# Patient Record
Sex: MALE | Race: ASIAN | NOT HISPANIC OR LATINO | URBAN - METROPOLITAN AREA
[De-identification: names, ages, dates, MRNs, and addresses within clinical notes are randomized per-mention and may not be internally consistent; named-entity substitution may affect disease eponyms.]

---

## 2019-08-08 ENCOUNTER — INPATIENT (INPATIENT)
Facility: HOSPITAL | Age: 49
LOS: 0 days | Discharge: ANOTHER HOSPITAL | End: 2019-08-09
Attending: INTERNAL MEDICINE | Admitting: INTERNAL MEDICINE
Payer: MEDICARE

## 2019-08-08 VITALS
OXYGEN SATURATION: 100 % | HEART RATE: 87 BPM | RESPIRATION RATE: 16 BRPM | TEMPERATURE: 98 F | DIASTOLIC BLOOD PRESSURE: 80 MMHG | SYSTOLIC BLOOD PRESSURE: 126 MMHG

## 2019-08-08 DIAGNOSIS — I24.9 ACUTE ISCHEMIC HEART DISEASE, UNSPECIFIED: ICD-10-CM

## 2019-08-08 DIAGNOSIS — Z98.61 CORONARY ANGIOPLASTY STATUS: Chronic | ICD-10-CM

## 2019-08-08 LAB
ALBUMIN SERPL ELPH-MCNC: 3.9 G/DL — SIGNIFICANT CHANGE UP (ref 3.3–5)
ALBUMIN SERPL ELPH-MCNC: 4.1 G/DL — SIGNIFICANT CHANGE UP (ref 3.3–5)
ALP SERPL-CCNC: 75 U/L — SIGNIFICANT CHANGE UP (ref 40–120)
ALP SERPL-CCNC: 76 U/L — SIGNIFICANT CHANGE UP (ref 40–120)
ALT FLD-CCNC: 15 U/L — SIGNIFICANT CHANGE UP (ref 4–41)
ALT FLD-CCNC: 16 U/L — SIGNIFICANT CHANGE UP (ref 4–41)
ANION GAP SERPL CALC-SCNC: 13 MMO/L — SIGNIFICANT CHANGE UP (ref 7–14)
ANION GAP SERPL CALC-SCNC: 13 MMO/L — SIGNIFICANT CHANGE UP (ref 7–14)
APTT BLD: 24.6 SEC — LOW (ref 27.5–36.3)
AST SERPL-CCNC: 12 U/L — SIGNIFICANT CHANGE UP (ref 4–40)
AST SERPL-CCNC: 13 U/L — SIGNIFICANT CHANGE UP (ref 4–40)
BASOPHILS # BLD AUTO: 0.03 K/UL — SIGNIFICANT CHANGE UP (ref 0–0.2)
BASOPHILS NFR BLD AUTO: 0.5 % — SIGNIFICANT CHANGE UP (ref 0–2)
BILIRUB SERPL-MCNC: 0.2 MG/DL — SIGNIFICANT CHANGE UP (ref 0.2–1.2)
BILIRUB SERPL-MCNC: 0.3 MG/DL — SIGNIFICANT CHANGE UP (ref 0.2–1.2)
BUN SERPL-MCNC: 10 MG/DL — SIGNIFICANT CHANGE UP (ref 7–23)
BUN SERPL-MCNC: 11 MG/DL — SIGNIFICANT CHANGE UP (ref 7–23)
CALCIUM SERPL-MCNC: 9.5 MG/DL — SIGNIFICANT CHANGE UP (ref 8.4–10.5)
CALCIUM SERPL-MCNC: 9.8 MG/DL — SIGNIFICANT CHANGE UP (ref 8.4–10.5)
CHLORIDE SERPL-SCNC: 100 MMOL/L — SIGNIFICANT CHANGE UP (ref 98–107)
CHLORIDE SERPL-SCNC: 103 MMOL/L — SIGNIFICANT CHANGE UP (ref 98–107)
CO2 SERPL-SCNC: 24 MMOL/L — SIGNIFICANT CHANGE UP (ref 22–31)
CO2 SERPL-SCNC: 26 MMOL/L — SIGNIFICANT CHANGE UP (ref 22–31)
CREAT SERPL-MCNC: 0.68 MG/DL — SIGNIFICANT CHANGE UP (ref 0.5–1.3)
CREAT SERPL-MCNC: 0.79 MG/DL — SIGNIFICANT CHANGE UP (ref 0.5–1.3)
EOSINOPHIL # BLD AUTO: 0.07 K/UL — SIGNIFICANT CHANGE UP (ref 0–0.5)
EOSINOPHIL NFR BLD AUTO: 1.1 % — SIGNIFICANT CHANGE UP (ref 0–6)
GLUCOSE SERPL-MCNC: 282 MG/DL — HIGH (ref 70–99)
GLUCOSE SERPL-MCNC: 357 MG/DL — HIGH (ref 70–99)
HCT VFR BLD CALC: 40.7 % — SIGNIFICANT CHANGE UP (ref 39–50)
HGB BLD-MCNC: 13.4 G/DL — SIGNIFICANT CHANGE UP (ref 13–17)
IMM GRANULOCYTES NFR BLD AUTO: 0.3 % — SIGNIFICANT CHANGE UP (ref 0–1.5)
INR BLD: 0.88 — SIGNIFICANT CHANGE UP (ref 0.88–1.17)
LYMPHOCYTES # BLD AUTO: 2.37 K/UL — SIGNIFICANT CHANGE UP (ref 1–3.3)
LYMPHOCYTES # BLD AUTO: 38.7 % — SIGNIFICANT CHANGE UP (ref 13–44)
MCHC RBC-ENTMCNC: 26.1 PG — LOW (ref 27–34)
MCHC RBC-ENTMCNC: 32.9 % — SIGNIFICANT CHANGE UP (ref 32–36)
MCV RBC AUTO: 79.2 FL — LOW (ref 80–100)
MONOCYTES # BLD AUTO: 0.43 K/UL — SIGNIFICANT CHANGE UP (ref 0–0.9)
MONOCYTES NFR BLD AUTO: 7 % — SIGNIFICANT CHANGE UP (ref 2–14)
NEUTROPHILS # BLD AUTO: 3.2 K/UL — SIGNIFICANT CHANGE UP (ref 1.8–7.4)
NEUTROPHILS NFR BLD AUTO: 52.4 % — SIGNIFICANT CHANGE UP (ref 43–77)
NRBC # FLD: 0 K/UL — SIGNIFICANT CHANGE UP (ref 0–0)
PLATELET # BLD AUTO: 161 K/UL — SIGNIFICANT CHANGE UP (ref 150–400)
PMV BLD: 12.6 FL — SIGNIFICANT CHANGE UP (ref 7–13)
POTASSIUM SERPL-MCNC: 4.1 MMOL/L — SIGNIFICANT CHANGE UP (ref 3.5–5.3)
POTASSIUM SERPL-MCNC: 4.4 MMOL/L — SIGNIFICANT CHANGE UP (ref 3.5–5.3)
POTASSIUM SERPL-SCNC: 4.1 MMOL/L — SIGNIFICANT CHANGE UP (ref 3.5–5.3)
POTASSIUM SERPL-SCNC: 4.4 MMOL/L — SIGNIFICANT CHANGE UP (ref 3.5–5.3)
PROT SERPL-MCNC: 6.9 G/DL — SIGNIFICANT CHANGE UP (ref 6–8.3)
PROT SERPL-MCNC: 6.9 G/DL — SIGNIFICANT CHANGE UP (ref 6–8.3)
PROTHROM AB SERPL-ACNC: 9.8 SEC — SIGNIFICANT CHANGE UP (ref 9.8–13.1)
RBC # BLD: 5.14 M/UL — SIGNIFICANT CHANGE UP (ref 4.2–5.8)
RBC # FLD: 12.9 % — SIGNIFICANT CHANGE UP (ref 10.3–14.5)
SODIUM SERPL-SCNC: 139 MMOL/L — SIGNIFICANT CHANGE UP (ref 135–145)
SODIUM SERPL-SCNC: 140 MMOL/L — SIGNIFICANT CHANGE UP (ref 135–145)
TROPONIN T, HIGH SENSITIVITY: 31 NG/L — SIGNIFICANT CHANGE UP (ref ?–14)
TROPONIN T, HIGH SENSITIVITY: 33 NG/L — SIGNIFICANT CHANGE UP (ref ?–14)
WBC # BLD: 6.12 K/UL — SIGNIFICANT CHANGE UP (ref 3.8–10.5)
WBC # FLD AUTO: 6.12 K/UL — SIGNIFICANT CHANGE UP (ref 3.8–10.5)

## 2019-08-08 PROCEDURE — 71046 X-RAY EXAM CHEST 2 VIEWS: CPT | Mod: 26

## 2019-08-08 RX ORDER — ASPIRIN/CALCIUM CARB/MAGNESIUM 324 MG
81 TABLET ORAL DAILY
Refills: 0 | Status: DISCONTINUED | OUTPATIENT
Start: 2019-08-08 | End: 2019-08-09

## 2019-08-08 RX ORDER — FENOFIBRATE,MICRONIZED 130 MG
145 CAPSULE ORAL DAILY
Refills: 0 | Status: DISCONTINUED | OUTPATIENT
Start: 2019-08-08 | End: 2019-08-09

## 2019-08-08 RX ORDER — PANTOPRAZOLE SODIUM 20 MG/1
40 TABLET, DELAYED RELEASE ORAL
Refills: 0 | Status: DISCONTINUED | OUTPATIENT
Start: 2019-08-08 | End: 2019-08-09

## 2019-08-08 RX ORDER — ATORVASTATIN CALCIUM 80 MG/1
40 TABLET, FILM COATED ORAL AT BEDTIME
Refills: 0 | Status: DISCONTINUED | OUTPATIENT
Start: 2019-08-08 | End: 2019-08-09

## 2019-08-08 RX ORDER — METOPROLOL TARTRATE 50 MG
25 TABLET ORAL
Refills: 0 | Status: DISCONTINUED | OUTPATIENT
Start: 2019-08-08 | End: 2019-08-09

## 2019-08-08 RX ORDER — GABAPENTIN 400 MG/1
300 CAPSULE ORAL
Refills: 0 | Status: DISCONTINUED | OUTPATIENT
Start: 2019-08-08 | End: 2019-08-09

## 2019-08-08 RX ORDER — ASPIRIN/CALCIUM CARB/MAGNESIUM 324 MG
324 TABLET ORAL ONCE
Refills: 0 | Status: COMPLETED | OUTPATIENT
Start: 2019-08-08 | End: 2019-08-08

## 2019-08-08 RX ORDER — LISINOPRIL 2.5 MG/1
10 TABLET ORAL DAILY
Refills: 0 | Status: DISCONTINUED | OUTPATIENT
Start: 2019-08-08 | End: 2019-08-09

## 2019-08-08 RX ORDER — HEPARIN SODIUM 5000 [USP'U]/ML
5000 INJECTION INTRAVENOUS; SUBCUTANEOUS EVERY 12 HOURS
Refills: 0 | Status: DISCONTINUED | OUTPATIENT
Start: 2019-08-08 | End: 2019-08-09

## 2019-08-08 RX ADMIN — Medication 324 MILLIGRAM(S): at 14:25

## 2019-08-08 NOTE — ED PROVIDER NOTE - CARDIAC, MLM
Chest wall tenderness over L chest wall. Normal rate, regular rhythm.  Heart sounds S1, S2.  No murmurs, rubs or gallops.

## 2019-08-08 NOTE — ED PROVIDER NOTE - PMH
Coronary artery disease of native artery of native heart with stable angina pectoris    Hyperlipidemia, unspecified hyperlipidemia type    Hypertension, unspecified type    Type 2 diabetes mellitus without complication, with long-term current use of insulin

## 2019-08-08 NOTE — ED PROVIDER NOTE - OBJECTIVE STATEMENT
48 yo M w/ PMHx of CAD (s/p stents in 2007, 2009), HTN, HLD, DM that presents to the ED c/o chest pain for 2 hours that onset while he was at rest in his car. The chest pain is described as L sided sharp electricity like that radiates to the L shoulder and is exacerbated by exertion and relieved with rest. He has associated shortness of breath with exertion. Also states he has been feeling "tired" since last week. Denies diaphoresis, fever, chills, cough, back pain, nausea, vomiting, abd pain. Of note, he was recently sent by his PCP for a MPI in 5/23/2019 which showed inferolateral scar with significant angelica-infact ischemia w/ no evidence of LV dilatation, and normal global LV systolic function with inferolateral wall motion abnormalities more pronounced during stress. He is scheduled for an elective heart cath on 8/14/2019.

## 2019-08-08 NOTE — ED PROVIDER NOTE - ATTENDING CONTRIBUTION TO CARE
I have personally performed a face to face bedside history and physical examination of this patient. I have discussed the history, examination, review of systems, assessment and plan of management with the resident. I have reviewed the electronic medical record and amended it to reflect my history, review of systems, physical exam, assessment and plan.      48 yo M w/ PMHx of CAD (s/p stents in 2007, 2009), HTN, HLD, DM that presents to the ED c/o chest pain for 2 hours.  Left sided, radiatin gto left shoulder, associated with sob and worse with exertion.  Had MPI 5/23/19 showing inferolateral scar and is scheduled for elective heart cath on 8/14/19.  VSS afebrile.  Exam non-toxic appearing, non-diaphoretic, lungs clear, heart sounds nl, well perfused.   EKG non-ischemic.   Unstable angina considered given risk factors and planned elective heart cath.  Low c/f PE, dissection.  Will send labs, cxr.  Likely CDU given elevated heart score.

## 2019-08-08 NOTE — ED PROVIDER NOTE - CLINICAL SUMMARY MEDICAL DECISION MAKING FREE TEXT BOX
48 yo M with PMhx of CAD (stents in 2007, 2009), HTN, HLD, DM and recent outpatient myocardial perfusion imaging that yielded recommendation for urgent outpatient cath that presented with chest pain for 2 hours concerning for cardiac chest pain due to its exertional nature. HEART score of 6 (moderate risk of MACE). Due to high suspicion of cardiac chest pain, will admit for ACS r/o. Less likely PE as patient is not tachypneic, not hypoxic, not tachycardia, no unilateral leg swelling, no hormone use, no hemoptysis, no recent surgery or trauma, no hx of DVT/PE. Less likely pneumonia as patient has no fever, chills, productive cough.

## 2019-08-08 NOTE — ED ADULT NURSE NOTE - NSIMPLEMENTINTERV_GEN_ALL_ED
Implemented All Universal Safety Interventions:  [place] to call system. Call bell, personal items and telephone within reach. Instruct patient to call for assistance. Room bathroom lighting operational. Non-slip footwear when patient is off stretcher. Physically safe environment: no spills, clutter or unnecessary equipment. Stretcher in lowest position, wheels locked, appropriate side rails in place.

## 2019-08-08 NOTE — ED ADULT TRIAGE NOTE - CHIEF COMPLAINT QUOTE
pt comes to ED for CP x 40 mins ago. pt VSS in triage. pt has hx of cardiac stents, stroke HLD, HTN. pt appears comfortable ekg to be obtained

## 2019-08-09 ENCOUNTER — TRANSCRIPTION ENCOUNTER (OUTPATIENT)
Age: 49
End: 2019-08-09

## 2019-08-09 ENCOUNTER — INPATIENT (INPATIENT)
Facility: HOSPITAL | Age: 49
LOS: 5 days | Discharge: HOME | DRG: 235 | End: 2019-08-15
Attending: STUDENT IN AN ORGANIZED HEALTH CARE EDUCATION/TRAINING PROGRAM | Admitting: STUDENT IN AN ORGANIZED HEALTH CARE EDUCATION/TRAINING PROGRAM
Payer: COMMERCIAL

## 2019-08-09 VITALS
OXYGEN SATURATION: 98 % | SYSTOLIC BLOOD PRESSURE: 106 MMHG | TEMPERATURE: 96 F | DIASTOLIC BLOOD PRESSURE: 71 MMHG | HEART RATE: 70 BPM | RESPIRATION RATE: 21 BRPM

## 2019-08-09 VITALS — HEART RATE: 72 BPM

## 2019-08-09 DIAGNOSIS — I10 ESSENTIAL (PRIMARY) HYPERTENSION: ICD-10-CM

## 2019-08-09 DIAGNOSIS — I25.10 ATHEROSCLEROTIC HEART DISEASE OF NATIVE CORONARY ARTERY WITHOUT ANGINA PECTORIS: ICD-10-CM

## 2019-08-09 DIAGNOSIS — E11.65 TYPE 2 DIABETES MELLITUS WITH HYPERGLYCEMIA: ICD-10-CM

## 2019-08-09 DIAGNOSIS — Z29.9 ENCOUNTER FOR PROPHYLACTIC MEASURES, UNSPECIFIED: ICD-10-CM

## 2019-08-09 DIAGNOSIS — R07.9 CHEST PAIN, UNSPECIFIED: ICD-10-CM

## 2019-08-09 DIAGNOSIS — Z98.61 CORONARY ANGIOPLASTY STATUS: Chronic | ICD-10-CM

## 2019-08-09 DIAGNOSIS — E78.5 HYPERLIPIDEMIA, UNSPECIFIED: ICD-10-CM

## 2019-08-09 LAB
ALBUMIN SERPL ELPH-MCNC: 3.9 G/DL — SIGNIFICANT CHANGE UP (ref 3.3–5)
ALP SERPL-CCNC: 59 U/L — SIGNIFICANT CHANGE UP (ref 40–120)
ALT FLD-CCNC: 18 U/L — SIGNIFICANT CHANGE UP (ref 10–45)
ANION GAP SERPL CALC-SCNC: 13 MMO/L — SIGNIFICANT CHANGE UP (ref 7–14)
ANION GAP SERPL CALC-SCNC: 13 MMOL/L — SIGNIFICANT CHANGE UP (ref 5–17)
APAP SERPL-MCNC: < 15 UG/ML — LOW (ref 15–25)
APTT BLD: 42.4 SEC — HIGH (ref 27.5–36.3)
AST SERPL-CCNC: 13 U/L — SIGNIFICANT CHANGE UP (ref 10–40)
BILIRUB SERPL-MCNC: 0.3 MG/DL — SIGNIFICANT CHANGE UP (ref 0.2–1.2)
BLD GP AB SCN SERPL QL: NEGATIVE — SIGNIFICANT CHANGE UP
BUN SERPL-MCNC: 12 MG/DL — SIGNIFICANT CHANGE UP (ref 7–23)
BUN SERPL-MCNC: 16 MG/DL — SIGNIFICANT CHANGE UP (ref 7–23)
CALCIUM SERPL-MCNC: 9.1 MG/DL — SIGNIFICANT CHANGE UP (ref 8.4–10.5)
CALCIUM SERPL-MCNC: 9.6 MG/DL — SIGNIFICANT CHANGE UP (ref 8.4–10.5)
CHLORIDE SERPL-SCNC: 101 MMOL/L — SIGNIFICANT CHANGE UP (ref 96–108)
CHLORIDE SERPL-SCNC: 101 MMOL/L — SIGNIFICANT CHANGE UP (ref 98–107)
CHOLEST SERPL-MCNC: 163 MG/DL — SIGNIFICANT CHANGE UP (ref 120–199)
CK MB BLD-MCNC: 5.34 NG/ML — SIGNIFICANT CHANGE UP (ref 1–6.6)
CK MB BLD-MCNC: SIGNIFICANT CHANGE UP (ref 0–2.5)
CK MB CFR SERPL CALC: 4 NG/ML — SIGNIFICANT CHANGE UP (ref 0–6.7)
CK SERPL-CCNC: 57 U/L — SIGNIFICANT CHANGE UP (ref 30–200)
CK SERPL-CCNC: 91 U/L — SIGNIFICANT CHANGE UP (ref 30–200)
CO2 SERPL-SCNC: 22 MMOL/L — SIGNIFICANT CHANGE UP (ref 22–31)
CO2 SERPL-SCNC: 26 MMOL/L — SIGNIFICANT CHANGE UP (ref 22–31)
CREAT SERPL-MCNC: 0.81 MG/DL — SIGNIFICANT CHANGE UP (ref 0.5–1.3)
CREAT SERPL-MCNC: 0.87 MG/DL — SIGNIFICANT CHANGE UP (ref 0.5–1.3)
ETHANOL BLD-MCNC: < 10 MG/DL — SIGNIFICANT CHANGE UP
FERRITIN SERPL-MCNC: 64.72 NG/ML — SIGNIFICANT CHANGE UP (ref 30–400)
FIBRINOGEN PPP-MCNC: 327 MG/DL — LOW (ref 350–510)
GAS PNL BLDA: SIGNIFICANT CHANGE UP
GLUCOSE BLDC GLUCOMTR-MCNC: 181 MG/DL — HIGH (ref 70–99)
GLUCOSE BLDC GLUCOMTR-MCNC: 190 MG/DL — HIGH (ref 70–99)
GLUCOSE BLDC GLUCOMTR-MCNC: 318 MG/DL — HIGH (ref 70–99)
GLUCOSE BLDC GLUCOMTR-MCNC: 385 MG/DL — HIGH (ref 70–99)
GLUCOSE SERPL-MCNC: 206 MG/DL — HIGH (ref 70–99)
GLUCOSE SERPL-MCNC: 370 MG/DL — HIGH (ref 70–99)
HBA1C BLD-MCNC: 11.7 % — HIGH (ref 4–5.6)
HCT VFR BLD CALC: 42.4 % — SIGNIFICANT CHANGE UP (ref 39–50)
HCT VFR BLD CALC: 42.4 % — SIGNIFICANT CHANGE UP (ref 39–50)
HDLC SERPL-MCNC: 44 MG/DL — SIGNIFICANT CHANGE UP (ref 35–55)
HGB BLD-MCNC: 13.9 G/DL — SIGNIFICANT CHANGE UP (ref 13–17)
HGB BLD-MCNC: 14 G/DL — SIGNIFICANT CHANGE UP (ref 13–17)
INR BLD: 0.93 RATIO — SIGNIFICANT CHANGE UP (ref 0.88–1.16)
IRON SATN MFR SERPL: 349 UG/DL — SIGNIFICANT CHANGE UP (ref 155–535)
IRON SATN MFR SERPL: 83 UG/DL — SIGNIFICANT CHANGE UP (ref 45–165)
LIPID PNL WITH DIRECT LDL SERPL: 98 MG/DL — SIGNIFICANT CHANGE UP
MAGNESIUM SERPL-MCNC: 2 MG/DL — SIGNIFICANT CHANGE UP (ref 1.6–2.6)
MCHC RBC-ENTMCNC: 26.1 PG — LOW (ref 27–34)
MCHC RBC-ENTMCNC: 26.8 PG — LOW (ref 27–34)
MCHC RBC-ENTMCNC: 32.8 % — SIGNIFICANT CHANGE UP (ref 32–36)
MCHC RBC-ENTMCNC: 33.1 GM/DL — SIGNIFICANT CHANGE UP (ref 32–36)
MCV RBC AUTO: 79.7 FL — LOW (ref 80–100)
MCV RBC AUTO: 80.9 FL — SIGNIFICANT CHANGE UP (ref 80–100)
NRBC # FLD: 0 K/UL — SIGNIFICANT CHANGE UP (ref 0–0)
PA ADP PRP-ACNC: 217 PRU — SIGNIFICANT CHANGE UP (ref 194–417)
PHOSPHATE SERPL-MCNC: 3.7 MG/DL — SIGNIFICANT CHANGE UP (ref 2.5–4.5)
PLATELET # BLD AUTO: 152 K/UL — SIGNIFICANT CHANGE UP (ref 150–400)
PLATELET # BLD AUTO: 156 K/UL — SIGNIFICANT CHANGE UP (ref 150–400)
PMV BLD: 12.9 FL — SIGNIFICANT CHANGE UP (ref 7–13)
POTASSIUM SERPL-MCNC: 4 MMOL/L — SIGNIFICANT CHANGE UP (ref 3.5–5.3)
POTASSIUM SERPL-MCNC: 4.5 MMOL/L — SIGNIFICANT CHANGE UP (ref 3.5–5.3)
POTASSIUM SERPL-SCNC: 4 MMOL/L — SIGNIFICANT CHANGE UP (ref 3.5–5.3)
POTASSIUM SERPL-SCNC: 4.5 MMOL/L — SIGNIFICANT CHANGE UP (ref 3.5–5.3)
PROT SERPL-MCNC: 6.5 G/DL — SIGNIFICANT CHANGE UP (ref 6–8.3)
PROTHROM AB SERPL-ACNC: 10.6 SEC — SIGNIFICANT CHANGE UP (ref 10–12.9)
RBC # BLD: 5.24 M/UL — SIGNIFICANT CHANGE UP (ref 4.2–5.8)
RBC # BLD: 5.32 M/UL — SIGNIFICANT CHANGE UP (ref 4.2–5.8)
RBC # FLD: 12.6 % — SIGNIFICANT CHANGE UP (ref 10.3–14.5)
RBC # FLD: 13.2 % — SIGNIFICANT CHANGE UP (ref 10.3–14.5)
RETICS #: 80 K/UL — SIGNIFICANT CHANGE UP (ref 25–125)
RETICS/RBC NFR: 1.5 % — SIGNIFICANT CHANGE UP (ref 0.5–2.5)
RH IG SCN BLD-IMP: POSITIVE — SIGNIFICANT CHANGE UP
SALICYLATES SERPL-MCNC: < 5 MG/DL — LOW (ref 15–30)
SODIUM SERPL-SCNC: 136 MMOL/L — SIGNIFICANT CHANGE UP (ref 135–145)
SODIUM SERPL-SCNC: 140 MMOL/L — SIGNIFICANT CHANGE UP (ref 135–145)
TRIGL SERPL-MCNC: 282 MG/DL — HIGH (ref 10–149)
TROPONIN T, HIGH SENSITIVITY RESULT: 26 NG/L — SIGNIFICANT CHANGE UP (ref 0–51)
TROPONIN T, HIGH SENSITIVITY: 26 NG/L — SIGNIFICANT CHANGE UP (ref ?–14)
TSH SERPL-MCNC: 1.77 UIU/ML — SIGNIFICANT CHANGE UP (ref 0.27–4.2)
UIBC SERPL-MCNC: 266 UG/DL — SIGNIFICANT CHANGE UP (ref 110–370)
WBC # BLD: 5.47 K/UL — SIGNIFICANT CHANGE UP (ref 3.8–10.5)
WBC # BLD: 6.3 K/UL — SIGNIFICANT CHANGE UP (ref 3.8–10.5)
WBC # FLD AUTO: 5.47 K/UL — SIGNIFICANT CHANGE UP (ref 3.8–10.5)
WBC # FLD AUTO: 6.3 K/UL — SIGNIFICANT CHANGE UP (ref 3.8–10.5)

## 2019-08-09 PROCEDURE — 71045 X-RAY EXAM CHEST 1 VIEW: CPT | Mod: 26

## 2019-08-09 PROCEDURE — 36620 INSERTION CATHETER ARTERY: CPT

## 2019-08-09 PROCEDURE — 33967 INSERT I-AORT PERCUT DEVICE: CPT

## 2019-08-09 PROCEDURE — 99223 1ST HOSP IP/OBS HIGH 75: CPT

## 2019-08-09 PROCEDURE — 76937 US GUIDE VASCULAR ACCESS: CPT | Mod: 26

## 2019-08-09 PROCEDURE — 93458 L HRT ARTERY/VENTRICLE ANGIO: CPT | Mod: 26

## 2019-08-09 PROCEDURE — 93010 ELECTROCARDIOGRAM REPORT: CPT

## 2019-08-09 PROCEDURE — 99291 CRITICAL CARE FIRST HOUR: CPT | Mod: 25

## 2019-08-09 PROCEDURE — 93306 TTE W/DOPPLER COMPLETE: CPT | Mod: 26

## 2019-08-09 PROCEDURE — 99236 HOSP IP/OBS SAME DATE HI 85: CPT

## 2019-08-09 RX ORDER — SODIUM CHLORIDE 9 MG/ML
3 INJECTION INTRAMUSCULAR; INTRAVENOUS; SUBCUTANEOUS EVERY 8 HOURS
Refills: 0 | Status: DISCONTINUED | OUTPATIENT
Start: 2019-08-09 | End: 2019-08-10

## 2019-08-09 RX ORDER — INSULIN LISPRO 100/ML
VIAL (ML) SUBCUTANEOUS EVERY 4 HOURS
Refills: 0 | Status: DISCONTINUED | OUTPATIENT
Start: 2019-08-09 | End: 2019-08-09

## 2019-08-09 RX ORDER — INSULIN GLARGINE 100 [IU]/ML
60 INJECTION, SOLUTION SUBCUTANEOUS AT BEDTIME
Refills: 0 | Status: DISCONTINUED | OUTPATIENT
Start: 2019-08-09 | End: 2019-08-09

## 2019-08-09 RX ORDER — DEXTROSE 50 % IN WATER 50 %
15 SYRINGE (ML) INTRAVENOUS ONCE
Refills: 0 | Status: DISCONTINUED | OUTPATIENT
Start: 2019-08-09 | End: 2019-08-10

## 2019-08-09 RX ORDER — CHLORHEXIDINE GLUCONATE 213 G/1000ML
1 SOLUTION TOPICAL ONCE
Refills: 0 | Status: COMPLETED | OUTPATIENT
Start: 2019-08-09 | End: 2019-08-09

## 2019-08-09 RX ORDER — INSULIN LISPRO 100/ML
8 VIAL (ML) SUBCUTANEOUS
Refills: 0 | Status: DISCONTINUED | OUTPATIENT
Start: 2019-08-09 | End: 2019-08-09

## 2019-08-09 RX ORDER — DEXTROSE 50 % IN WATER 50 %
12.5 SYRINGE (ML) INTRAVENOUS ONCE
Refills: 0 | Status: DISCONTINUED | OUTPATIENT
Start: 2019-08-09 | End: 2019-08-09

## 2019-08-09 RX ORDER — FENOFIBRATE,MICRONIZED 130 MG
48 CAPSULE ORAL DAILY
Refills: 0 | Status: DISCONTINUED | OUTPATIENT
Start: 2019-08-09 | End: 2019-08-10

## 2019-08-09 RX ORDER — INSULIN LISPRO 100/ML
VIAL (ML) SUBCUTANEOUS AT BEDTIME
Refills: 0 | Status: DISCONTINUED | OUTPATIENT
Start: 2019-08-09 | End: 2019-08-09

## 2019-08-09 RX ORDER — DEXTROSE 50 % IN WATER 50 %
12.5 SYRINGE (ML) INTRAVENOUS ONCE
Refills: 0 | Status: DISCONTINUED | OUTPATIENT
Start: 2019-08-09 | End: 2019-08-10

## 2019-08-09 RX ORDER — INSULIN LISPRO 100/ML
VIAL (ML) SUBCUTANEOUS
Refills: 0 | Status: DISCONTINUED | OUTPATIENT
Start: 2019-08-09 | End: 2019-08-09

## 2019-08-09 RX ORDER — SODIUM CHLORIDE 9 MG/ML
1000 INJECTION, SOLUTION INTRAVENOUS
Refills: 0 | Status: DISCONTINUED | OUTPATIENT
Start: 2019-08-09 | End: 2019-08-09

## 2019-08-09 RX ORDER — DEXTROSE 50 % IN WATER 50 %
25 SYRINGE (ML) INTRAVENOUS ONCE
Refills: 0 | Status: DISCONTINUED | OUTPATIENT
Start: 2019-08-09 | End: 2019-08-09

## 2019-08-09 RX ORDER — SODIUM CHLORIDE 9 MG/ML
500 INJECTION, SOLUTION INTRAVENOUS ONCE
Refills: 0 | Status: COMPLETED | OUTPATIENT
Start: 2019-08-09 | End: 2019-08-09

## 2019-08-09 RX ORDER — GLUCAGON INJECTION, SOLUTION 0.5 MG/.1ML
1 INJECTION, SOLUTION SUBCUTANEOUS ONCE
Refills: 0 | Status: DISCONTINUED | OUTPATIENT
Start: 2019-08-09 | End: 2019-08-09

## 2019-08-09 RX ORDER — HEPARIN SODIUM 5000 [USP'U]/ML
900 INJECTION INTRAVENOUS; SUBCUTANEOUS
Qty: 25000 | Refills: 0 | Status: DISCONTINUED | OUTPATIENT
Start: 2019-08-09 | End: 2019-08-09

## 2019-08-09 RX ORDER — SODIUM CHLORIDE 9 MG/ML
1000 INJECTION, SOLUTION INTRAVENOUS
Refills: 0 | Status: DISCONTINUED | OUTPATIENT
Start: 2019-08-09 | End: 2019-08-10

## 2019-08-09 RX ORDER — HEPARIN SODIUM 5000 [USP'U]/ML
900 INJECTION INTRAVENOUS; SUBCUTANEOUS
Qty: 25000 | Refills: 0 | Status: DISCONTINUED | OUTPATIENT
Start: 2019-08-09 | End: 2019-08-10

## 2019-08-09 RX ORDER — FENTANYL CITRATE 50 UG/ML
25 INJECTION INTRAVENOUS ONCE
Refills: 0 | Status: DISCONTINUED | OUTPATIENT
Start: 2019-08-09 | End: 2019-08-09

## 2019-08-09 RX ORDER — CHLORHEXIDINE GLUCONATE 213 G/1000ML
1 SOLUTION TOPICAL
Refills: 0 | Status: DISCONTINUED | OUTPATIENT
Start: 2019-08-09 | End: 2019-08-10

## 2019-08-09 RX ORDER — CHLORHEXIDINE GLUCONATE 213 G/1000ML
15 SOLUTION TOPICAL ONCE
Refills: 0 | Status: COMPLETED | OUTPATIENT
Start: 2019-08-09 | End: 2019-08-10

## 2019-08-09 RX ORDER — ATORVASTATIN CALCIUM 80 MG/1
80 TABLET, FILM COATED ORAL AT BEDTIME
Refills: 0 | Status: DISCONTINUED | OUTPATIENT
Start: 2019-08-09 | End: 2019-08-10

## 2019-08-09 RX ORDER — INSULIN HUMAN 100 [IU]/ML
3 INJECTION, SOLUTION SUBCUTANEOUS
Qty: 100 | Refills: 0 | Status: DISCONTINUED | OUTPATIENT
Start: 2019-08-09 | End: 2019-08-10

## 2019-08-09 RX ORDER — PANTOPRAZOLE SODIUM 20 MG/1
40 TABLET, DELAYED RELEASE ORAL DAILY
Refills: 0 | Status: DISCONTINUED | OUTPATIENT
Start: 2019-08-09 | End: 2019-08-10

## 2019-08-09 RX ORDER — DEXTROSE 50 % IN WATER 50 %
15 SYRINGE (ML) INTRAVENOUS ONCE
Refills: 0 | Status: DISCONTINUED | OUTPATIENT
Start: 2019-08-09 | End: 2019-08-09

## 2019-08-09 RX ORDER — CEFUROXIME AXETIL 250 MG
1500 TABLET ORAL ONCE
Refills: 0 | Status: DISCONTINUED | OUTPATIENT
Start: 2019-08-09 | End: 2019-08-10

## 2019-08-09 RX ORDER — INSULIN GLARGINE 100 [IU]/ML
24 INJECTION, SOLUTION SUBCUTANEOUS AT BEDTIME
Refills: 0 | Status: DISCONTINUED | OUTPATIENT
Start: 2019-08-09 | End: 2019-08-09

## 2019-08-09 RX ORDER — POTASSIUM CHLORIDE 20 MEQ
20 PACKET (EA) ORAL ONCE
Refills: 0 | Status: COMPLETED | OUTPATIENT
Start: 2019-08-09 | End: 2019-08-09

## 2019-08-09 RX ORDER — GLUCAGON INJECTION, SOLUTION 0.5 MG/.1ML
1 INJECTION, SOLUTION SUBCUTANEOUS ONCE
Refills: 0 | Status: DISCONTINUED | OUTPATIENT
Start: 2019-08-09 | End: 2019-08-10

## 2019-08-09 RX ADMIN — Medication 81 MILLIGRAM(S): at 09:27

## 2019-08-09 RX ADMIN — SODIUM CHLORIDE 3 MILLILITER(S): 9 INJECTION INTRAMUSCULAR; INTRAVENOUS; SUBCUTANEOUS at 22:27

## 2019-08-09 RX ADMIN — Medication 1: at 18:45

## 2019-08-09 RX ADMIN — Medication 4: at 22:15

## 2019-08-09 RX ADMIN — ATORVASTATIN CALCIUM 80 MILLIGRAM(S): 80 TABLET, FILM COATED ORAL at 22:15

## 2019-08-09 RX ADMIN — Medication 25 MILLIGRAM(S): at 05:06

## 2019-08-09 RX ADMIN — GABAPENTIN 300 MILLIGRAM(S): 400 CAPSULE ORAL at 18:44

## 2019-08-09 RX ADMIN — Medication 4: at 11:56

## 2019-08-09 RX ADMIN — PANTOPRAZOLE SODIUM 40 MILLIGRAM(S): 20 TABLET, DELAYED RELEASE ORAL at 05:06

## 2019-08-09 RX ADMIN — LISINOPRIL 10 MILLIGRAM(S): 2.5 TABLET ORAL at 05:06

## 2019-08-09 RX ADMIN — GABAPENTIN 300 MILLIGRAM(S): 400 CAPSULE ORAL at 05:06

## 2019-08-09 RX ADMIN — HEPARIN SODIUM 9 UNIT(S)/HR: 5000 INJECTION INTRAVENOUS; SUBCUTANEOUS at 17:45

## 2019-08-09 RX ADMIN — Medication 25 MILLIGRAM(S): at 18:44

## 2019-08-09 RX ADMIN — Medication 5: at 08:44

## 2019-08-09 RX ADMIN — Medication 145 MILLIGRAM(S): at 09:27

## 2019-08-09 NOTE — H&P ADULT - NSICDXPASTMEDICALHX_GEN_ALL_CORE_FT
PAST MEDICAL HISTORY:  Coronary artery disease of native artery of native heart with stable angina pectoris     Hyperlipidemia, unspecified hyperlipidemia type     Hypertension, unspecified type     Type 2 diabetes mellitus without complication, with long-term current use of insulin

## 2019-08-09 NOTE — H&P ADULT - NSHPPHYSICALEXAM_GEN_ALL_CORE
Neuro: awake, alert, followed commands  HEENT: WNL  Lungs: clear, bilateral breath sounds  CV: RRR S1S2, IABP in place R groin  Abd: soft, ND, NT  Ext: warm, well perfused, +DP bilaterally, no edema

## 2019-08-09 NOTE — H&P ADULT - NEGATIVE OPHTHALMOLOGIC SYMPTOMS
no blurred vision L/no discharge L/no photophobia/no diplopia/no blurred vision R/no discharge R/no lacrimation L/no lacrimation R

## 2019-08-09 NOTE — H&P ADULT - NEGATIVE NEUROLOGICAL SYMPTOMS
no transient paralysis/no weakness/no loss of consciousness/no hemiparesis/no generalized seizures/no tremors/no vertigo/no loss of sensation/no syncope/no difficulty walking/no confusion/no focal seizures/no paresthesias/no headache

## 2019-08-09 NOTE — H&P ADULT - NSHPSOCIALHISTORY_GEN_ALL_CORE
, lives with wife, retired    Quit smoking in 2005 and smoked 2-3 packs a day/used to drink 1/2 a bottle of vodka a day and quit in 2005/denied any illicit drug use

## 2019-08-09 NOTE — H&P ADULT - GASTROINTESTINAL DETAILS
bowel sounds normal/normal/nontender/no guarding/no rigidity/no rebound tenderness/soft/no distention

## 2019-08-09 NOTE — CONSULT NOTE ADULT - SUBJECTIVE AND OBJECTIVE BOX
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 As per the patient he was resting in bed last night when he developed sudden onset left sided chest pain. Patient stated that the pain was not that severe and he went to sleep. Patient woke up the next morning and went on with his usual day and was in the car when he developed similar left sided chest pain. Patient described the chest pain as a sharp pain, without any aggravating or alleviating factors, lasting few hours in duration, without any radiation, with a severity of 5/10. Patient stated that this episode of chest pain was different when he had the stents placed. Patient stated that when he had the stents placed he did not have chest pain. Patient also endorsed of SOB and CAGE with the chest pain. Patient denied any fevers, chills, N/V/D/C, abdominal pain, dysuria, melena, hematochezia, recent travel, sick contact, pleuritic or positional chest pain.     On ED admission EKG revealed NSR at a rate of 81 with TWI in leads V3-V5, II, III, AVF and QTc of 439, CE x 1: Trop: 33, CE x 2: Trop: 31, Gluc: 282. CXR: Suboptimally inflated but otherwise clear lungs. No pleural effusions or pneumothorax. Left coronary stent noted otherwise cardiac and mediastinal silhouettes within normal limits. Trachea midline. Unremarkable osseous structures. In the ED patient received Aspirin 325mg. When examined patient is resting in the stretcher and denied any current chest pain or SOB. (09 Aug 2019 00:05)      PAST MEDICAL & SURGICAL HISTORY:  Coronary artery disease of native artery of native heart with stable angina pectoris  Type 2 diabetes mellitus without complication, with long-term current use of insulin  Hyperlipidemia, unspecified hyperlipidemia type  Hypertension, unspecified type  History of percutaneous coronary intervention      FAMILY HISTORY:  Family history of heart disease      Social History:    Outpatient Medications:    MEDICATIONS  (STANDING):  aspirin enteric coated 81 milliGRAM(s) Oral daily  atorvastatin 40 milliGRAM(s) Oral at bedtime  dextrose 5%. 1000 milliLiter(s) (50 mL/Hr) IV Continuous <Continuous>  dextrose 50% Injectable 12.5 Gram(s) IV Push once  dextrose 50% Injectable 25 Gram(s) IV Push once  dextrose 50% Injectable 25 Gram(s) IV Push once  fenofibrate Tablet 145 milliGRAM(s) Oral daily  gabapentin 300 milliGRAM(s) Oral two times a day  heparin  Injectable 5000 Unit(s) SubCutaneous every 12 hours  insulin glargine Injectable (LANTUS) 60 Unit(s) SubCutaneous at bedtime  insulin lispro (HumaLOG) corrective regimen sliding scale   SubCutaneous three times a day before meals  insulin lispro (HumaLOG) corrective regimen sliding scale   SubCutaneous at bedtime  lisinopril 10 milliGRAM(s) Oral daily  metoprolol tartrate 25 milliGRAM(s) Oral two times a day  pantoprazole    Tablet 40 milliGRAM(s) Oral before breakfast    MEDICATIONS  (PRN):  dextrose 40% Gel 15 Gram(s) Oral once PRN Blood Glucose LESS THAN 70 milliGRAM(s)/deciliter  glucagon  Injectable 1 milliGRAM(s) IntraMuscular once PRN Glucose LESS THAN 70 milligrams/deciliter      Allergies    No Known Allergies    Intolerances      Review of Systems:  Constitutional: No fever  Eyes: No blurry vision  Neuro: No tremors  HEENT: No pain  Cardiovascular: No chest pain, palpitations  Respiratory: No SOB, no cough  GI: No nausea, vomiting, abdominal pain  : No dysuria  Skin: no rash  Psych: no depression  Endocrine: no polyuria, polydipsia  Hem/lymph: no swelling  Osteoporosis: no fractures    ALL OTHER SYSTEMS REVIEWED AND NEGATIVE    UNABLE TO OBTAIN    PHYSICAL EXAM:  VITALS: T(C): 36.4 (08-09-19 @ 05:27)  T(F): 97.6 (08-09-19 @ 05:27), Max: 98.2 (08-08-19 @ 19:46)  HR: 81 (08-09-19 @ 05:27) (70 - 84)  BP: 122/64 (08-09-19 @ 05:27) (122/64 - 141/82)  RR:  (16 - 18)  SpO2:  (98% - 100%)  Wt(kg): --  GENERAL: NAD, well-groomed, well-developed  EYES: No proptosis, no lid lag, anicteric  HEENT:  Atraumatic, Normocephalic, moist mucous membranes  THYROID: Normal size, no palpable nodules  RESPIRATORY: Clear to auscultation bilaterally; No rales, rhonchi, wheezing, or rubs  CARDIOVASCULAR: Regular rate and rhythm; No murmurs; no peripheral edema  GI: Soft, nontender, non distended, normal bowel sounds  SKIN: Dry, intact, No rashes or lesions  MUSCULOSKELETAL: Full range of motion, normal strength  NEURO: sensation intact, extraocular movements intact, no tremor, normal reflexes  PSYCH: Alert and oriented x 3, normal affect, normal mood  CUSHING'S SIGNS: no striae    POCT Blood Glucose.: 318 mg/dL (08-09-19 @ 11:47)  POCT Blood Glucose.: 385 mg/dL (08-09-19 @ 08:36)                            13.9   5.47  )-----------( 156      ( 09 Aug 2019 07:40 )             42.4       08-09    136  |  101  |  12  ----------------------------<  370<H>  4.5   |  22  |  0.81    EGFR if : 120  EGFR if non : 104    Ca    9.6      08-09  Mg     2.0     08-09  Phos  3.7     08-09    TPro  6.9  /  Alb  3.9  /  TBili  0.2  /  DBili  x   /  AST  12  /  ALT  15  /  AlkPhos  75  08-08      Thyroid Function Tests:  08-09 @ 07:40 TSH 1.77 FreeT4 -- T3 -- Anti TPO -- Anti Thyroglobulin Ab -- TSI --      Hemoglobin A1C, Whole Blood: 11.7 % <H> [4.0 - 5.6] (08-09-19 @ 07:40)      08-09 Chol 163 LDL 98 HDL 44 Trig 282<H>    Radiology: HPI:  50 y/o M with PMH of CAD(s/p 2 stents), 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 As per the patient he was resting in bed last night when he developed sudden onset left sided chest pain. Patient stated that the pain was not that severe and he went to sleep. Patient woke up the next morning and went on with his usual day and was in the car when he developed similar left sided chest pain. Patient described the chest pain as a sharp pain, without any aggravating or alleviating factors, lasting few hours in duration, without any radiation, with a severity of 5/10. Patient stated that this episode of chest pain was different when he had the stents placed. Patient stated that when he had the stents placed he did not have chest pain. Patient also endorsed of SOB and CAGE with the chest pain. Patient denied any fevers, chills, N/V/D/C, abdominal pain, dysuria, melena, hematochezia, recent travel, sick contact, pleuritic or positional chest pain.     On ED admission EKG revealed NSR at a rate of 81 with TWI in leads V3-V5, II, III, AVF and QTc of 439, CE x 1: Trop: 33, CE x 2: Trop: 31, Gluc: 282. CXR: Suboptimally inflated but otherwise clear lungs. No pleural effusions or pneumothorax. Left coronary stent noted otherwise cardiac and mediastinal silhouettes within normal limits. Trachea midline. Unremarkable osseous structures. In the ED patient received Aspirin 325mg. When examined patient is resting in the stretcher and denied any current chest pain or SOB. 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 Patient has been following with PMD for management of his DM. He was diagnosed with T2DM in 2005. He was initially treated with oral meds and was started on insulin 2-3 years ago. He currently takes glipizide 10mg BID, basaglar 60 Units HS and Janumet 50-1000mg BID. Patient has never been hospitalized for DKA. His DM is complicated by cataracts, neuropathy, CVA and CAD. His glucometer at home is broken so he does not check FS. He has a high intake of roti and rice in his diet and likes to drink home made orange juice.       PAST MEDICAL & SURGICAL HISTORY:  Coronary artery disease of native artery of native heart with stable angina pectoris  Type 2 diabetes mellitus without complication, with long-term current use of insulin  Hyperlipidemia, unspecified hyperlipidemia type  Hypertension, unspecified type  History of percutaneous coronary intervention      FAMILY HISTORY:  Family history of heart disease  FH of DM: Mother      Social History:nonsmoker, nondrinker    Outpatient Medications:  · 	esomeprazole 40 mg oral delayed release capsule: Last Dose Taken:  , 1 cap(s) orally once a day  · 	atorvastatin 40 mg oral tablet: Last Dose Taken:  , 1 tab(s) orally once a day  · 	Janumet 50 mg-1000 mg oral tablet: Last Dose Taken:  , 1 tab(s) orally 2 times a day  · 	fenofibrate 145 mg oral tablet: Last Dose Taken:  , 1 tab(s) orally once a day  · 	Metoprolol Tartrate 25 mg oral tablet: Last Dose Taken:  , 1 tab(s) orally 2 times a day  · 	lisinopril 10 mg oral tablet: Last Dose Taken:  , 1 tab(s) orally once a day  · 	gabapentin 300 mg oral capsule: Last Dose Taken:  , 1 cap(s) orally 2 times a day  · 	glipiZIDE 10 mg oral tablet: 1 tab(s) orally 2 times a day  · 	Aspirin Enteric Coated 81 mg oral delayed release tablet: 1 tab(s) orally once a day  · 	Basaglar KwikPen 100 units/mL subcutaneous solution: 60 unit(s) subcutaneous once a day (at bedtime)  · 	Vitamin D2 50,000 intl units (1.25 mg) oral capsule: 1 cap(s) orally once a week    .      MEDICATIONS  (STANDING):  aspirin enteric coated 81 milliGRAM(s) Oral daily  atorvastatin 40 milliGRAM(s) Oral at bedtime  dextrose 5%. 1000 milliLiter(s) (50 mL/Hr) IV Continuous <Continuous>  dextrose 50% Injectable 12.5 Gram(s) IV Push once  dextrose 50% Injectable 25 Gram(s) IV Push once  dextrose 50% Injectable 25 Gram(s) IV Push once  fenofibrate Tablet 145 milliGRAM(s) Oral daily  gabapentin 300 milliGRAM(s) Oral two times a day  heparin  Injectable 5000 Unit(s) SubCutaneous every 12 hours  insulin glargine Injectable (LANTUS) 60 Unit(s) SubCutaneous at bedtime  insulin lispro (HumaLOG) corrective regimen sliding scale   SubCutaneous three times a day before meals  insulin lispro (HumaLOG) corrective regimen sliding scale   SubCutaneous at bedtime  lisinopril 10 milliGRAM(s) Oral daily  metoprolol tartrate 25 milliGRAM(s) Oral two times a day  pantoprazole    Tablet 40 milliGRAM(s) Oral before breakfast    MEDICATIONS  (PRN):  dextrose 40% Gel 15 Gram(s) Oral once PRN Blood Glucose LESS THAN 70 milliGRAM(s)/deciliter  glucagon  Injectable 1 milliGRAM(s) IntraMuscular once PRN Glucose LESS THAN 70 milligrams/deciliter      Allergies    No Known Allergies    Intolerances      Review of Systems:  Constitutional: No fever  Eyes: No blurry vision  Neuro: No tremors  HEENT: No pain  Cardiovascular: + chest pain on arrival, palpitations  Respiratory: No SOB, no cough  GI: No nausea, vomiting, abdominal pain  : No dysuria  Skin: no rash  Psych: no depression  Endocrine: + polyuria, polydipsia  Hem/lymph: no swelling  Osteoporosis: no fractures    ALL OTHER SYSTEMS REVIEWED AND NEGATIVE        PHYSICAL EXAM:  VITALS: T(C): 36.4 (08-09-19 @ 05:27)  T(F): 97.6 (08-09-19 @ 05:27), Max: 98.2 (08-08-19 @ 19:46)  HR: 81 (08-09-19 @ 05:27) (70 - 84)  BP: 122/64 (08-09-19 @ 05:27) (122/64 - 141/82)  RR:  (16 - 18)  SpO2:  (98% - 100%)  Wt(kg): --  GENERAL: NAD, well-groomed, well-developed  EYES: No proptosis, no lid lag, anicteric  HEENT:  Atraumatic, Normocephalic, moist mucous membranes  THYROID: Normal size, no palpable nodules  RESPIRATORY: Clear to auscultation bilaterally; No rales, rhonchi, wheezing, or rubs  CARDIOVASCULAR: Regular rate and rhythm; No murmurs; no peripheral edema  GI: Soft, nontender, non distended, normal bowel sounds  SKIN: Dry, intact, No rashes or lesions  MUSCULOSKELETAL: Full range of motion, normal strength  NEURO: sensation intact, extraocular movements intact, no tremor, normal reflexes  PSYCH: Alert and oriented x 3, normal affect, normal mood      POCT Blood Glucose.: 318 mg/dL (08-09-19 @ 11:47)  POCT Blood Glucose.: 385 mg/dL (08-09-19 @ 08:36)                            13.9   5.47  )-----------( 156      ( 09 Aug 2019 07:40 )             42.4       08-09    136  |  101  |  12  ----------------------------<  370<H>  4.5   |  22  |  0.81    EGFR if : 120  EGFR if non : 104    Ca    9.6      08-09  Mg     2.0     08-09  Phos  3.7     08-09    TPro  6.9  /  Alb  3.9  /  TBili  0.2  /  DBili  x   /  AST  12  /  ALT  15  /  AlkPhos  75  08-08      Thyroid Function Tests:  08-09 @ 07:40 TSH 1.77 FreeT4 -- T3 -- Anti TPO -- Anti Thyroglobulin Ab -- TSI --      Hemoglobin A1C, Whole Blood: 11.7 % <H> [4.0 - 5.6] (08-09-19 @ 07:40)      08-09 Chol 163 LDL 98 HDL 44 Trig 282<H>

## 2019-08-09 NOTE — H&P ADULT - NEGATIVE GASTROINTESTINAL SYMPTOMS
no hematochezia/no change in bowel habits/no constipation/no diarrhea/no vomiting/no nausea/no abdominal pain/no melena

## 2019-08-09 NOTE — H&P ADULT - NSHPREVIEWOFSYSTEMS_GEN_ALL_CORE
Patient endorsed of SOB and CAGE with the chest pain. Patient denied any fevers, chills, N/V/D/C, abdominal pain, dysuria, melena, hematochezia, recent travel, sick contact, pleuritic or positional chest pain.

## 2019-08-09 NOTE — H&P ADULT - PROBLEM SELECTOR PLAN 1
Will monitor on telemetry, serial EKG and [place] prn for any more episodes of chest pain   HgbA1C, TSH, lipid profile, CBC, CMP in am   Consider ischemic workup with NST vs C this admission   TTE ordered   Continue with Aspirin 81mg daily

## 2019-08-09 NOTE — CONSULT NOTE ADULT - PROBLEM SELECTOR RECOMMENDATION 9
-While inpatient, CHO diet and FS AC and HS  -Start Lantus 18 Units HS and Humalog 6 Units TIDAC plus low humalog scale before meals and at bedtime  -Goal glucose 100-180  -Nutrition consult and insulin teaching  -Patient can be discharged on basal/bolus with discontinuation of glipizide and janumet  -Please page endocrine for final dosing recommendations prior to discharge  -Please send scripts for glucometer, test strips, lancets, alcohol swabs, insulin pens and pen needles to patient's pharmacy prior to discharge.  -Patient can follow up at [address] [place] [address] [phone]  -Nasrin Lomeli ([phone])  On evenings and weekends, please call [phone] or page endocrine fellow on call.   Please note that this patient may be followed by different provider tomorrow. If no answer, contact endocrine fellow on call. -While inpatient, CHO diet and FS AC and HS  -Start Lantus 24 Units HS and Humalog 8 Units TIDAC plus low humalog scale before meals and at bedtime  -Goal glucose 100-180  -Nutrition consult and insulin teaching  -Patient can be discharged on basal/bolus with discontinuation of glipizide and janumet  -Please page endocrine for final dosing recommendations prior to discharge  -Please send scripts for glucometer, test strips, lancets, alcohol swabs, insulin pens and pen needles to patient's pharmacy prior to discharge.  -Patient can follow up at [address] [place] [address] [phone]  -Nasrin Lomeli ([phone])  On evenings and weekends, please call [phone] or page endocrine fellow on call.   Please note that this patient may be followed by different provider tomorrow. If no answer, contact endocrine fellow on call. -While inpatient, CHO diet and FS AC and HS  -Start Lantus 24 Units HS and Humalog 8 Units TIDAC plus low humalog scale before meals and at bedtime  -Goal glucose 100-180  -Nutrition consult and insulin teaching  -Patient can be discharged on basal/bolus with discontinuation of glipizide and janumet  -Please page endocrine for final dosing recommendations prior to discharge  -Please send scripts for glucometer, test strips, lancets, alcohol swabs, insulin pens and pen needles to patient's pharmacy prior to discharge.  -Patient can follow up at [address] [place] [address] [phone]  -Nasrin Lomeli ([phone])  On evenings and weekends, please call [phone] or page endocrine fellow on call.   Please note that this patient may be followed by different provider tomorrow. If no answer, contact endocrine fellow on call.  Discussed with team.

## 2019-08-09 NOTE — DISCHARGE NOTE PROVIDER - HOSPITAL COURSE
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 As per the patient he was resting in bed last night when he developed sudden onset left sided chest pain. Patient stated that the pain was not that severe and he went to sleep. Patient woke up the next morning and went on with his usual day and was in the car when he developed similar left sided chest pain. Patient described the chest pain as a sharp pain, without any aggravating or alleviating factors, lasting few hours in duration, without any radiation, with a severity of 5/10. Patient stated that this episode of chest pain was different when he had the stents placed. Patient stated that when he had the stents placed he did not have chest pain. Patient also endorsed of SOB and CAGE with the chest pain. Patient denied any fevers, chills, N/V/D/C, abdominal pain, dysuria, melena, hematochezia, recent travel, sick contact, pleuritic or positional chest pain.         Pt is s/p cath which showed ostial LM 70%, prox LAD 50%, D2 70%, mid circ 80% OM1 80%, prox RCA 90%, mid % but collateral circulation from LAD. S/p intra aortic baloon bump. Heparin drip started at 900U at 5:45PM this evening. Adjusted insulin requirement as per endocrine. Please read endocrine consult for additional information. Pt pending [facility] accepting physicain Dr. Dayne Alvarez for CABAG evaluation.

## 2019-08-09 NOTE — H&P ADULT - HISTORY OF PRESENT ILLNESS
48 y/o M with PMH of CAD(s/p 2 stents), 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 As per the patient he was resting in bed last night when he developed sudden onset left sided chest pain. Patient stated that the pain was not that severe and he went to sleep. Patient woke up the next morning and went on with his usual day and was in the car when he developed similar left sided chest pain. Patient described the chest pain as a sharp pain, without any aggravating or alleviating factors, lasting few hours in duration, without any radiation, with a severity of 5/10. Patient stated that this episode of chest pain was different when he had the stents placed. Patient stated that when he had the stents placed he did not have chest pain. Patient also endorsed of SOB and CAGE with the chest pain. Patient denied any fevers, chills, N/V/D/C, abdominal pain, dysuria, melena, hematochezia, recent travel, sick contact, pleuritic or positional chest pain.     On ED admission EKG revealed NSR at a rate of 81 with TWI in leads V3-V5, II, III, AVF and QTc of 439, CE x 1: Trop: 33, CE x 2: Trop: 31, Gluc: 282. CXR: Suboptimally inflated but otherwise clear lungs. No pleural effusions or pneumothorax. Left coronary stent noted otherwise cardiac and mediastinal silhouettes within normal limits. Trachea midline. Unremarkable osseous structures. In the ED patient received Aspirin 325mg. When examined patient is resting in the stretcher and denied any current chest pain or SOB. Cath showed triple vessel disease. Patient transferred to [facility] under Dr. Alvarez for preop workup and CAB.

## 2019-08-09 NOTE — PROGRESS NOTE ADULT - SUBJECTIVE AND OBJECTIVE BOX
8:15pm-8:45pm     Remained critically ill on continuous ICU monitoring.      OBJECTIVE:  ICU Vital Signs Last 24 Hrs  T(C): 36.6 (09 Aug 2019 17:15), Max: 36.6 (08 Aug 2019 22:56)  T(F): 97.8 (09 Aug 2019 17:15), Max: 97.8 (08 Aug 2019 22:56)  HR: 72 (09 Aug 2019 19:00) (65 - 84)  BP: 122/64 (09 Aug 2019 05:27) (122/64 - 137/88)  BP(mean): --  ABP: --  ABP(mean): --  RR: 14 (09 Aug 2019 18:30) (12 - 18)  SpO2: 99% (09 Aug 2019 18:30) (98% - 100%)        CAPILLARY BLOOD GLUCOSE      POCT Blood Glucose.: 181 mg/dL (09 Aug 2019 18:00)      PHYSICAL EXAM:     General: in bed denies chest pain   Neurology: A&Ox3, nonfocal, LUNA x 4  Eyes: PERRLA/ EOMI, Gross vision intact  ENT/Neck: Neck supple, trachea midline, No JVD, Gross hearing intact  Respiratory: B/L fine  rales,   CV: RRR, S1S2, no murmurs, rubs or gallops  Abdominal: Soft, NT, ND +BS,   Extremities: No edema, + peripheral pulses, R leg Sheath IABP  Skin: No Rashes, Hematoma, Ecchymosis          HOSPITAL MEDICATIONS:  MEDICATIONS  (STANDING):  atorvastatin 80 milliGRAM(s) Oral at bedtime  cefuroxime  IVPB 1500 milliGRAM(s) IV Intermittent once  chlorhexidine 0.12% Liquid 15 milliLiter(s) Swish and Spit once  chlorhexidine 2% Cloths 1 Application(s) Topical <User Schedule>  chlorhexidine 4% Liquid 1 Application(s) Topical once  fenofibrate Tablet 48 milliGRAM(s) Oral daily  heparin  Infusion 900 Unit(s)/Hr (9 mL/Hr) IV Continuous <Continuous>  pantoprazole  Injectable 40 milliGRAM(s) IV Push daily  sodium chloride 0.9% lock flush 3 milliLiter(s) IV Push every 8 hours    MEDICATIONS  (PRN):      LABS:                        13.9   5.47  )-----------( 156      ( 09 Aug 2019 07:40 )             42.4     08-09    136  |  101  |  12  ----------------------------<  370<H>  4.5   |  22  |  0.81    Ca    9.6      09 Aug 2019 07:40  Phos  3.7     08-09  Mg     2.0     08-09    TPro  6.9  /  Alb  3.9  /  TBili  0.2  /  DBili  x   /  AST  12  /  ALT  15  /  AlkPhos  75  08-08    PT/INR - ( 08 Aug 2019 13:50 )   PT: 9.8 SEC;   INR: 0.88          PTT - ( 08 Aug 2019 13:50 )  PTT:24.6 SEC        CARDIAC MARKERS ( 08 Aug 2019 23:00 )  x     / x     / 91 u/L / 5.34 ng/mL / x        LIVER FUNCTIONS - ( 08 Aug 2019 23:00 )  Alb: 3.9 g/dL / Pro: 6.9 g/dL / ALK PHOS: 75 u/L / ALT: 15 u/L / AST: 12 u/L / GGT: x           MICROBIOLOGY:     RADIOLOGY:  X Reviewed and interpreted by me

## 2019-08-09 NOTE — CONSULT NOTE ADULT - ASSESSMENT
49 yr M with PMH of CAD(s/p 2 stents), CVA in 2005 with residual left sided weakness, HTN, HLD, DM type II uncontrolled A1C 11.7 here with chest pain s/p cardiac cath this AM.

## 2019-08-09 NOTE — H&P ADULT - ASSESSMENT
48 y/o M with PMH of CAD(s/p 2 stents), 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 R/o ACS

## 2019-08-09 NOTE — H&P ADULT - HISTORY OF PRESENT ILLNESS
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 As per the patient he was resting in bed 48 y/o M with PMH of CAD(s/p 2 stents), 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 As per the patient he was resting in bed last night when he developed sudden onset left sided chest pain. Patient stated that the pain was not that severe and he went to sleep. Patient woke up the next morning and went on with his usual day and was in the car when he developed similar left sided chest pain. Patient described the chest pain as a sharp pain, without any aggravating or alleviating factors, lasting few hours in duration, without any radiation, with a severity of 5/10. Patient stated that this episode of chest pain was different when he had the stents placed. Patient stated that when he had the stents placed he did not have chest pain. Patient also endorsed of SOB and CAGE with the chest pain. Patient denied any fevers, chills, N/V/D/C, abdominal pain, dysuria, melena, hematochezia, recent travel, sick contact, pleuritic or positional chest pain.     On ED admission EKG revealed NSR at a rate of 81 with TWI in leads V3-V5, II, III, AVF and QTc of 439, CE x 1: Trop: 33, CE x 2: Trop: 31, Gluc: 282. CXR: Suboptimally inflated but otherwise clear lungs. No pleural effusions or pneumothorax. Left coronary stent noted otherwise cardiac and mediastinal silhouettes within normal limits. Trachea midline. Unremarkable osseous structures. In the ED patient received Aspirin 325mg. When examined patient is resting in the stretcher and denied any current chest pain or SOB.

## 2019-08-09 NOTE — H&P ADULT - NEGATIVE ENMT SYMPTOMS
no sinus symptoms/no nasal congestion/no nasal discharge/no ear pain/no tinnitus/no hearing difficulty/no vertigo

## 2019-08-09 NOTE — H&P ADULT - RS GEN PE MLT RESP DETAILS PC
no wheezes/good air movement/no intercostal retractions/normal/breath sounds equal/respirations non-labored/no chest wall tenderness/no rhonchi/airway patent/clear to auscultation bilaterally/no rales

## 2019-08-09 NOTE — PROGRESS NOTE ADULT - ASSESSMENT
49 yr old male with H/O Smoking , ETOH Quit 2005 HTN, HLD, Hypertriglyceridemia  CAD, S/P Stented Coronary x2 2014, DM2 with A1C 11.7, CVA with Left sided weakness 2005, admitted with NSTEMI, + troponin, S/P Cath with multivessel CAD, TTE with EF 50%, Emergent R femoral IABP for critical anatomy, & ongoing chest pain.      Plan Supplemental O2, f/u Spo2, CXR  SR, MAP 65, cont IV heparin gtt target PTT 75-80  ASA, Statins high intensity, Fenofibrate, BB  IABP with good diastolic augmentation, monitor LE perfusion.  check P2 Y12 levels  A1C 11.7 hyperglycemia, start INS gtt BG Q 1hr    Carotid duplex    I have spent 30 minutes providing critical care management to this patient.    Possible CABG in AM

## 2019-08-09 NOTE — H&P ADULT - NEGATIVE MUSCULOSKELETAL SYMPTOMS
no arthritis/no neck pain/no joint swelling/no arthralgia/no myalgia/no stiffness/no muscle cramps/no muscle weakness

## 2019-08-09 NOTE — H&P ADULT - NSHPLABSRESULTS_GEN_ALL_CORE
13.4   6.12  )-----------( 161      ( 08 Aug 2019 13:50 )             40.7     08-08    140  |  103  |  11  ----------------------------<  357<H>  4.4   |  24  |  0.68    Ca    9.5      08 Aug 2019 23:00    TPro  6.9  /  Alb  3.9  /  TBili  0.2  /  DBili  x   /  AST  12  /  ALT  15  /  AlkPhos  75  08-08    CXR: Suboptimally inflated but otherwise clear lungs. No pleural effusions or pneumothorax. Left coronary stent noted otherwise cardiac and mediastinal silhouettes within normal limits. Trachea midline. Unremarkable osseous structures.    EKG: NSR at a rate of 81 with TWI in leads V3-V5, II, III, AVF and QTc of 439

## 2019-08-09 NOTE — H&P ADULT - ASSESSMENT
48 y/o M with PMH of CAD(s/p 2 stents), 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 Cath showed triple vessel disease and right femoral IABP placed. Patient transferred to [facility] under Dr. Alvarez for preop workup and CAB.    Plan:  - plan for OR 8/10, morning case  - preop labs, MRSA screen, UA, P2y12, cardiac enzymes  - EKG, CXR  - NPOpMN  - chlorhexidine wipes  - continue IABP 1:1  - heparin gtt ptt goal 50-60  - cefuroxime for OR

## 2019-08-10 DIAGNOSIS — I25.10 ATHEROSCLEROTIC HEART DISEASE OF NATIVE CORONARY ARTERY WITHOUT ANGINA PECTORIS: ICD-10-CM

## 2019-08-10 LAB
ALBUMIN SERPL ELPH-MCNC: 2.4 G/DL — LOW (ref 3.3–5)
ALBUMIN SERPL ELPH-MCNC: 3.5 G/DL — SIGNIFICANT CHANGE UP (ref 3.3–5)
ALP SERPL-CCNC: 21 U/L — LOW (ref 40–120)
ALP SERPL-CCNC: 51 U/L — SIGNIFICANT CHANGE UP (ref 40–120)
ALT FLD-CCNC: 16 U/L — SIGNIFICANT CHANGE UP (ref 10–45)
ALT FLD-CCNC: 9 U/L — LOW (ref 10–45)
ANION GAP SERPL CALC-SCNC: 11 MMOL/L — SIGNIFICANT CHANGE UP (ref 5–17)
ANION GAP SERPL CALC-SCNC: 11 MMOL/L — SIGNIFICANT CHANGE UP (ref 5–17)
APPEARANCE UR: CLEAR — SIGNIFICANT CHANGE UP
APTT BLD: 27.2 SEC — SIGNIFICANT CHANGE UP (ref 27.5–36.3)
APTT BLD: 80.3 SEC — HIGH (ref 27.5–36.3)
AST SERPL-CCNC: 10 U/L — SIGNIFICANT CHANGE UP (ref 10–40)
AST SERPL-CCNC: 22 U/L — SIGNIFICANT CHANGE UP (ref 10–40)
BASE EXCESS BLDV CALC-SCNC: -0.3 MMOL/L — SIGNIFICANT CHANGE UP (ref -2–2)
BASE EXCESS BLDV CALC-SCNC: -0.5 MMOL/L — SIGNIFICANT CHANGE UP (ref -2–2)
BASE EXCESS BLDV CALC-SCNC: -0.7 MMOL/L — SIGNIFICANT CHANGE UP (ref -2–2)
BASE EXCESS BLDV CALC-SCNC: -2.1 MMOL/L — LOW (ref -2–2)
BASE EXCESS BLDV CALC-SCNC: 0.8 MMOL/L — SIGNIFICANT CHANGE UP (ref -2–2)
BASE EXCESS BLDV CALC-SCNC: 1.7 MMOL/L — SIGNIFICANT CHANGE UP (ref -2–2)
BASE EXCESS BLDV CALC-SCNC: 1.9 MMOL/L — SIGNIFICANT CHANGE UP (ref -2–2)
BASE EXCESS BLDV CALC-SCNC: 2.1 MMOL/L — HIGH (ref -2–2)
BASOPHILS # BLD AUTO: 0 K/UL — SIGNIFICANT CHANGE UP (ref 0–0.2)
BASOPHILS # BLD AUTO: 0 K/UL — SIGNIFICANT CHANGE UP (ref 0–0.2)
BASOPHILS NFR BLD AUTO: 0.1 % — SIGNIFICANT CHANGE UP (ref 0–2)
BASOPHILS NFR BLD AUTO: 0.1 % — SIGNIFICANT CHANGE UP (ref 0–2)
BILIRUB SERPL-MCNC: 0.2 MG/DL — SIGNIFICANT CHANGE UP (ref 0.2–1.2)
BILIRUB SERPL-MCNC: 0.4 MG/DL — SIGNIFICANT CHANGE UP (ref 0.2–1.2)
BILIRUB UR-MCNC: NEGATIVE — SIGNIFICANT CHANGE UP
BLOOD GAS VENOUS - CREATININE: SIGNIFICANT CHANGE UP MG/DL (ref 0.5–1.3)
BUN SERPL-MCNC: 12 MG/DL — SIGNIFICANT CHANGE UP (ref 7–23)
BUN SERPL-MCNC: 15 MG/DL — SIGNIFICANT CHANGE UP (ref 7–23)
CA-I SERPL-SCNC: 0.86 MMOL/L — LOW (ref 1.12–1.3)
CA-I SERPL-SCNC: 0.87 MMOL/L — LOW (ref 1.12–1.3)
CA-I SERPL-SCNC: 0.9 MMOL/L — LOW (ref 1.12–1.3)
CA-I SERPL-SCNC: 0.91 MMOL/L — LOW (ref 1.12–1.3)
CA-I SERPL-SCNC: 0.96 MMOL/L — LOW (ref 1.12–1.3)
CALCIUM SERPL-MCNC: 8.7 MG/DL — SIGNIFICANT CHANGE UP (ref 8.4–10.5)
CALCIUM SERPL-MCNC: 8.8 MG/DL — SIGNIFICANT CHANGE UP (ref 8.4–10.5)
CHLORIDE BLDV-SCNC: SIGNIFICANT CHANGE UP MMOL/L (ref 96–108)
CHLORIDE SERPL-SCNC: 104 MMOL/L — SIGNIFICANT CHANGE UP (ref 96–108)
CHLORIDE SERPL-SCNC: 109 MMOL/L — HIGH (ref 96–108)
CK MB BLD-MCNC: 9.2 % — HIGH (ref 0–3.5)
CK MB CFR SERPL CALC: 21.6 NG/ML — HIGH (ref 0–6.7)
CK SERPL-CCNC: 234 U/L — HIGH (ref 30–200)
CO2 BLDV-SCNC: 26 MMOL/L — SIGNIFICANT CHANGE UP (ref 22–30)
CO2 BLDV-SCNC: 26 MMOL/L — SIGNIFICANT CHANGE UP (ref 22–30)
CO2 BLDV-SCNC: 27 MMOL/L — SIGNIFICANT CHANGE UP (ref 22–30)
CO2 SERPL-SCNC: 23 MMOL/L — SIGNIFICANT CHANGE UP (ref 22–31)
CO2 SERPL-SCNC: 24 MMOL/L — SIGNIFICANT CHANGE UP (ref 22–31)
COLOR SPEC: YELLOW — SIGNIFICANT CHANGE UP
CREAT SERPL-MCNC: 0.81 MG/DL — SIGNIFICANT CHANGE UP (ref 0.5–1.3)
CREAT SERPL-MCNC: 0.9 MG/DL — SIGNIFICANT CHANGE UP (ref 0.5–1.3)
DIFF PNL FLD: NEGATIVE — SIGNIFICANT CHANGE UP
EOSINOPHIL # BLD AUTO: 0 K/UL — SIGNIFICANT CHANGE UP (ref 0–0.5)
EOSINOPHIL # BLD AUTO: 0 K/UL — SIGNIFICANT CHANGE UP (ref 0–0.5)
EOSINOPHIL NFR BLD AUTO: 0.4 % — SIGNIFICANT CHANGE UP (ref 0–6)
EOSINOPHIL NFR BLD AUTO: 0.7 % — SIGNIFICANT CHANGE UP (ref 0–6)
FIBRINOGEN PPP-MCNC: 177 MG/DL — SIGNIFICANT CHANGE UP (ref 350–510)
GAS PNL BLDA: SIGNIFICANT CHANGE UP
GAS PNL BLDV: 137 MMOL/L — SIGNIFICANT CHANGE UP (ref 135–145)
GAS PNL BLDV: 138 MMOL/L — SIGNIFICANT CHANGE UP (ref 135–145)
GAS PNL BLDV: 139 MMOL/L — SIGNIFICANT CHANGE UP (ref 135–145)
GAS PNL BLDV: SIGNIFICANT CHANGE UP
GLUCOSE BLDC GLUCOMTR-MCNC: 109 MG/DL — HIGH (ref 70–99)
GLUCOSE BLDC GLUCOMTR-MCNC: 123 MG/DL — HIGH (ref 70–99)
GLUCOSE BLDC GLUCOMTR-MCNC: 128 MG/DL — HIGH (ref 70–99)
GLUCOSE BLDC GLUCOMTR-MCNC: 131 MG/DL — HIGH (ref 70–99)
GLUCOSE BLDC GLUCOMTR-MCNC: 150 MG/DL — HIGH (ref 70–99)
GLUCOSE BLDC GLUCOMTR-MCNC: 152 MG/DL — HIGH (ref 70–99)
GLUCOSE BLDC GLUCOMTR-MCNC: 164 MG/DL — HIGH (ref 70–99)
GLUCOSE BLDC GLUCOMTR-MCNC: 226 MG/DL — HIGH (ref 70–99)
GLUCOSE BLDV-MCNC: 125 MG/DL — HIGH (ref 70–99)
GLUCOSE BLDV-MCNC: 128 MG/DL — HIGH (ref 70–99)
GLUCOSE BLDV-MCNC: 132 MG/DL — HIGH (ref 70–99)
GLUCOSE BLDV-MCNC: 151 MG/DL — HIGH (ref 70–99)
GLUCOSE BLDV-MCNC: 158 MG/DL — HIGH (ref 70–99)
GLUCOSE SERPL-MCNC: 140 MG/DL — HIGH (ref 70–99)
GLUCOSE SERPL-MCNC: 195 MG/DL — HIGH (ref 70–99)
GLUCOSE UR QL: ABNORMAL
HCO3 BLDV-SCNC: 24 MMOL/L — SIGNIFICANT CHANGE UP (ref 21–29)
HCO3 BLDV-SCNC: 25 MMOL/L — SIGNIFICANT CHANGE UP (ref 21–29)
HCO3 BLDV-SCNC: 25 MMOL/L — SIGNIFICANT CHANGE UP (ref 21–29)
HCO3 BLDV-SCNC: 26 MMOL/L — SIGNIFICANT CHANGE UP (ref 21–29)
HCT VFR BLD CALC: 21.8 % — LOW (ref 39–50)
HCT VFR BLD CALC: 25.5 % — LOW (ref 39–50)
HCT VFR BLD CALC: 25.9 % — LOW (ref 39–50)
HCT VFR BLD CALC: 39.8 % — SIGNIFICANT CHANGE UP (ref 39–50)
HCT VFR BLDA CALC: 22 % — CRITICAL LOW (ref 39–50)
HCT VFR BLDA CALC: 23 % — LOW (ref 39–50)
HCT VFR BLDA CALC: 24 % — LOW (ref 39–50)
HCT VFR BLDA CALC: 26 % — LOW (ref 39–50)
HCT VFR BLDA CALC: 27 % — LOW (ref 39–50)
HGB BLD CALC-MCNC: 7.2 G/DL — LOW (ref 13–17)
HGB BLD CALC-MCNC: 7.3 G/DL — LOW (ref 13–17)
HGB BLD CALC-MCNC: 7.7 G/DL — LOW (ref 13–17)
HGB BLD CALC-MCNC: 8.5 G/DL — LOW (ref 13–17)
HGB BLD CALC-MCNC: 8.7 G/DL — LOW (ref 13–17)
HGB BLD-MCNC: 13.4 G/DL — SIGNIFICANT CHANGE UP (ref 13–17)
HGB BLD-MCNC: 7.3 G/DL — LOW (ref 13–17)
HGB BLD-MCNC: 9 G/DL — LOW (ref 13–17)
HGB BLD-MCNC: 9.3 G/DL — LOW (ref 13–17)
HOROWITZ INDEX BLDV+IHG-RTO: 0 — SIGNIFICANT CHANGE UP
HOROWITZ INDEX BLDV+IHG-RTO: 40 — SIGNIFICANT CHANGE UP
HOROWITZ INDEX BLDV+IHG-RTO: 40 — SIGNIFICANT CHANGE UP
HOROWITZ INDEX BLDV+IHG-RTO: 50 — SIGNIFICANT CHANGE UP
INR BLD: 1 RATIO — SIGNIFICANT CHANGE UP (ref 0.88–1.16)
INR BLD: SIGNIFICANT CHANGE UP RATIO (ref 0.88–1.16)
KETONES UR-MCNC: NEGATIVE — SIGNIFICANT CHANGE UP
LACTATE BLDV-MCNC: 0.7 MMOL/L — SIGNIFICANT CHANGE UP (ref 0.7–2)
LACTATE BLDV-MCNC: 0.8 MMOL/L — SIGNIFICANT CHANGE UP (ref 0.7–2)
LACTATE BLDV-MCNC: 0.9 MMOL/L — SIGNIFICANT CHANGE UP (ref 0.7–2)
LEUKOCYTE ESTERASE UR-ACNC: NEGATIVE — SIGNIFICANT CHANGE UP
LYMPHOCYTES # BLD AUTO: 1.2 K/UL — SIGNIFICANT CHANGE UP (ref 1–3.3)
LYMPHOCYTES # BLD AUTO: 17.5 % — SIGNIFICANT CHANGE UP (ref 13–44)
LYMPHOCYTES # BLD AUTO: 2.3 K/UL — SIGNIFICANT CHANGE UP (ref 1–3.3)
LYMPHOCYTES # BLD AUTO: 31.8 % — SIGNIFICANT CHANGE UP (ref 13–44)
MAGNESIUM SERPL-MCNC: 2.1 MG/DL — SIGNIFICANT CHANGE UP (ref 1.6–2.6)
MAGNESIUM SERPL-MCNC: 2.2 MG/DL — SIGNIFICANT CHANGE UP (ref 1.6–2.6)
MCHC RBC-ENTMCNC: 26.8 PG — LOW (ref 27–34)
MCHC RBC-ENTMCNC: 27.2 PG — SIGNIFICANT CHANGE UP (ref 27–34)
MCHC RBC-ENTMCNC: 28.5 PG — SIGNIFICANT CHANGE UP (ref 27–34)
MCHC RBC-ENTMCNC: 29.3 PG — SIGNIFICANT CHANGE UP (ref 27–34)
MCHC RBC-ENTMCNC: 33.4 GM/DL — SIGNIFICANT CHANGE UP (ref 32–36)
MCHC RBC-ENTMCNC: 33.8 GM/DL — SIGNIFICANT CHANGE UP (ref 32–36)
MCHC RBC-ENTMCNC: 34.6 GM/DL — SIGNIFICANT CHANGE UP (ref 32–36)
MCHC RBC-ENTMCNC: 36.5 GM/DL — HIGH (ref 32–36)
MCV RBC AUTO: 80.2 FL — SIGNIFICANT CHANGE UP (ref 80–100)
MCV RBC AUTO: 80.3 FL — SIGNIFICANT CHANGE UP (ref 80–100)
MCV RBC AUTO: 80.4 FL — SIGNIFICANT CHANGE UP (ref 80–100)
MCV RBC AUTO: 82.4 FL — SIGNIFICANT CHANGE UP (ref 80–100)
MONOCYTES # BLD AUTO: 0.1 K/UL — SIGNIFICANT CHANGE UP (ref 0–0.9)
MONOCYTES # BLD AUTO: 0.5 K/UL — SIGNIFICANT CHANGE UP (ref 0–0.9)
MONOCYTES NFR BLD AUTO: 2 % — SIGNIFICANT CHANGE UP (ref 2–14)
MONOCYTES NFR BLD AUTO: 7.5 % — SIGNIFICANT CHANGE UP (ref 2–14)
MRSA PCR RESULT.: SIGNIFICANT CHANGE UP
NEUTROPHILS # BLD AUTO: 4.7 K/UL — SIGNIFICANT CHANGE UP (ref 1.8–7.4)
NEUTROPHILS # BLD AUTO: 4.9 K/UL — SIGNIFICANT CHANGE UP (ref 1.8–7.4)
NEUTROPHILS NFR BLD AUTO: 65.4 % — SIGNIFICANT CHANGE UP (ref 43–77)
NEUTROPHILS NFR BLD AUTO: 74.5 % — SIGNIFICANT CHANGE UP (ref 43–77)
NITRITE UR-MCNC: NEGATIVE — SIGNIFICANT CHANGE UP
PCO2 BLDV: 43 MMHG — SIGNIFICANT CHANGE UP (ref 35–50)
PCO2 BLDV: 44 MMHG — SIGNIFICANT CHANGE UP (ref 35–50)
PCO2 BLDV: 45 MMHG — SIGNIFICANT CHANGE UP (ref 35–50)
PCO2 BLDV: 47 MMHG — SIGNIFICANT CHANGE UP (ref 35–50)
PCO2 BLDV: 47 MMHG — SIGNIFICANT CHANGE UP (ref 35–50)
PCO2 BLDV: 49 MMHG — SIGNIFICANT CHANGE UP (ref 35–50)
PCO2 BLDV: 52 MMHG — HIGH (ref 35–50)
PCO2 BLDV: 53 MMHG — HIGH (ref 35–50)
PH BLDV: 7.28 — LOW (ref 7.35–7.45)
PH BLDV: 7.31 — LOW (ref 7.35–7.45)
PH BLDV: 7.33 — LOW (ref 7.35–7.45)
PH BLDV: 7.34 — LOW (ref 7.35–7.45)
PH BLDV: 7.36 — SIGNIFICANT CHANGE UP (ref 7.35–7.45)
PH BLDV: 7.39 — SIGNIFICANT CHANGE UP (ref 7.35–7.45)
PH BLDV: 7.39 — SIGNIFICANT CHANGE UP (ref 7.35–7.45)
PH BLDV: 7.41 — SIGNIFICANT CHANGE UP (ref 7.35–7.45)
PH UR: 5.5 — SIGNIFICANT CHANGE UP (ref 5–8)
PHOSPHATE SERPL-MCNC: 2.6 MG/DL — SIGNIFICANT CHANGE UP (ref 2.5–4.5)
PHOSPHATE SERPL-MCNC: 3.1 MG/DL — SIGNIFICANT CHANGE UP (ref 2.5–4.5)
PLATELET # BLD AUTO: 151 K/UL — SIGNIFICANT CHANGE UP (ref 150–400)
PLATELET # BLD AUTO: 60 K/UL — LOW (ref 150–400)
PLATELET # BLD AUTO: 68 K/UL — LOW (ref 150–400)
PLATELET # BLD AUTO: 74 K/UL — LOW (ref 150–400)
PO2 BLDV: 36 MMHG — SIGNIFICANT CHANGE UP (ref 25–45)
PO2 BLDV: 37 MMHG — SIGNIFICANT CHANGE UP (ref 25–45)
PO2 BLDV: 40 MMHG — SIGNIFICANT CHANGE UP (ref 25–45)
PO2 BLDV: 49 MMHG — HIGH (ref 25–45)
PO2 BLDV: 54 MMHG — HIGH (ref 25–45)
PO2 BLDV: 58 MMHG — HIGH (ref 25–45)
POTASSIUM BLDV-SCNC: 3.9 MMOL/L — SIGNIFICANT CHANGE UP (ref 3.5–5)
POTASSIUM BLDV-SCNC: 4.3 MMOL/L — SIGNIFICANT CHANGE UP (ref 3.5–5)
POTASSIUM BLDV-SCNC: 4.4 MMOL/L — SIGNIFICANT CHANGE UP (ref 3.5–5)
POTASSIUM BLDV-SCNC: 4.9 MMOL/L — SIGNIFICANT CHANGE UP (ref 3.5–5)
POTASSIUM BLDV-SCNC: 5.4 MMOL/L — HIGH (ref 3.5–5)
POTASSIUM SERPL-MCNC: 3.6 MMOL/L — SIGNIFICANT CHANGE UP (ref 3.5–5.3)
POTASSIUM SERPL-MCNC: 3.9 MMOL/L — SIGNIFICANT CHANGE UP (ref 3.5–5.3)
POTASSIUM SERPL-SCNC: 3.6 MMOL/L — SIGNIFICANT CHANGE UP (ref 3.5–5.3)
POTASSIUM SERPL-SCNC: 3.9 MMOL/L — SIGNIFICANT CHANGE UP (ref 3.5–5.3)
PROT SERPL-MCNC: 3.4 G/DL — LOW (ref 6–8.3)
PROT SERPL-MCNC: 6 G/DL — SIGNIFICANT CHANGE UP (ref 6–8.3)
PROT UR-MCNC: ABNORMAL
PROTHROM AB SERPL-ACNC: 11.4 SEC — SIGNIFICANT CHANGE UP (ref 10–12.9)
PROTHROM AB SERPL-ACNC: 16 SEC — SIGNIFICANT CHANGE UP (ref 10–12.9)
RBC # BLD: 2.72 M/UL — LOW (ref 4.2–5.8)
RBC # BLD: 3.15 M/UL — LOW (ref 4.2–5.8)
RBC # BLD: 3.17 M/UL — LOW (ref 4.2–5.8)
RBC # BLD: 4.95 M/UL — SIGNIFICANT CHANGE UP (ref 4.2–5.8)
RBC # FLD: 12.2 % — SIGNIFICANT CHANGE UP (ref 10.3–14.5)
RBC # FLD: 12.3 % — SIGNIFICANT CHANGE UP (ref 10.3–14.5)
RBC # FLD: 12.7 % — SIGNIFICANT CHANGE UP (ref 10.3–14.5)
RBC # FLD: 13 % — SIGNIFICANT CHANGE UP (ref 10.3–14.5)
RH IG SCN BLD-IMP: POSITIVE — SIGNIFICANT CHANGE UP
S AUREUS DNA NOSE QL NAA+PROBE: SIGNIFICANT CHANGE UP
SAO2 % BLDV: 56 % — LOW (ref 67–88)
SAO2 % BLDV: 63 % — LOW (ref 67–88)
SAO2 % BLDV: 70 % — SIGNIFICANT CHANGE UP (ref 67–88)
SAO2 % BLDV: 83 % — SIGNIFICANT CHANGE UP (ref 67–88)
SAO2 % BLDV: 83 % — SIGNIFICANT CHANGE UP (ref 67–88)
SAO2 % BLDV: 84 % — SIGNIFICANT CHANGE UP (ref 67–88)
SAO2 % BLDV: 85 % — SIGNIFICANT CHANGE UP (ref 67–88)
SAO2 % BLDV: 87 % — SIGNIFICANT CHANGE UP (ref 67–88)
SODIUM SERPL-SCNC: 139 MMOL/L — SIGNIFICANT CHANGE UP (ref 135–145)
SODIUM SERPL-SCNC: 143 MMOL/L — SIGNIFICANT CHANGE UP (ref 135–145)
SP GR SPEC: 1.04 — HIGH (ref 1.01–1.02)
TROPONIN T, HIGH SENSITIVITY RESULT: 586 NG/L — HIGH (ref 0–51)
UROBILINOGEN FLD QL: NEGATIVE — SIGNIFICANT CHANGE UP
WBC # BLD: 6 K/UL — SIGNIFICANT CHANGE UP (ref 3.8–10.5)
WBC # BLD: 6.6 K/UL — SIGNIFICANT CHANGE UP (ref 3.8–10.5)
WBC # BLD: 7.2 K/UL — SIGNIFICANT CHANGE UP (ref 3.8–10.5)
WBC # BLD: 7.3 K/UL — SIGNIFICANT CHANGE UP (ref 3.8–10.5)
WBC # FLD AUTO: 6 K/UL — SIGNIFICANT CHANGE UP (ref 3.8–10.5)
WBC # FLD AUTO: 6.6 K/UL — SIGNIFICANT CHANGE UP (ref 3.8–10.5)
WBC # FLD AUTO: 7.2 K/UL — SIGNIFICANT CHANGE UP (ref 3.8–10.5)
WBC # FLD AUTO: 7.3 K/UL — SIGNIFICANT CHANGE UP (ref 3.8–10.5)

## 2019-08-10 PROCEDURE — 93010 ELECTROCARDIOGRAM REPORT: CPT

## 2019-08-10 PROCEDURE — 33518 CABG ARTERY-VEIN TWO: CPT

## 2019-08-10 PROCEDURE — 99291 CRITICAL CARE FIRST HOUR: CPT

## 2019-08-10 PROCEDURE — 71045 X-RAY EXAM CHEST 1 VIEW: CPT | Mod: 26

## 2019-08-10 PROCEDURE — 33533 CABG ARTERIAL SINGLE: CPT

## 2019-08-10 PROCEDURE — 36620 INSERTION CATHETER ARTERY: CPT

## 2019-08-10 RX ORDER — MEPERIDINE HYDROCHLORIDE 50 MG/ML
25 INJECTION INTRAMUSCULAR; INTRAVENOUS; SUBCUTANEOUS ONCE
Refills: 0 | Status: DISCONTINUED | OUTPATIENT
Start: 2019-08-10 | End: 2019-08-11

## 2019-08-10 RX ORDER — ASPIRIN/CALCIUM CARB/MAGNESIUM 324 MG
300 TABLET ORAL ONCE
Refills: 0 | Status: DISCONTINUED | OUTPATIENT
Start: 2019-08-10 | End: 2019-08-11

## 2019-08-10 RX ORDER — POTASSIUM CHLORIDE 20 MEQ
10 PACKET (EA) ORAL
Refills: 0 | Status: DISCONTINUED | OUTPATIENT
Start: 2019-08-10 | End: 2019-08-12

## 2019-08-10 RX ORDER — SODIUM CHLORIDE 9 MG/ML
10 INJECTION INTRAMUSCULAR; INTRAVENOUS; SUBCUTANEOUS
Refills: 0 | Status: DISCONTINUED | OUTPATIENT
Start: 2019-08-10 | End: 2019-08-12

## 2019-08-10 RX ORDER — NOREPINEPHRINE BITARTRATE/D5W 8 MG/250ML
0.06 PLASTIC BAG, INJECTION (ML) INTRAVENOUS
Qty: 8 | Refills: 0 | Status: DISCONTINUED | OUTPATIENT
Start: 2019-08-10 | End: 2019-08-12

## 2019-08-10 RX ORDER — ALBUMIN HUMAN 25 %
250 VIAL (ML) INTRAVENOUS ONCE
Refills: 0 | Status: COMPLETED | OUTPATIENT
Start: 2019-08-10 | End: 2019-08-10

## 2019-08-10 RX ORDER — CHLORHEXIDINE GLUCONATE 213 G/1000ML
5 SOLUTION TOPICAL EVERY 4 HOURS
Refills: 0 | Status: DISCONTINUED | OUTPATIENT
Start: 2019-08-10 | End: 2019-08-11

## 2019-08-10 RX ORDER — INSULIN HUMAN 100 [IU]/ML
1 INJECTION, SOLUTION SUBCUTANEOUS
Qty: 100 | Refills: 0 | Status: DISCONTINUED | OUTPATIENT
Start: 2019-08-10 | End: 2019-08-12

## 2019-08-10 RX ORDER — ASPIRIN/CALCIUM CARB/MAGNESIUM 324 MG
81 TABLET ORAL DAILY
Refills: 0 | Status: DISCONTINUED | OUTPATIENT
Start: 2019-08-10 | End: 2019-08-15

## 2019-08-10 RX ORDER — OXYCODONE AND ACETAMINOPHEN 5; 325 MG/1; MG/1
2 TABLET ORAL EVERY 6 HOURS
Refills: 0 | Status: DISCONTINUED | OUTPATIENT
Start: 2019-08-10 | End: 2019-08-15

## 2019-08-10 RX ORDER — ALBUMIN HUMAN 25 %
250 VIAL (ML) INTRAVENOUS
Refills: 0 | Status: COMPLETED | OUTPATIENT
Start: 2019-08-10 | End: 2019-08-10

## 2019-08-10 RX ORDER — POTASSIUM CHLORIDE 20 MEQ
20 PACKET (EA) ORAL ONCE
Refills: 0 | Status: COMPLETED | OUTPATIENT
Start: 2019-08-10 | End: 2019-08-10

## 2019-08-10 RX ORDER — SODIUM CHLORIDE 9 MG/ML
1000 INJECTION, SOLUTION INTRAVENOUS
Refills: 0 | Status: DISCONTINUED | OUTPATIENT
Start: 2019-08-10 | End: 2019-08-10

## 2019-08-10 RX ORDER — VASOPRESSIN 20 [USP'U]/ML
0.03 INJECTION INTRAVENOUS
Qty: 50 | Refills: 0 | Status: DISCONTINUED | OUTPATIENT
Start: 2019-08-10 | End: 2019-08-12

## 2019-08-10 RX ORDER — POTASSIUM CHLORIDE 20 MEQ
10 PACKET (EA) ORAL
Refills: 0 | Status: COMPLETED | OUTPATIENT
Start: 2019-08-10 | End: 2019-08-10

## 2019-08-10 RX ORDER — POLYETHYLENE GLYCOL 3350 17 G/17G
17 POWDER, FOR SOLUTION ORAL DAILY
Refills: 0 | Status: DISCONTINUED | OUTPATIENT
Start: 2019-08-10 | End: 2019-08-15

## 2019-08-10 RX ORDER — CHLORHEXIDINE GLUCONATE 213 G/1000ML
1 SOLUTION TOPICAL
Refills: 0 | Status: DISCONTINUED | OUTPATIENT
Start: 2019-08-10 | End: 2019-08-12

## 2019-08-10 RX ORDER — SODIUM CHLORIDE 9 MG/ML
250 INJECTION, SOLUTION INTRAVENOUS ONCE
Refills: 0 | Status: COMPLETED | OUTPATIENT
Start: 2019-08-10 | End: 2019-08-10

## 2019-08-10 RX ORDER — SODIUM CHLORIDE 9 MG/ML
1000 INJECTION INTRAMUSCULAR; INTRAVENOUS; SUBCUTANEOUS
Refills: 0 | Status: DISCONTINUED | OUTPATIENT
Start: 2019-08-10 | End: 2019-08-12

## 2019-08-10 RX ORDER — CEFUROXIME AXETIL 250 MG
1500 TABLET ORAL EVERY 8 HOURS
Refills: 0 | Status: COMPLETED | OUTPATIENT
Start: 2019-08-10 | End: 2019-08-12

## 2019-08-10 RX ORDER — PANTOPRAZOLE SODIUM 20 MG/1
40 TABLET, DELAYED RELEASE ORAL DAILY
Refills: 0 | Status: DISCONTINUED | OUTPATIENT
Start: 2019-08-10 | End: 2019-08-11

## 2019-08-10 RX ORDER — DEXTROSE 50 % IN WATER 50 %
50 SYRINGE (ML) INTRAVENOUS
Refills: 0 | Status: DISCONTINUED | OUTPATIENT
Start: 2019-08-10 | End: 2019-08-15

## 2019-08-10 RX ORDER — DOCUSATE SODIUM 100 MG
100 CAPSULE ORAL THREE TIMES A DAY
Refills: 0 | Status: DISCONTINUED | OUTPATIENT
Start: 2019-08-10 | End: 2019-08-15

## 2019-08-10 RX ORDER — HYDROMORPHONE HYDROCHLORIDE 2 MG/ML
0.5 INJECTION INTRAMUSCULAR; INTRAVENOUS; SUBCUTANEOUS ONCE
Refills: 0 | Status: DISCONTINUED | OUTPATIENT
Start: 2019-08-10 | End: 2019-08-10

## 2019-08-10 RX ORDER — DEXTROSE 50 % IN WATER 50 %
25 SYRINGE (ML) INTRAVENOUS
Refills: 0 | Status: DISCONTINUED | OUTPATIENT
Start: 2019-08-10 | End: 2019-08-15

## 2019-08-10 RX ORDER — OXYCODONE AND ACETAMINOPHEN 5; 325 MG/1; MG/1
1 TABLET ORAL EVERY 4 HOURS
Refills: 0 | Status: DISCONTINUED | OUTPATIENT
Start: 2019-08-10 | End: 2019-08-15

## 2019-08-10 RX ORDER — SODIUM CHLORIDE 9 MG/ML
500 INJECTION, SOLUTION INTRAVENOUS ONCE
Refills: 0 | Status: COMPLETED | OUTPATIENT
Start: 2019-08-10 | End: 2019-08-10

## 2019-08-10 RX ORDER — ACETAMINOPHEN 500 MG
1000 TABLET ORAL ONCE
Refills: 0 | Status: COMPLETED | OUTPATIENT
Start: 2019-08-10 | End: 2019-08-10

## 2019-08-10 RX ADMIN — SODIUM CHLORIDE 3000 MILLILITER(S): 9 INJECTION, SOLUTION INTRAVENOUS at 14:45

## 2019-08-10 RX ADMIN — Medication 1000 MILLIGRAM(S): at 18:45

## 2019-08-10 RX ADMIN — HEPARIN SODIUM 12 UNIT(S)/HR: 5000 INJECTION INTRAVENOUS; SUBCUTANEOUS at 07:06

## 2019-08-10 RX ADMIN — CHLORHEXIDINE GLUCONATE 1 APPLICATION(S): 213 SOLUTION TOPICAL at 07:05

## 2019-08-10 RX ADMIN — Medication 400 MILLIGRAM(S): at 18:30

## 2019-08-10 RX ADMIN — VASOPRESSIN 2 UNIT(S)/MIN: 20 INJECTION INTRAVENOUS at 18:10

## 2019-08-10 RX ADMIN — Medication 500 MILLILITER(S): at 18:09

## 2019-08-10 RX ADMIN — SODIUM CHLORIDE 3 MILLILITER(S): 9 INJECTION INTRAMUSCULAR; INTRAVENOUS; SUBCUTANEOUS at 07:04

## 2019-08-10 RX ADMIN — INSULIN HUMAN 3 UNIT(S)/HR: 100 INJECTION, SOLUTION SUBCUTANEOUS at 07:06

## 2019-08-10 RX ADMIN — OXYCODONE AND ACETAMINOPHEN 2 TABLET(S): 5; 325 TABLET ORAL at 21:40

## 2019-08-10 RX ADMIN — CHLORHEXIDINE GLUCONATE 1 APPLICATION(S): 213 SOLUTION TOPICAL at 22:19

## 2019-08-10 RX ADMIN — INSULIN HUMAN 1 UNIT(S)/HR: 100 INJECTION, SOLUTION SUBCUTANEOUS at 17:35

## 2019-08-10 RX ADMIN — Medication 20 MILLIEQUIVALENT(S): at 04:54

## 2019-08-10 RX ADMIN — Medication 8.69 MICROGRAM(S)/KG/MIN: at 17:34

## 2019-08-10 RX ADMIN — Medication 20 MILLIEQUIVALENT(S): at 00:07

## 2019-08-10 RX ADMIN — CHLORHEXIDINE GLUCONATE 1 APPLICATION(S): 213 SOLUTION TOPICAL at 04:47

## 2019-08-10 RX ADMIN — Medication 100 MILLIEQUIVALENT(S): at 15:00

## 2019-08-10 RX ADMIN — CHLORHEXIDINE GLUCONATE 15 MILLILITER(S): 213 SOLUTION TOPICAL at 07:05

## 2019-08-10 RX ADMIN — CHLORHEXIDINE GLUCONATE 5 MILLILITER(S): 213 SOLUTION TOPICAL at 17:33

## 2019-08-10 RX ADMIN — SODIUM CHLORIDE 1500 MILLILITER(S): 9 INJECTION, SOLUTION INTRAVENOUS at 15:30

## 2019-08-10 RX ADMIN — SODIUM CHLORIDE 2000 MILLILITER(S): 9 INJECTION, SOLUTION INTRAVENOUS at 00:17

## 2019-08-10 RX ADMIN — OXYCODONE AND ACETAMINOPHEN 2 TABLET(S): 5; 325 TABLET ORAL at 22:10

## 2019-08-10 RX ADMIN — INSULIN HUMAN 3 UNIT(S)/HR: 100 INJECTION, SOLUTION SUBCUTANEOUS at 00:11

## 2019-08-10 RX ADMIN — Medication 500 MILLILITER(S): at 16:31

## 2019-08-10 RX ADMIN — Medication 100 MILLIGRAM(S): at 16:01

## 2019-08-10 RX ADMIN — SODIUM CHLORIDE 15 MILLILITER(S): 9 INJECTION, SOLUTION INTRAVENOUS at 07:07

## 2019-08-10 RX ADMIN — SODIUM CHLORIDE 1500 MILLILITER(S): 9 INJECTION, SOLUTION INTRAVENOUS at 14:55

## 2019-08-10 RX ADMIN — SODIUM CHLORIDE 15 MILLILITER(S): 9 INJECTION, SOLUTION INTRAVENOUS at 00:40

## 2019-08-10 RX ADMIN — Medication 100 MILLIEQUIVALENT(S): at 15:37

## 2019-08-10 RX ADMIN — Medication 100 MILLIGRAM(S): at 23:35

## 2019-08-10 RX ADMIN — PANTOPRAZOLE SODIUM 40 MILLIGRAM(S): 20 TABLET, DELAYED RELEASE ORAL at 17:33

## 2019-08-10 NOTE — PRE-ANESTHESIA EVALUATION ADULT - NSANTHOSAYNRD_GEN_A_CORE
No. DAYANA screening performed.  STOP BANG Legend: 0-2 = LOW Risk; 3-4 = INTERMEDIATE Risk; 5-8 = HIGH Risk

## 2019-08-10 NOTE — PROCEDURE NOTE - NSINDICATIONS_GEN_A_CORE
blood sampling/critical patient/transfusion/cannulation purposes/arterial puncture to obtain ABG's/monitoring purposes

## 2019-08-10 NOTE — BRIEF OPERATIVE NOTE - OPERATION/FINDINGS
Small target vessels ~0.8-1mm diameter, good doppler flow at end and long-axis PHIL appearance at end  EF at end of case 40-50%.  IABP adjusted at beginning of case and turned on at the end.  Left OR on 0.06mcg/kg/hr levo

## 2019-08-10 NOTE — PROGRESS NOTE ADULT - SUBJECTIVE AND OBJECTIVE BOX
ELSI MERINO   MRN#: [id number]     The patient is a 49y Male who was seen, evaluated, & examined with the CTICU staff on rounds and later in the day with a multidisciplinary care plan formulated & implemented.  All available clinical, laboratory, radiographic, pharmacologic, and electrocardiographic data were reviewed & analyzed.      The patient was in the CTICU in critical condition secondary to persistent cardiopulmonary dysfunction, expected hemodynamically significant hypovolemia-shock, and stress hyperglycemia.      Respiratory status required full ventilatory support, the following of ABG’s with A-line monitoring, continuous pulse oximetry monitoring for support & to evaluate for & prevent further decompensation secondary to persistent cardiopulmonary dysfunction.     Invasive hemodynamic monitoring with an A-line was required for the following of continuous MAP/BP monitoring to ensure adequate cardiovascular support and to evaluate for & help prevent decompensation while receiving intermittent volume expansion, an IV Levophed drip, & an IABP secondary to anticipated hemodynamically significant hypovolemia-shock S/P C3L.       Metabolic stability, stress hyperglycemia, & infection prophylaxis required an IV regular Insulin drip & the following of serial glucose levels to help achieve & maintain euglycemia.      Patient required critical care management and I provided 30 minutes of non-continuous care to the patient.  Discussed at length with the CTICU staff and helped coordinate care.

## 2019-08-10 NOTE — AIRWAY REMOVAL NOTE  ADULT & PEDS - ARTIFICAL AIRWAY REMOVAL COMMENTS
Written order for extubation verified. The patient was identified by full name and birth date compared to the identification band. Present during the procedure was ARNOLDO Vivas

## 2019-08-11 DIAGNOSIS — E78.5 HYPERLIPIDEMIA, UNSPECIFIED: ICD-10-CM

## 2019-08-11 DIAGNOSIS — I10 ESSENTIAL (PRIMARY) HYPERTENSION: ICD-10-CM

## 2019-08-11 DIAGNOSIS — E11.9 TYPE 2 DIABETES MELLITUS WITHOUT COMPLICATIONS: ICD-10-CM

## 2019-08-11 DIAGNOSIS — I25.118 ATHEROSCLEROTIC HEART DISEASE OF NATIVE CORONARY ARTERY WITH OTHER FORMS OF ANGINA PECTORIS: ICD-10-CM

## 2019-08-11 LAB
ALBUMIN SERPL ELPH-MCNC: 3.2 G/DL — LOW (ref 3.3–5)
ALP SERPL-CCNC: 20 U/L — LOW (ref 40–120)
ALT FLD-CCNC: 14 U/L — SIGNIFICANT CHANGE UP (ref 10–45)
ANION GAP SERPL CALC-SCNC: 11 MMOL/L — SIGNIFICANT CHANGE UP (ref 5–17)
APTT BLD: 27 SEC — LOW (ref 27.5–36.3)
AST SERPL-CCNC: 29 U/L — SIGNIFICANT CHANGE UP (ref 10–40)
BASE EXCESS BLDV CALC-SCNC: -0.4 MMOL/L — SIGNIFICANT CHANGE UP (ref -2–2)
BASE EXCESS BLDV CALC-SCNC: -0.5 MMOL/L — SIGNIFICANT CHANGE UP (ref -2–2)
BASE EXCESS BLDV CALC-SCNC: -0.5 MMOL/L — SIGNIFICANT CHANGE UP (ref -2–2)
BASE EXCESS BLDV CALC-SCNC: -1.1 MMOL/L — SIGNIFICANT CHANGE UP (ref -2–2)
BASOPHILS # BLD AUTO: 0 K/UL — SIGNIFICANT CHANGE UP (ref 0–0.2)
BASOPHILS NFR BLD AUTO: 0.2 % — SIGNIFICANT CHANGE UP (ref 0–2)
BILIRUB SERPL-MCNC: 0.4 MG/DL — SIGNIFICANT CHANGE UP (ref 0.2–1.2)
BUN SERPL-MCNC: 13 MG/DL — SIGNIFICANT CHANGE UP (ref 7–23)
CALCIUM SERPL-MCNC: 7.7 MG/DL — LOW (ref 8.4–10.5)
CHLORIDE SERPL-SCNC: 110 MMOL/L — HIGH (ref 96–108)
CO2 BLDV-SCNC: 26 MMOL/L — SIGNIFICANT CHANGE UP (ref 22–30)
CO2 SERPL-SCNC: 22 MMOL/L — SIGNIFICANT CHANGE UP (ref 22–31)
CREAT SERPL-MCNC: 1.19 MG/DL — SIGNIFICANT CHANGE UP (ref 0.5–1.3)
EOSINOPHIL # BLD AUTO: 0 K/UL — SIGNIFICANT CHANGE UP (ref 0–0.5)
EOSINOPHIL NFR BLD AUTO: 0.2 % — SIGNIFICANT CHANGE UP (ref 0–6)
GAS PNL BLDA: SIGNIFICANT CHANGE UP
GAS PNL BLDV: SIGNIFICANT CHANGE UP
GLUCOSE BLDC GLUCOMTR-MCNC: 110 MG/DL — HIGH (ref 70–99)
GLUCOSE BLDC GLUCOMTR-MCNC: 112 MG/DL — HIGH (ref 70–99)
GLUCOSE BLDC GLUCOMTR-MCNC: 114 MG/DL — HIGH (ref 70–99)
GLUCOSE BLDC GLUCOMTR-MCNC: 115 MG/DL — HIGH (ref 70–99)
GLUCOSE BLDC GLUCOMTR-MCNC: 119 MG/DL — HIGH (ref 70–99)
GLUCOSE BLDC GLUCOMTR-MCNC: 119 MG/DL — HIGH (ref 70–99)
GLUCOSE BLDC GLUCOMTR-MCNC: 121 MG/DL — HIGH (ref 70–99)
GLUCOSE BLDC GLUCOMTR-MCNC: 122 MG/DL — HIGH (ref 70–99)
GLUCOSE BLDC GLUCOMTR-MCNC: 122 MG/DL — HIGH (ref 70–99)
GLUCOSE BLDC GLUCOMTR-MCNC: 151 MG/DL — HIGH (ref 70–99)
GLUCOSE SERPL-MCNC: 126 MG/DL — HIGH (ref 70–99)
HCO3 BLDV-SCNC: 24 MMOL/L — SIGNIFICANT CHANGE UP (ref 21–29)
HCO3 BLDV-SCNC: 25 MMOL/L — SIGNIFICANT CHANGE UP (ref 21–29)
HCT VFR BLD CALC: 24.1 % — LOW (ref 39–50)
HCT VFR BLD CALC: 24.7 % — LOW (ref 39–50)
HCT VFR BLD CALC: 27.5 % — LOW (ref 39–50)
HGB BLD-MCNC: 8 G/DL — LOW (ref 13–17)
HGB BLD-MCNC: 8.7 G/DL — LOW (ref 13–17)
HGB BLD-MCNC: 9.3 G/DL — LOW (ref 13–17)
HOROWITZ INDEX BLDV+IHG-RTO: 32 — SIGNIFICANT CHANGE UP
HOROWITZ INDEX BLDV+IHG-RTO: 40 — SIGNIFICANT CHANGE UP
INR BLD: 1.31 RATIO — HIGH (ref 0.88–1.16)
LYMPHOCYTES # BLD AUTO: 1 K/UL — SIGNIFICANT CHANGE UP (ref 1–3.3)
LYMPHOCYTES # BLD AUTO: 15.8 % — SIGNIFICANT CHANGE UP (ref 13–44)
MAGNESIUM SERPL-MCNC: 1.8 MG/DL — SIGNIFICANT CHANGE UP (ref 1.6–2.6)
MCHC RBC-ENTMCNC: 27.3 PG — SIGNIFICANT CHANGE UP (ref 27–34)
MCHC RBC-ENTMCNC: 28.2 PG — SIGNIFICANT CHANGE UP (ref 27–34)
MCHC RBC-ENTMCNC: 29 PG — SIGNIFICANT CHANGE UP (ref 27–34)
MCHC RBC-ENTMCNC: 33.2 GM/DL — SIGNIFICANT CHANGE UP (ref 32–36)
MCHC RBC-ENTMCNC: 33.9 GM/DL — SIGNIFICANT CHANGE UP (ref 32–36)
MCHC RBC-ENTMCNC: 35.2 GM/DL — SIGNIFICANT CHANGE UP (ref 32–36)
MCV RBC AUTO: 82.2 FL — SIGNIFICANT CHANGE UP (ref 80–100)
MCV RBC AUTO: 82.4 FL — SIGNIFICANT CHANGE UP (ref 80–100)
MCV RBC AUTO: 83.1 FL — SIGNIFICANT CHANGE UP (ref 80–100)
MONOCYTES # BLD AUTO: 0.6 K/UL — SIGNIFICANT CHANGE UP (ref 0–0.9)
MONOCYTES NFR BLD AUTO: 8.4 % — SIGNIFICANT CHANGE UP (ref 2–14)
NEUTROPHILS # BLD AUTO: 5 K/UL — SIGNIFICANT CHANGE UP (ref 1.8–7.4)
NEUTROPHILS NFR BLD AUTO: 75.4 % — SIGNIFICANT CHANGE UP (ref 43–77)
PCO2 BLDV: 47 MMHG — SIGNIFICANT CHANGE UP (ref 35–50)
PCO2 BLDV: 48 MMHG — SIGNIFICANT CHANGE UP (ref 35–50)
PH BLDV: 7.33 — LOW (ref 7.35–7.45)
PH BLDV: 7.34 — LOW (ref 7.35–7.45)
PHOSPHATE SERPL-MCNC: 2.9 MG/DL — SIGNIFICANT CHANGE UP (ref 2.5–4.5)
PLATELET # BLD AUTO: 100 K/UL — LOW (ref 150–400)
PLATELET # BLD AUTO: 102 K/UL — LOW (ref 150–400)
PLATELET # BLD AUTO: 63 K/UL — LOW (ref 150–400)
PO2 BLDV: 34 MMHG — SIGNIFICANT CHANGE UP (ref 25–45)
PO2 BLDV: 35 MMHG — SIGNIFICANT CHANGE UP (ref 25–45)
PO2 BLDV: 35 MMHG — SIGNIFICANT CHANGE UP (ref 25–45)
PO2 BLDV: 43 MMHG — SIGNIFICANT CHANGE UP (ref 25–45)
POTASSIUM SERPL-MCNC: 4.4 MMOL/L — SIGNIFICANT CHANGE UP (ref 3.5–5.3)
POTASSIUM SERPL-SCNC: 4.4 MMOL/L — SIGNIFICANT CHANGE UP (ref 3.5–5.3)
PROT SERPL-MCNC: 4.4 G/DL — LOW (ref 6–8.3)
PROTHROM AB SERPL-ACNC: 15.2 SEC — HIGH (ref 10–12.9)
RBC # BLD: 2.94 M/UL — LOW (ref 4.2–5.8)
RBC # BLD: 3 M/UL — LOW (ref 4.2–5.8)
RBC # BLD: 3.31 M/UL — LOW (ref 4.2–5.8)
RBC # FLD: 12.8 % — SIGNIFICANT CHANGE UP (ref 10.3–14.5)
RBC # FLD: 12.9 % — SIGNIFICANT CHANGE UP (ref 10.3–14.5)
RBC # FLD: 13 % — SIGNIFICANT CHANGE UP (ref 10.3–14.5)
SAO2 % BLDV: 60 % — LOW (ref 67–88)
SAO2 % BLDV: 61 % — LOW (ref 67–88)
SAO2 % BLDV: 62 % — LOW (ref 67–88)
SAO2 % BLDV: 73 % — SIGNIFICANT CHANGE UP (ref 67–88)
SODIUM SERPL-SCNC: 143 MMOL/L — SIGNIFICANT CHANGE UP (ref 135–145)
WBC # BLD: 6.2 K/UL — SIGNIFICANT CHANGE UP (ref 3.8–10.5)
WBC # BLD: 6.6 K/UL — SIGNIFICANT CHANGE UP (ref 3.8–10.5)
WBC # BLD: 7 K/UL — SIGNIFICANT CHANGE UP (ref 3.8–10.5)
WBC # FLD AUTO: 6.2 K/UL — SIGNIFICANT CHANGE UP (ref 3.8–10.5)
WBC # FLD AUTO: 6.6 K/UL — SIGNIFICANT CHANGE UP (ref 3.8–10.5)
WBC # FLD AUTO: 7 K/UL — SIGNIFICANT CHANGE UP (ref 3.8–10.5)

## 2019-08-11 PROCEDURE — 71045 X-RAY EXAM CHEST 1 VIEW: CPT | Mod: 26

## 2019-08-11 PROCEDURE — 99291 CRITICAL CARE FIRST HOUR: CPT

## 2019-08-11 PROCEDURE — 33968 REMOVE AORTIC ASSIST DEVICE: CPT | Mod: 78

## 2019-08-11 PROCEDURE — 93010 ELECTROCARDIOGRAM REPORT: CPT

## 2019-08-11 RX ORDER — PANTOPRAZOLE SODIUM 20 MG/1
40 TABLET, DELAYED RELEASE ORAL
Refills: 0 | Status: DISCONTINUED | OUTPATIENT
Start: 2019-08-11 | End: 2019-08-11

## 2019-08-11 RX ORDER — ALBUMIN HUMAN 25 %
250 VIAL (ML) INTRAVENOUS ONCE
Refills: 0 | Status: COMPLETED | OUTPATIENT
Start: 2019-08-11 | End: 2019-08-11

## 2019-08-11 RX ORDER — HEPARIN SODIUM 5000 [USP'U]/ML
5000 INJECTION INTRAVENOUS; SUBCUTANEOUS EVERY 8 HOURS
Refills: 0 | Status: DISCONTINUED | OUTPATIENT
Start: 2019-08-11 | End: 2019-08-15

## 2019-08-11 RX ORDER — PANTOPRAZOLE SODIUM 20 MG/1
40 TABLET, DELAYED RELEASE ORAL
Refills: 0 | Status: DISCONTINUED | OUTPATIENT
Start: 2019-08-11 | End: 2019-08-15

## 2019-08-11 RX ORDER — INSULIN LISPRO 100/ML
VIAL (ML) SUBCUTANEOUS
Refills: 0 | Status: DISCONTINUED | OUTPATIENT
Start: 2019-08-11 | End: 2019-08-15

## 2019-08-11 RX ORDER — INSULIN LISPRO 100/ML
8 VIAL (ML) SUBCUTANEOUS
Refills: 0 | Status: DISCONTINUED | OUTPATIENT
Start: 2019-08-11 | End: 2019-08-15

## 2019-08-11 RX ORDER — MAGNESIUM SULFATE 500 MG/ML
2 VIAL (ML) INJECTION ONCE
Refills: 0 | Status: COMPLETED | OUTPATIENT
Start: 2019-08-11 | End: 2019-08-11

## 2019-08-11 RX ORDER — CALCIUM GLUCONATE 100 MG/ML
1 VIAL (ML) INTRAVENOUS ONCE
Refills: 0 | Status: COMPLETED | OUTPATIENT
Start: 2019-08-11 | End: 2019-08-11

## 2019-08-11 RX ORDER — ATORVASTATIN CALCIUM 80 MG/1
80 TABLET, FILM COATED ORAL AT BEDTIME
Refills: 0 | Status: DISCONTINUED | OUTPATIENT
Start: 2019-08-11 | End: 2019-08-15

## 2019-08-11 RX ORDER — HYDROMORPHONE HYDROCHLORIDE 2 MG/ML
0.5 INJECTION INTRAMUSCULAR; INTRAVENOUS; SUBCUTANEOUS ONCE
Refills: 0 | Status: DISCONTINUED | OUTPATIENT
Start: 2019-08-11 | End: 2019-08-11

## 2019-08-11 RX ORDER — TICAGRELOR 90 MG/1
90 TABLET ORAL EVERY 12 HOURS
Refills: 0 | Status: DISCONTINUED | OUTPATIENT
Start: 2019-08-11 | End: 2019-08-15

## 2019-08-11 RX ORDER — INSULIN GLARGINE 100 [IU]/ML
20 INJECTION, SOLUTION SUBCUTANEOUS ONCE
Refills: 0 | Status: COMPLETED | OUTPATIENT
Start: 2019-08-11 | End: 2019-08-11

## 2019-08-11 RX ORDER — ATORVASTATIN CALCIUM 80 MG/1
80 TABLET, FILM COATED ORAL AT BEDTIME
Refills: 0 | Status: DISCONTINUED | OUTPATIENT
Start: 2019-08-11 | End: 2019-08-11

## 2019-08-11 RX ORDER — INSULIN GLARGINE 100 [IU]/ML
20 INJECTION, SOLUTION SUBCUTANEOUS AT BEDTIME
Refills: 0 | Status: DISCONTINUED | OUTPATIENT
Start: 2019-08-12 | End: 2019-08-15

## 2019-08-11 RX ADMIN — HEPARIN SODIUM 5000 UNIT(S): 5000 INJECTION INTRAVENOUS; SUBCUTANEOUS at 22:21

## 2019-08-11 RX ADMIN — Medication 125 MILLILITER(S): at 14:48

## 2019-08-11 RX ADMIN — Medication 8 UNIT(S): at 12:53

## 2019-08-11 RX ADMIN — Medication 50 GRAM(S): at 06:40

## 2019-08-11 RX ADMIN — HEPARIN SODIUM 5000 UNIT(S): 5000 INJECTION INTRAVENOUS; SUBCUTANEOUS at 14:18

## 2019-08-11 RX ADMIN — ATORVASTATIN CALCIUM 80 MILLIGRAM(S): 80 TABLET, FILM COATED ORAL at 22:20

## 2019-08-11 RX ADMIN — Medication 8 UNIT(S): at 17:42

## 2019-08-11 RX ADMIN — Medication 100 MILLIGRAM(S): at 14:18

## 2019-08-11 RX ADMIN — Medication 4: at 17:41

## 2019-08-11 RX ADMIN — Medication 81 MILLIGRAM(S): at 11:43

## 2019-08-11 RX ADMIN — CHLORHEXIDINE GLUCONATE 1 APPLICATION(S): 213 SOLUTION TOPICAL at 06:13

## 2019-08-11 RX ADMIN — TICAGRELOR 90 MILLIGRAM(S): 90 TABLET ORAL at 07:53

## 2019-08-11 RX ADMIN — Medication 100 MILLIGRAM(S): at 06:40

## 2019-08-11 RX ADMIN — VASOPRESSIN 2 UNIT(S)/MIN: 20 INJECTION INTRAVENOUS at 06:41

## 2019-08-11 RX ADMIN — TICAGRELOR 90 MILLIGRAM(S): 90 TABLET ORAL at 20:47

## 2019-08-11 RX ADMIN — INSULIN HUMAN 1 UNIT(S)/HR: 100 INJECTION, SOLUTION SUBCUTANEOUS at 06:41

## 2019-08-11 RX ADMIN — Medication 100 MILLIGRAM(S): at 22:20

## 2019-08-11 RX ADMIN — HYDROMORPHONE HYDROCHLORIDE 0.5 MILLIGRAM(S): 2 INJECTION INTRAMUSCULAR; INTRAVENOUS; SUBCUTANEOUS at 19:49

## 2019-08-11 RX ADMIN — Medication 125 MILLILITER(S): at 13:47

## 2019-08-11 RX ADMIN — INSULIN GLARGINE 20 UNIT(S): 100 INJECTION, SOLUTION SUBCUTANEOUS at 11:43

## 2019-08-11 RX ADMIN — OXYCODONE AND ACETAMINOPHEN 1 TABLET(S): 5; 325 TABLET ORAL at 15:00

## 2019-08-11 RX ADMIN — PANTOPRAZOLE SODIUM 40 MILLIGRAM(S): 20 TABLET, DELAYED RELEASE ORAL at 06:40

## 2019-08-11 RX ADMIN — Medication 4: at 22:21

## 2019-08-11 RX ADMIN — Medication 100 MILLIGRAM(S): at 07:53

## 2019-08-11 RX ADMIN — OXYCODONE AND ACETAMINOPHEN 2 TABLET(S): 5; 325 TABLET ORAL at 09:30

## 2019-08-11 RX ADMIN — Medication 100 MILLIGRAM(S): at 15:44

## 2019-08-11 RX ADMIN — OXYCODONE AND ACETAMINOPHEN 2 TABLET(S): 5; 325 TABLET ORAL at 08:43

## 2019-08-11 RX ADMIN — Medication 200 GRAM(S): at 16:45

## 2019-08-11 RX ADMIN — POLYETHYLENE GLYCOL 3350 17 GRAM(S): 17 POWDER, FOR SOLUTION ORAL at 11:43

## 2019-08-11 RX ADMIN — OXYCODONE AND ACETAMINOPHEN 1 TABLET(S): 5; 325 TABLET ORAL at 14:17

## 2019-08-11 RX ADMIN — HYDROMORPHONE HYDROCHLORIDE 0.5 MILLIGRAM(S): 2 INJECTION INTRAMUSCULAR; INTRAVENOUS; SUBCUTANEOUS at 19:34

## 2019-08-11 NOTE — PHYSICAL THERAPY INITIAL EVALUATION ADULT - BALANCE TRAINING, PT EVAL
GOAL: Pt will improve her balance during (static/dynamic) (sitting/standing) activites by atleast 1 balance grade within 3-4 weeks to assist with greater independence during functional mobility and ADL's. e

## 2019-08-11 NOTE — CONSULT NOTE ADULT - PROBLEM SELECTOR RECOMMENDATION 9
start  Lantus 20   units qhs, if start eating  Humalog  8    units before each meal in addition to correction scale coverage, monitor FS will FU  if not eating , ssi coverage only   Patient counseled for compliance with consistent low carb diet

## 2019-08-11 NOTE — PHYSICAL THERAPY INITIAL EVALUATION ADULT - PERTINENT HX OF CURRENT PROBLEM, REHAB EVAL
Pt is a 49 yr old male with H/O Smoking , ETOH Quit 2005 HTN, HLD, Hypertriglyceridemia  CAD, S/P Stented Coronary x2 2014, DM2 with A1C 11.7, CVA with Left sided weakness 2005, admitted with NSTEMI, + troponin, S/P Cath with multivessel CAD, TTE with EF 50%, Emergent R femoral IABP for critical anatomy, & ongoing chest pain. Now s/p CABG 8/10/19.

## 2019-08-11 NOTE — CHART NOTE - NSCHARTNOTEFT_GEN_A_CORE
PROCEDURE NOTE  REMOVAL OF INTRA AORTIC BALLOON PUMP    The IABP (intra-aortic balloon pump) was weaned according to protocol.  Hemodynamics remained stable.  Pulses in the right lower extremity are palpable.  The patient was placed in the supine position.  The insertion site was identified and the sutures were removed.  The IABP was turned off and the balloon deflated.  The IABP was then removed.  Direct pressure was applied for      30         minutes.  A sandbag was applied and is  to remain in place for three hours.    Monitoring of the     right        groin and both lower extremities including neuro-vascular checks and vital signs every 15 minutes  x4, then every 30 minutes x 2, then every 1 hr x 4 was ordered.      Complications: none

## 2019-08-11 NOTE — CONSULT NOTE ADULT - ATTENDING COMMENTS
I have discussed patient with ophthalmology team but have not examined patient. Patient should follow up with [facility] within 1 week of discharge.

## 2019-08-11 NOTE — CONSULT NOTE ADULT - SUBJECTIVE AND OBJECTIVE BOX
[facility] DEPARTMENT OF OPHTHALMOLOGY - INITIAL ADULT CONSULT  -----------------------------------------------------------------------------------------------------------------  Masood Clay MD PGY-2  Pager: [phone]/[facility]: [id number]  -----------------------------------------------------------------------------------------------------------------    HPI:  50 y/o M with PMH of CAD(s/p 2 stents), 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 As per the patient he was resting in bed last night when he developed sudden onset left sided chest pain. Patient stated that the pain was not that severe and he went to sleep. Patient woke up the next morning and went on with his usual day and was in the car when he developed similar left sided chest pain. Patient described the chest pain as a sharp pain, without any aggravating or alleviating factors, lasting few hours in duration, without any radiation, with a severity of 5/10. Patient stated that this episode of chest pain was different when he had the stents placed. Patient stated that when he had the stents placed he did not have chest pain. Patient also endorsed of SOB and CAGE with the chest pain. Patient denied any fevers, chills, N/V/D/C, abdominal pain, dysuria, melena, hematochezia, recent travel, sick contact, pleuritic or positional chest pain.     On ED admission EKG revealed NSR at a rate of 81 with TWI in leads V3-V5, II, III, AVF and QTc of 439, CE x 1: Trop: 33, CE x 2: Trop: 31, Gluc: 282. CXR: Suboptimally inflated but otherwise clear lungs. No pleural effusions or pneumothorax. Left coronary stent noted otherwise cardiac and mediastinal silhouettes within normal limits. Trachea midline. Unremarkable osseous structures. In the ED patient received Aspirin 325mg. When examined patient is resting in the stretcher and denied any current chest pain or SOB. Cath showed triple vessel disease. Patient transferred to [facility] under Dr. Alvarez for preop workup and CAB. (09 Aug 2019 20:47)    Interval History: consulted for complaint of blurry vision. Upon asking patient, reported vision in right eye has been blurry for the last 6-7 weeks, admits to poorly controlled diabetes. Had 'cloudy vision' in the right eye before for which he got laser. Denies any acute changes to vision, wears reading glasses.     PAST MEDICAL & SURGICAL HISTORY:  Coronary artery disease of native artery of native heart with stable angina pectoris  Type 2 diabetes mellitus without complication, with long-term current use of insulin  Hyperlipidemia, unspecified hyperlipidemia type  Hypertension, unspecified type  History of percutaneous coronary intervention    Past Ocular History: ?diabetic retinopathy OD  FAMILY HISTORY: Family history of heart disease  Social History: former smoker, former drinker  Ophthalmic Medications: none    MEDICATIONS  (STANDING):  aspirin enteric coated 81 milliGRAM(s) Oral daily  atorvastatin 80 milliGRAM(s) Oral at bedtime  cefuroxime  IVPB 1500 milliGRAM(s) IV Intermittent every 8 hours  chlorhexidine 2% Cloths 1 Application(s) Topical <User Schedule>  dextrose 50% Injectable 50 milliLiter(s) IV Push every 15 minutes  dextrose 50% Injectable 25 milliLiter(s) IV Push every 15 minutes  docusate sodium 100 milliGRAM(s) Oral three times a day  heparin  Injectable 5000 Unit(s) SubCutaneous every 8 hours  insulin lispro (HumaLOG) corrective regimen sliding scale   SubCutaneous Before meals and at bedtime  insulin lispro Injectable (HumaLOG) 8 Unit(s) SubCutaneous before breakfast  insulin lispro Injectable (HumaLOG) 8 Unit(s) SubCutaneous before lunch  insulin lispro Injectable (HumaLOG) 8 Unit(s) SubCutaneous before dinner  insulin regular Infusion 1 Unit(s)/Hr (1 mL/Hr) IV Continuous <Continuous>  norepinephrine Infusion 0.06 MICROgram(s)/kG/Min (8.685 mL/Hr) IV Continuous <Continuous>  pantoprazole    Tablet 40 milliGRAM(s) Oral before breakfast  polyethylene glycol 3350 17 Gram(s) Oral daily  potassium chloride  10 mEq/50 mL IVPB 10 milliEquivalent(s) IV Intermittent every 1 hour  potassium chloride  10 mEq/50 mL IVPB 10 milliEquivalent(s) IV Intermittent every 1 hour  potassium chloride  10 mEq/50 mL IVPB 10 milliEquivalent(s) IV Intermittent every 1 hour  sodium chloride 0.9%. 1000 milliLiter(s) (10 mL/Hr) IV Continuous <Continuous>  ticagrelor 90 milliGRAM(s) Oral every 12 hours  vasopressin Infusion 0.033 Unit(s)/Min (2 mL/Hr) IV Continuous <Continuous>    MEDICATIONS  (PRN):  oxyCODONE    5 mG/acetaminophen 325 mG 1 Tablet(s) Oral every 4 hours PRN Mild Pain (1 - 3)  oxyCODONE    5 mG/acetaminophen 325 mG 2 Tablet(s) Oral every 6 hours PRN Moderate Pain (4 - 6)  sodium chloride 0.9% lock flush 10 milliLiter(s) IV Push every 1 hour PRN Pre/post blood products, medications, blood draw, and to maintain line patency    Allergies & Intolerances: NKDA    Review of Systems:  Constitutional: No fever, chills  Eyes: No flashes, floaters, FBS, erythema, discharge, double vision OU, blurry VA OD x 6-7 weeks  Neuro: No tremors  Cardiovascular: No chest pain, palpitations  Respiratory: No SOB, no cough  GI: No nausea, vomiting, abdominal pain  : No dysuria  Skin: no rash  Psych: no depression  Endocrine: no polyuria, polydipsia  Heme/lymph: no swelling    VITALS: T(C): 37.4 (08-11-19 @ 20:00)  T(F): 99.3 (08-11-19 @ 20:00), Max: 99.7 (08-11-19 @ 16:00)  HR: 109 (08-11-19 @ 20:00) (96 - 116)  BP: 123/68 (08-11-19 @ 20:00) (84/57 - 139/70)  RR:  (7 - 31)  SpO2:  (91% - 100%)  Wt(kg): --  General: AAO x 3, appropriate mood and affect    Ophthalmology Exam:  Visual acuity (w/ +1.75 readers): 20/600 OD ph NI, 20/25 OS  Pupils: PERRL OU, no APD  Ttono: 16 OU  Extraocular movements (EOMs): Full OU, no pain, no diplopia  Confrontational Visual Field (CVF): Full OU    Pen Light Exam (PLE)  External: Flat OU  Lids/Lashes/Lacrimal Ducts: Flat OU    Sclera/Conjunctiva: W+Q OU  Cornea: Cl OU  Anterior Chamber: D+F OU    Iris: Flat OU  Lens: Cl OU    Fundus Exam: dilated with 1% tropicamide and 2.5% phenylephrine  Approval obtained from primary team for dilation  Patient aware that pupils can remained dilated for at least 4-6 hours  Exam performed with 20D lens    Vitreous: wnl OU  Disc, cup/disc: sharp and pink, 0.4 OU  Macula: wnl OU  Vessels: wnl OU  Periphery: wnl OU [facility] DEPARTMENT OF OPHTHALMOLOGY - INITIAL ADULT CONSULT  ----------------------------------------------------------------------  Masood Clay MD PGY-2  Pager: [phone]/[facility]: [id number]  ----------------------------------------------------------------------    HPI:  50 y/o M with PMH of CAD(s/p 2 stents), 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 As per the patient he was resting in bed last night when he developed sudden onset left sided chest pain. Patient stated that the pain was not that severe and he went to sleep. Patient woke up the next morning and went on with his usual day and was in the car when he developed similar left sided chest pain. Patient described the chest pain as a sharp pain, without any aggravating or alleviating factors, lasting few hours in duration, without any radiation, with a severity of 5/10. Patient stated that this episode of chest pain was different when he had the stents placed. Patient stated that when he had the stents placed he did not have chest pain. Patient also endorsed of SOB and CAGE with the chest pain. Patient denied any fevers, chills, N/V/D/C, abdominal pain, dysuria, melena, hematochezia, recent travel, sick contact, pleuritic or positional chest pain.     On ED admission EKG revealed NSR at a rate of 81 with TWI in leads V3-V5, II, III, AVF and QTc of 439, CE x 1: Trop: 33, CE x 2: Trop: 31, Gluc: 282. CXR: Suboptimally inflated but otherwise clear lungs. No pleural effusions or pneumothorax. Left coronary stent noted otherwise cardiac and mediastinal silhouettes within normal limits. Trachea midline. Unremarkable osseous structures. In the ED patient received Aspirin 325mg. When examined patient is resting in the stretcher and denied any current chest pain or SOB. Cath showed triple vessel disease. Patient transferred to [facility] under Dr. Alvarez for preop workup and CAB. (09 Aug 2019 20:47)    Interval History: consulted for complaint of blurry vision. Upon asking patient, reported vision in right eye has been blurry for the last 6-7 weeks, admits to poorly controlled diabetes. Had 'cloudy vision' in the right eye before for which he got laser? Denies any acute changes to vision, wears reading glasses. Though speaking w/ other providers, appears patient's story somewhat inconsistent and changes. 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 pectoris  Type 2 diabetes mellitus without complication, with long-term current use of insulin  Hyperlipidemia, unspecified hyperlipidemia type  Hypertension, unspecified type  History of percutaneous coronary intervention    Past Ocular History: ?diabetic retinopathy OD  FAMILY HISTORY: Family history of heart disease  Social History: former smoker, former drinker  Ophthalmic Medications: none    MEDICATIONS  (STANDING):  aspirin enteric coated 81 milliGRAM(s) Oral daily  atorvastatin 80 milliGRAM(s) Oral at bedtime  cefuroxime  IVPB 1500 milliGRAM(s) IV Intermittent every 8 hours  chlorhexidine 2% Cloths 1 Application(s) Topical <User Schedule>  dextrose 50% Injectable 50 milliLiter(s) IV Push every 15 minutes  dextrose 50% Injectable 25 milliLiter(s) IV Push every 15 minutes  docusate sodium 100 milliGRAM(s) Oral three times a day  heparin  Injectable 5000 Unit(s) SubCutaneous every 8 hours  insulin lispro (HumaLOG) corrective regimen sliding scale   SubCutaneous Before meals and at bedtime  insulin lispro Injectable (HumaLOG) 8 Unit(s) SubCutaneous before breakfast  insulin lispro Injectable (HumaLOG) 8 Unit(s) SubCutaneous before lunch  insulin lispro Injectable (HumaLOG) 8 Unit(s) SubCutaneous before dinner  insulin regular Infusion 1 Unit(s)/Hr (1 mL/Hr) IV Continuous <Continuous>  norepinephrine Infusion 0.06 MICROgram(s)/kG/Min (8.685 mL/Hr) IV Continuous <Continuous>  pantoprazole    Tablet 40 milliGRAM(s) Oral before breakfast  polyethylene glycol 3350 17 Gram(s) Oral daily  potassium chloride  10 mEq/50 mL IVPB 10 milliEquivalent(s) IV Intermittent every 1 hour  potassium chloride  10 mEq/50 mL IVPB 10 milliEquivalent(s) IV Intermittent every 1 hour  potassium chloride  10 mEq/50 mL IVPB 10 milliEquivalent(s) IV Intermittent every 1 hour  sodium chloride 0.9%. 1000 milliLiter(s) (10 mL/Hr) IV Continuous <Continuous>  ticagrelor 90 milliGRAM(s) Oral every 12 hours  vasopressin Infusion 0.033 Unit(s)/Min (2 mL/Hr) IV Continuous <Continuous>    MEDICATIONS  (PRN):  oxyCODONE    5 mG/acetaminophen 325 mG 1 Tablet(s) Oral every 4 hours PRN Mild Pain (1 - 3)  oxyCODONE    5 mG/acetaminophen 325 mG 2 Tablet(s) Oral every 6 hours PRN Moderate Pain (4 - 6)  sodium chloride 0.9% lock flush 10 milliLiter(s) IV Push every 1 hour PRN Pre/post blood products, medications, blood draw, and to maintain line patency    Allergies & Intolerances: NKDA    Review of Systems:  Constitutional: No fever, chills  Eyes: No flashes, floaters, FBS, erythema, discharge, double vision OU, blurry VA OD x 6-7 weeks  Neuro: No tremors  Cardiovascular: No chest pain, palpitations  Respiratory: No SOB, no cough  GI: No nausea, vomiting, abdominal pain  : No dysuria  Skin: no rash  Psych: no depression  Endocrine: no polyuria, polydipsia  Heme/lymph: no swelling    VITALS: T(C): 37.4 (08-11-19 @ 20:00)  T(F): 99.3 (08-11-19 @ 20:00), Max: 99.7 (08-11-19 @ 16:00)  HR: 109 (08-11-19 @ 20:00) (96 - 116)  BP: 123/68 (08-11-19 @ 20:00) (84/57 - 139/70)  RR:  (7 - 31)  SpO2:  (91% - 100%)  Wt(kg): --  General: AAO x 3, appropriate mood and affect    Ophthalmology Exam:  Visual acuity (w/ +1.75 readers): 20/600 OD ph NI, 20/25 OS  Pupils: PERRL OU, no APD  Ttono: 17 OD, 19 OS  Extraocular movements (EOMs): Full OU, no pain, no diplopia  Confrontational Visual Field (CVF): Full OU    Pen Light Exam (PLE)  External: Flat OU  Lids/Lashes/Lacrimal Ducts: Flat OU    Sclera/Conjunctiva: W+Q OU  Cornea: Cl OU  Anterior Chamber: D+F OU    Iris: Flat OU  Lens: +NS, CC, PSC OD, PCIOL OS    Fundus Exam: dilated with 1% tropicamide and 2.5% phenylephrine  Approval obtained from primary team for dilation  Patient aware that pupils can remained dilated for at least 4-6 hours  Exam performed with 20D lens    View OS hazy likely 2/2 cataract  Vitreous: wnl OU  Disc, cup/disc: sharp and pink, 0.4 OU  Macula: scattered DBH OU  Vessels: wnl OU  Periphery: scattered DBH OU [facility] DEPARTMENT OF OPHTHALMOLOGY - INITIAL ADULT CONSULT  ----------------------------------------------------------------------  Masood Clay MD PGY-2  Pager: [phone]/[facility]: [id number]  ----------------------------------------------------------------------    HPI:  50 y/o M with PMH of CAD(s/p 2 stents), 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 As per the patient he was resting in bed last night when he developed sudden onset left sided chest pain. Patient stated that the pain was not that severe and he went to sleep. Patient woke up the next morning and went on with his usual day and was in the car when he developed similar left sided chest pain. Patient described the chest pain as a sharp pain, without any aggravating or alleviating factors, lasting few hours in duration, without any radiation, with a severity of 5/10. Patient stated that this episode of chest pain was different when he had the stents placed. Patient stated that when he had the stents placed he did not have chest pain. Patient also endorsed of SOB and CAGE with the chest pain. Patient denied any fevers, chills, N/V/D/C, abdominal pain, dysuria, melena, hematochezia, recent travel, sick contact, pleuritic or positional chest pain.     On ED admission EKG revealed NSR at a rate of 81 with TWI in leads V3-V5, II, III, AVF and QTc of 439, CE x 1: Trop: 33, CE x 2: Trop: 31, Gluc: 282. CXR: Suboptimally inflated but otherwise clear lungs. No pleural effusions or pneumothorax. Left coronary stent noted otherwise cardiac and mediastinal silhouettes within normal limits. Trachea midline. Unremarkable osseous structures. In the ED patient received Aspirin 325mg. When examined patient is resting in the stretcher and denied any current chest pain or SOB. Cath showed triple vessel disease. Patient transferred to [facility] under Dr. Alvarez for preop workup and CAB. (09 Aug 2019 20:47)    Interval History: consulted for complaint of blurry vision. Upon asking patient, reported vision in right eye has been blurry for the last 6-7 weeks, admits to poorly controlled diabetes. Had 'cloudy vision' in the right eye before for which he got laser? Denies any acute changes to vision, wears reading glasses. Though speaking w/ other providers, appears patient's story somewhat inconsistent and changes. 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 pectoris  Type 2 diabetes mellitus without complication, with long-term current use of insulin  Hyperlipidemia, unspecified hyperlipidemia type  Hypertension, unspecified type  History of percutaneous coronary intervention    Past Ocular History: ?diabetic retinopathy OD  FAMILY HISTORY: Family history of heart disease  Social History: former smoker, former drinker  Ophthalmic Medications: none    MEDICATIONS  (STANDING):  aspirin enteric coated 81 milliGRAM(s) Oral daily  atorvastatin 80 milliGRAM(s) Oral at bedtime  cefuroxime  IVPB 1500 milliGRAM(s) IV Intermittent every 8 hours  chlorhexidine 2% Cloths 1 Application(s) Topical <User Schedule>  dextrose 50% Injectable 50 milliLiter(s) IV Push every 15 minutes  dextrose 50% Injectable 25 milliLiter(s) IV Push every 15 minutes  docusate sodium 100 milliGRAM(s) Oral three times a day  heparin  Injectable 5000 Unit(s) SubCutaneous every 8 hours  insulin lispro (HumaLOG) corrective regimen sliding scale   SubCutaneous Before meals and at bedtime  insulin lispro Injectable (HumaLOG) 8 Unit(s) SubCutaneous before breakfast  insulin lispro Injectable (HumaLOG) 8 Unit(s) SubCutaneous before lunch  insulin lispro Injectable (HumaLOG) 8 Unit(s) SubCutaneous before dinner  insulin regular Infusion 1 Unit(s)/Hr (1 mL/Hr) IV Continuous <Continuous>  norepinephrine Infusion 0.06 MICROgram(s)/kG/Min (8.685 mL/Hr) IV Continuous <Continuous>  pantoprazole    Tablet 40 milliGRAM(s) Oral before breakfast  polyethylene glycol 3350 17 Gram(s) Oral daily  potassium chloride  10 mEq/50 mL IVPB 10 milliEquivalent(s) IV Intermittent every 1 hour  potassium chloride  10 mEq/50 mL IVPB 10 milliEquivalent(s) IV Intermittent every 1 hour  potassium chloride  10 mEq/50 mL IVPB 10 milliEquivalent(s) IV Intermittent every 1 hour  sodium chloride 0.9%. 1000 milliLiter(s) (10 mL/Hr) IV Continuous <Continuous>  ticagrelor 90 milliGRAM(s) Oral every 12 hours  vasopressin Infusion 0.033 Unit(s)/Min (2 mL/Hr) IV Continuous <Continuous>    MEDICATIONS  (PRN):  oxyCODONE    5 mG/acetaminophen 325 mG 1 Tablet(s) Oral every 4 hours PRN Mild Pain (1 - 3)  oxyCODONE    5 mG/acetaminophen 325 mG 2 Tablet(s) Oral every 6 hours PRN Moderate Pain (4 - 6)  sodium chloride 0.9% lock flush 10 milliLiter(s) IV Push every 1 hour PRN Pre/post blood products, medications, blood draw, and to maintain line patency    Allergies & Intolerances: NKDA    Review of Systems:  Constitutional: No fever, chills  Eyes: No flashes, floaters, FBS, erythema, discharge, double vision OU, blurry VA OD x 6-7 weeks  Neuro: No tremors  Cardiovascular: No chest pain, palpitations  Respiratory: No SOB, no cough  GI: No nausea, vomiting, abdominal pain  : No dysuria  Skin: no rash  Psych: no depression  Endocrine: no polyuria, polydipsia  Heme/lymph: no swelling    VITALS: T(C): 37.4 (08-11-19 @ 20:00)  T(F): 99.3 (08-11-19 @ 20:00), Max: 99.7 (08-11-19 @ 16:00)  HR: 109 (08-11-19 @ 20:00) (96 - 116)  BP: 123/68 (08-11-19 @ 20:00) (84/57 - 139/70)  RR:  (7 - 31)  SpO2:  (91% - 100%)  Wt(kg): --  General: AAO x 3, appropriate mood and affect    Ophthalmology Exam:  Visual acuity (w/ +1.75 readers): 20/800 OD ph NI, 20/25 OS  Pupils: small, equal, round, reactive to light OU, no APD OU  Ttono: 17 OD, 19 OS  Extraocular movements (EOMs): Full OU, no pain, no diplopia  Confrontational Visual Field (CVF): Full OU    Pen Light Exam (PLE)  External: Flat OU  Lids/Lashes/Lacrimal Ducts: Flat OU    Sclera/Conjunctiva: W+Q OU  Cornea: Cl OU  Anterior Chamber: D+F OU    Iris: Flat OU  Lens: +NS, CC, PSC OD, PCIOL OS    Fundus Exam: dilated with 1% tropicamide and 2.5% phenylephrine  Approval obtained from primary team for dilation  Patient aware that pupils can remained dilated for at least 4-6 hours  Exam performed with 20D lens    View OS hazy likely 2/2 cataract  Vitreous: wnl OU  Disc, cup/disc: sharp and pink, 0.4 OU  Macula: scattered DBH OU  Vessels: wnl OU  Periphery: scattered DBH OU

## 2019-08-11 NOTE — PHYSICAL THERAPY INITIAL EVALUATION ADULT - PLANNED THERAPY INTERVENTIONS, PT EVAL
GOAL: Pt will perform 12 stairs with or without U HR as needed within 3-4weeks./transfer training/balance training/bed mobility training/gait training

## 2019-08-11 NOTE — PHYSICAL THERAPY INITIAL EVALUATION ADULT - ADDITIONAL COMMENTS
As per pt, pt lives in a private house w/ 3 steps to enter. PTA pt was independent w/ ADLs and mobility.

## 2019-08-11 NOTE — PROGRESS NOTE ADULT - SUBJECTIVE AND OBJECTIVE BOX
ELSI MERINO   MRN#: [id number]     The patient is a 49y Male who was seen, evaluated, & examined with the CTICU staff post-operatively with a multidisciplinary care plan formulated & implemented.  All available clinical, laboratory, radiographic, pharmacologic, and electrocardiographic data were reviewed & analyzed.      The patient was in the CTICU in critical condition secondary to:     persistent cardiopulmonary dysfunction  vasodilatory shock    For support and evaluation & prevention of further decompensation secondary to persistent cardiopulmonary dysfunction, respiratory status required:     supplemental oxygen with nasal cannula  continuous pulse oximetry monitoring  following ABGs with A-line monitoring    Invasive hemodynamic monitoring with an A-line was required for continuous MAP/BP monitoring to ensure adequate cardiovascular support and to evaluate for & help prevent decompensation while receiving     blood transfusions  blood product transfusions  IV Vasopressin infusion    secondary to     hemodynamically significant anemia  vasodilatory shock    Metabolic stability, uncontrolled type II Diabetes mellitus-stress hyperglycemia, & infection prophylaxis required an IV regular Insulin drip & the following of serial glucose levels to help achieve & maintain euglycemia.      In addition:  S/p CABG and multiple stents, on ASA, Brilinta, Lipitor 80  Pressor requirement, now off post transfusions of blood and platelets, will check levels post transfusions  Poorly-controlled diabetic as an outpatient, on insulin drip, Endocrine consulted, awaiting recs    The patient required critical care management and I personally provided 30 minutes of non-continuous care to the patient, excluding separate procedures, in addition to  discussing the patient and plan at length with the CTICU staff and helping coordinate care. ELSI MERINO   MRN#: [id number]     The patient is a 49y Male who was seen, evaluated, & examined with the CTICU staff post-operatively with a multidisciplinary care plan formulated & implemented.  All available clinical, laboratory, radiographic, pharmacologic, and electrocardiographic data were reviewed & analyzed.      The patient was in the CTICU in critical condition secondary to:     persistent cardiopulmonary dysfunction  vasodilatory shock    For support and evaluation & prevention of further decompensation secondary to persistent cardiopulmonary dysfunction, respiratory status required:     supplemental oxygen with nasal cannula  continuous pulse oximetry monitoring  following ABGs with A-line monitoring    Invasive hemodynamic monitoring with an A-line was required for continuous MAP/BP monitoring to ensure adequate cardiovascular support and to evaluate for & help prevent decompensation while receiving     blood transfusions  blood product transfusions  IV Vasopressin infusion    secondary to     hemodynamically significant anemia  vasodilatory shock    Metabolic stability, uncontrolled type II Diabetes mellitus-stress hyperglycemia, & infection prophylaxis required an IV regular Insulin drip & the following of serial glucose levels to help achieve & maintain euglycemia.      In addition:  S/p CABG and multiple stents, on ASA, Brilinta, Lipitor 80  IABP removed earlier today  Pressor requirement, now off post transfusions of blood and platelets, will check levels post transfusions  Poorly-controlled diabetic as an outpatient, on insulin drip, Endocrine consulted, awaiting recs    The patient required critical care management and I personally provided 30 minutes of non-continuous care to the patient, excluding separate procedures, in addition to  discussing the patient and plan at length with the CTICU staff and helping coordinate care.

## 2019-08-11 NOTE — CONSULT NOTE ADULT - SUBJECTIVE AND OBJECTIVE BOX
Endocrinology Attending Covering For Dr. 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 As per the patient he was resting in bed last night when he developed sudden onset left sided chest pain. Patient stated that the pain was not that severe and he went to sleep. Patient woke up the next morning and went on with his usual day and was in the car when he developed similar left sided chest pain. Patient described the chest pain as a sharp pain, without any aggravating or alleviating factors, lasting few hours in duration, without any radiation, with a severity of 5/10. Patient stated that this episode of chest pain was different when he had the stents placed. Patient stated that when he had the stents placed he did not have chest pain. Patient also endorsed of SOB and CAGE with the chest pain. Patient denied any fevers, chills, N/V/D/C, abdominal pain, dysuria, melena, hematochezia, recent travel, sick contact, pleuritic or positional chest pain.   On ED admission EKG revealed NSR at a rate of 81 with TWI in leads V3-V5, II, III, AVF and QTc of 439, CE x 1: Trop: 33, CE x 2: Trop: 31, Gluc: 282. CXR: Suboptimally inflated but otherwise clear lungs. No pleural effusions or pneumothorax. Left coronary stent noted otherwise cardiac and mediastinal silhouettes within normal limits. Trachea midline. Unremarkable osseous structures. In the ED patient received Aspirin 325mg. When examined patient is resting in the stretcher and denied any current chest pain or SOB. Cath showed triple vessel disease. Patient transferred to [facility] under Dr. Alvarez for preop workup and CAB. (09 Aug 2019 20:47)  Patient has history of diabetes,,  on insulin at home,  Lantus 55 and Humalog  12 pre mealsPatient follows up with PCP for diabetes management.  Had lt eye laser rx., on insulin drip, plan is to wine off.  PAST MEDICAL & SURGICAL HISTORY:  Coronary artery disease of native artery of native heart with stable angina pectoris  Type 2 diabetes mellitus without complication, with long-term current use of insulin  Hyperlipidemia, unspecified hyperlipidemia type  Hypertension, unspecified type  History of percutaneous coronary intervention      FAMILY HISTORY:  Family history of heart disease  Mother with Diabetes    Social History:    Outpatient Medications:    MEDICATIONS  (STANDING):  aspirin enteric coated 81 milliGRAM(s) Oral daily  atorvastatin 80 milliGRAM(s) Oral at bedtime  cefuroxime  IVPB 1500 milliGRAM(s) IV Intermittent every 8 hours  chlorhexidine 2% Cloths 1 Application(s) Topical <User Schedule>  dextrose 50% Injectable 50 milliLiter(s) IV Push every 15 minutes  dextrose 50% Injectable 25 milliLiter(s) IV Push every 15 minutes  docusate sodium 100 milliGRAM(s) Oral three times a day  heparin  Injectable 5000 Unit(s) SubCutaneous every 8 hours  insulin glargine Injectable (LANTUS) 20 Unit(s) SubCutaneous once  insulin lispro (HumaLOG) corrective regimen sliding scale   SubCutaneous Before meals and at bedtime  insulin lispro Injectable (HumaLOG) 8 Unit(s) SubCutaneous before breakfast  insulin lispro Injectable (HumaLOG) 8 Unit(s) SubCutaneous before lunch  insulin lispro Injectable (HumaLOG) 8 Unit(s) SubCutaneous before dinner  insulin regular Infusion 1 Unit(s)/Hr (1 mL/Hr) IV Continuous <Continuous>  norepinephrine Infusion 0.06 MICROgram(s)/kG/Min (8.685 mL/Hr) IV Continuous <Continuous>  pantoprazole    Tablet 40 milliGRAM(s) Oral before breakfast  polyethylene glycol 3350 17 Gram(s) Oral daily  potassium chloride  10 mEq/50 mL IVPB 10 milliEquivalent(s) IV Intermittent every 1 hour  potassium chloride  10 mEq/50 mL IVPB 10 milliEquivalent(s) IV Intermittent every 1 hour  potassium chloride  10 mEq/50 mL IVPB 10 milliEquivalent(s) IV Intermittent every 1 hour  sodium chloride 0.9%. 1000 milliLiter(s) (10 mL/Hr) IV Continuous <Continuous>  ticagrelor 90 milliGRAM(s) Oral every 12 hours  vasopressin Infusion 0.033 Unit(s)/Min (2 mL/Hr) IV Continuous <Continuous>    MEDICATIONS  (PRN):  oxyCODONE    5 mG/acetaminophen 325 mG 1 Tablet(s) Oral every 4 hours PRN Mild Pain (1 - 3)  oxyCODONE    5 mG/acetaminophen 325 mG 2 Tablet(s) Oral every 6 hours PRN Moderate Pain (4 - 6)  sodium chloride 0.9% lock flush 10 milliLiter(s) IV Push every 1 hour PRN Pre/post blood products, medications, blood draw, and to maintain line patency      Allergies    No Known Allergies    Intolerances      Review of Systems:  Constitutional: No fever, no chills  Eyes: No blurry vision  Neuro: No tremors  HEENT: No pain, no neck swelling  Cardiovascular: No chest pain, no palpitations  Respiratory: Has SOB, no cough  GI: No nausea, vomiting, abdominal pain  : No dysuria  Skin: no rash  MSK: Has leg swelling, no foot ulcers.  Psych: no depression  Endocrine: no polyuria, polydipsia    ALL OTHER SYSTEMS REVIEWED AND NEGATIVE    UNABLE TO OBTAIN    PHYSICAL EXAM:  VITALS: T(C): 37 (08-11-19 @ 08:00)  T(F): 98.6 (08-11-19 @ 08:00), Max: 100.4 (08-10-19 @ 20:00)  HR: 105 (08-11-19 @ 09:15) (96 - 114)  BP: 108/56 (08-11-19 @ 09:15) (84/57 - 139/70)  RR:  (3 - 28)  SpO2:  (96% - 100%)  Wt(kg): --  GENERAL: NAD, well-groomed, well-developed  EYES: No proptosis, no lid lag  HEENT:  Atraumatic, Normocephalic  THYROID: Normal size, no palpable nodules  RESPIRATORY: Clear to auscultation bilaterally; No rales, rhonchi, wheezing  CARDIOVASCULAR: Si S2, No murmurs;  GI: Soft, non distended, normal bowel sounds  SKIN: Dry, intact, No rashes or lesions  MUSCULOSKELETAL: Has BL lower extremity edema.  NEURO:  no tremor, sensation decreased in feet BL,    POCT Blood Glucose.: 115 mg/dL (08-11-19 @ 09:01)  POCT Blood Glucose.: 112 mg/dL (08-11-19 @ 05:59)  POCT Blood Glucose.: 119 mg/dL (08-11-19 @ 04:03)  POCT Blood Glucose.: 122 mg/dL (08-11-19 @ 02:58)  POCT Blood Glucose.: 121 mg/dL (08-11-19 @ 02:00)  POCT Blood Glucose.: 114 mg/dL (08-11-19 @ 01:08)  POCT Blood Glucose.: 128 mg/dL (08-10-19 @ 23:08)  POCT Blood Glucose.: 150 mg/dL (08-10-19 @ 20:19)  POCT Blood Glucose.: 152 mg/dL (08-10-19 @ 17:54)  POCT Blood Glucose.: 131 mg/dL (08-10-19 @ 15:10)  POCT Blood Glucose.: 164 mg/dL (08-10-19 @ 06:59)  POCT Blood Glucose.: 123 mg/dL (08-10-19 @ 06:07)  POCT Blood Glucose.: 109 mg/dL (08-10-19 @ 05:27)  POCT Blood Glucose.: 226 mg/dL (08-10-19 @ 02:12)  POCT Blood Glucose.: 190 mg/dL (08-09-19 @ 21:24)  POCT Blood Glucose.: 181 mg/dL (08-09-19 @ 18:00)  POCT Blood Glucose.: 318 mg/dL (08-09-19 @ 11:47)  POCT Blood Glucose.: 385 mg/dL (08-09-19 @ 08:36)                            8.0    6.2   )-----------( 100      ( 11 Aug 2019 06:40 )             24.1       08-11    143  |  110<H>  |  13  ----------------------------<  126<H>  4.4   |  22  |  1.19    EGFR if : 82  EGFR if non : 71    Ca    7.7<L>      08-11  Mg     1.8     08-11  Phos  2.9     08-11    TPro  4.4<L>  /  Alb  3.2<L>  /  TBili  0.4  /  DBili  x   /  AST  29  /  ALT  14  /  AlkPhos  20<L>  08-11      Thyroid Function Tests:  08-09 @ 07:40 TSH 1.77 FreeT4 -- T3 -- Anti TPO -- Anti Thyroglobulin Ab -- TSI --      Hemoglobin A1C, Whole Blood: 11.7 % <H> [4.0 - 5.6] (08-09-19 @ 07:40)      08-09 Chol 163 LDL 98 HDL 44 Trig 282<H>    Radiology:

## 2019-08-11 NOTE — CONSULT NOTE ADULT - ASSESSMENT
49M h/o diabetes (A1c 11%) w/ h/o prior laser OD and diabetic retinopathy. Consult for blurry vision.   - outp    SDW Dr. Soriano    Outpatient follow-up: Patient should follow-up with his/her ophthalmologist or with [facility] Department of Ophthalmology within 1 week of after discharge at:    [address]  [place] [address]  [phone]    Masood Clay MD, PGY-2  Pager: [phone]/[facility]: [phone] 49M h/o diabetes (A1c 11%) w/ CE/IOL OS, moderate non-proliferative diabetic retinopathy OU, visually significant cataract OD. Consult for blurry vision. Blurry VA OD w/o improvement w/ pinhole likely multifactorial in setting of moderate NPDR and visually significant cataract. No APD seen on exam and retina is attached OU. Patient reports he has outpatient ophthalmologist and patient advise to follow-up with ophthalmology (private or in-house) within 1 week of discharge for further evaluation.   - PFATs 4-5 times per day OU  - Strict BP/BG control  - Outpatient follow-up as below for cataract evaluation    SDW Dr. Soriano    Outpatient follow-up: Patient should follow-up with his/her ophthalmologist or with [facility] Department of Ophthalmology within 1 week of after discharge at:    [address]  [place] [address]  [phone]    Masood Clay MD, PGY-2  Pager: [phone]/[facility]: [phone] 49M h/o diabetes (A1c 11%) w/ CE/IOL OS, moderate non-proliferative diabetic retinopathy OU, visually significant cataract OD. Consult for blurry vision. Blurry VA OD w/o improvement w/ pinhole likely multifactorial in setting of moderate NPDR and visually significant cataract. No APD seen on exam and retina is attached OU. Given recent cardiac surgery, PION would be on differential, though is diagnosis of exclusion and unlikely in patient without APD and given timeline for decreased vision in right eye pre-dates cardiac surgery. Patient reports he has outpatient ophthalmologist and patient advise to follow-up with ophthalmology (private or in-house) within 1 week of discharge for further evaluation.   - PFATs 4-5 times per day OU  - BG control  - monitor for hypotension/anemia  - Outpatient follow-up as below for cataract evaluation    SDW Dr. Soriano    Outpatient follow-up: Patient should follow-up with his/her ophthalmologist or with [facility] Department of Ophthalmology within 1 week of after discharge at:    [address]  [place] [address]  [phone]    Masood Clay MD, PGY-2  Pager: [phone]/[facility]: [phone]

## 2019-08-11 NOTE — CONSULT NOTE ADULT - ASSESSMENT
Assessment  DMT2: 49y Male with DM T2 with hyperglycemia on IV  insulin drip  not eating meals yet   non compliant with low carb diet.  CAD: post CABG day 1 On medications, stable, monitored.  HTN:  was hypotensive post op, now off pressors stable   HLD on statin        Plan:     DMT2:  start  Lantus 20   units qhs, if start eating  Humalog  8    units before each meal in addition to correction scale coverage, monitor FS will FU  if not eating , ssi coverage only   Patient counseled for compliance with consistent low carb diet  CAD: post CABG day 1 Continue treatment, monitoring FU Cardiothoracic team.  HTN: Continue treatment, monitoring, Primary team FU  HLD :Continue  statin  Case discussed with the NP  thank you for consult  Titi radford MD  [phone]

## 2019-08-11 NOTE — PROVIDER CONTACT NOTE (OTHER) - BACKGROUND
pt s/p c3l. pt has hx of cataract to b/l eyes. right eyes has been operated on per wife. left eye is blurry at night per wife. pt now c.o of b/l blurry eyes

## 2019-08-11 NOTE — CHART NOTE - NSCHARTNOTEFT_GEN_A_CORE
6-hour post-removal of IABP evaluation  8/11  1200 NOON    vital signs are stable, neuro-vascular status of the lower extremities is intact/stable and there is no evidence of hematoma of  the     right            groin.

## 2019-08-12 DIAGNOSIS — H53.8 OTHER VISUAL DISTURBANCES: ICD-10-CM

## 2019-08-12 DIAGNOSIS — Z95.1 PRESENCE OF AORTOCORONARY BYPASS GRAFT: ICD-10-CM

## 2019-08-12 LAB
ALBUMIN SERPL ELPH-MCNC: 3.4 G/DL — SIGNIFICANT CHANGE UP (ref 3.3–5)
ALP SERPL-CCNC: 38 U/L — LOW (ref 40–120)
ALT FLD-CCNC: 23 U/L — SIGNIFICANT CHANGE UP (ref 10–45)
ANION GAP SERPL CALC-SCNC: 9 MMOL/L — SIGNIFICANT CHANGE UP (ref 5–17)
APTT BLD: 28.8 SEC — SIGNIFICANT CHANGE UP (ref 27.5–36.3)
AST SERPL-CCNC: 31 U/L — SIGNIFICANT CHANGE UP (ref 10–40)
BASOPHILS # BLD AUTO: 0 K/UL — SIGNIFICANT CHANGE UP (ref 0–0.2)
BASOPHILS NFR BLD AUTO: 0.2 % — SIGNIFICANT CHANGE UP (ref 0–2)
BILIRUB SERPL-MCNC: 0.3 MG/DL — SIGNIFICANT CHANGE UP (ref 0.2–1.2)
BUN SERPL-MCNC: 11 MG/DL — SIGNIFICANT CHANGE UP (ref 7–23)
CALCIUM SERPL-MCNC: 8.1 MG/DL — LOW (ref 8.4–10.5)
CHLORIDE SERPL-SCNC: 105 MMOL/L — SIGNIFICANT CHANGE UP (ref 96–108)
CO2 SERPL-SCNC: 23 MMOL/L — SIGNIFICANT CHANGE UP (ref 22–31)
CREAT SERPL-MCNC: 0.83 MG/DL — SIGNIFICANT CHANGE UP (ref 0.5–1.3)
EOSINOPHIL # BLD AUTO: 0.1 K/UL — SIGNIFICANT CHANGE UP (ref 0–0.5)
EOSINOPHIL NFR BLD AUTO: 0.6 % — SIGNIFICANT CHANGE UP (ref 0–6)
GLUCOSE BLDC GLUCOMTR-MCNC: 130 MG/DL — HIGH (ref 70–99)
GLUCOSE BLDC GLUCOMTR-MCNC: 166 MG/DL — HIGH (ref 70–99)
GLUCOSE BLDC GLUCOMTR-MCNC: 180 MG/DL — HIGH (ref 70–99)
GLUCOSE BLDC GLUCOMTR-MCNC: 188 MG/DL — HIGH (ref 70–99)
GLUCOSE SERPL-MCNC: 133 MG/DL — HIGH (ref 70–99)
HCT VFR BLD CALC: 25.3 % — LOW (ref 39–50)
HCT VFR BLD CALC: 30.7 % — LOW (ref 39–50)
HGB BLD-MCNC: 10 G/DL — LOW (ref 13–17)
HGB BLD-MCNC: 8.7 G/DL — LOW (ref 13–17)
INR BLD: 1.2 RATIO — HIGH (ref 0.88–1.16)
LYMPHOCYTES # BLD AUTO: 1.2 K/UL — SIGNIFICANT CHANGE UP (ref 1–3.3)
LYMPHOCYTES # BLD AUTO: 14.1 % — SIGNIFICANT CHANGE UP (ref 13–44)
MAGNESIUM SERPL-MCNC: 2.3 MG/DL — SIGNIFICANT CHANGE UP (ref 1.6–2.6)
MCHC RBC-ENTMCNC: 26.8 PG — LOW (ref 27–34)
MCHC RBC-ENTMCNC: 28.9 PG — SIGNIFICANT CHANGE UP (ref 27–34)
MCHC RBC-ENTMCNC: 32.5 GM/DL — SIGNIFICANT CHANGE UP (ref 32–36)
MCHC RBC-ENTMCNC: 34.5 GM/DL — SIGNIFICANT CHANGE UP (ref 32–36)
MCV RBC AUTO: 82.5 FL — SIGNIFICANT CHANGE UP (ref 80–100)
MCV RBC AUTO: 83.6 FL — SIGNIFICANT CHANGE UP (ref 80–100)
MONOCYTES # BLD AUTO: 0.6 K/UL — SIGNIFICANT CHANGE UP (ref 0–0.9)
MONOCYTES NFR BLD AUTO: 6.8 % — SIGNIFICANT CHANGE UP (ref 2–14)
NEUTROPHILS # BLD AUTO: 6.6 K/UL — SIGNIFICANT CHANGE UP (ref 1.8–7.4)
NEUTROPHILS NFR BLD AUTO: 78.2 % — HIGH (ref 43–77)
PHOSPHATE SERPL-MCNC: 2.2 MG/DL — LOW (ref 2.5–4.5)
PLATELET # BLD AUTO: 89 K/UL — LOW (ref 150–400)
PLATELET # BLD AUTO: 99 K/UL — LOW (ref 150–400)
POTASSIUM SERPL-MCNC: 4.1 MMOL/L — SIGNIFICANT CHANGE UP (ref 3.5–5.3)
POTASSIUM SERPL-SCNC: 4.1 MMOL/L — SIGNIFICANT CHANGE UP (ref 3.5–5.3)
PROT SERPL-MCNC: 5.2 G/DL — LOW (ref 6–8.3)
PROTHROM AB SERPL-ACNC: 13.8 SEC — HIGH (ref 10–12.9)
RBC # BLD: 3.03 M/UL — LOW (ref 4.2–5.8)
RBC # BLD: 3.73 M/UL — LOW (ref 4.2–5.8)
RBC # FLD: 12.8 % — SIGNIFICANT CHANGE UP (ref 10.3–14.5)
RBC # FLD: 13 % — SIGNIFICANT CHANGE UP (ref 10.3–14.5)
SODIUM SERPL-SCNC: 137 MMOL/L — SIGNIFICANT CHANGE UP (ref 135–145)
WBC # BLD: 7.6 K/UL — SIGNIFICANT CHANGE UP (ref 3.8–10.5)
WBC # BLD: 8.4 K/UL — SIGNIFICANT CHANGE UP (ref 3.8–10.5)
WBC # FLD AUTO: 7.6 K/UL — SIGNIFICANT CHANGE UP (ref 3.8–10.5)
WBC # FLD AUTO: 8.4 K/UL — SIGNIFICANT CHANGE UP (ref 3.8–10.5)

## 2019-08-12 PROCEDURE — 71045 X-RAY EXAM CHEST 1 VIEW: CPT | Mod: 26

## 2019-08-12 PROCEDURE — 99233 SBSQ HOSP IP/OBS HIGH 50: CPT

## 2019-08-12 PROCEDURE — 93010 ELECTROCARDIOGRAM REPORT: CPT | Mod: 76

## 2019-08-12 RX ORDER — METOPROLOL TARTRATE 50 MG
50 TABLET ORAL EVERY 12 HOURS
Refills: 0 | Status: DISCONTINUED | OUTPATIENT
Start: 2019-08-12 | End: 2019-08-13

## 2019-08-12 RX ORDER — METOPROLOL TARTRATE 50 MG
5 TABLET ORAL ONCE
Refills: 0 | Status: COMPLETED | OUTPATIENT
Start: 2019-08-12 | End: 2019-08-12

## 2019-08-12 RX ORDER — METOPROLOL TARTRATE 50 MG
25 TABLET ORAL EVERY 12 HOURS
Refills: 0 | Status: DISCONTINUED | OUTPATIENT
Start: 2019-08-12 | End: 2019-08-12

## 2019-08-12 RX ORDER — DEXMEDETOMIDINE HYDROCHLORIDE IN 0.9% SODIUM CHLORIDE 4 UG/ML
0.5 INJECTION INTRAVENOUS
Qty: 200 | Refills: 0 | Status: DISCONTINUED | OUTPATIENT
Start: 2019-08-12 | End: 2019-08-12

## 2019-08-12 RX ADMIN — OXYCODONE AND ACETAMINOPHEN 2 TABLET(S): 5; 325 TABLET ORAL at 20:48

## 2019-08-12 RX ADMIN — HEPARIN SODIUM 5000 UNIT(S): 5000 INJECTION INTRAVENOUS; SUBCUTANEOUS at 15:25

## 2019-08-12 RX ADMIN — ATORVASTATIN CALCIUM 80 MILLIGRAM(S): 80 TABLET, FILM COATED ORAL at 21:56

## 2019-08-12 RX ADMIN — Medication 100 MILLIGRAM(S): at 06:18

## 2019-08-12 RX ADMIN — DEXMEDETOMIDINE HYDROCHLORIDE IN 0.9% SODIUM CHLORIDE 9.65 MICROGRAM(S)/KG/HR: 4 INJECTION INTRAVENOUS at 04:45

## 2019-08-12 RX ADMIN — CHLORHEXIDINE GLUCONATE 1 APPLICATION(S): 213 SOLUTION TOPICAL at 06:19

## 2019-08-12 RX ADMIN — Medication 5 MILLIGRAM(S): at 08:43

## 2019-08-12 RX ADMIN — OXYCODONE AND ACETAMINOPHEN 1 TABLET(S): 5; 325 TABLET ORAL at 10:52

## 2019-08-12 RX ADMIN — Medication 4: at 14:43

## 2019-08-12 RX ADMIN — Medication 100 MILLIGRAM(S): at 21:56

## 2019-08-12 RX ADMIN — HEPARIN SODIUM 5000 UNIT(S): 5000 INJECTION INTRAVENOUS; SUBCUTANEOUS at 21:56

## 2019-08-12 RX ADMIN — TICAGRELOR 90 MILLIGRAM(S): 90 TABLET ORAL at 08:43

## 2019-08-12 RX ADMIN — OXYCODONE AND ACETAMINOPHEN 1 TABLET(S): 5; 325 TABLET ORAL at 09:32

## 2019-08-12 RX ADMIN — Medication 4: at 08:54

## 2019-08-12 RX ADMIN — INSULIN GLARGINE 20 UNIT(S): 100 INJECTION, SOLUTION SUBCUTANEOUS at 21:56

## 2019-08-12 RX ADMIN — TICAGRELOR 90 MILLIGRAM(S): 90 TABLET ORAL at 20:43

## 2019-08-12 RX ADMIN — HEPARIN SODIUM 5000 UNIT(S): 5000 INJECTION INTRAVENOUS; SUBCUTANEOUS at 06:18

## 2019-08-12 RX ADMIN — Medication 8 UNIT(S): at 18:42

## 2019-08-12 RX ADMIN — Medication 50 MILLIGRAM(S): at 17:48

## 2019-08-12 RX ADMIN — Medication 100 MILLIGRAM(S): at 00:53

## 2019-08-12 RX ADMIN — PANTOPRAZOLE SODIUM 40 MILLIGRAM(S): 20 TABLET, DELAYED RELEASE ORAL at 06:19

## 2019-08-12 RX ADMIN — Medication 62.5 MILLIMOLE(S): at 06:18

## 2019-08-12 RX ADMIN — Medication 81 MILLIGRAM(S): at 15:25

## 2019-08-12 RX ADMIN — Medication 25 MILLIGRAM(S): at 06:18

## 2019-08-12 RX ADMIN — Medication 4: at 18:42

## 2019-08-12 RX ADMIN — Medication 8 UNIT(S): at 14:43

## 2019-08-12 RX ADMIN — OXYCODONE AND ACETAMINOPHEN 2 TABLET(S): 5; 325 TABLET ORAL at 20:18

## 2019-08-12 RX ADMIN — Medication 1 DROP(S): at 15:25

## 2019-08-12 NOTE — PROGRESS NOTE ADULT - ASSESSMENT
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 As per the patient he was resting in bed last night when he developed sudden onset left sided chest pain. Patient stated that the pain was not that severe and he went to sleep. Patient woke up the next morning and went on with his usual day and was in the car when he developed similar left sided chest pain. Patient described the chest pain as a sharp pain, without any aggravating or alleviating factors, lasting few hours in duration, without any radiation, with a severity of 5/10. Patient stated that this episode of chest pain was different when he had the stents placed. Patient stated that when he had the stents placed he did not have chest pain. Patient also endorsed of SOB and CAGE with the chest pain. Patient denied any fevers, chills, N/V/D/C, abdominal pain, dysuria, melena, hematochezia, recent travel, sick contact, pleuritic or positional chest pain.     On ED admission EKG revealed NSR at a rate of 81 with TWI in leads V3-V5, II, III, AVF and QTc of 439, CE x 1: Trop: 33, CE x 2: Trop: 31, Gluc: 282. CXR: Suboptimally inflated but otherwise clear lungs. No pleural effusions or pneumothorax. Left coronary stent noted otherwise cardiac and mediastinal silhouettes within normal limits. Trachea midline. Unremarkable osseous structures. In the ED patient received Aspirin 325mg. When examined patient is resting in the stretcher and denied any current chest pain or SOB. Cath showed triple vessel disease. Patient transferred to [facility] under Dr. Alvarez for preop workup and CAB. (09 Aug 2019 20:47) 50 y/o Male with PMH of CAD(s/p 2 stents), 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 Cath showed triple vessel disease. Patient transferred to [facility] under Dr. Alvarez for preop workup and CAB. (09 Aug 2019 20:47)  Pre-op IABP placement  s/p 8/10 CABGx3 LIMA  Post-op Course:  8/11 Pt extubated, weaned off pressor support, IABP dc'd. C/o blurry vision - opthal consulted f/u outpatient. Endocrine consulted for glucose management.  8/12 H/H 8.7/25.3 s/p 1U PRBC - repeat H/H 10/30.7, VSS, transferred to floor.

## 2019-08-12 NOTE — PROGRESS NOTE ADULT - ASSESSMENT
Assessment  DMT2: 49y Male with DM T2 with hyperglycemia  start  eating meals ,   Bs 188 this AM  diet.  CAD: post CABG day 1 On medications, stable, monitored.  HTN:  was hypotensive post op, now off pressors stable   HLD on statin        Plan:     DMT2:  Change   Lantus 24   units qhs, if  eating  Humalog  8    units before each meal in addition to correction scale coverage, monitor FS will FU  if not eating , ssi coverage only   Patient counseled for compliance with consistent low carb diet  CAD: post CABG day 1 Continue treatment, monitoring FU Cardiothoracic team.  HTN: Continue treatment, monitoring, Primary team FU  HLD :Continue  statin  Case discussed with the NP  thank you for consult  Titi radford MD  [phone]

## 2019-08-12 NOTE — PROGRESS NOTE ADULT - SUBJECTIVE AND OBJECTIVE BOX
Subjective: Pt states " " denies any CP, SOB, N/V and palpitations. No acute events overnight.     Telemetry:    Vital Signs Last 24 Hrs  T(C): 36.7 (19 @ 11:11), Max: 38 (19 @ 04:00)  T(F): 98 (19 @ 11:11), Max: 100.4 (19 @ 04:00)  HR: 96 (19 11:11) (96 - 116)  BP: 120/72 (19 @ 11:11) (106/62 - 134/73)  RR: 18 (19 11:11) (15 - 31)  SpO2: 97% (19:11) (95% - 100%)              @ 07:01  -   @ 11:16  --------------------------------------------------------  IN: 255 mL / OUT: 80 mL / NET: 175 mL        Daily Weight in k.1 (12 Aug 2019 00:00)                        10.0   8.4   )-----------( 99       ( 12 Aug 2019 09:02 )             30.7     137  |  105  |  11  ----------------------------<  133<H>  4.1   |  23  |  0.83          CAPILLARY BLOOD GLUCOSE  119 - 188        PHYSICAL EXAM  Neurology: A&Ox3, nonfocal, no gross deficits  CV : RRR+S1S2  Sternal Wound: MSI CDI w/DSD, Stable  +PW - EPM off  Lungs: Respirations non-labored, B/L BS clear, diminished at bases  +Left pleural and Right pleural chest tube with serosanguinous drainage to LWS, no air leak  Abdomen: Soft, NT/ND, +BSx4Q, neg BM  : Voiding without difficulty  Extremities: B/L LE trace edema, negative calf tenderness, +PP, RLE SVG incision w/DSD CDI             MEDICATIONS  aspirin enteric coated 81 milliGRAM(s) Oral daily  atorvastatin 80 milliGRAM(s) Oral at bedtime  dextrose 50% Injectable 50 milliLiter(s) IV Push every 15 minutes  dextrose 50% Injectable 25 milliLiter(s) IV Push every 15 minutes  docusate sodium 100 milliGRAM(s) Oral three times a day  heparin  Injectable 5000 Unit(s) SubCutaneous every 8 hours  insulin glargine Injectable (LANTUS) 20 Unit(s) SubCutaneous at bedtime  insulin lispro (HumaLOG) corrective regimen sliding scale   SubCutaneous Before meals and at bedtime  insulin lispro Injectable (HumaLOG) 8 Unit(s) SubCutaneous before breakfast  insulin lispro Injectable (HumaLOG) 8 Unit(s) SubCutaneous before lunch  insulin lispro Injectable (HumaLOG) 8 Unit(s) SubCutaneous before dinner  metoprolol tartrate 50 milliGRAM(s) Oral every 12 hours  oxyCODONE    5 mG/acetaminophen 325 mG 1 Tablet(s) Oral every 4 hours PRN  oxyCODONE    5 mG/acetaminophen 325 mG 2 Tablet(s) Oral every 6 hours PRN  pantoprazole    Tablet 40 milliGRAM(s) Oral before breakfast  polyethylene glycol 3350 17 Gram(s) Oral daily  ticagrelor 90 milliGRAM(s) Oral every 12 hours      Physical Therapy Rec:   Home  [ X ]   Home w/ PT  [  ]  Rehab  [  ]    Discussed with Cardiothoracic Team at AM rounds. Subjective: Pt states "Hello" denies any CP, SOB, N/V and palpitations.     Telemetry:  SR 90s  Vital Signs Last 24 Hrs  T(C): 36.7 (19 @ 11:11), Max: 38 (19 @ 04:00)  T(F): 98 (19 @ 11:11), Max: 100.4 (19 @ 04:00)  HR: 96 (19 11:11) (96 - 116)  BP: 120/72 (19 @ 11:11) (106/62 - 134/73)  RR: 18 (19 11:11) (15 - 31)  SpO2: 97% (19 11:11) (95% - 100%)              @ 07:01  -   @ 11:16  --------------------------------------------------------  IN: 255 mL / OUT: 80 mL / NET: 175 mL        Daily Weight in k.1 (12 Aug 2019 00:00)                        10.0   8.4   )-----------( 99       ( 12 Aug 2019 09:02 )             30.7     137  |  105  |  11  ----------------------------<  133<H>  4.1   |  23  |  0.83          CAPILLARY BLOOD GLUCOSE  119 - 188        PHYSICAL EXAM  Neurology: A&Ox3, nonfocal, no gross deficits  CV : RRR+S1S2  Sternal Wound: MSI CDI w/DSD, Stable  +PW - EPM off  Lungs: Respirations non-labored, B/L BS clear, diminished at bases  +Left pleural and Right pleural chest tube with serosanguinous drainage to LWS, no air leak  Abdomen: Soft, NT/ND, +BSx4Q, neg BM  : Voiding without difficulty  Extremities: B/L LE trace edema, negative calf tenderness, +PP, RLE SVG incision w/DSD CDI             MEDICATIONS  aspirin enteric coated 81 milliGRAM(s) Oral daily  atorvastatin 80 milliGRAM(s) Oral at bedtime  dextrose 50% Injectable 50 milliLiter(s) IV Push every 15 minutes  dextrose 50% Injectable 25 milliLiter(s) IV Push every 15 minutes  docusate sodium 100 milliGRAM(s) Oral three times a day  heparin  Injectable 5000 Unit(s) SubCutaneous every 8 hours  insulin glargine Injectable (LANTUS) 20 Unit(s) SubCutaneous at bedtime  insulin lispro (HumaLOG) corrective regimen sliding scale   SubCutaneous Before meals and at bedtime  insulin lispro Injectable (HumaLOG) 8 Unit(s) SubCutaneous before breakfast  insulin lispro Injectable (HumaLOG) 8 Unit(s) SubCutaneous before lunch  insulin lispro Injectable (HumaLOG) 8 Unit(s) SubCutaneous before dinner  metoprolol tartrate 50 milliGRAM(s) Oral every 12 hours  oxyCODONE    5 mG/acetaminophen 325 mG 1 Tablet(s) Oral every 4 hours PRN  oxyCODONE    5 mG/acetaminophen 325 mG 2 Tablet(s) Oral every 6 hours PRN  pantoprazole    Tablet 40 milliGRAM(s) Oral before breakfast  polyethylene glycol 3350 17 Gram(s) Oral daily  ticagrelor 90 milliGRAM(s) Oral every 12 hours      Physical Therapy Rec:   Home  [ X ]   Home w/ PT  [  ]  Rehab  [  ]    Discussed with Cardiothoracic Team at AM rounds.

## 2019-08-12 NOTE — PROGRESS NOTE ADULT - SUBJECTIVE AND OBJECTIVE BOX
Endocrinology Attending Covering for Dr. Lyon      Chief complaint  Patient is a 49y old  Male who presents with a chief complaint of CABG (12 Aug 2019 07:39)   Review of systems  Patient in bed, looks comfortable, no fever,  had no hypoglycemia.    Labs and Fingersticks  CAPILLARY BLOOD GLUCOSE  188 (12 Aug 2019 09:00)  119 (11 Aug 2019 12:00)      POCT Blood Glucose.: 188 mg/dL (12 Aug 2019 08:49)  POCT Blood Glucose.: 151 mg/dL (11 Aug 2019 22:19)  POCT Blood Glucose.: 122 mg/dL (11 Aug 2019 12:51)  POCT Blood Glucose.: 119 mg/dL (11 Aug 2019 12:15)      Anion Gap, Serum: 9 (08-12 @ 00:55)  Anion Gap, Serum: 11 (08-11 @ 00:58)  Anion Gap, Serum: 11 (08-10 @ 14:51)      Calcium, Total Serum: 8.1 <L> (08-12 @ 00:55)  Calcium, Total Serum: 7.7 <L> (08-11 @ 00:58)  Calcium, Total Serum: 8.7 (08-10 @ 14:51)  Albumin, Serum: 3.4 (08-12 @ 00:55)  Albumin, Serum: 3.2 <L> (08-11 @ 00:58)  Albumin, Serum: 2.4 <L> (08-10 @ 14:51)    Alanine Aminotransferase (ALT/SGPT): 23 (08-12 @ 00:55)  Alanine Aminotransferase (ALT/SGPT): 14 (08-11 @ 00:58)  Alanine Aminotransferase (ALT/SGPT): 9 <L> (08-10 @ 14:51)  Alkaline Phosphatase, Serum: 38 <L> (08-12 @ 00:55)  Alkaline Phosphatase, Serum: 20 <L> (08-11 @ 00:58)  Alkaline Phosphatase, Serum: 21 <L> (08-10 @ 14:51)  Aspartate Aminotransferase (AST/SGOT): 31 (08-12 @ 00:55)  Aspartate Aminotransferase (AST/SGOT): 29 (08-11 @ 00:58)  Aspartate Aminotransferase (AST/SGOT): 22 (08-10 @ 14:51)        08-12    137  |  105  |  11  ----------------------------<  133<H>  4.1   |  23  |  0.83    Ca    8.1<L>      12 Aug 2019 00:55  Phos  2.2     08-12  Mg     2.3     08-12    TPro  5.2<L>  /  Alb  3.4  /  TBili  0.3  /  DBili  x   /  AST  31  /  ALT  23  /  AlkPhos  38<L>  08-12                        10.0   8.4   )-----------( 99       ( 12 Aug 2019 09:02 )             30.7     Medications  MEDICATIONS  (STANDING):  aspirin enteric coated 81 milliGRAM(s) Oral daily  atorvastatin 80 milliGRAM(s) Oral at bedtime  dextrose 50% Injectable 50 milliLiter(s) IV Push every 15 minutes  dextrose 50% Injectable 25 milliLiter(s) IV Push every 15 minutes  docusate sodium 100 milliGRAM(s) Oral three times a day  heparin  Injectable 5000 Unit(s) SubCutaneous every 8 hours  insulin glargine Injectable (LANTUS) 20 Unit(s) SubCutaneous at bedtime  insulin lispro (HumaLOG) corrective regimen sliding scale   SubCutaneous Before meals and at bedtime  insulin lispro Injectable (HumaLOG) 8 Unit(s) SubCutaneous before breakfast  insulin lispro Injectable (HumaLOG) 8 Unit(s) SubCutaneous before lunch  insulin lispro Injectable (HumaLOG) 8 Unit(s) SubCutaneous before dinner  metoprolol tartrate 50 milliGRAM(s) Oral every 12 hours  pantoprazole    Tablet 40 milliGRAM(s) Oral before breakfast  polyethylene glycol 3350 17 Gram(s) Oral daily  ticagrelor 90 milliGRAM(s) Oral every 12 hours      Physical Exam  General: Patient comfortable in bed  Vital Signs Last 12 Hrs  T(F): 98 (08-12-19 @ 11:11), Max: 100.4 (08-12-19 @ 04:00)  HR: 96 (08-12-19 @ 11:11) (96 - 109)  BP: 120/72 (08-12-19 @ 11:11) (112/64 - 130/73)  BP(mean): 80 (08-12-19 @ 10:00) (80 - 95)  RR: 18 (08-12-19 @ 11:11) (15 - 24)  SpO2: 97% (08-12-19 @ 11:11) (97% - 100%)  Neck: No palpable thyroid nodules.  CVS: S1S2, No murmurs  Respiratory: No wheezing, no crepitations  GI: Abdomen soft, bowel sounds positive  Musculoskeletal:  edema lower extremities.   Skin: No skin rashes, no ecchymosis    Diagnostics

## 2019-08-12 NOTE — PROGRESS NOTE ADULT - SUBJECTIVE AND OBJECTIVE BOX
ELSI MERINO  MRN-[id number]    Patient is a 49y old  Male who presents with a chief complaint of Left sided chest pain (11 Aug 2019 20:37)    HPI:  48 y/o M with PMH of CAD(s/p 2 stents), CVA in 2005 with residual left sided weakness, HTN, HLD, DM type II presented with the complaint of left sided chest pain, was found to have significant multi-vessel coronary artery disease and is now s/p CABG. Per history the patient was resting in bed the night prior to admission when he developed sudden onset left sided chest pain. Patient stated that the pain was not that severe and he went to sleep. Patient woke up the next morning and went on with his usual day and was in the car when he developed similar left sided chest pain. Patient described the chest pain as a sharp pain, without any aggravating or alleviating factors, lasting few hours in duration, without any radiation, with a severity of 5/10. Patient stated that this episode of chest pain was different when he had the stents placed. Patient stated that when he had the stents placed he did not have chest pain. Patient also endorsed of SOB and CAGE with the chest pain. Patient denied any fevers, chills, N/V/D/C, abdominal pain, dysuria, melena, hematochezia, recent travel, sick contact, pleuritic or positional chest pain. Cath showed triple vessel disease. Patient transferred to [facility] and is now s/p CABG. He was transfused one unit of blood overnight. He has no complaints.    PAST MEDICAL & SURGICAL HISTORY:  Coronary artery disease of native artery of native heart with stable angina pectoris  Type 2 diabetes mellitus without complication, with long-term current use of insulin  Hyperlipidemia, unspecified hyperlipidemia type  Hypertension, unspecified type  History of percutaneous coronary intervention    FAMILY HISTORY:  Family history of heart disease    Social History:  Denies illicit drug use. He endorses prior smoking and prior frequent alcohol consumption    Allergies  No Known Allergies    MEDICATIONS  (STANDING):  aspirin enteric coated 81 milliGRAM(s) Oral daily  atorvastatin 80 milliGRAM(s) Oral at bedtime  chlorhexidine 2% Cloths 1 Application(s) Topical <User Schedule>  dextrose 50% Injectable 50 milliLiter(s) IV Push every 15 minutes  dextrose 50% Injectable 25 milliLiter(s) IV Push every 15 minutes  docusate sodium 100 milliGRAM(s) Oral three times a day  heparin  Injectable 5000 Unit(s) SubCutaneous every 8 hours  insulin glargine Injectable (LANTUS) 20 Unit(s) SubCutaneous at bedtime  insulin lispro (HumaLOG) corrective regimen sliding scale   SubCutaneous Before meals and at bedtime  insulin lispro Injectable (HumaLOG) 8 Unit(s) SubCutaneous before breakfast  insulin lispro Injectable (HumaLOG) 8 Unit(s) SubCutaneous before lunch  insulin lispro Injectable (HumaLOG) 8 Unit(s) SubCutaneous before dinner  metoprolol tartrate 25 milliGRAM(s) Oral every 12 hours  pantoprazole    Tablet 40 milliGRAM(s) Oral before breakfast  polyethylene glycol 3350 17 Gram(s) Oral daily  sodium chloride 0.9%. 1000 milliLiter(s) (10 mL/Hr) IV Continuous <Continuous>  ticagrelor 90 milliGRAM(s) Oral every 12 hours    MEDICATIONS  (PRN):  oxyCODONE    5 mG/acetaminophen 325 mG 1 Tablet(s) Oral every 4 hours PRN Mild Pain (1 - 3)  oxyCODONE    5 mG/acetaminophen 325 mG 2 Tablet(s) Oral every 6 hours PRN Moderate Pain (4 - 6)  sodium chloride 0.9% lock flush 10 milliLiter(s) IV Push every 1 hour PRN Pre/post blood products, medications, blood draw, and to maintain line patency      Review of Systems:  Constitutional:  Negative for weight change, fever, malaise  HEENT:  Negative for sinus pain, hoarseness, sore throat, dysphagia, vision changes  Cardiovascular:  Positive for chest pain; negative for palpitations, dizziness  Respiratory:  Negative for cough, wheezing, dyspnea  Gastrointestinal:  Negative for nausea, vomiting, diarrhea, melena  Musculoskeletal:  Negative for pain, swelling, stiffness   Neuro:  Negative for weakness, numbness, headache  Psych:  Negative for anxiety, depression  Endocrine:  Negative for polyuria, polydipsia, temperature Intolerance    All other systems negative unless otherwise stated    ICU Vital Signs Last 24 Hrs  T(C): 38 (12 Aug 2019 04:00), Max: 38 (12 Aug 2019 04:00)  T(F): 100.4 (12 Aug 2019 04:00), Max: 100.4 (12 Aug 2019 04:00)  HR: 106 (12 Aug 2019 07:00) (101 - 116)  BP: 130/73 (12 Aug 2019 07:00) (94/52 - 134/73)  BP(mean): 95 (12 Aug 2019 07:00) (67 - 96)  ABP: 84/54 (11 Aug 2019 17:00) (62/62 - 141/42)  ABP(mean): 67 (11 Aug 2019 17:00) (62 - 87)  RR: 19 (12 Aug 2019 07:00) (15 - 31)  SpO2: 100% (12 Aug 2019 07:00) (91% - 100%)    Daily     Daily Weight in k.1 (12 Aug 2019 00:00)  I&O's Summary    11 Aug 2019 07:01  -  12 Aug 2019 07:00  --------------------------------------------------------  IN: 2165.1 mL / OUT: 1860 mL / NET: 305.1 mL    Physical Exam:  Gen: Alert, no apparent distress  CV: Tachycardic, regular rhythm no murmurs, rubs or gallops  Pulm: Clear to auscultation bilaterally, no rales, rhonchi or wheezes  Chest: Chest tubes/drains in place with dressings clean, dry and intact  GI: Abd is soft, non-tender and non-distended with +BS  Ext: No clubbing, cyanosis or edema  Neuro: A+Ox3, follows commands and moves all extremities    Labs:                          8.7    7.6   )-----------( 89       ( 12 Aug 2019 00:55 )             25.3       08-12    137  |  105  |  11  ----------------------------<  133<H>  4.1   |  23  |  0.83    Ca    8.1<L>      12 Aug 2019 00:55  Phos  2.2     08-12  Mg     2.3     08-12    TPro  5.2<L>  /  Alb  3.4  /  TBili  0.3  /  DBili  x   /  AST  31  /  ALT  23  /  AlkPhos  38<L>  08-12  PT/INR - ( 12 Aug 2019 00:55 )   PT: 13.8 sec;   INR: 1.20 ratio    PTT - ( 12 Aug 2019 00:55 )  PTT:28.8 sec    Assessment/Plan: 48 y/o M with PMH of CAD(s/p 2 stents), CVA in  with residual left sided weakness, HTN, HLD, DM type II presented with the complaint of left sided chest pain, was found to have significant multi-vessel coronary artery disease and is now s/p CABG.     Neuro:   Pain control with Percocet / Tylenol IV                                           Cardiovascular:    Stable hemodynamics  Not on any pressors  ASA, Brilinta, Lipitor 80  Uptitrating beta blocker for sinus tachycardia  Continue hemodynamic monitoring    Respiratory:  Pt is comfortable on nasal cannula  Encourage incentive spirometry    GI:  Tolerating diabetic diet  Continue bowel regimen, Protonix  Continue Zofran for nausea - PRN	          Renal:  Monitor I/Os and electrolytes  Will remove Parikh    Hematologic / Oncology:  No signs of active bleeding, transufsed overnight for Hct 25; will check f/u H/H   HSQ for DVT ppl    Infectious disease:  All surgical incision / chest tube sites look clean  No fever or other signs of infection     Endocrine:  Poorly controlled diabetic as an outpatient  Endocrine following  Basal Lantus 20 qhs, pre-meal Humalog 8 TID  Continue Accuchecks with sliding scale coverage    All clinical, lab, hemodynamic and radiographic data were reviewed and the plan was discussed with CTICU team.     Scotty Scherer MD

## 2019-08-13 LAB
ANION GAP SERPL CALC-SCNC: 10 MMOL/L — SIGNIFICANT CHANGE UP (ref 5–17)
BASOPHILS # BLD AUTO: 0.02 K/UL — SIGNIFICANT CHANGE UP (ref 0–0.2)
BASOPHILS NFR BLD AUTO: 0.3 % — SIGNIFICANT CHANGE UP (ref 0–2)
BUN SERPL-MCNC: 12 MG/DL — SIGNIFICANT CHANGE UP (ref 7–23)
CALCIUM SERPL-MCNC: 8.8 MG/DL — SIGNIFICANT CHANGE UP (ref 8.4–10.5)
CHLORIDE SERPL-SCNC: 105 MMOL/L — SIGNIFICANT CHANGE UP (ref 96–108)
CO2 SERPL-SCNC: 25 MMOL/L — SIGNIFICANT CHANGE UP (ref 22–31)
CREAT SERPL-MCNC: 0.82 MG/DL — SIGNIFICANT CHANGE UP (ref 0.5–1.3)
EOSINOPHIL # BLD AUTO: 0.13 K/UL — SIGNIFICANT CHANGE UP (ref 0–0.5)
EOSINOPHIL NFR BLD AUTO: 1.8 % — SIGNIFICANT CHANGE UP (ref 0–6)
GLUCOSE BLDC GLUCOMTR-MCNC: 116 MG/DL — HIGH (ref 70–99)
GLUCOSE BLDC GLUCOMTR-MCNC: 195 MG/DL — HIGH (ref 70–99)
GLUCOSE BLDC GLUCOMTR-MCNC: 235 MG/DL — HIGH (ref 70–99)
GLUCOSE BLDC GLUCOMTR-MCNC: 261 MG/DL — HIGH (ref 70–99)
GLUCOSE SERPL-MCNC: 100 MG/DL — HIGH (ref 70–99)
HCT VFR BLD CALC: 31.2 % — LOW (ref 39–50)
HGB BLD-MCNC: 10.1 G/DL — LOW (ref 13–17)
IMM GRANULOCYTES NFR BLD AUTO: 0.4 % — SIGNIFICANT CHANGE UP (ref 0–1.5)
LYMPHOCYTES # BLD AUTO: 1.13 K/UL — SIGNIFICANT CHANGE UP (ref 1–3.3)
LYMPHOCYTES # BLD AUTO: 15.3 % — SIGNIFICANT CHANGE UP (ref 13–44)
MCHC RBC-ENTMCNC: 27.2 PG — SIGNIFICANT CHANGE UP (ref 27–34)
MCHC RBC-ENTMCNC: 32.4 GM/DL — SIGNIFICANT CHANGE UP (ref 32–36)
MCV RBC AUTO: 83.9 FL — SIGNIFICANT CHANGE UP (ref 80–100)
MONOCYTES # BLD AUTO: 0.58 K/UL — SIGNIFICANT CHANGE UP (ref 0–0.9)
MONOCYTES NFR BLD AUTO: 7.9 % — SIGNIFICANT CHANGE UP (ref 2–14)
NEUTROPHILS # BLD AUTO: 5.49 K/UL — SIGNIFICANT CHANGE UP (ref 1.8–7.4)
NEUTROPHILS NFR BLD AUTO: 74.3 % — SIGNIFICANT CHANGE UP (ref 43–77)
PLATELET # BLD AUTO: 105 K/UL — LOW (ref 150–400)
POTASSIUM SERPL-MCNC: 3.9 MMOL/L — SIGNIFICANT CHANGE UP (ref 3.5–5.3)
POTASSIUM SERPL-SCNC: 3.9 MMOL/L — SIGNIFICANT CHANGE UP (ref 3.5–5.3)
RBC # BLD: 3.72 M/UL — LOW (ref 4.2–5.8)
RBC # FLD: 14.1 % — SIGNIFICANT CHANGE UP (ref 10.3–14.5)
SODIUM SERPL-SCNC: 140 MMOL/L — SIGNIFICANT CHANGE UP (ref 135–145)
WBC # BLD: 7.38 K/UL — SIGNIFICANT CHANGE UP (ref 3.8–10.5)
WBC # FLD AUTO: 7.38 K/UL — SIGNIFICANT CHANGE UP (ref 3.8–10.5)

## 2019-08-13 PROCEDURE — 71045 X-RAY EXAM CHEST 1 VIEW: CPT | Mod: 26

## 2019-08-13 PROCEDURE — 71045 X-RAY EXAM CHEST 1 VIEW: CPT | Mod: 26,77

## 2019-08-13 RX ORDER — METOPROLOL TARTRATE 50 MG
25 TABLET ORAL ONCE
Refills: 0 | Status: COMPLETED | OUTPATIENT
Start: 2019-08-13 | End: 2019-08-13

## 2019-08-13 RX ORDER — METOPROLOL TARTRATE 50 MG
75 TABLET ORAL
Refills: 0 | Status: DISCONTINUED | OUTPATIENT
Start: 2019-08-13 | End: 2019-08-13

## 2019-08-13 RX ORDER — FUROSEMIDE 40 MG
40 TABLET ORAL DAILY
Refills: 0 | Status: DISCONTINUED | OUTPATIENT
Start: 2019-08-13 | End: 2019-08-15

## 2019-08-13 RX ORDER — POTASSIUM CHLORIDE 20 MEQ
20 PACKET (EA) ORAL DAILY
Refills: 0 | Status: DISCONTINUED | OUTPATIENT
Start: 2019-08-13 | End: 2019-08-15

## 2019-08-13 RX ORDER — HYDROMORPHONE HYDROCHLORIDE 2 MG/ML
1 INJECTION INTRAMUSCULAR; INTRAVENOUS; SUBCUTANEOUS ONCE
Refills: 0 | Status: DISCONTINUED | OUTPATIENT
Start: 2019-08-13 | End: 2019-08-13

## 2019-08-13 RX ORDER — METOPROLOL TARTRATE 50 MG
75 TABLET ORAL
Refills: 0 | Status: DISCONTINUED | OUTPATIENT
Start: 2019-08-14 | End: 2019-08-15

## 2019-08-13 RX ADMIN — TICAGRELOR 90 MILLIGRAM(S): 90 TABLET ORAL at 08:51

## 2019-08-13 RX ADMIN — Medication 25 MILLIGRAM(S): at 21:05

## 2019-08-13 RX ADMIN — HYDROMORPHONE HYDROCHLORIDE 1 MILLIGRAM(S): 2 INJECTION INTRAMUSCULAR; INTRAVENOUS; SUBCUTANEOUS at 23:20

## 2019-08-13 RX ADMIN — HEPARIN SODIUM 5000 UNIT(S): 5000 INJECTION INTRAVENOUS; SUBCUTANEOUS at 13:25

## 2019-08-13 RX ADMIN — Medication 8 UNIT(S): at 11:53

## 2019-08-13 RX ADMIN — HEPARIN SODIUM 5000 UNIT(S): 5000 INJECTION INTRAVENOUS; SUBCUTANEOUS at 21:06

## 2019-08-13 RX ADMIN — Medication 50 MILLIGRAM(S): at 06:05

## 2019-08-13 RX ADMIN — Medication 100 MILLIGRAM(S): at 06:05

## 2019-08-13 RX ADMIN — Medication 1 DROP(S): at 18:40

## 2019-08-13 RX ADMIN — Medication 8 UNIT(S): at 08:50

## 2019-08-13 RX ADMIN — Medication 20 MILLIEQUIVALENT(S): at 21:45

## 2019-08-13 RX ADMIN — Medication 100 MILLIGRAM(S): at 13:25

## 2019-08-13 RX ADMIN — Medication 50 MILLIGRAM(S): at 18:40

## 2019-08-13 RX ADMIN — Medication 1 DROP(S): at 11:13

## 2019-08-13 RX ADMIN — Medication 1 DROP(S): at 00:51

## 2019-08-13 RX ADMIN — ATORVASTATIN CALCIUM 80 MILLIGRAM(S): 80 TABLET, FILM COATED ORAL at 21:06

## 2019-08-13 RX ADMIN — HEPARIN SODIUM 5000 UNIT(S): 5000 INJECTION INTRAVENOUS; SUBCUTANEOUS at 06:05

## 2019-08-13 RX ADMIN — INSULIN GLARGINE 20 UNIT(S): 100 INJECTION, SOLUTION SUBCUTANEOUS at 21:43

## 2019-08-13 RX ADMIN — OXYCODONE AND ACETAMINOPHEN 1 TABLET(S): 5; 325 TABLET ORAL at 11:10

## 2019-08-13 RX ADMIN — Medication 1 DROP(S): at 06:05

## 2019-08-13 RX ADMIN — Medication 12: at 11:52

## 2019-08-13 RX ADMIN — Medication 8: at 21:43

## 2019-08-13 RX ADMIN — TICAGRELOR 90 MILLIGRAM(S): 90 TABLET ORAL at 19:45

## 2019-08-13 RX ADMIN — Medication 100 MILLIGRAM(S): at 21:06

## 2019-08-13 RX ADMIN — Medication 81 MILLIGRAM(S): at 11:10

## 2019-08-13 RX ADMIN — HYDROMORPHONE HYDROCHLORIDE 1 MILLIGRAM(S): 2 INJECTION INTRAMUSCULAR; INTRAVENOUS; SUBCUTANEOUS at 22:50

## 2019-08-13 RX ADMIN — Medication 40 MILLIGRAM(S): at 21:45

## 2019-08-13 RX ADMIN — PANTOPRAZOLE SODIUM 40 MILLIGRAM(S): 20 TABLET, DELAYED RELEASE ORAL at 06:06

## 2019-08-13 RX ADMIN — Medication 4: at 17:14

## 2019-08-13 RX ADMIN — Medication 8 UNIT(S): at 17:14

## 2019-08-13 NOTE — PROGRESS NOTE ADULT - ASSESSMENT
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 Cath showed triple vessel disease. Patient transferred to [facility] under Dr. Alvarez for preop workup and CAB. (09 Aug 2019 20:47)  Pre-op IABP placement  s/p 8/10 CABGx3 LIMA  Post-op Course:  8/11 Pt extubated, weaned off pressor support, IABP dc'd. C/o blurry vision - opthal consulted f/u outpatient. Endocrine consulted for glucose management.  8/12 H/H 8.7/25.3 s/p 1U PRBC - repeat H/H 10/30.7, VSS, transferred to floor.   8/13: L+R pleural tubes 45cc/24. Will likely remove today.

## 2019-08-13 NOTE — PROGRESS NOTE ADULT - ASSESSMENT
Assessment  DMT2: 49y Male with DM T2 with hyperglycemia  start  eating meals ,   Bs 116  this AM  diet.  CAD: post CABG day 3 On medications, stable, monitored.  HTN:  was hypotensive post op, now off pressors stable   HLD on statin        Plan:     DMT2:  Continue    Lantus 24   units qhs, if  eating  Humalog  8    units before each meal in addition to correction scale coverage, monitor FS will FU  if not eating , ssi coverage only   Patient counseled for compliance with consistent low carb diet  CAD: post CABG day 1 Continue treatment, monitoring FU Cardiothoracic team.  HTN: Continue treatment, monitoring, Primary team FU  HLD :Continue  statin  Case discussed with the NP    Titi radford MD  [phone]

## 2019-08-13 NOTE — PROVIDER CONTACT NOTE (OTHER) - ACTION/TREATMENT ORDERED:
NP notified and aware. Urgent chest x-ray ordered. Patient monitor for resp distress.  Chest tube connections re-taped to secure system.. Dressing changed.
Administer pain medication
KYLEE yuan made aware. per KYLEE yuan to assess pt. will continue to monitor

## 2019-08-13 NOTE — PROGRESS NOTE ADULT - SUBJECTIVE AND OBJECTIVE BOX
VITAL SIGNS    Telemetry:    Vital Signs Last 24 Hrs  T(C): 37 (19 @ 12:12), Max: 37.3 (19 @ 19:48)  T(F): 98.6 (19 @ 12:12), Max: 99.1 (19 @ 19:48)  HR: 109 (19 @ 12:12) (60 - 109)  BP: 114/71 (19 @ 12:12) (114/71 - 141/75)  RR: 18 (19 @ 12:12) (18 - 18)  SpO2: 90% (19 @ 12:12) (90% - 98%)             @ 07:01  -   @ 07:00  --------------------------------------------------------  IN: 735 mL / OUT: 2300 mL / NET: -1565 mL     @ 07:01  -   @ 12:42  --------------------------------------------------------  IN: 900 mL / OUT: 250 mL / NET: 650 mL       Daily     Daily Weight in k.1 (13 Aug 2019 08:00)  Admit Wt: Drug Dosing Weight  Height (cm): 170 (10 Aug 2019 08:17)  Weight (kg): 77.2 (10 Aug 2019 08:17)  BMI (kg/m2): 26.7 (10 Aug 2019 08:17)  BSA (m2): 1.89 (10 Aug 2019 08:17)     Daily Weight in k.1 (19 @ 08:00)    LABS      140  |  105  |  12  ----------------------------<  100<H>  3.9   |  25  |  0.82    Ca    8.8      13 Aug 2019 06:19  Phos  2.2       Mg     2.3         TPro  5.2<L>  /  Alb  3.4  /  TBili  0.3  /  DBili  x   /  AST  31  /  ALT  23  /  AlkPhos  38<L>                                   10.1   7.38  )-----------( 105      ( 13 Aug 2019 08:42 )             31.2          PT/INR - ( 12 Aug 2019 00:55 )   PT: 13.8 sec;   INR: 1.20 ratio         PTT - ( 12 Aug 2019 00:55 )  PTT:28.8 sec          CAPILLARY BLOOD GLUCOSE      POCT Blood Glucose.: 261 mg/dL (13 Aug 2019 11:40)  POCT Blood Glucose.: 116 mg/dL (13 Aug 2019 08:15)  POCT Blood Glucose.: 130 mg/dL (12 Aug 2019 21:39)  POCT Blood Glucose.: 166 mg/dL (12 Aug 2019 17:54)  POCT Blood Glucose.: 180 mg/dL (12 Aug 2019 14:27)          Drains:     MS       [  ]   [  ]            L Pleural [  ]            R Pleural  [  ]            LESLYE  [  ]           Parikh  [  ]    Pacing Wires      [  ]   Settings:                                  Isolated  [  ]                    CXR:      MEDICATIONS  artificial tears (preservative free) Ophthalmic Solution 1 Drop(s) Both EYES four times a day  aspirin enteric coated 81 milliGRAM(s) Oral daily  atorvastatin 80 milliGRAM(s) Oral at bedtime  dextrose 50% Injectable 50 milliLiter(s) IV Push every 15 minutes  dextrose 50% Injectable 25 milliLiter(s) IV Push every 15 minutes  docusate sodium 100 milliGRAM(s) Oral three times a day  heparin  Injectable 5000 Unit(s) SubCutaneous every 8 hours  insulin glargine Injectable (LANTUS) 20 Unit(s) SubCutaneous at bedtime  insulin lispro (HumaLOG) corrective regimen sliding scale   SubCutaneous Before meals and at bedtime  insulin lispro Injectable (HumaLOG) 8 Unit(s) SubCutaneous before breakfast  insulin lispro Injectable (HumaLOG) 8 Unit(s) SubCutaneous before lunch  insulin lispro Injectable (HumaLOG) 8 Unit(s) SubCutaneous before dinner  metoprolol tartrate 50 milliGRAM(s) Oral every 12 hours  oxyCODONE    5 mG/acetaminophen 325 mG 1 Tablet(s) Oral every 4 hours PRN  oxyCODONE    5 mG/acetaminophen 325 mG 2 Tablet(s) Oral every 6 hours PRN  pantoprazole    Tablet 40 milliGRAM(s) Oral before breakfast  polyethylene glycol 3350 17 Gram(s) Oral daily  ticagrelor 90 milliGRAM(s) Oral every 12 hours      PHYSICAL EXAM      Neurology: alert and oriented x 3, nonfocal, no gross deficits  CV :S1S2  Sternal Wound :  CDI , Stable  Lungs: cta  Abdomen: soft, nontender, nondistended, positive bowel sounds, last bowel movement   : voids      Extremities:   warm to touch               PAST MEDICAL & SURGICAL HISTORY:  Coronary artery disease of native artery of native heart with stable angina pectoris  Type 2 diabetes mellitus without complication, with long-term current use of insulin  Hyperlipidemia, unspecified hyperlipidemia type  Hypertension, unspecified type  History of percutaneous coronary intervention                 Discussed with Cardiothoracic Team at AM rounds.

## 2019-08-13 NOTE — PROGRESS NOTE ADULT - SUBJECTIVE AND OBJECTIVE BOX
Endocrinology Attending Covering for Dr. Lyon      Chief complaint  Patient is a 49y old  Male who presents with a chief complaint of Left sided chest pain (12 Aug 2019 11:33)   Review of systems  Patient in bed, looks comfortable, no fever,  had no hypoglycemia.    Labs and Fingersticks  CAPILLARY BLOOD GLUCOSE  188 (12 Aug 2019 09:00)      POCT Blood Glucose.: 116 mg/dL (13 Aug 2019 08:15)  POCT Blood Glucose.: 130 mg/dL (12 Aug 2019 21:39)  POCT Blood Glucose.: 166 mg/dL (12 Aug 2019 17:54)  POCT Blood Glucose.: 180 mg/dL (12 Aug 2019 14:27)  POCT Blood Glucose.: 188 mg/dL (12 Aug 2019 08:49)      Anion Gap, Serum: 10 (08-13 @ 06:19)  Anion Gap, Serum: 9 (08-12 @ 00:55)      Calcium, Total Serum: 8.8 (08-13 @ 06:19)  Calcium, Total Serum: 8.1 <L> (08-12 @ 00:55)  Albumin, Serum: 3.4 (08-12 @ 00:55)    Alanine Aminotransferase (ALT/SGPT): 23 (08-12 @ 00:55)  Alkaline Phosphatase, Serum: 38 <L> (08-12 @ 00:55)  Aspartate Aminotransferase (AST/SGOT): 31 (08-12 @ 00:55)        08-13    140  |  105  |  12  ----------------------------<  100<H>  3.9   |  25  |  0.82    Ca    8.8      13 Aug 2019 06:19  Phos  2.2     08-12  Mg     2.3     08-12    TPro  5.2<L>  /  Alb  3.4  /  TBili  0.3  /  DBili  x   /  AST  31  /  ALT  23  /  AlkPhos  38<L>  08-12                        10.0   8.4   )-----------( 99       ( 12 Aug 2019 09:02 )             30.7     Medications  MEDICATIONS  (STANDING):  artificial tears (preservative free) Ophthalmic Solution 1 Drop(s) Both EYES four times a day  aspirin enteric coated 81 milliGRAM(s) Oral daily  atorvastatin 80 milliGRAM(s) Oral at bedtime  dextrose 50% Injectable 50 milliLiter(s) IV Push every 15 minutes  dextrose 50% Injectable 25 milliLiter(s) IV Push every 15 minutes  docusate sodium 100 milliGRAM(s) Oral three times a day  heparin  Injectable 5000 Unit(s) SubCutaneous every 8 hours  insulin glargine Injectable (LANTUS) 20 Unit(s) SubCutaneous at bedtime  insulin lispro (HumaLOG) corrective regimen sliding scale   SubCutaneous Before meals and at bedtime  insulin lispro Injectable (HumaLOG) 8 Unit(s) SubCutaneous before breakfast  insulin lispro Injectable (HumaLOG) 8 Unit(s) SubCutaneous before lunch  insulin lispro Injectable (HumaLOG) 8 Unit(s) SubCutaneous before dinner  metoprolol tartrate 50 milliGRAM(s) Oral every 12 hours  pantoprazole    Tablet 40 milliGRAM(s) Oral before breakfast  polyethylene glycol 3350 17 Gram(s) Oral daily  ticagrelor 90 milliGRAM(s) Oral every 12 hours      Physical Exam  General: Patient comfortable in bed  Vital Signs Last 12 Hrs  T(F): 98.8 (08-13-19 @ 05:05), Max: 98.8 (08-13-19 @ 05:05)  HR: 103 (08-13-19 @ 06:03) (96 - 103)  BP: 141/75 (08-13-19 @ 06:03) (123/74 - 141/75)  BP(mean): --  RR: 18 (08-13-19 @ 05:05) (18 - 18)  SpO2: 98% (08-13-19 @ 05:05) (98% - 98%)  Neck: No palpable thyroid nodules.  CVS: S1S2, No murmurs  Respiratory: No wheezing, no crepitations  GI: Abdomen soft, bowel sounds positive  Musculoskeletal:  edema lower extremities.   Skin: No skin rashes, no ecchymosis    Diagnostics

## 2019-08-13 NOTE — PROVIDER CONTACT NOTE (OTHER) - SITUATION
patient sitting in chair, right sided chest tube dislodged at connection site for 5-10 seconds ( found during hourly roundBreath sounds diminished, clear, equal bilaterally, oxygen saturation 94% RA.

## 2019-08-14 ENCOUNTER — TRANSCRIPTION ENCOUNTER (OUTPATIENT)
Age: 49
End: 2019-08-14

## 2019-08-14 LAB
ALBUMIN SERPL ELPH-MCNC: 3 G/DL — LOW (ref 3.3–5)
ALP SERPL-CCNC: 89 U/L — SIGNIFICANT CHANGE UP (ref 40–120)
ALT FLD-CCNC: 22 U/L — SIGNIFICANT CHANGE UP (ref 10–45)
ANION GAP SERPL CALC-SCNC: 10 MMOL/L — SIGNIFICANT CHANGE UP (ref 5–17)
AST SERPL-CCNC: 12 U/L — SIGNIFICANT CHANGE UP (ref 10–40)
BASOPHILS # BLD AUTO: 0 K/UL — SIGNIFICANT CHANGE UP (ref 0–0.2)
BASOPHILS NFR BLD AUTO: 0.1 % — SIGNIFICANT CHANGE UP (ref 0–2)
BILIRUB SERPL-MCNC: 0.5 MG/DL — SIGNIFICANT CHANGE UP (ref 0.2–1.2)
BUN SERPL-MCNC: 12 MG/DL — SIGNIFICANT CHANGE UP (ref 7–23)
CALCIUM SERPL-MCNC: 8.4 MG/DL — SIGNIFICANT CHANGE UP (ref 8.4–10.5)
CHLORIDE SERPL-SCNC: 102 MMOL/L — SIGNIFICANT CHANGE UP (ref 96–108)
CO2 SERPL-SCNC: 27 MMOL/L — SIGNIFICANT CHANGE UP (ref 22–31)
CREAT SERPL-MCNC: 0.85 MG/DL — SIGNIFICANT CHANGE UP (ref 0.5–1.3)
EOSINOPHIL # BLD AUTO: 0.1 K/UL — SIGNIFICANT CHANGE UP (ref 0–0.5)
EOSINOPHIL NFR BLD AUTO: 2 % — SIGNIFICANT CHANGE UP (ref 0–6)
GLUCOSE BLDC GLUCOMTR-MCNC: 157 MG/DL — HIGH (ref 70–99)
GLUCOSE BLDC GLUCOMTR-MCNC: 181 MG/DL — HIGH (ref 70–99)
GLUCOSE BLDC GLUCOMTR-MCNC: 214 MG/DL — HIGH (ref 70–99)
GLUCOSE BLDC GLUCOMTR-MCNC: 265 MG/DL — HIGH (ref 70–99)
GLUCOSE SERPL-MCNC: 157 MG/DL — HIGH (ref 70–99)
HCT VFR BLD CALC: 29.7 % — LOW (ref 39–50)
HGB BLD-MCNC: 9.6 G/DL — LOW (ref 13–17)
LYMPHOCYTES # BLD AUTO: 1.6 K/UL — SIGNIFICANT CHANGE UP (ref 1–3.3)
LYMPHOCYTES # BLD AUTO: 21.2 % — SIGNIFICANT CHANGE UP (ref 13–44)
MCHC RBC-ENTMCNC: 26.8 PG — LOW (ref 27–34)
MCHC RBC-ENTMCNC: 32.3 GM/DL — SIGNIFICANT CHANGE UP (ref 32–36)
MCV RBC AUTO: 82.9 FL — SIGNIFICANT CHANGE UP (ref 80–100)
MONOCYTES # BLD AUTO: 0.8 K/UL — SIGNIFICANT CHANGE UP (ref 0–0.9)
MONOCYTES NFR BLD AUTO: 10.5 % — SIGNIFICANT CHANGE UP (ref 2–14)
NEUTROPHILS # BLD AUTO: 4.8 K/UL — SIGNIFICANT CHANGE UP (ref 1.8–7.4)
NEUTROPHILS NFR BLD AUTO: 66.2 % — SIGNIFICANT CHANGE UP (ref 43–77)
PLATELET # BLD AUTO: 150 K/UL — SIGNIFICANT CHANGE UP (ref 150–400)
POTASSIUM SERPL-MCNC: 3.7 MMOL/L — SIGNIFICANT CHANGE UP (ref 3.5–5.3)
POTASSIUM SERPL-SCNC: 3.7 MMOL/L — SIGNIFICANT CHANGE UP (ref 3.5–5.3)
PROT SERPL-MCNC: 5.6 G/DL — LOW (ref 6–8.3)
RBC # BLD: 3.59 M/UL — LOW (ref 4.2–5.8)
RBC # FLD: 12.9 % — SIGNIFICANT CHANGE UP (ref 10.3–14.5)
SODIUM SERPL-SCNC: 139 MMOL/L — SIGNIFICANT CHANGE UP (ref 135–145)
WBC # BLD: 7.3 K/UL — SIGNIFICANT CHANGE UP (ref 3.8–10.5)
WBC # FLD AUTO: 7.3 K/UL — SIGNIFICANT CHANGE UP (ref 3.8–10.5)

## 2019-08-14 PROCEDURE — 71045 X-RAY EXAM CHEST 1 VIEW: CPT | Mod: 26

## 2019-08-14 PROCEDURE — 71045 X-RAY EXAM CHEST 1 VIEW: CPT | Mod: 26,77

## 2019-08-14 RX ORDER — POTASSIUM CHLORIDE 20 MEQ
40 PACKET (EA) ORAL ONCE
Refills: 0 | Status: COMPLETED | OUTPATIENT
Start: 2019-08-14 | End: 2019-08-14

## 2019-08-14 RX ORDER — POTASSIUM CHLORIDE 20 MEQ
20 PACKET (EA) ORAL ONCE
Refills: 0 | Status: COMPLETED | OUTPATIENT
Start: 2019-08-14 | End: 2019-08-14

## 2019-08-14 RX ORDER — OXYMETAZOLINE HYDROCHLORIDE 0.5 MG/ML
1 SPRAY NASAL EVERY 12 HOURS
Refills: 0 | Status: DISCONTINUED | OUTPATIENT
Start: 2019-08-14 | End: 2019-08-15

## 2019-08-14 RX ORDER — FUROSEMIDE 40 MG
40 TABLET ORAL ONCE
Refills: 0 | Status: COMPLETED | OUTPATIENT
Start: 2019-08-14 | End: 2019-08-14

## 2019-08-14 RX ADMIN — Medication 4: at 17:43

## 2019-08-14 RX ADMIN — OXYCODONE AND ACETAMINOPHEN 1 TABLET(S): 5; 325 TABLET ORAL at 16:20

## 2019-08-14 RX ADMIN — PANTOPRAZOLE SODIUM 40 MILLIGRAM(S): 20 TABLET, DELAYED RELEASE ORAL at 06:45

## 2019-08-14 RX ADMIN — Medication 8 UNIT(S): at 07:50

## 2019-08-14 RX ADMIN — Medication 40 MILLIGRAM(S): at 18:28

## 2019-08-14 RX ADMIN — OXYCODONE AND ACETAMINOPHEN 1 TABLET(S): 5; 325 TABLET ORAL at 15:32

## 2019-08-14 RX ADMIN — Medication 100 MILLIGRAM(S): at 15:31

## 2019-08-14 RX ADMIN — Medication 1 DROP(S): at 17:44

## 2019-08-14 RX ADMIN — POLYETHYLENE GLYCOL 3350 17 GRAM(S): 17 POWDER, FOR SOLUTION ORAL at 11:58

## 2019-08-14 RX ADMIN — Medication 20 MILLIEQUIVALENT(S): at 18:28

## 2019-08-14 RX ADMIN — Medication 12: at 11:55

## 2019-08-14 RX ADMIN — TICAGRELOR 90 MILLIGRAM(S): 90 TABLET ORAL at 21:44

## 2019-08-14 RX ADMIN — INSULIN GLARGINE 20 UNIT(S): 100 INJECTION, SOLUTION SUBCUTANEOUS at 21:51

## 2019-08-14 RX ADMIN — Medication 8 UNIT(S): at 11:56

## 2019-08-14 RX ADMIN — Medication 100 MILLIGRAM(S): at 21:50

## 2019-08-14 RX ADMIN — ATORVASTATIN CALCIUM 80 MILLIGRAM(S): 80 TABLET, FILM COATED ORAL at 21:50

## 2019-08-14 RX ADMIN — Medication 75 MILLIGRAM(S): at 06:45

## 2019-08-14 RX ADMIN — HEPARIN SODIUM 5000 UNIT(S): 5000 INJECTION INTRAVENOUS; SUBCUTANEOUS at 06:44

## 2019-08-14 RX ADMIN — Medication 100 MILLIGRAM(S): at 06:44

## 2019-08-14 RX ADMIN — Medication 20 MILLIEQUIVALENT(S): at 12:07

## 2019-08-14 RX ADMIN — OXYCODONE AND ACETAMINOPHEN 1 TABLET(S): 5; 325 TABLET ORAL at 07:31

## 2019-08-14 RX ADMIN — Medication 8: at 21:50

## 2019-08-14 RX ADMIN — Medication 1 DROP(S): at 06:44

## 2019-08-14 RX ADMIN — HEPARIN SODIUM 5000 UNIT(S): 5000 INJECTION INTRAVENOUS; SUBCUTANEOUS at 14:45

## 2019-08-14 RX ADMIN — Medication 4: at 07:49

## 2019-08-14 RX ADMIN — Medication 75 MILLIGRAM(S): at 17:46

## 2019-08-14 RX ADMIN — Medication 81 MILLIGRAM(S): at 11:58

## 2019-08-14 RX ADMIN — HEPARIN SODIUM 5000 UNIT(S): 5000 INJECTION INTRAVENOUS; SUBCUTANEOUS at 21:50

## 2019-08-14 RX ADMIN — TICAGRELOR 90 MILLIGRAM(S): 90 TABLET ORAL at 11:57

## 2019-08-14 RX ADMIN — Medication 1 DROP(S): at 11:57

## 2019-08-14 RX ADMIN — OXYCODONE AND ACETAMINOPHEN 1 TABLET(S): 5; 325 TABLET ORAL at 07:01

## 2019-08-14 RX ADMIN — Medication 40 MILLIGRAM(S): at 06:44

## 2019-08-14 RX ADMIN — Medication 8 UNIT(S): at 17:44

## 2019-08-14 RX ADMIN — Medication 40 MILLIEQUIVALENT(S): at 12:06

## 2019-08-14 NOTE — PROGRESS NOTE ADULT - SUBJECTIVE AND OBJECTIVE BOX
VITAL SIGNS    Subjective: "I'm ok." Denies CP, palpitation, SOB, CAGE, HA, dizziness, N/V/D, fever or chills.  No acute event noted overnight.     Telemetry: NSR       Vital Signs Last 24 Hrs  T(C): 36.6 (19 @ 12:10), Max: 37.2 (19 @ 20:24)  T(F): 97.9 (19 @ 12:10), Max: 99 (19 @ 20:24)  HR: 63 (19 @ 17:53) (63 - 112)  BP: 114/72 (19 @ 17:53) (100/66 - 150/84)  RR: 18 (19 @ 12:10) (18 - 18)  SpO2: 94% (19 @ 12:10) (94% - 97%)            @ 07:01  -   @ 07:00  --------------------------------------------------------  IN: 1280 mL / OUT: 2425 mL / NET: -1145 mL     @ 07:01  -   @ 18:23  --------------------------------------------------------  IN: 580 mL / OUT: 490 mL / NET: 90 mL    Daily     Daily Weight in k.5 (14 Aug 2019 08:00)    CAPILLARY BLOOD GLUCOSE    POCT Blood Glucose.: 157 mg/dL (14 Aug 2019 16:47)  POCT Blood Glucose.: 265 mg/dL (14 Aug 2019 11:47)  POCT Blood Glucose.: 181 mg/dL (14 Aug 2019 07:44)  POCT Blood Glucose.: 235 mg/dL (13 Aug 2019 21:37)        PHYSICAL EXAM    Neurology: alert and oriented x 3, nonfocal, no gross deficits    CV: (+) S1 and S2, No murmurs, rubs, gallops or clicks     Sternal Wound:  MSI -->CDI , sternum stable; Right and left pleural CT --> Pleural vac --> LWS. Negative air leak. PW x 2 --> Isolated       Lungs: CTA B/L     Abdomen: soft, nontender, nondistended, positive bowel sounds, (+) Flatus; (+) BM     :  Voiding               Extremities:  B/L LE (+) 1 edema; negative calf tenderness; (+) 2 DP palpable        artificial tears (preservative free) Ophthalmic Solution 1 Drop(s) Both EYES four times a day  aspirin enteric coated 81 milliGRAM(s) Oral daily  atorvastatin 80 milliGRAM(s) Oral at bedtime  docusate sodium 100 milliGRAM(s) Oral three times a day  furosemide Tablet 40 milliGRAM(s) Oral daily  furosemide Injectable 40 milliGRAM(s) IV Push once  heparin  Injectable 5000 Unit(s) SubCutaneous every 8 hours  insulin glargine Injectable (LANTUS) 20 Unit(s) SubCutaneous at bedtime  insulin lispro (HumaLOG) corrective regimen sliding scale   SubCutaneous Before meals and at bedtime  insulin lispro Injectable (HumaLOG) 8 Unit(s) SubCutaneous before breakfast  insulin lispro Injectable (HumaLOG) 8 Unit(s) SubCutaneous before lunch  insulin lispro Injectable (HumaLOG) 8 Unit(s) SubCutaneous before dinner  metoprolol tartrate 75 milliGRAM(s) Oral two times a day  oxyCODONE 5 mG/acetaminophen 325 mG 1 Tablet(s) Oral every 4 hours PRN  oxyCODONE 5 mG/acetaminophen 325 mG 2 Tablet(s) Oral every 6 hours PRN  pantoprazole Tablet 40 milliGRAM(s) Oral before breakfast  polyethylene glycol 3350 17 Gram(s) Oral daily  potassium chloride Tablet ER 20 milliEquivalent(s) Oral daily  potassium chloride Tablet ER 20 milliEquivalent(s) Oral once  ticagrelor 90 milliGRAM(s) Oral every 12 hours    Physical Therapy Rec:   Home  [  ]   Home w/ PT  [ X ]  Rehab  [  ]    Discussed with Cardiothoracic Team at AM rounds.

## 2019-08-14 NOTE — PROGRESS NOTE ADULT - PROBLEM SELECTOR PLAN 1
Continue asa 81 daily and Brilinta 90 BID  Continue metoprolol 75 BID - titrate up beta-blocker as tolerated  Continue atorvastatin 80 HS  Monitor chest tube drainage - strict I & Os/Daily CXR  Cough and deep breathe, Incentive Spirometry Q1h, Chest PT.  Ambulate 4x daily as tolerated and with PT.  C/W GI prophylaxis on Protonix and DVT prophylaxis on SQ Heparin.  Disposition: Home Thur/Fri
Continue asa 81 daily and Brilinta 90 BID  Continue metoprolol 50 BID - titrate up beta-blocker as tolerated  Continue atorvastatin 80 HS  Monitor chest tube drainage - strict I & Os/Daily CXR  Cough and deep breathe, Incentive Spirometry Q1h, Chest PT.  Ambulate 4x daily as tolerated and with PT.  C/W GI prophylaxis on protonix and DVT prophylaxis on SQ Heparin.  Disposition: Home Thur/Fri
Continue asa 81 daily and Brilinta 90 BID  Continue metoprolol 50 BID - titrate up beta-blocker as tolerated  Continue atorvastatin 80 HS  Monitor chest tube drainage - strict I & Os/Daily CXR  Cough and deep breathe, Incentive Spirometry Q1h, Chest PT.  Ambulate 4x daily as tolerated and with PT.  C/W GI prophylaxis on protonix and DVT prophylaxis on SQ Heparin.  Disposition: Home Thur/Fri

## 2019-08-14 NOTE — PROGRESS NOTE ADULT - SUBJECTIVE AND OBJECTIVE BOX
Endocrinology Attending Covering for Dr. Lyon      Chief complaint  Patient is a 49y old  Male who presents with a chief complaint of Left sided chest pain (13 Aug 2019 12:42)   Review of systems  Patient in bed, looks comfortable, no fever,  had no hypoglycemia.    Labs and Fingersticks  CAPILLARY BLOOD GLUCOSE      POCT Blood Glucose.: 181 mg/dL (14 Aug 2019 07:44)  POCT Blood Glucose.: 235 mg/dL (13 Aug 2019 21:37)  POCT Blood Glucose.: 195 mg/dL (13 Aug 2019 17:12)  POCT Blood Glucose.: 261 mg/dL (13 Aug 2019 11:40)      Anion Gap, Serum: 10 (08-14 @ 06:17)  Anion Gap, Serum: 10 (08-13 @ 06:19)      Calcium, Total Serum: 8.4 (08-14 @ 06:17)  Calcium, Total Serum: 8.8 (08-13 @ 06:19)  Albumin, Serum: 3.0 <L> (08-14 @ 06:17)    Alanine Aminotransferase (ALT/SGPT): 22 (08-14 @ 06:17)  Alkaline Phosphatase, Serum: 89 (08-14 @ 06:17)  Aspartate Aminotransferase (AST/SGOT): 12 (08-14 @ 06:17)        08-14    139  |  102  |  12  ----------------------------<  157<H>  3.7   |  27  |  0.85    Ca    8.4      14 Aug 2019 06:17    TPro  5.6<L>  /  Alb  3.0<L>  /  TBili  0.5  /  DBili  x   /  AST  12  /  ALT  22  /  AlkPhos  89  08-14                        9.6    7.3   )-----------( 150      ( 14 Aug 2019 06:17 )             29.7     Medications  MEDICATIONS  (STANDING):  artificial tears (preservative free) Ophthalmic Solution 1 Drop(s) Both EYES four times a day  aspirin enteric coated 81 milliGRAM(s) Oral daily  atorvastatin 80 milliGRAM(s) Oral at bedtime  dextrose 50% Injectable 50 milliLiter(s) IV Push every 15 minutes  dextrose 50% Injectable 25 milliLiter(s) IV Push every 15 minutes  docusate sodium 100 milliGRAM(s) Oral three times a day  furosemide    Tablet 40 milliGRAM(s) Oral daily  heparin  Injectable 5000 Unit(s) SubCutaneous every 8 hours  insulin glargine Injectable (LANTUS) 20 Unit(s) SubCutaneous at bedtime  insulin lispro (HumaLOG) corrective regimen sliding scale   SubCutaneous Before meals and at bedtime  insulin lispro Injectable (HumaLOG) 8 Unit(s) SubCutaneous before breakfast  insulin lispro Injectable (HumaLOG) 8 Unit(s) SubCutaneous before lunch  insulin lispro Injectable (HumaLOG) 8 Unit(s) SubCutaneous before dinner  metoprolol tartrate 75 milliGRAM(s) Oral two times a day  pantoprazole    Tablet 40 milliGRAM(s) Oral before breakfast  polyethylene glycol 3350 17 Gram(s) Oral daily  potassium chloride    Tablet ER 20 milliEquivalent(s) Oral daily  ticagrelor 90 milliGRAM(s) Oral every 12 hours      Physical Exam  General: Patient comfortable in bed  Vital Signs Last 12 Hrs  T(F): 98.9 (08-14-19 @ 04:51), Max: 99 (08-13-19 @ 20:24)  HR: 94 (08-14-19 @ 05:00) (94 - 107)  BP: 106/74 (08-14-19 @ 05:00) (106/74 - 150/84)  BP(mean): 106 (08-13-19 @ 20:24) (106 - 106)  RR: 18 (08-14-19 @ 04:51) (18 - 18)  SpO2: 97% (08-14-19 @ 04:51) (94% - 97%)  Neck: No palpable thyroid nodules.  CVS: S1S2, No murmurs  Respiratory: No wheezing, no crepitations  GI: Abdomen soft, bowel sounds positive  Musculoskeletal:  edema lower extremities.   Skin: No skin rashes, no ecchymosis    Diagnostics

## 2019-08-14 NOTE — PROGRESS NOTE ADULT - PROBLEM SELECTOR PLAN 2
Endocrine following  Check FS AC/HS and diabetic diet  Continue Lantus 20 HS and Humalog 8 TID premeals

## 2019-08-14 NOTE — PROGRESS NOTE ADULT - PROBLEM SELECTOR PLAN 3
Opthalmology consulted  Recommend artificial tears 4-5 times per day OU  - BG control  - monitor for hypotension/anemia  - Outpatient follow-up as below for cataract evaluation   [address] [facility]. [address]  [place], [place] [id number] [phone]
Opthalmology consulted  Recommend artificial tears 4-5 times per day OU  - BG control  - monitor for hypotension/anemia  - Outpatient follow-up as below for cataract evaluation   [address] [facility]  [place], [place] [id number] [phone]
Opthalmology consulted  Recommend artificial tears 4-5 times per day OU  - BG control  - monitor for hypotension/anemia  - Outpatient follow-up as below for cataract evaluation   [address] [facility]. [address]  [place], [place] [id number] [phone]

## 2019-08-14 NOTE — PROGRESS NOTE ADULT - ASSESSMENT
Problem: Patient Care Overview  Goal: Plan of Care Review  Outcome: Ongoing (interventions implemented as appropriate)  No contact with family thus far this shift.  VSS.  Infant remains intubated, vent setting weaned this shift.  FiO2 21-23%.  No a/b noted.  PIV inserted this shift and RBC infused per order.  Meds given per MAR.  Infant tolerating continuous feeds of Donor EBM20 well; no spits or residuals noted.  Infant voiding and stooling.  Will continue to monitor closely.         Assessment  DMT2: 49y Male with DM T2 with hyperglycemia  start  eating meals ,blood sugarers trending up   Bs 181  this AM  diet.  CAD: post CABG day 4 On medications, stable, monitored.  HTN:  was hypotensive post op, now off pressors stable   HLD on statin        Plan:     DMT2:  Continue    Lantus 28   units qhs, if  eating  Humalog  10    units before each meal in addition to correction scale coverage, monitor FS will FU  if not eating , ssi coverage only   Patient counseled for compliance with consistent low carb diet  CAD: post CABG day 1 Continue treatment, monitoring FU Cardiothoracic team.  HTN: Continue treatment, monitoring, Primary team FU  HLD :Continue  statin  Case discussed with the NP    Titi radford MD  [phone]

## 2019-08-14 NOTE — PROGRESS NOTE ADULT - ASSESSMENT
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 Cath showed triple vessel disease. Patient transferred to [facility] under Dr. Alvarez for preop workup and CAGB. (09 Aug 2019 20:47). Pre-op IABP placement  On s/p 8/10 CABG x 3 LIMA  Post-op Course:  8/11 Pt extubated, weaned off pressor support, IABP dc'd. C/o blurry vision - opthal consulted f/u outpatient. Endocrine consulted for glucose management.  8/12 H/H 8.7/25.3 s/p 1U PRBC - repeat H/H 10/30.7, VSS, transferred to floor.   8/13: L+R pleural tubes 45cc/24. Will likely remove today.   8/14 VVS; B/L pleural CT  from pleural vac --> placed to water seal.  Lasix 40 IV x 1 given.  Right pleural CT D/C'd.  Follow up CXR.    Disposition: Home PT once medically cleared

## 2019-08-14 NOTE — DISCHARGE NOTE NURSING/CASE MANAGEMENT/SOCIAL WORK - NSDCDPATPORTLINK_GEN_ALL_CORE
You can access the [facility], offered by [facility], by registering with the following website: [email or web address]

## 2019-08-15 ENCOUNTER — TRANSCRIPTION ENCOUNTER (OUTPATIENT)
Age: 49
End: 2019-08-15

## 2019-08-15 VITALS
OXYGEN SATURATION: 99 % | SYSTOLIC BLOOD PRESSURE: 110 MMHG | RESPIRATION RATE: 18 BRPM | DIASTOLIC BLOOD PRESSURE: 67 MMHG | TEMPERATURE: 98 F | HEART RATE: 77 BPM

## 2019-08-15 PROBLEM — Z00.00 ENCOUNTER FOR PREVENTIVE HEALTH EXAMINATION: Noted: 2019-08-15

## 2019-08-15 LAB
ANION GAP SERPL CALC-SCNC: 12 MMOL/L — SIGNIFICANT CHANGE UP (ref 5–17)
BUN SERPL-MCNC: 13 MG/DL — SIGNIFICANT CHANGE UP (ref 7–23)
CALCIUM SERPL-MCNC: 9 MG/DL — SIGNIFICANT CHANGE UP (ref 8.4–10.5)
CHLORIDE SERPL-SCNC: 101 MMOL/L — SIGNIFICANT CHANGE UP (ref 96–108)
CO2 SERPL-SCNC: 27 MMOL/L — SIGNIFICANT CHANGE UP (ref 22–31)
CREAT SERPL-MCNC: 0.9 MG/DL — SIGNIFICANT CHANGE UP (ref 0.5–1.3)
GLUCOSE BLDC GLUCOMTR-MCNC: 247 MG/DL — HIGH (ref 70–99)
GLUCOSE BLDC GLUCOMTR-MCNC: 293 MG/DL — HIGH (ref 70–99)
GLUCOSE SERPL-MCNC: 213 MG/DL — HIGH (ref 70–99)
HCT VFR BLD CALC: 31 % — LOW (ref 39–50)
HGB BLD-MCNC: 9.9 G/DL — LOW (ref 13–17)
MCHC RBC-ENTMCNC: 26.5 PG — LOW (ref 27–34)
MCHC RBC-ENTMCNC: 31.9 GM/DL — LOW (ref 32–36)
MCV RBC AUTO: 83 FL — SIGNIFICANT CHANGE UP (ref 80–100)
PLATELET # BLD AUTO: 181 K/UL — SIGNIFICANT CHANGE UP (ref 150–400)
POTASSIUM SERPL-MCNC: 4 MMOL/L — SIGNIFICANT CHANGE UP (ref 3.5–5.3)
POTASSIUM SERPL-SCNC: 4 MMOL/L — SIGNIFICANT CHANGE UP (ref 3.5–5.3)
RBC # BLD: 3.74 M/UL — LOW (ref 4.2–5.8)
RBC # FLD: 12.9 % — SIGNIFICANT CHANGE UP (ref 10.3–14.5)
SODIUM SERPL-SCNC: 140 MMOL/L — SIGNIFICANT CHANGE UP (ref 135–145)
WBC # BLD: 6.8 K/UL — SIGNIFICANT CHANGE UP (ref 3.8–10.5)
WBC # FLD AUTO: 6.8 K/UL — SIGNIFICANT CHANGE UP (ref 3.8–10.5)

## 2019-08-15 PROCEDURE — 86923 COMPATIBILITY TEST ELECTRIC: CPT

## 2019-08-15 PROCEDURE — 80048 BASIC METABOLIC PNL TOTAL CA: CPT

## 2019-08-15 PROCEDURE — P9045: CPT

## 2019-08-15 PROCEDURE — 87640 STAPH A DNA AMP PROBE: CPT

## 2019-08-15 PROCEDURE — P9041: CPT

## 2019-08-15 PROCEDURE — C1889: CPT

## 2019-08-15 PROCEDURE — 85027 COMPLETE CBC AUTOMATED: CPT

## 2019-08-15 PROCEDURE — 84132 ASSAY OF SERUM POTASSIUM: CPT

## 2019-08-15 PROCEDURE — 86891 AUTOLOGOUS BLOOD OP SALVAGE: CPT

## 2019-08-15 PROCEDURE — 71045 X-RAY EXAM CHEST 1 VIEW: CPT | Mod: 26,77

## 2019-08-15 PROCEDURE — 82553 CREATINE MB FRACTION: CPT

## 2019-08-15 PROCEDURE — 85014 HEMATOCRIT: CPT

## 2019-08-15 PROCEDURE — P9037: CPT

## 2019-08-15 PROCEDURE — 97116 GAIT TRAINING THERAPY: CPT

## 2019-08-15 PROCEDURE — 87641 MR-STAPH DNA AMP PROBE: CPT

## 2019-08-15 PROCEDURE — 36430 TRANSFUSION BLD/BLD COMPNT: CPT

## 2019-08-15 PROCEDURE — 82803 BLOOD GASES ANY COMBINATION: CPT

## 2019-08-15 PROCEDURE — 81001 URINALYSIS AUTO W/SCOPE: CPT

## 2019-08-15 PROCEDURE — 86901 BLOOD TYPING SEROLOGIC RH(D): CPT

## 2019-08-15 PROCEDURE — 82962 GLUCOSE BLOOD TEST: CPT

## 2019-08-15 PROCEDURE — 84100 ASSAY OF PHOSPHORUS: CPT

## 2019-08-15 PROCEDURE — 82550 ASSAY OF CK (CPK): CPT

## 2019-08-15 PROCEDURE — 84484 ASSAY OF TROPONIN QUANT: CPT

## 2019-08-15 PROCEDURE — P9016: CPT

## 2019-08-15 PROCEDURE — 93005 ELECTROCARDIOGRAM TRACING: CPT

## 2019-08-15 PROCEDURE — 82330 ASSAY OF CALCIUM: CPT

## 2019-08-15 PROCEDURE — 84295 ASSAY OF SERUM SODIUM: CPT

## 2019-08-15 PROCEDURE — 86850 RBC ANTIBODY SCREEN: CPT

## 2019-08-15 PROCEDURE — 82947 ASSAY GLUCOSE BLOOD QUANT: CPT

## 2019-08-15 PROCEDURE — 80053 COMPREHEN METABOLIC PANEL: CPT

## 2019-08-15 PROCEDURE — 85384 FIBRINOGEN ACTIVITY: CPT

## 2019-08-15 PROCEDURE — 85610 PROTHROMBIN TIME: CPT

## 2019-08-15 PROCEDURE — 71045 X-RAY EXAM CHEST 1 VIEW: CPT | Mod: 26

## 2019-08-15 PROCEDURE — 82435 ASSAY OF BLOOD CHLORIDE: CPT

## 2019-08-15 PROCEDURE — P9047: CPT

## 2019-08-15 PROCEDURE — C1751: CPT

## 2019-08-15 PROCEDURE — 85730 THROMBOPLASTIN TIME PARTIAL: CPT

## 2019-08-15 PROCEDURE — 97161 PT EVAL LOW COMPLEX 20 MIN: CPT

## 2019-08-15 PROCEDURE — 71045 X-RAY EXAM CHEST 1 VIEW: CPT

## 2019-08-15 PROCEDURE — 83605 ASSAY OF LACTIC ACID: CPT

## 2019-08-15 PROCEDURE — 97530 THERAPEUTIC ACTIVITIES: CPT

## 2019-08-15 PROCEDURE — 94002 VENT MGMT INPAT INIT DAY: CPT

## 2019-08-15 PROCEDURE — 86900 BLOOD TYPING SEROLOGIC ABO: CPT

## 2019-08-15 PROCEDURE — C1729: CPT

## 2019-08-15 PROCEDURE — 83735 ASSAY OF MAGNESIUM: CPT

## 2019-08-15 PROCEDURE — 82565 ASSAY OF CREATININE: CPT

## 2019-08-15 PROCEDURE — 85576 BLOOD PLATELET AGGREGATION: CPT

## 2019-08-15 RX ORDER — DOCUSATE SODIUM 100 MG
1 CAPSULE ORAL
Qty: 0 | Refills: 0 | DISCHARGE
Start: 2019-08-15

## 2019-08-15 RX ORDER — METOPROLOL TARTRATE 50 MG
25 TABLET ORAL ONCE
Refills: 0 | Status: COMPLETED | OUTPATIENT
Start: 2019-08-15 | End: 2019-08-15

## 2019-08-15 RX ORDER — INSULIN LISPRO 100/ML
10 VIAL (ML) SUBCUTANEOUS
Qty: 1 | Refills: 0
Start: 2019-08-15 | End: 2019-09-13

## 2019-08-15 RX ORDER — TICAGRELOR 90 MG/1
1 TABLET ORAL
Qty: 60 | Refills: 0
Start: 2019-08-15 | End: 2019-09-13

## 2019-08-15 RX ORDER — INSULIN GLARGINE 100 [IU]/ML
30 INJECTION, SOLUTION SUBCUTANEOUS
Qty: 1 | Refills: 0
Start: 2019-08-15 | End: 2019-09-13

## 2019-08-15 RX ORDER — METOPROLOL TARTRATE 50 MG
100 TABLET ORAL
Refills: 0 | Status: DISCONTINUED | OUTPATIENT
Start: 2019-08-15 | End: 2019-08-15

## 2019-08-15 RX ORDER — ATORVASTATIN CALCIUM 80 MG/1
1 TABLET, FILM COATED ORAL
Qty: 30 | Refills: 0
Start: 2019-08-15 | End: 2019-09-13

## 2019-08-15 RX ADMIN — Medication 81 MILLIGRAM(S): at 12:16

## 2019-08-15 RX ADMIN — Medication 8 UNIT(S): at 12:14

## 2019-08-15 RX ADMIN — HEPARIN SODIUM 5000 UNIT(S): 5000 INJECTION INTRAVENOUS; SUBCUTANEOUS at 06:29

## 2019-08-15 RX ADMIN — Medication 12: at 12:14

## 2019-08-15 RX ADMIN — Medication 20 MILLIEQUIVALENT(S): at 12:16

## 2019-08-15 RX ADMIN — Medication 100 MILLIGRAM(S): at 06:28

## 2019-08-15 RX ADMIN — Medication 8 UNIT(S): at 07:37

## 2019-08-15 RX ADMIN — Medication 40 MILLIGRAM(S): at 06:28

## 2019-08-15 RX ADMIN — Medication 1 DROP(S): at 06:28

## 2019-08-15 RX ADMIN — Medication 25 MILLIGRAM(S): at 08:52

## 2019-08-15 RX ADMIN — Medication 8: at 07:37

## 2019-08-15 RX ADMIN — OXYMETAZOLINE HYDROCHLORIDE 1 SPRAY(S): 0.5 SPRAY NASAL at 06:29

## 2019-08-15 RX ADMIN — PANTOPRAZOLE SODIUM 40 MILLIGRAM(S): 20 TABLET, DELAYED RELEASE ORAL at 06:29

## 2019-08-15 RX ADMIN — Medication 75 MILLIGRAM(S): at 06:29

## 2019-08-15 RX ADMIN — TICAGRELOR 90 MILLIGRAM(S): 90 TABLET ORAL at 07:37

## 2019-08-15 NOTE — PROGRESS NOTE ADULT - PROBLEM SELECTOR PROBLEM 2
Coronary artery disease of native artery of native heart with stable angina pectoris
Type 2 diabetes mellitus without complication, with long-term current use of insulin

## 2019-08-15 NOTE — DISCHARGE NOTE PROVIDER - NSDCPNSUBOBJ_GEN_ALL_CORE
cor  RRR    MT   stable with chest tube   site drainage    lungs  CTA    GI   soft  ND  NT    ext  trace to plus one pedal edema cor  RRR    MT   stable with +  chest tube   site drainage    lungs  CTA    GI   soft  ND  NT    ext  trace to plus one pedal edema

## 2019-08-15 NOTE — PROGRESS NOTE ADULT - REASON FOR ADMISSION
CABG
CABG
Left sided chest pain

## 2019-08-15 NOTE — PROGRESS NOTE ADULT - PROBLEM SELECTOR PROBLEM 1
S/P CABG x 3
Type 2 diabetes mellitus without complication, with long-term current use of insulin

## 2019-08-15 NOTE — DISCHARGE NOTE PROVIDER - NSDCFUADDAPPT_GEN_ALL_CORE_FT
shower   daily   walk 4x day   check Blood sigar  4 x day   Insulin as directed    ***see Dr Alvarez   Aug 30 th   1030 am  f up with your cardiologist and PCP within 2 weeks   see Dr Villalba  Endocrinologist 2-4 weeks shower   daily   walk 4x day   check Blood sigar  4 x day   Insulin as directed    ***see Dr Alvarez   Aug 30 th   1030 am  f up with your cardiologist and PCP within 2 weeks   see Dr Villalba  Endocrinologist 2-4 weeks    follow up with an ophthalmologist for cataract eval

## 2019-08-15 NOTE — DISCHARGE NOTE PROVIDER - HOSPITAL COURSE
20:47). Pre-op IABP placement    On s/p 8/10 CABG x 3 LIMA    Post-op Course:    8/11 Pt extubated, weaned off pressor support, IABP dc'd. C/o blurry vision - opthal consulted f/u outpatient. Endocrine consulted for glucose management.    8/12 H/H 8.7/25.3 s/p 1U PRBC - repeat H/H 10/30.7, VSS, transferred to floor.     8/13: L+R pleural tubes 45cc/24. Will likely remove today.     8/14 VVS; B/L pleural CT  from pleural vac --> placed to water seal.  Lasix 40 IV x 1 given.  Right pleural CT D/C'd.  Follow up CXR.      Disposition: Home PT once medically cleared 20:47). Pre-op IABP placement    On s/p 8/10 CABG x 3 LIMA    Post-op Course:    8/11 Pt extubated, weaned off pressor support, IABP dc'd. C/o blurry vision - opthal consulted f/u outpatient. Endocrine consulted for glucose management.    8/12 H/H 8.7/25.3 s/p 1U PRBC - repeat H/H 10/30.7, VSS, transferred to floor.     8/13: L+R pleural tubes 45cc/24. Will likely remove today.     8/14 VVS; B/L pleural CT  from pleural vac --> placed to water seal.  Lasix 40 IV x 1 given.  Right pleural CT D/C'd.  Follow up CXR.      Disposition: Home PT once medically cleared     8/15  Pw  cut     stitch placed at chest tube site for drainage    endo  recs:   Insulin anf premeal needed

## 2019-08-15 NOTE — PROGRESS NOTE ADULT - PROBLEM SELECTOR PROBLEM 3
Hypertension, unspecified type
Blurry vision

## 2019-08-15 NOTE — PROGRESS NOTE ADULT - ASSESSMENT
Assessment  DMT2: 49y Male with DM T2 with hyperglycemia  blood sugarers trending up  eating meals ,   Bs 247  this AM  .  CAD: post CABG day 3 On medications, stable, monitored.  HTN:  was hypotensive post op, now off pressors stable   HLD on statin        Plan:     DMT2:  Increase     Lantus 34   units qhs, and   Humalog  12 unit pre breakfast, 10 unit pre lunch, and 12 unit  pre dinner in addition to correction scale coverage, monitor FS will FU  if not eating , ssi coverage only   Patient counseled for compliance with consistent low carb diet  CAD: post CABG day 1 Continue treatment, monitoring FU Cardiothoracic team.  HTN: Continue treatment, monitoring, Primary team FU  HLD :Continue  statin  Case discussed with the NP    Titi radford MD  [phone]

## 2019-08-15 NOTE — PROGRESS NOTE ADULT - SUBJECTIVE AND OBJECTIVE BOX
Endocrinology Attending Covering for Dr. Lyon      Chief complaint  Patient is a 49y old  Male who presents with a chief complaint of Left sided chest pain (14 Aug 2019 18:23)   Review of systems  Patient in bed, looks comfortable, no fever,  had no hypoglycemia.    Labs and Fingersticks  CAPILLARY BLOOD GLUCOSE      POCT Blood Glucose.: 247 mg/dL (15 Aug 2019 07:33)  POCT Blood Glucose.: 214 mg/dL (14 Aug 2019 21:32)  POCT Blood Glucose.: 157 mg/dL (14 Aug 2019 16:47)  POCT Blood Glucose.: 265 mg/dL (14 Aug 2019 11:47)      Anion Gap, Serum: 12 (08-15 @ 06:11)  Anion Gap, Serum: 10 (08-14 @ 06:17)      Calcium, Total Serum: 9.0 (08-15 @ 06:11)  Calcium, Total Serum: 8.4 (08-14 @ 06:17)  Albumin, Serum: 3.0 <L> (08-14 @ 06:17)    Alanine Aminotransferase (ALT/SGPT): 22 (08-14 @ 06:17)  Alkaline Phosphatase, Serum: 89 (08-14 @ 06:17)  Aspartate Aminotransferase (AST/SGOT): 12 (08-14 @ 06:17)        08-15    140  |  101  |  13  ----------------------------<  213<H>  4.0   |  27  |  0.90    Ca    9.0      15 Aug 2019 06:11    TPro  5.6<L>  /  Alb  3.0<L>  /  TBili  0.5  /  DBili  x   /  AST  12  /  ALT  22  /  AlkPhos  89  08-14                        9.9    6.8   )-----------( 181      ( 15 Aug 2019 06:11 )             31.0     Medications  MEDICATIONS  (STANDING):  artificial tears (preservative free) Ophthalmic Solution 1 Drop(s) Both EYES four times a day  aspirin enteric coated 81 milliGRAM(s) Oral daily  atorvastatin 80 milliGRAM(s) Oral at bedtime  dextrose 50% Injectable 50 milliLiter(s) IV Push every 15 minutes  dextrose 50% Injectable 25 milliLiter(s) IV Push every 15 minutes  docusate sodium 100 milliGRAM(s) Oral three times a day  furosemide    Tablet 40 milliGRAM(s) Oral daily  heparin  Injectable 5000 Unit(s) SubCutaneous every 8 hours  insulin glargine Injectable (LANTUS) 20 Unit(s) SubCutaneous at bedtime  insulin lispro (HumaLOG) corrective regimen sliding scale   SubCutaneous Before meals and at bedtime  insulin lispro Injectable (HumaLOG) 8 Unit(s) SubCutaneous before breakfast  insulin lispro Injectable (HumaLOG) 8 Unit(s) SubCutaneous before lunch  insulin lispro Injectable (HumaLOG) 8 Unit(s) SubCutaneous before dinner  metoprolol tartrate 25 milliGRAM(s) Oral once  metoprolol tartrate 100 milliGRAM(s) Oral two times a day  oxymetazoline 0.05% Nasal Spray 1 Spray(s) Both Nostrils every 12 hours  pantoprazole    Tablet 40 milliGRAM(s) Oral before breakfast  polyethylene glycol 3350 17 Gram(s) Oral daily  potassium chloride    Tablet ER 20 milliEquivalent(s) Oral daily  ticagrelor 90 milliGRAM(s) Oral every 12 hours      Physical Exam  General: Patient comfortable in bed  Vital Signs Last 12 Hrs  T(F): 99.1 (08-15-19 @ 05:00), Max: 99.1 (08-15-19 @ 05:00)  HR: 95 (08-15-19 @ 06:27) (95 - 104)  BP: 125/75 (08-15-19 @ 06:27) (125/75 - 130/76)  BP(mean): --  RR: 16 (08-15-19 @ 05:00) (16 - 16)  SpO2: 93% (08-15-19 @ 05:00) (93% - 93%)  Neck: No palpable thyroid nodules.  CVS: S1S2, No murmurs  Respiratory: No wheezing, no crepitations  GI: Abdomen soft, bowel sounds positive  Musculoskeletal:  edema lower extremities.   Skin: No skin rashes, no ecchymosis    Diagnostics

## 2019-08-15 NOTE — DISCHARGE NOTE PROVIDER - NSDCACTIVITY_GEN_ALL_CORE
Do not make important decisions/Stairs allowed/Walking - Indoors allowed/No heavy lifting/straining/Walking - Outdoors allowed/Showering allowed

## 2019-08-15 NOTE — DISCHARGE NOTE PROVIDER - CARE PROVIDER_API CALL
Sanaz Alvarez)  Thoracic and Cardiac Surgery  [address] [place] [address]  Phone: [phone]  Fax: [phone]  Follow Up Time:

## 2019-08-15 NOTE — DISCHARGE NOTE PROVIDER - NSDCCPCAREPLAN_GEN_ALL_CORE_FT
PRINCIPAL DISCHARGE DIAGNOSIS  Diagnosis: Coronary disease  Assessment and Plan of Treatment: sp  CABG  shower daily    ambulate      SECONDARY DISCHARGE DIAGNOSES  Diagnosis: Diabetes mellitus  Assessment and Plan of Treatment: ck  blood sugar 4x day     Insulin as directed

## 2019-08-15 NOTE — DISCHARGE NOTE PROVIDER - NSDCFUADDINST_GEN_ALL_CORE_FT
shower daily shower daily  with dressings off   Apply dry dressing daily if chest tube site draining

## 2019-08-15 NOTE — DISCHARGE NOTE PROVIDER - NSDCHHNEEDSERVICE_GEN_ALL_CORE
Other, specify.../Observation and assessment/Wound care and assessment/Medication teaching and assessment/Teaching and training

## 2019-08-15 NOTE — PROGRESS NOTE ADULT - PROVIDER SPECIALTY LIST ADULT
CT Surgery
CT Surgery
Critical Care
Endocrinology
CT Surgery

## 2019-08-16 RX ORDER — ASPIRIN/CALCIUM CARB/MAGNESIUM 324 MG
1 TABLET ORAL
Qty: 30 | Refills: 0
Start: 2019-08-16 | End: 2019-09-14

## 2019-08-16 RX ORDER — METOPROLOL TARTRATE 50 MG
1 TABLET ORAL
Qty: 28 | Refills: 0
Start: 2019-08-16 | End: 2019-08-29

## 2019-08-16 RX ORDER — FUROSEMIDE 40 MG
1 TABLET ORAL
Qty: 14 | Refills: 0
Start: 2019-08-16 | End: 2019-08-29

## 2019-08-16 RX ORDER — POTASSIUM CHLORIDE 20 MEQ
1 PACKET (EA) ORAL
Qty: 30 | Refills: 0
Start: 2019-08-16 | End: 2019-09-14

## 2019-08-23 RX ORDER — POTASSIUM CHLORIDE 1500 MG/1
20 TABLET, EXTENDED RELEASE ORAL DAILY
Refills: 0 | Status: ACTIVE | COMMUNITY
Start: 2019-08-23

## 2019-08-23 RX ORDER — INSULIN LISPRO 100 [IU]/ML
100 INJECTION, SOLUTION INTRAVENOUS; SUBCUTANEOUS
Refills: 0 | Status: ACTIVE | COMMUNITY
Start: 2019-08-23

## 2019-08-23 RX ORDER — DOCUSATE SODIUM 100 MG/1
100 CAPSULE, LIQUID FILLED ORAL 3 TIMES DAILY
Refills: 0 | Status: ACTIVE | COMMUNITY
Start: 2019-08-23

## 2019-08-23 RX ORDER — ASPIRIN 81 MG/1
81 TABLET ORAL DAILY
Refills: 0 | Status: ACTIVE | COMMUNITY
Start: 2019-08-23

## 2019-08-23 RX ORDER — ATORVASTATIN CALCIUM 80 MG/1
80 TABLET, FILM COATED ORAL
Refills: 0 | Status: ACTIVE | COMMUNITY
Start: 2019-08-23

## 2019-08-23 RX ORDER — METOPROLOL TARTRATE 100 MG/1
100 TABLET, FILM COATED ORAL
Refills: 0 | Status: ACTIVE | COMMUNITY
Start: 2019-08-23

## 2019-08-23 RX ORDER — FUROSEMIDE 40 MG/1
40 TABLET ORAL DAILY
Refills: 0 | Status: ACTIVE | COMMUNITY
Start: 2019-08-23

## 2019-08-23 RX ORDER — INSULIN GLARGINE 100 [IU]/ML
100 INJECTION, SOLUTION SUBCUTANEOUS
Refills: 0 | Status: ACTIVE | COMMUNITY
Start: 2019-08-23

## 2019-08-23 RX ORDER — OXYCODONE AND ACETAMINOPHEN 5; 325 MG/1; MG/1
5-325 TABLET ORAL
Refills: 0 | Status: ACTIVE | COMMUNITY
Start: 2019-08-23

## 2019-08-26 ENCOUNTER — INPATIENT (INPATIENT)
Facility: HOSPITAL | Age: 49
LOS: 3 days | Discharge: HOME HEALTH | DRG: 638 | End: 2019-08-30
Attending: STUDENT IN AN ORGANIZED HEALTH CARE EDUCATION/TRAINING PROGRAM | Admitting: STUDENT IN AN ORGANIZED HEALTH CARE EDUCATION/TRAINING PROGRAM
Payer: COMMERCIAL

## 2019-08-26 VITALS
SYSTOLIC BLOOD PRESSURE: 110 MMHG | HEART RATE: 82 BPM | RESPIRATION RATE: 16 BRPM | HEIGHT: 67 IN | DIASTOLIC BLOOD PRESSURE: 72 MMHG | OXYGEN SATURATION: 100 % | TEMPERATURE: 98 F | WEIGHT: 169.98 LBS

## 2019-08-26 DIAGNOSIS — I10 ESSENTIAL (PRIMARY) HYPERTENSION: ICD-10-CM

## 2019-08-26 DIAGNOSIS — R73.9 HYPERGLYCEMIA, UNSPECIFIED: ICD-10-CM

## 2019-08-26 DIAGNOSIS — Z95.1 PRESENCE OF AORTOCORONARY BYPASS GRAFT: ICD-10-CM

## 2019-08-26 DIAGNOSIS — Z29.9 ENCOUNTER FOR PROPHYLACTIC MEASURES, UNSPECIFIED: ICD-10-CM

## 2019-08-26 DIAGNOSIS — Z98.61 CORONARY ANGIOPLASTY STATUS: Chronic | ICD-10-CM

## 2019-08-26 LAB
ALBUMIN SERPL ELPH-MCNC: 3.9 G/DL — SIGNIFICANT CHANGE UP (ref 3.3–5)
ALP SERPL-CCNC: 125 U/L — HIGH (ref 40–120)
ALT FLD-CCNC: 13 U/L — SIGNIFICANT CHANGE UP (ref 10–45)
ANION GAP SERPL CALC-SCNC: 10 MMOL/L — SIGNIFICANT CHANGE UP (ref 5–17)
APPEARANCE UR: CLEAR — SIGNIFICANT CHANGE UP
APTT BLD: 27.6 SEC — SIGNIFICANT CHANGE UP (ref 27.5–36.3)
AST SERPL-CCNC: 6 U/L — LOW (ref 10–40)
B-OH-BUTYR SERPL-SCNC: 0 MMOL/L — SIGNIFICANT CHANGE UP
BACTERIA # UR AUTO: NEGATIVE — SIGNIFICANT CHANGE UP
BASOPHILS # BLD AUTO: 0.1 K/UL — SIGNIFICANT CHANGE UP (ref 0–0.2)
BASOPHILS NFR BLD AUTO: 0.7 % — SIGNIFICANT CHANGE UP (ref 0–2)
BILIRUB SERPL-MCNC: 0.2 MG/DL — SIGNIFICANT CHANGE UP (ref 0.2–1.2)
BILIRUB UR-MCNC: NEGATIVE — SIGNIFICANT CHANGE UP
BUN SERPL-MCNC: 15 MG/DL — SIGNIFICANT CHANGE UP (ref 7–23)
CALCIUM SERPL-MCNC: 10.1 MG/DL — SIGNIFICANT CHANGE UP (ref 8.4–10.5)
CHLORIDE SERPL-SCNC: 94 MMOL/L — LOW (ref 96–108)
CO2 SERPL-SCNC: 30 MMOL/L — SIGNIFICANT CHANGE UP (ref 22–31)
COLOR SPEC: COLORLESS — SIGNIFICANT CHANGE UP
CREAT SERPL-MCNC: 0.85 MG/DL — SIGNIFICANT CHANGE UP (ref 0.5–1.3)
DIFF PNL FLD: NEGATIVE — SIGNIFICANT CHANGE UP
EOSINOPHIL # BLD AUTO: 0.2 K/UL — SIGNIFICANT CHANGE UP (ref 0–0.5)
EOSINOPHIL NFR BLD AUTO: 3 % — SIGNIFICANT CHANGE UP (ref 0–6)
EPI CELLS # UR: 0 /HPF — SIGNIFICANT CHANGE UP
GAS PNL BLDA: SIGNIFICANT CHANGE UP
GAS PNL BLDV: SIGNIFICANT CHANGE UP
GLUCOSE BLDC GLUCOMTR-MCNC: 267 MG/DL — HIGH (ref 70–99)
GLUCOSE BLDC GLUCOMTR-MCNC: 379 MG/DL — HIGH (ref 70–99)
GLUCOSE SERPL-MCNC: 520 MG/DL — CRITICAL HIGH (ref 70–99)
GLUCOSE UR QL: ABNORMAL
HCT VFR BLD CALC: 37.7 % — LOW (ref 39–50)
HGB BLD-MCNC: 12.1 G/DL — LOW (ref 13–17)
HYALINE CASTS # UR AUTO: 0 /LPF — SIGNIFICANT CHANGE UP (ref 0–2)
INR BLD: 0.99 RATIO — SIGNIFICANT CHANGE UP (ref 0.88–1.16)
KETONES UR-MCNC: NEGATIVE — SIGNIFICANT CHANGE UP
LEUKOCYTE ESTERASE UR-ACNC: NEGATIVE — SIGNIFICANT CHANGE UP
LIDOCAIN IGE QN: 150 U/L — HIGH (ref 7–60)
LYMPHOCYTES # BLD AUTO: 2 K/UL — SIGNIFICANT CHANGE UP (ref 1–3.3)
LYMPHOCYTES # BLD AUTO: 25.3 % — SIGNIFICANT CHANGE UP (ref 13–44)
MAGNESIUM SERPL-MCNC: 2.2 MG/DL — SIGNIFICANT CHANGE UP (ref 1.6–2.6)
MCHC RBC-ENTMCNC: 26.6 PG — LOW (ref 27–34)
MCHC RBC-ENTMCNC: 32.2 GM/DL — SIGNIFICANT CHANGE UP (ref 32–36)
MCV RBC AUTO: 82.6 FL — SIGNIFICANT CHANGE UP (ref 80–100)
MONOCYTES # BLD AUTO: 0.5 K/UL — SIGNIFICANT CHANGE UP (ref 0–0.9)
MONOCYTES NFR BLD AUTO: 6.2 % — SIGNIFICANT CHANGE UP (ref 2–14)
NEUTROPHILS # BLD AUTO: 5 K/UL — SIGNIFICANT CHANGE UP (ref 1.8–7.4)
NEUTROPHILS NFR BLD AUTO: 64.8 % — SIGNIFICANT CHANGE UP (ref 43–77)
NITRITE UR-MCNC: NEGATIVE — SIGNIFICANT CHANGE UP
NT-PROBNP SERPL-SCNC: 726 PG/ML — HIGH (ref 0–300)
PH UR: 6 — SIGNIFICANT CHANGE UP (ref 5–8)
PHOSPHATE SERPL-MCNC: 4.4 MG/DL — SIGNIFICANT CHANGE UP (ref 2.5–4.5)
PLATELET # BLD AUTO: 425 K/UL — HIGH (ref 150–400)
POTASSIUM SERPL-MCNC: 4.4 MMOL/L — SIGNIFICANT CHANGE UP (ref 3.5–5.3)
POTASSIUM SERPL-SCNC: 4.4 MMOL/L — SIGNIFICANT CHANGE UP (ref 3.5–5.3)
PROCALCITONIN SERPL-MCNC: 0.06 NG/ML — SIGNIFICANT CHANGE UP (ref 0.02–0.1)
PROT SERPL-MCNC: 7.4 G/DL — SIGNIFICANT CHANGE UP (ref 6–8.3)
PROT UR-MCNC: NEGATIVE — SIGNIFICANT CHANGE UP
PROTHROM AB SERPL-ACNC: 11.4 SEC — SIGNIFICANT CHANGE UP (ref 10–12.9)
RBC # BLD: 4.56 M/UL — SIGNIFICANT CHANGE UP (ref 4.2–5.8)
RBC # FLD: 13.4 % — SIGNIFICANT CHANGE UP (ref 10.3–14.5)
RBC CASTS # UR COMP ASSIST: 0 /HPF — SIGNIFICANT CHANGE UP (ref 0–4)
SODIUM SERPL-SCNC: 134 MMOL/L — LOW (ref 135–145)
SP GR SPEC: 1.01 — SIGNIFICANT CHANGE UP (ref 1.01–1.02)
TROPONIN T, HIGH SENSITIVITY RESULT: 41 NG/L — SIGNIFICANT CHANGE UP (ref 0–51)
UROBILINOGEN FLD QL: NEGATIVE — SIGNIFICANT CHANGE UP
WBC # BLD: 7.8 K/UL — SIGNIFICANT CHANGE UP (ref 3.8–10.5)
WBC # FLD AUTO: 7.8 K/UL — SIGNIFICANT CHANGE UP (ref 3.8–10.5)
WBC UR QL: 0 /HPF — SIGNIFICANT CHANGE UP (ref 0–5)

## 2019-08-26 PROCEDURE — 99291 CRITICAL CARE FIRST HOUR: CPT

## 2019-08-26 PROCEDURE — 93010 ELECTROCARDIOGRAM REPORT: CPT

## 2019-08-26 PROCEDURE — 99285 EMERGENCY DEPT VISIT HI MDM: CPT

## 2019-08-26 PROCEDURE — 99222 1ST HOSP IP/OBS MODERATE 55: CPT | Mod: 24

## 2019-08-26 RX ORDER — DEXTROSE 50 % IN WATER 50 %
15 SYRINGE (ML) INTRAVENOUS ONCE
Refills: 0 | Status: DISCONTINUED | OUTPATIENT
Start: 2019-08-26 | End: 2019-08-30

## 2019-08-26 RX ORDER — ATORVASTATIN CALCIUM 80 MG/1
80 TABLET, FILM COATED ORAL AT BEDTIME
Refills: 0 | Status: DISCONTINUED | OUTPATIENT
Start: 2019-08-26 | End: 2019-08-30

## 2019-08-26 RX ORDER — SODIUM CHLORIDE 9 MG/ML
1000 INJECTION, SOLUTION INTRAVENOUS
Refills: 0 | Status: DISCONTINUED | OUTPATIENT
Start: 2019-08-26 | End: 2019-08-30

## 2019-08-26 RX ORDER — ACETAMINOPHEN 500 MG
650 TABLET ORAL EVERY 6 HOURS
Refills: 0 | Status: DISCONTINUED | OUTPATIENT
Start: 2019-08-26 | End: 2019-08-30

## 2019-08-26 RX ORDER — INSULIN HUMAN 100 [IU]/ML
8 INJECTION, SOLUTION SUBCUTANEOUS
Qty: 100 | Refills: 0 | Status: DISCONTINUED | OUTPATIENT
Start: 2019-08-26 | End: 2019-08-28

## 2019-08-26 RX ORDER — INSULIN GLARGINE 100 [IU]/ML
30 INJECTION, SOLUTION SUBCUTANEOUS AT BEDTIME
Refills: 0 | Status: DISCONTINUED | OUTPATIENT
Start: 2019-08-26 | End: 2019-08-27

## 2019-08-26 RX ORDER — GLUCAGON INJECTION, SOLUTION 0.5 MG/.1ML
1 INJECTION, SOLUTION SUBCUTANEOUS ONCE
Refills: 0 | Status: DISCONTINUED | OUTPATIENT
Start: 2019-08-26 | End: 2019-08-30

## 2019-08-26 RX ORDER — INSULIN LISPRO 100/ML
15 VIAL (ML) SUBCUTANEOUS
Refills: 0 | Status: DISCONTINUED | OUTPATIENT
Start: 2019-08-26 | End: 2019-08-28

## 2019-08-26 RX ORDER — METOPROLOL TARTRATE 50 MG
100 TABLET ORAL
Refills: 0 | Status: DISCONTINUED | OUTPATIENT
Start: 2019-08-26 | End: 2019-08-26

## 2019-08-26 RX ORDER — DEXTROSE 50 % IN WATER 50 %
25 SYRINGE (ML) INTRAVENOUS ONCE
Refills: 0 | Status: DISCONTINUED | OUTPATIENT
Start: 2019-08-26 | End: 2019-08-30

## 2019-08-26 RX ORDER — INSULIN HUMAN 100 [IU]/ML
4 INJECTION, SOLUTION SUBCUTANEOUS
Qty: 100 | Refills: 0 | Status: DISCONTINUED | OUTPATIENT
Start: 2019-08-26 | End: 2019-08-26

## 2019-08-26 RX ORDER — DEXTROSE 50 % IN WATER 50 %
12.5 SYRINGE (ML) INTRAVENOUS ONCE
Refills: 0 | Status: DISCONTINUED | OUTPATIENT
Start: 2019-08-26 | End: 2019-08-30

## 2019-08-26 RX ORDER — FENTANYL CITRATE 50 UG/ML
50 INJECTION INTRAVENOUS ONCE
Refills: 0 | Status: DISCONTINUED | OUTPATIENT
Start: 2019-08-26 | End: 2019-08-26

## 2019-08-26 RX ORDER — ASPIRIN/CALCIUM CARB/MAGNESIUM 324 MG
81 TABLET ORAL DAILY
Refills: 0 | Status: DISCONTINUED | OUTPATIENT
Start: 2019-08-26 | End: 2019-08-30

## 2019-08-26 RX ORDER — POTASSIUM CHLORIDE 20 MEQ
40 PACKET (EA) ORAL ONCE
Refills: 0 | Status: COMPLETED | OUTPATIENT
Start: 2019-08-26 | End: 2019-08-26

## 2019-08-26 RX ORDER — METOPROLOL TARTRATE 50 MG
25 TABLET ORAL
Refills: 0 | Status: DISCONTINUED | OUTPATIENT
Start: 2019-08-26 | End: 2019-08-30

## 2019-08-26 RX ORDER — SODIUM CHLORIDE 9 MG/ML
1000 INJECTION INTRAMUSCULAR; INTRAVENOUS; SUBCUTANEOUS ONCE
Refills: 0 | Status: COMPLETED | OUTPATIENT
Start: 2019-08-26 | End: 2019-08-26

## 2019-08-26 RX ORDER — SODIUM CHLORIDE 9 MG/ML
1000 INJECTION INTRAMUSCULAR; INTRAVENOUS; SUBCUTANEOUS
Refills: 0 | Status: DISCONTINUED | OUTPATIENT
Start: 2019-08-26 | End: 2019-08-30

## 2019-08-26 RX ORDER — TICAGRELOR 90 MG/1
90 TABLET ORAL EVERY 12 HOURS
Refills: 0 | Status: DISCONTINUED | OUTPATIENT
Start: 2019-08-26 | End: 2019-08-30

## 2019-08-26 RX ORDER — PANTOPRAZOLE SODIUM 20 MG/1
40 TABLET, DELAYED RELEASE ORAL
Refills: 0 | Status: DISCONTINUED | OUTPATIENT
Start: 2019-08-26 | End: 2019-08-30

## 2019-08-26 RX ORDER — SODIUM CHLORIDE 9 MG/ML
1000 INJECTION INTRAMUSCULAR; INTRAVENOUS; SUBCUTANEOUS
Refills: 0 | Status: DISCONTINUED | OUTPATIENT
Start: 2019-08-26 | End: 2019-08-26

## 2019-08-26 RX ADMIN — FENTANYL CITRATE 50 MICROGRAM(S): 50 INJECTION INTRAVENOUS at 23:05

## 2019-08-26 RX ADMIN — Medication 25 MILLIGRAM(S): at 23:13

## 2019-08-26 RX ADMIN — ATORVASTATIN CALCIUM 80 MILLIGRAM(S): 80 TABLET, FILM COATED ORAL at 23:13

## 2019-08-26 RX ADMIN — FENTANYL CITRATE 50 MICROGRAM(S): 50 INJECTION INTRAVENOUS at 22:50

## 2019-08-26 RX ADMIN — SODIUM CHLORIDE 1000 MILLILITER(S): 9 INJECTION INTRAMUSCULAR; INTRAVENOUS; SUBCUTANEOUS at 19:48

## 2019-08-26 RX ADMIN — TICAGRELOR 90 MILLIGRAM(S): 90 TABLET ORAL at 23:13

## 2019-08-26 RX ADMIN — Medication 40 MILLIEQUIVALENT(S): at 23:13

## 2019-08-26 NOTE — ED PROVIDER NOTE - OBJECTIVE STATEMENT
49yom pmhx of s/p CABD 3 LIMA, discharged 08/15 by Dr. Alvarez. CAD(s/p 2 stents), CVA in 2005 with residual left sided weakness, HTN, HLD, DM type II on insulin here with persistent hyperglycemia along with sob and roca since the surgery. pt unable to get PT due to symx. No fever or chills. No nausea or vomiting. compliant with home meds and insulin.

## 2019-08-26 NOTE — ED CLERICAL - NS ED CLERK NOTE PRE-ARRIVAL INFORMATION; ADDITIONAL PRE-ARRIVAL INFORMATION
This patient is enrolled in the Follow [facility] program and has undergone a cardiac surgery procedure within the last 30 days and has active care navigation.   This patient can be followed up by the care navigation team within 24 hours. To arrange close follow-up or to obtain additional clinical information about this patient, please call the contact number above.   Please call the cardiac surgery team once patient is registered at [phone] for consultation PRIOR to disposition decision.  The patient recently underwent a cardiac surgery procedure and the team can assist in acute medical management.

## 2019-08-26 NOTE — H&P ADULT - HISTORY OF PRESENT ILLNESS
48 y/o M with PMH of CAD(s/p 2 stents), CVA in 2005 with residual left sided weakness, HTN, HLD, DM type II presented to outside hospital with the complaint of left sided chest pain and was transferred to [facility] for further management.  Cath showed triple vessel disease and is now s/p C3LIMA on 8/10 and s/p IABP.  Post-op Course c/o blurry vision - opthal consulted f/u outpatient, hyperglycemia requiring endocrine consult, acute blood loss anemia with 1U PRBC required.   Discharged to home on 8/15.  Seen by NP at home as patient has endorsed weakness, unsteady gait, and high glucose despite decreased po intake.   Upon arrival in ED, glucose found to be 485. 50 y/o M with PMH of CAD(s/p 2 stents), CVA in 2005 with residual left sided weakness, HTN, HLD, DM type II presented to outside hospital with the complaint of left sided chest pain and was transferred to [facility] for further management.  Cath showed triple vessel disease and is now s/p C3LIMA on 8/10 and s/p IABP.  Post-op Course c/o blurry vision - opthal consulted f/u outpatient, hyperglycemia requiring endocrine consult, acute blood loss anemia with 1U PRBC required.   Discharged to home on 8/15.  Seen by NP at home as patient has endorsed weakness, unsteady gait, and high glucose despite decreased po intake.     Upon arrival in ED, glucose found to be 485.  Spouse states that patient ate 3 peaches for breakfast and hasn't eaten since.   Denies fever, chills, sick contacts, polyuria, polydipsia, dysuria, cough, sputum production, drainage from wound.

## 2019-08-26 NOTE — ED PROVIDER NOTE - PHYSICAL EXAMINATION
Gen: AAO x 3, NAD; appears older than stated age   Skin: No rashes or lesions  HEENT: NC/AT, PERRLA, EOMI, MMM  Resp: unlabored CTAB  Cardiac: rrr s1s2, no murmurs, rubs or gallops; +mid sternal scar with mild serosangious oozing   GI: ND, +BS, Soft, NT  Ext: no pedal edema, FROM in all extremities  Neuro: no focal deficits

## 2019-08-26 NOTE — H&P ADULT - NSHPLABSRESULTS_GEN_ALL_CORE
12.1   7.8   )-----------( 425      ( 26 Aug 2019 19:57 )             37.7   08-26    134<L>  |  94<L>  |  15  ----------------------------<  520<HH>  4.4   |  30  |  0.85    Ca    10.1      26 Aug 2019 19:57  Mg     2.2     08-26    TPro  7.4  /  Alb  3.9  /  TBili  0.2  /  DBili  x   /  AST  6<L>  /  ALT  13  /  AlkPhos  125<H>  08-26

## 2019-08-26 NOTE — H&P ADULT - PROBLEM SELECTOR PLAN 3
ppx  ASA continued for graft occlusion-thromboembolism prophylaxis  Lipitor was also started for long term graft patency  Lopressor initiated for tachycardia and atrial fibrillation prophylaxis   VTE prophylaxis with SCD+ lovenox  PPI  maintained for GI bleeding prophylaxis ASA continued for graft occlusion-thromboembolism prophylaxis  Lipitor was also started for long term graft patency  Lopressor initiated for tachycardia and atrial fibrillation prophylaxis  VTE prophylaxis with SCD+ lovenox  PPI  maintained for GI bleeding prophylaxis

## 2019-08-26 NOTE — ED ADULT NURSE NOTE - CHPI ED NUR SYMPTOMS NEG
no decreased eating/drinking/no vomiting/no weakness/no fever/no pain/no dizziness/no chills/no nausea/no tingling

## 2019-08-26 NOTE — H&P ADULT - PROBLEM SELECTOR PLAN 1
insulin gtt  IVF  Endocrine: Dr. Lyon called r/o DKA  insulin gtt  IVF  f/u Beta hydroxybutyrate r/o ketones  Endocrine: Dr. Lyon called  urinalysis  follow up labs: Keep K>4.0  Mg>2.0 r/o HONK vs ?DKA  insulin gtt  IVF  f/u Beta hydroxybutyrate r/o ketones  Endocrine: Dr. Lyon called  urinalysis  follow up labs: Keep K>4.0  Mg>2.0 r/o HONK vs ?DKA  insulin gtt  IVF  f/u Beta hydroxybutyrate r/o ketones  Endocrine: Dr. Lyon called  urinalysis  follow up labs: Keep K>4.2  Mg>2.0  diabetic teaching ordered

## 2019-08-26 NOTE — ED ADULT NURSE NOTE - OBJECTIVE STATEMENT
49 year old male presents to the ED from home with wife and daughter sent in by visiting nurse due to high blood sugar (500 at home), and lower blood pressures than usual since DC on 8/15 s/p CABG. PMH cad s/p 2 stents, htn, hld, DM II on insulin. Patient and wife at bedside report pt. has been compliant with all medications including insulin since DC from hospital. Patient reports feeling progressive generalized weakness since DC after surgery. Pt. denies SOB, chest pain, fevers, chills, nausea, vomiting, diarrhea, dysuria, hematuria. 20g peripheral IV placed in L ac and labs drawn and sent to lab. FSBS 485, EKG done and placed pt on cardiac monitor - NSR in 80s. Patient undressed and placed into gown, call bell in hand and side rails up with bed in lowest position for safety. blanket provided. Comfort and safety provided.

## 2019-08-26 NOTE — PROGRESS NOTE ADULT - ASSESSMENT
49 yr old male with H/O Smoking , ETOH Quit 2005 HTN, HLD, Hypertriglyceridemia  CAD, S/P Stented Coronary x2 2014, DM2 with A1C 11.7, CVA with Left sided weakness 2005, admitted with NSTEMI, + troponin, S/P Cath with multivessel CAD, TTE with EF 50%, Emergent R femoral IABP for critical anatomy, & ongoing chest pain. S/P CABG X3 8/10 Dced home 8/15.    Patient readmitted with hyperglycemia unable to check his glucose because glucometer issues, asked & assessed by visiting nurse told to report to ER      Plan Supplemental O2, f/u Spo2, CXR  SR, Hypertensive  A-line insertion  ASA, Brilinta, Statins   BB  Admitted without any chest pain or evidence of ischemia    hyperglycemia, A1c 11.7 on previous admission, now with BG >500  start INS gtt BG Q 1hr, check K, Phos    Lantus 30 Units X1   Serum Ketones negative   Endo f/u in AM.    All wounds healing no evidence of infection.    I have spent 30 minutes providing critical care management to this patient.

## 2019-08-26 NOTE — H&P ADULT - NSHPREVIEWOFSYSTEMS_GEN_ALL_CORE
Review of Systems  Constitutional: [ ] fever, [ ]weight loss,  [x ]fatigue  Eyes: [ ] visual changes  Respiratory: [ ]shortness of breath;  [ ] cough, [ ]wheezing, [ ]chills, [ ]hemoptysis  Cardiovascular: [x ] chest pain, [ ]palpitations, [ ]dizziness,  [ ]leg swelling[ ]orthopnea[ ]PND  Gastrointestinal: [ ] abdominal pain, [ ]nausea, [ ]vomiting,  [ ]diarrhea   Genitourinary: [ ] dysuria, [ ] hematuria  Neurologic: [ ] headaches [ ] tremors[ ]weakness  Skin: [ ] itching, [ ]burning, [ ] rashes  Endocrine: [ ] heat or cold intolerance  Musculoskeletal: [ ] joint pain or swelling; [ ] muscle, back, or extremity pain  Psychiatric: [ ] depression, [ ]anxiety, [ ]mood swings, or [ ]difficulty sleeping  Hematologic: [ ] easy bruising, [ ] bleeding gums  All other systems negative except as noted

## 2019-08-26 NOTE — H&P ADULT - NSHPSOCIALHISTORY_GEN_ALL_CORE
SOCIAL HISTORY:  Smoker: [ ] Yes  [ ] No        PACK YEARS:          Quit date:  ETOH use: [ ] Yes  [ ] No              FREQUENCY / QUANTITY:  Ilicit Drug use:  [ ] Yes  [ ] No  Occupation:   Living arrangements:   Assist device use:     Relevant Family History  FAMILY HISTORY:  Family history of heart disease: SOCIAL HISTORY:  , lives with wife, retired    Quit smoking in 2005 and smoked 2-3 packs a day/used to drink 1/2 a bottle of vodka a day and quit in 2005/denied any illicit drug use SOCIAL HISTORY:  Spouse and daughter at bedside for interview  , lives with wife, retired    Quit smoking in 2005 and smoked 2-3 packs a day/used to drink 1/2 a bottle of vodka a day and quit in 2005/denied any illicit drug use

## 2019-08-26 NOTE — H&P ADULT - NSHPPHYSICALEXAM_GEN_ALL_CORE
General: Well nourished, well developed, no acute distress.                                                         Neuro: Normal exam oriented to person/place & time with no focal motor or sensory  deficits.                    Eyes: Normal exam of conjunctiva & lids, pupils equally reactive.   ENT: Normal exam of nasal/oral mucosa with absence of cyanosis.   Neck: No JVD, Trachea midline  Chest: decreased left base                                                                        CV:  Auscultation: normal [x ] S3[ ] S4[ ] Irregular [ ] Rub[ ] Clicks[ ]    Murmurs none:[x]systolic [ ]  diastolic [ ] holosystolic [ ]                                                                      GI: Soft, non-tender, non-distended, liver & spleen with no noted masses or tenderness. Bowel sounds active in all 4 quadrants                                                                                             Extremities: Normal no evidence of cyanosis or deformity Edema: none[ x]trace[ ]1+[ ]2+[ ]3+[ ]4+[ ]  Lower Extremity Pulses: Right[  +2 /4] Left[ _+2 /4 ] Varicosities[  ]  SKIN :  Normal exam to inspection & palpation.  No rashes, breakdown  Incisions: General: Tired appearing, no distress                                                       Neuro: Normal exam oriented to person/place & time with no focal motor or sensory  deficits.                    Eyes: Normal exam of conjunctiva & lids, pupils equally reactive.   ENT: Normal exam of nasal/oral mucosa with absence of cyanosis.   Neck: No JVD, Trachea midline  Chest: decreased left base, on room air                                                                      CV:  Auscultation: normal [x ] S3[ ] S4[ ] Irregular [ ] Rub[ ] Clicks[ ]    Murmurs none:[x]systolic [ ]  diastolic [ ] holosystolic [ ]                                                                      GI: Soft, non-tender, non-distended, liver & spleen with no noted masses or tenderness. Bowel sounds active in all 4 quadrants                                                                                             Extremities: Normal no evidence of cyanosis or deformity Edema: none[ x]trace[ ]1+[ ]2+[ ]3+[ ]4+[ ]  Lower Extremity Pulses: Right[  +2 /4] Left[ _+2 /4 ]   SKIN :  Normal exam to inspection & palpation.  No rashes, breakdown  Incisions: Chest tube stab sites with sutures scheduled for removal at office visit on 8/28  Midline sternal incision, clean, dry, well approximated, no drainage

## 2019-08-26 NOTE — ED PROVIDER NOTE - NS ED ROS FT
Constitutional: No fever or chills  Eyes: No visual changes, eye pain or redness  HEENT: No throat pain, ear pain, nasal pain. No nose bleeding.  CV: +chest pain -lower extremity edema  Resp: +SOB no cough  GI: No abd pain. No nausea or vomiting. No diarrhea. No constipation.   : No dysuria, hematuria.   MSK: No musculoskeletal pain  Skin: No rash  Neuro: No headache. No numbness or tingling. No weakness.

## 2019-08-26 NOTE — H&P ADULT - ASSESSMENT
50 y/o M with PMH of CAD s/p 2 stents, CVA in 2005 with residual left sided weakness, HTN, HLD, DM type II Hgb A1C 11, and recent admission for chest pain with work up revealing triple vessel disease and now s/p C3Lima on 8/10, progressed well after surgery, and discharged to home on 8/15.  Glucose managed by endocrine prior to discharge.  Patient presents this evening with complaints of fatigue, weakness, and hyperglycemia.  Admit for IV hydration, insulin gtt,  and continued management of blood glucose.

## 2019-08-26 NOTE — ED PROVIDER NOTE - ATTENDING CONTRIBUTION TO CARE
attending Yaa: 49yM with recent 3 vessel CABG (d/c 10 days ago), prior stroke 2005 with residual L sided weakness, HTN, HLD, DMII on insulin sent in by visiting nurse for generalized malaise and elevated blood sugars. Compliant with insulin. EKG in triage with anterior TWI not present on last hospital EKG. Will repeat EKG, place on tele, labs, CT surgery evaluation, likely admit

## 2019-08-26 NOTE — PROGRESS NOTE ADULT - SUBJECTIVE AND OBJECTIVE BOX
11pm-12am    OBJECTIVE:  ICU Vital Signs Last 24 Hrs  T(C): 36.3 (26 Aug 2019 22:00), Max: 36.8 (26 Aug 2019 19:23)  T(F): 97.4 (26 Aug 2019 22:00), Max: 98.3 (26 Aug 2019 19:23)  HR: 77 (27 Aug 2019 00:00) (77 - 84)  BP: 119/73 (26 Aug 2019 22:00) (105/73 - 122/73)  BP(mean): 90 (26 Aug 2019 22:00) (90 - 90)  ABP: 137/57 (27 Aug 2019 00:00) (137/57 - 146/60)  ABP(mean): 79 (27 Aug 2019 00:00) (79 - 84)  RR: 17 (27 Aug 2019 00:00) (16 - 18)  SpO2: 98% (27 Aug 2019 00:00) (97% - 100%)        CAPILLARY BLOOD GLUCOSE      POCT Blood Glucose.: 267 mg/dL (26 Aug 2019 23:58)      PHYSICAL EXAM:Daily Height in cm: 170.18 (26 Aug 2019 18:46)      General: Appear debilitated   Neurology: A&Ox3, nonfocal, LUNA x 4  Eyes: PERRLA/ EOMI, Gross vision intact  ENT/Neck: Neck supple, trachea midline, No JVD, Gross hearing intact  Respiratory:  B/L fine  rales  CV: RRR, S1S2, no murmurs, rubs or gallops  Abdominal: Soft, NT, ND +BS,   Extremities: No edema, + peripheral pulses  Skin: No Rashes, Hematoma, Ecchymosis  Incisions: healed         HOSPITAL MEDICATIONS:  MEDICATIONS  (STANDING):  aspirin enteric coated 81 milliGRAM(s) Oral daily  atorvastatin 80 milliGRAM(s) Oral at bedtime  dextrose 5%. 1000 milliLiter(s) (50 mL/Hr) IV Continuous <Continuous>  dextrose 50% Injectable 12.5 Gram(s) IV Push once  dextrose 50% Injectable 25 Gram(s) IV Push once  dextrose 50% Injectable 25 Gram(s) IV Push once  insulin glargine Injectable (LANTUS) 30 Unit(s) SubCutaneous at bedtime  insulin lispro Injectable (HumaLOG) 15 Unit(s) SubCutaneous before breakfast  insulin lispro Injectable (HumaLOG) 15 Unit(s) SubCutaneous before lunch  insulin lispro Injectable (HumaLOG) 15 Unit(s) SubCutaneous before dinner  insulin regular Infusion 8 Unit(s)/Hr (8 mL/Hr) IV Continuous <Continuous>  metoprolol tartrate 25 milliGRAM(s) Oral two times a day  pantoprazole    Tablet 40 milliGRAM(s) Oral before breakfast  sodium chloride 0.9%. 1000 milliLiter(s) (150 mL/Hr) IV Continuous <Continuous>  ticagrelor 90 milliGRAM(s) Oral every 12 hours    MEDICATIONS  (PRN):  acetaminophen   Tablet .. 650 milliGRAM(s) Oral every 6 hours PRN Temp greater or equal to 38.5C (101.3F), Mild Pain (1 - 3)  dextrose 40% Gel 15 Gram(s) Oral once PRN Blood Glucose LESS THAN 70 milliGRAM(s)/deciliter  glucagon  Injectable 1 milliGRAM(s) IntraMuscular once PRN Glucose LESS THAN 70 milligrams/deciliter      LABS:                        12.1   7.8   )-----------( 425      ( 26 Aug 2019 19:57 )             37.7         134<L>  |  94<L>  |  15  ----------------------------<  520<HH>  4.4   |  30  |  0.85    Ca    10.1      26 Aug 2019 19:57  Phos  4.4       Mg     2.2         TPro  7.4  /  Alb  3.9  /  TBili  0.2  /  DBili  x   /  AST  6<L>  /  ALT  13  /  AlkPhos  125<H>      PT/INR - ( 26 Aug 2019 19:57 )   PT: 11.4 sec;   INR: 0.99 ratio         PTT - ( 26 Aug 2019 19:57 )  PTT:27.6 sec  Urinalysis Basic - ( 26 Aug 2019 21:21 )    Color: Colorless / Appearance: Clear / S.015 / pH: x  Gluc: x / Ketone: Negative  / Bili: Negative / Urobili: Negative   Blood: x / Protein: Negative / Nitrite: Negative   Leuk Esterase: Negative / RBC: 0 /hpf / WBC 0 /HPF   Sq Epi: x / Non Sq Epi: 0 /hpf / Bacteria: Negative      Arterial Blood Gas:   @ 22:53  7.42/42/80/26/95/2.1  ABG lactate: --    Venous Blood Gas:   @ 19:57  7.37/56/20/32/24  VBG Lactate: 2.0      LIVER FUNCTIONS - ( 26 Aug 2019 19:57 )  Alb: 3.9 g/dL / Pro: 7.4 g/dL / ALK PHOS: 125 U/L / ALT: 13 U/L / AST: 6 U/L / GGT: x           Urinalysis Basic - ( 26 Aug 2019 21:21 )    Color: Colorless / Appearance: Clear / S.015 / pH: x  Gluc: x / Ketone: Negative  / Bili: Negative / Urobili: Negative   Blood: x / Protein: Negative / Nitrite: Negative   Leuk Esterase: Negative / RBC: 0 /hpf / WBC 0 /HPF   Sq Epi: x / Non Sq Epi: 0 /hpf / Bacteria: Negative    MICROBIOLOGY:     RADIOLOGY:  X Reviewed and interpreted by me        I have spent 30 minutes providing critical care management to this patient. 11pm-12am    OBJECTIVE:  ICU Vital Signs Last 24 Hrs  T(C): 36.3 (26 Aug 2019 22:00), Max: 36.8 (26 Aug 2019 19:23)  T(F): 97.4 (26 Aug 2019 22:00), Max: 98.3 (26 Aug 2019 19:23)  HR: 77 (27 Aug 2019 00:00) (77 - 84)  BP: 119/73 (26 Aug 2019 22:00) (105/73 - 122/73)  BP(mean): 90 (26 Aug 2019 22:00) (90 - 90)  ABP: 137/57 (27 Aug 2019 00:00) (137/57 - 146/60)  ABP(mean): 79 (27 Aug 2019 00:00) (79 - 84)  RR: 17 (27 Aug 2019 00:00) (16 - 18)  SpO2: 98% (27 Aug 2019 00:00) (97% - 100%)        CAPILLARY BLOOD GLUCOSE      POCT Blood Glucose.: 267 mg/dL (26 Aug 2019 23:58)      PHYSICAL EXAM:Daily Height in cm: 170.18 (26 Aug 2019 18:46)      General: Appear debilitated   Neurology: A&Ox3, nonfocal, LUNA x 4  Eyes: PERRLA/ EOMI, Gross vision intact  ENT/Neck: Neck supple, trachea midline, No JVD, Gross hearing intact  Respiratory:  B/L fine  rales  CV: RRR, S1S2, no murmurs, rubs or gallops  Abdominal: Soft, NT, ND +BS,   Extremities: No edema, + peripheral pulses  Skin: No Rashes, Hematoma, Ecchymosis  Incisions: healed         HOSPITAL MEDICATIONS:  MEDICATIONS  (STANDING):  aspirin enteric coated 81 milliGRAM(s) Oral daily  atorvastatin 80 milliGRAM(s) Oral at bedtime  dextrose 5%. 1000 milliLiter(s) (50 mL/Hr) IV Continuous <Continuous>  dextrose 50% Injectable 12.5 Gram(s) IV Push once  dextrose 50% Injectable 25 Gram(s) IV Push once  dextrose 50% Injectable 25 Gram(s) IV Push once  insulin glargine Injectable (LANTUS) 30 Unit(s) SubCutaneous at bedtime  insulin lispro Injectable (HumaLOG) 15 Unit(s) SubCutaneous before breakfast  insulin lispro Injectable (HumaLOG) 15 Unit(s) SubCutaneous before lunch  insulin lispro Injectable (HumaLOG) 15 Unit(s) SubCutaneous before dinner  insulin regular Infusion 8 Unit(s)/Hr (8 mL/Hr) IV Continuous <Continuous>  metoprolol tartrate 25 milliGRAM(s) Oral two times a day  pantoprazole    Tablet 40 milliGRAM(s) Oral before breakfast  sodium chloride 0.9%. 1000 milliLiter(s) (150 mL/Hr) IV Continuous <Continuous>  ticagrelor 90 milliGRAM(s) Oral every 12 hours    MEDICATIONS  (PRN):  acetaminophen   Tablet .. 650 milliGRAM(s) Oral every 6 hours PRN Temp greater or equal to 38.5C (101.3F), Mild Pain (1 - 3)  dextrose 40% Gel 15 Gram(s) Oral once PRN Blood Glucose LESS THAN 70 milliGRAM(s)/deciliter  glucagon  Injectable 1 milliGRAM(s) IntraMuscular once PRN Glucose LESS THAN 70 milligrams/deciliter      LABS:                        12.1   7.8   )-----------( 425      ( 26 Aug 2019 19:57 )             37.7         134<L>  |  94<L>  |  15  ----------------------------<  520<HH>  4.4   |  30  |  0.85    Ca    10.1      26 Aug 2019 19:57  Phos  4.4       Mg     2.2         TPro  7.4  /  Alb  3.9  /  TBili  0.2  /  DBili  x   /  AST  6<L>  /  ALT  13  /  AlkPhos  125<H>      PT/INR - ( 26 Aug 2019 19:57 )   PT: 11.4 sec;   INR: 0.99 ratio         PTT - ( 26 Aug 2019 19:57 )  PTT:27.6 sec  Urinalysis Basic - ( 26 Aug 2019 21:21 )    Color: Colorless / Appearance: Clear / S.015 / pH: x  Gluc: x / Ketone: Negative  / Bili: Negative / Urobili: Negative   Blood: x / Protein: Negative / Nitrite: Negative   Leuk Esterase: Negative / RBC: 0 /hpf / WBC 0 /HPF   Sq Epi: x / Non Sq Epi: 0 /hpf / Bacteria: Negative      Arterial Blood Gas:   @ 22:53  7.42/42/80/26/95/2.1  ABG lactate: --    Venous Blood Gas:   @ 19:57  7.37/56/20/32/24  VBG Lactate: 2.0      LIVER FUNCTIONS - ( 26 Aug 2019 19:57 )  Alb: 3.9 g/dL / Pro: 7.4 g/dL / ALK PHOS: 125 U/L / ALT: 13 U/L / AST: 6 U/L / GGT: x           Urinalysis Basic - ( 26 Aug 2019 21:21 )    Color: Colorless / Appearance: Clear / S.015 / pH: x  Gluc: x / Ketone: Negative  / Bili: Negative / Urobili: Negative   Blood: x / Protein: Negative / Nitrite: Negative   Leuk Esterase: Negative / RBC: 0 /hpf / WBC 0 /HPF   Sq Epi: x / Non Sq Epi: 0 /hpf / Bacteria: Negative    MICROBIOLOGY:     RADIOLOGY:  X Reviewed and interpreted by me

## 2019-08-27 DIAGNOSIS — E11.9 TYPE 2 DIABETES MELLITUS WITHOUT COMPLICATIONS: ICD-10-CM

## 2019-08-27 DIAGNOSIS — I25.10 ATHEROSCLEROTIC HEART DISEASE OF NATIVE CORONARY ARTERY WITHOUT ANGINA PECTORIS: ICD-10-CM

## 2019-08-27 DIAGNOSIS — E11.65 TYPE 2 DIABETES MELLITUS WITH HYPERGLYCEMIA: ICD-10-CM

## 2019-08-27 LAB
CULTURE RESULTS: SIGNIFICANT CHANGE UP
GAS PNL BLDA: SIGNIFICANT CHANGE UP
GLUCOSE BLDC GLUCOMTR-MCNC: 102 MG/DL — HIGH (ref 70–99)
GLUCOSE BLDC GLUCOMTR-MCNC: 114 MG/DL — HIGH (ref 70–99)
GLUCOSE BLDC GLUCOMTR-MCNC: 117 MG/DL — HIGH (ref 70–99)
GLUCOSE BLDC GLUCOMTR-MCNC: 134 MG/DL — HIGH (ref 70–99)
GLUCOSE BLDC GLUCOMTR-MCNC: 138 MG/DL — HIGH (ref 70–99)
GLUCOSE BLDC GLUCOMTR-MCNC: 167 MG/DL — HIGH (ref 70–99)
GLUCOSE BLDC GLUCOMTR-MCNC: 184 MG/DL — HIGH (ref 70–99)
GLUCOSE BLDC GLUCOMTR-MCNC: 192 MG/DL — HIGH (ref 70–99)
GLUCOSE BLDC GLUCOMTR-MCNC: 193 MG/DL — HIGH (ref 70–99)
GLUCOSE BLDC GLUCOMTR-MCNC: 198 MG/DL — HIGH (ref 70–99)
GLUCOSE BLDC GLUCOMTR-MCNC: 201 MG/DL — HIGH (ref 70–99)
GLUCOSE BLDC GLUCOMTR-MCNC: 210 MG/DL — HIGH (ref 70–99)
GLUCOSE BLDC GLUCOMTR-MCNC: 218 MG/DL — HIGH (ref 70–99)
GLUCOSE BLDC GLUCOMTR-MCNC: 224 MG/DL — HIGH (ref 70–99)
GLUCOSE BLDC GLUCOMTR-MCNC: 242 MG/DL — HIGH (ref 70–99)
HBA1C BLD-MCNC: 9.7 % — HIGH (ref 4–5.6)
SPECIMEN SOURCE: SIGNIFICANT CHANGE UP
TSH SERPL-MCNC: 3.08 UIU/ML — SIGNIFICANT CHANGE UP (ref 0.27–4.2)

## 2019-08-27 PROCEDURE — 93306 TTE W/DOPPLER COMPLETE: CPT | Mod: 26

## 2019-08-27 PROCEDURE — 99233 SBSQ HOSP IP/OBS HIGH 50: CPT

## 2019-08-27 PROCEDURE — 71045 X-RAY EXAM CHEST 1 VIEW: CPT | Mod: 26

## 2019-08-27 PROCEDURE — 99024 POSTOP FOLLOW-UP VISIT: CPT

## 2019-08-27 RX ORDER — INSULIN GLARGINE 100 [IU]/ML
38 INJECTION, SOLUTION SUBCUTANEOUS AT BEDTIME
Refills: 0 | Status: DISCONTINUED | OUTPATIENT
Start: 2019-08-27 | End: 2019-08-28

## 2019-08-27 RX ORDER — POTASSIUM CHLORIDE 20 MEQ
20 PACKET (EA) ORAL ONCE
Refills: 0 | Status: COMPLETED | OUTPATIENT
Start: 2019-08-27 | End: 2019-08-27

## 2019-08-27 RX ORDER — HUMAN INSULIN 100 [IU]/ML
12 INJECTION, SUSPENSION SUBCUTANEOUS ONCE
Refills: 0 | Status: COMPLETED | OUTPATIENT
Start: 2019-08-27 | End: 2019-08-27

## 2019-08-27 RX ORDER — INSULIN LISPRO 100/ML
VIAL (ML) SUBCUTANEOUS AT BEDTIME
Refills: 0 | Status: DISCONTINUED | OUTPATIENT
Start: 2019-08-27 | End: 2019-08-30

## 2019-08-27 RX ORDER — INSULIN LISPRO 100/ML
VIAL (ML) SUBCUTANEOUS
Refills: 0 | Status: DISCONTINUED | OUTPATIENT
Start: 2019-08-27 | End: 2019-08-30

## 2019-08-27 RX ORDER — ENOXAPARIN SODIUM 100 MG/ML
40 INJECTION SUBCUTANEOUS DAILY
Refills: 0 | Status: DISCONTINUED | OUTPATIENT
Start: 2019-08-27 | End: 2019-08-30

## 2019-08-27 RX ORDER — FENOFIBRATE,MICRONIZED 130 MG
145 CAPSULE ORAL AT BEDTIME
Refills: 0 | Status: DISCONTINUED | OUTPATIENT
Start: 2019-08-27 | End: 2019-08-30

## 2019-08-27 RX ADMIN — Medication 145 MILLIGRAM(S): at 21:52

## 2019-08-27 RX ADMIN — Medication 81 MILLIGRAM(S): at 13:07

## 2019-08-27 RX ADMIN — Medication 15 UNIT(S): at 08:22

## 2019-08-27 RX ADMIN — ATORVASTATIN CALCIUM 80 MILLIGRAM(S): 80 TABLET, FILM COATED ORAL at 21:52

## 2019-08-27 RX ADMIN — TICAGRELOR 90 MILLIGRAM(S): 90 TABLET ORAL at 17:25

## 2019-08-27 RX ADMIN — Medication 15 UNIT(S): at 17:28

## 2019-08-27 RX ADMIN — Medication 2: at 17:28

## 2019-08-27 RX ADMIN — ENOXAPARIN SODIUM 40 MILLIGRAM(S): 100 INJECTION SUBCUTANEOUS at 13:06

## 2019-08-27 RX ADMIN — TICAGRELOR 90 MILLIGRAM(S): 90 TABLET ORAL at 07:11

## 2019-08-27 RX ADMIN — Medication 25 MILLIGRAM(S): at 17:25

## 2019-08-27 RX ADMIN — Medication 25 MILLIGRAM(S): at 07:11

## 2019-08-27 RX ADMIN — PANTOPRAZOLE SODIUM 40 MILLIGRAM(S): 20 TABLET, DELAYED RELEASE ORAL at 07:11

## 2019-08-27 RX ADMIN — Medication 15 UNIT(S): at 14:34

## 2019-08-27 RX ADMIN — INSULIN GLARGINE 38 UNIT(S): 100 INJECTION, SOLUTION SUBCUTANEOUS at 21:52

## 2019-08-27 RX ADMIN — HUMAN INSULIN 12 UNIT(S): 100 INJECTION, SUSPENSION SUBCUTANEOUS at 10:04

## 2019-08-27 RX ADMIN — Medication 20 MILLIEQUIVALENT(S): at 03:45

## 2019-08-27 NOTE — PHYSICAL THERAPY INITIAL EVALUATION ADULT - PERTINENT HX OF CURRENT PROBLEM, REHAB EVAL
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 Upon arrival in ED, glucose found to be 485.

## 2019-08-27 NOTE — PROGRESS NOTE ADULT - PROBLEM SELECTOR PLAN 1
Endo following  A1C= 9.7  sliding scale ACHS  Continue Lantus Endo following  A1C= 9.7  sliding scale ACHS  goal is glycemic control  Continue Lantus

## 2019-08-27 NOTE — PATIENT PROFILE ADULT - NSPRESCRALCFREQ_GEN_A_NUR
Received return call from Mr. Joseph Gil (he had not gotten my text from earlier- not yet set up on his new phone)- but was calling about his prescriptions- relayed that I had gotten the refills done earlier via the NP recommendations. He is telling me that he is able to do more activity than before and since the MI and hospital stay. He is telling me that he takes his meds around 0600 and then lies back down. But awakens and keeps a HA most of the day. He is taking his BP and weight daily- denies any fluid gain- breathing symptoms. Relays that he does get tired after multiple trips up and down the stairs at home and end of the day after being out working. Values have been consistently SBP 's- some values in 100's., HR resting in 60's with 78-84 with activity. Consulted with NP- will move schedule of taking his metoprolol to evening-  And see how that goes. Left  and asked me to return call to make sure he has gotten the message. Never

## 2019-08-27 NOTE — CONSULT NOTE ADULT - SUBJECTIVE AND OBJECTIVE BOX
HPI:  48 y/o M with PMH of CAD(s/p 2 stents), CVA in 2005 with residual left sided weakness, HTN, HLD, DM type II presented to outside hospital with the complaint of left sided chest pain and was transferred to [facility] for further management.  Cath showed triple vessel disease and is now s/p C3LIMA on 8/10 and s/p IABP.  Post-op Course c/o blurry vision - opthal consulted f/u outpatient, hyperglycemia requiring endocrine consult, acute blood loss anemia with 1U PRBC required.   Discharged to home on 8/15.  Seen by NP at home as patient has endorsed weakness, unsteady gait, and high glucose despite decreased po intake.     Upon arrival in ED, glucose found to be 485.  Spouse states that patient ate 3 peaches for breakfast and hasn't eaten since.   Denies fever, chills, sick contacts, polyuria, polydipsia, dysuria, cough, sputum production, drainage from wound. (26 Aug 2019 20:09)  Patient has history of diabetes, A1C 11.7%, on insulin at home, no recent hypoglycemic episodes, no polyuria polydipsia. Patient follows up with PCP for diabetes management.    PAST MEDICAL & SURGICAL HISTORY:  Coronary artery disease of native artery of native heart with stable angina pectoris  Type 2 diabetes mellitus without complication, with long-term current use of insulin  Hyperlipidemia, unspecified hyperlipidemia type  Hypertension, unspecified type  History of percutaneous coronary intervention      FAMILY HISTORY:  Family history of heart disease      Social History:    Outpatient Medications:    MEDICATIONS  (STANDING):  aspirin enteric coated 81 milliGRAM(s) Oral daily  atorvastatin 80 milliGRAM(s) Oral at bedtime  dextrose 5%. 1000 milliLiter(s) (50 mL/Hr) IV Continuous <Continuous>  dextrose 50% Injectable 12.5 Gram(s) IV Push once  dextrose 50% Injectable 25 Gram(s) IV Push once  dextrose 50% Injectable 25 Gram(s) IV Push once  enoxaparin Injectable 40 milliGRAM(s) SubCutaneous daily  fenofibrate Tablet 145 milliGRAM(s) Oral at bedtime  insulin glargine Injectable (LANTUS) 30 Unit(s) SubCutaneous at bedtime  insulin lispro Injectable (HumaLOG) 15 Unit(s) SubCutaneous before breakfast  insulin lispro Injectable (HumaLOG) 15 Unit(s) SubCutaneous before lunch  insulin lispro Injectable (HumaLOG) 15 Unit(s) SubCutaneous before dinner  insulin regular Infusion 8 Unit(s)/Hr (8 mL/Hr) IV Continuous <Continuous>  metoprolol tartrate 25 milliGRAM(s) Oral two times a day  pantoprazole    Tablet 40 milliGRAM(s) Oral before breakfast  sodium chloride 0.9%. 1000 milliLiter(s) (150 mL/Hr) IV Continuous <Continuous>  ticagrelor 90 milliGRAM(s) Oral every 12 hours    MEDICATIONS  (PRN):  acetaminophen   Tablet .. 650 milliGRAM(s) Oral every 6 hours PRN Temp greater or equal to 38.5C (101.3F), Mild Pain (1 - 3)  dextrose 40% Gel 15 Gram(s) Oral once PRN Blood Glucose LESS THAN 70 milliGRAM(s)/deciliter  glucagon  Injectable 1 milliGRAM(s) IntraMuscular once PRN Glucose LESS THAN 70 milligrams/deciliter      Allergies    No Known Allergies    Intolerances      Review of Systems:  Constitutional: No fever, no chills  Eyes: No blurry vision  Neuro: No tremors  HEENT: No pain, no neck swelling  Cardiovascular: No chest pain, no palpitations  Respiratory: Has SOB, no cough  GI: No nausea, vomiting, abdominal pain  : No dysuria  Skin: no rash  MSK: Has leg swelling.  Psych: no depression  Endocrine: no polyuria, polydipsia    ALL OTHER SYSTEMS REVIEWED AND NEGATIVE    UNABLE TO OBTAIN    PHYSICAL EXAM:  VITALS: T(C): 37.1 (08-27-19 @ 12:15)  T(F): 98.7 (08-27-19 @ 12:15), Max: 98.7 (08-27-19 @ 12:15)  HR: 87 (08-27-19 @ 12:15) (75 - 88)  BP: 99/56 (08-27-19 @ 12:15) (99/56 - 122/73)  RR:  (13 - 24)  SpO2:  (96% - 100%)  Wt(kg): --  GENERAL: NAD, well-groomed, well-developed  EYES: No proptosis, no lid lag  HEENT:  Atraumatic, Normocephalic  THYROID: Normal size, no palpable nodules  RESPIRATORY: Clear to auscultation bilaterally; No rales, rhonchi, wheezing  CARDIOVASCULAR: Si S2, No murmurs;  GI: Soft, non distended, normal bowel sounds  SKIN: Dry, intact, No rashes or lesions  MUSCULOSKELETAL: Has BL lower extremity edema.  NEURO:  no tremor, sensation decreased in feet BL,    POCT Blood Glucose.: 138 mg/dL (08-27-19 @ 13:40)  POCT Blood Glucose.: 102 mg/dL (08-27-19 @ 12:19)  POCT Blood Glucose.: 114 mg/dL (08-27-19 @ 11:43)  POCT Blood Glucose.: 117 mg/dL (08-27-19 @ 10:58)  POCT Blood Glucose.: 193 mg/dL (08-27-19 @ 10:14)  POCT Blood Glucose.: 210 mg/dL (08-27-19 @ 09:06)  POCT Blood Glucose.: 192 mg/dL (08-27-19 @ 07:59)  POCT Blood Glucose.: 198 mg/dL (08-27-19 @ 06:53)  POCT Blood Glucose.: 224 mg/dL (08-27-19 @ 06:03)  POCT Blood Glucose.: 201 mg/dL (08-27-19 @ 05:00)  POCT Blood Glucose.: 134 mg/dL (08-27-19 @ 04:09)  POCT Blood Glucose.: 184 mg/dL (08-27-19 @ 02:05)  POCT Blood Glucose.: 218 mg/dL (08-27-19 @ 01:04)  POCT Blood Glucose.: 267 mg/dL (08-26-19 @ 23:58)  POCT Blood Glucose.: 379 mg/dL (08-26-19 @ 21:53)  POCT Blood Glucose.: 485 mg/dL (08-26-19 @ 18:52)                            12.1   7.8   )-----------( 425      ( 26 Aug 2019 19:57 )             37.7       08-26    134<L>  |  94<L>  |  15  ----------------------------<  520<HH>  4.4   |  30  |  0.85    EGFR if : 118  EGFR if non : 102    Ca    10.1      08-26  Mg     2.2     08-26  Phos  4.4     08-26    TPro  7.4  /  Alb  3.9  /  TBili  0.2  /  DBili  x   /  AST  6<L>  /  ALT  13  /  AlkPhos  125<H>  08-26      Thyroid Function Tests:  08-27 @ 02:38 TSH 3.08 FreeT4 -- T3 -- Anti TPO -- Anti Thyroglobulin Ab -- TSI --  08-09 @ 07:40 TSH 1.77 FreeT4 -- T3 -- Anti TPO -- Anti Thyroglobulin Ab -- TSI --      Hemoglobin A1C, Whole Blood: 9.7 % <H> [4.0 - 5.6] (08-27-19 @ 02:38)  Hemoglobin A1C, Whole Blood: 11.7 % <H> [4.0 - 5.6] (08-09-19 @ 07:40)      08-09 Chol 163 LDL 98 HDL 44 Trig 282<H>    Radiology:

## 2019-08-27 NOTE — PROCEDURE NOTE - NSPROCDETAILS_GEN_ALL_CORE
connected to a pressurized flush line/positive blood return obtained via catheter/sutured in place/Seldinger technique

## 2019-08-27 NOTE — PROGRESS NOTE ADULT - SUBJECTIVE AND OBJECTIVE BOX
ELSI MERINO  MRN#: [id number]  Subjective:  Patient was seen and evaluate on AM rounds offering no specific complaints at this time.    OBJECTIVE:  ICU Vital Signs Last 24 Hrs  T(C): 36.4 (27 Aug 2019 08:00), Max: 36.8 (26 Aug 2019 19:23)  T(F): 97.5 (27 Aug 2019 08:00), Max: 98.3 (26 Aug 2019 19:23)  HR: 88 (27 Aug 2019 09:00) (75 - 88)  BP: 119/73 (26 Aug 2019 22:00) (105/73 - 122/73)  BP(mean): 90 (26 Aug 2019 22:00) (90 - 90)  ABP: 121/55 (27 Aug 2019 09:00) (108/20 - 146/60)  ABP(mean): 75 (27 Aug 2019 09:00) (32 - 84)  RR: 24 (27 Aug 2019 09:00) (13 - 24)  SpO2: 96% (27 Aug 2019 09:00) (96% - 100%)      08-26 @ 07:01  -  08-27 @ 07:00  --------------------------------------------------------  IN: 238 mL / OUT: 300 mL / NET: -62 mL    08-26-19 @ 07:01  -  08-27-19 @ 07:00  --------------------------------------------------------  IN: 238 mL / OUT: 300 mL / NET: -62 mL      PHYSICAL EXAM:Daily Height in cm: 170.18 (26 Aug 2019 18:46)    Daily   General: WN/WD NAD    HEENT:     + NCAT  + EOMI  - Conjuctival edema   - Icterus   - Thrush   - ETT  - NGT/OGT  Neck:         + FROM    + JVD     - Nodes     - Masses    + Mid-line trachea   - Tracheostomy  Chest:         - Sternal click  - Sternal drainage  - Pacing wires  - Chest tubes  - SubQ emphysema  Lungs:          + CTA   - Rhonchi    - Rales    - Wheezing     - Decreased BS   - Dullness R L  Cardiac:       + S1 + S2    + RRR   - Irregular   - S3  - S4    - Murmurs   - Rub   - Hamman’s sign   Abdomen:    + BS     + Soft    + Non-tender     - Distended    - Organomegaly  - PEG  Extremities:   - Cyanosis U/L   - Clubbing  U/L  - LE/UE Edema   + Capillary refill    + Pulses   Neuro:        + Awake   +  Alert   - Confused   - Lethargic   - Sedated   - Generalized Weakness  Skin:        - Rashes    - Erythema   + Normal incisions   + IV sites intact  - Sacral decubitus    HOSPITAL MEDICATIONS: All mediciations reviewed and analyzed  MEDICATIONS  (STANDING):  aspirin enteric coated 81 milliGRAM(s) Oral daily  atorvastatin 80 milliGRAM(s) Oral at bedtime  dextrose 5%. 1000 milliLiter(s) (50 mL/Hr) IV Continuous <Continuous>  dextrose 50% Injectable 12.5 Gram(s) IV Push once  dextrose 50% Injectable 25 Gram(s) IV Push once  dextrose 50% Injectable 25 Gram(s) IV Push once  enoxaparin Injectable 40 milliGRAM(s) SubCutaneous daily  fenofibrate Tablet 145 milliGRAM(s) Oral at bedtime  insulin glargine Injectable (LANTUS) 30 Unit(s) SubCutaneous at bedtime  insulin lispro Injectable (HumaLOG) 15 Unit(s) SubCutaneous before breakfast  insulin lispro Injectable (HumaLOG) 15 Unit(s) SubCutaneous before lunch  insulin lispro Injectable (HumaLOG) 15 Unit(s) SubCutaneous before dinner  insulin NPH human recombinant 12 Unit(s) SubCutaneous once  insulin regular Infusion 8 Unit(s)/Hr (8 mL/Hr) IV Continuous <Continuous>  metoprolol tartrate 25 milliGRAM(s) Oral two times a day  pantoprazole    Tablet 40 milliGRAM(s) Oral before breakfast  sodium chloride 0.9%. 1000 milliLiter(s) (150 mL/Hr) IV Continuous <Continuous>  ticagrelor 90 milliGRAM(s) Oral every 12 hours    MEDICATIONS  (PRN):  acetaminophen   Tablet .. 650 milliGRAM(s) Oral every 6 hours PRN Temp greater or equal to 38.5C (101.3F), Mild Pain (1 - 3)  dextrose 40% Gel 15 Gram(s) Oral once PRN Blood Glucose LESS THAN 70 milliGRAM(s)/deciliter  glucagon  Injectable 1 milliGRAM(s) IntraMuscular once PRN Glucose LESS THAN 70 milligrams/deciliter    LABS: All Lab data reviewed and analyzed                        12.1   7.8   )-----------( 425      ( 26 Aug 2019 19:57 )             37.7    08-26    134<L>  |  94<L>  |  15  ----------------------------<  520<HH>  4.4   |  30  |  0.85    Ca    10.1      26 Aug 2019 19:57  Phos  4.4     08-26  Mg     2.2     08-26    TPro  7.4  /  Alb  3.9  /  TBili  0.2  /  DBili  x   /  AST  6<L>  /  ALT  13  /  AlkPhos  125<H>  08-26    PT/INR - ( 26 Aug 2019 19:57 )   PT: 11.4 sec;   INR: 0.99 ratio         PTT - ( 26 Aug 2019 19:57 )  PTT:27.6 sec LIVER FUNCTIONS - ( 26 Aug 2019 19:57 )  Alb: 3.9 g/dL / Pro: 7.4 g/dL / ALK PHOS: 125 U/L / ALT: 13 U/L / AST: 6 U/L / GGT: x             RADIOLOGY: - Reviewed and analyzed     Assessment: S/P C3L    Plan:    Continued mobilization as tolerated  ASA and Brilinta continued for graft occlusion-thromboembolism prophylaxis  Lipitor was for long term graft patency  Lovenox continued for VTE prophylaxis  Protonix maintained for GI bleeding prophylaxis  Lopressor continued for atrial fibrillation prophylaxis  Metabolic stability & infection prophylaxis required review and adjustment of regular Insulin drip, initiation of Lantus, sliding scale and gylcemic regimen while following serial glucose levels to help achieve & maintain euglycemia  Reviewed & addressed surgical site infection prophylaxis regimen

## 2019-08-27 NOTE — PROGRESS NOTE ADULT - ASSESSMENT
49 yr old male with H/O Smoking , ETOH Quit 2005 HTN, HLD, Hypertriglyceridemia  CAD, S/P Stented Coronary x2 2014, DM2 with A1C 11.7, CVA with Left sided weakness 2005, admitted with NSTEMI, + troponin, S/P Cath with multivessel CAD, TTE with EF 50%, Emergent R femoral IABP for critical anatomy, & ongoing chest pain. S/P CABG X3 8/10 Dced home 8/15.    Patient readmitted with hyperglycemia unable to check his glucose because glucometer issues, asked & assessed by visiting nurse told to report to ER    8/27 VSS, Endo consult appreciated, most recent POC glucose= 138, A1c= 9.7, sliding scale added ACHS, diabetes education, continue diabetic medications as per endo

## 2019-08-27 NOTE — PROGRESS NOTE ADULT - SUBJECTIVE AND OBJECTIVE BOX
Subjective: Pt states "Im ok" denies any CP, SOB, N/V and palpitations. No acute events overnight.     VITAL SIGNS    Telemetry:  SR @ 90  Vital Signs Last 24 Hrs  T(C): 36.7 (08-27-19 @ 15:06), Max: 37.1 (08-27-19 @ 12:15)  T(F): 98.1 (08-27-19 @ 15:06), Max: 98.7 (08-27-19 @ 12:15)  HR: 94 (08-27-19 @ 15:06) (75 - 94)  BP: 99/56 (08-27-19 @ 12:15) (99/56 - 122/73)  RR: 20 (08-27-19 @ 15:06) (13 - 24)  SpO2: 97% (08-27-19 @ 15:06) (96% - 100%)            08-26 @ 07:01  -  08-27 @ 07:00  --------------------------------------------------------  IN: 238 mL / OUT: 300 mL / NET: -62 mL       Daily Height in cm: 170.18 (26 Aug 2019 18:46)    Daily     Drug Dosing Weight      Bilirubin Total, Serum: 0.2 mg/dL (08-26 @ 19:57)    CAPILLARY BLOOD GLUCOSE      POCT Blood Glucose.: 138 mg/dL (27 Aug 2019 13:40)  POCT Blood Glucose.: 102 mg/dL (27 Aug 2019 12:19)  POCT Blood Glucose.: 114 mg/dL (27 Aug 2019 11:43)  POCT Blood Glucose.: 117 mg/dL (27 Aug 2019 10:58)  POCT Blood Glucose.: 193 mg/dL (27 Aug 2019 10:14)  POCT Blood Glucose.: 210 mg/dL (27 Aug 2019 09:06)  POCT Blood Glucose.: 192 mg/dL (27 Aug 2019 07:59)  POCT Blood Glucose.: 198 mg/dL (27 Aug 2019 06:53)  POCT Blood Glucose.: 224 mg/dL (27 Aug 2019 06:03)  POCT Blood Glucose.: 201 mg/dL (27 Aug 2019 05:00)  POCT Blood Glucose.: 134 mg/dL (27 Aug 2019 04:09)  POCT Blood Glucose.: 184 mg/dL (27 Aug 2019 02:05)  POCT Blood Glucose.: 218 mg/dL (27 Aug 2019 01:04)  POCT Blood Glucose.: 267 mg/dL (26 Aug 2019 23:58)  POCT Blood Glucose.: 379 mg/dL (26 Aug 2019 21:53)  POCT Blood Glucose.: 485 mg/dL (26 Aug 2019 18:52)          MEDICATIONS  acetaminophen   Tablet .. 650 milliGRAM(s) Oral every 6 hours PRN  aspirin enteric coated 81 milliGRAM(s) Oral daily  atorvastatin 80 milliGRAM(s) Oral at bedtime  dextrose 40% Gel 15 Gram(s) Oral once PRN  dextrose 5%. 1000 milliLiter(s) IV Continuous <Continuous>  dextrose 50% Injectable 12.5 Gram(s) IV Push once  dextrose 50% Injectable 25 Gram(s) IV Push once  dextrose 50% Injectable 25 Gram(s) IV Push once  enoxaparin Injectable 40 milliGRAM(s) SubCutaneous daily  fenofibrate Tablet 145 milliGRAM(s) Oral at bedtime  glucagon  Injectable 1 milliGRAM(s) IntraMuscular once PRN  insulin glargine Injectable (LANTUS) 30 Unit(s) SubCutaneous at bedtime  insulin lispro (HumaLOG) corrective regimen sliding scale   SubCutaneous three times a day before meals  insulin lispro (HumaLOG) corrective regimen sliding scale   SubCutaneous at bedtime  insulin lispro Injectable (HumaLOG) 15 Unit(s) SubCutaneous before breakfast  insulin lispro Injectable (HumaLOG) 15 Unit(s) SubCutaneous before lunch  insulin lispro Injectable (HumaLOG) 15 Unit(s) SubCutaneous before dinner  insulin regular Infusion 8 Unit(s)/Hr IV Continuous <Continuous>  metoprolol tartrate 25 milliGRAM(s) Oral two times a day  pantoprazole    Tablet 40 milliGRAM(s) Oral before breakfast  sodium chloride 0.9%. 1000 milliLiter(s) IV Continuous <Continuous>  ticagrelor 90 milliGRAM(s) Oral every 12 hours        PHYSICAL EXAM    Neurology: A&Ox3, nonfocal, no gross deficits  CV : RRR+S1S2  Sternal Wound :  MSI CDI , Stable  Lungs: Respirations non-labored, B/L BS  Abdomen: Soft, NT/ND, +BSx4Q, last BM on 8/26/19, (-)N/V/D  : Voiding without difficulty, bladder non-distended   Extremities: (-) B/L LE edema, negative calf tenderness, +PP , SVG incision CDI      LABS  08-26    134<L>  |  94<L>  |  15  ----------------------------<  520<HH>  4.4   |  30  |  0.85    Ca    10.1      26 Aug 2019 19:57  Phos  4.4     08-26  Mg     2.2     08-26    TPro  7.4  /  Alb  3.9  /  TBili  0.2  /  DBili  x   /  AST  6<L>  /  ALT  13  /  AlkPhos  125<H>  08-26                                 12.1   7.8   )-----------( 425      ( 26 Aug 2019 19:57 )             37.7          PT/INR - ( 26 Aug 2019 19:57 )   PT: 11.4 sec;   INR: 0.99 ratio         PTT - ( 26 Aug 2019 19:57 )  PTT:27.6 sec       PAST MEDICAL & SURGICAL HISTORY:  Coronary artery disease of native artery of native heart with stable angina pectoris  Type 2 diabetes mellitus without complication, with long-term current use of insulin  Hyperlipidemia, unspecified hyperlipidemia type  Hypertension, unspecified type  History of percutaneous coronary intervention       Physical Therapy Rec:   Home  [  ]   Home w/ PT  [  ]  Rehab  [  ]    Discussed with CT Surgery attending.

## 2019-08-27 NOTE — CONSULT NOTE ADULT - ASSESSMENT
Assessment  DMT2: 49y Male with DM T2 admitted for weakness, fatigue, and hyperglycemia;  A1C 11.7%, was on insulin at home, now on basal bolus insulin, FS improved and within acceptable range, no hypoglycemic episode,  eating meals, comfortable with no complaints.  CAD: s/p cardiac surgery (2 weeks prior), on medications, no chest pain, stable, monitored.  HTN: Controlled,  on antihypertensive medications.  Obesity: No strict exercise routines, not on any weight loss program, neither on low calorie diet.    Plan:  DMT2:  Will continue current insulin regimen for now. Will continue monitoring FS, log, and follow up.  Patient counseled for compliance with consistent low carb diet. States he knows how to inject insulin.    CAD: On medications,  no chest pain, stable, monitored and followed up by primary team/cardiology   HTN: Suggest to continue medications, monitoring, FU primary team recommendations.  Overweight/Obesity: Patient counseled for weight loss, exercise, low calorie diet.      Jose Lyon MD  Cell: [phone]  Office: [phone]

## 2019-08-28 ENCOUNTER — APPOINTMENT (OUTPATIENT)
Dept: CARDIOTHORACIC SURGERY | Facility: CLINIC | Age: 49
End: 2019-08-28

## 2019-08-28 LAB
ALBUMIN SERPL ELPH-MCNC: 3.1 G/DL — LOW (ref 3.3–5)
ALP SERPL-CCNC: 79 U/L — SIGNIFICANT CHANGE UP (ref 40–120)
ALT FLD-CCNC: 9 U/L — LOW (ref 10–45)
ANION GAP SERPL CALC-SCNC: 13 MMOL/L — SIGNIFICANT CHANGE UP (ref 5–17)
AST SERPL-CCNC: 9 U/L — LOW (ref 10–40)
BILIRUB SERPL-MCNC: 0.2 MG/DL — SIGNIFICANT CHANGE UP (ref 0.2–1.2)
BUN SERPL-MCNC: 10 MG/DL — SIGNIFICANT CHANGE UP (ref 7–23)
CALCIUM SERPL-MCNC: 9.1 MG/DL — SIGNIFICANT CHANGE UP (ref 8.4–10.5)
CHLORIDE SERPL-SCNC: 104 MMOL/L — SIGNIFICANT CHANGE UP (ref 96–108)
CO2 SERPL-SCNC: 22 MMOL/L — SIGNIFICANT CHANGE UP (ref 22–31)
CREAT SERPL-MCNC: 0.75 MG/DL — SIGNIFICANT CHANGE UP (ref 0.5–1.3)
GLUCOSE BLDC GLUCOMTR-MCNC: 186 MG/DL — HIGH (ref 70–99)
GLUCOSE BLDC GLUCOMTR-MCNC: 222 MG/DL — HIGH (ref 70–99)
GLUCOSE BLDC GLUCOMTR-MCNC: 224 MG/DL — HIGH (ref 70–99)
GLUCOSE BLDC GLUCOMTR-MCNC: 233 MG/DL — HIGH (ref 70–99)
GLUCOSE SERPL-MCNC: 165 MG/DL — HIGH (ref 70–99)
HCT VFR BLD CALC: 32.9 % — LOW (ref 39–50)
HGB BLD-MCNC: 11.1 G/DL — LOW (ref 13–17)
MCHC RBC-ENTMCNC: 27.9 PG — SIGNIFICANT CHANGE UP (ref 27–34)
MCHC RBC-ENTMCNC: 33.8 GM/DL — SIGNIFICANT CHANGE UP (ref 32–36)
MCV RBC AUTO: 82.6 FL — SIGNIFICANT CHANGE UP (ref 80–100)
PLATELET # BLD AUTO: 371 K/UL — SIGNIFICANT CHANGE UP (ref 150–400)
POTASSIUM SERPL-MCNC: 3.9 MMOL/L — SIGNIFICANT CHANGE UP (ref 3.5–5.3)
POTASSIUM SERPL-SCNC: 3.9 MMOL/L — SIGNIFICANT CHANGE UP (ref 3.5–5.3)
PROT SERPL-MCNC: 6.3 G/DL — SIGNIFICANT CHANGE UP (ref 6–8.3)
RBC # BLD: 3.98 M/UL — LOW (ref 4.2–5.8)
RBC # FLD: 13.2 % — SIGNIFICANT CHANGE UP (ref 10.3–14.5)
SODIUM SERPL-SCNC: 139 MMOL/L — SIGNIFICANT CHANGE UP (ref 135–145)
WBC # BLD: 7.8 K/UL — SIGNIFICANT CHANGE UP (ref 3.8–10.5)
WBC # FLD AUTO: 7.8 K/UL — SIGNIFICANT CHANGE UP (ref 3.8–10.5)

## 2019-08-28 PROCEDURE — 99024 POSTOP FOLLOW-UP VISIT: CPT

## 2019-08-28 RX ORDER — INSULIN GLARGINE 100 [IU]/ML
42 INJECTION, SOLUTION SUBCUTANEOUS AT BEDTIME
Refills: 0 | Status: DISCONTINUED | OUTPATIENT
Start: 2019-08-28 | End: 2019-08-29

## 2019-08-28 RX ORDER — INSULIN LISPRO 100/ML
19 VIAL (ML) SUBCUTANEOUS
Refills: 0 | Status: DISCONTINUED | OUTPATIENT
Start: 2019-08-28 | End: 2019-08-29

## 2019-08-28 RX ADMIN — TICAGRELOR 90 MILLIGRAM(S): 90 TABLET ORAL at 05:42

## 2019-08-28 RX ADMIN — ENOXAPARIN SODIUM 40 MILLIGRAM(S): 100 INJECTION SUBCUTANEOUS at 10:09

## 2019-08-28 RX ADMIN — Medication 2: at 12:49

## 2019-08-28 RX ADMIN — Medication 19 UNIT(S): at 18:24

## 2019-08-28 RX ADMIN — Medication 25 MILLIGRAM(S): at 05:42

## 2019-08-28 RX ADMIN — Medication 1: at 08:19

## 2019-08-28 RX ADMIN — Medication 650 MILLIGRAM(S): at 10:09

## 2019-08-28 RX ADMIN — Medication 2: at 18:25

## 2019-08-28 RX ADMIN — Medication 81 MILLIGRAM(S): at 10:09

## 2019-08-28 RX ADMIN — ATORVASTATIN CALCIUM 80 MILLIGRAM(S): 80 TABLET, FILM COATED ORAL at 21:24

## 2019-08-28 RX ADMIN — INSULIN GLARGINE 42 UNIT(S): 100 INJECTION, SOLUTION SUBCUTANEOUS at 22:10

## 2019-08-28 RX ADMIN — Medication 145 MILLIGRAM(S): at 21:24

## 2019-08-28 RX ADMIN — TICAGRELOR 90 MILLIGRAM(S): 90 TABLET ORAL at 18:24

## 2019-08-28 RX ADMIN — Medication 15 UNIT(S): at 08:19

## 2019-08-28 RX ADMIN — Medication 25 MILLIGRAM(S): at 18:24

## 2019-08-28 RX ADMIN — PANTOPRAZOLE SODIUM 40 MILLIGRAM(S): 20 TABLET, DELAYED RELEASE ORAL at 06:18

## 2019-08-28 NOTE — PROGRESS NOTE ADULT - ASSESSMENT
49 yr old male with H/O Smoking , ETOH Quit 2005 HTN, HLD, Hypertriglyceridemia  CAD, S/P Stented Coronary x2 2014, DM2 with A1C 11.7, CVA with Left sided weakness 2005, admitted with NSTEMI, + troponin, S/P Cath with multivessel CAD, TTE with EF 50%, Emergent R femoral IABP for critical anatomy, & ongoing chest pain. S/P CABG X3 8/10 Dced home 8/15.    Patient readmitted with hyperglycemia unable to check his glucose because glucometer issues, asked & assessed by visiting nurse told to report to ER    8/27 VSS, Endo consult appreciated, most recent POC glucose= 138, A1c= 9.7, sliding scale added ACHS, diabetes education, continue diabetic medications as per endo  8/28 VSS, patient moved to floor, PT eval ordered for c/o weakness, most recent POC glucose= 186, continue diabetic medications as per endo, diabetes education

## 2019-08-28 NOTE — PROGRESS NOTE ADULT - SUBJECTIVE AND OBJECTIVE BOX
Subjective: Pt states "I feel good " denies any CP, SOB, N/V and palpitations. No acute events overnight.     VITAL SIGNS    Telemetry:  SR @ 91  Vital Signs Last 24 Hrs  T(C): 37 (19 @ 07:00), Max: 37.1 (19 @ 12:15)  T(F): 98.6 (19 @ 07:00), Max: 98.8 (19 @ 03:19)  HR: 85 (19 @ 07:00) (83 - 100)  BP: 97/58 (19 @ 07:00) (97/58 - 118/63)  RR: 18 (19 @ 07:00) (18 - 20)  SpO2: 97% (19 @ 07:00) (95% - 100%)             @ 07:01  -   @ 07:00  --------------------------------------------------------  IN: 120 mL / OUT: 1900 mL / NET: -1780 mL       Daily     Daily Weight in k.8 (28 Aug 2019 07:00)      Bilirubin Total, Serum: 0.2 mg/dL ( @ 04:57)    CAPILLARY BLOOD GLUCOSE    POCT Blood Glucose.: 186 mg/dL (28 Aug 2019 08:17)  POCT Blood Glucose.: 167 mg/dL (27 Aug 2019 21:38)  POCT Blood Glucose.: 242 mg/dL (27 Aug 2019 17:16)  POCT Blood Glucose.: 138 mg/dL (27 Aug 2019 13:40)  POCT Blood Glucose.: 102 mg/dL (27 Aug 2019 12:19)  POCT Blood Glucose.: 114 mg/dL (27 Aug 2019 11:43)  POCT Blood Glucose.: 117 mg/dL (27 Aug 2019 10:58)  POCT Blood Glucose.: 193 mg/dL (27 Aug 2019 10:14)          MEDICATIONS  acetaminophen   Tablet .. 650 milliGRAM(s) Oral every 6 hours PRN  aspirin enteric coated 81 milliGRAM(s) Oral daily  atorvastatin 80 milliGRAM(s) Oral at bedtime  dextrose 40% Gel 15 Gram(s) Oral once PRN  dextrose 5%. 1000 milliLiter(s) IV Continuous <Continuous>  dextrose 50% Injectable 12.5 Gram(s) IV Push once  dextrose 50% Injectable 25 Gram(s) IV Push once  dextrose 50% Injectable 25 Gram(s) IV Push once  enoxaparin Injectable 40 milliGRAM(s) SubCutaneous daily  fenofibrate Tablet 145 milliGRAM(s) Oral at bedtime  glucagon  Injectable 1 milliGRAM(s) IntraMuscular once PRN  insulin glargine Injectable (LANTUS) 38 Unit(s) SubCutaneous at bedtime  insulin lispro (HumaLOG) corrective regimen sliding scale   SubCutaneous three times a day before meals  insulin lispro (HumaLOG) corrective regimen sliding scale   SubCutaneous at bedtime  insulin lispro Injectable (HumaLOG) 15 Unit(s) SubCutaneous before breakfast  insulin lispro Injectable (HumaLOG) 15 Unit(s) SubCutaneous before lunch  insulin lispro Injectable (HumaLOG) 15 Unit(s) SubCutaneous before dinner  insulin regular Infusion 8 Unit(s)/Hr IV Continuous <Continuous>  metoprolol tartrate 25 milliGRAM(s) Oral two times a day  pantoprazole    Tablet 40 milliGRAM(s) Oral before breakfast  sodium chloride 0.9%. 1000 milliLiter(s) IV Continuous <Continuous>  ticagrelor 90 milliGRAM(s) Oral every 12 hours    PHYSICAL EXAM    Neurology: A&Ox3, nonfocal, no gross deficits  CV : RRR+S1S2  Sternal Wound :  MSI CDI , Stable  Lungs: Respirations non-labored, B/L BS, CTA  Abdomen: Soft, NT/ND, +BSx4Q, last BM on 19, (-)N/V/D  : Voiding without difficulty, bladder non-distended   Extremities: (-) B/L LE edema, negative calf tenderness, +PP , SVG incision CDI      LABS      139  |  104  |  10  ----------------------------<  165<H>  3.9   |  22  |  0.75    Ca    9.1      28 Aug 2019 04:57  Phos  4.4     08-26  Mg     2.2     08-26    TPro  6.3  /  Alb  3.1<L>  /  TBili  0.2  /  DBili  x   /  AST  9<L>  /  ALT  9<L>  /  AlkPhos  79  -                                 11.1   7.8   )-----------( 371      ( 28 Aug 2019 04:57 )             32.9          PT/INR - ( 26 Aug 2019 19:57 )   PT: 11.4 sec;   INR: 0.99 ratio         PTT - ( 26 Aug 2019 19:57 )  PTT:27.6 sec       PAST MEDICAL & SURGICAL HISTORY:  Coronary artery disease of native artery of native heart with stable angina pectoris  Type 2 diabetes mellitus without complication, with long-term current use of insulin  Hyperlipidemia, unspecified hyperlipidemia type  Hypertension, unspecified type  History of percutaneous coronary intervention       Physical Therapy Rec:   PT eval for today    Discussed with CT Surgery attending.

## 2019-08-28 NOTE — PROGRESS NOTE ADULT - SUBJECTIVE AND OBJECTIVE BOX
Chief complaint  Patient is a 49y old  Male who presents with a chief complaint of hyperglycemia (28 Aug 2019 09:54)   Review of systems  Patient in bed, looks comfortable, no fever, no hypoglycemia.    Labs and Fingersticks  CAPILLARY BLOOD GLUCOSE      POCT Blood Glucose.: 233 mg/dL (28 Aug 2019 12:00)  POCT Blood Glucose.: 186 mg/dL (28 Aug 2019 08:17)  POCT Blood Glucose.: 167 mg/dL (27 Aug 2019 21:38)  POCT Blood Glucose.: 242 mg/dL (27 Aug 2019 17:16)      Anion Gap, Serum: 13 (08-28 @ 04:57)  Anion Gap, Serum: 10 (08-26 @ 19:57)    Hemoglobin A1C, Whole Blood: 9.7 <H> (08-27 @ 02:38)    Calcium, Total Serum: 9.1 (08-28 @ 04:57)  Calcium, Total Serum: 10.1 (08-26 @ 19:57)  Albumin, Serum: 3.1 <L> (08-28 @ 04:57)  Albumin, Serum: 3.9 (08-26 @ 19:57)    Alanine Aminotransferase (ALT/SGPT): 9 <L> (08-28 @ 04:57)  Alanine Aminotransferase (ALT/SGPT): 13 (08-26 @ 19:57)  Alkaline Phosphatase, Serum: 79 (08-28 @ 04:57)  Alkaline Phosphatase, Serum: 125 <H> (08-26 @ 19:57)  Aspartate Aminotransferase (AST/SGOT): 9 <L> (08-28 @ 04:57)  Aspartate Aminotransferase (AST/SGOT): 6 <L> (08-26 @ 19:57)        08-28    139  |  104  |  10  ----------------------------<  165<H>  3.9   |  22  |  0.75    Ca    9.1      28 Aug 2019 04:57  Phos  4.4     08-26  Mg     2.2     08-26    TPro  6.3  /  Alb  3.1<L>  /  TBili  0.2  /  DBili  x   /  AST  9<L>  /  ALT  9<L>  /  AlkPhos  79  08-28                        11.1   7.8   )-----------( 371      ( 28 Aug 2019 04:57 )             32.9     Medications  MEDICATIONS  (STANDING):  aspirin enteric coated 81 milliGRAM(s) Oral daily  atorvastatin 80 milliGRAM(s) Oral at bedtime  dextrose 5%. 1000 milliLiter(s) (50 mL/Hr) IV Continuous <Continuous>  dextrose 50% Injectable 12.5 Gram(s) IV Push once  dextrose 50% Injectable 25 Gram(s) IV Push once  dextrose 50% Injectable 25 Gram(s) IV Push once  enoxaparin Injectable 40 milliGRAM(s) SubCutaneous daily  fenofibrate Tablet 145 milliGRAM(s) Oral at bedtime  insulin glargine Injectable (LANTUS) 42 Unit(s) SubCutaneous at bedtime  insulin lispro (HumaLOG) corrective regimen sliding scale   SubCutaneous three times a day before meals  insulin lispro (HumaLOG) corrective regimen sliding scale   SubCutaneous at bedtime  insulin lispro Injectable (HumaLOG) 19 Unit(s) SubCutaneous three times a day before meals  metoprolol tartrate 25 milliGRAM(s) Oral two times a day  pantoprazole    Tablet 40 milliGRAM(s) Oral before breakfast  sodium chloride 0.9%. 1000 milliLiter(s) (150 mL/Hr) IV Continuous <Continuous>  ticagrelor 90 milliGRAM(s) Oral every 12 hours      Physical Exam  General: Patient comfortable in bed  Vital Signs Last 12 Hrs  T(F): 97.5 (08-28-19 @ 13:23), Max: 98.8 (08-28-19 @ 03:19)  HR: 89 (08-28-19 @ 13:23) (85 - 95)  BP: 113/74 (08-28-19 @ 13:23) (96/59 - 117/68)  BP(mean): 79 (08-28-19 @ 10:19) (72 - 88)  RR: 18 (08-28-19 @ 13:23) (18 - 18)  SpO2: 98% (08-28-19 @ 13:23) (96% - 100%)  Neck: No palpable thyroid nodules.  CVS: S1S2, No murmurs  Respiratory: No wheezing, no crepitations  GI: Abdomen soft, bowel sounds positive  Musculoskeletal:  edema lower extremities.   Skin: No skin rashes, no ecchymosis    Diagnostics

## 2019-08-28 NOTE — PROGRESS NOTE ADULT - ASSESSMENT
Assessment  DMT2: 49y Male with DMT2 admitted for weakness, fatigue, and hyperglycemia;  A1C 9.7%, was on insulin and oral DM meds at home, now on basal bolus insulin, FS remain elevated, no hypoglycemic episode, ambulating and comfortable with no complaints.  CAD: s/p cardiac surgery (2 weeks prior), on medications, no chest pain, stable, monitored.  HTN: Controlled,  on antihypertensive medications.  Obesity: No strict exercise routines, not on any weight loss program, neither on low calorie diet.    Plan:  DMT2:  Will increase Lantus to 42u at bedtime, increase Humalog to 19u before each meal. Continue coverage scale, and suggest to d/c insulin drip. Will continue monitoring FS, log, and follow up.  Patient counseled for compliance with consistent low carb diet. States he knows how to inject insulin.    CAD: On medications,  no chest pain, stable, monitored and followed up by primary team/cardiology   HTN: Suggest to continue medications, monitoring, FU primary team recommendations.  Overweight/Obesity: Patient counseled for weight loss, exercise, low calorie diet.      Jose Lyon MD  Cell: [phone]  Office: [phone] Assessment  DMT2: 49y Male with DMT2 admitted for weakness, fatigue, and hyperglycemia;  A1C 9.7%, was on insulin and oral DM meds at home, now on basal bolus insulin,  FS remain elevated, no hypoglycemic episode, ambulating and comfortable with no complaints.  CAD: s/p cardiac surgery (2 weeks prior), on medications, no chest pain, stable, monitored.  HTN: Controlled,  on antihypertensive medications.  Obesity: No strict exercise routines, not on any weight loss program, neither on low calorie diet.    Plan:  DMT2:  Will increase Lantus to 42u at bedtime, increase Humalog to 19u before each meal. Continue coverage scale, and suggest to d/c insulin drip. Will continue monitoring FS, log, and follow up.  Patient counseled for compliance with consistent low carb diet. States he knows how to inject insulin.    CAD: On medications,  no chest pain, stable, monitored and followed up by primary team/cardiology   HTN: Suggest to continue medications, monitoring, FU primary team recommendations.  Overweight/Obesity: Patient counseled for weight loss, exercise, low calorie diet.      Jose Lyon MD  Cell: [phone]  Office: [phone]

## 2019-08-29 LAB
ANION GAP SERPL CALC-SCNC: 12 MMOL/L — SIGNIFICANT CHANGE UP (ref 5–17)
BUN SERPL-MCNC: 8 MG/DL — SIGNIFICANT CHANGE UP (ref 7–23)
CALCIUM SERPL-MCNC: 9.3 MG/DL — SIGNIFICANT CHANGE UP (ref 8.4–10.5)
CHLORIDE SERPL-SCNC: 105 MMOL/L — SIGNIFICANT CHANGE UP (ref 96–108)
CO2 SERPL-SCNC: 24 MMOL/L — SIGNIFICANT CHANGE UP (ref 22–31)
CREAT SERPL-MCNC: 0.73 MG/DL — SIGNIFICANT CHANGE UP (ref 0.5–1.3)
GLUCOSE BLDC GLUCOMTR-MCNC: 122 MG/DL — HIGH (ref 70–99)
GLUCOSE BLDC GLUCOMTR-MCNC: 78 MG/DL — SIGNIFICANT CHANGE UP (ref 70–99)
GLUCOSE BLDC GLUCOMTR-MCNC: 86 MG/DL — SIGNIFICANT CHANGE UP (ref 70–99)
GLUCOSE BLDC GLUCOMTR-MCNC: 90 MG/DL — SIGNIFICANT CHANGE UP (ref 70–99)
GLUCOSE SERPL-MCNC: 114 MG/DL — HIGH (ref 70–99)
HCT VFR BLD CALC: 35.4 % — LOW (ref 39–50)
HGB BLD-MCNC: 11.8 G/DL — LOW (ref 13–17)
MCHC RBC-ENTMCNC: 27.7 PG — SIGNIFICANT CHANGE UP (ref 27–34)
MCHC RBC-ENTMCNC: 33.2 GM/DL — SIGNIFICANT CHANGE UP (ref 32–36)
MCV RBC AUTO: 83.2 FL — SIGNIFICANT CHANGE UP (ref 80–100)
PLATELET # BLD AUTO: 363 K/UL — SIGNIFICANT CHANGE UP (ref 150–400)
POTASSIUM SERPL-MCNC: 4.1 MMOL/L — SIGNIFICANT CHANGE UP (ref 3.5–5.3)
POTASSIUM SERPL-SCNC: 4.1 MMOL/L — SIGNIFICANT CHANGE UP (ref 3.5–5.3)
RBC # BLD: 4.25 M/UL — SIGNIFICANT CHANGE UP (ref 4.2–5.8)
RBC # FLD: 13.2 % — SIGNIFICANT CHANGE UP (ref 10.3–14.5)
SODIUM SERPL-SCNC: 141 MMOL/L — SIGNIFICANT CHANGE UP (ref 135–145)
WBC # BLD: 6.4 K/UL — SIGNIFICANT CHANGE UP (ref 3.8–10.5)
WBC # FLD AUTO: 6.4 K/UL — SIGNIFICANT CHANGE UP (ref 3.8–10.5)

## 2019-08-29 PROCEDURE — 99024 POSTOP FOLLOW-UP VISIT: CPT

## 2019-08-29 RX ORDER — INSULIN GLARGINE 100 [IU]/ML
50 INJECTION, SOLUTION SUBCUTANEOUS AT BEDTIME
Refills: 0 | Status: DISCONTINUED | OUTPATIENT
Start: 2019-08-29 | End: 2019-08-29

## 2019-08-29 RX ORDER — INSULIN GLARGINE 100 [IU]/ML
40 INJECTION, SOLUTION SUBCUTANEOUS AT BEDTIME
Refills: 0 | Status: DISCONTINUED | OUTPATIENT
Start: 2019-08-29 | End: 2019-08-30

## 2019-08-29 RX ORDER — INSULIN LISPRO 100/ML
17 VIAL (ML) SUBCUTANEOUS
Refills: 0 | Status: DISCONTINUED | OUTPATIENT
Start: 2019-08-29 | End: 2019-08-30

## 2019-08-29 RX ORDER — INSULIN LISPRO 100/ML
22 VIAL (ML) SUBCUTANEOUS
Refills: 0 | Status: DISCONTINUED | OUTPATIENT
Start: 2019-08-29 | End: 2019-08-29

## 2019-08-29 RX ADMIN — Medication 17 UNIT(S): at 17:30

## 2019-08-29 RX ADMIN — Medication 81 MILLIGRAM(S): at 12:27

## 2019-08-29 RX ADMIN — ENOXAPARIN SODIUM 40 MILLIGRAM(S): 100 INJECTION SUBCUTANEOUS at 12:27

## 2019-08-29 RX ADMIN — Medication 650 MILLIGRAM(S): at 09:53

## 2019-08-29 RX ADMIN — Medication 17 UNIT(S): at 12:27

## 2019-08-29 RX ADMIN — Medication 25 MILLIGRAM(S): at 17:47

## 2019-08-29 RX ADMIN — PANTOPRAZOLE SODIUM 40 MILLIGRAM(S): 20 TABLET, DELAYED RELEASE ORAL at 06:05

## 2019-08-29 RX ADMIN — Medication 145 MILLIGRAM(S): at 22:02

## 2019-08-29 RX ADMIN — Medication 650 MILLIGRAM(S): at 09:23

## 2019-08-29 RX ADMIN — TICAGRELOR 90 MILLIGRAM(S): 90 TABLET ORAL at 06:05

## 2019-08-29 RX ADMIN — ATORVASTATIN CALCIUM 80 MILLIGRAM(S): 80 TABLET, FILM COATED ORAL at 22:02

## 2019-08-29 RX ADMIN — INSULIN GLARGINE 40 UNIT(S): 100 INJECTION, SOLUTION SUBCUTANEOUS at 22:02

## 2019-08-29 RX ADMIN — Medication 25 MILLIGRAM(S): at 06:05

## 2019-08-29 RX ADMIN — TICAGRELOR 90 MILLIGRAM(S): 90 TABLET ORAL at 17:47

## 2019-08-29 NOTE — PROGRESS NOTE ADULT - SUBJECTIVE AND OBJECTIVE BOX
Subjective: Pt states "I feel good" denies any CP, SOB, N/V and palpitations. No acute events overnight.     VITAL SIGNS    Telemetry:  SR @   Vital Signs Last 24 Hrs  T(C): 36.8 (19 @ 05:12), Max: 36.8 (19 @ 05:12)  T(F): 98.3 (19 @ 05:12), Max: 98.3 (19 @ 05:12)  HR: 93 (19 @ 05:12) (89 - 93)  BP: 108/69 (19 @ 05:12) (108/69 - 120/72)  RR: 18 (19 @ 05:12) (18 - 18)  SpO2: 97% (19 @ 05:12) (95% - 98%)             @ 07:01  -   @ 07:00  --------------------------------------------------------  IN: 680 mL / OUT: 1300 mL / NET: -620 mL       Daily     Daily Weight in k (29 Aug 2019 07:59)      CAPILLARY BLOOD GLUCOSE      POCT Blood Glucose.: 90 mg/dL (29 Aug 2019 08:37)  POCT Blood Glucose.: 222 mg/dL (28 Aug 2019 21:40)  POCT Blood Glucose.: 224 mg/dL (28 Aug 2019 17:39)  POCT Blood Glucose.: 233 mg/dL (28 Aug 2019 12:00)          MEDICATIONS  acetaminophen   Tablet .. 650 milliGRAM(s) Oral every 6 hours PRN  aspirin enteric coated 81 milliGRAM(s) Oral daily  atorvastatin 80 milliGRAM(s) Oral at bedtime  dextrose 40% Gel 15 Gram(s) Oral once PRN  dextrose 5%. 1000 milliLiter(s) IV Continuous <Continuous>  dextrose 50% Injectable 12.5 Gram(s) IV Push once  dextrose 50% Injectable 25 Gram(s) IV Push once  dextrose 50% Injectable 25 Gram(s) IV Push once  enoxaparin Injectable 40 milliGRAM(s) SubCutaneous daily  fenofibrate Tablet 145 milliGRAM(s) Oral at bedtime  glucagon  Injectable 1 milliGRAM(s) IntraMuscular once PRN  insulin glargine Injectable (LANTUS) 40 Unit(s) SubCutaneous at bedtime  insulin lispro (HumaLOG) corrective regimen sliding scale   SubCutaneous three times a day before meals  insulin lispro (HumaLOG) corrective regimen sliding scale   SubCutaneous at bedtime  insulin lispro Injectable (HumaLOG) 17 Unit(s) SubCutaneous three times a day before meals  metoprolol tartrate 25 milliGRAM(s) Oral two times a day  pantoprazole    Tablet 40 milliGRAM(s) Oral before breakfast  sodium chloride 0.9%. 1000 milliLiter(s) IV Continuous <Continuous>  ticagrelor 90 milliGRAM(s) Oral every 12 hours        PHYSICAL EXAM    Neurology: A&Ox3, nonfocal, no gross deficits  CV : RRR+S1S2  Sternal Wound :  MSI CDI , Stable  Lungs: Respirations non-labored, B/L BS, CTA  Abdomen: Soft, NT/ND, +BSx4Q, last BM on 19, (-)N/V/D  : Voiding without difficulty, bladder non-distended   Extremities: B/L LE edema, negative calf tenderness, +PP , SVG incision CDI      LABS      141  |  105  |  8   ----------------------------<  114<H>  4.1   |  24  |  0.73    Ca    9.3      29 Aug 2019 06:24    TPro  6.3  /  Alb  3.1<L>  /  TBili  0.2  /  DBili  x   /  AST  9<L>  /  ALT  9<L>  /  AlkPhos  79                                   11.8   6.4   )-----------( 363      ( 29 Aug 2019 06:24 )             35.4                 PAST MEDICAL & SURGICAL HISTORY:  Coronary artery disease of native artery of native heart with stable angina pectoris  Type 2 diabetes mellitus without complication, with long-term current use of insulin  Hyperlipidemia, unspecified hyperlipidemia type  Hypertension, unspecified type  History of percutaneous coronary intervention       Physical Therapy Rec: Home (x)    Discussed with CT Surgery attending.

## 2019-08-29 NOTE — PROGRESS NOTE ADULT - SUBJECTIVE AND OBJECTIVE BOX
Chief complaint  Patient is a 49y old  Male who presents with a chief complaint of hyperglycemia (28 Aug 2019 13:50)   Review of systems  Patient in bed, looks comfortable, no fever, no hypoglycemia.    Labs and Fingersticks  CAPILLARY BLOOD GLUCOSE      POCT Blood Glucose.: 90 mg/dL (29 Aug 2019 08:37)  POCT Blood Glucose.: 222 mg/dL (28 Aug 2019 21:40)  POCT Blood Glucose.: 224 mg/dL (28 Aug 2019 17:39)  POCT Blood Glucose.: 233 mg/dL (28 Aug 2019 12:00)      Anion Gap, Serum: 12 (08-29 @ 06:24)  Anion Gap, Serum: 13 (08-28 @ 04:57)      Calcium, Total Serum: 9.3 (08-29 @ 06:24)  Calcium, Total Serum: 9.1 (08-28 @ 04:57)  Albumin, Serum: 3.1 <L> (08-28 @ 04:57)    Alanine Aminotransferase (ALT/SGPT): 9 <L> (08-28 @ 04:57)  Alkaline Phosphatase, Serum: 79 (08-28 @ 04:57)  Aspartate Aminotransferase (AST/SGOT): 9 <L> (08-28 @ 04:57)        08-29    141  |  105  |  8   ----------------------------<  114<H>  4.1   |  24  |  0.73    Ca    9.3      29 Aug 2019 06:24    TPro  6.3  /  Alb  3.1<L>  /  TBili  0.2  /  DBili  x   /  AST  9<L>  /  ALT  9<L>  /  AlkPhos  79  08-28                        11.8   6.4   )-----------( 363      ( 29 Aug 2019 06:24 )             35.4     Medications  MEDICATIONS  (STANDING):  aspirin enteric coated 81 milliGRAM(s) Oral daily  atorvastatin 80 milliGRAM(s) Oral at bedtime  dextrose 5%. 1000 milliLiter(s) (50 mL/Hr) IV Continuous <Continuous>  dextrose 50% Injectable 12.5 Gram(s) IV Push once  dextrose 50% Injectable 25 Gram(s) IV Push once  dextrose 50% Injectable 25 Gram(s) IV Push once  enoxaparin Injectable 40 milliGRAM(s) SubCutaneous daily  fenofibrate Tablet 145 milliGRAM(s) Oral at bedtime  insulin glargine Injectable (LANTUS) 40 Unit(s) SubCutaneous at bedtime  insulin lispro (HumaLOG) corrective regimen sliding scale   SubCutaneous three times a day before meals  insulin lispro (HumaLOG) corrective regimen sliding scale   SubCutaneous at bedtime  insulin lispro Injectable (HumaLOG) 17 Unit(s) SubCutaneous three times a day before meals  metoprolol tartrate 25 milliGRAM(s) Oral two times a day  pantoprazole    Tablet 40 milliGRAM(s) Oral before breakfast  sodium chloride 0.9%. 1000 milliLiter(s) (150 mL/Hr) IV Continuous <Continuous>  ticagrelor 90 milliGRAM(s) Oral every 12 hours      Physical Exam  General: Patient comfortable in bed  Vital Signs Last 12 Hrs  T(F): 98.3 (08-29-19 @ 05:12), Max: 98.3 (08-29-19 @ 05:12)  HR: 93 (08-29-19 @ 05:12) (93 - 93)  BP: 108/69 (08-29-19 @ 05:12) (108/69 - 108/69)  BP(mean): --  RR: 18 (08-29-19 @ 05:12) (18 - 18)  SpO2: 97% (08-29-19 @ 05:12) (97% - 97%)  Neck: No palpable thyroid nodules.  CVS: S1S2, No murmurs  Respiratory: No wheezing, no crepitations  GI: Abdomen soft, bowel sounds positive  Musculoskeletal:  edema lower extremities.   Skin: No skin rashes, no ecchymosis    Diagnostics Chief complaint  Patient is a 49y old  Male who presents with a chief complaint of hyperglycemia (28 Aug 2019 13:50)   Review of systems  Patient in bed, looks comfortable, no fever, no hypoglycemia, no overnight events.    Labs and Fingersticks  CAPILLARY BLOOD GLUCOSE      POCT Blood Glucose.: 90 mg/dL (29 Aug 2019 08:37)  POCT Blood Glucose.: 222 mg/dL (28 Aug 2019 21:40)  POCT Blood Glucose.: 224 mg/dL (28 Aug 2019 17:39)  POCT Blood Glucose.: 233 mg/dL (28 Aug 2019 12:00)      Anion Gap, Serum: 12 (08-29 @ 06:24)  Anion Gap, Serum: 13 (08-28 @ 04:57)      Calcium, Total Serum: 9.3 (08-29 @ 06:24)  Calcium, Total Serum: 9.1 (08-28 @ 04:57)  Albumin, Serum: 3.1 <L> (08-28 @ 04:57)    Alanine Aminotransferase (ALT/SGPT): 9 <L> (08-28 @ 04:57)  Alkaline Phosphatase, Serum: 79 (08-28 @ 04:57)  Aspartate Aminotransferase (AST/SGOT): 9 <L> (08-28 @ 04:57)        08-29    141  |  105  |  8   ----------------------------<  114<H>  4.1   |  24  |  0.73    Ca    9.3      29 Aug 2019 06:24    TPro  6.3  /  Alb  3.1<L>  /  TBili  0.2  /  DBili  x   /  AST  9<L>  /  ALT  9<L>  /  AlkPhos  79  08-28                        11.8   6.4   )-----------( 363      ( 29 Aug 2019 06:24 )             35.4     Medications  MEDICATIONS  (STANDING):  aspirin enteric coated 81 milliGRAM(s) Oral daily  atorvastatin 80 milliGRAM(s) Oral at bedtime  dextrose 5%. 1000 milliLiter(s) (50 mL/Hr) IV Continuous <Continuous>  dextrose 50% Injectable 12.5 Gram(s) IV Push once  dextrose 50% Injectable 25 Gram(s) IV Push once  dextrose 50% Injectable 25 Gram(s) IV Push once  enoxaparin Injectable 40 milliGRAM(s) SubCutaneous daily  fenofibrate Tablet 145 milliGRAM(s) Oral at bedtime  insulin glargine Injectable (LANTUS) 40 Unit(s) SubCutaneous at bedtime  insulin lispro (HumaLOG) corrective regimen sliding scale   SubCutaneous three times a day before meals  insulin lispro (HumaLOG) corrective regimen sliding scale   SubCutaneous at bedtime  insulin lispro Injectable (HumaLOG) 17 Unit(s) SubCutaneous three times a day before meals  metoprolol tartrate 25 milliGRAM(s) Oral two times a day  pantoprazole    Tablet 40 milliGRAM(s) Oral before breakfast  sodium chloride 0.9%. 1000 milliLiter(s) (150 mL/Hr) IV Continuous <Continuous>  ticagrelor 90 milliGRAM(s) Oral every 12 hours      Physical Exam  General: Patient comfortable in bed  Vital Signs Last 12 Hrs  T(F): 98.3 (08-29-19 @ 05:12), Max: 98.3 (08-29-19 @ 05:12)  HR: 93 (08-29-19 @ 05:12) (93 - 93)  BP: 108/69 (08-29-19 @ 05:12) (108/69 - 108/69)  BP(mean): --  RR: 18 (08-29-19 @ 05:12) (18 - 18)  SpO2: 97% (08-29-19 @ 05:12) (97% - 97%)  Neck: No palpable thyroid nodules.  CVS: S1S2, No murmurs  Respiratory: No wheezing, no crepitations  GI: Abdomen soft, bowel sounds positive  Musculoskeletal:  edema lower extremities.   Skin: No skin rashes, no ecchymosis    Diagnostics

## 2019-08-29 NOTE — PROGRESS NOTE ADULT - ATTENDING COMMENTS
Assessment  DMT2: 49y Male with DMT2 admitted for weakness, fatigue, and hyperglycemia;  A1C 9.7%, was on insulin and oral DM meds at home, blood sugars running slightly low this AM after increasing his insulin yesterday, no hypoglycemic episode, eating meals, ambulating and comfortable with no complaints.  CAD: s/p cardiac surgery (CABG 8/10), on medications, no chest pain, stable, monitored.  HTN: Controlled, on antihypertensive medications.  Obesity: No strict exercise routines, not on any weight loss program, neither on low calorie diet.    Plan:  DMT2:  Will decrease Lantus to 40u at bedtime, decrease Humalog to 17u before each meal, and continue coverage scale. Will continue monitoring FS, log, and follow up.  Patient counseled for compliance with consistent low carb diet. States he knows how to inject insulin.    CAD: On medications,  no chest pain, stable, monitored and followed up by primary team/cardiology   HTN: Suggest to continue medications, monitoring, FU primary team recommendations.  Overweight/Obesity: Patient counseled for weight loss, exercise, low calorie diet.

## 2019-08-29 NOTE — PROGRESS NOTE ADULT - PROBLEM SELECTOR PLAN 1
Endo following  A1C= 9.7  sliding scale ACHS  Continue Lantus and humolog as per Endo  PT eval ordered for weakness

## 2019-08-29 NOTE — PROGRESS NOTE ADULT - ASSESSMENT
49 yr old male with H/O Smoking , ETOH Quit 2005 HTN, HLD, Hypertriglyceridemia  CAD, S/P Stented Coronary x2 2014, DM2 with A1C 11.7, CVA with Left sided weakness 2005, admitted with NSTEMI, + troponin, S/P Cath with multivessel CAD, TTE with EF 50%, Emergent R femoral IABP for critical anatomy, & ongoing chest pain. S/P CABG X3 8/10 Dced home 8/15.    Patient readmitted with hyperglycemia unable to check his glucose because glucometer issues, asked & assessed by visiting nurse told to report to ER    8/27 VSS, Endo consult appreciated, most recent POC glucose= 138, A1c= 9.7, sliding scale added ACHS, diabetes education, continue diabetic medications as per endo  8/28 VSS, patient moved to floor, PT eval ordered for c/o weakness, most recent POC glucose= 186, continue diabetic medications as per endo, diabetes education  8/29 VSS, most recent blood glucose= 90, lantus and humolog increased as per Endo, continue to monitor, d/c planning home 49 yr old male with H/O Smoking , ETOH Quit 2005 HTN, HLD, Hypertriglyceridemia  CAD, S/P Stented Coronary x2 2014, DM2 with A1C 11.7, CVA with Left sided weakness 2005, admitted with NSTEMI, + troponin, S/P Cath with multivessel CAD, TTE with EF 50%, Emergent R femoral IABP for critical anatomy, & ongoing chest pain. S/P CABG X3 8/10 Dced home 8/15.    Patient readmitted with hyperglycemia unable to check his glucose because glucometer issues, asked & assessed by visiting nurse told to report to ER    8/27 VSS, Endo consult appreciated, most recent POC glucose= 138, A1c= 9.7, sliding scale added ACHS, diabetes education, continue diabetic medications as per endo  8/28 VSS, patient moved to floor, PT eval ordered for c/o weakness, most recent POC glucose= 186, continue diabetic medications as per endo, diabetes education  8/29 VSS, most recent blood glucose= 90, lantus and humolog increased as per Endo, continue to monitor, d/c planning home  pts wife instructed of pt's discharge plan for home in am friday w/ PT

## 2019-08-29 NOTE — PROGRESS NOTE ADULT - ASSESSMENT
Assessment  DMT2: 49y Male with DMT2 admitted for weakness, fatigue, and hyperglycemia;  A1C 9.7%, was on insulin and oral DM meds at home, blood sugars running slightly low this AM after increasing his insulin yesterday, no hypoglycemic episode, ambulating and comfortable with no complaints.  CAD: s/p cardiac surgery (CABG 8/10), on medications, no chest pain, stable, monitored.  HTN: Controlled, on antihypertensive medications.  Obesity: No strict exercise routines, not on any weight loss program, neither on low calorie diet.    Plan:  DMT2:  Will decrease Lantus to 40u at bedtime, decrease Humalog to 17u before each meal, and continue coverage scale. Will continue monitoring FS, log, and follow up.  Patient counseled for compliance with consistent low carb diet. States he knows how to inject insulin.    CAD: On medications,  no chest pain, stable, monitored and followed up by primary team/cardiology   HTN: Suggest to continue medications, monitoring, FU primary team recommendations.  Overweight/Obesity: Patient counseled for weight loss, exercise, low calorie diet.      Jose Lyon MD  Cell: [phone]  Office: [phone] Assessment  DMT2: 49y Male with DMT2 admitted for weakness, fatigue, and hyperglycemia;  A1C 9.7%, was on insulin and oral DM meds at home, blood sugars running slightly low this AM after increasing his insulin yesterday, no hypoglycemic episode, eating meals, ambulating and comfortable with no complaints.  CAD: s/p cardiac surgery (CABG 8/10), on medications, no chest pain, stable, monitored.  HTN: Controlled, on antihypertensive medications.  Obesity: No strict exercise routines, not on any weight loss program, neither on low calorie diet.    Plan:  DMT2:  Will decrease Lantus to 40u at bedtime, decrease Humalog to 17u before each meal, and continue coverage scale. Will continue monitoring FS, log, and follow up.  Patient counseled for compliance with consistent low carb diet. States he knows how to inject insulin.    CAD: On medications,  no chest pain, stable, monitored and followed up by primary team/cardiology   HTN: Suggest to continue medications, monitoring, FU primary team recommendations.  Overweight/Obesity: Patient counseled for weight loss, exercise, low calorie diet.      Jose Lyon MD  Cell: [phone]  Office: [phone]

## 2019-08-30 ENCOUNTER — TRANSCRIPTION ENCOUNTER (OUTPATIENT)
Age: 49
End: 2019-08-30

## 2019-08-30 VITALS — WEIGHT: 164.46 LBS

## 2019-08-30 LAB
ANION GAP SERPL CALC-SCNC: 10 MMOL/L — SIGNIFICANT CHANGE UP (ref 5–17)
BUN SERPL-MCNC: 10 MG/DL — SIGNIFICANT CHANGE UP (ref 7–23)
CALCIUM SERPL-MCNC: 9.4 MG/DL — SIGNIFICANT CHANGE UP (ref 8.4–10.5)
CHLORIDE SERPL-SCNC: 103 MMOL/L — SIGNIFICANT CHANGE UP (ref 96–108)
CO2 SERPL-SCNC: 29 MMOL/L — SIGNIFICANT CHANGE UP (ref 22–31)
CREAT SERPL-MCNC: 0.82 MG/DL — SIGNIFICANT CHANGE UP (ref 0.5–1.3)
GLUCOSE BLDC GLUCOMTR-MCNC: 170 MG/DL — HIGH (ref 70–99)
GLUCOSE BLDC GLUCOMTR-MCNC: 188 MG/DL — HIGH (ref 70–99)
GLUCOSE SERPL-MCNC: 210 MG/DL — HIGH (ref 70–99)
HCT VFR BLD CALC: 37.3 % — LOW (ref 39–50)
HGB BLD-MCNC: 12.1 G/DL — LOW (ref 13–17)
MCHC RBC-ENTMCNC: 27.2 PG — SIGNIFICANT CHANGE UP (ref 27–34)
MCHC RBC-ENTMCNC: 32.4 GM/DL — SIGNIFICANT CHANGE UP (ref 32–36)
MCV RBC AUTO: 83.9 FL — SIGNIFICANT CHANGE UP (ref 80–100)
PLATELET # BLD AUTO: 339 K/UL — SIGNIFICANT CHANGE UP (ref 150–400)
POTASSIUM SERPL-MCNC: 4.2 MMOL/L — SIGNIFICANT CHANGE UP (ref 3.5–5.3)
POTASSIUM SERPL-SCNC: 4.2 MMOL/L — SIGNIFICANT CHANGE UP (ref 3.5–5.3)
RBC # BLD: 4.45 M/UL — SIGNIFICANT CHANGE UP (ref 4.2–5.8)
RBC # FLD: 13.3 % — SIGNIFICANT CHANGE UP (ref 10.3–14.5)
SODIUM SERPL-SCNC: 142 MMOL/L — SIGNIFICANT CHANGE UP (ref 135–145)
WBC # BLD: 6.3 K/UL — SIGNIFICANT CHANGE UP (ref 3.8–10.5)
WBC # FLD AUTO: 6.3 K/UL — SIGNIFICANT CHANGE UP (ref 3.8–10.5)

## 2019-08-30 PROCEDURE — 83690 ASSAY OF LIPASE: CPT

## 2019-08-30 PROCEDURE — 82010 KETONE BODYS QUAN: CPT

## 2019-08-30 PROCEDURE — 97116 GAIT TRAINING THERAPY: CPT

## 2019-08-30 PROCEDURE — 85730 THROMBOPLASTIN TIME PARTIAL: CPT

## 2019-08-30 PROCEDURE — 99285 EMERGENCY DEPT VISIT HI MDM: CPT | Mod: 25

## 2019-08-30 PROCEDURE — 83605 ASSAY OF LACTIC ACID: CPT

## 2019-08-30 PROCEDURE — 83880 ASSAY OF NATRIURETIC PEPTIDE: CPT

## 2019-08-30 PROCEDURE — 82435 ASSAY OF BLOOD CHLORIDE: CPT

## 2019-08-30 PROCEDURE — 84100 ASSAY OF PHOSPHORUS: CPT

## 2019-08-30 PROCEDURE — 85014 HEMATOCRIT: CPT

## 2019-08-30 PROCEDURE — 84295 ASSAY OF SERUM SODIUM: CPT

## 2019-08-30 PROCEDURE — 87086 URINE CULTURE/COLONY COUNT: CPT

## 2019-08-30 PROCEDURE — 84145 PROCALCITONIN (PCT): CPT

## 2019-08-30 PROCEDURE — 83735 ASSAY OF MAGNESIUM: CPT

## 2019-08-30 PROCEDURE — 84443 ASSAY THYROID STIM HORMONE: CPT

## 2019-08-30 PROCEDURE — 85027 COMPLETE CBC AUTOMATED: CPT

## 2019-08-30 PROCEDURE — 82330 ASSAY OF CALCIUM: CPT

## 2019-08-30 PROCEDURE — 82803 BLOOD GASES ANY COMBINATION: CPT

## 2019-08-30 PROCEDURE — 97163 PT EVAL HIGH COMPLEX 45 MIN: CPT

## 2019-08-30 PROCEDURE — 85610 PROTHROMBIN TIME: CPT

## 2019-08-30 PROCEDURE — 71045 X-RAY EXAM CHEST 1 VIEW: CPT

## 2019-08-30 PROCEDURE — 83036 HEMOGLOBIN GLYCOSYLATED A1C: CPT

## 2019-08-30 PROCEDURE — 84132 ASSAY OF SERUM POTASSIUM: CPT

## 2019-08-30 PROCEDURE — 82962 GLUCOSE BLOOD TEST: CPT

## 2019-08-30 PROCEDURE — 99024 POSTOP FOLLOW-UP VISIT: CPT

## 2019-08-30 PROCEDURE — 93005 ELECTROCARDIOGRAM TRACING: CPT

## 2019-08-30 PROCEDURE — 84484 ASSAY OF TROPONIN QUANT: CPT

## 2019-08-30 PROCEDURE — 81001 URINALYSIS AUTO W/SCOPE: CPT

## 2019-08-30 PROCEDURE — 80048 BASIC METABOLIC PNL TOTAL CA: CPT

## 2019-08-30 PROCEDURE — 93306 TTE W/DOPPLER COMPLETE: CPT

## 2019-08-30 PROCEDURE — 80053 COMPREHEN METABOLIC PANEL: CPT

## 2019-08-30 PROCEDURE — 82947 ASSAY GLUCOSE BLOOD QUANT: CPT

## 2019-08-30 RX ORDER — ATORVASTATIN CALCIUM 80 MG/1
1 TABLET, FILM COATED ORAL
Qty: 0 | Refills: 0 | DISCHARGE
Start: 2019-08-30

## 2019-08-30 RX ORDER — TICAGRELOR 90 MG/1
1 TABLET ORAL
Qty: 0 | Refills: 0 | DISCHARGE
Start: 2019-08-30

## 2019-08-30 RX ORDER — FENOFIBRATE,MICRONIZED 130 MG
1 CAPSULE ORAL
Qty: 30 | Refills: 0
Start: 2019-08-30 | End: 2019-09-28

## 2019-08-30 RX ORDER — ASPIRIN/CALCIUM CARB/MAGNESIUM 324 MG
1 TABLET ORAL
Qty: 0 | Refills: 0 | DISCHARGE
Start: 2019-08-30

## 2019-08-30 RX ORDER — INSULIN GLARGINE 100 [IU]/ML
45 INJECTION, SOLUTION SUBCUTANEOUS
Qty: 1 | Refills: 0
Start: 2019-08-30 | End: 2019-09-28

## 2019-08-30 RX ORDER — INSULIN GLARGINE 100 [IU]/ML
45 INJECTION, SOLUTION SUBCUTANEOUS AT BEDTIME
Refills: 0 | Status: DISCONTINUED | OUTPATIENT
Start: 2019-08-30 | End: 2019-08-30

## 2019-08-30 RX ORDER — METOPROLOL TARTRATE 50 MG
1 TABLET ORAL
Qty: 60 | Refills: 0
Start: 2019-08-30 | End: 2019-09-28

## 2019-08-30 RX ORDER — INSULIN LISPRO 100/ML
17 VIAL (ML) SUBCUTANEOUS
Qty: 1 | Refills: 0
Start: 2019-08-30 | End: 2019-09-28

## 2019-08-30 RX ORDER — ACETAMINOPHEN 500 MG
2 TABLET ORAL
Qty: 0 | Refills: 0 | DISCHARGE
Start: 2019-08-30

## 2019-08-30 RX ADMIN — PANTOPRAZOLE SODIUM 40 MILLIGRAM(S): 20 TABLET, DELAYED RELEASE ORAL at 06:17

## 2019-08-30 RX ADMIN — TICAGRELOR 90 MILLIGRAM(S): 90 TABLET ORAL at 06:17

## 2019-08-30 RX ADMIN — Medication 1: at 08:10

## 2019-08-30 RX ADMIN — Medication 25 MILLIGRAM(S): at 06:17

## 2019-08-30 RX ADMIN — Medication 1: at 12:18

## 2019-08-30 RX ADMIN — Medication 17 UNIT(S): at 12:17

## 2019-08-30 RX ADMIN — ENOXAPARIN SODIUM 40 MILLIGRAM(S): 100 INJECTION SUBCUTANEOUS at 12:21

## 2019-08-30 RX ADMIN — Medication 17 UNIT(S): at 08:09

## 2019-08-30 RX ADMIN — Medication 81 MILLIGRAM(S): at 12:21

## 2019-08-30 NOTE — PROGRESS NOTE ADULT - SUBJECTIVE AND OBJECTIVE BOX
Chief complaint  Patient is a 49y old  Male who presents with a chief complaint of hyperglycemia (30 Aug 2019 11:42)   Review of systems  Patient in bed, looks comfortable, no fever, no hypoglycemia.    Labs and Fingersticks  CAPILLARY BLOOD GLUCOSE      POCT Blood Glucose.: 188 mg/dL (30 Aug 2019 12:05)  POCT Blood Glucose.: 170 mg/dL (30 Aug 2019 08:06)  POCT Blood Glucose.: 86 mg/dL (29 Aug 2019 21:37)  POCT Blood Glucose.: 78 mg/dL (29 Aug 2019 17:06)      Anion Gap, Serum: 10 (08-30 @ 07:09)  Anion Gap, Serum: 12 (08-29 @ 06:24)      Calcium, Total Serum: 9.4 (08-30 @ 07:09)  Calcium, Total Serum: 9.3 (08-29 @ 06:24)          08-30    142  |  103  |  10  ----------------------------<  210<H>  4.2   |  29  |  0.82    Ca    9.4      30 Aug 2019 07:09                          12.1   6.3   )-----------( 339      ( 30 Aug 2019 07:09 )             37.3     Medications  MEDICATIONS  (STANDING):  aspirin enteric coated 81 milliGRAM(s) Oral daily  atorvastatin 80 milliGRAM(s) Oral at bedtime  dextrose 5%. 1000 milliLiter(s) (50 mL/Hr) IV Continuous <Continuous>  dextrose 50% Injectable 12.5 Gram(s) IV Push once  dextrose 50% Injectable 25 Gram(s) IV Push once  dextrose 50% Injectable 25 Gram(s) IV Push once  enoxaparin Injectable 40 milliGRAM(s) SubCutaneous daily  fenofibrate Tablet 145 milliGRAM(s) Oral at bedtime  insulin glargine Injectable (LANTUS) 45 Unit(s) SubCutaneous at bedtime  insulin lispro (HumaLOG) corrective regimen sliding scale   SubCutaneous three times a day before meals  insulin lispro (HumaLOG) corrective regimen sliding scale   SubCutaneous at bedtime  insulin lispro Injectable (HumaLOG) 17 Unit(s) SubCutaneous three times a day before meals  metoprolol tartrate 25 milliGRAM(s) Oral two times a day  pantoprazole    Tablet 40 milliGRAM(s) Oral before breakfast  sodium chloride 0.9%. 1000 milliLiter(s) (150 mL/Hr) IV Continuous <Continuous>  ticagrelor 90 milliGRAM(s) Oral every 12 hours      Physical Exam  General: Patient comfortable in bed  Vital Signs Last 12 Hrs  T(F): 98.7 (08-30-19 @ 05:12), Max: 98.7 (08-30-19 @ 05:12)  HR: 82 (08-30-19 @ 06:13) (82 - 87)  BP: 140/92 (08-30-19 @ 06:13) (129/82 - 140/92)  BP(mean): --  RR: 18 (08-30-19 @ 05:12) (18 - 18)  SpO2: 98% (08-30-19 @ 05:12) (98% - 98%)  Neck: No palpable thyroid nodules.  CVS: S1S2, No murmurs  Respiratory: No wheezing, no crepitations  GI: Abdomen soft, bowel sounds positive  Musculoskeletal:  edema lower extremities.   Skin: No skin rashes, no ecchymosis    Diagnostics Chief complaint  Patient is a 49y old  Male who presents with a chief complaint of hyperglycemia (30 Aug 2019 11:42)   Review of systems  Patient in bed, looks comfortable, no fever, no  hypoglycemia.    Labs and Fingersticks  CAPILLARY BLOOD GLUCOSE      POCT Blood Glucose.: 188 mg/dL (30 Aug 2019 12:05)  POCT Blood Glucose.: 170 mg/dL (30 Aug 2019 08:06)  POCT Blood Glucose.: 86 mg/dL (29 Aug 2019 21:37)  POCT Blood Glucose.: 78 mg/dL (29 Aug 2019 17:06)      Anion Gap, Serum: 10 (08-30 @ 07:09)  Anion Gap, Serum: 12 (08-29 @ 06:24)      Calcium, Total Serum: 9.4 (08-30 @ 07:09)  Calcium, Total Serum: 9.3 (08-29 @ 06:24)          08-30    142  |  103  |  10  ----------------------------<  210<H>  4.2   |  29  |  0.82    Ca    9.4      30 Aug 2019 07:09                          12.1   6.3   )-----------( 339      ( 30 Aug 2019 07:09 )             37.3     Medications  MEDICATIONS  (STANDING):  aspirin enteric coated 81 milliGRAM(s) Oral daily  atorvastatin 80 milliGRAM(s) Oral at bedtime  dextrose 5%. 1000 milliLiter(s) (50 mL/Hr) IV Continuous <Continuous>  dextrose 50% Injectable 12.5 Gram(s) IV Push once  dextrose 50% Injectable 25 Gram(s) IV Push once  dextrose 50% Injectable 25 Gram(s) IV Push once  enoxaparin Injectable 40 milliGRAM(s) SubCutaneous daily  fenofibrate Tablet 145 milliGRAM(s) Oral at bedtime  insulin glargine Injectable (LANTUS) 45 Unit(s) SubCutaneous at bedtime  insulin lispro (HumaLOG) corrective regimen sliding scale   SubCutaneous three times a day before meals  insulin lispro (HumaLOG) corrective regimen sliding scale   SubCutaneous at bedtime  insulin lispro Injectable (HumaLOG) 17 Unit(s) SubCutaneous three times a day before meals  metoprolol tartrate 25 milliGRAM(s) Oral two times a day  pantoprazole    Tablet 40 milliGRAM(s) Oral before breakfast  sodium chloride 0.9%. 1000 milliLiter(s) (150 mL/Hr) IV Continuous <Continuous>  ticagrelor 90 milliGRAM(s) Oral every 12 hours      Physical Exam  General: Patient comfortable in bed  Vital Signs Last 12 Hrs  T(F): 98.7 (08-30-19 @ 05:12), Max: 98.7 (08-30-19 @ 05:12)  HR: 82 (08-30-19 @ 06:13) (82 - 87)  BP: 140/92 (08-30-19 @ 06:13) (129/82 - 140/92)  BP(mean): --  RR: 18 (08-30-19 @ 05:12) (18 - 18)  SpO2: 98% (08-30-19 @ 05:12) (98% - 98%)  Neck: No palpable thyroid nodules.  CVS: S1S2, No murmurs  Respiratory: No wheezing, no crepitations  GI: Abdomen soft, bowel sounds positive  Musculoskeletal:  edema lower extremities.   Skin: No skin rashes, no ecchymosis    Diagnostics

## 2019-08-30 NOTE — PROGRESS NOTE ADULT - PROVIDER SPECIALTY LIST ADULT
CT Surgery
CT Surgery
Critical Care
Critical Care
Endocrinology
Endocrinology
CT Surgery
Endocrinology

## 2019-08-30 NOTE — DISCHARGE NOTE PROVIDER - CARE PROVIDERS DIRECT ADDRESSES
[email or web address]

## 2019-08-30 NOTE — DISCHARGE NOTE PROVIDER - HOSPITAL COURSE
49M -H/O Smoking , ETOH Quit 2005 HTN, HLD, Stent '14, DM2 with A1C 11.7, CVA with Left sided weakness '05, admitted with NSTEMI, + troponin, S/P Cath with multivessel CAD, TTE with EF 50%, Emergent R femoral IABP for critical anatomy, & ongoing chest pain. S/P CABG X3 8/10 Dced home 8/15.        Patient readmitted with hyperglycemia unable to check his glucose because glucometer issues, asked & assessed by visiting nurse told to report to ER        8/27 VSS, Endo consult appreciated, most recent POC glucose= 138, A1c= 9.7, sliding scale added ACHS, diabetes education, continue diabetic medications as per endo    8/28 VSS, patient moved to floor, PT eval ordered for c/o weakness, most recent POC glucose= 186, continue diabetic medications as per endo, diabetes education    8/29 VSS, most recent blood glucose= 90, lantus and humolog increased as per Endo, continue to monitor, d/c planning home    pts wife instructed of pt's discharge plan for home in am friday w/ PT    8/30 pt discharged home on humolog 17 tid & lantus 45 hs.

## 2019-08-30 NOTE — DISCHARGE NOTE PROVIDER - NSDCCPCAREPLAN_GEN_ALL_CORE_FT
PRINCIPAL DISCHARGE DIAGNOSIS  Diagnosis: Hyperglycemia  Assessment and Plan of Treatment: Endo - consulted for hyperglycemia -   dr. Lyon in to see pt.    pt to be discharged home on lantus 45 & humolog 17 tid.  instructed on importance of psh.   follow up appt with Dr. Neda Alvarez in 1-2 weeks  follow up appt with your Cardiologist and of Primary Care MD in 2-3 weeks

## 2019-08-30 NOTE — DISCHARGE NOTE PROVIDER - NSDCPNSUBOBJ_GEN_ALL_CORE
PHYSICAL EXAM        Neurology: A&Ox3, nonfocal, no gross deficits    CV : RRR+S1S2    Sternal Wound :  MSI CDI , Stable    Lungs: Respirations non-labored, B/L BS, CTA    Abdomen: Soft, NT/ND, +BSx4Q, last BM on 8/29/19, (-)N/V/D    : Voiding without difficulty, bladder non-distended     Extremities: B/L LE edema, negative calf tenderness, +PP , SVG incision

## 2019-08-30 NOTE — DISCHARGE NOTE PROVIDER - NSDCHHCONTACT_GEN_ALL_CORE_FT
As certified below, I, or a nurse practitioner or physician assistant working with me, had a face-to-face encounter that meets the physician face-to-face encounter requirements. - - -

## 2019-08-30 NOTE — DISCHARGE NOTE PROVIDER - CARE PROVIDER_API CALL
Sanaz Alvarez)  Thoracic and Cardiac Surgery  [address] [place] [address]  Phone: [phone]  Fax: [phone]  Follow Up Time:     Jose Lyon)  EndocrinologyMetabDiabetes; Internal Medicine  [address] [place] [address]  Phone: [phone]  Fax: [phone]  Follow Up Time:

## 2019-08-30 NOTE — PROGRESS NOTE ADULT - ASSESSMENT
Assessment  DMT2: 49y Male with DMT2 admitted for weakness, fatigue, and hyperglycemia;  A1C 9.7%, was on insulin and oral DM meds at home, blood sugars still running high this AM after adjusting his insulin yesterday, no hypoglycemic episode. He is clear for d/c, eating meals, ambulating, comfortable with no complaints.  CAD: s/p cardiac surgery (CABG 8/10), on medications, no chest pain, stable, monitored.  HTN: Controlled, on antihypertensive medications.  Obesity: No strict exercise routines, not on any weight loss program, neither on low calorie diet.    Plan:  DMT2:  Will increase Lantus to 45u at bedtime, continue Humalog to 17u before each meal as well as coverage scale. Will continue monitoring FS, log, and follow up.  Suggest to d/c on current hospital regimen and follow-up with endocrinologist within 2 weeks.  Patient counseled for compliance with consistent low carb diet. He knows how to inject insulin, agrees to plan. Also spoke with primary team.     CAD: On medications,  no chest pain, stable, monitored and followed up by primary team/cardiology   HTN: Suggest to continue medications, monitoring, FU primary team recommendations.  Overweight/Obesity: Patient counseled for weight loss, exercise, low calorie diet.      Jose Lyon MD  Cell: [phone]  Office: [phone] Assessment  DMT2: 49y Male with DMT2 admitted for weakness, fatigue, and hyperglycemia;  blood sugars still running high this AM after adjusting his insulin yesterday, no hypoglycemic episode. He is clear for d/c, eating meals, ambulating, comfortable with no complaints.  CAD: s/p cardiac surgery (CABG 8/10), on medications, no chest pain, stable, monitored.  HTN: Controlled, on antihypertensive medications.  Obesity: No strict exercise routines, not on any weight loss program, neither on low calorie diet.    Plan:  DMT2:  Will increase Lantus to 45u at bedtime, continue Humalog to 17u before each meal as well as coverage scale. Will continue monitoring FS, log, and follow up.  Suggest to d/c on current hospital regimen and follow-up with endocrinologist within 2 weeks.  Patient counseled for compliance with consistent low carb diet. He knows how to inject insulin, agrees to plan. Also spoke with primary team.     CAD: On medications,  no chest pain, stable, monitored and followed up by primary team/cardiology   HTN: Suggest to continue medications, monitoring, FU primary team recommendations.  Overweight/Obesity: Patient counseled for weight loss, exercise, low calorie diet.      Jose Lyon MD  Cell: [phone]  Office: [phone]

## 2019-08-30 NOTE — DISCHARGE NOTE NURSING/CASE MANAGEMENT/SOCIAL WORK - PATIENT PORTAL LINK FT
You can access the FollowMyHealth Patient Portal offered by [facility] by registering at the following website: [email or web address]. By joining [facility]’s FollowMyHealth portal, you will also be able to view your health information using other applications (apps) compatible with our system.

## 2019-08-30 NOTE — PROGRESS NOTE ADULT - PROBLEM SELECTOR PROBLEM 1
Type 2 diabetes mellitus with hyperglycemia, unspecified whether long term insulin use
Type 2 diabetes mellitus without complication, with long-term current use of insulin
Type 2 diabetes mellitus with hyperglycemia, unspecified whether long term insulin use
Type 2 diabetes mellitus with hyperglycemia, unspecified whether long term insulin use
Type 2 diabetes mellitus without complication, with long-term current use of insulin
Type 2 diabetes mellitus without complication, with long-term current use of insulin

## 2019-09-25 ENCOUNTER — APPOINTMENT (OUTPATIENT)
Dept: CARDIOTHORACIC SURGERY | Facility: CLINIC | Age: 49
End: 2019-09-25
Payer: SELF-PAY

## 2019-09-25 VITALS
DIASTOLIC BLOOD PRESSURE: 80 MMHG | SYSTOLIC BLOOD PRESSURE: 118 MMHG | TEMPERATURE: 97.6 F | WEIGHT: 175 LBS | HEIGHT: 67 IN | BODY MASS INDEX: 27.47 KG/M2 | HEART RATE: 102 BPM | OXYGEN SATURATION: 97 % | RESPIRATION RATE: 16 BRPM

## 2019-09-25 PROCEDURE — 99024 POSTOP FOLLOW-UP VISIT: CPT

## 2019-09-25 RX ORDER — ESOMEPRAZOLE MAGNESIUM 40 MG/1
40 CAPSULE, DELAYED RELEASE ORAL DAILY
Refills: 0 | Status: ACTIVE | COMMUNITY
Start: 2019-09-25

## 2019-09-25 RX ORDER — DOCUSATE SODIUM 100 MG/1
100 CAPSULE, LIQUID FILLED ORAL 3 TIMES DAILY
Qty: 30 | Refills: 0 | Status: COMPLETED | COMMUNITY
Start: 2019-09-24 | End: 2019-09-25

## 2019-09-25 RX ORDER — FUROSEMIDE 40 MG/1
40 TABLET ORAL DAILY
Qty: 10 | Refills: 0 | Status: COMPLETED | COMMUNITY
Start: 2019-09-24 | End: 2019-09-25

## 2019-09-25 RX ORDER — OXYCODONE HYDROCHLORIDE AND ACETAMINOPHEN 5; 325 MG/1; MG/1
5-325 TABLET ORAL
Qty: 30 | Refills: 0 | Status: COMPLETED | COMMUNITY
Start: 2019-09-24 | End: 2019-09-25

## 2019-09-25 RX ORDER — POTASSIUM CHLORIDE 1500 MG/1
20 TABLET, EXTENDED RELEASE ORAL DAILY
Qty: 14 | Refills: 0 | Status: COMPLETED | COMMUNITY
Start: 2019-09-24 | End: 2019-09-25

## 2019-10-02 ENCOUNTER — APPOINTMENT (OUTPATIENT)
Dept: CARDIOTHORACIC SURGERY | Facility: CLINIC | Age: 49
End: 2019-10-02

## 2019-10-02 NOTE — ASSESSMENT
[FreeTextEntry1] : This is a 49 year old male status post CABG X 3 ( LIMA to LAD, SVG to first diagonal, RPDA ) on 8/10/19. Post op course significant for blurry vision, follow up opthalmology as outpatient. Discharged to home and followed up post out one week post.  Patient was readmitted from 8/26/2019 - 8/30/2019 with hyperglycemia and was unable to check his glucose because glucometer issues. Pt. follows endocrine with Dr. Jose Lyon. Pt is here today for post op visit. \par \par Today on exam patient's lungs clear bilaterally, normal sinus rhythm, sternum stable, incision clean, dry and intact. SVG site is clean, dry and intact. No peripheral edema noted. Instructed patient on importance of optimal glycemic control, daily showering, daily weights, incentive spirometer use, and increase ambulation as tolerated. Instructed to call office with any signs or symptoms of infection or weight gain of 2 or more pounds in 1 day or 3 or more pounds in 1 week. \par \par Plan:\par 1) Continue current medication regimen\par 2) Follow up with cardiologist\par 3) May return on as needed basis \par 4) \par \par \par \par \par

## 2019-10-02 NOTE — HISTORY OF PRESENT ILLNESS
[FreeTextEntry1] : This is a 49 year old male status post CABG X 3 ( LIMA to LAD, SVG to first diagonal, RPDA ) on 8/10/19. Post op course significant for blurry vision, follow up opthalmology as outpatient. Discharged home and followed up one week post op in office. Patient was readmitted from 8/26/2019 to 8/30/2019 with hyperglycemia (pt had glucometer issues). Pt. follows Dr. Jose Lyon from endocrine. Pt is here today for post op visit. \par

## 2019-10-14 ENCOUNTER — APPOINTMENT (OUTPATIENT)
Dept: CARDIOTHORACIC SURGERY | Facility: CLINIC | Age: 49
End: 2019-10-14
Payer: SELF-PAY

## 2019-10-14 VITALS
DIASTOLIC BLOOD PRESSURE: 84 MMHG | HEART RATE: 96 BPM | OXYGEN SATURATION: 95 % | BODY MASS INDEX: 27.31 KG/M2 | HEIGHT: 67 IN | TEMPERATURE: 97.7 F | SYSTOLIC BLOOD PRESSURE: 127 MMHG | RESPIRATION RATE: 16 BRPM | WEIGHT: 174 LBS

## 2019-10-14 DIAGNOSIS — Z95.1 PRESENCE OF AORTOCORONARY BYPASS GRAFT: ICD-10-CM

## 2019-10-14 DIAGNOSIS — Z09 ENCOUNTER FOR FOLLOW-UP EXAMINATION AFTER COMPLETED TREATMENT FOR CONDITIONS OTHER THAN MALIGNANT NEOPLASM: ICD-10-CM

## 2019-10-14 PROCEDURE — 99024 POSTOP FOLLOW-UP VISIT: CPT

## 2019-10-14 RX ORDER — INSULIN LISPRO 100 [IU]/ML
100 INJECTION, SOLUTION INTRAVENOUS; SUBCUTANEOUS
Refills: 0 | Status: ACTIVE | COMMUNITY
Start: 2019-09-24

## 2019-10-14 RX ORDER — OXYCODONE HYDROCHLORIDE AND ACETAMINOPHEN 5; 325 MG/1; MG/1
5-325 TABLET ORAL
Refills: 0 | Status: COMPLETED | COMMUNITY
Start: 2019-10-04 | End: 2019-10-14

## 2019-10-14 RX ORDER — INSULIN GLARGINE 100 [IU]/ML
100 INJECTION, SOLUTION SUBCUTANEOUS
Qty: 3 | Refills: 3 | Status: ACTIVE | COMMUNITY
Start: 2019-09-24

## 2019-10-31 PROBLEM — I25.10 CAD (CORONARY ARTERY DISEASE): Status: ACTIVE | Noted: 2019-10-31

## 2019-10-31 PROBLEM — E11.9 DIABETES MELLITUS: Status: ACTIVE | Noted: 2019-10-31

## 2019-10-31 RX ORDER — METOPROLOL TARTRATE 25 MG/1
25 TABLET, FILM COATED ORAL TWICE DAILY
Qty: 60 | Refills: 0 | Status: ACTIVE | COMMUNITY

## 2019-10-31 RX ORDER — ASPIRIN 81 MG/1
81 TABLET, COATED ORAL
Refills: 0 | Status: ACTIVE | COMMUNITY

## 2019-10-31 RX ORDER — INSULIN LISPRO 100 [IU]/ML
100 INJECTION, SOLUTION INTRAVENOUS; SUBCUTANEOUS
Refills: 0 | Status: ACTIVE | COMMUNITY

## 2019-10-31 RX ORDER — INSULIN GLARGINE 100 [IU]/ML
100 INJECTION, SOLUTION SUBCUTANEOUS
Refills: 0 | Status: ACTIVE | COMMUNITY

## 2019-10-31 RX ORDER — ATORVASTATIN CALCIUM 80 MG/1
80 TABLET, FILM COATED ORAL
Qty: 30 | Refills: 11 | Status: ACTIVE | COMMUNITY

## 2019-11-01 ENCOUNTER — APPOINTMENT (OUTPATIENT)
Dept: CARDIOLOGY | Facility: CLINIC | Age: 49
End: 2019-11-01

## 2019-11-01 DIAGNOSIS — I25.10 ATHEROSCLEROTIC HEART DISEASE OF NATIVE CORONARY ARTERY W/OUT ANGINA PECTORIS: ICD-10-CM

## 2019-11-01 DIAGNOSIS — E11.9 TYPE 2 DIABETES MELLITUS W/OUT COMPLICATIONS: ICD-10-CM

## 2019-11-18 NOTE — ED ADULT NURSE NOTE - NSFALLRSKASSESSDT_ED_ALL_ED
NOTIFICATION RETURN TO WORK / SCHOOL 
 
11/18/2019 3:47 PM 
 
Ms. Ana Morse [address] [id number] To Whom It May Concern: 
 
Ana Morse is currently under the care of GREGOR Diggs. She will return to work/school on: 11/21/19 If there are questions or concerns please have the patient contact our office. Sincerely, 
 
21/19 Miguel Sesay MD 
 
                                
 
 08-Aug-2019 14:55

## 2020-01-10 ENCOUNTER — APPOINTMENT (OUTPATIENT)
Dept: CARDIOLOGY | Facility: CLINIC | Age: 50
End: 2020-01-10
Payer: MEDICARE

## 2020-01-10 VITALS
BODY MASS INDEX: 27.47 KG/M2 | SYSTOLIC BLOOD PRESSURE: 123 MMHG | HEART RATE: 91 BPM | HEIGHT: 67 IN | OXYGEN SATURATION: 96 % | DIASTOLIC BLOOD PRESSURE: 76 MMHG | WEIGHT: 175 LBS

## 2020-01-10 DIAGNOSIS — I50.22 CHRONIC SYSTOLIC (CONGESTIVE) HEART FAILURE: ICD-10-CM

## 2020-01-10 DIAGNOSIS — I25.10 ATHEROSCLEROTIC HEART DISEASE OF NATIVE CORONARY ARTERY W/OUT ANGINA PECTORIS: ICD-10-CM

## 2020-01-10 DIAGNOSIS — R73.9 HYPERGLYCEMIA, UNSPECIFIED: ICD-10-CM

## 2020-01-10 PROCEDURE — 99204 OFFICE O/P NEW MOD 45 MIN: CPT

## 2020-01-10 PROCEDURE — 93000 ELECTROCARDIOGRAM COMPLETE: CPT

## 2020-01-10 RX ORDER — ASPIRIN ENTERIC COATED TABLETS 81 MG 81 MG/1
81 TABLET, DELAYED RELEASE ORAL
Qty: 30 | Refills: 0 | Status: ACTIVE | COMMUNITY
Start: 2019-09-24

## 2020-01-10 RX ORDER — POTASSIUM CHLORIDE 1500 MG/1
20 TABLET, FILM COATED, EXTENDED RELEASE ORAL
Qty: 5 | Refills: 0 | Status: ACTIVE | COMMUNITY
Start: 2019-10-04

## 2020-01-17 PROBLEM — R73.9 BLOOD GLUCOSE ELEVATED: Status: ACTIVE | Noted: 2020-01-17

## 2020-01-17 LAB
ANION GAP SERPL CALC-SCNC: 12 MMOL/L
BASOPHILS # BLD AUTO: 0.04 K/UL
BASOPHILS NFR BLD AUTO: 0.6 %
BUN SERPL-MCNC: 14 MG/DL
CALCIUM SERPL-MCNC: 10 MG/DL
CHLORIDE SERPL-SCNC: 97 MMOL/L
CHOLEST SERPL-MCNC: 315 MG/DL
CHOLEST/HDLC SERPL: 6.9 RATIO
CO2 SERPL-SCNC: 26 MMOL/L
CREAT SERPL-MCNC: 0.89 MG/DL
EOSINOPHIL # BLD AUTO: 0.11 K/UL
EOSINOPHIL NFR BLD AUTO: 1.7 %
GLUCOSE SERPL-MCNC: 534 MG/DL
HCT VFR BLD CALC: 41.4 %
HDLC SERPL-MCNC: 46 MG/DL
HGB BLD-MCNC: 13.1 G/DL
IMM GRANULOCYTES NFR BLD AUTO: 0.2 %
LDLC SERPL CALC-MCNC: NORMAL MG/DL
LYMPHOCYTES # BLD AUTO: 2.71 K/UL
LYMPHOCYTES NFR BLD AUTO: 40.8 %
MAN DIFF?: NORMAL
MCHC RBC-ENTMCNC: 23.9 PG
MCHC RBC-ENTMCNC: 31.6 GM/DL
MCV RBC AUTO: 75.4 FL
MONOCYTES # BLD AUTO: 0.48 K/UL
MONOCYTES NFR BLD AUTO: 7.2 %
NEUTROPHILS # BLD AUTO: 3.3 K/UL
NEUTROPHILS NFR BLD AUTO: 49.5 %
PLATELET # BLD AUTO: 200 K/UL
POTASSIUM SERPL-SCNC: 4.6 MMOL/L
RBC # BLD: 5.49 M/UL
RBC # FLD: 15 %
SODIUM SERPL-SCNC: 135 MMOL/L
TRIGL SERPL-MCNC: 732 MG/DL
WBC # FLD AUTO: 6.65 K/UL

## 2020-01-17 NOTE — REASON FOR VISIT
[Follow-Up - From Hospitalization] : follow-up of a recent hospitalization for [CABG Follow-up] : bypass graft [Spouse] : spouse

## 2020-01-17 NOTE — REVIEW OF SYSTEMS
[see HPI] : see HPI [Cough] : no cough [Wheezing] : no wheezing [Coughing Up Blood] : no hemoptysis [Negative] : Heme/Lymph

## 2020-01-17 NOTE — HISTORY OF PRESENT ILLNESS
[FreeTextEntry1] : Mr. ELSI MERINO  is a 49 year-old man PMH CAD s/p CABG x3 (LIMA to distal LAD , SVG to first Diagonal , RPDA ) 9/10/2019 who presents to establish cardiology care\par he denies symptoms of CP, but does admit to sometimes having heavy breathing but it is not assoc with exertion \par \par

## 2020-01-17 NOTE — PHYSICAL EXAM
[General Appearance - Well Developed] : well developed [Normal Appearance] : normal appearance [Well Groomed] : well groomed [General Appearance - Well Nourished] : well nourished [No Deformities] : no deformities [Normal Conjunctiva] : the conjunctiva exhibited no abnormalities [General Appearance - In No Acute Distress] : no acute distress [Eyelids - No Xanthelasma] : the eyelids demonstrated no xanthelasmas [Normal Oral Mucosa] : normal oral mucosa [No Oral Pallor] : no oral pallor [No Oral Cyanosis] : no oral cyanosis [Normal Jugular Venous A Waves Present] : normal jugular venous A waves present [Normal Jugular Venous V Waves Present] : normal jugular venous V waves present [No Jugular Venous Celis A Waves] : no jugular venous celis A waves [Heart Sounds] : normal S1 and S2 [Heart Rate And Rhythm] : heart rate and rhythm were normal [Murmurs] : no murmurs present [FreeTextEntry1] : chest wall , sternotomy scar healing  [Respiration, Rhythm And Depth] : normal respiratory rhythm and effort [Auscultation Breath Sounds / Voice Sounds] : lungs were clear to auscultation bilaterally [Exaggerated Use Of Accessory Muscles For Inspiration] : no accessory muscle use [Abdomen Soft] : soft [Abdomen Tenderness] : non-tender [Abdomen Mass (___ Cm)] : no abdominal mass palpated [Abnormal Walk] : normal gait [Gait - Sufficient For Exercise Testing] : the gait was sufficient for exercise testing [Nail Clubbing] : no clubbing of the fingernails [Cyanosis, Localized] : no localized cyanosis [Petechial Hemorrhages (___cm)] : no petechial hemorrhages [Skin Color & Pigmentation] : normal skin color and pigmentation [] : no rash [No Venous Stasis] : no venous stasis [Skin Lesions] : no skin lesions [No Skin Ulcers] : no skin ulcer [No Xanthoma] : no  xanthoma was observed [Oriented To Time, Place, And Person] : oriented to person, place, and time [Affect] : the affect was normal [Mood] : the mood was normal [No Anxiety] : not feeling anxious

## 2020-02-19 ENCOUNTER — APPOINTMENT (OUTPATIENT)
Dept: CV DIAGNOSITCS | Facility: HOSPITAL | Age: 50
End: 2020-02-19

## 2020-02-19 ENCOUNTER — OUTPATIENT (OUTPATIENT)
Dept: OUTPATIENT SERVICES | Facility: HOSPITAL | Age: 50
LOS: 1 days | End: 2020-02-19
Payer: COMMERCIAL

## 2020-02-19 DIAGNOSIS — I35.0 NONRHEUMATIC AORTIC (VALVE) STENOSIS: ICD-10-CM

## 2020-02-19 DIAGNOSIS — Z98.61 CORONARY ANGIOPLASTY STATUS: Chronic | ICD-10-CM

## 2020-02-19 PROCEDURE — 93306 TTE W/DOPPLER COMPLETE: CPT

## 2020-02-19 PROCEDURE — 93306 TTE W/DOPPLER COMPLETE: CPT | Mod: 26

## 2020-03-13 ENCOUNTER — APPOINTMENT (OUTPATIENT)
Dept: CARDIOLOGY | Facility: CLINIC | Age: 50
End: 2020-03-13

## 2020-06-28 ENCOUNTER — EMERGENCY (EMERGENCY)
Facility: HOSPITAL | Age: 50
LOS: 1 days | Discharge: HOME | End: 2020-06-28
Attending: EMERGENCY MEDICINE
Payer: COMMERCIAL

## 2020-06-28 VITALS
DIASTOLIC BLOOD PRESSURE: 66 MMHG | HEART RATE: 106 BPM | WEIGHT: 169.98 LBS | RESPIRATION RATE: 20 BRPM | TEMPERATURE: 98 F | SYSTOLIC BLOOD PRESSURE: 102 MMHG | HEIGHT: 65 IN | OXYGEN SATURATION: 96 %

## 2020-06-28 DIAGNOSIS — I25.118 ATHEROSCLEROTIC HEART DISEASE OF NATIVE CORONARY ARTERY WITH OTHER FORMS OF ANGINA PECTORIS: ICD-10-CM

## 2020-06-28 DIAGNOSIS — L97.509 NON-PRESSURE CHRONIC ULCER OF OTHER PART OF UNSPECIFIED FOOT WITH UNSPECIFIED SEVERITY: ICD-10-CM

## 2020-06-28 DIAGNOSIS — Z98.61 CORONARY ANGIOPLASTY STATUS: Chronic | ICD-10-CM

## 2020-06-28 DIAGNOSIS — E11.9 TYPE 2 DIABETES MELLITUS WITHOUT COMPLICATIONS: ICD-10-CM

## 2020-06-28 DIAGNOSIS — I10 ESSENTIAL (PRIMARY) HYPERTENSION: ICD-10-CM

## 2020-06-28 LAB
ALBUMIN SERPL ELPH-MCNC: 4 G/DL — SIGNIFICANT CHANGE UP (ref 3.3–5)
ALP SERPL-CCNC: 95 U/L — SIGNIFICANT CHANGE UP (ref 40–120)
ALT FLD-CCNC: 15 U/L — SIGNIFICANT CHANGE UP (ref 10–45)
ANION GAP SERPL CALC-SCNC: 12 MMOL/L — SIGNIFICANT CHANGE UP (ref 5–17)
ANION GAP SERPL CALC-SCNC: 13 MMOL/L — SIGNIFICANT CHANGE UP (ref 5–17)
AST SERPL-CCNC: 14 U/L — SIGNIFICANT CHANGE UP (ref 10–40)
BASOPHILS # BLD AUTO: 0.02 K/UL — SIGNIFICANT CHANGE UP (ref 0–0.2)
BASOPHILS NFR BLD AUTO: 0.3 % — SIGNIFICANT CHANGE UP (ref 0–2)
BILIRUB SERPL-MCNC: 0.4 MG/DL — SIGNIFICANT CHANGE UP (ref 0.2–1.2)
BUN SERPL-MCNC: 18 MG/DL — SIGNIFICANT CHANGE UP (ref 7–23)
BUN SERPL-MCNC: 21 MG/DL — SIGNIFICANT CHANGE UP (ref 7–23)
CALCIUM SERPL-MCNC: 9.3 MG/DL — SIGNIFICANT CHANGE UP (ref 8.4–10.5)
CALCIUM SERPL-MCNC: 9.5 MG/DL — SIGNIFICANT CHANGE UP (ref 8.4–10.5)
CHLORIDE SERPL-SCNC: 96 MMOL/L — SIGNIFICANT CHANGE UP (ref 96–108)
CHLORIDE SERPL-SCNC: 99 MMOL/L — SIGNIFICANT CHANGE UP (ref 96–108)
CO2 SERPL-SCNC: 24 MMOL/L — SIGNIFICANT CHANGE UP (ref 22–31)
CO2 SERPL-SCNC: 26 MMOL/L — SIGNIFICANT CHANGE UP (ref 22–31)
CREAT SERPL-MCNC: 0.8 MG/DL — SIGNIFICANT CHANGE UP (ref 0.5–1.3)
CREAT SERPL-MCNC: 0.98 MG/DL — SIGNIFICANT CHANGE UP (ref 0.5–1.3)
EOSINOPHIL # BLD AUTO: 0.05 K/UL — SIGNIFICANT CHANGE UP (ref 0–0.5)
EOSINOPHIL NFR BLD AUTO: 0.7 % — SIGNIFICANT CHANGE UP (ref 0–6)
ERYTHROCYTE [SEDIMENTATION RATE] IN BLOOD: 27 MM/HR — HIGH (ref 0–20)
GLUCOSE SERPL-MCNC: 314 MG/DL — HIGH (ref 70–99)
GLUCOSE SERPL-MCNC: 548 MG/DL — CRITICAL HIGH (ref 70–99)
HCT VFR BLD CALC: 38.1 % — LOW (ref 39–50)
HGB BLD-MCNC: 13 G/DL — SIGNIFICANT CHANGE UP (ref 13–17)
IMM GRANULOCYTES NFR BLD AUTO: 0.3 % — SIGNIFICANT CHANGE UP (ref 0–1.5)
LYMPHOCYTES # BLD AUTO: 1.73 K/UL — SIGNIFICANT CHANGE UP (ref 1–3.3)
LYMPHOCYTES # BLD AUTO: 24.6 % — SIGNIFICANT CHANGE UP (ref 13–44)
MCHC RBC-ENTMCNC: 27.3 PG — SIGNIFICANT CHANGE UP (ref 27–34)
MCHC RBC-ENTMCNC: 34.1 GM/DL — SIGNIFICANT CHANGE UP (ref 32–36)
MCV RBC AUTO: 79.9 FL — LOW (ref 80–100)
MONOCYTES # BLD AUTO: 0.53 K/UL — SIGNIFICANT CHANGE UP (ref 0–0.9)
MONOCYTES NFR BLD AUTO: 7.5 % — SIGNIFICANT CHANGE UP (ref 2–14)
NEUTROPHILS # BLD AUTO: 4.67 K/UL — SIGNIFICANT CHANGE UP (ref 1.8–7.4)
NEUTROPHILS NFR BLD AUTO: 66.6 % — SIGNIFICANT CHANGE UP (ref 43–77)
NRBC # BLD: 0 /100 WBCS — SIGNIFICANT CHANGE UP (ref 0–0)
PLATELET # BLD AUTO: 154 K/UL — SIGNIFICANT CHANGE UP (ref 150–400)
POTASSIUM SERPL-MCNC: 3.8 MMOL/L — SIGNIFICANT CHANGE UP (ref 3.5–5.3)
POTASSIUM SERPL-MCNC: 4.3 MMOL/L — SIGNIFICANT CHANGE UP (ref 3.5–5.3)
POTASSIUM SERPL-SCNC: 3.8 MMOL/L — SIGNIFICANT CHANGE UP (ref 3.5–5.3)
POTASSIUM SERPL-SCNC: 4.3 MMOL/L — SIGNIFICANT CHANGE UP (ref 3.5–5.3)
PROT SERPL-MCNC: 6.7 G/DL — SIGNIFICANT CHANGE UP (ref 6–8.3)
RBC # BLD: 4.77 M/UL — SIGNIFICANT CHANGE UP (ref 4.2–5.8)
RBC # FLD: 13.9 % — SIGNIFICANT CHANGE UP (ref 10.3–14.5)
SARS-COV-2 RNA SPEC QL NAA+PROBE: SIGNIFICANT CHANGE UP
SODIUM SERPL-SCNC: 132 MMOL/L — LOW (ref 135–145)
SODIUM SERPL-SCNC: 138 MMOL/L — SIGNIFICANT CHANGE UP (ref 135–145)
WBC # BLD: 7.02 K/UL — SIGNIFICANT CHANGE UP (ref 3.8–10.5)
WBC # FLD AUTO: 7.02 K/UL — SIGNIFICANT CHANGE UP (ref 3.8–10.5)

## 2020-06-28 PROCEDURE — 99218: CPT

## 2020-06-28 PROCEDURE — 73630 X-RAY EXAM OF FOOT: CPT | Mod: 26,RT,76

## 2020-06-28 RX ORDER — SODIUM CHLORIDE 9 MG/ML
1000 INJECTION INTRAMUSCULAR; INTRAVENOUS; SUBCUTANEOUS ONCE
Refills: 0 | Status: COMPLETED | OUTPATIENT
Start: 2020-06-28 | End: 2020-06-28

## 2020-06-28 RX ORDER — INSULIN LISPRO 100/ML
VIAL (ML) SUBCUTANEOUS AT BEDTIME
Refills: 0 | Status: DISCONTINUED | OUTPATIENT
Start: 2020-06-28 | End: 2020-07-02

## 2020-06-28 RX ORDER — DEXTROSE 50 % IN WATER 50 %
25 SYRINGE (ML) INTRAVENOUS ONCE
Refills: 0 | Status: DISCONTINUED | OUTPATIENT
Start: 2020-06-28 | End: 2020-07-02

## 2020-06-28 RX ORDER — SODIUM CHLORIDE 9 MG/ML
1000 INJECTION, SOLUTION INTRAVENOUS
Refills: 0 | Status: DISCONTINUED | OUTPATIENT
Start: 2020-06-28 | End: 2020-07-02

## 2020-06-28 RX ORDER — INSULIN LISPRO 100/ML
VIAL (ML) SUBCUTANEOUS
Refills: 0 | Status: DISCONTINUED | OUTPATIENT
Start: 2020-06-28 | End: 2020-07-02

## 2020-06-28 RX ORDER — INSULIN LISPRO 100/ML
15 VIAL (ML) SUBCUTANEOUS
Refills: 0 | Status: DISCONTINUED | OUTPATIENT
Start: 2020-06-28 | End: 2020-06-29

## 2020-06-28 RX ORDER — DEXTROSE 50 % IN WATER 50 %
15 SYRINGE (ML) INTRAVENOUS ONCE
Refills: 0 | Status: DISCONTINUED | OUTPATIENT
Start: 2020-06-28 | End: 2020-07-02

## 2020-06-28 RX ORDER — DEXTROSE 50 % IN WATER 50 %
12.5 SYRINGE (ML) INTRAVENOUS ONCE
Refills: 0 | Status: DISCONTINUED | OUTPATIENT
Start: 2020-06-28 | End: 2020-07-02

## 2020-06-28 RX ORDER — INSULIN LISPRO 100/ML
17 VIAL (ML) SUBCUTANEOUS ONCE
Refills: 0 | Status: COMPLETED | OUTPATIENT
Start: 2020-06-28 | End: 2020-06-28

## 2020-06-28 RX ORDER — GLUCAGON INJECTION, SOLUTION 0.5 MG/.1ML
1 INJECTION, SOLUTION SUBCUTANEOUS ONCE
Refills: 0 | Status: DISCONTINUED | OUTPATIENT
Start: 2020-06-28 | End: 2020-07-02

## 2020-06-28 RX ORDER — INSULIN GLARGINE 100 [IU]/ML
40 INJECTION, SOLUTION SUBCUTANEOUS AT BEDTIME
Refills: 0 | Status: DISCONTINUED | OUTPATIENT
Start: 2020-06-28 | End: 2020-06-29

## 2020-06-28 RX ADMIN — INSULIN GLARGINE 40 UNIT(S): 100 INJECTION, SOLUTION SUBCUTANEOUS at 23:07

## 2020-06-28 RX ADMIN — SODIUM CHLORIDE 1000 MILLILITER(S): 9 INJECTION INTRAMUSCULAR; INTRAVENOUS; SUBCUTANEOUS at 15:23

## 2020-06-28 RX ADMIN — SODIUM CHLORIDE 1000 MILLILITER(S): 9 INJECTION INTRAMUSCULAR; INTRAVENOUS; SUBCUTANEOUS at 16:10

## 2020-06-28 RX ADMIN — Medication 17 UNIT(S): at 15:37

## 2020-06-28 RX ADMIN — SODIUM CHLORIDE 1000 MILLILITER(S): 9 INJECTION INTRAMUSCULAR; INTRAVENOUS; SUBCUTANEOUS at 18:45

## 2020-06-28 RX ADMIN — Medication 1 TABLET(S): at 16:09

## 2020-06-28 NOTE — CONSULT NOTE ADULT - ATTENDING COMMENTS
seen at bedside, wounds appear stable with minimal depth and no signs of infection, stable from pod standpoint

## 2020-06-28 NOTE — ED PROVIDER NOTE - LOWER EXTREMITY EXAM, LEFT
TENDERNESS TENDERNESS/no peripheral edema. pt has superficial spider veins on right medial ankle without thrombosis; he reports this is the swelling he was concerned about

## 2020-06-28 NOTE — ED CDU PROVIDER INITIAL DAY NOTE - CPE EDP NEURO NORM
normal... No. FARZANA screening performed.  STOP BANG Legend: 0-2 = LOW Risk; 3-4 = INTERMEDIATE Risk; 5-8 = HIGH Risk

## 2020-06-28 NOTE — ED CDU PROVIDER INITIAL DAY NOTE - SKIN WOUND TYPE
L 5th roca: crusting yellow lesion to 5th fingernail with surrounding dry, cracked skin. no erythema/warmth/discharge.      R foot: dried, blister-like lesion to plantar midfoot without discharge, erythema, or warmth

## 2020-06-28 NOTE — ED CDU PROVIDER INITIAL DAY NOTE - ATTENDING CONTRIBUTION TO CARE
ATTENDING, Vinay DAVIDSON: I have personally performed a face to face diagnostic evaluation on this patient.  I have reviewed the ACP note and agree with the history, exam, and plan of care, except as noted here. Progress notes and further evaluation to be reviewed by observation and discharging attending.

## 2020-06-28 NOTE — CONSULT NOTE ADULT - SUBJECTIVE AND OBJECTIVE BOX
HPI:  Patient has history of diabetes, on oral meds and insulin at home, non compliant with insulin injections and testing, did not pickup his insulin refills, no recent hypoglycemic episodes, no polyuria polydipsia. Patient follows up with me for diabetes management.    PAST MEDICAL & SURGICAL HISTORY:  Coronary artery disease of native artery of native heart with stable angina pectoris  Type 2 diabetes mellitus without complication, with long-term current use of insulin  Hyperlipidemia, unspecified hyperlipidemia type  Hypertension, unspecified type  History of percutaneous coronary intervention      FAMILY HISTORY:  Family history of heart disease      Social History:    Outpatient Medications:    MEDICATIONS  (STANDING):  dextrose 5%. 1000 milliLiter(s) (50 mL/Hr) IV Continuous <Continuous>  dextrose 50% Injectable 12.5 Gram(s) IV Push once  dextrose 50% Injectable 25 Gram(s) IV Push once  dextrose 50% Injectable 25 Gram(s) IV Push once  insulin glargine Injectable (LANTUS) 40 Unit(s) SubCutaneous at bedtime  insulin lispro (HumaLOG) corrective regimen sliding scale   SubCutaneous three times a day before meals  insulin lispro (HumaLOG) corrective regimen sliding scale   SubCutaneous at bedtime  insulin lispro Injectable (HumaLOG) 15 Unit(s) SubCutaneous three times a day before meals    MEDICATIONS  (PRN):  dextrose 40% Gel 15 Gram(s) Oral once PRN Blood Glucose LESS THAN 70 milliGRAM(s)/deciLiter  glucagon  Injectable 1 milliGRAM(s) IntraMuscular once PRN Glucose <70 milliGRAM(s)/deciLiter      Allergies    No Known Allergies    Intolerances      Review of Systems:  Constitutional: No fever, no chills  Eyes: No blurry vision  Neuro: No tremors  HEENT: No pain, no neck swelling  Cardiovascular: No chest pain, no palpitations  Respiratory: No SOB, no cough  GI: No nausea, vomiting, abdominal pain  : No dysuria  Skin: no rash  MSK: Has leg swelling.  Psych: no depression  Endocrine: no polyuria, polydipsia    UNABLE TO OBTAIN    ALL OTHER SYSTEMS REVIEWED AND NEGATIVE        PHYSICAL EXAM:  VITALS: T(C): 36.7 (06-28-20 @ 14:24)  T(F): 98 (06-28-20 @ 14:24), Max: 98 (06-28-20 @ 14:24)  HR: 97 (06-28-20 @ 14:24) (97 - 106)  BP: 102/66 (06-28-20 @ 14:24) (102/66 - 102/66)  RR:  (17 - 20)  SpO2:  (95% - 96%)  Wt(kg): --  GENERAL: NAD, well-groomed, well-developed  EYES: No proptosis, no lid lag  HEENT:  Atraumatic, Normocephalic  THYROID: Normal size, no palpable nodules  RESPIRATORY: Clear to auscultation bilaterally; No rales, rhonchi, wheezing  CARDIOVASCULAR: Si S2, No murmurs;  GI: Soft, non distended, normal bowel sounds  SKIN: Dry, intact, No rashes or lesions  MUSCULOSKELETAL: Has BL lower extremity edema.  NEURO:  no tremor, sensation decreased in feet BL,                              13.0   7.02  )-----------( 154      ( 28 Jun 2020 14:24 )             38.1       06-28    132<L>  |  96  |  21  ----------------------------<  548<HH>  4.3   |  24  |  0.98    EGFR if : 104  EGFR if non : 90    Ca    9.5      06-28    TPro  6.7  /  Alb  4.0  /  TBili  0.4  /  DBili  x   /  AST  14  /  ALT  15  /  AlkPhos  95  06-28      Thyroid Function Tests:              Radiology:

## 2020-06-28 NOTE — ED CDU PROVIDER INITIAL DAY NOTE - OBJECTIVE STATEMENT
49y m PMHx CAD, DM 2, HTN, HLD p/w left foot pain. Pt reports 3 weeks of left foot swelling and pain, primarily to his 5th left toe with overlying yellow crusting lesion and pain. He also reports pain with walking but has been able to walk. His "doctor friend" gave him 2 pills which have helped, he does not know what these pills are. Denies joint swelling, redness, warmth, or limited ROM. Denies numbness, weakness, temperature changes, calf pain/swelling, recent travel, CP, SOB, fever, chills, or discharge. Declined pain meds in ED, took motrin at home.  In ED patient found to have significant elevated BG, reportedly very noncompliant with insulin. Patient's endocrinologist was contacted and he made insulin recommendations and also advised overnight observation of BG monitoring, follow-up with him in his office this week for reevaluation. 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 Recommended to take augmentin twice daily and f/u with podiatry as outpatient.

## 2020-06-28 NOTE — CONSULT NOTE ADULT - ASSESSMENT
Assessment  DMT2: 49y Male with DM T2 with hyperglycemia, was on oral meds and insulin at home, non compliant with mesd and testing, admitted with foot infections, blood sugars running high, no hypoglycemic episode, eating meals, compliant with low carb diet.  Patient is high risk with high level decision making due to uncontrolled diabetes, has increased risk for cardiovascular and cerebrovascular events.  Foot infection: Continue treatment, podiatry primary team FU  CAD: on medications, no chest pain, stable, monitored.  HTN: Controlled,  on antihypertensive medications.      Jose Lyon MD  Cell: [phone]  Office: [phone]

## 2020-06-28 NOTE — ED CDU PROVIDER INITIAL DAY NOTE - PROGRESS NOTE DETAILS
CDU PROGRESS NOTE KYLEE SEVERINO: Received sign-out. Case/plan reviewed. Patient s/p Excisional debridement of L foot 5th digit tyloma and Excisional debridement of R foot submet 2 wound to subq by Podiatry. Recs appreciated, start Augmentin BID x10d. Pt without complaints. Will continue to monitor overnight. CDU PROGRESS NOTE PA MERE: Pt resting comfortably, NAD, VSS. . Patient received 40 units of Lantus as per Endo, will closely monitor.

## 2020-06-28 NOTE — CONSULT NOTE ADULT - ASSESSMENT
49M w/ PMHx DM, CAD, HTN, HLD w/ c/c of L foot pain  - Pt seen and evaluated  - Afebrile, no leukocytosis  - L foot lateral 5th DIPJ tyloma with underlying ulceration, 0.5x0.5cm, depth to capsule, wound bed granular, sanguinous drainage, no malodor, periwound hyperkeratotic, etiology 2/2 pressure, DM polyneuropathy  - R foot submet 2 wound, 1.5x0.5cm, depth to subq, wound bed granular, sanguinous drainage, no maldoro, periwound macerated, etiology 2/2 pressure, equinus, DM polyneuropathy  - Excisional debridement of L foot 5th digit tyloma using sterile 15 blade to the level of but not beyond epidermis revealing pin-point ulceration with no signs of infection  - Excisional debridement of R foot submet 2 wound to subq using sterile 15 blade, no signs of infection  - No acute podiatric surgical intervention  - Rec dc on Augmentin BID x10d  - Stable for discharge from podiatric standpoint to f/u as outpt with Dr. Wade Ayers within 1 week of dc @ [address] ([phone]) 49M w/ PMHx DM, CAD, HTN, HLD w/ c/c of L foot pain  - Pt seen and evaluated  - Afebrile, no leukocytosis  - L foot lateral 5th DIPJ tyloma with underlying ulceration, 0.5x0.5cm, depth to capsule, wound bed granular, sanguinous drainage, no malodor, periwound hyperkeratotic, etiology 2/2 pressure, DM polyneuropathy  - R foot submet 2 wound, 1.5x0.5cm, depth to subq, wound bed granular, sanguinous drainage, no maldoro, periwound macerated, etiology 2/2 pressure, equinus, DM polyneuropathy  - Excisional debridement of L foot 5th digit tyloma using sterile 15 blade to the level of but not beyond epidermis revealing pin-point ulceration with no signs of infection  - Excisional debridement of R foot submet 2 wound to subq using sterile 15 blade, no signs of infection  - No acute podiatric surgical intervention  - Rec dc on Augmentin BID x10d  - Stable for discharge from podiatric standpoint to f/u as outpt with Dr. Wade Ayers within 1 week of dc @ [address] ([phone])  -Seen with attending

## 2020-06-28 NOTE — CONSULT NOTE ADULT - SUBJECTIVE AND OBJECTIVE BOX
Patient is a 49y old  Male who presents with a chief complaint of L foot wound    HPI: 49y m PMHx CAD, DM 2, HTN, HLD p/w left foot pain. Pt reports 3 weeks of left foot swelling and pain, primarily to his 5th left toe with overlying yellow crusting lesion and pain. He also reports pain with walking but has been able to walk. His "doctor friend" gave him 2 pills which have helped, he does not know what these pills are. Denies joint swelling, redness, warmth, or limited ROM. Denies numbness, weakness, temperature changes, calf pain/swelling, recent travel, CP, SOB, fever, chills, or discharge. Declined pain meds in ED, took motrin at home.      PAST MEDICAL & SURGICAL HISTORY:  Coronary artery disease of native artery of native heart with stable angina pectoris  Type 2 diabetes mellitus without complication, with long-term current use of insulin  Hyperlipidemia, unspecified hyperlipidemia type  Hypertension, unspecified type  History of percutaneous coronary intervention      MEDICATIONS  (STANDING):    MEDICATIONS  (PRN):      Allergies    No Known Allergies    Intolerances        VITALS:    Vital Signs Last 24 Hrs  T(C): 36.7 (28 Jun 2020 14:24), Max: 36.7 (28 Jun 2020 14:24)  T(F): 98 (28 Jun 2020 14:24), Max: 98 (28 Jun 2020 14:24)  HR: 97 (28 Jun 2020 14:24) (97 - 106)  BP: 102/66 (28 Jun 2020 14:24) (102/66 - 102/66)  BP(mean): --  RR: 17 (28 Jun 2020 14:24) (17 - 20)  SpO2: 95% (28 Jun 2020 14:24) (95% - 96%)    LABS:                          13.0   7.02  )-----------( 154      ( 28 Jun 2020 14:24 )             38.1       06-28    132<L>  |  96  |  21  ----------------------------<  548<HH>  4.3   |  24  |  0.98    Ca    9.5      28 Jun 2020 14:24    TPro  6.7  /  Alb  4.0  /  TBili  0.4  /  DBili  x   /  AST  14  /  ALT  15  /  AlkPhos  95  06-28      CAPILLARY BLOOD GLUCOSE              LOWER EXTREMITY PHYSICAL EXAM:    Vascular: DP/PT 1/4, B/L, CFT <3 seconds B/L, Temperature gradient warm to cool, B/L.   Neuro: Epicritic sensation intact to the level of digits, B/L.  Musculoskeletal/Ortho: bilateral equinus foot deformities  Skin: No erythema, no cellulitis  Wound #1: L foot lateral 5th DIPJ tyloma with underlying ulceration, 0.5x0.5cm, depth to capsule, wound bed granular, sanguinous drainage, no malodor, periwound hyperkeratotic, etiology 2/2 pressure, DM polyneuropathy  Wound #2: R foot submet 2 wound, 1.5x0.5cm, depth to subq, wound bed granular, sanguinous drainage, no maldoro, periwound macerated, etiology 2/2 pressure, equinus, DM polyneuropathy    RADIOLOGY & ADDITIONAL STUDIES:  < from: Xray Foot AP + Lateral + Oblique, Right (06.28.20 @ 14:39) >  ******PRELIMINARY REPORT******    ******PRELIMINARY REPORT******          EXAM:  FOOT COMPLETE RIGHT (MIN 3 VIEW)                            PROCEDURE DATE:  06/28/2020      ******PRELIMINARY REPORT******    ******PRELIMINARY REPORT******              INTERPRETATION:  no emergent findings.    follow up official report in AM              ******PRELIMINARY REPORT******    ******PRELIMINARY REPORT******          MAYELA LYONS M.D., RADIOLOGY RESIDENT    < end of copied text >    < from: Xray Toes, Left Foot (06.28.20 @ 14:09) >  ******PRELIMINARY REPORT******    ******PRELIMINARY REPORT******          EXAM:  TOES(S) LEFT FOOT                            PROCEDURE DATE:  06/28/2020      ******PRELIMINARY REPORT******    ******PRELIMINARY REPORT******              INTERPRETATION:  no emergent findings.    follow up official report in AM              ******PRELIMINARY REPORT******    ******PRELIMINARY REPORT******          MAYELA LYONS M.D., RADIOLOGY RESIDENT    < end of copied text >

## 2020-06-28 NOTE — ED ADULT NURSE NOTE - OBJECTIVE STATEMENT
49y m PMHx CAD, DM 2, HTN, HLD p/w left foot pain. Pt reports 3 weeks of left foot swelling and pain, primarily to his 5th left toe with overlying yellow crusting lesion and pain. He also reports pain with walking but has been able to walk. His "doctor friend" gave him 2 pills which have helped, he does not know what these pills are. Denies joint swelling, redness, warmth, or limited ROM. Denies numbness, weakness, temperature changes, calf pain/swelling, recent travel, CP, SOB, fever, chills, or discharge.

## 2020-06-28 NOTE — ED PROVIDER NOTE - PROGRESS NOTE DETAILS
Podiatry consulted team aware of pt will see in ED - John Paul Perez PA-C Spoke to on-call podiatry resident again - due to multiple urgent consults estimated time for consult 1-2hrs. Recommended DC with pod followup if clinically well-appearing. will reevaluate pt, labs pending for dispo. - John Paul Perez PA-C glucose 548. pt reports infrequent insulin use, said he has not seen his doctor because of the pandemic. Also reports not hydrating well. D/w pt plan to stay overnight in CDU for podiatry eval and FS monitoring/endo consult. Pt agreeable to staying. Case endorsed to CDU KYLEE Perez PA-C D/w podiatry confirmed will see pt tonight for consult. Endocrine fellow paged x1 - John Paul Perez PA-C D/w endo fellow - pt follows w/private endo Dr. Jose Lyon cell #[phone] called x1 left voicemail. - John Paul Perez PA-C Pt seen by podiatry - debrided small ulcer to R plantar surface and small ulcer to L 5th toe. Recommended course of PO Augmentin, podiatry followup. - John Paul Perez PA-C Dr Lyon (endo) saw pt - recommended lantus 40 and humalog 15 w/meals. Recommended CDU for overnight obs, FS with goal 200-250 before DC. - John Paul Perez PA-C

## 2020-06-28 NOTE — ED ADULT NURSE REASSESSMENT NOTE - NS ED NURSE REASSESS COMMENT FT1
Pt received from ARNOLDO Gomes. Pt oriented to CDU & plan of care was discussed. Pt denies any pain to L leg at this time. Unable to visualize feet due to bandaging. Pt states he is feeling better at the moment. Safety & comfort measures maintained. Call bell in reach. Will continue to monitor. Pt received from ARNOLDO Gomes. Pt oriented to CDU & plan of care was discussed. Pt denies any pain to L or R foot at this time. Unable to visualize feet due to bandaging. Pt states he is feeling better at the moment. Safety & comfort measures maintained. Call bell in reach. Will continue to monitor.

## 2020-06-28 NOTE — ED PROVIDER NOTE - EXTREMITY EXAM
left lower extremity findings NEG homans bilaterally. no calf swelling bilaterally./left lower extremity findings

## 2020-06-28 NOTE — ED CDU PROVIDER INITIAL DAY NOTE - LOWER EXTREMITY EXAM, LEFT
TENDERNESS/no peripheral edema. pt has superficial spider veins on right medial ankle without thrombosis; he reports this is the swelling he was concerned about

## 2020-06-28 NOTE — ED CDU PROVIDER INITIAL DAY NOTE - DETAILS
48 y/o M pmhx dm2, htn, hld, p/w foot ulcer and hyperglycemia, poorly controlled  - continuous monitoring and frequent reevaluations   - augmentin BID   - endo consult obtained with recommendations followed   - BG monitoring   - d/w ED attending

## 2020-06-28 NOTE — ED PROVIDER NOTE - ATTENDING CONTRIBUTION TO CARE
L pinky toe w wound  no swelling despite triage  L pinky toe laterally w 3 x 3 cm crusting yellow dry wound. foot w thready pulse  R w wound to ball of foot  labs / xr / consult podiatry

## 2020-06-28 NOTE — ED PROVIDER NOTE - SKIN WOUND TYPE
L 5th roca: crusting yellow lesion to 5th fingernail with surrounding dry, cracked skin. no erythema/warmth/discharge L 5th roca: crusting yellow lesion to 5th fingernail with surrounding dry, cracked skin. no erythema/warmth/discharge.      R foot: dried, blister-like lesion to plantar midfoot without discharge, erythema, or warmth

## 2020-06-28 NOTE — CONSULT NOTE ADULT - PROBLEM SELECTOR RECOMMENDATION 9
Will  start Lantus 40 units at bed time.  Will start Humalog 15 units before each meal in addition to Humalog correction scale coverage.  Patient counseled for compliance with consistent low carb diet.

## 2020-06-28 NOTE — ED ADULT NURSE REASSESSMENT NOTE - NS ED NURSE REASSESS COMMENT FT1
1611 podiatry to see pt and pt had a callas on the left pinky toe was debrided and dressed and also bottom of the right foot was cleaned out and dressed antibiotics given as ordered Jovanna

## 2020-06-29 VITALS
HEART RATE: 85 BPM | OXYGEN SATURATION: 98 % | SYSTOLIC BLOOD PRESSURE: 112 MMHG | DIASTOLIC BLOOD PRESSURE: 79 MMHG | TEMPERATURE: 98 F | RESPIRATION RATE: 17 BRPM

## 2020-06-29 LAB
A1C WITH ESTIMATED AVERAGE GLUCOSE RESULT: 15 % — HIGH (ref 4–5.6)
ESTIMATED AVERAGE GLUCOSE: 384 MG/DL — HIGH (ref 68–114)

## 2020-06-29 PROCEDURE — 83036 HEMOGLOBIN GLYCOSYLATED A1C: CPT

## 2020-06-29 PROCEDURE — 85027 COMPLETE CBC AUTOMATED: CPT

## 2020-06-29 PROCEDURE — 73660 X-RAY EXAM OF TOE(S): CPT

## 2020-06-29 PROCEDURE — 82962 GLUCOSE BLOOD TEST: CPT

## 2020-06-29 PROCEDURE — 99284 EMERGENCY DEPT VISIT MOD MDM: CPT | Mod: 25

## 2020-06-29 PROCEDURE — 85652 RBC SED RATE AUTOMATED: CPT

## 2020-06-29 PROCEDURE — 73630 X-RAY EXAM OF FOOT: CPT

## 2020-06-29 PROCEDURE — 99217: CPT

## 2020-06-29 PROCEDURE — 80048 BASIC METABOLIC PNL TOTAL CA: CPT

## 2020-06-29 PROCEDURE — 96360 HYDRATION IV INFUSION INIT: CPT | Mod: XU

## 2020-06-29 PROCEDURE — 80053 COMPREHEN METABOLIC PANEL: CPT

## 2020-06-29 PROCEDURE — G0378: CPT

## 2020-06-29 PROCEDURE — 11042 DBRDMT SUBQ TIS 1ST 20SQCM/<: CPT | Mod: LT

## 2020-06-29 RX ORDER — INSULIN GLARGINE 100 [IU]/ML
46 INJECTION, SOLUTION SUBCUTANEOUS AT BEDTIME
Refills: 0 | Status: DISCONTINUED | OUTPATIENT
Start: 2020-06-29 | End: 2020-07-02

## 2020-06-29 RX ORDER — INSULIN LISPRO 100/ML
18 VIAL (ML) SUBCUTANEOUS
Refills: 0 | Status: DISCONTINUED | OUTPATIENT
Start: 2020-06-29 | End: 2020-07-02

## 2020-06-29 RX ADMIN — Medication 1 TABLET(S): at 04:30

## 2020-06-29 RX ADMIN — Medication 2: at 09:03

## 2020-06-29 RX ADMIN — Medication 2: at 14:46

## 2020-06-29 RX ADMIN — Medication 15 UNIT(S): at 09:02

## 2020-06-29 RX ADMIN — Medication 18 UNIT(S): at 14:46

## 2020-06-29 NOTE — ED CDU PROVIDER SUBSEQUENT DAY NOTE - MEDICAL DECISION MAKING DETAILS
Bettie Trejo MD - Attending Physician: Pt here with foot ulcer, also found to have hyperglycemia, noncompliant. Podiatry cleared for dc. Improved BG this am. D/w Endo for likely dc home today

## 2020-06-29 NOTE — ED CDU PROVIDER DISPOSITION NOTE - NSFOLLOWUPINSTRUCTIONS_ED_ALL_ED_FT
As per Endocrine..........  Follow up with your Primary Care Physician within the next 2-3 days  Bring a copy of your test results with you to your appointment  Recommended to take Augmentin twice daily and f/u with podiatry as outpatient with Dr. Wade Ayers within 1 week of dc @ [address] ([phone])  Take Lantus 40 units at bed time. Start Humalog 15 units before each meal in addition to Humalog correction scale coverage. Patient counseled for compliance with consistent low carb diet.  Return to the Emergency Room if you experience new or worsening symptoms Follow up with your Primary Care Physician within the next 1-2 days    Bring a copy of your test results with you to your appointment    Take Augmentin twice daily and f/u with podiatry as outpatient with Dr. Wade Ayers within 1 week at [address] ([phone])    Take Lantus 46 units at bed time. Start Humalog 18 units before each meal in addition to Humalog correction scale coverage. Please continue a low carb diet.    Return to the Emergency Room if you experience new or worsening symptoms, such as shortness of breath, abdominal pain, vomiting, dizziness, syncope, chest pain.    Please follow up with Dr. Lyon, call tomorrow to follow up on appointment.   Jose Lyon  Endocrinology, Diabetes and Metabolism  [address] [place] [address]  Phone: [phone]  Fax: [phone]

## 2020-06-29 NOTE — PROGRESS NOTE ADULT - SUBJECTIVE AND OBJECTIVE BOX
Chief complaint  Patient is a 49y old  Male who presents with a chief complaint of  Review of systems  Patient in bed, looks comfortable, no fever, feeling better,  no hypoglycemia.    Labs and Fingersticks  CAPILLARY BLOOD GLUCOSE      POCT Blood Glucose.: 247 mg/dL (29 Jun 2020 14:39)  POCT Blood Glucose.: 237 mg/dL (29 Jun 2020 09:01)  POCT Blood Glucose.: 215 mg/dL (29 Jun 2020 04:29)  POCT Blood Glucose.: 155 mg/dL (28 Jun 2020 22:47)  POCT Blood Glucose.: 138 mg/dL (28 Jun 2020 20:24)      Anion Gap, Serum: 13 (06-28 @ 16:41)  Anion Gap, Serum: 12 (06-28 @ 14:24)      Calcium, Total Serum: 9.3 (06-28 @ 16:41)  Calcium, Total Serum: 9.5 (06-28 @ 14:24)  Albumin, Serum: 4.0 (06-28 @ 14:24)    Alanine Aminotransferase (ALT/SGPT): 15 (06-28 @ 14:24)  Alkaline Phosphatase, Serum: 95 (06-28 @ 14:24)  Aspartate Aminotransferase (AST/SGOT): 14 (06-28 @ 14:24)        06-28    138  |  99  |  18  ----------------------------<  314<H>  3.8   |  26  |  0.80    Ca    9.3      28 Jun 2020 16:41    TPro  6.7  /  Alb  4.0  /  TBili  0.4  /  DBili  x   /  AST  14  /  ALT  15  /  AlkPhos  95  06-28                        13.0   7.02  )-----------( 154      ( 28 Jun 2020 14:24 )             38.1     Medications  MEDICATIONS  (STANDING):  amoxicillin  875 milliGRAM(s)/clavulanate 1 Tablet(s) Oral two times a day  dextrose 5%. 1000 milliLiter(s) (50 mL/Hr) IV Continuous <Continuous>  dextrose 50% Injectable 12.5 Gram(s) IV Push once  dextrose 50% Injectable 25 Gram(s) IV Push once  dextrose 50% Injectable 25 Gram(s) IV Push once  insulin glargine Injectable (LANTUS) 46 Unit(s) SubCutaneous at bedtime  insulin lispro (HumaLOG) corrective regimen sliding scale   SubCutaneous three times a day before meals  insulin lispro (HumaLOG) corrective regimen sliding scale   SubCutaneous at bedtime  insulin lispro Injectable (HumaLOG) 18 Unit(s) SubCutaneous three times a day with meals      Physical Exam  General: Patient comfortable in bed  Vital Signs Last 12 Hrs  T(F): 98.5 (06-29-20 @ 12:49), Max: 98.5 (06-29-20 @ 12:49)  HR: 85 (06-29-20 @ 12:49) (85 - 87)  BP: 112/79 (06-29-20 @ 12:49) (99/59 - 112/79)  BP(mean): --  RR: 17 (06-29-20 @ 12:49) (17 - 18)  SpO2: 98% (06-29-20 @ 12:49) (97% - 98%)  Neck: No palpable thyroid nodules.  CVS: S1S2, No murmurs  Respiratory: No wheezing, no crepitations  GI: Abdomen soft, bowel sounds positive  Musculoskeletal:  edema lower extremities.   Skin: No skin rashes, no ecchymosis    Diagnostics    A1C with Estimated Average Glucose: Routine (06-28 @ 16:46)

## 2020-06-29 NOTE — ED CDU PROVIDER SUBSEQUENT DAY NOTE - EXTREMITY EXAM
right lower extremity findings/left lower extremity findings/NEG homans bilaterally. no calf swelling bilaterally.

## 2020-06-29 NOTE — ED CDU PROVIDER SUBSEQUENT DAY NOTE - HISTORY
49y m PMHx CAD, DM 2, HTN, HLD p/w bilateral foot ulcerations and hyperglycemia secondary to uncontrolled DM. In ED patient found to have significant elevated BG, reportedly very noncompliant with insulin. Patient's endocrinologist was contacted and he made insulin recommendations and also advised overnight observation of BG monitoring, follow-up with him in his office this week for reevaluation. 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 Recommended to take augmentin twice daily and f/u with podiatry as outpatient.

## 2020-06-29 NOTE — ED CDU PROVIDER SUBSEQUENT DAY NOTE - ATTENDING CONTRIBUTION TO CARE
Bettie Trejo MD - Attending Physician: I have personally seen and examined this patient. I have discussed the case with the ACP. I have reviewed all pertinent clinical information, including history, physical exam, plan and the ACP’s note and agree except as noted. See MDM

## 2020-06-29 NOTE — ED CDU PROVIDER SUBSEQUENT DAY NOTE - PROGRESS NOTE DETAILS
CDU PROGRESS NOTE PA MERE: Pt resting comfortably, NAD, VSS, afebrile. No interval change from previous physical exam. . Patient seen at bedside. VSS. Patient resting comfortably, no complaints. Per sign out patient cleared for DC from podiatry. Pending discussion with endocrinology about diabetic management, pending next glucose reading with breakfast. - Nora Goetz PA-C Spoke with Dr. Lyon, states he will see patient in CDU. - Nora Goetz PA-C Patient seen at bedside. VSS. Patient resting comfortably, no complaints. Pending endo final recommendations. Will reevaluate. - Nora Goetz PA-C Patient seen by Dr. Lyon, endocrinology at bedside. Recommending Lantus 46 units at bedtime and 18 units of Humalog with meals. No sliding scale. DW patient's wife who states that they have patient's insulin: Basaglar and Humalog at home. MARISOL Trejo, patient to be DC home. - Nora Goetz PA-C Patient seen by Dr. Lyon, endocrinology at bedside. Recommending Lantus 46 units at bedtime and 18 units of Humalog with meals. No sliding scale. MARISOL patient's wife who states that they have patient's insulin at home. MARISOL Trejo, patient to be DC home. - Nora Goetz PA-C Patient seen by Dr. Lyon, endocrinology at bedside. Recommending Lantus 46 units at bedtime and 18 units of Humalog with meals. No sliding scale. MARISOL patient's wife who states that they have both of patient's insulins at home [phone]. MARISOL Trejo, patient to be DC home. - Nora Goetz PA-C Patient seen by Dr. Lyon, endocrinology at bedside. Recommending Lantus 46 units at bedtime and 18 units of Humalog with meals. No sliding scale. DW patient's wife who states that they have both of patient's insulins at home [phone]. Discussed plan for follow up and medication compliance (for antibiotics and insulin) extensively with wife. MARISOL Trejo, patient to be DC home. - Nora Goetz PA-C DW patient importance of medication compliance. DW importance of follow up with PCP/Endocrine/Podiatry. - Nora Goetz PA-C

## 2020-06-29 NOTE — ED CDU PROVIDER DISPOSITION NOTE - ATTENDING CONTRIBUTION TO CARE
CDU DISCHARGE NOTE - Dr. Trejo Attending : I have personally seen and examined this patient. I have discussed the case with the ACP. I have reviewed all pertinent clinical information, including history, physical exam, plan and the ACP’s note and agree except as noted.  Patient is stable for discharge to home.  Labs and tests reviewed with the patient.  The patient is to follow up with their doctor as discussed.

## 2020-06-29 NOTE — ED CDU PROVIDER DISPOSITION NOTE - PATIENT PORTAL LINK FT
You can access the FollowMyHealth Patient Portal offered by [facility] by registering at the following website: [email or web address]. By joining [employer]’s FollowMyHealth portal, you will also be able to view your health information using other applications (apps) compatible with our system.

## 2020-06-29 NOTE — PROGRESS NOTE ADULT - PROBLEM SELECTOR PLAN 1
Will increase Lantus to 46 units at bed time.  Will increase Humalog to 18 units before each meal in addition to Humalog correction scale coverage.  Patient to be DC home on current insulin dose, discussed with primary team and patient, he will  his insulin today.  Patient counseled for compliance with consistent low carb diet.

## 2020-06-29 NOTE — ED CDU PROVIDER DISPOSITION NOTE - CLINICAL COURSE
49y m PMHx CAD, DM 2, HTN, HLD p/w left foot pain. Pt reports 3 weeks of left foot swelling and pain, primarily to his 5th left toe with overlying yellow crusting lesion and pain. He also reports pain with walking but has been able to walk. His "doctor friend" gave him 2 pills which have helped, he does not know what these pills are. Denies joint swelling, redness, warmth, or limited ROM. Denies numbness, weakness, temperature changes, calf pain/swelling, recent travel, CP, SOB, fever, chills, or discharge. Declined pain meds in ED, took motrin at home.  In ED patient found to have significant elevated BG, reportedly very noncompliant with insulin. Patient's endocrinologist was contacted and he made insulin recommendations and also advised overnight observation of BG monitoring, follow-up with him in his office this week for reevaluation. 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 Recommended to take Augmentin twice daily and f/u with podiatry as outpatient with Dr. Wade Ayers within 1 week of dc. Patient was also evalauted by Endocrine who recommended Lantus 40 units at bed time. Start Humalog 15 units before each meal in addition to Humalog correction scale coverage. Patient counseled for compliance with consistent low carb diet. 49y m PMHx CAD, DM 2, HTN, HLD p/w left foot pain. Pt reports 3 weeks of left foot swelling and pain, primarily to his 5th left toe with overlying yellow crusting lesion and pain. He also reports pain with walking but has been able to walk. His "doctor friend" gave him 2 pills which have helped, he does not know what these pills are. Denies joint swelling, redness, warmth, or limited ROM. Denies numbness, weakness, temperature changes, calf pain/swelling, recent travel, CP, SOB, fever, chills, or discharge. Declined pain meds in ED, took motrin at home.  In ED patient found to have significant elevated BG, reportedly very noncompliant with insulin. Patient's endocrinologist was contacted and he made insulin recommendations and also advised overnight observation of BG monitoring, follow-up with him in his office this week for reevaluation. 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 Recommended to take Augmentin twice daily and f/u with podiatry as outpatient with Dr. Wade Ayers within 1 week of MA. Patient was also evalauted by Endocrine who recommended Lantus 40 units at bed time. Start Humalog 15 units before each meal in addition to Humalog correction scale coverage. Patient counseled for compliance with consistent low carb diet.  Podiatry recommended outpatient follow up in 1 week as well as Augmentin BID x 10 days. Endocrine recommended 46 units of Lantus at bedtime and 18 units of Humalog with meals. Patient has insulin at home. Patient given strict return precautions and educated on taking meds as prescribed. MARISOL Trejo. [place] home.

## 2020-06-29 NOTE — PROGRESS NOTE ADULT - ASSESSMENT
Assessment  DMT2: 49y Male with DM T2 with hyperglycemia, was on oral meds and insulin at home, non compliant with mesd and testing, admitted with foot infections, started on basal bolus insulin, blood sugars trending down but not at target, no hypoglycemic episode, eating meals, compliant with low carb diet.  Patient is high risk with high level decision making due to uncontrolled diabetes, has increased risk for cardiovascular and cerebrovascular events.  Foot infection: Continue treatment, podiatry primary team FU  CAD: on medications, no chest pain, stable, monitored.  HTN: Controlled,  on antihypertensive medications.      Jose Lyon MD  Cell: [phone]  Office: [phone]

## 2020-07-22 ENCOUNTER — APPOINTMENT (OUTPATIENT)
Dept: CARDIOLOGY | Facility: CLINIC | Age: 50
End: 2020-07-22
Payer: MEDICARE

## 2020-07-22 VITALS
SYSTOLIC BLOOD PRESSURE: 104 MMHG | DIASTOLIC BLOOD PRESSURE: 67 MMHG | OXYGEN SATURATION: 96 % | HEIGHT: 67 IN | WEIGHT: 160 LBS | BODY MASS INDEX: 25.11 KG/M2 | HEART RATE: 89 BPM

## 2020-07-22 DIAGNOSIS — Z01.84 ENCOUNTER FOR ANTIBODY RESPONSE EXAMINATION: ICD-10-CM

## 2020-07-22 DIAGNOSIS — S91.301A UNSPECIFIED OPEN WOUND, RIGHT FOOT, INITIAL ENCOUNTER: ICD-10-CM

## 2020-07-22 DIAGNOSIS — R06.00 DYSPNEA, UNSPECIFIED: ICD-10-CM

## 2020-07-22 DIAGNOSIS — Z83.438 FAMILY HISTORY OF OTHER DISORDER OF LIPOPROTEIN METABOLISM AND OTHER LIPIDEMIA: ICD-10-CM

## 2020-07-22 PROCEDURE — 93000 ELECTROCARDIOGRAM COMPLETE: CPT

## 2020-07-22 PROCEDURE — 99215 OFFICE O/P EST HI 40 MIN: CPT

## 2020-07-22 RX ORDER — METOPROLOL SUCCINATE 100 MG/1
100 TABLET, EXTENDED RELEASE ORAL DAILY
Qty: 90 | Refills: 3 | Status: ACTIVE | COMMUNITY
Start: 2020-07-22 | End: 1900-01-01

## 2020-07-22 RX ORDER — METOPROLOL TARTRATE 100 MG/1
100 TABLET, FILM COATED ORAL
Qty: 60 | Refills: 2 | Status: DISCONTINUED | COMMUNITY
Start: 2019-09-24 | End: 2020-07-22

## 2020-07-22 NOTE — REASON FOR VISIT
[CABG Follow-up] : bypass graft [Follow-Up - From Hospitalization] : follow-up of a recent hospitalization for [Spouse] : spouse

## 2020-07-23 PROBLEM — Z83.438 FAMILY HISTORY OF HYPERLIPIDEMIA: Status: ACTIVE | Noted: 2020-07-23

## 2020-07-23 PROBLEM — R06.00 DYSPNEA ON EXERTION: Status: ACTIVE | Noted: 2020-07-23

## 2020-07-23 PROBLEM — Z01.84 COVID-19 VIRUS IGG ANTIBODY DETECTED: Status: ACTIVE | Noted: 2020-07-23

## 2020-07-23 LAB
SARS-COV-2 IGG SERPL IA-ACNC: 7.12 INDEX
SARS-COV-2 IGG SERPL QL IA: POSITIVE

## 2020-07-23 RX ORDER — ATORVASTATIN CALCIUM 80 MG/1
80 TABLET, FILM COATED ORAL
Qty: 90 | Refills: 2 | Status: ACTIVE | COMMUNITY
Start: 2019-09-24

## 2020-07-23 NOTE — REVIEW OF SYSTEMS
[Negative] : Heme/Lymph [see HPI] : see HPI [Shortness Of Breath] : shortness of breath [Dyspnea on exertion] : dyspnea during exertion [Lower Ext Edema] : lower extremity edema [Leg Claudication] : intermittent leg claudication [Chest Pain] : no chest pain [Chest  Pressure] : no chest pressure [Palpitations] : no palpitations [Wheezing] : no wheezing [Cough] : no cough [Coughing Up Blood] : no hemoptysis

## 2020-07-23 NOTE — HISTORY OF PRESENT ILLNESS
[FreeTextEntry1] : Mr. ELSI MERINO  is a 49 year-old man PMH CAD s/p CABG x3 (LIMA to distal LAD , SVG to first Diagonal , RPDA ) 9/10/2019 who presents for fu with complaints of 2 -3 weks of new le weakness, Le swelling and SOB with exertion as well as fatigue\par he also had recent lab tests and was told they were abnormal \par \par he denies hx of fevers, chills, CP, PND, orthopnea\par + dyspnea with stairs, no palpitations\par \par

## 2020-07-23 NOTE — PHYSICAL EXAM
[General Appearance - Well Developed] : well developed [Normal Appearance] : normal appearance [Well Groomed] : well groomed [General Appearance - Well Nourished] : well nourished [Normal Conjunctiva] : the conjunctiva exhibited no abnormalities [General Appearance - In No Acute Distress] : no acute distress [No Deformities] : no deformities [Eyelids - No Xanthelasma] : the eyelids demonstrated no xanthelasmas [No Oral Pallor] : no oral pallor [Normal Oral Mucosa] : normal oral mucosa [No Oral Cyanosis] : no oral cyanosis [Normal Jugular Venous A Waves Present] : normal jugular venous A waves present [Normal Jugular Venous V Waves Present] : normal jugular venous V waves present [No Jugular Venous Celis A Waves] : no jugular venous celis A waves [Respiration, Rhythm And Depth] : normal respiratory rhythm and effort [Exaggerated Use Of Accessory Muscles For Inspiration] : no accessory muscle use [Auscultation Breath Sounds / Voice Sounds] : lungs were clear to auscultation bilaterally [Murmurs] : no murmurs present [Heart Sounds] : normal S1 and S2 [Heart Rate And Rhythm] : heart rate and rhythm were normal [Abdomen Soft] : soft [Abdomen Mass (___ Cm)] : no abdominal mass palpated [Abdomen Tenderness] : non-tender [Abnormal Walk] : normal gait [Gait - Sufficient For Exercise Testing] : the gait was sufficient for exercise testing [Nail Clubbing] : no clubbing of the fingernails [Petechial Hemorrhages (___cm)] : no petechial hemorrhages [Cyanosis, Localized] : no localized cyanosis [] : no rash [No Venous Stasis] : no venous stasis [Skin Color & Pigmentation] : normal skin color and pigmentation [No Xanthoma] : no  xanthoma was observed [Oriented To Time, Place, And Person] : oriented to person, place, and time [Mood] : the mood was normal [Affect] : the affect was normal [No Anxiety] : not feeling anxious [FreeTextEntry1] : Rt shin  large wound from trip and fall, Rt foot ulcer on bottom, healing

## 2020-07-29 PROCEDURE — 99441: CPT

## 2020-08-11 ENCOUNTER — APPOINTMENT (OUTPATIENT)
Dept: OPHTHALMOLOGY | Facility: CLINIC | Age: 50
End: 2020-08-11

## 2020-08-17 ENCOUNTER — APPOINTMENT (OUTPATIENT)
Dept: CV DIAGNOSITCS | Facility: HOSPITAL | Age: 50
End: 2020-08-17

## 2020-08-26 ENCOUNTER — RESULT REVIEW (OUTPATIENT)
Age: 50
End: 2020-08-26

## 2020-08-26 ENCOUNTER — OUTPATIENT (OUTPATIENT)
Dept: OUTPATIENT SERVICES | Facility: HOSPITAL | Age: 50
LOS: 1 days | End: 2020-08-26
Payer: COMMERCIAL

## 2020-08-26 ENCOUNTER — APPOINTMENT (OUTPATIENT)
Dept: ULTRASOUND IMAGING | Facility: HOSPITAL | Age: 50
End: 2020-08-26

## 2020-08-26 DIAGNOSIS — I25.10 ATHEROSCLEROTIC HEART DISEASE OF NATIVE CORONARY ARTERY WITHOUT ANGINA PECTORIS: ICD-10-CM

## 2020-08-26 DIAGNOSIS — S91.301A UNSPECIFIED OPEN WOUND, RIGHT FOOT, INITIAL ENCOUNTER: ICD-10-CM

## 2020-08-26 DIAGNOSIS — Z98.61 CORONARY ANGIOPLASTY STATUS: Chronic | ICD-10-CM

## 2020-08-26 PROCEDURE — 93923 UPR/LXTR ART STDY 3+ LVLS: CPT

## 2020-08-26 PROCEDURE — 93923 UPR/LXTR ART STDY 3+ LVLS: CPT | Mod: 26

## 2020-09-11 ENCOUNTER — APPOINTMENT (OUTPATIENT)
Dept: CARDIOLOGY | Facility: CLINIC | Age: 50
End: 2020-09-11

## 2020-09-18 DIAGNOSIS — E78.49 OTHER HYPERLIPIDEMIA: ICD-10-CM

## 2020-09-24 NOTE — PATIENT PROFILE ADULT - FUNCTIONAL SCREEN CURRENT LEVEL: BATHING, MLM
Chief Complaint(s) and History of Present Illness(es)     Follow Up     In right eye (Optic neuritis).  Since onset it is gradually improving.  Associated symptoms include eye pain and headache.  Negative for double vision.              Comments     Possibly vision improving in the RIGHT eye, seeing more top half of the right eye.     Headaches but ?streroid related  Sharp eye pain in the left eye. Lasts for a second. Eye pain right eye persists but improving.     Gudelia Smith CO 9/24/2020 8:02 AM                 0 = independent

## 2020-09-28 ENCOUNTER — OUTPATIENT (OUTPATIENT)
Dept: OUTPATIENT SERVICES | Facility: HOSPITAL | Age: 50
LOS: 1 days | End: 2020-09-28
Payer: COMMERCIAL

## 2020-09-28 ENCOUNTER — APPOINTMENT (OUTPATIENT)
Dept: CV DIAGNOSITCS | Facility: HOSPITAL | Age: 50
End: 2020-09-28

## 2020-09-28 DIAGNOSIS — Z01.84 ENCOUNTER FOR ANTIBODY RESPONSE EXAMINATION: ICD-10-CM

## 2020-09-28 DIAGNOSIS — R06.00 DYSPNEA, UNSPECIFIED: ICD-10-CM

## 2020-09-28 DIAGNOSIS — Z98.61 CORONARY ANGIOPLASTY STATUS: Chronic | ICD-10-CM

## 2020-09-28 PROCEDURE — 93306 TTE W/DOPPLER COMPLETE: CPT

## 2020-09-28 PROCEDURE — 93306 TTE W/DOPPLER COMPLETE: CPT | Mod: 26

## 2020-10-01 ENCOUNTER — APPOINTMENT (OUTPATIENT)
Dept: OPHTHALMOLOGY | Facility: CLINIC | Age: 50
End: 2020-10-01

## 2020-10-12 ENCOUNTER — LABORATORY RESULT (OUTPATIENT)
Age: 50
End: 2020-10-12

## 2020-10-12 ENCOUNTER — APPOINTMENT (OUTPATIENT)
Dept: CARDIOLOGY | Facility: CLINIC | Age: 50
End: 2020-10-12
Payer: MEDICARE

## 2020-10-12 VITALS
DIASTOLIC BLOOD PRESSURE: 71 MMHG | BODY MASS INDEX: 26.68 KG/M2 | WEIGHT: 170 LBS | HEIGHT: 67 IN | OXYGEN SATURATION: 98 % | HEART RATE: 82 BPM | SYSTOLIC BLOOD PRESSURE: 124 MMHG

## 2020-10-12 DIAGNOSIS — E11.40 TYPE 2 DIABETES MELLITUS WITH DIABETIC NEUROPATHY, UNSPECIFIED: ICD-10-CM

## 2020-10-12 DIAGNOSIS — E08.43 DIABETES MELLITUS DUE TO UNDERLYING CONDITION WITH DIABETIC AUTONOMIC (POLY)NEUROPATHY: ICD-10-CM

## 2020-10-12 PROCEDURE — 99214 OFFICE O/P EST MOD 30 MIN: CPT

## 2020-10-12 PROCEDURE — 93000 ELECTROCARDIOGRAM COMPLETE: CPT

## 2020-10-12 RX ORDER — GABAPENTIN 300 MG/1
300 CAPSULE ORAL TWICE DAILY
Refills: 0 | Status: ACTIVE | COMMUNITY
Start: 2020-10-12

## 2020-10-12 RX ORDER — LISINOPRIL 10 MG/1
10 TABLET ORAL
Refills: 0 | Status: ACTIVE | COMMUNITY
Start: 2020-10-12

## 2020-10-12 RX ORDER — OMEPRAZOLE 40 MG/1
40 CAPSULE, DELAYED RELEASE ORAL
Refills: 0 | Status: ACTIVE | COMMUNITY
Start: 2020-10-12

## 2020-10-13 LAB
CHOLEST SERPL-MCNC: 116 MG/DL
CHOLEST/HDLC SERPL: 2.2 RATIO
HDLC SERPL-MCNC: 54 MG/DL
LDLC SERPL CALC-MCNC: 46 MG/DL
TRIGL SERPL-MCNC: 77 MG/DL

## 2020-10-14 LAB — APO LP(A) SERPL-MCNC: 23 NMOL/L

## 2020-10-22 NOTE — REVIEW OF SYSTEMS
[Shortness Of Breath] : shortness of breath [Dyspnea on exertion] : not dyspnea during exertion [Chest  Pressure] : no chest pressure [Chest Pain] : no chest pain [Lower Ext Edema] : no extremity edema [Leg Claudication] : no intermittent leg claudication [Palpitations] : no palpitations [see HPI] : see HPI [Cough] : no cough [Wheezing] : no wheezing [Coughing Up Blood] : no hemoptysis [Negative] : Heme/Lymph [Nl] : Neurological [NI] : Endocrine

## 2020-10-23 NOTE — PHYSICAL EXAM
[General Appearance - Well Developed] : well developed [Normal Appearance] : normal appearance [Well Groomed] : well groomed [General Appearance - Well Nourished] : well nourished [No Deformities] : no deformities [General Appearance - In No Acute Distress] : no acute distress [Normal Conjunctiva] : the conjunctiva exhibited no abnormalities [Eyelids - No Xanthelasma] : the eyelids demonstrated no xanthelasmas [Normal Oral Mucosa] : normal oral mucosa [No Oral Pallor] : no oral pallor [No Oral Cyanosis] : no oral cyanosis [Normal Jugular Venous A Waves Present] : normal jugular venous A waves present [Normal Jugular Venous V Waves Present] : normal jugular venous V waves present [No Jugular Venous Celis A Waves] : no jugular venous celis A waves [Respiration, Rhythm And Depth] : normal respiratory rhythm and effort [Exaggerated Use Of Accessory Muscles For Inspiration] : no accessory muscle use [Auscultation Breath Sounds / Voice Sounds] : lungs were clear to auscultation bilaterally [Heart Rate And Rhythm] : heart rate and rhythm were normal [Heart Sounds] : normal S1 and S2 [Murmurs] : no murmurs present [Abdomen Soft] : soft [Abdomen Tenderness] : non-tender [Abdomen Mass (___ Cm)] : no abdominal mass palpated [Abnormal Walk] : normal gait [Gait - Sufficient For Exercise Testing] : the gait was sufficient for exercise testing [Nail Clubbing] : no clubbing of the fingernails [Cyanosis, Localized] : no localized cyanosis [Petechial Hemorrhages (___cm)] : no petechial hemorrhages [Skin Color & Pigmentation] : normal skin color and pigmentation [] : no rash [No Venous Stasis] : no venous stasis [No Xanthoma] : no  xanthoma was observed [FreeTextEntry1] : Rt shin  large wound from trip and fall, Rt foot ulcer on bottom, healing [Oriented To Time, Place, And Person] : oriented to person, place, and time [Affect] : the affect was normal [Mood] : the mood was normal [No Anxiety] : not feeling anxious [Normal] : orientated to person, place, and time [de-identified] : reg, + murmur [de-identified] : trace edema, + rt foot wound

## 2020-10-23 NOTE — HISTORY OF PRESENT ILLNESS
[FreeTextEntry1] : Mr. ELSI MERINO is a 50 year-old man PMH CAD s/p CABG x3 (LIMA to distal LAD , SVG to first Diagonal , RPDA ) \par He initially presented to cardiology \par He had a CVA in 2005, he was diagnosed with HLD and DM at that time. at the age of 35 yo\par \par He later underwent PCI in 2008 at the 36 yo, he had left sided chest pain and had ECHO and stress that was positive \par at that time he was SOB when running\par in 2009 he had another PCI for further SOB\par \par He had LE swelling that recently resolved but it is worse with wearing socks\par continued weakness and SOB\par \par + ROS pins and needles in the hands and feet

## 2020-10-23 NOTE — REASON FOR VISIT
[CABG Follow-up] : bypass graft [Spouse] : spouse [FreeTextEntry3] : ELSI MERINO  is being seen  for an initial consultation at the [facility] Program at [facility] on 10/12/2020.   Mr. MERINO was referred  for hereditary cardiac predisposition risk assessment and counseling, due to his hx of early onset CAD, HLD, and FH of early onset CAD

## 2020-10-23 NOTE — FAMILY HISTORY
[FreeTextEntry1] : FamilyHistory_20_twCiteListControlStart FamilyHistory_20_twCiteListControlEnd [id number] \par A four-generation family history was constructed and scanned into [employer]. \par Family history is significant for: \par siblings\par 3 brothers\par 3 sisters\par 69 yo sister dec Hepatitis- no children\par 70s brother med hx unk- 1 son 46yo and 1 daughter 46yo\par 69 yo brother-hx DM, other med hx unk- 3 sons: 46 yo, 43 yo, 34yo\par 60s yo hx cirrosis, liver transplant- from hepatitis- no children\par 63 yo sister hx of gyn problem- other med hx unk--no children\par 54yo sister dec hepatitis- no children\par \par Mother dec (1991) hx DM\par Maternal aunts x2 all dec age and cause unk\par Maternal uncles x2 all dec one dec 70s  cause uk, one dec in 60s cause unk\par Maternal Grandmother 60s dec cause unk\par Maternal Grandfather dec cause unk\par \par Father dec 99 yo hx HLD, Hx CVA, dementia, HTN\par Paternal aunts x1: dec med hx unk\par Paternal uncles x2 dec med hx unk\par Paternal Grandfather dec med hx unk\par Paternal Grandmother dec med hx unk\par \par children:\par 3 daughters \par 19 yo- HLD diag at age 12 yo- now diet controlled\par 18 yo -HLD diag at age 11 yo- now diet controlled\par 13 yo\par \par his maternal families originate from [place] and paternal families originate from [place] . \par No Ashkenazi Synagogue ancestry. \par Family history was negative for consanguinity  \par No family history of SIDS\par  \par \par

## 2020-10-23 NOTE — FAMILY HISTORY
[FreeTextEntry1] : FamilyHistory_20_twCiteListControlStart FamilyHistory_20_twCiteListControlEnd [id number] \par A four-generation family history was constructed and scanned into [employer]. \par Family history is significant for: \par siblings\par 3 brothers\par 3 sisters\par 71 yo sister dec Hepatitis- no children\par 70s brother med hx unk- 1 son 48yo and 1 daughter 44yo\par 67 yo brother-hx DM, other med hx unk- 3 sons: 46 yo, 41 yo, 32yo\par 60s yo hx cirrosis, liver transplant- from hepatitis- no children\par 63 yo sister hx of gyn problem- other med hx unk--no children\par 54yo sister dec hepatitis- no children\par \par Mother dec (1991) hx DM\par Maternal aunts x2 all dec age and cause unk\par Maternal uncles x2 all dec one dec 70s  cause uk, one dec in 60s cause unk\par Maternal Grandmother 60s dec cause unk\par Maternal Grandfather dec cause unk\par \par Father dec 99 yo hx HLD, Hx CVA, dementia, HTN\par Paternal aunts x1: dec med hx unk\par Paternal uncles x2 dec med hx unk\par Paternal Grandfather dec med hx unk\par Paternal Grandmother dec med hx unk\par \par children:\par 3 daughters \par 21 yo- HLD diag at age 13 yo- now diet controlled\par 18 yo -HLD diag at age 9 yo- now diet controlled\par 15 yo\par \par his maternal families originate from [place] and paternal families originate from [place] . \par No Ashkenazi Rastafarian ancestry. \par Family history was negative for consanguinity  \par No family history of SIDS\par  \par \par

## 2020-10-23 NOTE — PHYSICAL EXAM
[General Appearance - Well Developed] : well developed [Normal Appearance] : normal appearance [Well Groomed] : well groomed [General Appearance - Well Nourished] : well nourished [No Deformities] : no deformities [General Appearance - In No Acute Distress] : no acute distress [Normal Conjunctiva] : the conjunctiva exhibited no abnormalities [Eyelids - No Xanthelasma] : the eyelids demonstrated no xanthelasmas [Normal Oral Mucosa] : normal oral mucosa [No Oral Pallor] : no oral pallor [No Oral Cyanosis] : no oral cyanosis [Normal Jugular Venous A Waves Present] : normal jugular venous A waves present [Normal Jugular Venous V Waves Present] : normal jugular venous V waves present [No Jugular Venous Celis A Waves] : no jugular venous celis A waves [Respiration, Rhythm And Depth] : normal respiratory rhythm and effort [Exaggerated Use Of Accessory Muscles For Inspiration] : no accessory muscle use [Auscultation Breath Sounds / Voice Sounds] : lungs were clear to auscultation bilaterally [Heart Rate And Rhythm] : heart rate and rhythm were normal [Heart Sounds] : normal S1 and S2 [Murmurs] : no murmurs present [Abdomen Soft] : soft [Abdomen Tenderness] : non-tender [Abdomen Mass (___ Cm)] : no abdominal mass palpated [Abnormal Walk] : normal gait [Gait - Sufficient For Exercise Testing] : the gait was sufficient for exercise testing [Nail Clubbing] : no clubbing of the fingernails [Cyanosis, Localized] : no localized cyanosis [Petechial Hemorrhages (___cm)] : no petechial hemorrhages [Skin Color & Pigmentation] : normal skin color and pigmentation [] : no rash [No Venous Stasis] : no venous stasis [No Xanthoma] : no  xanthoma was observed [FreeTextEntry1] : Rt shin  large wound from trip and fall, Rt foot ulcer on bottom, healing [Oriented To Time, Place, And Person] : oriented to person, place, and time [Affect] : the affect was normal [Mood] : the mood was normal [No Anxiety] : not feeling anxious [Normal] : orientated to person, place, and time [de-identified] : reg, + murmur [de-identified] : trace edema, + rt foot wound

## 2020-10-23 NOTE — HISTORY OF PRESENT ILLNESS
[FreeTextEntry1] : Mr. ELSI MERINO is a 50 year-old man PMH CAD s/p CABG x3 (LIMA to distal LAD , SVG to first Diagonal , RPDA ) \par He initially presented to cardiology \par He had a CVA in 2005, he was diagnosed with HLD and DM at that time. at the age of 33 yo\par \par He later underwent PCI in 2008 at the 38 yo, he had left sided chest pain and had ECHO and stress that was positive \par at that time he was SOB when running\par in 2009 he had another PCI for further SOB\par \par He had LE swelling that recently resolved but it is worse with wearing socks\par continued weakness and SOB\par \par + ROS pins and needles in the hands and feet

## 2020-10-23 NOTE — REASON FOR VISIT
[CABG Follow-up] : bypass graft [Spouse] : spouse [FreeTextEntry3] : ELSI MERINO  is being seen  for an initial consultation at the Cardiogenomics Program at [facility] on 10/12/2020.   Mr. MERINO was referred  for hereditary cardiac predisposition risk assessment and counseling, due to his hx of early onset CAD, HLD, and FH of early onset CAD

## 2020-10-28 ENCOUNTER — APPOINTMENT (OUTPATIENT)
Dept: OPHTHALMOLOGY | Facility: CLINIC | Age: 50
End: 2020-10-28

## 2020-12-02 RX ORDER — ESOMEPRAZOLE MAGNESIUM 40 MG/1
1 CAPSULE, DELAYED RELEASE ORAL
Qty: 0 | Refills: 0 | DISCHARGE

## 2020-12-02 RX ORDER — ASPIRIN/CALCIUM CARB/MAGNESIUM 324 MG
1 TABLET ORAL
Qty: 0 | Refills: 0 | DISCHARGE

## 2020-12-02 RX ORDER — LISINOPRIL 2.5 MG/1
1 TABLET ORAL
Qty: 0 | Refills: 0 | DISCHARGE

## 2020-12-02 RX ORDER — METOPROLOL TARTRATE 50 MG
1 TABLET ORAL
Qty: 0 | Refills: 0 | DISCHARGE

## 2020-12-02 RX ORDER — ERGOCALCIFEROL 1.25 MG/1
1 CAPSULE ORAL
Qty: 0 | Refills: 0 | DISCHARGE

## 2020-12-02 RX ORDER — ATORVASTATIN CALCIUM 80 MG/1
1 TABLET, FILM COATED ORAL
Qty: 0 | Refills: 0 | DISCHARGE

## 2020-12-02 RX ORDER — FENOFIBRATE,MICRONIZED 130 MG
1 CAPSULE ORAL
Qty: 0 | Refills: 0 | DISCHARGE

## 2020-12-02 RX ORDER — SITAGLIPTIN AND METFORMIN HYDROCHLORIDE 500; 50 MG/1; MG/1
1 TABLET, FILM COATED ORAL
Qty: 0 | Refills: 0 | DISCHARGE

## 2020-12-02 RX ORDER — INSULIN GLARGINE 100 [IU]/ML
60 INJECTION, SOLUTION SUBCUTANEOUS
Qty: 0 | Refills: 0 | DISCHARGE

## 2020-12-02 RX ORDER — GABAPENTIN 400 MG/1
1 CAPSULE ORAL
Qty: 0 | Refills: 0 | DISCHARGE

## 2020-12-09 ENCOUNTER — APPOINTMENT (OUTPATIENT)
Dept: CARDIOLOGY | Facility: CLINIC | Age: 50
End: 2020-12-09
Payer: MEDICARE

## 2020-12-09 PROCEDURE — 99203 OFFICE O/P NEW LOW 30 MIN: CPT | Mod: 95

## 2020-12-09 NOTE — FAMILY HISTORY
[FreeTextEntry1] : FamilyHistory_20_twCiteListControlStart FamilyHistory_20_twCiteListControlEnd [id number] \par A four-generation family history was constructed and scanned into [employer]. \par Family history is significant for: \par siblings\par 3 brothers\par 3 sisters\par 69 yo sister dec Hepatitis- no children\par 70s brother med hx unk- 1 son 48yo and 1 daughter 44yo\par 69 yo brother-hx DM, other med hx unk- 3 sons: 46 yo, 43 yo, 32yo\par 60s yo hx cirrosis, liver transplant- from hepatitis- no children\par 61 yo sister hx of gyn problem- other med hx unk--no children\par 54yo sister dec hepatitis- no children\par \par Mother dec (1991) hx DM\par Maternal aunts x2 all dec age and cause unk\par Maternal uncles x2 all dec one dec 70s  cause uk, one dec in 60s cause unk\par Maternal Grandmother 60s dec cause unk\par Maternal Grandfather dec cause unk\par \par Father dec 99 yo hx HLD, Hx CVA, dementia, HTN\par Paternal aunts x1: dec med hx unk\par Paternal uncles x2 dec med hx unk\par Paternal Grandfather dec med hx unk\par Paternal Grandmother dec med hx unk\par \par children:\par 3 daughters \par 19 yo- HLD diag at age 10 yo- now diet controlled\par 19 yo -HLD diag at age 9 yo- now diet controlled\par 12 yo\par \par his maternal families originate from [place] and paternal families originate from [place] . \par No Ashkenazi Moravian ancestry. \par Family history was negative for consanguinity  \par No family history of SIDS\par  \par \par

## 2020-12-09 NOTE — HISTORY OF PRESENT ILLNESS
[FreeTextEntry1] : Mr. ELSI MERINO  is a 49 year-old man PMH CAD s/p CABG x3 (LIMA to distal LAD , SVG to first Diagonal , RPDA ) 9/10/2019 who presented prior with complaints of 2 -3 weks of new le weakness, Le swelling and SOB with exertion as well as fatigue\par he underwent further workup and today presents for results\par \par \par he denies hx of fevers, chills, CP, PND, orthopnea\par + dyspnea with stairs, no palpitations\par \par

## 2020-12-09 NOTE — PHYSICAL EXAM
[Normal] : orientated to person, place, and time [de-identified] : reg, + murmur [de-identified] : trace edema, + rt foot wound [General Appearance - Well Developed] : well developed [Normal Appearance] : normal appearance [Well Groomed] : well groomed [General Appearance - Well Nourished] : well nourished [No Deformities] : no deformities [General Appearance - In No Acute Distress] : no acute distress [Normal Conjunctiva] : the conjunctiva exhibited no abnormalities [Eyelids - No Xanthelasma] : the eyelids demonstrated no xanthelasmas [Normal Oral Mucosa] : normal oral mucosa [No Oral Pallor] : no oral pallor [No Oral Cyanosis] : no oral cyanosis [Normal Jugular Venous A Waves Present] : normal jugular venous A waves present [Normal Jugular Venous V Waves Present] : normal jugular venous V waves present [No Jugular Venous Celis A Waves] : no jugular venous celis A waves [Respiration, Rhythm And Depth] : normal respiratory rhythm and effort [Exaggerated Use Of Accessory Muscles For Inspiration] : no accessory muscle use [Auscultation Breath Sounds / Voice Sounds] : lungs were clear to auscultation bilaterally [Heart Rate And Rhythm] : heart rate and rhythm were normal [Heart Sounds] : normal S1 and S2 [Murmurs] : no murmurs present [Abdomen Soft] : soft [Abdomen Tenderness] : non-tender [Abdomen Mass (___ Cm)] : no abdominal mass palpated [Abnormal Walk] : normal gait [Gait - Sufficient For Exercise Testing] : the gait was sufficient for exercise testing [Nail Clubbing] : no clubbing of the fingernails [Cyanosis, Localized] : no localized cyanosis [Petechial Hemorrhages (___cm)] : no petechial hemorrhages [Skin Color & Pigmentation] : normal skin color and pigmentation [] : no rash [No Venous Stasis] : no venous stasis [No Xanthoma] : no  xanthoma was observed [FreeTextEntry1] : Rt shin  large wound from trip and fall, Rt foot ulcer on bottom, healing [Oriented To Time, Place, And Person] : oriented to person, place, and time [Affect] : the affect was normal [Mood] : the mood was normal [No Anxiety] : not feeling anxious

## 2020-12-09 NOTE — REVIEW OF SYSTEMS
[Nl] : Neurological [NI] : Endocrine [Shortness Of Breath] : shortness of breath [Dyspnea on exertion] : not dyspnea during exertion [Chest  Pressure] : no chest pressure [Chest Pain] : no chest pain [Lower Ext Edema] : no extremity edema [Leg Claudication] : no intermittent leg claudication [Palpitations] : no palpitations [see HPI] : see HPI [Cough] : no cough [Wheezing] : no wheezing [Coughing Up Blood] : no hemoptysis [Negative] : Heme/Lymph

## 2020-12-09 NOTE — FAMILY HISTORY
[FreeTextEntry1] : FamilyHistory_20_twCiteListControlStart FamilyHistory_20_twCiteListControlEnd [id number] \par A four-generation family history was constructed and scanned into [employer] \par Family history is significant for: \par siblings\par 3 brothers\par 3 sisters\par 69 yo sister dec Hepatitis- no children\par 70s brother med hx unk- 1 son 48yo and 1 daughter 44yo\par 67 yo brother-hx DM, other med hx unk- 3 sons: 46 yo, 41 yo, 32yo\par 60s yo hx cirrosis, liver transplant- from hepatitis- no children\par 61 yo sister hx of gyn problem- other med hx unk--no children\par 54yo sister dec hepatitis- no children\par \par Mother dec (1991) hx DM\par Maternal aunts x2 all dec age and cause unk\par Maternal uncles x2 all dec one dec 70s  cause uk, one dec in 60s cause unk\par Maternal Grandmother 60s dec cause unk\par Maternal Grandfather dec cause unk\par \par Father dec 97 yo hx HLD, Hx CVA, dementia, HTN\par Paternal aunts x1: dec med hx unk\par Paternal uncles x2 dec med hx unk\par Paternal Grandfather dec med hx unk\par Paternal Grandmother dec med hx unk\par \par children:\par 3 daughters \par 19 yo- HLD diag at age 12 yo- now diet controlled\par 17 yo -HLD diag at age 9 yo- now diet controlled\par 13 yo\par \par his maternal families originate from [place] and paternal families originate from [place] . \par No Ashkenazi Anabaptism ancestry. \par Family history was negative for consanguinity  \par No family history of SIDS\par  \par \par

## 2020-12-09 NOTE — REASON FOR VISIT
[FreeTextEntry3] : ELSI MERINO  was seen  for an initial consultation at the [facility]ogen[facility] Program at [facility] on 10/12/2020.   Mr. MERINO was referred  for hereditary cardiac predisposition risk assessment and counseling, due to his hx of early onset CAD, HLD, and FH of early onset CAD [Follow-Up - Clinic] : a clinic follow-up of [CABG Follow-up] : bypass graft [Spouse] : spouse

## 2020-12-09 NOTE — REASON FOR VISIT
[FreeTextEntry3] : ELSI MERINO  was seen  for an initial consultation at the Cardiogenomics Program at [facility] on 10/12/2020.   Mr. MERINO was referred  for hereditary cardiac predisposition risk assessment and counseling, due to his hx of early onset CAD, HLD, and FH of early onset CAD [Follow-Up - Clinic] : a clinic follow-up of [CABG Follow-up] : bypass graft [Spouse] : spouse

## 2020-12-31 NOTE — PATIENT PROFILE ADULT - NSPROHMSYMPCOND_GEN_A_NUR
2020         RE: Marjorie Lee  [address] [place] [address]        Dear Colleague,    Thank you for referring your patient, Marjorie Lee, to the [facility]. Please see a copy of my visit note below.      ASSESSMENT/PLAN:    Encounter Diagnosis   Name Primary?     Plantar fasciitis, right Yes     The patient was educated about the causes and nature of arch and heel pain.  The anatomy and function of the plantar fascia was discussed.  The treatment plan discussed included icing, calf and plantar fascial stretching, avoidance of barefoot walking, wearing sturdy, supportive, stiffer-soled athletic-type shoes, activity modification, and over-the-counter arch supports versus custom orthoses.  A comprehensive After-Visit Summary was provided.     Mrajorie Lee is referred to [facility] for prescription orthoses.      She is interested in physical therapy and I will specifically refer her for the running program.    I also discussed with her that the calcaneal spur is likely not the source of pain.  Discussed a prior, robust study that was done showing no correlation between bone spurs and pain.    Mook Vilchis DPM, FACFAS, MS    [facility]    Disclaimer: This note consists of symbols derived from keyboarding, dictation and/or voice recognition software. As a result, there may be errors in the script that have gone undetected. Please consider this when interpreting information found in this chart.    ____________________________________________________________________    HPI:       I was asked by La Scott CNP to evaluate Marjorie for plantar fasciitis.  Chief Complaint: right heel pain  Onset of problem: 9+ months  Pain/ discomfort is described as:  sharp  Ratin/10 at worst   Frequency:  intermittent    The pain is made worse with higher impact activity - hill running, repeats,  jumping.  Avid runner  Previous treatment: stretching, massage  Prior x-ray    *  Patient Active Problem List   Diagnosis     IUD (intrauterine device) in place     Abnormal colonoscopy     Rectal bleeding   *  *  Past Surgical History:   Procedure Laterality Date     COLONOSCOPY N/A 9/3/2019    Procedure: Colonoscopy, With Polypectomy And Biopsy;  Surgeon: Radha Neri DO;  Location: [facility] OR     COLONOSCOPY N/A 3/5/2020    Procedure: Colonoscopy, With Polypectomy And Biopsy;  Surgeon: Radha Neri DO;  Location: [facility] OR     COLONOSCOPY WITH CO2 INSUFFLATION N/A 9/3/2019    Procedure: COLONOSCOPY, WITH CO2 INSUFFLATION;  Surgeon: Radha Neri DO;  Location: [facility] OR     COLONOSCOPY WITH CO2 INSUFFLATION N/A 3/5/2020    Procedure: COLONOSCOPY, WITH CO2 INSUFFLATION;  Surgeon: Radha Neir DO;  Location: [facility] OR   *  *  Current Outpatient Medications   Medication Sig Dispense Refill     Cholecalciferol (VITAMIN D3) 50 MCG (2000 UT) CAPS Take 1 capsule by mouth 3 times a week       cyanocobalamin (VITAMIN B-12) 1000 MCG tablet Take 1,000 mcg by mouth 3 times a week       levonorgestrel (MIRENA) 20 MCG/24HR IUD 1 each (20 mcg) by Intrauterine route continuous       Multiple Vitamins-Minerals (MULTIVITAMIN PO) Take 1 tablet by mouth daily        triamcinolone (KENALOG) 0.1 % external ointment Apply topically 2 times daily To affected area for no longer than 2 weeks at a time. 30 g 1     vitamin C (ASCORBIC ACID) 500 MG tablet Take 500 mg by mouth 3 times a week         ROS:     A 10-point review of systems was performed and is positive for that noted above in the HPI and as seen below.  All other areas are negative.     Numbness in feet?  no   Calf pain with walking? no  Recent foot/ankle injury? no  Weight change over past 12 months? no  Self perception as overweight? no  Recent flu-like symptoms? no  Joint pain other than feet ? yes    Social History: Employment:  yes;  Exercise/Physical activity:  running;   "Tobacco use:  no  Social History     Socioeconomic History     Marital status:      Spouse name: Not on file     Number of children: Not on file     Years of education: Not on file     Highest education level: Not on file   Occupational History     Employer: [employer]   Social Needs     Financial resource strain: Not on file     Food insecurity     Worry: Not on file     Inability: Not on file     Transportation needs     Medical: Not on file     Non-medical: Not on file   Tobacco Use     Smoking status: Never Smoker     Smokeless tobacco: Never Used   Substance and Sexual Activity     Alcohol use: Yes     Comment: occ     Drug use: No     Sexual activity: Yes     Partners: Male     Birth control/protection: Implant   Lifestyle     Physical activity     Days per week: Not on file     Minutes per session: Not on file     Stress: Not on file   Relationships     Social connections     Talks on phone: Not on file     Gets together: Not on file     Attends Protestant service: Not on file     Active member of club or organization: Not on file     Attends meetings of clubs or organizations: Not on file     Relationship status: Not on file     Intimate partner violence     Fear of current or ex partner: Not on file     Emotionally abused: Not on file     Physically abused: Not on file     Forced sexual activity: Not on file   Other Topics Concern     Not on file   Social History Narrative     Not on file       Family history:  Family History   Problem Relation Age of Onset     Osteoarthritis Mother      Osteoarthritis Father      Breast Cancer Maternal Aunt      Other - See Comments Paternal Uncle         glioblastoma     Other - See Comments Paternal Uncle         glioblastoma     Colon Cancer Maternal Grandmother        Rheumatoid arthritis:  no  Foot Problems: bunions, toe issues - parent  Diabetes: no      EXAM:    Vitals: /66   Ht 1.619 m (5' 3.75\")   Wt 54.4 kg (120 lb)   BMI 20.76 kg/m    BMI: Body mass " "index is 20.76 kg/m .  Height: 5' 3.75\"    Constitutional/ general:  Pt is in no apparent distress, appears well-nourished.  Cooperative with history and physical exam.     Vascular:  Pedal pulses are palpable bilaterally for both the DP and PT arteries.  CFT < 3 sec.  No edema.  Pedal hair growth noted.     Neuro:  Alert and oriented x 3. Coordinated gait.  Light touch sensation is intact to the L4, L5, S1 distributions. No obvious deficits.  No evidence of neurological-based weakness, spasticity, or contracture in the lower extremities.     Derm: Normal texture and turgor.  No erythema, ecchymosis, or cyanosis.  No open lesions.     Musculoskeletal:    Lower extremity muscle strength is normal.  Patient is ambulatory without an assistive device or brace .  No gross deformities. Pain on palpation to the plantar medial aspect of the right heel.  No significant pain with   side-to-side compression of the heel.  No nodularity noted.    Radiographic Exam:  X-Ray Findings:  I personally reviewed the films.    FOOT RIGHT THREE OR MORE VIEWS, ANKLE RIGHT THREE OR MORE VIEWS  December 21, 2020 12:41 PM      HISTORY: Plantar fasciitis.                                                   IMPRESSION: Plantar calcaneal spurring. Otherwise unremarkable. No  fracture identified.     RAYMOND BURNETTE MD            Again, thank you for allowing me to participate in the care of your patient.        Sincerely,        Mook Vilchis DPM    " diabetes

## 2021-02-11 PROBLEM — E78.5 HYPERLIPIDEMIA, UNSPECIFIED: Chronic | Status: ACTIVE | Noted: 2019-08-08

## 2021-02-11 PROBLEM — I10 ESSENTIAL (PRIMARY) HYPERTENSION: Chronic | Status: ACTIVE | Noted: 2019-08-08

## 2021-02-11 PROBLEM — I25.118 ATHEROSCLEROTIC HEART DISEASE OF NATIVE CORONARY ARTERY WITH OTHER FORMS OF ANGINA PECTORIS: Chronic | Status: ACTIVE | Noted: 2019-08-08

## 2021-02-11 PROBLEM — E11.9 TYPE 2 DIABETES MELLITUS WITHOUT COMPLICATIONS: Chronic | Status: ACTIVE | Noted: 2019-08-08

## 2021-02-16 ENCOUNTER — NON-APPOINTMENT (OUTPATIENT)
Age: 51
End: 2021-02-16

## 2021-02-17 ENCOUNTER — NON-APPOINTMENT (OUTPATIENT)
Age: 51
End: 2021-02-17

## 2021-03-05 ENCOUNTER — APPOINTMENT (OUTPATIENT)
Dept: CARDIOLOGY | Facility: CLINIC | Age: 51
End: 2021-03-05

## 2021-05-28 ENCOUNTER — NON-APPOINTMENT (OUTPATIENT)
Age: 51
End: 2021-05-28

## 2021-05-28 ENCOUNTER — APPOINTMENT (OUTPATIENT)
Dept: CARDIOLOGY | Facility: CLINIC | Age: 51
End: 2021-05-28
Payer: MEDICARE

## 2021-05-28 VITALS
WEIGHT: 175 LBS | HEIGHT: 67 IN | HEART RATE: 81 BPM | SYSTOLIC BLOOD PRESSURE: 137 MMHG | DIASTOLIC BLOOD PRESSURE: 77 MMHG | BODY MASS INDEX: 27.47 KG/M2 | OXYGEN SATURATION: 98 %

## 2021-05-28 PROCEDURE — 99214 OFFICE O/P EST MOD 30 MIN: CPT

## 2021-05-28 PROCEDURE — 99072 ADDL SUPL MATRL&STAF TM PHE: CPT

## 2021-05-28 PROCEDURE — 93000 ELECTROCARDIOGRAM COMPLETE: CPT

## 2021-05-28 RX ORDER — TICAGRELOR 90 MG/1
90 TABLET ORAL TWICE DAILY
Qty: 180 | Refills: 0 | Status: DISCONTINUED | COMMUNITY
Start: 2019-09-24 | End: 2021-05-28

## 2021-05-28 RX ORDER — TICAGRELOR 90 MG/1
90 TABLET ORAL
Qty: 30 | Refills: 11 | Status: DISCONTINUED | COMMUNITY
End: 2021-05-28

## 2021-05-28 RX ORDER — TICAGRELOR 90 MG/1
90 TABLET ORAL TWICE DAILY
Refills: 0 | Status: DISCONTINUED | COMMUNITY
Start: 2019-08-23 | End: 2021-05-28

## 2021-05-28 NOTE — REVIEW OF SYSTEMS
[Blurry Vision] : blurred vision [Negative] : Heme/Lymph [Seeing Double (Diplopia)] : no diplopia [Eye Pain] : no eye pain [FreeTextEntry5] : mild edema

## 2021-05-28 NOTE — CARDIOLOGY SUMMARY
[de-identified] : SR inf infarct, lat TWI [de-identified] : TTE 9/28/2020\par NML EF 60-65%\par mild segmental LV dysfunction with preserved EF, basal septal hypokinesis.\par gr 1 diastolic dysfunction\par no significant valvular disease  \par \par

## 2021-05-28 NOTE — HISTORY OF PRESENT ILLNESS
[FreeTextEntry1] : Mr. ELSI MERINO is a 50 year-old man PMH CAD s/p CABG x3 (LIMA to distal LAD , SVG to first Diagonal , RPDA ) 9/10/2019 who initially presented to cardiology \par He had a CVA in 2005, he was diagnosed with HLD and DM at that time. at the age of 35 yo\par \par He later underwent PCI in 2008 at the 36 yo, he had left sided chest pain and had ECHO and stress that was positive \par at that time he was SOB when running\par in 2009 he had another PCI for further SOB\par \par Today he presents for pre-op evaluation for cataract surgery

## 2021-05-28 NOTE — PHYSICAL EXAM
[Well Developed] : well developed [Well Nourished] : well nourished [No Acute Distress] : no acute distress [Normal Conjunctiva] : normal conjunctiva [Normal Venous Pressure] : normal venous pressure [No Carotid Bruit] : no carotid bruit [Normal S1, S2] : normal S1, S2 [No Murmur] : no murmur [No Rub] : no rub [No Gallop] : no gallop [Clear Lung Fields] : clear lung fields [Good Air Entry] : good air entry [No Respiratory Distress] : no respiratory distress  [Soft] : abdomen soft [No Masses/organomegaly] : no masses/organomegaly [Non Tender] : non-tender [Normal Bowel Sounds] : normal bowel sounds [Normal Gait] : normal gait [No Edema] : no edema [No Cyanosis] : no cyanosis [No Clubbing] : no clubbing [No Varicosities] : no varicosities [No Rash] : no rash [No Skin Lesions] : no skin lesions [Moves all extremities] : moves all extremities [No Focal Deficits] : no focal deficits [Normal Speech] : normal speech [Alert and Oriented] : alert and oriented [Normal memory] : normal memory [de-identified] : trace ankle edema

## 2021-10-01 ENCOUNTER — RX RENEWAL (OUTPATIENT)
Age: 51
End: 2021-10-01

## 2021-10-01 RX ORDER — EZETIMIBE 10 MG/1
10 TABLET ORAL DAILY
Qty: 90 | Refills: 2 | Status: ACTIVE | COMMUNITY
Start: 2020-07-22 | End: 1900-01-01

## 2022-01-20 ENCOUNTER — EMERGENCY (EMERGENCY)
Facility: HOSPITAL | Age: 52
LOS: 0 days | Discharge: HOME | End: 2022-01-21
Attending: EMERGENCY MEDICINE
Payer: MEDICARE

## 2022-01-20 VITALS
WEIGHT: 175.05 LBS | HEIGHT: 63 IN | HEART RATE: 108 BPM | OXYGEN SATURATION: 91 % | RESPIRATION RATE: 18 BRPM | TEMPERATURE: 103 F

## 2022-01-20 DIAGNOSIS — R50.9 FEVER, UNSPECIFIED: ICD-10-CM

## 2022-01-20 DIAGNOSIS — Z20.822 CONTACT WITH AND (SUSPECTED) EXPOSURE TO COVID-19: ICD-10-CM

## 2022-01-20 DIAGNOSIS — E78.5 HYPERLIPIDEMIA, UNSPECIFIED: ICD-10-CM

## 2022-01-20 DIAGNOSIS — Z98.61 CORONARY ANGIOPLASTY STATUS: Chronic | ICD-10-CM

## 2022-01-20 DIAGNOSIS — I10 ESSENTIAL (PRIMARY) HYPERTENSION: ICD-10-CM

## 2022-01-20 DIAGNOSIS — E11.9 TYPE 2 DIABETES MELLITUS WITHOUT COMPLICATIONS: ICD-10-CM

## 2022-01-20 LAB
ALBUMIN SERPL ELPH-MCNC: 3.2 G/DL — LOW (ref 3.3–5)
ALP SERPL-CCNC: 55 U/L — SIGNIFICANT CHANGE UP (ref 40–120)
ALT FLD-CCNC: 23 U/L — SIGNIFICANT CHANGE UP (ref 12–78)
ANION GAP SERPL CALC-SCNC: 7 MMOL/L — SIGNIFICANT CHANGE UP (ref 5–17)
APTT BLD: 26.3 SEC — LOW (ref 27.5–35.5)
AST SERPL-CCNC: 13 U/L — LOW (ref 15–37)
BASOPHILS # BLD AUTO: 0.02 K/UL — SIGNIFICANT CHANGE UP (ref 0–0.2)
BASOPHILS NFR BLD AUTO: 0.2 % — SIGNIFICANT CHANGE UP (ref 0–2)
BILIRUB SERPL-MCNC: 0.3 MG/DL — SIGNIFICANT CHANGE UP (ref 0.2–1.2)
BUN SERPL-MCNC: 16 MG/DL — SIGNIFICANT CHANGE UP (ref 7–23)
CALCIUM SERPL-MCNC: 8.5 MG/DL — SIGNIFICANT CHANGE UP (ref 8.5–10.1)
CHLORIDE SERPL-SCNC: 104 MMOL/L — SIGNIFICANT CHANGE UP (ref 96–108)
CO2 SERPL-SCNC: 26 MMOL/L — SIGNIFICANT CHANGE UP (ref 22–31)
CREAT SERPL-MCNC: 1.26 MG/DL — SIGNIFICANT CHANGE UP (ref 0.5–1.3)
EOSINOPHIL # BLD AUTO: 0.04 K/UL — SIGNIFICANT CHANGE UP (ref 0–0.5)
EOSINOPHIL NFR BLD AUTO: 0.4 % — SIGNIFICANT CHANGE UP (ref 0–6)
FLUAV AG NPH QL: SIGNIFICANT CHANGE UP
FLUBV AG NPH QL: SIGNIFICANT CHANGE UP
GLUCOSE SERPL-MCNC: 297 MG/DL — HIGH (ref 70–99)
HCT VFR BLD CALC: 36.4 % — LOW (ref 39–50)
HGB BLD-MCNC: 11.7 G/DL — LOW (ref 13–17)
IMM GRANULOCYTES NFR BLD AUTO: 0.5 % — SIGNIFICANT CHANGE UP (ref 0–1.5)
INR BLD: 1 RATIO — SIGNIFICANT CHANGE UP (ref 0.88–1.16)
LACTATE SERPL-SCNC: 1.9 MMOL/L — SIGNIFICANT CHANGE UP (ref 0.7–2)
LYMPHOCYTES # BLD AUTO: 0.96 K/UL — LOW (ref 1–3.3)
LYMPHOCYTES # BLD AUTO: 9.2 % — LOW (ref 13–44)
MCHC RBC-ENTMCNC: 26.1 PG — LOW (ref 27–34)
MCHC RBC-ENTMCNC: 32.1 GM/DL — SIGNIFICANT CHANGE UP (ref 32–36)
MCV RBC AUTO: 81.1 FL — SIGNIFICANT CHANGE UP (ref 80–100)
MONOCYTES # BLD AUTO: 0.53 K/UL — SIGNIFICANT CHANGE UP (ref 0–0.9)
MONOCYTES NFR BLD AUTO: 5.1 % — SIGNIFICANT CHANGE UP (ref 2–14)
NEUTROPHILS # BLD AUTO: 8.78 K/UL — HIGH (ref 1.8–7.4)
NEUTROPHILS NFR BLD AUTO: 84.6 % — HIGH (ref 43–77)
NRBC # BLD: 0 /100 WBCS — SIGNIFICANT CHANGE UP (ref 0–0)
PLATELET # BLD AUTO: 176 K/UL — SIGNIFICANT CHANGE UP (ref 150–400)
POTASSIUM SERPL-MCNC: 4.4 MMOL/L — SIGNIFICANT CHANGE UP (ref 3.5–5.3)
POTASSIUM SERPL-SCNC: 4.4 MMOL/L — SIGNIFICANT CHANGE UP (ref 3.5–5.3)
PROT SERPL-MCNC: 7 GM/DL — SIGNIFICANT CHANGE UP (ref 6–8.3)
PROTHROM AB SERPL-ACNC: 11.6 SEC — SIGNIFICANT CHANGE UP (ref 10.6–13.6)
RBC # BLD: 4.49 M/UL — SIGNIFICANT CHANGE UP (ref 4.2–5.8)
RBC # FLD: 13.3 % — SIGNIFICANT CHANGE UP (ref 10.3–14.5)
SARS-COV-2 RNA SPEC QL NAA+PROBE: SIGNIFICANT CHANGE UP
SODIUM SERPL-SCNC: 137 MMOL/L — SIGNIFICANT CHANGE UP (ref 135–145)
WBC # BLD: 10.38 K/UL — SIGNIFICANT CHANGE UP (ref 3.8–10.5)
WBC # FLD AUTO: 10.38 K/UL — SIGNIFICANT CHANGE UP (ref 3.8–10.5)

## 2022-01-20 PROCEDURE — 99285 EMERGENCY DEPT VISIT HI MDM: CPT

## 2022-01-20 PROCEDURE — 71045 X-RAY EXAM CHEST 1 VIEW: CPT | Mod: 26

## 2022-01-20 PROCEDURE — 93010 ELECTROCARDIOGRAM REPORT: CPT

## 2022-01-20 RX ORDER — SODIUM CHLORIDE 9 MG/ML
1000 INJECTION INTRAMUSCULAR; INTRAVENOUS; SUBCUTANEOUS ONCE
Refills: 0 | Status: COMPLETED | OUTPATIENT
Start: 2022-01-20 | End: 2022-01-20

## 2022-01-20 RX ORDER — ACETAMINOPHEN 500 MG
975 TABLET ORAL ONCE
Refills: 0 | Status: COMPLETED | OUTPATIENT
Start: 2022-01-20 | End: 2022-01-20

## 2022-01-20 RX ADMIN — Medication 975 MILLIGRAM(S): at 21:45

## 2022-01-20 RX ADMIN — SODIUM CHLORIDE 1000 MILLILITER(S): 9 INJECTION INTRAMUSCULAR; INTRAVENOUS; SUBCUTANEOUS at 21:45

## 2022-01-20 RX ADMIN — Medication 975 MILLIGRAM(S): at 20:07

## 2022-01-20 RX ADMIN — SODIUM CHLORIDE 1000 MILLILITER(S): 9 INJECTION INTRAMUSCULAR; INTRAVENOUS; SUBCUTANEOUS at 20:08

## 2022-01-20 NOTE — ED PROVIDER NOTE - CONDITION AT DISCHARGE:
Thank you for choosing the [facility] , and our team as your Stroke Specialists.  We actively use feedback to constantly improve and deliver the best care possible. To provide the best experience we are collecting feedback from you on how we performed.  You may receive a survey in the mail to evaluate how we did. Please take a moment and share your thoughts.      If for any reason you feel that we did not meet your expectations or you want to share a positive experience, please give us a call. Your feedback helps us know how we are doing and what we can be doing better.    If your provider has ordered imaging for you to complete please call our pre-service department at [phone] to schedule.    Office hours: 8:00 am to 4:30 pm, Monday - Friday  Phone: [phone]   Improved

## 2022-01-20 NOTE — ED PROVIDER NOTE - CLINICAL SUMMARY MEDICAL DECISION MAKING FREE TEXT BOX
Patient with fever and chills, febrile in ER.  VS improved.  Sepsis work up done, cultures sent.  Although negative for flu/covid suspect other viral source.  UA negative.  CXR clear.  Due to high risk history, will dc with empiric antibiotic, augmentin.  Patient declines hospital admission, wants to go home and follow up with his PMD.  Patient looks well enough to dc, advised to return if condition worsens.  Discussed results and outcome of today's visit with the patient/family, copy of results given with discharge.  Patient advised to arrange rogers follow up with their PMD and/or any provided referral(s) within the next few days and are cautioned to return to the Emergency Department for any worsening symptoms.  Patient advised that their doctor may call  to follow up on the specific results of the tests performed today in the emergency department.   Patient appears well on discharge.

## 2022-01-20 NOTE — ED ADULT NURSE NOTE - NSIMPLEMENTINTERV_GEN_ALL_ED
Implemented All Universal Safety Interventions:  [place] to call system. Call bell, personal items and telephone within reach. Instruct patient to call for assistance. Room bathroom lighting operational. Non-slip footwear when patient is off stretcher. Physically safe environment: no spills, clutter or unnecessary equipment. Stretcher in lowest position, wheels locked, appropriate side rails in place.

## 2022-01-20 NOTE — ED ADULT NURSE NOTE - ED STAT RN HANDOFF DETAILS
Received report from ARNOLDO Licea.  Safety checks completed this shift. Safety rounds completed hourly. IV sites checked Q2+remains WDL. Fall & skin precautions in place. will continue to monitor.

## 2022-01-20 NOTE — ED ADULT NURSE NOTE - OBJECTIVE STATEMENT
Patient presents to ED awake, alert and oriented x4 complaining of a fever with chills for the past hour.  PMH diabetes, PSH bypass. Endorses he is fully vaccinated with booster. Denies sick contacts, difficulty breathing, chest pain.  Denies allergies.

## 2022-01-20 NOTE — ED PROVIDER NOTE - PHYSICAL EXAMINATION
Gen: Alert, NAD, well appearing  Head: NC, AT, EOMI, normal lids/conjunctiva  ENT: normal hearing, patent oropharynx without erythema/exudate, uvula midline  Neck: +supple, no tenderness/meningismus/JVD, +Trachea midline, healed midline surgical scar on chest  Pulm: Bilateral BS, normal resp effort, no wheeze/stridor/retractions  CV: RRR, no M/R/G, +dist pulses  Abd: soft, NT/ND, Negative [place] signs, +BS, no palpable masses  Mskel: no edema/erythema/cyanosis  Skin: no rash, warm/dry  Neuro: AAOx3, no apparent sensory/motor deficits, coordination intact

## 2022-01-20 NOTE — ED PROVIDER NOTE - PATIENT PORTAL LINK FT
You can access the FollowMyHealth Patient Portal offered by [facility] by registering at the following website: [email or web address]. By joining [employer]’s FollowMyHealth portal, you will also be able to view your health information using other applications (apps) compatible with our system.

## 2022-01-21 VITALS
TEMPERATURE: 98 F | RESPIRATION RATE: 18 BRPM | HEART RATE: 98 BPM | SYSTOLIC BLOOD PRESSURE: 119 MMHG | OXYGEN SATURATION: 99 % | DIASTOLIC BLOOD PRESSURE: 70 MMHG

## 2022-01-21 LAB
APPEARANCE UR: CLEAR — SIGNIFICANT CHANGE UP
BILIRUB UR-MCNC: NEGATIVE — SIGNIFICANT CHANGE UP
COLOR SPEC: YELLOW — SIGNIFICANT CHANGE UP
CRP SERPL-MCNC: <3 MG/L — SIGNIFICANT CHANGE UP
DIFF PNL FLD: NEGATIVE — SIGNIFICANT CHANGE UP
FERRITIN SERPL-MCNC: 56 NG/ML — SIGNIFICANT CHANGE UP (ref 30–400)
GLUCOSE BLDC GLUCOMTR-MCNC: 178 MG/DL — HIGH (ref 70–99)
GLUCOSE UR QL: 1000 MG/DL
KETONES UR-MCNC: NEGATIVE — SIGNIFICANT CHANGE UP
LEUKOCYTE ESTERASE UR-ACNC: NEGATIVE — SIGNIFICANT CHANGE UP
NITRITE UR-MCNC: NEGATIVE — SIGNIFICANT CHANGE UP
PH UR: 6 — SIGNIFICANT CHANGE UP (ref 5–8)
PROCALCITONIN SERPL-MCNC: 0.09 NG/ML — SIGNIFICANT CHANGE UP (ref 0.02–0.1)
PROT UR-MCNC: NEGATIVE MG/DL — SIGNIFICANT CHANGE UP
SP GR SPEC: 1.01 — SIGNIFICANT CHANGE UP (ref 1.01–1.02)
UROBILINOGEN FLD QL: NEGATIVE MG/DL — SIGNIFICANT CHANGE UP

## 2022-01-21 RX ORDER — SODIUM CHLORIDE 9 MG/ML
1000 INJECTION INTRAMUSCULAR; INTRAVENOUS; SUBCUTANEOUS ONCE
Refills: 0 | Status: COMPLETED | OUTPATIENT
Start: 2022-01-21 | End: 2022-01-21

## 2022-01-21 RX ORDER — INSULIN HUMAN 100 [IU]/ML
8 INJECTION, SOLUTION SUBCUTANEOUS ONCE
Refills: 0 | Status: DISCONTINUED | OUTPATIENT
Start: 2022-01-21 | End: 2022-01-21

## 2022-01-21 RX ADMIN — SODIUM CHLORIDE 1000 MILLILITER(S): 9 INJECTION INTRAMUSCULAR; INTRAVENOUS; SUBCUTANEOUS at 01:47

## 2022-01-21 RX ADMIN — Medication 1 TABLET(S): at 01:50

## 2022-01-22 LAB
CULTURE RESULTS: NO GROWTH — SIGNIFICANT CHANGE UP
SPECIMEN SOURCE: SIGNIFICANT CHANGE UP

## 2022-01-26 LAB
CULTURE RESULTS: SIGNIFICANT CHANGE UP
CULTURE RESULTS: SIGNIFICANT CHANGE UP
SPECIMEN SOURCE: SIGNIFICANT CHANGE UP
SPECIMEN SOURCE: SIGNIFICANT CHANGE UP

## 2022-01-28 ENCOUNTER — APPOINTMENT (OUTPATIENT)
Dept: CARDIOLOGY | Facility: CLINIC | Age: 52
End: 2022-01-28

## 2022-02-04 ENCOUNTER — NON-APPOINTMENT (OUTPATIENT)
Age: 52
End: 2022-02-04

## 2022-02-04 ENCOUNTER — APPOINTMENT (OUTPATIENT)
Dept: CARDIOLOGY | Facility: CLINIC | Age: 52
End: 2022-02-04
Payer: MEDICARE

## 2022-02-04 VITALS
SYSTOLIC BLOOD PRESSURE: 116 MMHG | WEIGHT: 165 LBS | HEART RATE: 84 BPM | DIASTOLIC BLOOD PRESSURE: 70 MMHG | OXYGEN SATURATION: 96 % | HEIGHT: 67 IN | BODY MASS INDEX: 25.9 KG/M2

## 2022-02-04 DIAGNOSIS — R60.0 LOCALIZED EDEMA: ICD-10-CM

## 2022-02-04 DIAGNOSIS — R07.9 CHEST PAIN, UNSPECIFIED: ICD-10-CM

## 2022-02-04 PROCEDURE — 99214 OFFICE O/P EST MOD 30 MIN: CPT

## 2022-02-04 PROCEDURE — 93000 ELECTROCARDIOGRAM COMPLETE: CPT

## 2022-02-04 RX ORDER — FUROSEMIDE 40 MG/1
40 TABLET ORAL
Qty: 90 | Refills: 1 | Status: ACTIVE | COMMUNITY
Start: 2019-10-04

## 2022-02-04 NOTE — REVIEW OF SYSTEMS
[SOB] : shortness of breath [Dyspnea on exertion] : dyspnea during exertion [Chest Discomfort] : chest discomfort [Lower Ext Edema] : lower extremity edema [Negative] : Gastrointestinal [Leg Claudication] : no intermittent leg claudication [Palpitations] : no palpitations [Syncope] : no syncope

## 2022-02-04 NOTE — CARDIOLOGY SUMMARY
[de-identified] : ECG: SR inf infarct, lat TWI  [de-identified] : Echo: TTE 9/28/2020\par NML EF 60-65%\par mild segmental LV dysfunction with preserved EF, basal septal hypokinesis.\par gr 1 diastolic dysfunction\par no significant valvular disease \par

## 2022-02-04 NOTE — HISTORY OF PRESENT ILLNESS
[FreeTextEntry1] : Mr. ELSI MERINO is a 50 year-old man PMH CAD s/p CABG x3 (LIMA to distal LAD , SVG to first Diagonal , RPDA ) 9/10/2019 who initially presented to cardiology \par He had a CVA in 2005, he was diagnosed with HLD and DM at that time. at the age of 33 yo\par \par He later underwent PCI in 2008 at the 36 yo, he had left sided chest pain and had ECHO and stress that was positive \par at that time he was SOB when running\par in 2009 he had another PCI for further SOB\par \par admits to occasional chest left sided, sometimes assoc with SOB, last for 1 hr\par worse the past 5-6 days\par no palpitations\par

## 2022-02-24 ENCOUNTER — APPOINTMENT (OUTPATIENT)
Dept: CARDIOLOGY | Facility: CLINIC | Age: 52
End: 2022-02-24

## 2022-02-25 ENCOUNTER — OUTPATIENT (OUTPATIENT)
Dept: OUTPATIENT SERVICES | Facility: HOSPITAL | Age: 52
LOS: 1 days | End: 2022-02-25
Payer: COMMERCIAL

## 2022-02-25 ENCOUNTER — APPOINTMENT (OUTPATIENT)
Dept: CV DIAGNOSTICS | Facility: HOSPITAL | Age: 52
End: 2022-02-25

## 2022-02-25 DIAGNOSIS — I25.10 ATHEROSCLEROTIC HEART DISEASE OF NATIVE CORONARY ARTERY WITHOUT ANGINA PECTORIS: ICD-10-CM

## 2022-02-25 DIAGNOSIS — Z98.61 CORONARY ANGIOPLASTY STATUS: Chronic | ICD-10-CM

## 2022-02-25 PROCEDURE — A9500: CPT

## 2022-02-25 PROCEDURE — C8929: CPT

## 2022-02-25 PROCEDURE — 93018 CV STRESS TEST I&R ONLY: CPT

## 2022-02-25 PROCEDURE — 93017 CV STRESS TEST TRACING ONLY: CPT

## 2022-02-25 PROCEDURE — 78452 HT MUSCLE IMAGE SPECT MULT: CPT | Mod: 26,MH

## 2022-02-25 PROCEDURE — 93306 TTE W/DOPPLER COMPLETE: CPT | Mod: 26

## 2022-02-25 PROCEDURE — 93016 CV STRESS TEST SUPVJ ONLY: CPT

## 2022-02-25 PROCEDURE — 78452 HT MUSCLE IMAGE SPECT MULT: CPT | Mod: MH

## 2022-03-17 ENCOUNTER — APPOINTMENT (OUTPATIENT)
Dept: CARDIOLOGY | Facility: CLINIC | Age: 52
End: 2022-03-17

## 2022-06-16 ENCOUNTER — RESULT CHARGE (OUTPATIENT)
Age: 52
End: 2022-06-16

## 2022-06-17 ENCOUNTER — APPOINTMENT (OUTPATIENT)
Dept: CARDIOLOGY | Facility: CLINIC | Age: 52
End: 2022-06-17
Payer: MEDICARE

## 2022-06-17 VITALS
WEIGHT: 170 LBS | HEIGHT: 67 IN | SYSTOLIC BLOOD PRESSURE: 126 MMHG | HEART RATE: 79 BPM | BODY MASS INDEX: 26.68 KG/M2 | DIASTOLIC BLOOD PRESSURE: 70 MMHG | OXYGEN SATURATION: 98 %

## 2022-06-17 DIAGNOSIS — I25.118 ATHEROSCLEROTIC HEART DISEASE OF NATIVE CORONARY ARTERY WITH OTHER FORMS OF ANGINA PECTORIS: ICD-10-CM

## 2022-06-17 DIAGNOSIS — I20.9 ANGINA PECTORIS, UNSPECIFIED: ICD-10-CM

## 2022-06-17 PROCEDURE — 93000 ELECTROCARDIOGRAM COMPLETE: CPT

## 2022-06-17 PROCEDURE — 99215 OFFICE O/P EST HI 40 MIN: CPT

## 2022-06-17 RX ORDER — ISOSORBIDE MONONITRATE 30 MG/1
30 TABLET, EXTENDED RELEASE ORAL
Qty: 90 | Refills: 3 | Status: ACTIVE | COMMUNITY
Start: 2022-06-17 | End: 1900-01-01

## 2022-06-27 NOTE — CARDIOLOGY SUMMARY
[de-identified] : ECG: SR inf infarct, lat TWI  [de-identified] : Echo: TTE 9/28/2020\par NML EF 60-65%\par mild segmental LV dysfunction with preserved EF, basal septal hypokinesis.\par gr 1 diastolic dysfunction\par no significant valvular disease \par

## 2022-06-27 NOTE — HISTORY OF PRESENT ILLNESS
[FreeTextEntry1] : Mr. ELSI MERINO is a 51 year-old man PMH CAD s/p CABG x3 (LIMA to distal LAD , SVG to first Diagonal , RPDA ) 9/10/2019 who initially presented to cardiology \par He had a CVA in 2005, he was diagnosed with HLD and DM at that time. at the age of 35 yo\par \par He later underwent PCI in 2008 at the 36 yo, he had left sided chest pain and had ECHO and stress that was positive \par at that time he was SOB when running\par in 2009 he had another PCI for further SOB\par \par still with chest pressure with walking resolves with rest\par \par discussed results of ECHo and stress test\par \par - recent blood work  results to be faxed\par \par

## 2022-08-19 ENCOUNTER — APPOINTMENT (OUTPATIENT)
Dept: CARDIOLOGY | Facility: CLINIC | Age: 52
End: 2022-08-19

## 2022-11-21 ENCOUNTER — APPOINTMENT (OUTPATIENT)
Dept: CARDIOLOGY | Facility: CLINIC | Age: 52
End: 2022-11-21

## 2023-03-20 NOTE — ED ADULT NURSE NOTE - CHIEF COMPLAINT
The patient is a 49y Male complaining of Albendazole Counseling:  I discussed with the patient the risks of albendazole including but not limited to cytopenia, kidney damage, nausea/vomiting and severe allergy.  The patient understands that this medication is being used in an off-label manner.

## 2023-08-27 NOTE — ED ADULT NURSE NOTE - PAIN RATING/NUMBER SCALE (0-10): REST
[de-identified] : 0 None

## 2023-10-25 NOTE — ED ADULT NURSE NOTE - NS ED NURSE DC PT EDUCATION RESOURCES
None needed Trilobed Flap Text: The defect edges were debeveled with a #15 scalpel blade.  Given the location of the defect and the proximity to free margins a trilobed flap was deemed most appropriate.  Using a sterile surgical marker, an appropriate trilobed flap drawn around the defect.    The area thus outlined was incised deep to adipose tissue with a #15 scalpel blade.  The skin margins were undermined to an appropriate distance in all directions utilizing iris scissors.

## 2024-01-29 ENCOUNTER — APPOINTMENT (OUTPATIENT)
Dept: CARDIOLOGY | Facility: CLINIC | Age: 54
End: 2024-01-29

## 2024-05-07 NOTE — ED ADULT NURSE NOTE - EENT WDL
Detail Level: Zone Include Location In Plan?: No Eyes with no visual disturbances.  Ears clean and dry and no hearing difficulties. Nose with pink mucosa and no drainage.  Mouth mucous membranes moist and pink.  No tenderness or swelling to throat or neck.

## 2024-06-17 ENCOUNTER — APPOINTMENT (OUTPATIENT)
Dept: VASCULAR SURGERY | Facility: CLINIC | Age: 54
End: 2024-06-17

## 2024-07-01 ENCOUNTER — APPOINTMENT (OUTPATIENT)
Dept: VASCULAR SURGERY | Facility: CLINIC | Age: 54
End: 2024-07-01

## 2024-07-23 ENCOUNTER — APPOINTMENT (OUTPATIENT)
Dept: VASCULAR SURGERY | Facility: CLINIC | Age: 54
End: 2024-07-23
Payer: MEDICARE

## 2024-07-23 ENCOUNTER — APPOINTMENT (OUTPATIENT)
Dept: VASCULAR SURGERY | Facility: CLINIC | Age: 54
End: 2024-07-23

## 2024-07-23 VITALS
HEART RATE: 98 BPM | TEMPERATURE: 97.7 F | BODY MASS INDEX: 26.68 KG/M2 | SYSTOLIC BLOOD PRESSURE: 120 MMHG | DIASTOLIC BLOOD PRESSURE: 63 MMHG | WEIGHT: 170 LBS | HEIGHT: 67 IN

## 2024-07-23 DIAGNOSIS — I73.9 PERIPHERAL VASCULAR DISEASE, UNSPECIFIED: ICD-10-CM

## 2024-07-23 DIAGNOSIS — L98.499 STRICTURE OF ARTERY: ICD-10-CM

## 2024-07-23 DIAGNOSIS — I77.1 STRICTURE OF ARTERY: ICD-10-CM

## 2024-07-23 DIAGNOSIS — Z78.9 OTHER SPECIFIED HEALTH STATUS: ICD-10-CM

## 2024-07-23 PROCEDURE — 99204 OFFICE O/P NEW MOD 45 MIN: CPT

## 2024-07-23 PROCEDURE — 93978 VASCULAR STUDY: CPT

## 2024-07-23 PROCEDURE — 93923 UPR/LXTR ART STDY 3+ LVLS: CPT | Mod: 59

## 2024-07-23 PROCEDURE — ZZZZZ: CPT

## 2024-07-23 RX ORDER — FENOFIBRATE 120 MG/1
TABLET ORAL
Refills: 0 | Status: ACTIVE | COMMUNITY

## 2024-07-23 RX ORDER — CILOSTAZOL 50 MG/1
50 TABLET ORAL
Qty: 180 | Refills: 3 | Status: ACTIVE | COMMUNITY
Start: 2024-07-23 | End: 1900-01-01

## 2024-07-23 RX ORDER — SERTRALINE HYDROCHLORIDE 50 MG/1
50 TABLET, FILM COATED ORAL
Refills: 0 | Status: ACTIVE | COMMUNITY

## 2024-07-23 RX ORDER — DULAGLUTIDE 0.75 MG/.5ML
0.75 INJECTION, SOLUTION SUBCUTANEOUS
Refills: 0 | Status: ACTIVE | COMMUNITY

## 2024-07-23 NOTE — HISTORY OF PRESENT ILLNESS
[FreeTextEntry1] : Pt c/o right foot ulcer onset more than 1 year ago   Pt c/o bilateral  leg  intermittent claudication at 3-4  blocks pt denies nocturnal leg or foot cramps    Pt denies any recent  cardiac c/o , SOB, Chest Pain or recent heart attacks  Pt is receiving  woundcare from Dr RADHA De Leon DPM     Pt with daughter  in attendance  Pt states rle ba/stent in  [place] more than 5 years ago

## 2024-07-23 NOTE — PHYSICAL EXAM
[Normal Breath Sounds] : Normal breath sounds [Alert] : alert [Oriented to Person] : oriented to person [Oriented to Place] : oriented to place [Oriented to Time] : oriented to time [Calm] : calm [2+] : left 2+ [Right Carotid Bruit] : right carotid bruit heard [Left Carotid Bruit] : left carotid bruit heard [1+] : left 1+ [JVD] : no jugular venous distention  [Ankle Swelling (On Exam)] : not present [Varicose Veins Of Lower Extremities] : not present [] : not present [de-identified] : nad [de-identified] : wnl [de-identified] : cta parish [FreeTextEntry1] : Mod  arterial insufficiency w moderate  trophic skin changes  RLE foot wound mid arch  1cm diam x 4mm w mod to good granulation tissue  [de-identified] : wnl [de-identified] : Sae Cranial nerves 2-12 sae grossly intact [de-identified] : cooperative

## 2024-07-23 NOTE — ASSESSMENT
[FreeTextEntry1] : Impression symptomatic arterial insuff w rt foot ulcer  Plan Med Conservative management Woundcare trial pletal 50 bid  Indications risks and benefits of RLE angio were explained to pt who understands Pt wants to think about it and decide Pt wants to try med/conserv management  f/u w Dr RADHA De Leon DPM  for rt foot woundcare  ov in1 mo to re eval for possible le angio   Letter faxed to Dr RADHA De Leon DPM   [Arterial/Venous Disease] : arterial/venous disease [Medication Management] : medication management [Other: _____] : [unfilled] [Foot care/Footwear] : foot care/footwear [Ulcer Care] : ulcer care

## 2024-07-23 NOTE — DATA REVIEWED
[FreeTextEntry1] : 7/23/2024 OBDULIO/PVR RLE mild infra inguinal and                                  LLE mild  infra inguinal geniculate arterial insuff w vessel calcification                                  Rt OBDULIO  1.23 Lt OBDULIO  1.24                                   7/23/2024 Aorto Iliac Duplex  AA patent, patent parish iliac arterial systems

## 2024-08-05 ENCOUNTER — INPATIENT (INPATIENT)
Facility: HOSPITAL | Age: 54
LOS: 3 days | Discharge: HOME | DRG: 240 | End: 2024-08-09
Attending: SURGERY | Admitting: SURGERY
Payer: COMMERCIAL

## 2024-08-05 ENCOUNTER — NON-APPOINTMENT (OUTPATIENT)
Age: 54
End: 2024-08-05

## 2024-08-05 VITALS
SYSTOLIC BLOOD PRESSURE: 105 MMHG | OXYGEN SATURATION: 97 % | WEIGHT: 164.91 LBS | TEMPERATURE: 98 F | HEIGHT: 66 IN | HEART RATE: 97 BPM | RESPIRATION RATE: 18 BRPM | DIASTOLIC BLOOD PRESSURE: 63 MMHG

## 2024-08-05 DIAGNOSIS — E11.621 TYPE 2 DIABETES MELLITUS WITH FOOT ULCER: ICD-10-CM

## 2024-08-05 DIAGNOSIS — Z98.61 CORONARY ANGIOPLASTY STATUS: Chronic | ICD-10-CM

## 2024-08-05 LAB
ADD ON TEST-SPECIMEN IN LAB: SIGNIFICANT CHANGE UP
ALBUMIN SERPL ELPH-MCNC: 3.9 G/DL — SIGNIFICANT CHANGE UP (ref 3.3–5)
ALP SERPL-CCNC: 103 U/L — SIGNIFICANT CHANGE UP (ref 40–120)
ALT FLD-CCNC: 14 U/L — SIGNIFICANT CHANGE UP (ref 10–45)
ANION GAP SERPL CALC-SCNC: 13 MMOL/L — SIGNIFICANT CHANGE UP (ref 5–17)
APTT BLD: 28.7 SEC — SIGNIFICANT CHANGE UP (ref 24.5–35.6)
AST SERPL-CCNC: 11 U/L — SIGNIFICANT CHANGE UP (ref 10–40)
B-OH-BUTYR SERPL-SCNC: 0.1 MMOL/L — SIGNIFICANT CHANGE UP
BASE EXCESS BLDV CALC-SCNC: 3.8 MMOL/L — HIGH (ref -2–3)
BASOPHILS # BLD AUTO: 0.02 K/UL — SIGNIFICANT CHANGE UP (ref 0–0.2)
BASOPHILS NFR BLD AUTO: 0.3 % — SIGNIFICANT CHANGE UP (ref 0–2)
BILIRUB SERPL-MCNC: 0.3 MG/DL — SIGNIFICANT CHANGE UP (ref 0.2–1.2)
BLD GP AB SCN SERPL QL: NEGATIVE — SIGNIFICANT CHANGE UP
BUN SERPL-MCNC: 20 MG/DL — SIGNIFICANT CHANGE UP (ref 7–23)
CA-I SERPL-SCNC: 1.27 MMOL/L — SIGNIFICANT CHANGE UP (ref 1.15–1.33)
CALCIUM SERPL-MCNC: 9.4 MG/DL — SIGNIFICANT CHANGE UP (ref 8.4–10.5)
CHLORIDE BLDV-SCNC: 103 MMOL/L — SIGNIFICANT CHANGE UP (ref 96–108)
CHLORIDE SERPL-SCNC: 99 MMOL/L — SIGNIFICANT CHANGE UP (ref 96–108)
CO2 BLDV-SCNC: 33 MMOL/L — HIGH (ref 22–26)
CO2 SERPL-SCNC: 22 MMOL/L — SIGNIFICANT CHANGE UP (ref 22–31)
CREAT SERPL-MCNC: 0.82 MG/DL — SIGNIFICANT CHANGE UP (ref 0.5–1.3)
EGFR: 104 ML/MIN/1.73M2 — SIGNIFICANT CHANGE UP
EOSINOPHIL # BLD AUTO: 0.1 K/UL — SIGNIFICANT CHANGE UP (ref 0–0.5)
EOSINOPHIL NFR BLD AUTO: 1.4 % — SIGNIFICANT CHANGE UP (ref 0–6)
GAS PNL BLDV: 135 MMOL/L — LOW (ref 136–145)
GAS PNL BLDV: SIGNIFICANT CHANGE UP
GLUCOSE BLDC GLUCOMTR-MCNC: 208 MG/DL — HIGH (ref 70–99)
GLUCOSE BLDV-MCNC: 239 MG/DL — HIGH (ref 70–99)
GLUCOSE SERPL-MCNC: 278 MG/DL — HIGH (ref 70–99)
HCO3 BLDV-SCNC: 31 MMOL/L — HIGH (ref 22–29)
HCT VFR BLD CALC: 38 % — LOW (ref 39–50)
HCT VFR BLDA CALC: 36 % — LOW (ref 39–51)
HGB BLD CALC-MCNC: 12 G/DL — LOW (ref 12.6–17.4)
HGB BLD-MCNC: 11.9 G/DL — LOW (ref 13–17)
IMM GRANULOCYTES NFR BLD AUTO: 0.3 % — SIGNIFICANT CHANGE UP (ref 0–0.9)
INR BLD: 0.88 RATIO — SIGNIFICANT CHANGE UP (ref 0.85–1.18)
LACTATE BLDV-MCNC: 1.5 MMOL/L — SIGNIFICANT CHANGE UP (ref 0.5–2)
LYMPHOCYTES # BLD AUTO: 2.64 K/UL — SIGNIFICANT CHANGE UP (ref 1–3.3)
LYMPHOCYTES # BLD AUTO: 37.9 % — SIGNIFICANT CHANGE UP (ref 13–44)
MCHC RBC-ENTMCNC: 25.4 PG — LOW (ref 27–34)
MCHC RBC-ENTMCNC: 31.3 GM/DL — LOW (ref 32–36)
MCV RBC AUTO: 81.2 FL — SIGNIFICANT CHANGE UP (ref 80–100)
MONOCYTES # BLD AUTO: 0.56 K/UL — SIGNIFICANT CHANGE UP (ref 0–0.9)
MONOCYTES NFR BLD AUTO: 8 % — SIGNIFICANT CHANGE UP (ref 2–14)
NEUTROPHILS # BLD AUTO: 3.63 K/UL — SIGNIFICANT CHANGE UP (ref 1.8–7.4)
NEUTROPHILS NFR BLD AUTO: 52.1 % — SIGNIFICANT CHANGE UP (ref 43–77)
NRBC # BLD: 0 /100 WBCS — SIGNIFICANT CHANGE UP (ref 0–0)
PCO2 BLDV: 59 MMHG — HIGH (ref 42–55)
PH BLDV: 7.33 — SIGNIFICANT CHANGE UP (ref 7.32–7.43)
PLATELET # BLD AUTO: 220 K/UL — SIGNIFICANT CHANGE UP (ref 150–400)
PO2 BLDV: 29 MMHG — SIGNIFICANT CHANGE UP (ref 25–45)
POTASSIUM BLDV-SCNC: 4.2 MMOL/L — SIGNIFICANT CHANGE UP (ref 3.5–5.1)
POTASSIUM SERPL-MCNC: 4.6 MMOL/L — SIGNIFICANT CHANGE UP (ref 3.5–5.3)
POTASSIUM SERPL-SCNC: 4.6 MMOL/L — SIGNIFICANT CHANGE UP (ref 3.5–5.3)
PROT SERPL-MCNC: 7.3 G/DL — SIGNIFICANT CHANGE UP (ref 6–8.3)
PROTHROM AB SERPL-ACNC: 9.7 SEC — SIGNIFICANT CHANGE UP (ref 9.5–13)
RBC # BLD: 4.68 M/UL — SIGNIFICANT CHANGE UP (ref 4.2–5.8)
RBC # FLD: 13.1 % — SIGNIFICANT CHANGE UP (ref 10.3–14.5)
RH IG SCN BLD-IMP: POSITIVE — SIGNIFICANT CHANGE UP
SAO2 % BLDV: 42.4 % — LOW (ref 67–88)
SODIUM SERPL-SCNC: 134 MMOL/L — LOW (ref 135–145)
WBC # BLD: 6.97 K/UL — SIGNIFICANT CHANGE UP (ref 3.8–10.5)
WBC # FLD AUTO: 6.97 K/UL — SIGNIFICANT CHANGE UP (ref 3.8–10.5)

## 2024-08-05 PROCEDURE — 99222 1ST HOSP IP/OBS MODERATE 55: CPT

## 2024-08-05 PROCEDURE — 93971 EXTREMITY STUDY: CPT | Mod: 26,RT

## 2024-08-05 PROCEDURE — 71045 X-RAY EXAM CHEST 1 VIEW: CPT | Mod: 26

## 2024-08-05 PROCEDURE — 73630 X-RAY EXAM OF FOOT: CPT | Mod: 26,RT

## 2024-08-05 PROCEDURE — 99285 EMERGENCY DEPT VISIT HI MDM: CPT

## 2024-08-05 RX ORDER — DEXTROSE MONOHYDRATE, SODIUM CHLORIDE, SODIUM LACTATE, CALCIUM CHLORIDE, MAGNESIUM CHLORIDE 1.5; 538; 448; 18.4; 5.08 G/100ML; MG/100ML; MG/100ML; MG/100ML; MG/100ML
1000 SOLUTION INTRAPERITONEAL
Refills: 0 | Status: ACTIVE | OUTPATIENT
Start: 2024-08-05 | End: 2025-07-04

## 2024-08-05 RX ORDER — PIPERACILLIN SODIUM, TAZOBACTAM SODIUM 3; .375 G/15ML; G/15ML
3.38 INJECTION, POWDER, LYOPHILIZED, FOR SOLUTION INTRAVENOUS ONCE
Refills: 0 | Status: COMPLETED | OUTPATIENT
Start: 2024-08-06 | End: 2024-08-06

## 2024-08-05 RX ORDER — GLUCAGON INJECTION, SOLUTION 0.5 MG/.1ML
1 INJECTION, SOLUTION SUBCUTANEOUS ONCE
Refills: 0 | Status: ACTIVE | OUTPATIENT
Start: 2024-08-05 | End: 2025-07-04

## 2024-08-05 RX ORDER — DEXTROSE 4 G
25 TABLET,CHEWABLE ORAL ONCE
Refills: 0 | Status: ACTIVE | OUTPATIENT
Start: 2024-08-05

## 2024-08-05 RX ORDER — INSULIN LISPRO 100/ML
VIAL (ML) SUBCUTANEOUS
Refills: 0 | Status: DISCONTINUED | OUTPATIENT
Start: 2024-08-05 | End: 2024-08-07

## 2024-08-05 RX ORDER — PIPERACILLIN SODIUM, TAZOBACTAM SODIUM 3; .375 G/15ML; G/15ML
3.38 INJECTION, POWDER, LYOPHILIZED, FOR SOLUTION INTRAVENOUS EVERY 8 HOURS
Refills: 0 | Status: ACTIVE | OUTPATIENT
Start: 2024-08-06 | End: 2024-08-16

## 2024-08-05 RX ORDER — DEXTROSE 4 G
15 TABLET,CHEWABLE ORAL ONCE
Refills: 0 | Status: ACTIVE | OUTPATIENT
Start: 2024-08-05 | End: 2025-07-04

## 2024-08-05 RX ORDER — PIPERACILLIN SODIUM, TAZOBACTAM SODIUM 3; .375 G/15ML; G/15ML
3.38 INJECTION, POWDER, LYOPHILIZED, FOR SOLUTION INTRAVENOUS ONCE
Refills: 0 | Status: COMPLETED | OUTPATIENT
Start: 2024-08-05 | End: 2024-08-05

## 2024-08-05 RX ORDER — OXYCODONE HYDROCHLORIDE 30 MG/1
5 TABLET ORAL EVERY 6 HOURS
Refills: 0 | Status: ACTIVE | OUTPATIENT
Start: 2024-08-05 | End: 2024-08-12

## 2024-08-05 RX ORDER — DEXTROSE 4 G
12.5 TABLET,CHEWABLE ORAL ONCE
Refills: 0 | Status: ACTIVE | OUTPATIENT
Start: 2024-08-05

## 2024-08-05 RX ORDER — ACETAMINOPHEN 500 MG
975 TABLET ORAL EVERY 6 HOURS
Refills: 0 | Status: ACTIVE | OUTPATIENT
Start: 2024-08-05 | End: 2025-07-04

## 2024-08-05 RX ORDER — OXYCODONE HYDROCHLORIDE 30 MG/1
2.5 TABLET ORAL EVERY 6 HOURS
Refills: 0 | Status: ACTIVE | OUTPATIENT
Start: 2024-08-05 | End: 2024-08-12

## 2024-08-05 RX ADMIN — PIPERACILLIN SODIUM, TAZOBACTAM SODIUM 200 GRAM(S): 3; .375 INJECTION, POWDER, LYOPHILIZED, FOR SOLUTION INTRAVENOUS at 23:36

## 2024-08-05 RX ADMIN — Medication 2: at 23:35

## 2024-08-05 NOTE — ED PROVIDER NOTE - RAPID ASSESSMENT
54-year-old male past medical history hypertension, hyperlipidemia, CAD, diabetes, PAD, presents to ED referred by podiatry for worsening right foot wound.  No fevers or chills.  No acute distress in triage.    Patient was seen as a QPA patient. The patient will be seen and further worked up in the main emergency department and their care will be completed by the main emergency department team along with a thorough physical exam. Receiving team will follow up on labs, analgesia, any clinical imaging, reassess and disposition as clinically indicated, all decisions regarding the progression of care will be made at their discretion. - Yue ROME

## 2024-08-05 NOTE — ED PROVIDER NOTE - OBJECTIVE STATEMENT
54M PMH HTN, HLD, CAD, DM sent in by podiatry for admission and planned surgical management of poorly healing right foot ulcer. Pt reports he has been performing wound care as prescribed, but reports worsening pain with ambulating. Denies fever, chills.

## 2024-08-05 NOTE — ED ADULT NURSE NOTE - OBJECTIVE STATEMENT
53y/o male with history of CAD, DM, walked into ED a&ox4 c/o foot wound. Patient reports he has had diabetic foot wound for "a while". States wound has been getting worse and was sent here to see podiatry. Endorses increased pain when ambulating and states his lower leg becomes swollen intermittently. Denies fevers, numbness/tingling, difficulty ambulating, sob, cp, or any other complaints. Right lower extremity appears swollen in comparison to left. Ulcer-like wound noted to bottom of right foot, clean appearing. No drainage noted. +pedal pulses, sensation in tact.

## 2024-08-05 NOTE — H&P ADULT - HISTORY OF PRESENT ILLNESS
HPI:  54 year old male with history of DM, CAD s/p CABG x3 2019, PAD s/p R fem-AK pop bypass 6/2023 at [place] presenting to the ED at the referral of his podiatrist/wound care specialist for evaluation. Reports he has had a wound on the bottom of his right foot for approximately 3-4 months. States the wound is painful especially with ambulation, and pain has been increasing. Admits to cramping pain in the calves after 3-4 blocks but denies pain at rest. Also notes some swelling of the right leg which has been present for several weeks. Denies fevers, chills, N/V, drainage, CP, SOB.    HPI:  54 year old male with history of DM, HTN, HLD, CAD s/p CABG x3 2019, PAD s/p RLE angioplasty/stent at [facility] 5+ years ago presenting to the ED at the referral of his podiatrist/wound care specialist for evaluation. Reports he has had a wound on the bottom of his right foot for approximately 3-4 months. States the wound is painful especially with ambulation, and pain has been increasing. Admits to cramping pain in the calves after 3-4 blocks but denies pain at rest. Also notes some swelling of the right leg which has been present for several weeks. Denies fevers, chills, N/V, drainage, CP, SOB.

## 2024-08-05 NOTE — H&P ADULT - ASSESSMENT
54 year old male with history of DM, CAD s/p CABG x3 2019, PAD s/p R fem-AK pop bypass 6/2023 at [place] presenting for evaluation of a right foot wound. History of claudication but no rest pain or fevers. Afebrile and hemodynamically stable, WBC and lactate WNL, no evidence of subcutaneous gas or osteomyelitis on XR.     Plan:  - Admit to Dr. Youssef  - MEERA planning for Wednesday  - Podiatry OR Thursday  - Medical/cardiac consults for optimization  - VTE ppx    Discussed with fellow on behalf of Dr. Youssef    Vascular Surgery  [id number] 54 year old male with history of DM, CAD s/p CABG x3 2019, PAD s/p R fem-AK pop bypass 6/2023 at [place] presenting for evaluation of a right foot wound. History of claudication but no rest pain or fevers. Afebrile and hemodynamically stable, WBC and lactate WNL, no evidence of subcutaneous gas or osteomyelitis on XR.     Plan:  - Admit to Dr. Youssef  - MEERA planning for Wednesday  - Podiatry OR Thursday  - Medical/cardiac consults for optimization  - f/u venous duplex from ED  - Zosyn  - VTE ppx    Discussed with fellow on behalf of Dr. Youssef    Vascular Surgery  [id number] 54 year old male with history of DM, HTN, HLD, CAD s/p CABG x3 2019, PAD s/p RLE angioplasty/stent at OSH 5+ years ago presenting for evaluation of a right foot wound. History of claudication but no rest pain or fevers. Afebrile and hemodynamically stable, WBC and lactate WNL, no evidence of subcutaneous gas or osteomyelitis on XR.     Plan:  - Admit to Dr. Youssef  - RLE planning for Wednesday  - Podiatry OR Thursday  - Medical/cardiac consults for optimization  - f/u venous duplex from ED  - Zosyn  - VTE ppx    Discussed with fellow on behalf of Dr. Youssef    Vascular Surgery  [id number] 54 year old male with history of DM, HTN, HLD, CAD s/p CABG x3 2019, PAD s/p RLE angioplasty/stent at OSH 5+ years ago presenting for evaluation of a right foot wound. History of claudication but no rest pain or fevers. Afebrile and hemodynamically stable, WBC and lactate WNL, no evidence of subcutaneous gas or osteomyelitis on XR.     Plan:  - Admit to Dr. Youssef  - MEERA angio planning for Wednesday  - Podiatry OR Thursday  - Medical/cardiac consults for optimization  - f/u venous duplex from ED  - Zosyn  - VTE ppx    Discussed with fellow on behalf of Dr. Youssef    Vascular Surgery  [id number]

## 2024-08-05 NOTE — ED PROVIDER NOTE - ATTENDING CONTRIBUTION TO CARE
Attending MD Hammond:   I personally have seen and examined this patient.  ACP, Resident, medical student note reviewed and agree on plan of care and except where noted.     see my mdm above

## 2024-08-05 NOTE — H&P ADULT - PROBLEM SELECTOR PLAN 1
EPIFANIO Youssef MD have participated in the daily care of this patient and have performed a history and physical exam of the patient and discussed  the findings and plan with the house officer. I reviewed the resident note and agree with the findings and plan   I Estevan Youssef MD have personally seen and examined the patient at bedside today  8/5/2024 at    10 pm

## 2024-08-05 NOTE — CONSULT NOTE ADULT - SUBJECTIVE AND OBJECTIVE BOX
Patient is a 54y old  Male who presents with a chief complaint of     HPI: Pt sent in for vascular surgery and podiatry surgery with Dr Domonique De Leon       PAST MEDICAL & SURGICAL HISTORY:  Hypertension, unspecified type      Hyperlipidemia, unspecified hyperlipidemia type      Type 2 diabetes mellitus without complication, with long-term current use of insulin      Coronary artery disease of native artery of native heart with stable angina pectoris      History of percutaneous coronary intervention          MEDICATIONS  (STANDING):    MEDICATIONS  (PRN):      Allergies    No Known Allergies    Intolerances        VITALS:    Vital Signs Last 24 Hrs  T(C): 36.8 (05 Aug 2024 15:08), Max: 36.8 (05 Aug 2024 15:08)  T(F): 98.2 (05 Aug 2024 15:08), Max: 98.2 (05 Aug 2024 15:08)  HR: 97 (05 Aug 2024 15:08) (97 - 97)  BP: 105/63 (05 Aug 2024 15:08) (105/63 - 105/63)  BP(mean): --  RR: 18 (05 Aug 2024 15:08) (18 - 18)  SpO2: 97% (05 Aug 2024 15:08) (97% - 97%)    Parameters below as of 05 Aug 2024 15:08  Patient On (Oxygen Delivery Method): room air        LABS:                CAPILLARY BLOOD GLUCOSE              LOWER EXTREMITY PHYSICAL EXAM:    Vascular: DP/PT 0/4, B/L, CFT <3 seconds B/L, Temperature gradient warm to cool, B/L.   Neuro: Epicritic sensation absent to the level of digits , B/L.  Musculoskeletal/Ortho: unremarkable  Skin: R foot sub 3rd metatarsal head wound to sub Q without signs of infection. L foot no open wounds or signs of infection       RADIOLOGY & ADDITIONAL STUDIES:

## 2024-08-05 NOTE — H&P ADULT - NSHPPHYSICALEXAM_GEN_ALL_CORE
General: NAD, male appearing stated age  HEENT: NC/AT, no scleral icterus  CV: RRR, hemodynamically stable  Resp: Nonlabored breathing on RA  GI/Abd: Soft, non-distended, non-tender  Vascular:   - Right: warm, 2+ pitting edema to calf, faintly palp DP, PT ds+,  palp pop palp fem. Plantar foot ulcer approx 2cm with clean base, no erythema/purulence. Well healed surgical scars c/w bypass  - Left: warm, 1+ pitting edema to ankle, faintly palp DP,  PT ds+, palp pop palp fem     Skin: No rashes/jaundice  Psych: Awake, alert and oriented x4, appropriate mood and affect  Neuro: Moving all extremities spontaneously, no focal deficits

## 2024-08-05 NOTE — ED PROVIDER NOTE - NS ED ATTENDING STATEMENT MOD
I have seen and examined this patient and fully participated in the care of this patient as the teaching attending.  The service was shared with the ZAKIA.  I reviewed and verified the documentation.

## 2024-08-05 NOTE — H&P ADULT - NSHPLABSRESULTS_GEN_ALL_CORE
11.9   6.97  )-----------( 220      ( 05 Aug 2024 20:12 )             38.0     08-05    134<L>  |  99  |  20  ----------------------------<  278<H>  4.6   |  22  |  0.82    Ca    9.4      05 Aug 2024 20:12    TPro  7.3  /  Alb  3.9  /  TBili  0.3  /  DBili  x   /  AST  11  /  ALT  14  /  AlkPhos  103  08-05    LIVER FUNCTIONS - ( 05 Aug 2024 20:12 )  Alb: 3.9 g/dL / Pro: 7.3 g/dL / ALK PHOS: 103 U/L / ALT: 14 U/L / AST: 11 U/L / GGT: x           PT/INR - ( 05 Aug 2024 20:12 )   PT: 9.7 sec;   INR: 0.88 ratio      PTT - ( 05 Aug 2024 20:12 )  PTT:28.7 sec  Urinalysis Basic - ( 05 Aug 2024 20:12 )    Color: x / Appearance: x / SG: x / pH: x  Gluc: 278 mg/dL / Ketone: x  / Bili: x / Urobili: x   Blood: x / Protein: x / Nitrite: x   Leuk Esterase: x / RBC: x / WBC x   Sq Epi: x / Non Sq Epi: x / Bacteria: x      ACC: [id number] EXAM:  XR FOOT COMP MIN 3 VIEWS RT   ORDERED BY: ZANE CHAMBERS   PROCEDURE DATE:  08/05/2024      INTERPRETATION:  CLINICAL INDICATION: Right foot wound    TECHNIQUE: 3 views the right foot.    COMPARISON: 6/28/2020    FINDINGS:  No acute fracture or dislocation. Calcaneal enthesophyte. No osseous   erosion.  Joint spaces are maintained.  Vascular calcifications. Soft tissue edema about the foot. No tracking   soft tissue gas collection.    IMPRESSION:  No acute fracture or dislocation.  No radiographic evidence of osteomyelitis.    --- End of Report ---

## 2024-08-05 NOTE — ED PROVIDER NOTE - CLINICAL SUMMARY MEDICAL DECISION MAKING FREE TEXT BOX
Attending MD Hammond: diabetic foot ulcer sent in for admission, hyperglyemic here, will r/o dka, obtain pre-op labs, RLE with edema, no erythema, no tenderness to palpation, no crepitus, no concern for cellulitis, will r/o DVT.

## 2024-08-06 ENCOUNTER — RESULT REVIEW (OUTPATIENT)
Age: 54
End: 2024-08-06

## 2024-08-06 DIAGNOSIS — I77.1 STRICTURE OF ARTERY: ICD-10-CM

## 2024-08-06 LAB
ANION GAP SERPL CALC-SCNC: 10 MMOL/L — SIGNIFICANT CHANGE UP (ref 5–17)
APTT BLD: 25.1 SEC — SIGNIFICANT CHANGE UP (ref 24.5–35.6)
BLD GP AB SCN SERPL QL: NEGATIVE — SIGNIFICANT CHANGE UP
BUN SERPL-MCNC: 15 MG/DL — SIGNIFICANT CHANGE UP (ref 7–23)
CALCIUM SERPL-MCNC: 9.3 MG/DL — SIGNIFICANT CHANGE UP (ref 8.4–10.5)
CHLORIDE SERPL-SCNC: 103 MMOL/L — SIGNIFICANT CHANGE UP (ref 96–108)
CO2 SERPL-SCNC: 23 MMOL/L — SIGNIFICANT CHANGE UP (ref 22–31)
CREAT SERPL-MCNC: 0.75 MG/DL — SIGNIFICANT CHANGE UP (ref 0.5–1.3)
EGFR: 107 ML/MIN/1.73M2 — SIGNIFICANT CHANGE UP
GLUCOSE BLDC GLUCOMTR-MCNC: 202 MG/DL — HIGH (ref 70–99)
GLUCOSE BLDC GLUCOMTR-MCNC: 227 MG/DL — HIGH (ref 70–99)
GLUCOSE BLDC GLUCOMTR-MCNC: 294 MG/DL — HIGH (ref 70–99)
GLUCOSE BLDC GLUCOMTR-MCNC: 304 MG/DL — HIGH (ref 70–99)
GLUCOSE BLDC GLUCOMTR-MCNC: 310 MG/DL — HIGH (ref 70–99)
GLUCOSE SERPL-MCNC: 283 MG/DL — HIGH (ref 70–99)
HCT VFR BLD CALC: 35.1 % — LOW (ref 39–50)
HGB BLD-MCNC: 11.5 G/DL — LOW (ref 13–17)
INR BLD: 0.93 RATIO — SIGNIFICANT CHANGE UP (ref 0.85–1.18)
MAGNESIUM SERPL-MCNC: 2.1 MG/DL — SIGNIFICANT CHANGE UP (ref 1.6–2.6)
MCHC RBC-ENTMCNC: 26.3 PG — LOW (ref 27–34)
MCHC RBC-ENTMCNC: 32.8 GM/DL — SIGNIFICANT CHANGE UP (ref 32–36)
MCV RBC AUTO: 80.3 FL — SIGNIFICANT CHANGE UP (ref 80–100)
NRBC # BLD: 0 /100 WBCS — SIGNIFICANT CHANGE UP (ref 0–0)
PHOSPHATE SERPL-MCNC: 3.1 MG/DL — SIGNIFICANT CHANGE UP (ref 2.5–4.5)
PLATELET # BLD AUTO: 201 K/UL — SIGNIFICANT CHANGE UP (ref 150–400)
POTASSIUM SERPL-MCNC: 4 MMOL/L — SIGNIFICANT CHANGE UP (ref 3.5–5.3)
POTASSIUM SERPL-SCNC: 4 MMOL/L — SIGNIFICANT CHANGE UP (ref 3.5–5.3)
PROTHROM AB SERPL-ACNC: 10.3 SEC — SIGNIFICANT CHANGE UP (ref 9.5–13)
RBC # BLD: 4.37 M/UL — SIGNIFICANT CHANGE UP (ref 4.2–5.8)
RBC # FLD: 12.9 % — SIGNIFICANT CHANGE UP (ref 10.3–14.5)
RH IG SCN BLD-IMP: POSITIVE — SIGNIFICANT CHANGE UP
SODIUM SERPL-SCNC: 136 MMOL/L — SIGNIFICANT CHANGE UP (ref 135–145)
WBC # BLD: 5.66 K/UL — SIGNIFICANT CHANGE UP (ref 3.8–10.5)
WBC # FLD AUTO: 5.66 K/UL — SIGNIFICANT CHANGE UP (ref 3.8–10.5)

## 2024-08-06 PROCEDURE — 93306 TTE W/DOPPLER COMPLETE: CPT | Mod: 26

## 2024-08-06 PROCEDURE — 99232 SBSQ HOSP IP/OBS MODERATE 35: CPT

## 2024-08-06 RX ORDER — INSULIN GLARGINE-YFGN 100 [IU]/ML
45 INJECTION, SOLUTION SUBCUTANEOUS AT BEDTIME
Refills: 0 | Status: ACTIVE | OUTPATIENT
Start: 2024-08-06 | End: 2025-07-05

## 2024-08-06 RX ORDER — ATORVASTATIN CALCIUM 40 MG/1
80 TABLET, FILM COATED ORAL AT BEDTIME
Refills: 0 | Status: ACTIVE | OUTPATIENT
Start: 2024-08-06 | End: 2025-07-05

## 2024-08-06 RX ORDER — INSULIN LISPRO 100/ML
17 VIAL (ML) SUBCUTANEOUS
Refills: 0 | Status: ACTIVE | OUTPATIENT
Start: 2024-08-06 | End: 2025-07-05

## 2024-08-06 RX ORDER — ENOXAPARIN SODIUM 120 MG/.8ML
40 INJECTION SUBCUTANEOUS EVERY 24 HOURS
Refills: 0 | Status: ACTIVE | OUTPATIENT
Start: 2024-08-06 | End: 2025-07-05

## 2024-08-06 RX ORDER — INSULIN LISPRO 100/ML
VIAL (ML) SUBCUTANEOUS AT BEDTIME
Refills: 0 | Status: DISCONTINUED | OUTPATIENT
Start: 2024-08-06 | End: 2024-08-07

## 2024-08-06 RX ORDER — FENOFIBRATE 30 MG/1
145 CAPSULE ORAL AT BEDTIME
Refills: 0 | Status: ACTIVE | OUTPATIENT
Start: 2024-08-06 | End: 2025-07-05

## 2024-08-06 RX ORDER — METOPROLOL TARTRATE 100 MG
25 TABLET ORAL
Refills: 0 | Status: ACTIVE | OUTPATIENT
Start: 2024-08-06 | End: 2025-07-05

## 2024-08-06 RX ADMIN — ENOXAPARIN SODIUM 40 MILLIGRAM(S): 120 INJECTION SUBCUTANEOUS at 05:24

## 2024-08-06 RX ADMIN — FENOFIBRATE 145 MILLIGRAM(S): 30 CAPSULE ORAL at 21:15

## 2024-08-06 RX ADMIN — Medication 975 MILLIGRAM(S): at 23:14

## 2024-08-06 RX ADMIN — PIPERACILLIN SODIUM, TAZOBACTAM SODIUM 25 GRAM(S): 3; .375 INJECTION, POWDER, LYOPHILIZED, FOR SOLUTION INTRAVENOUS at 15:43

## 2024-08-06 RX ADMIN — ATORVASTATIN CALCIUM 80 MILLIGRAM(S): 40 TABLET, FILM COATED ORAL at 21:15

## 2024-08-06 RX ADMIN — Medication 975 MILLIGRAM(S): at 05:25

## 2024-08-06 RX ADMIN — PIPERACILLIN SODIUM, TAZOBACTAM SODIUM 25 GRAM(S): 3; .375 INJECTION, POWDER, LYOPHILIZED, FOR SOLUTION INTRAVENOUS at 21:15

## 2024-08-06 RX ADMIN — Medication 2: at 19:39

## 2024-08-06 RX ADMIN — Medication 17 UNIT(S): at 19:39

## 2024-08-06 RX ADMIN — INSULIN GLARGINE-YFGN 45 UNIT(S): 100 INJECTION, SOLUTION SUBCUTANEOUS at 21:16

## 2024-08-06 RX ADMIN — Medication 3: at 15:43

## 2024-08-06 RX ADMIN — Medication 4: at 09:44

## 2024-08-06 RX ADMIN — Medication 975 MILLIGRAM(S): at 11:17

## 2024-08-06 RX ADMIN — Medication 975 MILLIGRAM(S): at 18:04

## 2024-08-06 RX ADMIN — Medication 975 MILLIGRAM(S): at 12:17

## 2024-08-06 RX ADMIN — Medication 25 MILLIGRAM(S): at 05:24

## 2024-08-06 RX ADMIN — Medication 4: at 12:12

## 2024-08-06 RX ADMIN — Medication 975 MILLIGRAM(S): at 05:55

## 2024-08-06 RX ADMIN — Medication 17 UNIT(S): at 15:43

## 2024-08-06 RX ADMIN — Medication 975 MILLIGRAM(S): at 17:06

## 2024-08-06 RX ADMIN — Medication 25 MILLIGRAM(S): at 17:06

## 2024-08-06 RX ADMIN — PIPERACILLIN SODIUM, TAZOBACTAM SODIUM 25 GRAM(S): 3; .375 INJECTION, POWDER, LYOPHILIZED, FOR SOLUTION INTRAVENOUS at 09:54

## 2024-08-06 RX ADMIN — PIPERACILLIN SODIUM, TAZOBACTAM SODIUM 25 GRAM(S): 3; .375 INJECTION, POWDER, LYOPHILIZED, FOR SOLUTION INTRAVENOUS at 04:30

## 2024-08-06 NOTE — CONSULT NOTE ADULT - SUBJECTIVE AND OBJECTIVE BOX
Patient is a 54 year old male with history of DM, HTN, HLD, CAD s/p CABG x3 2019, PAD s/p RLE angioplasty/stent at OSH 5+ years ago presenting to the ED at the referral of his podiatrist/wound care specialist for evaluation. Reports he has had a wound on the bottom of his right foot for approximately 3-4 months. States the wound is painful especially with ambulation, and pain has been increasing. Admits to cramping pain in the calves after 3-4 blocks but denies pain at rest. Also notes some swelling of the right leg which has been present for several weeks. Denies fevers, chills, N/V, drainage, CP, SOB.     Subjective: Patient seen and examined. No new events except as noted.     REVIEW OF SYSTEMS:    CONSTITUTIONAL: No weakness, fevers or chills  EYES/ENT: No visual changes;  No vertigo or throat pain   NECK: No pain or stiffness  RESPIRATORY: No cough, wheezing, hemoptysis; No shortness of breath  CARDIOVASCULAR: No chest pain or palpitations  GASTROINTESTINAL: No abdominal or epigastric pain. No nausea, vomiting, or hematemesis; No diarrhea or constipation. No melena or hematochezia.  GENITOURINARY: No dysuria, frequency or hematuria  NEUROLOGICAL: No numbness or weakness  SKIN: No itching, burning, rashes, or lesions   All other review of systems is negative unless indicated above.    MEDICATIONS:  MEDICATIONS  (STANDING):  acetaminophen     Tablet .. 975 milliGRAM(s) Oral every 6 hours  atorvastatin 80 milliGRAM(s) Oral at bedtime  dextrose 5%. 1000 milliLiter(s) (50 mL/Hr) IV Continuous <Continuous>  dextrose 5%. 1000 milliLiter(s) (100 mL/Hr) IV Continuous <Continuous>  dextrose 50% Injectable 25 Gram(s) IV Push once  dextrose 50% Injectable 12.5 Gram(s) IV Push once  dextrose 50% Injectable 25 Gram(s) IV Push once  enoxaparin Injectable 40 milliGRAM(s) SubCutaneous every 24 hours  fenofibrate Tablet 145 milliGRAM(s) Oral at bedtime  glucagon  Injectable 1 milliGRAM(s) IntraMuscular once  insulin glargine Injectable (LANTUS) 45 Unit(s) SubCutaneous at bedtime  insulin lispro (ADMELOG) corrective regimen sliding scale   SubCutaneous three times a day before meals  insulin lispro (ADMELOG) corrective regimen sliding scale   SubCutaneous at bedtime  insulin lispro Injectable (ADMELOG) 17 Unit(s) SubCutaneous three times a day before meals  metoprolol tartrate 25 milliGRAM(s) Oral two times a day  piperacillin/tazobactam IVPB.- 3.375 Gram(s) IV Intermittent once  piperacillin/tazobactam IVPB.. 3.375 Gram(s) IV Intermittent every 8 hours    Home Medications:  acetaminophen 325 mg oral tablet: 2 tab(s) orally every 6 hours, As needed, Temp greater or equal to 38.5C (101.3F), Mild Pain (1 - 3) (02 Dec 2020 14:36)  aspirin 81 mg oral delayed release tablet: 1 tab(s) orally once a day (02 Dec 2020 14:36)  atorvastatin 80 mg oral tablet: 1 tab(s) orally once a day (at bedtime) (02 Dec 2020 14:36)  ticagrelor 90 mg oral tablet: 1 tab(s) orally every 12 hours (02 Dec 2020 14:36)      PAST MEDICAL & SURGICAL HISTORY:  Hypertension, unspecified type  Hyperlipidemia, unspecified hyperlipidemia type  Type 2 diabetes mellitus without complication, with long-term current use of insulin  Coronary artery disease of native artery of native heart with stable angina pectoris  History o percutaneous coronary intervention        PHYSICAL EXAM:  T(C): 36.3 (08-06-24 @ 13:08), Max: 37.1 (08-06-24 @ 02:08)  HR: 80 (08-06-24 @ 13:08) (79 - 97)  BP: 97/57 (08-06-24 @ 13:08) (97/57 - 145/77)  RR: 18 (08-06-24 @ 13:08) (16 - 18)  SpO2: 97% (08-06-24 @ 13:08) (95% - 98%)  Wt(kg): --  I&O's Summary    06 Aug 2024 07:01  -  06 Aug 2024 14:49  --------------------------------------------------------  IN: 480 mL / OUT: 0 mL / NET: 480 mL      Height (cm): 167.6 (08-05 @ 15:08)  Weight (kg): 74.8 (08-05 @ 15:08)  BMI (kg/m2): 26.6 (08-05 @ 15:08)  BSA (m2): 1.84 (08-05 @ 15:08)    Appearance: Normal	  HEENT:  PERRLA   Lymphatic: No lymphadenopathy   Cardiovascular: Normal S1 S2, no JVD  Respiratory: normal effort , clear  Gastrointestinal:  Soft, Non-tender  Skin: No rashes,  warm to touch  Psychiatry:  Mood & affect appropriate  Musculuskeletal: No edema    recent labs, Imaging and EKGs personally reviewed     08-06-24 @ 07:01  -  08-06-24 @ 14:49  --------------------------------------------------------  IN: 480 mL / OUT: 0 mL / NET: 480 mL                          11.5   5.66  )-----------( 201      ( 06 Aug 2024 06:52 )             35.1               08-06    136  |  103  |  15  ----------------------------<  283<H>  4.0   |  23  |  0.75    Ca    9.3      06 Aug 2024 06:52  Phos  3.1     08-06  Mg     2.1     08-06    TPro  7.3  /  Alb  3.9  /  TBili  0.3  /  DBili  x   /  AST  11  /  ALT  14  /  AlkPhos  103  08-05    PT/INR - ( 06 Aug 2024 06:54 )   PT: 10.3 sec;   INR: 0.93 ratio         PTT - ( 06 Aug 2024 06:54 )  PTT:25.1 sec                   Urinalysis Basic - ( 06 Aug 2024 06:52 )    Color: x / Appearance: x / SG: x / pH: x  Gluc: 283 mg/dL / Ketone: x  / Bili: x / Urobili: x   Blood: x / Protein: x / Nitrite: x   Leuk Esterase: x / RBC: x / WBC x   Sq Epi: x / Non Sq Epi: x / Bacteria: x

## 2024-08-06 NOTE — CONSULT NOTE ADULT - SUBJECTIVE AND OBJECTIVE BOX
DATE OF SERVICE: 08-06-24 @ 16:25    CHIEF COMPLAINT:Patient is a 54y old  Male who presents with a chief complaint of RLE foot wound (06 Aug 2024 14:48)      HISTORY OF PRESENT ILLNESS:HPI:  HPI:  54 year old male with history of DM, HTN, HLD, CAD s/p CABG x3 2019, PAD s/p RLE angioplasty/stent at OSH 5+ years ago presenting to the ED at the referral of his podiatrist/wound care specialist for evaluation. Reports he has had a wound on the bottom of his right foot for approximately 3-4 months. States the wound is painful especially with ambulation, and pain has been increasing. Admits to cramping pain in the calves after 3-4 blocks but denies pain at rest. Also notes some swelling of the right leg which has been present for several weeks. Denies fevers, chills, N/V, drainage, CP, SOB.    (05 Aug 2024 22:31)      PAST MEDICAL & SURGICAL HISTORY:  Hypertension, unspecified type      Hyperlipidemia, unspecified hyperlipidemia type      Type 2 diabetes mellitus without complication, with long-term current use of insulin      Coronary artery disease of native artery of native heart with stable angina pectoris      History of percutaneous coronary intervention              MEDICATIONS:  enoxaparin Injectable 40 milliGRAM(s) SubCutaneous every 24 hours  metoprolol tartrate 25 milliGRAM(s) Oral two times a day    piperacillin/tazobactam IVPB.. 3.375 Gram(s) IV Intermittent every 8 hours      acetaminophen     Tablet .. 975 milliGRAM(s) Oral every 6 hours  oxyCODONE    IR 2.5 milliGRAM(s) Oral every 6 hours PRN  oxyCODONE    IR 5 milliGRAM(s) Oral every 6 hours PRN      atorvastatin 80 milliGRAM(s) Oral at bedtime  dextrose 50% Injectable 25 Gram(s) IV Push once  dextrose 50% Injectable 12.5 Gram(s) IV Push once  dextrose 50% Injectable 25 Gram(s) IV Push once  dextrose Oral Gel 15 Gram(s) Oral once PRN  fenofibrate Tablet 145 milliGRAM(s) Oral at bedtime  glucagon  Injectable 1 milliGRAM(s) IntraMuscular once  insulin glargine Injectable (LANTUS) 45 Unit(s) SubCutaneous at bedtime  insulin lispro (ADMELOG) corrective regimen sliding scale   SubCutaneous three times a day before meals  insulin lispro (ADMELOG) corrective regimen sliding scale   SubCutaneous at bedtime  insulin lispro Injectable (ADMELOG) 17 Unit(s) SubCutaneous three times a day before meals    dextrose 5%. 1000 milliLiter(s) IV Continuous <Continuous>  dextrose 5%. 1000 milliLiter(s) IV Continuous <Continuous>      FAMILY HISTORY:  Family history of heart disease (Father)        Non-contributory    SOCIAL HISTORY:    [ ] Tobacco  [ ] Drugs  [ ] Alcohol    Allergies    No Known Allergies    Intolerances    	    REVIEW OF SYSTEMS:  CONSTITUTIONAL: No fever  EYES: No eye pain, visual disturbances, or discharge  ENMT:  No difficulty hearing, tinnitus  NECK: No pain or stiffness  RESPIRATORY: No cough, wheezing,  CARDIOVASCULAR: No chest pain, palpitations, passing out, dizziness, or leg swelling  GASTROINTESTINAL:  No nausea, vomiting, diarrhea or constipation. No melena.  GENITOURINARY: No dysuria, hematuria  NEUROLOGICAL: No stroke like symptoms  SKIN: No burning or lesions   ENDOCRINE: No heat or cold intolerance  MUSCULOSKELETAL: No joint pain or swelling  PSYCHIATRIC: No  anxiety, mood swings  HEME/LYMPH: No bleeding gums  ALLERGY AND IMMUNOLOGIC: No hives or eczema	    All other ROS negative    PHYSICAL EXAM:  T(C): 36.3 (08-06-24 @ 13:08), Max: 37.1 (08-06-24 @ 02:08)  HR: 80 (08-06-24 @ 13:08) (79 - 95)  BP: 97/57 (08-06-24 @ 13:08) (97/57 - 145/77)  RR: 18 (08-06-24 @ 13:08) (16 - 18)  SpO2: 97% (08-06-24 @ 13:08) (95% - 98%)  Wt(kg): --  I&O's Summary    06 Aug 2024 07:01  -  06 Aug 2024 16:25  --------------------------------------------------------  IN: 480 mL / OUT: 0 mL / NET: 480 mL        Appearance: Normal	  HEENT:   Normal oral mucosa, EOMI	  Cardiovascular:  S1 S2, No JVD,    Respiratory: Lungs clear to auscultation	  Psychiatry: Alert  Gastrointestinal:  Soft, Non-tender, + BS	  Skin: No rashes   Neurologic: Non-focal  Extremities:  No edema  Vascular: Peripheral pulses palpable    	    	  	  CARDIAC MARKERS:  Labs personally reviewed by me                                  11.5   5.66  )-----------( 201      ( 06 Aug 2024 06:52 )             35.1     08-06    136  |  103  |  15  ----------------------------<  283<H>  4.0   |  23  |  0.75    Ca    9.3      06 Aug 2024 06:52  Phos  3.1     08-06  Mg     2.1     08-06    TPro  7.3  /  Alb  3.9  /  TBili  0.3  /  DBili  x   /  AST  11  /  ALT  14  /  AlkPhos  103  08-05          EKG: Personally reviewed by me - NSR  Radiology: Personally reviewed by me -   < from: Xray Chest 1 View- PORTABLE-Urgent (Xray Chest 1 View- PORTABLE-Urgent .) (08.05.24 @ 20:47) >  IMPRESSION:  Clear lungs. Elevated left hemidiaphragm.    < end of copied text >  < from: TTE W or WO Ultrasound Enhancing Agent (08.06.24 @ 13:27) >      1. Left ventricular cavity is normal in size. Left ventricular wall thickness is normal. Left ventricular systolic function is mildly decreased with an ejection fraction visually estimated at 45 to 50 %. Regional wall motion abnormalities present.   2. Multiple segmental abnormalities exist. See findings.   3. Normal right ventricular cavity size, with normal wall thickness, and mildly reduced right ventricular systolic function.   4. No significant valvular disease.   5. Compared to the transthoracic echocardiogram performed on 2/25/2922, the endocardium is better visualized.    < end of copied text >  < from: TTE with Doppler (w/Cont) (02.25.22 @ 09:45) >  Conclusions:  1. Mitral annular calcification, otherwise possible  anterior anterior mitral valve prolapse. Mild mitral  regurgitation.  2. Normal left ventricular internal dimensions and wall  thicknesses.  3. Endocardium not well visualized; grossly normal left  ventricular systolic function. Endocardial visualization  enhanced with intravenous injection of Ultrasonic Enhancing  Agent (Definity). The mid to basal inferolateral wall is  hypokinetic.  4. Normal diastolic function.  5. Right ventricular not well visualized. Grossly, normal  right ventricular size with borderline right ventricular  function.  *** Compared with echocardiogram of 9/28/2020, no  significant changes noted.    < end of copied text >  < from: Cardiac Cath Lab - Adult (08.09.19 @ 15:32) >  COMPLICATIONS: There were no complications.  DIAGNOSTIC RECOMMENDATIONS: Consultation will be obtained for coronary  artery bypass grafting.    < end of copied text >  CABG, with PHIL     Assessment /Plan:       Patient is a 54 year old male with history of DM, HTN, HLD, CAD s/p CABG x3 2019, PAD s/p RLE angioplasty/stent at OSH 5+ years ago presenting to the ED at the referral of his podiatrist/wound care specialist for evaluation. Reports he has had a wound on the bottom of his right foot for approximately 3-4 months. States the wound is painful especially with ambulation, and pain has been increasing. Admits to cramping pain in the calves after 3-4 blocks but denies pain at rest. Also notes some swelling of the right leg which has been present for several weeks. Denies fevers, chills, N/V, drainage, CP, SOB.     Problem/Plan #1: Coronary Artery Disease  - Hx of PCI and most recent CABG in 2019  - ECG NSR  - TTE shows newly reduced EF 45-50% and WMA similar to prior TTE  - NST in 2022 with stress infarct with jermain-infarct ischemia  - c/w ASA 81mg PO and atorvastatin 80mg PO daily  - If patient may require open vascular under general anesthesia, will require further ischemic eval with cardiac cath    Problem/Plan #2: Hypertension  - c/w Metoprolol 25mg PO BID    Problem/Plan #3: HLD  - c/w atorvastatin 80mg PO daily  - c/w fenofibrate 145mg PO daily    Problem/Plan #4: Heart Failure with mildly reduced Ejection Fraction  - TTE as noted above with mildly reduced EF 45-50%  - Appears euvolemic  - Will need sHF GDMT  --- cont Metoprolol 25m PO BID  --- Consider Farxiga 10mg PO daily when no further procedures or surgeries planned  --- Further implementation of sHF GDMT limited by soft BP  - Possible ischemic eval pending clinical course    Problem/Plan #5: Cardiac Risk Stratification  - ECG NSR with no ischemic characteristics  - Denies active CP or SOB  - Not in decompensated HF  - No hx of tachy mariano arrhythmia  - Patient is elevated risk for low risk peripheral angiogram. No contraindication to proceed.  - If patient may require open vascular under general anesthesia, will require further ischemic eval with cardiac cath.        Differential diagnosis and plan of care discussed with patient after the evaluation. Counseling on diet, nutritional counseling, weight management, exercise and medication compliance was done.   Advanced care planning/advanced directives discussed with patient/family. DNR status including forceful chest compressions to attempt to restart the heart, ventilator support/artificial breathing, electric shock, artificial nutrition, health care proxy, Molst form all discussed with pt. Pt wishes to consider. More than fifteen minutes spent on discussing advanced directives.     Shana Pizano [facility]-BC  Edwin Dwyer DO [facility]  Cardiovascular Medicine  [address] [place] [address] Dr, [address]  Available for call or text via [employer]  Office [phone]   DATE OF SERVICE: 08-06-24 @ 16:25    CHIEF COMPLAINT:Patient is a 54y old  Male who presents with a chief complaint of RLE foot wound (06 Aug 2024 14:48)      HISTORY OF PRESENT ILLNESS:HPI:  HPI:  54 year old male with history of DM, HTN, HLD, CAD s/p CABG x3 2019, PAD s/p RLE angioplasty/stent at OSH 5+ years ago presenting to the ED at the referral of his podiatrist/wound care specialist for evaluation. Reports he has had a wound on the bottom of his right foot for approximately 3-4 months. States the wound is painful especially with ambulation, and pain has been increasing. Admits to cramping pain in the calves after 3-4 blocks but denies pain at rest. Also notes some swelling of the right leg which has been present for several weeks. Denies fevers, chills, N/V, drainage, CP, SOB.    (05 Aug 2024 22:31)      PAST MEDICAL & SURGICAL HISTORY:  Hypertension, unspecified type      Hyperlipidemia, unspecified hyperlipidemia type      Type 2 diabetes mellitus without complication, with long-term current use of insulin      Coronary artery disease of native artery of native heart with stable angina pectoris      History of percutaneous coronary intervention              MEDICATIONS:  enoxaparin Injectable 40 milliGRAM(s) SubCutaneous every 24 hours  metoprolol tartrate 25 milliGRAM(s) Oral two times a day    piperacillin/tazobactam IVPB.. 3.375 Gram(s) IV Intermittent every 8 hours      acetaminophen     Tablet .. 975 milliGRAM(s) Oral every 6 hours  oxyCODONE    IR 2.5 milliGRAM(s) Oral every 6 hours PRN  oxyCODONE    IR 5 milliGRAM(s) Oral every 6 hours PRN      atorvastatin 80 milliGRAM(s) Oral at bedtime  dextrose 50% Injectable 25 Gram(s) IV Push once  dextrose 50% Injectable 12.5 Gram(s) IV Push once  dextrose 50% Injectable 25 Gram(s) IV Push once  dextrose Oral Gel 15 Gram(s) Oral once PRN  fenofibrate Tablet 145 milliGRAM(s) Oral at bedtime  glucagon  Injectable 1 milliGRAM(s) IntraMuscular once  insulin glargine Injectable (LANTUS) 45 Unit(s) SubCutaneous at bedtime  insulin lispro (ADMELOG) corrective regimen sliding scale   SubCutaneous three times a day before meals  insulin lispro (ADMELOG) corrective regimen sliding scale   SubCutaneous at bedtime  insulin lispro Injectable (ADMELOG) 17 Unit(s) SubCutaneous three times a day before meals    dextrose 5%. 1000 milliLiter(s) IV Continuous <Continuous>  dextrose 5%. 1000 milliLiter(s) IV Continuous <Continuous>      FAMILY HISTORY:  Family history of heart disease (Father)        Non-contributory    SOCIAL HISTORY:    [ -] Tobacco  [ -] Drugs  [ -] Alcohol    Allergies    No Known Allergies    Intolerances    	    REVIEW OF SYSTEMS:  CONSTITUTIONAL: No fever  EYES: No eye pain, visual disturbances, or discharge  ENMT:  No difficulty hearing, tinnitus  NECK: No pain or stiffness  RESPIRATORY: No cough, wheezing,  CARDIOVASCULAR: No chest pain, palpitations, passing out, dizziness, or leg swelling  GASTROINTESTINAL:  No nausea, vomiting, diarrhea or constipation. No melena.  GENITOURINARY: No dysuria, hematuria  NEUROLOGICAL: No stroke like symptoms  SKIN: No burning or lesions   ENDOCRINE: No heat or cold intolerance  MUSCULOSKELETAL: No joint pain or swelling  PSYCHIATRIC: No  anxiety, mood swings  HEME/LYMPH: No bleeding gums  ALLERGY AND IMMUNOLOGIC: No hives or eczema	    All other ROS negative    PHYSICAL EXAM:  T(C): 36.3 (08-06-24 @ 13:08), Max: 37.1 (08-06-24 @ 02:08)  HR: 80 (08-06-24 @ 13:08) (79 - 95)  BP: 97/57 (08-06-24 @ 13:08) (97/57 - 145/77)  RR: 18 (08-06-24 @ 13:08) (16 - 18)  SpO2: 97% (08-06-24 @ 13:08) (95% - 98%)  Wt(kg): --  I&O's Summary    06 Aug 2024 07:01  -  06 Aug 2024 16:25  --------------------------------------------------------  IN: 480 mL / OUT: 0 mL / NET: 480 mL        Appearance: Normal	  HEENT:   Normal oral mucosa, EOMI	  Cardiovascular:  S1 S2, No JVD,    Respiratory: Lungs clear to auscultation	  Psychiatry: Alert  Gastrointestinal:  Soft, Non-tender, + BS	  Skin: No rashes   Neurologic: Non-focal  Extremities:  No edema  Vascular: Peripheral pulses palpable    	    	  	  CARDIAC MARKERS:  Labs personally reviewed by me                                  11.5   5.66  )-----------( 201      ( 06 Aug 2024 06:52 )             35.1     08-06    136  |  103  |  15  ----------------------------<  283<H>  4.0   |  23  |  0.75    Ca    9.3      06 Aug 2024 06:52  Phos  3.1     08-06  Mg     2.1     08-06    TPro  7.3  /  Alb  3.9  /  TBili  0.3  /  DBili  x   /  AST  11  /  ALT  14  /  AlkPhos  103  08-05          EKG: Personally reviewed by me - NSR  Radiology: Personally reviewed by me -   < from: Xray Chest 1 View- PORTABLE-Urgent (Xray Chest 1 View- PORTABLE-Urgent .) (08.05.24 @ 20:47) >  IMPRESSION:  Clear lungs. Elevated left hemidiaphragm.    < end of copied text >  < from: TTE W or WO Ultrasound Enhancing Agent (08.06.24 @ 13:27) >      1. Left ventricular cavity is normal in size. Left ventricular wall thickness is normal. Left ventricular systolic function is mildly decreased with an ejection fraction visually estimated at 45 to 50 %. Regional wall motion abnormalities present.   2. Multiple segmental abnormalities exist. See findings.   3. Normal right ventricular cavity size, with normal wall thickness, and mildly reduced right ventricular systolic function.   4. No significant valvular disease.   5. Compared to the transthoracic echocardiogram performed on 2/25/2922, the endocardium is better visualized.    < end of copied text >  < from: TTE with Doppler (w/Cont) (02.25.22 @ 09:45) >  Conclusions:  1. Mitral annular calcification, otherwise possible  anterior anterior mitral valve prolapse. Mild mitral  regurgitation.  2. Normal left ventricular internal dimensions and wall  thicknesses.  3. Endocardium not well visualized; grossly normal left  ventricular systolic function. Endocardial visualization  enhanced with intravenous injection of Ultrasonic Enhancing  Agent (Definity). The mid to basal inferolateral wall is  hypokinetic.  4. Normal diastolic function.  5. Right ventricular not well visualized. Grossly, normal  right ventricular size with borderline right ventricular  function.  *** Compared with echocardiogram of 9/28/2020, no  significant changes noted.    < end of copied text >  < from: Cardiac Cath Lab - Adult (08.09.19 @ 15:32) >  COMPLICATIONS: There were no complications.  DIAGNOSTIC RECOMMENDATIONS: Consultation will be obtained for coronary  artery bypass grafting.    < end of copied text >  CABG, with PHIL     Assessment /Plan:       Patient is a 54 year old male with history of DM, HTN, HLD, CAD s/p CABG x3 2019, PAD s/p RLE angioplasty/stent at OSH 5+ years ago presenting to the ED at the referral of his podiatrist/wound care specialist for evaluation. Reports he has had a wound on the bottom of his right foot for approximately 3-4 months. States the wound is painful especially with ambulation, and pain has been increasing. Admits to cramping pain in the calves after 3-4 blocks but denies pain at rest. Also notes some swelling of the right leg which has been present for several weeks. Denies fevers, chills, N/V, drainage, CP, SOB.     Problem/Plan #1: Coronary Artery Disease  - Hx of PCI and most recent CABG in 2019  - ECG NSR  - TTE shows newly reduced EF 45-50% and WMA similar to prior TTE  - NST in 2022 with stress infarct with jermain-infarct ischemia  - c/w ASA 81mg PO and atorvastatin 80mg PO daily  - If patient may require open vascular under general anesthesia, will require further ischemic eval with cardiac cath    Problem/Plan #2: Hypertension  - c/w Metoprolol 25mg PO BID    Problem/Plan #3: HLD  - c/w atorvastatin 80mg PO daily  - c/w fenofibrate 145mg PO daily    Problem/Plan #4: Heart Failure with mildly reduced Ejection Fraction  - TTE as noted above with mildly reduced EF 45-50%  - Appears euvolemic  - Will need sHF GDMT  --- cont Metoprolol 25m PO BID  --- Consider Farxiga 10mg PO daily when no further procedures or surgeries planned  --- Further implementation of sHF GDMT limited by soft BP  - Possible ischemic eval pending clinical course    Problem/Plan #5: Cardiac Risk Stratification  - ECG NSR with no ischemic characteristics  - Denies active CP or SOB  - Not in decompensated HF  - No hx of tachy mariano arrhythmia  - Patient is elevated risk for low risk peripheral angiogram. No contraindication to proceed.  - If patient may require open vascular under general anesthesia, will require further ischemic eval with cardiac cath.        Differential diagnosis and plan of care discussed with patient after the evaluation. Counseling on diet, nutritional counseling, weight management, exercise and medication compliance was done.   Advanced care planning/advanced directives discussed with patient/family. DNR status including forceful chest compressions to attempt to restart the heart, ventilator support/artificial breathing, electric shock, artificial nutrition, health care proxy, Molst form all discussed with pt. Pt wishes to consider. More than fifteen minutes spent on discussing advanced directives.     Shana Pizano AGNP-BC  Edwin Dwyer DO [facility]  Cardiovascular Medicine  [address] [place] [address] Dr, [address]  Available for call or text via [employer]  Office [phone]   DATE OF SERVICE: 08-06-24 @ 16:25    CHIEF COMPLAINT:Patient is a 54y old  Male who presents with a chief complaint of RLE foot wound (06 Aug 2024 14:48)      HISTORY OF PRESENT ILLNESS:HPI:  HPI:  54 year old male with history of DM, HTN, HLD, CAD s/p CABG x3 2019, PAD s/p RLE angioplasty/stent at OSH 5+ years ago presenting to the ED at the referral of his podiatrist/wound care specialist for evaluation. Reports he has had a wound on the bottom of his right foot for approximately 3-4 months. States the wound is painful especially with ambulation, and pain has been increasing. Admits to cramping pain in the calves after 3-4 blocks but denies pain at rest. Also notes some swelling of the right leg which has been present for several weeks. Denies fevers, chills, N/V, drainage, CP, SOB.    (05 Aug 2024 22:31)      PAST MEDICAL & SURGICAL HISTORY:  Hypertension, unspecified type      Hyperlipidemia, unspecified hyperlipidemia type      Type 2 diabetes mellitus without complication, with long-term current use of insulin      Coronary artery disease of native artery of native heart with stable angina pectoris      History of percutaneous coronary intervention              MEDICATIONS:  enoxaparin Injectable 40 milliGRAM(s) SubCutaneous every 24 hours  metoprolol tartrate 25 milliGRAM(s) Oral two times a day    piperacillin/tazobactam IVPB.. 3.375 Gram(s) IV Intermittent every 8 hours      acetaminophen     Tablet .. 975 milliGRAM(s) Oral every 6 hours  oxyCODONE    IR 2.5 milliGRAM(s) Oral every 6 hours PRN  oxyCODONE    IR 5 milliGRAM(s) Oral every 6 hours PRN      atorvastatin 80 milliGRAM(s) Oral at bedtime  dextrose 50% Injectable 25 Gram(s) IV Push once  dextrose 50% Injectable 12.5 Gram(s) IV Push once  dextrose 50% Injectable 25 Gram(s) IV Push once  dextrose Oral Gel 15 Gram(s) Oral once PRN  fenofibrate Tablet 145 milliGRAM(s) Oral at bedtime  glucagon  Injectable 1 milliGRAM(s) IntraMuscular once  insulin glargine Injectable (LANTUS) 45 Unit(s) SubCutaneous at bedtime  insulin lispro (ADMELOG) corrective regimen sliding scale   SubCutaneous three times a day before meals  insulin lispro (ADMELOG) corrective regimen sliding scale   SubCutaneous at bedtime  insulin lispro Injectable (ADMELOG) 17 Unit(s) SubCutaneous three times a day before meals    dextrose 5%. 1000 milliLiter(s) IV Continuous <Continuous>  dextrose 5%. 1000 milliLiter(s) IV Continuous <Continuous>      FAMILY HISTORY:  Family history of heart disease (Father)        Non-contributory    SOCIAL HISTORY:    [ -] Tobacco  [ -] Drugs  [ -] Alcohol    Allergies    No Known Allergies    Intolerances    	    REVIEW OF SYSTEMS:  CONSTITUTIONAL: No fever  EYES: No eye pain, visual disturbances, or discharge  ENMT:  No difficulty hearing, tinnitus  NECK: No pain or stiffness  RESPIRATORY: No cough, wheezing,  CARDIOVASCULAR: No chest pain, palpitations, passing out, dizziness, or leg swelling  GASTROINTESTINAL:  No nausea, vomiting, diarrhea or constipation. No melena.  GENITOURINARY: No dysuria, hematuria  NEUROLOGICAL: No stroke like symptoms  SKIN: No burning or lesions   ENDOCRINE: No heat or cold intolerance  MUSCULOSKELETAL: No joint pain or swelling  PSYCHIATRIC: No  anxiety, mood swings  HEME/LYMPH: No bleeding gums  ALLERGY AND IMMUNOLOGIC: No hives or eczema	    All other ROS negative    PHYSICAL EXAM:  T(C): 36.3 (08-06-24 @ 13:08), Max: 37.1 (08-06-24 @ 02:08)  HR: 80 (08-06-24 @ 13:08) (79 - 95)  BP: 97/57 (08-06-24 @ 13:08) (97/57 - 145/77)  RR: 18 (08-06-24 @ 13:08) (16 - 18)  SpO2: 97% (08-06-24 @ 13:08) (95% - 98%)  Wt(kg): --  I&O's Summary    06 Aug 2024 07:01  -  06 Aug 2024 16:25  --------------------------------------------------------  IN: 480 mL / OUT: 0 mL / NET: 480 mL        Appearance: Normal	  HEENT:   Normal oral mucosa, EOMI	  Cardiovascular:  S1 S2, No JVD,    Respiratory: Lungs clear to auscultation	  Psychiatry: Alert  Gastrointestinal:  Soft, Non-tender, + BS	  Skin: No rashes   Neurologic: Non-focal  Extremities:  No edema  Vascular: Peripheral pulses palpable    	    	  	  CARDIAC MARKERS:  Labs personally reviewed by me                                  11.5   5.66  )-----------( 201      ( 06 Aug 2024 06:52 )             35.1     08-06    136  |  103  |  15  ----------------------------<  283<H>  4.0   |  23  |  0.75    Ca    9.3      06 Aug 2024 06:52  Phos  3.1     08-06  Mg     2.1     08-06    TPro  7.3  /  Alb  3.9  /  TBili  0.3  /  DBili  x   /  AST  11  /  ALT  14  /  AlkPhos  103  08-05          EKG: Personally reviewed by me - NSR  Radiology: Personally reviewed by me -   < from: Xray Chest 1 View- PORTABLE-Urgent (Xray Chest 1 View- PORTABLE-Urgent .) (08.05.24 @ 20:47) >  IMPRESSION:  Clear lungs. Elevated left hemidiaphragm.    < end of copied text >  < from: TTE W or WO Ultrasound Enhancing Agent (08.06.24 @ 13:27) >      1. Left ventricular cavity is normal in size. Left ventricular wall thickness is normal. Left ventricular systolic function is mildly decreased with an ejection fraction visually estimated at 45 to 50 %. Regional wall motion abnormalities present.   2. Multiple segmental abnormalities exist. See findings.   3. Normal right ventricular cavity size, with normal wall thickness, and mildly reduced right ventricular systolic function.   4. No significant valvular disease.   5. Compared to the transthoracic echocardiogram performed on 2/25/2922, the endocardium is better visualized.    < end of copied text >  < from: TTE with Doppler (w/Cont) (02.25.22 @ 09:45) >  Conclusions:  1. Mitral annular calcification, otherwise possible  anterior anterior mitral valve prolapse. Mild mitral  regurgitation.  2. Normal left ventricular internal dimensions and wall  thicknesses.  3. Endocardium not well visualized; grossly normal left  ventricular systolic function. Endocardial visualization  enhanced with intravenous injection of Ultrasonic Enhancing  Agent (Definity). The mid to basal inferolateral wall is  hypokinetic.  4. Normal diastolic function.  5. Right ventricular not well visualized. Grossly, normal  right ventricular size with borderline right ventricular  function.  *** Compared with echocardiogram of 9/28/2020, no  significant changes noted.    < end of copied text >  < from: Cardiac Cath Lab - Adult (08.09.19 @ 15:32) >  COMPLICATIONS: There were no complications.  DIAGNOSTIC RECOMMENDATIONS: Consultation will be obtained for coronary  artery bypass grafting.    < end of copied text >  CABG, with PHIL           Assessment /Plan:   Patient is a 54 year old male with history of DM, HTN, HLD, CAD s/p CABG x3 2019, PAD s/p RLE angioplasty/stent at OSH 5+ years ago presenting to the ED at the referral of his podiatrist/wound care specialist for evaluation. Reports he has had a wound on the bottom of his right foot for approximately 3-4 months. States the wound is painful especially with ambulation, and pain has been increasing. Admits to cramping pain in the calves after 3-4 blocks but denies pain at rest. Also notes some swelling of the right leg which has been present for several weeks. Denies fevers, chills, N/V, drainage, CP, SOB.     Problem/Plan #1:  Pre op Cardiac Risk Stratification  - ECG NSR with no ischemic characteristics  - Denies active CP or SOB  - Reports excellent functional capacity, can walk upto 10 blocks with no CP/SOB, up multiple flights of steps  - Not in decompensated HF  - No hx of tachy mariano arrhythmia  - Patient is elevated risk for low risk peripheral angiogram. No cardiac contraindication to proceed.  - If patient may require open vascular surgery,  will require further ischemic eval with cardiac cath.      Problem/Plan #2: Hypertension  - c/w Metoprolol 25mg PO BID    Problem/Plan #3: HLD  - c/w atorvastatin 80mg PO daily  - c/w fenofibrate 145mg PO daily    Problem/Plan #4: Heart Failure with mildly reduced Ejection Fraction  - TTE as noted above with mildly reduced EF 45-50%  - Appears euvolemic  - Will need sHF GDMT  --- cont Metoprolol 25m PO BID  --- Consider Farxiga 10mg PO daily when no further procedures or surgeries planned  --- Further implementation of sHF GDMT limited by soft BP  - Possible ischemic eval pending clinical course    Problem/Plan #5: Coronary Artery Disease  - Hx of PCI and most recent CABG in 2019  - ECG NSR  - TTE shows EF 45-50% and WMA similar to prior TTE  - NST in 2022 with stress infarct with periinfarct ischemia  - c/w ASA 81mg PO and atorvastatin 80mg PO daily  - If patient may require open vascular under general anesthesia, will require further ischemic eval with cardiac cath            Differential diagnosis and plan of care discussed with patient after the evaluation. Counseling on diet, nutritional counseling, weight management, exercise and medication compliance was done.   Advanced care planning/advanced directives discussed with patient/family. DNR status including forceful chest compressions to attempt to restart the heart, ventilator support/artificial breathing, electric shock, artificial nutrition, health care proxy, Molst form all discussed with pt. Pt wishes to consider. More than fifteen minutes spent on discussing advanced directives.     Shana Pizano [facility]-BC  Edwin Dwyer DO [facility]  Cardiovascular Medicine  [address] [place] [address] Dr, [address]  Available for call or text via [employer]  Office [phone]

## 2024-08-06 NOTE — PATIENT PROFILE ADULT - FALL HARM RISK - HARM RISK INTERVENTIONS
Assistance with ambulation/Communicate Risk of Fall with Harm to all staff/Monitor for mental status changes/Reinforce activity limits and safety measures with patient and family/Tailored Fall Risk Interventions/Use of alarms - bed, chair and/or voice tab/Visual Cue: Yellow wristband and red socks/Bed in lowest position, wheels locked, appropriate side rails in place/Call bell, personal items and telephone in reach/Instruct patient to call for assistance before getting out of bed or chair/Non-slip footwear when patient is out of bed/[place] to call system/Physically safe environment - no spills, clutter or unnecessary equipment/Purposeful Proactive Rounding/Room/bathroom lighting operational, light cord in reach

## 2024-08-06 NOTE — CONSULT NOTE ADULT - NS ATTEND AMEND GEN_ALL_CORE FT
Patient care and plan discussed and reviewed with Advanced Care Provider. Plan as outlined above edited by me to reflect our discussion. n/a

## 2024-08-06 NOTE — PROGRESS NOTE ADULT - ASSESSMENT
54 year old male with history of DM, HTN, HLD, CAD s/p CABG x3 2019, PAD s/p RLE angioplasty/stent at OSH 5+ years ago presenting for evaluation of a right foot wound. History of claudication but no rest pain or fevers. Afebrile and hemodynamically stable, WBC and lactate WNL, no evidence of subcutaneous gas or osteomyelitis on XR.     Plan:  - RLE angio planning for Wednesday, 8/7  - Podiatry OR Thurday, 8/8- R foot 3rd metatarsal resection  - Medical/cardiac consults for optimization  - Preop today  - pending consent  - NPO at [place]  - [facility]  - VTE ppx      Vascular Surgery  [id number]

## 2024-08-06 NOTE — PATIENT PROFILE ADULT - TOBACCO USE
"  SUBJECTIVE:    Patient ID: Ye Hu is a 53 y.o. male.    Chief Complaint: Back Pain (low back pain)    This is a 53-year-old man who sees Dr. Bautista for his primary care.  History of hypertension and psoriasis otherwise denies any chronic major medical problems.  Long history of low back pain.  He had an unspecified lumbar spine surgery roughly 20 years ago.  He was having pain radiating down his right leg at the time.  He improved after surgery.  He presents now with several months history of right-sided low back pain at the lumbosacral junction that radiates down the anterior portion of the right thigh to the knee.  Denies bowel or bladder dysfunction fever chills sweats or unexpected weight loss.  Pain level is 10/10.  I reviewed x-rays of his lumbar spine which show scoliosis and degenerative disc disease mostly at L4-5.  He admits to taking narcotic medication that he obtains off the streets.        Past Medical History:   Diagnosis Date    Hypertension      Social History     Socioeconomic History    Marital status: Single   Tobacco Use    Smoking status: Current Every Day Smoker     Packs/day: 1.00     Types: Cigarettes    Smokeless tobacco: Never Used   Substance and Sexual Activity    Alcohol use: Yes     Alcohol/week: 5.0 - 7.0 standard drinks     Types: 2 - 3 Cans of beer, 3 - 4 Shots of liquor per week    Drug use: Yes     Types: Marijuana     Past Surgical History:   Procedure Laterality Date    BACK SURGERY      HERNIA REPAIR Right      History reviewed. No pertinent family history.  Vitals:    05/20/22 1349   Resp: 20   Weight: 68.9 kg (152 lb)   Height: 5' 3" (1.6 m)       Review of Systems   Constitutional: Negative for chills, diaphoresis, fatigue, fever and unexpected weight change.   HENT: Negative for trouble swallowing.    Eyes: Negative for visual disturbance.   Respiratory: Negative for shortness of breath.    Cardiovascular: Negative for chest pain.   Gastrointestinal: " Negative for abdominal pain, constipation, diarrhea, nausea and vomiting.   Genitourinary: Negative for difficulty urinating.   Musculoskeletal: Negative for arthralgias, back pain, gait problem, joint swelling, myalgias, neck pain and neck stiffness.   Neurological: Negative for dizziness, speech difficulty, weakness, light-headedness, numbness and headaches.          Objective:      Physical Exam  Neurological:      Mental Status: He is alert and oriented to person, place, and time.      Comments: He is awake and in no acute distress  No point tenderness or palpable masses about the lumbar spine  He has scoliosis  Forward flexion is to about 60° before complains of pain at the lumbosacral junction.  Extension of the lumbar spine causes mild pain at the lumbosacral junction but less so than forward flexion  He can heel and toe walk normally  Reflexes- +1-+2 reflexes at the following:   C5-Biceps   C6-Brachioradialis   C7-Triceps   L3/4-Patellar   S1-Achilles  Helga sign negative bilaterally  Strength testing- 5/5 strength in the following muscle groups:  C5-Elbow flexion  C6-Wrist extension  C7-Elbow extension  C8-Finger flexion  T1-Finger abduction  L2-Hip flexion  L3-Knee extension  L4-Ankle dorsiflexion  L5-Great toe extension  S1-Ankle plantar flexion    Digits 3 4 and 5 of the left hand have distal amputations  Straight leg raise is negative bilaterally  CLAY test bilaterally causes ipsilateral low back pain             Assessment:       1. Scoliosis, unspecified scoliosis type, unspecified spinal region    2. Chronic bilateral low back pain with bilateral sciatica    3. History of lumbar discectomy    4. Dorsalgia, unspecified           Plan:     he has a nonfocal neurological examination and no historical red flags.  He has low back pain on basis of degenerative disc disease and facet arthropathy.  He has symptoms of upper lumbar radiculitis with no evidence of nerve root dysfunction.  Going to get an  MRI of the lumbar spine in preparation for epidural steroid injections.  In the meantime start gabapentin 300 mg q.h.s. and Robaxin as needed for pain.  Follow-up after the scan      Scoliosis, unspecified scoliosis type, unspecified spinal region  -     X-Ray Scoliosis Complete Including Supine And Erect; Future; Expected date: 05/20/2022    Chronic bilateral low back pain with bilateral sciatica  -     Ambulatory referral[facility]consult to [facility]    History of lumbar discectomy  -     Ambulatory referral/consult to [facility]    Dorsalgia, unspecified  -     MRI Lumbar Spine Without Contrast; Future; Expected date: 05/20/2022    Other orders  -     gabapentin (NEURONTIN) 300 MG capsule; Take 1 capsule (300 mg total) by mouth every evening.  Dispense: 30 capsule; Refill: 1  -     methocarbamoL (ROBAXIN) 500 MG Tab; Take 1 tablet (500 mg total) by mouth 2 (two) times daily as needed (Pain).  Dispense: 60 tablet; Refill: 1           Former smoker

## 2024-08-06 NOTE — PROGRESS NOTE ADULT - SUBJECTIVE AND OBJECTIVE BOX
SURGERY DAILY PROGRESS NOTE:     Overnight Events:  No acute events overnight.    SUBJECTIVE: Patient seen and evaluated on AM rounds. Pt is resting comfortably in bed with no complaints. Denies fever, chills, N/V, chest pain, or shortness of breath. Tolerating diet. Pain is adequately controlled on current regimen.    OBJECTIVE:  Vital Signs Last 24 Hrs  T(C): 36.6 (06 Aug 2024 05:05), Max: 37.1 (06 Aug 2024 02:08)  T(F): 97.8 (06 Aug 2024 05:05), Max: 98.8 (06 Aug 2024 02:08)  HR: 94 (06 Aug 2024 05:05) (79 - 97)  BP: 122/76 (06 Aug 2024 05:05) (103/59 - 145/77)  BP(mean): --  RR: 18 (06 Aug 2024 05:05) (16 - 18)  SpO2: 95% (06 Aug 2024 05:05) (95% - 98%)    Parameters below as of 06 Aug 2024 05:05  Patient On (Oxygen Delivery Method): room air      I&O's Detail    Daily Height in cm: 167.64 (05 Aug 2024 15:08)    Daily     LABS:                        11.5   5.66  )-----------( 201      ( 06 Aug 2024 06:52 )             35.1     08-06    136  |  103  |  15  ----------------------------<  283<H>  4.0   |  23  |  0.75    Ca    9.3      06 Aug 2024 06:52  Phos  3.1     08-06  Mg     2.1     08-06    TPro  7.3  /  Alb  3.9  /  TBili  0.3  /  DBili  x   /  AST  11  /  ALT  14  /  AlkPhos  103  08-05    PT/INR - ( 06 Aug 2024 06:54 )   PT: 10.3 sec;   INR: 0.93 ratio         PTT - ( 06 Aug 2024 06:54 )  PTT:25.1 sec  Urinalysis Basic - ( 06 Aug 2024 06:52 )    Color: x / Appearance: x / SG: x / pH: x  Gluc: 283 mg/dL / Ketone: x  / Bili: x / Urobili: x   Blood: x / Protein: x / Nitrite: x   Leuk Esterase: x / RBC: x / WBC x   Sq Epi: x / Non Sq Epi: x / Bacteria: x        PHYSICAL EXAM:  General: NAD, male appearing stated age  Psych: Awake, alert and oriented x4, appropriate mood and affect  Neuro: Moving all extremities spontaneously, no focal deficits  HEENT: NC/AT, no scleral icterus  CV: RRR, hemodynamically stable  Resp: Nonlabored breathing on RA  GI/Abd: Soft, non-distended, non-tender  Vascular:   - Right: warm, 2+ pitting edema to calf, faintly palp DP, PT ds+,  palp pop palp fem. Plantar foot ulcer approx 2cm with clean base, no erythema/purulence. Well healed surgical scars c/w bypass  - Left: warm, 1+ pitting edema to ankle, faintly palp DP,  PT ds+, palp pop palp fem     Skin: No rashes/jaundice     SURGERY DAILY PROGRESS NOTE:     Overnight Events:  No acute events overnight.    SUBJECTIVE: Patient seen and evaluated on AM rounds. Pt is resting comfortably in bed with no complaints. Denies fever, chills, N/V, chest pain, or shortness of breath. Tolerating diet. Pain is adequately controlled on current regimen.    OBJECTIVE:  Vital Signs Last 24 Hrs  T(C): 36.6 (06 Aug 2024 05:05), Max: 37.1 (06 Aug 2024 02:08)  T(F): 97.8 (06 Aug 2024 05:05), Max: 98.8 (06 Aug 2024 02:08)  HR: 94 (06 Aug 2024 05:05) (79 - 97)  BP: 122/76 (06 Aug 2024 05:05) (103/59 - 145/77)  BP(mean): --  RR: 18 (06 Aug 2024 05:05) (16 - 18)  SpO2: 95% (06 Aug 2024 05:05) (95% - 98%)    Parameters below as of 06 Aug 2024 05:05  Patient On (Oxygen Delivery Method): room air      I&O's Detail    Daily Height in cm: 167.64 (05 Aug 2024 15:08)      LABS:                        11.5   5.66  )-----------( 201      ( 06 Aug 2024 06:52 )             35.1     08-06    136  |  103  |  15  ----------------------------<  283<H>  4.0   |  23  |  0.75    Ca    9.3      06 Aug 2024 06:52  Phos  3.1     08-06  Mg     2.1     08-06    TPro  7.3  /  Alb  3.9  /  TBili  0.3  /  DBili  x   /  AST  11  /  ALT  14  /  AlkPhos  103  08-05    PT/INR - ( 06 Aug 2024 06:54 )   PT: 10.3 sec;   INR: 0.93 ratio         PTT - ( 06 Aug 2024 06:54 )  PTT:25.1 sec  Urinalysis Basic - ( 06 Aug 2024 06:52 )    Color: x / Appearance: x / SG: x / pH: x  Gluc: 283 mg/dL / Ketone: x  / Bili: x / Urobili: x   Blood: x / Protein: x / Nitrite: x   Leuk Esterase: x / RBC: x / WBC x   Sq Epi: x / Non Sq Epi: x / Bacteria: x        PHYSICAL EXAM:  General: NAD, male appearing stated age  Psych: Awake, alert and oriented x4, appropriate mood and affect  Neuro: Moving all extremities spontaneously, no focal deficits  HEENT: NC/AT, no scleral icterus  CV: RRR, hemodynamically stable  Resp: Nonlabored breathing on RA  GI/Abd: Soft, non-distended, non-tender  Vascular:   - Right: warm, 2+ pitting edema to calf, faintly palp DP, PT ds+,  palp pop palp fem. Plantar foot ulcer approx 2cm with clean base, no erythema/purulence. Well healed surgical scars c/w bypass  - Left: warm, 1+ pitting edema to ankle, faintly palp DP,  PT ds+, palp pop palp fem     Skin: No rashes/jaundice

## 2024-08-06 NOTE — CHART NOTE - NSCHARTNOTEFT_GEN_A_CORE
SURGERY PRE-OP NOTE    Planned Procedure: right lower extremity angiogram  Surgeon: Dr. Youssef      Pertinent Labs:                            11.5   5.66  )-----------( 201      ( 06 Aug 2024 06:52 )             35.1       08-06    136  |  103  |  15  ----------------------------<  283<H>  4.0   |  23  |  0.75    Ca    9.3      06 Aug 2024 06:52  Phos  3.1     08-06  Mg     2.1     08-06    TPro  7.3  /  Alb  3.9  /  TBili  0.3  /  DBili  x   /  AST  11  /  ALT  14  /  AlkPhos  103  08-05      PT/INR - ( 06 Aug 2024 06:54 )   PT: 10.3 sec;   INR: 0.93 ratio         PTT - ( 06 Aug 2024 06:54 )  PTT:25.1 sec    -----------------------------------------------------------------------------------------------------------------------------------  Type and Screen      -----------------------------------------------------------------------------------------------------------------------------------  U/A:  UCx:  Urinalysis Basic - ( 06 Aug 2024 06:52 )    Color: x / Appearance: x / SG: x / pH: x  Gluc: 283 mg/dL / Ketone: x  / Bili: x / Urobili: x   Blood: x / Protein: x / Nitrite: x   Leuk Esterase: x / RBC: x / WBC x   Sq Epi: x / Non Sq Epi: x / Bacteria: x      -----------------------------------------------------------------------------------------------------------------------------------  uHCG:      -----------------------------------------------------------------------------------------------------------------------------------  CXR:          -----------------------------------------------------------------------------------------------------------------------------------  EKG:    -----------------------------------------------------------------------------------------------------------------------------------  Echo      -----------------------------------------------------------------------------------------------------------------------------------      Plan:  - NPO after midnight   - consent signed in chart  - Preop for  right lower extremity angiogram  with Dr. Youssef  - Pending:   - pre op labs 4:00 am of 8/7 : cbc, bmp, mg, phos, PTT/INR, type and screenx1  - Cardiac clearance pending  - Medicine Clearance pending

## 2024-08-07 ENCOUNTER — TRANSCRIPTION ENCOUNTER (OUTPATIENT)
Age: 54
End: 2024-08-07

## 2024-08-07 LAB
A1C WITH ESTIMATED AVERAGE GLUCOSE RESULT: 14 % — HIGH (ref 4–5.6)
A1C WITH ESTIMATED AVERAGE GLUCOSE RESULT: 14.1 % — HIGH (ref 4–5.6)
ANION GAP SERPL CALC-SCNC: 12 MMOL/L — SIGNIFICANT CHANGE UP (ref 5–17)
APTT BLD: 30 SEC — SIGNIFICANT CHANGE UP (ref 24.5–35.6)
BUN SERPL-MCNC: 12 MG/DL — SIGNIFICANT CHANGE UP (ref 7–23)
CALCIUM SERPL-MCNC: 9.1 MG/DL — SIGNIFICANT CHANGE UP (ref 8.4–10.5)
CHLORIDE SERPL-SCNC: 103 MMOL/L — SIGNIFICANT CHANGE UP (ref 96–108)
CHOLEST SERPL-MCNC: 209 MG/DL — HIGH
CO2 SERPL-SCNC: 22 MMOL/L — SIGNIFICANT CHANGE UP (ref 22–31)
CREAT SERPL-MCNC: 0.91 MG/DL — SIGNIFICANT CHANGE UP (ref 0.5–1.3)
EGFR: 100 ML/MIN/1.73M2 — SIGNIFICANT CHANGE UP
ESTIMATED AVERAGE GLUCOSE: 355 MG/DL — HIGH (ref 68–114)
ESTIMATED AVERAGE GLUCOSE: 358 MG/DL — HIGH (ref 68–114)
GLUCOSE BLDC GLUCOMTR-MCNC: 165 MG/DL — HIGH (ref 70–99)
GLUCOSE BLDC GLUCOMTR-MCNC: 192 MG/DL — HIGH (ref 70–99)
GLUCOSE BLDC GLUCOMTR-MCNC: 204 MG/DL — HIGH (ref 70–99)
GLUCOSE BLDC GLUCOMTR-MCNC: 227 MG/DL — HIGH (ref 70–99)
GLUCOSE BLDC GLUCOMTR-MCNC: 343 MG/DL — HIGH (ref 70–99)
GLUCOSE SERPL-MCNC: 235 MG/DL — HIGH (ref 70–99)
HCT VFR BLD CALC: 34.7 % — LOW (ref 39–50)
HDLC SERPL-MCNC: 44 MG/DL — SIGNIFICANT CHANGE UP
HGB BLD-MCNC: 11.5 G/DL — LOW (ref 13–17)
INR BLD: 0.94 RATIO — SIGNIFICANT CHANGE UP (ref 0.85–1.18)
LIPID PNL WITH DIRECT LDL SERPL: 141 MG/DL — HIGH
MAGNESIUM SERPL-MCNC: 2 MG/DL — SIGNIFICANT CHANGE UP (ref 1.6–2.6)
MCHC RBC-ENTMCNC: 26.3 PG — LOW (ref 27–34)
MCHC RBC-ENTMCNC: 33.1 GM/DL — SIGNIFICANT CHANGE UP (ref 32–36)
MCV RBC AUTO: 79.4 FL — LOW (ref 80–100)
NON HDL CHOLESTEROL: 165 MG/DL — HIGH
NRBC # BLD: 0 /100 WBCS — SIGNIFICANT CHANGE UP (ref 0–0)
PHOSPHATE SERPL-MCNC: 3.5 MG/DL — SIGNIFICANT CHANGE UP (ref 2.5–4.5)
PLATELET # BLD AUTO: 191 K/UL — SIGNIFICANT CHANGE UP (ref 150–400)
POTASSIUM SERPL-MCNC: 4.1 MMOL/L — SIGNIFICANT CHANGE UP (ref 3.5–5.3)
POTASSIUM SERPL-SCNC: 4.1 MMOL/L — SIGNIFICANT CHANGE UP (ref 3.5–5.3)
PROTHROM AB SERPL-ACNC: 10.4 SEC — SIGNIFICANT CHANGE UP (ref 9.5–13)
RBC # BLD: 4.37 M/UL — SIGNIFICANT CHANGE UP (ref 4.2–5.8)
RBC # FLD: 12.9 % — SIGNIFICANT CHANGE UP (ref 10.3–14.5)
SODIUM SERPL-SCNC: 137 MMOL/L — SIGNIFICANT CHANGE UP (ref 135–145)
TRIGL SERPL-MCNC: 134 MG/DL — SIGNIFICANT CHANGE UP
WBC # BLD: 4.72 K/UL — SIGNIFICANT CHANGE UP (ref 3.8–10.5)
WBC # FLD AUTO: 4.72 K/UL — SIGNIFICANT CHANGE UP (ref 3.8–10.5)

## 2024-08-07 PROCEDURE — 75710 ARTERY X-RAYS ARM/LEG: CPT | Mod: 26,RT

## 2024-08-07 PROCEDURE — 36245 INS CATH ABD/L-EXT ART 1ST: CPT | Mod: RT

## 2024-08-07 PROCEDURE — 75625 CONTRAST EXAM ABDOMINL AORTA: CPT | Mod: 26

## 2024-08-07 RX ORDER — INSULIN LISPRO 100/ML
VIAL (ML) SUBCUTANEOUS EVERY 6 HOURS
Refills: 0 | Status: ACTIVE | OUTPATIENT
Start: 2024-08-07 | End: 2025-07-06

## 2024-08-07 RX ADMIN — Medication 975 MILLIGRAM(S): at 17:39

## 2024-08-07 RX ADMIN — Medication 25 MILLIGRAM(S): at 05:27

## 2024-08-07 RX ADMIN — Medication 2: at 05:28

## 2024-08-07 RX ADMIN — Medication 1: at 15:24

## 2024-08-07 RX ADMIN — FENOFIBRATE 145 MILLIGRAM(S): 30 CAPSULE ORAL at 21:09

## 2024-08-07 RX ADMIN — Medication 17 UNIT(S): at 17:40

## 2024-08-07 RX ADMIN — Medication 975 MILLIGRAM(S): at 05:27

## 2024-08-07 RX ADMIN — ATORVASTATIN CALCIUM 80 MILLIGRAM(S): 40 TABLET, FILM COATED ORAL at 21:09

## 2024-08-07 RX ADMIN — PIPERACILLIN SODIUM, TAZOBACTAM SODIUM 25 GRAM(S): 3; .375 INJECTION, POWDER, LYOPHILIZED, FOR SOLUTION INTRAVENOUS at 15:25

## 2024-08-07 RX ADMIN — Medication 25 MILLIGRAM(S): at 17:39

## 2024-08-07 RX ADMIN — Medication 4: at 23:28

## 2024-08-07 RX ADMIN — Medication 2: at 17:41

## 2024-08-07 RX ADMIN — Medication 975 MILLIGRAM(S): at 00:14

## 2024-08-07 RX ADMIN — ENOXAPARIN SODIUM 40 MILLIGRAM(S): 120 INJECTION SUBCUTANEOUS at 05:27

## 2024-08-07 RX ADMIN — PIPERACILLIN SODIUM, TAZOBACTAM SODIUM 25 GRAM(S): 3; .375 INJECTION, POWDER, LYOPHILIZED, FOR SOLUTION INTRAVENOUS at 21:09

## 2024-08-07 RX ADMIN — PIPERACILLIN SODIUM, TAZOBACTAM SODIUM 25 GRAM(S): 3; .375 INJECTION, POWDER, LYOPHILIZED, FOR SOLUTION INTRAVENOUS at 05:27

## 2024-08-07 RX ADMIN — INSULIN GLARGINE-YFGN 45 UNIT(S): 100 INJECTION, SOLUTION SUBCUTANEOUS at 23:28

## 2024-08-07 RX ADMIN — Medication 975 MILLIGRAM(S): at 23:30

## 2024-08-07 RX ADMIN — Medication 975 MILLIGRAM(S): at 05:57

## 2024-08-07 NOTE — PROGRESS NOTE ADULT - SUBJECTIVE AND OBJECTIVE BOX
SURGERY DAILY PROGRESS NOTE:     Overnight Events:  No acute events overnight.    SUBJECTIVE: Patient seen and evaluated on AM rounds. Pt is resting comfortably in bed with no complaints. Denies fever, chills, N/V, chest pain, or shortness of breath. Tolerating diet. Pain is adequately controlled on current regimen.    OBJECTIVE:  Vital Signs Last 24 Hrs  T(C): 36.4 (07 Aug 2024 05:10), Max: 36.6 (06 Aug 2024 09:57)  T(F): 97.5 (07 Aug 2024 05:10), Max: 97.9 (06 Aug 2024 09:57)  HR: 75 (07 Aug 2024 05:10) (75 - 83)  BP: 103/63 (07 Aug 2024 05:10) (97/57 - 129/72)  BP(mean): --  RR: 18 (07 Aug 2024 05:10) (18 - 18)  SpO2: 95% (07 Aug 2024 05:10) (95% - 97%)    Parameters below as of 07 Aug 2024 05:10  Patient On (Oxygen Delivery Method): room air      I&O's Detail    06 Aug 2024 07:01  -  07 Aug 2024 07:00  --------------------------------------------------------  IN:    Oral Fluid: 960 mL  Total IN: 960 mL    OUT:  Total OUT: 0 mL    Total NET: 960 mL        Daily     Daily     LABS:                        11.5   4.72  )-----------( 191      ( 07 Aug 2024 07:05 )             34.7     08-07    137  |  103  |  12  ----------------------------<  235<H>  4.1   |  22  |  0.91    Ca    9.1      07 Aug 2024 07:05  Phos  3.5     08-07  Mg     2.0     08-07    TPro  7.3  /  Alb  3.9  /  TBili  0.3  /  DBili  x   /  AST  11  /  ALT  14  /  AlkPhos  103  08-05    PT/INR - ( 07 Aug 2024 07:05 )   PT: 10.4 sec;   INR: 0.94 ratio         PTT - ( 07 Aug 2024 07:05 )  PTT:30.0 sec  Urinalysis Basic - ( 07 Aug 2024 07:05 )    Color: x / Appearance: x / SG: x / pH: x  Gluc: 235 mg/dL / Ketone: x  / Bili: x / Urobili: x   Blood: x / Protein: x / Nitrite: x   Leuk Esterase: x / RBC: x / WBC x   Sq Epi: x / Non Sq Epi: x / Bacteria: x                PHYSICAL EXAM:  General: NAD, male appearing stated age  Psych: Awake, alert and oriented x4, appropriate mood and affect  Neuro: Moving all extremities spontaneously, no focal deficits  HEENT: NC/AT,   CV: RRR, hemodynamically stable  Resp: Nonlabored breathing on RA  Vascular:   - Right: warm, 2+ pitting edema to calf, faintly palp DP, PT ds+,  palp pop palp fem. Plantar foot ulcer approx 2cm with clean base, no erythema/purulence. Well healed surgical scars c/w bypass  - Left: warm, 1+ pitting edema to ankle, faintly palp DP,  PT ds+, palp pop palp fem

## 2024-08-07 NOTE — CHART NOTE - NSCHARTNOTEFT_GEN_A_CORE
Patient is a 54y old  Male who presents with a chief complaint of RLE foot wound (07 Aug 2024 11:49)    Patient was examined bedside. Denies SOB, CP, N/V. States he feels no pain and feels great  Pt is S/P  RLE angiogram performed via femoral artery access                        POD# 0    Vital Signs Last 24 Hrs  T(C): 36.5 (07 Aug 2024 21:27), Max: 37.3 (07 Aug 2024 17:28)  T(F): 97.7 (07 Aug 2024 21:27), Max: 99.1 (07 Aug 2024 17:28)  HR: 76 (07 Aug 2024 21:27) (63 - 81)  BP: 128/74 (07 Aug 2024 21:27) (103/63 - 146/82)  BP(mean): --  RR: 18 (07 Aug 2024 21:27) (15 - 18)  SpO2: 95% (07 Aug 2024 21:27) (94% - 99%)    Parameters below as of 07 Aug 2024 21:27  Patient On (Oxygen Delivery Method): room air      I&O's Detail    06 Aug 2024 07:01  -  07 Aug 2024 07:00  --------------------------------------------------------  IN:    Oral Fluid: 960 mL  Total IN: 960 mL    OUT:  Total OUT: 0 mL    Total NET: 960 mL      07 Aug 2024 07:01  -  07 Aug 2024 23:00  --------------------------------------------------------  IN:    Oral Fluid: 200 mL  Total IN: 200 mL    OUT:  Total OUT: 0 mL    Total NET: 200 mL        piperacillin/tazobactam IVPB.. 3.375  enoxaparin Injectable 40  metoprolol tartrate 25  piperacillin/tazobactam IVPB.. 3.375    PAST MEDICAL & SURGICAL HISTORY:  Hypertension, unspecified type      Hyperlipidemia, unspecified hyperlipidemia type      Type 2 diabetes mellitus without complication, with long-term current use of insulin      Coronary artery disease of native artery of native heart with stable angina pectoris      History of percutaneous coronary intervention        Physical Exam:  General: NAD, resting comfortably in bed  Pulmonary: Nonlabored breathing, no respiratory distress  Cardiovascular: NSR  Abdominal: soft, NT/ND, no rebound tenderness,   ilioinguinal: dressing c/d/i   Extremities: WWP  Pulses: b/l DP and PT palpable   Right:                                                                          Left:   DP [ ]2+ [ ]1+ [x ]doppler                                                DP [ ]2+ [ ]1+ [x ]doppler  PT[ ]2+ [ ]1+ [ x]doppler                                                  PT [ ]2+ [ ]1+ [ x]doppler      LABS:                        11.5   4.72  )-----------( 191      ( 07 Aug 2024 07:05 )             34.7     08-07    137  |  103  |  12  ----------------------------<  235<H>  4.1   |  22  |  0.91    Ca    9.1      07 Aug 2024 07:05  Phos  3.5     08-07  Mg     2.0     08-07      PT/INR - ( 07 Aug 2024 07:05 )   PT: 10.4 sec;   INR: 0.94 ratio         PTT - ( 07 Aug 2024 07:05 )  PTT:30.0 sec  CAPILLARY BLOOD GLUCOSE      POCT Blood Glucose.: 227 mg/dL (07 Aug 2024 17:19)  POCT Blood Glucose.: 165 mg/dL (07 Aug 2024 14:54)  POCT Blood Glucose.: 204 mg/dL (07 Aug 2024 05:26)  POCT Blood Glucose.: 192 mg/dL (07 Aug 2024 00:05)      Radiology and Additional Studies:    Assessment:54yMaleHPI:  HPI:  54 year old male with history of DM, HTN, HLD, CAD s/p CABG x3 2019, PAD s/p RLE angioplasty/stent at OSH 5+ years ago presenting to the ED at the referral of his podiatrist/wound care specialist for evaluation. Reports he has had a wound on the bottom of his right foot for approximately 3-4 months. States the wound is painful especially with ambulation, and pain has been increasing. Admits to cramping pain in the calves after 3-4 blocks but denies pain at rest. Also notes some swelling of the right leg which has been present for several weeks. Denies fevers, chills, N/V, drainage, CP, SOB.     Plan:  - DVT: Lovenox  - ABx: Zosyn   - f/u labs  - home medications   - NPO at [facility] Patient is a 54y old  Male who presents with a chief complaint of RLE foot wound (07 Aug 2024 11:49)    Patient was examined bedside. Denies SOB, CP, N/V. States he feels no pain and feels great  Pt is S/P  RLE angiogram performed via femoral artery access                        POD# 0    Vital Signs Last 24 Hrs  T(C): 36.5 (07 Aug 2024 21:27), Max: 37.3 (07 Aug 2024 17:28)  T(F): 97.7 (07 Aug 2024 21:27), Max: 99.1 (07 Aug 2024 17:28)  HR: 76 (07 Aug 2024 21:27) (63 - 81)  BP: 128/74 (07 Aug 2024 21:27) (103/63 - 146/82)  BP(mean): --  RR: 18 (07 Aug 2024 21:27) (15 - 18)  SpO2: 95% (07 Aug 2024 21:27) (94% - 99%)    Parameters below as of 07 Aug 2024 21:27  Patient On (Oxygen Delivery Method): room air      I&O's Detail    06 Aug 2024 07:01  -  07 Aug 2024 07:00  --------------------------------------------------------  IN:    Oral Fluid: 960 mL  Total IN: 960 mL    OUT:  Total OUT: 0 mL    Total NET: 960 mL      07 Aug 2024 07:01  -  07 Aug 2024 23:00  --------------------------------------------------------  IN:    Oral Fluid: 200 mL  Total IN: 200 mL    OUT:  Total OUT: 0 mL    Total NET: 200 mL        piperacillin/tazobactam IVPB.. 3.375  enoxaparin Injectable 40  metoprolol tartrate 25  piperacillin/tazobactam IVPB.. 3.375    PAST MEDICAL & SURGICAL HISTORY:  Hypertension, unspecified type      Hyperlipidemia, unspecified hyperlipidemia type      Type 2 diabetes mellitus without complication, with long-term current use of insulin      Coronary artery disease of native artery of native heart with stable angina pectoris      History of percutaneous coronary intervention        Physical Exam:  General: NAD, resting comfortably in bed  Pulmonary: Nonlabored breathing, no respiratory distress  Cardiovascular: NSR  Abdominal: soft, NT/ND, no rebound tenderness,   ilioinguinal: dressing c/d/i   Extremities: WWP  Pulses: b/l DP and PT palpable   Right:                                                                          Left:   DP [ ]2+ [ ]1+ [x ]doppler                                                DP [ ]2+ [ ]1+ [x ]doppler  PT[ ]2+ [ ]1+ [ x]doppler                                                  PT [ ]2+ [ ]1+ [ x]doppler      LABS:                        11.5   4.72  )-----------( 191      ( 07 Aug 2024 07:05 )             34.7     08-07    137  |  103  |  12  ----------------------------<  235<H>  4.1   |  22  |  0.91    Ca    9.1      07 Aug 2024 07:05  Phos  3.5     08-07  Mg     2.0     08-07      PT/INR - ( 07 Aug 2024 07:05 )   PT: 10.4 sec;   INR: 0.94 ratio         PTT - ( 07 Aug 2024 07:05 )  PTT:30.0 sec  CAPILLARY BLOOD GLUCOSE      POCT Blood Glucose.: 227 mg/dL (07 Aug 2024 17:19)  POCT Blood Glucose.: 165 mg/dL (07 Aug 2024 14:54)  POCT Blood Glucose.: 204 mg/dL (07 Aug 2024 05:26)  POCT Blood Glucose.: 192 mg/dL (07 Aug 2024 00:05)      Radiology and Additional Studies:    Assessment:54yMaleHPI:  HPI:  54 year old male with history of DM, HTN, HLD, CAD s/p CABG x3 2019, PAD s/p RLE angioplasty/stent at OSH 5+ years ago presenting to the ED at the referral of his podiatrist/wound care specialist for evaluation. Reports he has had a wound on the bottom of his right foot for approximately 3-4 months. States the wound is painful especially with ambulation, and pain has been increasing. S/P  RLE angiogram performed via femoral artery access .     Plan:  - DVT: Lovenox  - ABx: Zosyn   - f/u labs  - home medications   - NPO at [facility]    Kevin Clark MD   General Surgery Resident PGY1  VASCULAR SURGERY

## 2024-08-07 NOTE — PROGRESS NOTE ADULT - SUBJECTIVE AND OBJECTIVE BOX
Patient is a 54y old  Male who presents with a chief complaint of RLE foot wound (07 Aug 2024 08:15)       INTERVAL HPI/OVERNIGHT EVENTS:  Patient seen and evaluated at bedside.  Pt is resting comfortable in NAD. Denies N/V/F/C.    Allergies    No Known Allergies    Intolerances        Vital Signs Last 24 Hrs  T(C): 36.6 (07 Aug 2024 09:20), Max: 36.6 (06 Aug 2024 09:57)  T(F): 97.8 (07 Aug 2024 09:20), Max: 97.9 (06 Aug 2024 09:57)  HR: 74 (07 Aug 2024 09:20) (74 - 83)  BP: 113/70 (07 Aug 2024 09:20) (97/57 - 129/72)  BP(mean): --  RR: 18 (07 Aug 2024 09:20) (18 - 18)  SpO2: 96% (07 Aug 2024 09:20) (95% - 97%)    Parameters below as of 07 Aug 2024 09:20  Patient On (Oxygen Delivery Method): room air        LABS:                        11.5   4.72  )-----------( 191      ( 07 Aug 2024 07:05 )             34.7     08-07    137  |  103  |  12  ----------------------------<  235<H>  4.1   |  22  |  0.91    Ca    9.1      07 Aug 2024 07:05  Phos  3.5     08-07  Mg     2.0     08-07    TPro  7.3  /  Alb  3.9  /  TBili  0.3  /  DBili  x   /  AST  11  /  ALT  14  /  AlkPhos  103  08-05    PT/INR - ( 07 Aug 2024 07:05 )   PT: 10.4 sec;   INR: 0.94 ratio         PTT - ( 07 Aug 2024 07:05 )  PTT:30.0 sec  Urinalysis Basic - ( 07 Aug 2024 07:05 )    Color: x / Appearance: x / SG: x / pH: x  Gluc: 235 mg/dL / Ketone: x  / Bili: x / Urobili: x   Blood: x / Protein: x / Nitrite: x   Leuk Esterase: x / RBC: x / WBC x   Sq Epi: x / Non Sq Epi: x / Bacteria: x      CAPILLARY BLOOD GLUCOSE      POCT Blood Glucose.: 204 mg/dL (07 Aug 2024 05:26)  POCT Blood Glucose.: 192 mg/dL (07 Aug 2024 00:05)  POCT Blood Glucose.: 227 mg/dL (06 Aug 2024 21:13)  POCT Blood Glucose.: 202 mg/dL (06 Aug 2024 19:34)  POCT Blood Glucose.: 294 mg/dL (06 Aug 2024 15:34)  POCT Blood Glucose.: 304 mg/dL (06 Aug 2024 11:59)  POCT Blood Glucose.: 310 mg/dL (06 Aug 2024 09:42)      Lower Extremity Physical Exam:  Vascular: DP/PT 0/4, B/L, CFT <3 seconds B/L, Temperature gradient warm to cool, B/L.   Neuro: Epicritic sensation absent to the level of digits , B/L.  Musculoskeletal/Ortho: unremarkable  Skin: Right foot sub 3rd metatarsal head wound to sub Q without signs of infection. Left foot no open wounds or signs of infection     RADIOLOGY & ADDITIONAL TESTS:

## 2024-08-07 NOTE — PROGRESS NOTE ADULT - SUBJECTIVE AND OBJECTIVE BOX
DATE OF SERVICE: 08-07-24 @ 11:34    Patient is a 54y old  Male who presents with a chief complaint of RLE foot wound (07 Aug 2024 09:41)      INTERVAL HISTORY: Feels ok.     REVIEW OF SYSTEMS:  CONSTITUTIONAL: No weakness  EYES/ENT: No visual changes;  No throat pain   NECK: No pain or stiffness  RESPIRATORY: No cough, wheezing; No shortness of breath  CARDIOVASCULAR: No chest pain or palpitations  GASTROINTESTINAL: No abdominal  pain. No nausea, vomiting, or hematemesis  GENITOURINARY: No dysuria, frequency or hematuria  NEUROLOGICAL: No stroke like symptoms  SKIN: No rashes    	  MEDICATIONS:  metoprolol tartrate 25 milliGRAM(s) Oral two times a day        PHYSICAL EXAM:  T(C): 36.6 (08-07-24 @ 09:20), Max: 36.6 (08-07-24 @ 09:20)  HR: 74 (08-07-24 @ 09:20) (74 - 83)  BP: 113/70 (08-07-24 @ 09:20) (97/57 - 129/72)  RR: 18 (08-07-24 @ 09:20) (18 - 18)  SpO2: 96% (08-07-24 @ 09:20) (95% - 97%)  Wt(kg): --  I&O's Summary    06 Aug 2024 07:01  -  07 Aug 2024 07:00  --------------------------------------------------------  IN: 960 mL / OUT: 0 mL / NET: 960 mL          Appearance: In no distress	  HEENT:    PERRL, EOMI	  Cardiovascular:  S1 S2, No JVD  Respiratory: Lungs clear to auscultation	  Gastrointestinal:  Soft, Non-tender, + BS	  Vascularature:  No edema of LE  Psychiatric: Appropriate affect   Neuro: no acute focal deficits                               11.5   4.72  )-----------( 191      ( 07 Aug 2024 07:05 )             34.7     08-07    137  |  103  |  12  ----------------------------<  235<H>  4.1   |  22  |  0.91    Ca    9.1      07 Aug 2024 07:05  Phos  3.5     08-07  Mg     2.0     08-07    TPro  7.3  /  Alb  3.9  /  TBili  0.3  /  DBili  x   /  AST  11  /  ALT  14  /  AlkPhos  103  08-05        Labs personally reviewed      ASSESSMENT/PLAN: 	    Patient is a 54 year old male with history of DM, HTN, HLD, CAD s/p CABG x3 2019, PAD s/p RLE angioplasty/stent at OSH 5+ years ago presenting to the ED at the referral of his podiatrist/wound care specialist for evaluation. Reports he has had a wound on the bottom of his right foot for approximately 3-4 months. States the wound is painful especially with ambulation, and pain has been increasing. Admits to cramping pain in the calves after 3-4 blocks but denies pain at rest. Also notes some swelling of the right leg which has been present for several weeks. Denies fevers, chills, N/V, drainage, CP, SOB.     Problem/Plan #1:  Pre op Cardiac Risk Stratification  - ECG NSR with no ischemic characteristics  - Denies active CP or SOB  - Reports excellent functional capacity, can walk upto 10 blocks with no CP/SOB, up multiple flights of steps  - Not in decompensated HF  - No hx of tachy mariano arrhythmia  - Patient is elevated risk for low risk peripheral angiogram. No cardiac contraindication to proceed.  - If patient may require open vascular surgery,  will require further ischemic eval with cardiac cath.      Problem/Plan #2: Hypertension  - c/w Metoprolol 25mg PO BID    Problem/Plan #3: HLD  - c/w atorvastatin 80mg PO daily  - c/w fenofibrate 145mg PO daily    Problem/Plan #4: Heart Failure with mildly reduced Ejection Fraction  - TTE as noted above with mildly reduced EF 45-50%  - Appears euvolemic  - Will need sHF GDMT  --- cont Metoprolol 25m PO BID  --- Consider Farxiga 10mg PO daily when no further procedures or surgeries planned  --- Further implementation of sHF GDMT limited by soft BP  - Possible ischemic eval pending clinical course    Problem/Plan #5: Coronary Artery Disease  - Hx of PCI and most recent CABG in 2019  - ECG NSR  - TTE shows EF 45-50% and WMA similar to prior TTE  - NST in 2022 with stress infarct with periinfarct ischemia  - c/w ASA 81mg PO and atorvastatin 80mg PO daily  - If patient may require open vascular under general anesthesia, will require further ischemic eval with cardiac cath        ANNMARIE Herring DO [facility]  Cardiovascular Medicine  [address] [place] [address], [address]  Available through call or text on [employer]  Office: [phone]

## 2024-08-07 NOTE — PROGRESS NOTE ADULT - SUBJECTIVE AND OBJECTIVE BOX
Name of Patient : ELSI MERINO  MRN: [id number]  Date of visit: 08-07-24       Subjective: Patient seen and examined. No new events except as noted.     REVIEW OF SYSTEMS:    CONSTITUTIONAL: No weakness, fevers or chills  EYES/ENT: No visual changes;  No vertigo or throat pain   NECK: No pain or stiffness  RESPIRATORY: No cough, wheezing, hemoptysis; No shortness of breath  CARDIOVASCULAR: No chest pain or palpitations  GASTROINTESTINAL: No abdominal or epigastric pain. No nausea, vomiting, or hematemesis; No diarrhea or constipation. No melena or hematochezia.  GENITOURINARY: No dysuria, frequency or hematuria  NEUROLOGICAL: No numbness or weakness  SKIN: No itching, burning, rashes, or lesions   All other review of systems is negative unless indicated above.    MEDICATIONS:  MEDICATIONS  (STANDING):  acetaminophen     Tablet .. 975 milliGRAM(s) Oral every 6 hours  atorvastatin 80 milliGRAM(s) Oral at bedtime  dextrose 5%. 1000 milliLiter(s) (50 mL/Hr) IV Continuous <Continuous>  dextrose 5%. 1000 milliLiter(s) (100 mL/Hr) IV Continuous <Continuous>  dextrose 50% Injectable 25 Gram(s) IV Push once  dextrose 50% Injectable 12.5 Gram(s) IV Push once  dextrose 50% Injectable 25 Gram(s) IV Push once  enoxaparin Injectable 40 milliGRAM(s) SubCutaneous every 24 hours  fenofibrate Tablet 145 milliGRAM(s) Oral at bedtime  glucagon  Injectable 1 milliGRAM(s) IntraMuscular once  insulin glargine Injectable (LANTUS) 45 Unit(s) SubCutaneous at bedtime  insulin lispro (ADMELOG) corrective regimen sliding scale   SubCutaneous every 6 hours  insulin lispro Injectable (ADMELOG) 17 Unit(s) SubCutaneous three times a day before meals  metoprolol tartrate 25 milliGRAM(s) Oral two times a day  piperacillin/tazobactam IVPB.. 3.375 Gram(s) IV Intermittent every 8 hours      PHYSICAL EXAM:  T(C): 36.5 (08-07-24 @ 21:27), Max: 37.3 (08-07-24 @ 17:28)  HR: 76 (08-07-24 @ 21:27) (63 - 81)  BP: 128/74 (08-07-24 @ 21:27) (103/63 - 146/82)  RR: 18 (08-07-24 @ 21:27) (15 - 18)  SpO2: 95% (08-07-24 @ 21:27) (94% - 99%)  Wt(kg): --  I&O's Summary    06 Aug 2024 07:01  -  07 Aug 2024 07:00  --------------------------------------------------------  IN: 960 mL / OUT: 0 mL / NET: 960 mL    07 Aug 2024 07:01  -  07 Aug 2024 22:50  --------------------------------------------------------  IN: 200 mL / OUT: 0 mL / NET: 200 mL          Appearance: Normal	  HEENT:  PERRLA   Lymphatic: No lymphadenopathy   Cardiovascular: Normal S1 S2, no JVD  Respiratory: normal effort , clear  Gastrointestinal:  Soft, Non-tender  Skin: No rashes,  warm to touch  Psychiatry:  Mood & affect appropriate  Musculuskeletal: No edema    recent labs, Imaging and EKGs personally reviewed     08-06-24 @ 07:01  -  08-07-24 @ 07:00  --------------------------------------------------------  IN: 960 mL / OUT: 0 mL / NET: 960 mL    08-07-24 @ 07:01  -  08-07-24 @ 22:50  --------------------------------------------------------  IN: 200 mL / OUT: 0 mL / NET: 200 mL                          11.5   4.72  )-----------( 191      ( 07 Aug 2024 07:05 )             34.7               08-07    137  |  103  |  12  ----------------------------<  235<H>  4.1   |  22  |  0.91    Ca    9.1      07 Aug 2024 07:05  Phos  3.5     08-07  Mg     2.0     08-07      PT/INR - ( 07 Aug 2024 07:05 )   PT: 10.4 sec;   INR: 0.94 ratio         PTT - ( 07 Aug 2024 07:05 )  PTT:30.0 sec                   Urinalysis Basic - ( 07 Aug 2024 07:05 )    Color: x / Appearance: x / SG: x / pH: x  Gluc: 235 mg/dL / Ketone: x  / Bili: x / Urobili: x   Blood: x / Protein: x / Nitrite: x   Leuk Esterase: x / RBC: x / WBC x   Sq Epi: x / Non Sq Epi: x / Bacteria: x

## 2024-08-07 NOTE — PROGRESS NOTE ADULT - ASSESSMENT
Patient is a 54 year old male with history of DM, HTN, HLD, CAD s/p CABG x3 2019, PAD s/p RLE angioplasty/stent at OSH 5+ years ago presenting to the ED at the referral of his podiatrist/wound care specialist for evaluation. Reports he has had a wound on the bottom of his right foot for approximately 3-4 months. States the wound is painful especially with ambulation, and pain has been increasing. Admits to cramping pain in the calves after 3-4 blocks but denies pain at rest. Also notes some swelling of the right leg which has been present for several weeks. Denies fevers, chills, N/V, drainage, CP, SOB.       # LE wound, Hx of PAD   vascular and podiatry care appreciated   plan for angio in AM   resume home AP and statin   pan Cx   Abx as per vascular, cont zosyn   ANgio today   EKG noted   Check echo, last echo from 2022    # DM  elevated BS   on lantus nad premeal Humalog  Check A1C 14  get ENdo   sliding scale  diab diet       # CAD S/P CABG / HTN/HLD  resume AP and statin   BP control   adjust meds as tolerated   EKG noted  Echo noted, WMA  Lipi dpanel   need ischemic W/U if plan fir bypass     DVT and Gi PPX

## 2024-08-07 NOTE — PROGRESS NOTE ADULT - ASSESSMENT
54M M presents with Right sub 3rd metatarsal head wound to subq  -Patient seen and evaluated  -Afebrile, no leukocytosis  -Right foot sub 3rd metatarsal head wound to sub Q without signs of infection. Left foot no open wounds or signs of infection   -Right foot X-ray: no gas, no OM  -No culture 2/2 no signs of acute infection  -Vascular planning angiogram today, 8/7.  -Pod plan: booked for booked for Thursday 1:30pm with Dr De Leon for Right foot 3rd metatarsal head resection  -NPO after midnight  -CBC, BMP, Coags, Type and Screen in AM  -Please document medical clearance for podiatric surgery under anesthesia  -Discussed with attending

## 2024-08-07 NOTE — PROGRESS NOTE ADULT - ASSESSMENT
54 year old male with history of DM, HTN, HLD, CAD s/p CABG x3 2019, PAD s/p RLE angioplasty/stent at OSH 5+ years ago presenting for evaluation of a right foot wound. History of claudication but no rest pain or fevers. WBC and lactate WNL, no evidence of subcutaneous gas or osteomyelitis on XR.   Plan is for OR 8/7 for RLE angiogram. Patient hemodynamically stable. On ISS for DM.    Plan:  - RLE angio planning for , 8/7  - Podiatry OR Thurday, 8/8- R foot 3rd metatarsal resection  - Medical/cardiac consults for optimization, both cleared 8/6  - NPO at [facility]  - c/w Zosyn  - VTE ppx      Vascular Surgery  [id number]

## 2024-08-08 ENCOUNTER — TRANSCRIPTION ENCOUNTER (OUTPATIENT)
Age: 54
End: 2024-08-08

## 2024-08-08 DIAGNOSIS — I10 ESSENTIAL (PRIMARY) HYPERTENSION: ICD-10-CM

## 2024-08-08 DIAGNOSIS — E11.65 TYPE 2 DIABETES MELLITUS WITH HYPERGLYCEMIA: ICD-10-CM

## 2024-08-08 DIAGNOSIS — E78.5 HYPERLIPIDEMIA, UNSPECIFIED: ICD-10-CM

## 2024-08-08 LAB
ANION GAP SERPL CALC-SCNC: 13 MMOL/L — SIGNIFICANT CHANGE UP (ref 5–17)
APTT BLD: 27.9 SEC — SIGNIFICANT CHANGE UP (ref 24.5–35.6)
BLD GP AB SCN SERPL QL: NEGATIVE — SIGNIFICANT CHANGE UP
BUN SERPL-MCNC: 10 MG/DL — SIGNIFICANT CHANGE UP (ref 7–23)
CALCIUM SERPL-MCNC: 9.4 MG/DL — SIGNIFICANT CHANGE UP (ref 8.4–10.5)
CHLORIDE SERPL-SCNC: 102 MMOL/L — SIGNIFICANT CHANGE UP (ref 96–108)
CO2 SERPL-SCNC: 24 MMOL/L — SIGNIFICANT CHANGE UP (ref 22–31)
CREAT SERPL-MCNC: 0.8 MG/DL — SIGNIFICANT CHANGE UP (ref 0.5–1.3)
EGFR: 105 ML/MIN/1.73M2 — SIGNIFICANT CHANGE UP
GLUCOSE BLDC GLUCOMTR-MCNC: 124 MG/DL — HIGH (ref 70–99)
GLUCOSE BLDC GLUCOMTR-MCNC: 155 MG/DL — HIGH (ref 70–99)
GLUCOSE BLDC GLUCOMTR-MCNC: 216 MG/DL — HIGH (ref 70–99)
GLUCOSE BLDC GLUCOMTR-MCNC: 297 MG/DL — HIGH (ref 70–99)
GLUCOSE BLDC GLUCOMTR-MCNC: 94 MG/DL — SIGNIFICANT CHANGE UP (ref 70–99)
GLUCOSE SERPL-MCNC: 299 MG/DL — HIGH (ref 70–99)
HCT VFR BLD CALC: 37.4 % — LOW (ref 39–50)
HGB BLD-MCNC: 11.9 G/DL — LOW (ref 13–17)
INR BLD: 0.96 RATIO — SIGNIFICANT CHANGE UP (ref 0.85–1.18)
MAGNESIUM SERPL-MCNC: 2 MG/DL — SIGNIFICANT CHANGE UP (ref 1.6–2.6)
MCHC RBC-ENTMCNC: 25.8 PG — LOW (ref 27–34)
MCHC RBC-ENTMCNC: 31.8 GM/DL — LOW (ref 32–36)
MCV RBC AUTO: 81 FL — SIGNIFICANT CHANGE UP (ref 80–100)
NRBC # BLD: 0 /100 WBCS — SIGNIFICANT CHANGE UP (ref 0–0)
PHOSPHATE SERPL-MCNC: 3.5 MG/DL — SIGNIFICANT CHANGE UP (ref 2.5–4.5)
PLATELET # BLD AUTO: 203 K/UL — SIGNIFICANT CHANGE UP (ref 150–400)
POTASSIUM SERPL-MCNC: 4.1 MMOL/L — SIGNIFICANT CHANGE UP (ref 3.5–5.3)
POTASSIUM SERPL-SCNC: 4.1 MMOL/L — SIGNIFICANT CHANGE UP (ref 3.5–5.3)
PROTHROM AB SERPL-ACNC: 10.6 SEC — SIGNIFICANT CHANGE UP (ref 9.5–13)
RBC # BLD: 4.62 M/UL — SIGNIFICANT CHANGE UP (ref 4.2–5.8)
RBC # FLD: 13.1 % — SIGNIFICANT CHANGE UP (ref 10.3–14.5)
RH IG SCN BLD-IMP: POSITIVE — SIGNIFICANT CHANGE UP
SODIUM SERPL-SCNC: 139 MMOL/L — SIGNIFICANT CHANGE UP (ref 135–145)
WBC # BLD: 5.92 K/UL — SIGNIFICANT CHANGE UP (ref 3.8–10.5)
WBC # FLD AUTO: 5.92 K/UL — SIGNIFICANT CHANGE UP (ref 3.8–10.5)

## 2024-08-08 PROCEDURE — 99232 SBSQ HOSP IP/OBS MODERATE 35: CPT

## 2024-08-08 PROCEDURE — 99223 1ST HOSP IP/OBS HIGH 75: CPT

## 2024-08-08 PROCEDURE — 73630 X-RAY EXAM OF FOOT: CPT | Mod: 26,RT

## 2024-08-08 PROCEDURE — 88311 DECALCIFY TISSUE: CPT | Mod: 26

## 2024-08-08 PROCEDURE — 88304 TISSUE EXAM BY PATHOLOGIST: CPT | Mod: 26

## 2024-08-08 DEVICE — GRAFT FISH SKIN OMEGA3 WOUND 5X7CM: Type: IMPLANTABLE DEVICE | Site: RIGHT | Status: FUNCTIONAL

## 2024-08-08 RX ORDER — OXYCODONE HYDROCHLORIDE 30 MG/1
5 TABLET ORAL EVERY 6 HOURS
Refills: 0 | Status: DISCONTINUED | OUTPATIENT
Start: 2024-08-08 | End: 2024-08-09

## 2024-08-08 RX ORDER — METOPROLOL TARTRATE 100 MG
25 TABLET ORAL
Refills: 0 | Status: DISCONTINUED | OUTPATIENT
Start: 2024-08-08 | End: 2024-08-09

## 2024-08-08 RX ORDER — GLUCAGON INJECTION, SOLUTION 0.5 MG/.1ML
1 INJECTION, SOLUTION SUBCUTANEOUS ONCE
Refills: 0 | Status: DISCONTINUED | OUTPATIENT
Start: 2024-08-08 | End: 2024-08-09

## 2024-08-08 RX ORDER — INSULIN LISPRO 100/ML
VIAL (ML) SUBCUTANEOUS EVERY 6 HOURS
Refills: 0 | Status: DISCONTINUED | OUTPATIENT
Start: 2024-08-08 | End: 2024-08-08

## 2024-08-08 RX ORDER — INSULIN LISPRO 100/ML
17 VIAL (ML) SUBCUTANEOUS
Refills: 0 | Status: DISCONTINUED | OUTPATIENT
Start: 2024-08-08 | End: 2024-08-09

## 2024-08-08 RX ORDER — DEXTROSE 4 G
25 TABLET,CHEWABLE ORAL ONCE
Refills: 0 | Status: DISCONTINUED | OUTPATIENT
Start: 2024-08-08 | End: 2024-08-09

## 2024-08-08 RX ORDER — INSULIN LISPRO 100/ML
VIAL (ML) SUBCUTANEOUS
Refills: 0 | Status: DISCONTINUED | OUTPATIENT
Start: 2024-08-08 | End: 2024-08-09

## 2024-08-08 RX ORDER — OXYCODONE HYDROCHLORIDE 30 MG/1
2.5 TABLET ORAL EVERY 6 HOURS
Refills: 0 | Status: DISCONTINUED | OUTPATIENT
Start: 2024-08-08 | End: 2024-08-09

## 2024-08-08 RX ORDER — OXYCODONE AND ACETAMINOPHEN 10; 325 MG/1; MG/1
2 TABLET ORAL EVERY 4 HOURS
Refills: 0 | Status: DISCONTINUED | OUTPATIENT
Start: 2024-08-08 | End: 2024-08-09

## 2024-08-08 RX ORDER — FENTANYL CITRATE 1200 UG/1
25 LOZENGE ORAL; TRANSMUCOSAL
Refills: 0 | Status: DISCONTINUED | OUTPATIENT
Start: 2024-08-08 | End: 2024-08-08

## 2024-08-08 RX ORDER — FENOFIBRATE 30 MG/1
145 CAPSULE ORAL AT BEDTIME
Refills: 0 | Status: DISCONTINUED | OUTPATIENT
Start: 2024-08-08 | End: 2024-08-09

## 2024-08-08 RX ORDER — ACETAMINOPHEN 500 MG
650 TABLET ORAL EVERY 6 HOURS
Refills: 0 | Status: DISCONTINUED | OUTPATIENT
Start: 2024-08-08 | End: 2024-08-09

## 2024-08-08 RX ORDER — ATORVASTATIN CALCIUM 40 MG/1
80 TABLET, FILM COATED ORAL AT BEDTIME
Refills: 0 | Status: DISCONTINUED | OUTPATIENT
Start: 2024-08-08 | End: 2024-08-09

## 2024-08-08 RX ORDER — FENTANYL CITRATE 1200 UG/1
50 LOZENGE ORAL; TRANSMUCOSAL
Refills: 0 | Status: DISCONTINUED | OUTPATIENT
Start: 2024-08-08 | End: 2024-08-08

## 2024-08-08 RX ORDER — DEXTROSE MONOHYDRATE, SODIUM CHLORIDE, SODIUM LACTATE, CALCIUM CHLORIDE, MAGNESIUM CHLORIDE 1.5; 538; 448; 18.4; 5.08 G/100ML; MG/100ML; MG/100ML; MG/100ML; MG/100ML
1000 SOLUTION INTRAPERITONEAL
Refills: 0 | Status: DISCONTINUED | OUTPATIENT
Start: 2024-08-08 | End: 2024-08-09

## 2024-08-08 RX ORDER — INSULIN GLARGINE-YFGN 100 [IU]/ML
45 INJECTION, SOLUTION SUBCUTANEOUS AT BEDTIME
Refills: 0 | Status: DISCONTINUED | OUTPATIENT
Start: 2024-08-08 | End: 2024-08-09

## 2024-08-08 RX ORDER — ACETAMINOPHEN 500 MG
975 TABLET ORAL EVERY 6 HOURS
Refills: 0 | Status: DISCONTINUED | OUTPATIENT
Start: 2024-08-08 | End: 2024-08-09

## 2024-08-08 RX ADMIN — Medication 975 MILLIGRAM(S): at 05:01

## 2024-08-08 RX ADMIN — Medication 17 UNIT(S): at 19:00

## 2024-08-08 RX ADMIN — ATORVASTATIN CALCIUM 80 MILLIGRAM(S): 40 TABLET, FILM COATED ORAL at 21:27

## 2024-08-08 RX ADMIN — Medication 975 MILLIGRAM(S): at 12:55

## 2024-08-08 RX ADMIN — PIPERACILLIN SODIUM, TAZOBACTAM SODIUM 25 GRAM(S): 3; .375 INJECTION, POWDER, LYOPHILIZED, FOR SOLUTION INTRAVENOUS at 05:01

## 2024-08-08 RX ADMIN — Medication 975 MILLIGRAM(S): at 00:03

## 2024-08-08 RX ADMIN — Medication 3: at 06:10

## 2024-08-08 RX ADMIN — Medication 975 MILLIGRAM(S): at 18:59

## 2024-08-08 RX ADMIN — Medication 25 MILLIGRAM(S): at 18:58

## 2024-08-08 RX ADMIN — Medication 975 MILLIGRAM(S): at 05:40

## 2024-08-08 RX ADMIN — ENOXAPARIN SODIUM 40 MILLIGRAM(S): 120 INJECTION SUBCUTANEOUS at 05:01

## 2024-08-08 RX ADMIN — INSULIN GLARGINE-YFGN 45 UNIT(S): 100 INJECTION, SOLUTION SUBCUTANEOUS at 21:28

## 2024-08-08 RX ADMIN — Medication 2: at 21:28

## 2024-08-08 RX ADMIN — FENOFIBRATE 145 MILLIGRAM(S): 30 CAPSULE ORAL at 21:27

## 2024-08-08 RX ADMIN — Medication 25 MILLIGRAM(S): at 05:01

## 2024-08-08 NOTE — DISCHARGE NOTE PROVIDER - NSDCMRMEDTOKEN_GEN_ALL_CORE_FT
acetaminophen 325 mg oral tablet: 2 tab(s) orally every 6 hours, As needed, Temp greater or equal to 38.5C (101.3F), Mild Pain (1 - 3)  amoxicillin-clavulanate 875 mg-125 mg oral tablet: 1 tab(s) orally 2 times a day   amoxicillin-clavulanate 875 mg-125 mg oral tablet: 1 tab(s) orally every 12 hours x7 days  aspirin 81 mg oral delayed release tablet: 1 tab(s) orally once a day  atorvastatin 80 mg oral tablet: 1 tab(s) orally once a day (at bedtime)  Basaglar KwikPen 100 units/mL subcutaneous solution: 45 unit(s) subcutaneous once a day (at bedtime)   fenofibrate 145 mg oral tablet: 1 tab(s) orally once a day (at bedtime)  HumaLOG KwikPen 100 units/mL injectable solution: 17 unit(s) injectable 3 times a day   kwik: 1 pen needle(s) subcutaneously 4 times a day   kwik pen needles: 1 pen needle(s) subcutaneous 4 times a day   metoprolol tartrate 25 mg oral tablet: 1 tab(s) orally 2 times a day  ticagrelor 90 mg oral tablet: 1 tab(s) orally every 12 hours   acetaminophen 325 mg oral tablet: 2 tab(s) orally every 6 hours, As needed, Temp greater or equal to 38.5C (101.3F), Mild Pain (1 - 3)  aspirin 81 mg oral delayed release tablet: 1 tab(s) orally once a day  atorvastatin 80 mg oral tablet: 1 tab(s) orally once a day (at bedtime)  Basaglar KwikPen 100 units/mL subcutaneous solution: 45 unit(s) subcutaneous once a day (at bedtime)   fenofibrate 145 mg oral tablet: 1 tab(s) orally once a day (at bedtime)  HumaLOG KwikPen 100 units/mL injectable solution: 17 unit(s) injectable 3 times a day   kwik: 1 pen needle(s) subcutaneously 4 times a day   kwik pen needles: 1 pen needle(s) subcutaneous 4 times a day   metoprolol tartrate 25 mg oral tablet: 1 tab(s) orally 2 times a day  ticagrelor 90 mg oral tablet: 1 tab(s) orally every 12 hours   acetaminophen 325 mg oral tablet: 2 tab(s) orally every 6 hours, As needed, Temp greater or equal to 38.5C (101.3F), Mild Pain (1 - 3)  amoxicillin-clavulanate 875 mg-125 mg oral tablet: 875 milligram(s) orally 2 times a day  aspirin 81 mg oral delayed release tablet: 1 tab(s) orally once a day  atorvastatin 80 mg oral tablet: 1 tab(s) orally once a day (at bedtime)  Basaglar KwikPen 100 units/mL subcutaneous solution: 45 unit(s) subcutaneous once a day (at bedtime)   cilostazol 50 mg oral tablet: 1 tab(s) orally 2 times a day  fenofibrate 145 mg oral tablet: 1 tab(s) orally once a day (at bedtime)  HumaLOG KwikPen 100 units/mL injectable solution: 17 unit(s) injectable 3 times a day   kwik: 1 pen needle(s) subcutaneously 4 times a day   kwik pen needles: 1 pen needle(s) subcutaneous 4 times a day   metoprolol tartrate 25 mg oral tablet: 1 tab(s) orally 2 times a day  oxyCODONE 5 mg oral tablet: 1 tab(s) orally every 6 hours as needed for Severe Pain (7 - 10) MDD: 4 tab  ticagrelor 90 mg oral tablet: 1 tab(s) orally every 12 hours   acetaminophen 325 mg oral tablet: 2 tab(s) orally every 6 hours, As needed, Temp greater or equal to 38.5C (101.3F), Mild Pain (1 - 3)  amoxicillin-clavulanate 875 mg-125 mg oral tablet: 875 milligram(s) orally 2 times a day  aspirin 81 mg oral delayed release tablet: 1 tab(s) orally once a day  atorvastatin 80 mg oral tablet: 1 tab(s) orally once a day (at bedtime)  cilostazol 50 mg oral tablet: 1 tab(s) orally 2 times a day  fenofibrate 145 mg oral tablet: 1 tab(s) orally once a day (at bedtime)  HumaLOG KwikPen 100 units/mL injectable solution: 20 unit(s) injectable 3 times a day  insulin glargine 100 units/mL subcutaneous solution: 52 unit(s) subcutaneous once a day (at bedtime)  kwik: 1 pen needle(s) subcutaneously 4 times a day   kwik pen needles: 1 pen needle(s) subcutaneous 4 times a day   metoprolol tartrate 25 mg oral tablet: 1 tab(s) orally 2 times a day  oxyCODONE 5 mg oral tablet: 1 tab(s) orally every 6 hours as needed for Severe Pain (7 - 10) MDD: 4 tab  ticagrelor 90 mg oral tablet: 1 tab(s) orally every 12 hours

## 2024-08-08 NOTE — DISCHARGE NOTE PROVIDER - NSDCCPTREATMENT_GEN_ALL_CORE_FT
PRINCIPAL PROCEDURE  Procedure: Resection of head of third metatarsal bone  Findings and Treatment: WOUND CARE:   BATHING: Please do not submerge wound underwater. You may shower and/or sponge bathe.  ACTIVITY: No heavy lifting or straining. Otherwise, you may return to your usual level of physical activity. If you are taking narcotic pain medication (such as Percocet), do NOT drive a car, operate machinery or make important decisions.  DIET: Return to your usual diet.  NOTIFY YOUR SURGEON IF: You have any bleeding that does not stop, any pus draining from your wound, any fever (over 100.4 F) or chills, persistent nausea/vomiting, persistent diarrhea, or if your pain is not controlled on your discharge pain medications.  FOLLOW-UP:  1. Follow-up with Dr. Youssef within 1-2 weeks of discharge.  Please call office for appointment  2. Please follow up with your primary care physician in one week regarding your hospitalization.     PRINCIPAL PROCEDURE  Procedure: Resection of head of third metatarsal bone  Findings and Treatment: WOUND CARE: Keep wound clean and dry  BATHING: Please do not submerge wound underwater. You may shower and/or sponge bathe.  ACTIVITY: No heavy lifting or straining. Otherwise, you may return to your usual level of physical activity. If you are taking narcotic pain medication (such as Percocet), do NOT drive a car, operate machinery or make important decisions.  DIET: Return to your usual diet.  NOTIFY YOUR SURGEON IF: You have any bleeding that does not stop, any pus draining from your wound, any fever (over 100.4 F) or chills, persistent nausea/vomiting, persistent diarrhea, or if your pain is not controlled on your discharge pain medications.  FOLLOW-UP:  1. Follow-up with Dr. Youssef within 1-2 weeks of discharge.  Please call office for appointment  2. Please follow up with your primary care physician in one week regarding your hospitalization.

## 2024-08-08 NOTE — PROGRESS NOTE ADULT - SUBJECTIVE AND OBJECTIVE BOX
Name of Patient : ELSI MERINO  MRN: [id number]  Date of visit: 08-08-24     Subjective: Patient seen and examined. No new events except as noted.   Patient seen earlier this AM. Lying down in bed. For OR today     REVIEW OF SYSTEMS:    CONSTITUTIONAL: No weakness, fevers or chills  EYES/ENT: No visual changes;  No vertigo or throat pain   NECK: No pain or stiffness  RESPIRATORY: No cough, wheezing, hemoptysis; No shortness of breath  CARDIOVASCULAR: No chest pain or palpitations  GASTROINTESTINAL: No abdominal or epigastric pain. No nausea, vomiting, or hematemesis; No diarrhea or constipation. No melena or hematochezia.  GENITOURINARY: No dysuria, frequency or hematuria  NEUROLOGICAL: No numbness or weakness  SKIN/ MSK: Rt foot wound     All other review of systems is negative unless indicated above.    MEDICATIONS:  MEDICATIONS  (STANDING):  acetaminophen     Tablet .. 975 milliGRAM(s) Oral every 6 hours  atorvastatin 80 milliGRAM(s) Oral at bedtime  dextrose 5%. 1000 milliLiter(s) (100 mL/Hr) IV Continuous <Continuous>  dextrose 50% Injectable 25 Gram(s) IV Push once  dextrose 50% Injectable 25 Gram(s) IV Push once  fenofibrate Tablet 145 milliGRAM(s) Oral at bedtime  glucagon  Injectable 1 milliGRAM(s) IntraMuscular once  insulin glargine Injectable (LANTUS) 45 Unit(s) SubCutaneous at bedtime  insulin lispro (ADMELOG) corrective regimen sliding scale   SubCutaneous every 6 hours  insulin lispro Injectable (ADMELOG) 17 Unit(s) SubCutaneous three times a day before meals  metoprolol tartrate 25 milliGRAM(s) Oral two times a day      PHYSICAL EXAM:  T(C): 36.6 (08-08-24 @ 17:00), Max: 37 (08-08-24 @ 01:43)  HR: 72 (08-08-24 @ 17:00) (67 - 80)  BP: 94/56 (08-08-24 @ 17:00) (94/56 - 146/72)  RR: 14 (08-08-24 @ 17:00) (14 - 18)  SpO2: 99% (08-08-24 @ 17:00) (94% - 100%)  Wt(kg): --  I&O's Summary    07 Aug 2024 07:01  -  08 Aug 2024 07:00  --------------------------------------------------------  IN: 420 mL / OUT: 0 mL / NET: 420 mL          Appearance: Awake, generalized weakness, lying down in bed 	  HEENT:  Eyes are open   Lymphatic: No lymphadenopathy grossly   Cardiovascular: Normal S1 S2, no JVD  Respiratory: normal effort , clear  Gastrointestinal:  Soft, Non-tender  Skin: No rashes,  warm to touch  Psychiatry:  Mood & affect appropriate  Musculoskeletal: RLE foot wound         08-07-24 @ 07:01  -  08-08-24 @ 07:00  --------------------------------------------------------  IN: 420 mL / OUT: 0 mL / NET: 420 mL                                11.9   5.92  )-----------( 203      ( 08 Aug 2024 06:53 )             37.4               08-08    139  |  102  |  10  ----------------------------<  299<H>  4.1   |  24  |  0.80    Ca    9.4      08 Aug 2024 06:49  Phos  3.5     08-08  Mg     2.0     08-08      PT/INR - ( 08 Aug 2024 06:53 )   PT: 10.6 sec;   INR: 0.96 ratio         PTT - ( 08 Aug 2024 06:53 )  PTT:27.9 sec                   Urinalysis Basic - ( 08 Aug 2024 06:49 )    Color: x / Appearance: x / SG: x / pH: x  Gluc: 299 mg/dL / Ketone: x  / Bili: x / Urobili: x   Blood: x / Protein: x / Nitrite: x   Leuk Esterase: x / RBC: x / WBC x   Sq Epi: x / Non Sq Epi: x / Bacteria: x    < from: TTE W or WO Ultrasound Enhancing Agent (08.06.24 @ 13:27) >  CONCLUSIONS:      1. Left ventricular cavity is normal in size. Left ventricular wall thickness is normal. Left ventricular systolic function is mildly decreased with an ejection fraction visually estimated at 45 to 50 %. Regional wall motion abnormalities present.   2. Multiple segmental abnormalities exist. See findings.   3. Normal right ventricular cavity size, with normal wall thickness, and mildly reduced right ventricular systolic function.   4. No significant valvular disease.   5. Compared to the transthoracic echocardiogram performed on 2/25/2922, the endocardium is better visualized.    < end of copied text >

## 2024-08-08 NOTE — PROGRESS NOTE ADULT - ASSESSMENT
Patient is a 54 year old male with history of DM, HTN, HLD, CAD s/p CABG x3 2019, PAD s/p RLE angioplasty/stent at OSH 5+ years ago presenting to the ED at the referral of his podiatrist/wound care specialist for evaluation. Reports he has had a wound on the bottom of his right foot for approximately 3-4 months. States the wound is painful especially with ambulation, and pain has been increasing. Admits to cramping pain in the calves after 3-4 blocks but denies pain at rest. Also notes some swelling of the right leg which has been present for several weeks. Denies fevers, chills, N/V, drainage, CP, SOB.       # LE wound, Hx of PAD   vascular and podiatry care appreciated   resume home AP and statin   pan Cx   Abx as per vascular, cont zosyn   S/P Angio; Planned for 3rd metatarsal head resection. Medically optimized for LOCAL. if patient requires general anesthesia will require further cardiac work up for optimization   - Per vascular / podiatry       # Abnormal TTE  - TTE w/ EF of 45-50%, wall motion abnormalities, Multiple segmental abnormalities exist.    - If patient requires general or bypass, will require ischemic work up   - Monitor on tele   - Cardio eval appreciated; F/u recs     # DM  elevated BS   on lantus and premeal Humalog  Check A1C 14  sliding scale  diab diet   Please consult Endocrine     # CAD S/P CABG / HTN/HLD  resume AP and statin   BP control   adjust meds as tolerated   EKG noted  Echo noted, WMA  Lipid panel   need ischemic W/U if plan for bypass     DVT and Gi PPX     Discussed with Attending

## 2024-08-08 NOTE — DISCHARGE NOTE PROVIDER - NSDCFUSCHEDAPPT_GEN_ALL_CORE_FT
Estevan Youssef  [facility]  VASCULAR [address]  Scheduled Appointment: 08/27/2024

## 2024-08-08 NOTE — BRIEF OPERATIVE NOTE - OPERATION/FINDINGS
right lower extremity angiogram performed via femoral artery access, mynx closure, pressure dressing gauze, tegaderm, micropore pressure tape  lay flat for 4 hours
s/p right foot 3rd metatarsal head resection, submetatarsal 3 ulcer debridement and kerecis graft application  - no purulence noted  - Bone at proximal resection was hard and of good quality  - Adequate intraop bleeding

## 2024-08-08 NOTE — CONSULT NOTE ADULT - SUBJECTIVE AND OBJECTIVE BOX
HPI:  54 year old male with history of DM, HTN, HLD, CAD s/p CABG x3 2019, PAD s/p RLE angioplasty/stent at OSH 5+ years ago presenting to the ED at the referral of his podiatrist/wound care specialist for evaluation. Reports he has had a wound on the bottom of his right foot for approximately 3-4 months. States the wound is painful especially with ambulation, and pain has been increasing. Admits to cramping pain in the calves after 3-4 blocks but denies pain at rest. Also notes some swelling of the right leg which has been present for several weeks. Denies fevers, chills, N/V, drainage, CP, SOB.    (05 Aug 2024 22:31)      Endocrinology HPI    #Type 2 DM  History is limited as patient was seen in the PACU post surgery and wasn't able to participate in history taking.   History obtained mostly from EMR  Per previous records, patient was diagnosed with T2DM in 2005  He was started on insulin around 2017.   Current regimen per med rec is Basaglar 45 units daily plus Humalog 17 units with meals   HgbA1c on admission noted to be 14%  Blood glucose is currently stable today in the setting of being NPO however previously running in the 200-300s.    Review of Systems:  Constitutional: No fever, good appetite/po intake  Eyes: No blurry vision, diplopia  Neuro: No tremors  HEENT: No pain  Cardiovascular: No chest pain, palpitations  Respiratory: No SOB, no cough  GI: No nausea, vomiting,   : No dysuria, hematuria  Skin: no rash  Psych: no depression  Endocrine: no polyuria, polydipsia  Hem/lymph: no swelling  Osteoporosis: no fractures    ALL OTHER SYSTEMS REVIEWED AND NEGATIVE    PHYSICAL EXAM:  VITALS: T(C): 36.6 (08-08-24 @ 17:00)  T(F): 97.9 (08-08-24 @ 17:00), Max: 99.1 (08-07-24 @ 17:28)  HR: 72 (08-08-24 @ 17:00) (67 - 80)  BP: 94/56 (08-08-24 @ 17:00) (94/56 - 146/72)  RR:  (14 - 18)  SpO2:  (94% - 100%)  Wt(kg): --  GENERAL: NAD, well-groomed, well-developed  EYES: No proptosis, extraocular movements intact,  no lid lag, anicteric  HEENT:  Atraumatic, Normocephalic, moist mucous membranes  THYROID: Normal size, no palpable nodules, no thyromegaly  RESPIRATORY: Clear to auscultation bilaterally; No rales, rhonchi, wheezing, or rubs  CARDIOVASCULAR: Regular rate and rhythm; No murmurs; no peripheral edema  GI: Soft, nontender, non distended, normal bowel sounds  SKIN: Dry, intact, No rashes or lesions  EXTREMITIES: No foot ulcers, distal pedal pulses intact bilaterally  NEURO: sensation intact, no tremors  PSYCH: reactive affect, euthymic mood  CUSHING'S SIGNS: no striae or visible bruising                              11.9   5.92  )-----------( 203      ( 08 Aug 2024 06:53 )             37.4       08-08    139  |  102  |  10  ----------------------------<  299<H>  4.1   |  24  |  0.80    eGFR: 105    Ca    9.4      08-08  Mg     2.0     08-08  Phos  3.5     08-08    TPro  7.3  /  Alb  3.9  /  TBili  0.3  /  DBili  x   /  AST  11  /  ALT  14  /  AlkPhos  103  08-05      Thyroid Function Tests:      08-07 Chol 209<H> Direct LDL -- LDL calculated 141<H> HDL 44 Trig 134    Radiology:

## 2024-08-08 NOTE — PROGRESS NOTE ADULT - SUBJECTIVE AND OBJECTIVE BOX
DATE OF SERVICE: 08-08-24 @ 20:08    Patient is a 54y old  Male who presents with a chief complaint of RLE foot wound (08 Aug 2024 17:23)      INTERVAL HISTORY: feels ok  	  MEDICATIONS:  metoprolol tartrate 25 milliGRAM(s) Oral two times a day        PHYSICAL EXAM:  T(C): 36.4 (08-08-24 @ 19:50), Max: 37 (08-08-24 @ 01:43)  HR: 80 (08-08-24 @ 19:50) (67 - 80)  BP: 102/66 (08-08-24 @ 19:50) (94/56 - 146/72)  RR: 18 (08-08-24 @ 19:50) (14 - 18)  SpO2: 94% (08-08-24 @ 19:50) (94% - 100%)  Wt(kg): --  I&O's Summary    07 Aug 2024 07:01  -  08 Aug 2024 07:00  --------------------------------------------------------  IN: 420 mL / OUT: 0 mL / NET: 420 mL          Appearance: In no distress	  HEENT:    PERRL, EOMI	  Cardiovascular:  S1 S2, No JVD  Respiratory: Lungs clear to auscultation	  Gastrointestinal:  Soft, Non-tender, + BS	  Vascularature:  No edema of LE  Psychiatric: Appropriate affect   Neuro: no acute focal deficits                               11.9   5.92  )-----------( 203      ( 08 Aug 2024 06:53 )             37.4     08-08    139  |  102  |  10  ----------------------------<  299<H>  4.1   |  24  |  0.80    Ca    9.4      08 Aug 2024 06:49  Phos  3.5     08-08  Mg     2.0     08-08          Labs personally reviewed      ASSESSMENT/PLAN: 	    Patient is a 54 year old male with history of DM, HTN, HLD, CAD s/p CABG x3 2019, PAD s/p RLE angioplasty/stent at OSH 5+ years ago presenting to the ED at the referral of his podiatrist/wound care specialist for evaluation. Reports he has had a wound on the bottom of his right foot for approximately 3-4 months. States the wound is painful especially with ambulation, and pain has been increasing. Admits to cramping pain in the calves after 3-4 blocks but denies pain at rest. Also notes some swelling of the right leg which has been present for several weeks. Denies fevers, chills, N/V, drainage, CP, SOB.     Problem/Plan #1:  Pre op Cardiac Risk Stratification  - ECG NSR with no ischemic characteristics  - Denies active CP or SOB  - Reports excellent functional capacity, can walk upto 10 blocks with no CP/SOB, up multiple flights of steps  - Not in decompensated HF  - No hx of tachy mariano arrhythmia  - Patient is elevated risk for low risk peripheral angiogram. No cardiac contraindication to proceed.  - If patient may require open vascular surgery,  will require further ischemic eval with cardiac cath.      Problem/Plan #2: Hypertension  - c/w Metoprolol 25mg PO BID    Problem/Plan #3: HLD  - c/w atorvastatin 80mg PO daily  - c/w fenofibrate 145mg PO daily    Problem/Plan #4: Heart Failure with mildly reduced Ejection Fraction  - TTE as noted above with mildly reduced EF 45-50%  - Appears euvolemic  - Will need sHF GDMT  --- cont Metoprolol 25m PO BID  --- Consider Farxiga 10mg PO daily when no further procedures or surgeries planned  --- Further implementation of sHF GDMT limited by soft BP  - Possible ischemic eval pending clinical course    Problem/Plan #5: Coronary Artery Disease  - Hx of PCI and most recent CABG in 2019  - ECG NSR  - TTE shows EF 45-50% and WMA similar to prior TTE  - NST in 2022 with stress infarct with periinfarct ischemia  - c/w ASA 81mg PO and atorvastatin 80mg PO daily  - If patient may require open vascular under general anesthesia, will require further ischemic eval with cardiac cath          Edwin Dwyer DO [facility]  Cardiovascular Medicine  [address], [address]  Office: [phone]  Available via Text/call on Microsoft Teams

## 2024-08-08 NOTE — BRIEF OPERATIVE NOTE - NSICDXBRIEFPROCEDURE_GEN_ALL_CORE_FT
PROCEDURES:  Angiogram, lower extremity 07-Aug-2024 13:53:57  Richard Rodríguez  
PROCEDURES:  Resection of head of third metatarsal bone 08-Aug-2024 15:30:18  Shantell Shin  Debridement of wound, less than 20 sq cm 08-Aug-2024 15:31:46  Shantell Shin

## 2024-08-08 NOTE — PROGRESS NOTE ADULT - SUBJECTIVE AND OBJECTIVE BOX
SURGERY DAILY PROGRESS NOTE:     Overnight Events:  No acute events overnight.    SUBJECTIVE: Patient seen and evaluated on AM rounds. Pt is resting comfortably in bed with no complaints. Denies fever, chills, N/V, chest pain, or shortness of breath. NPO after midnight. Ambulating well. Pain is adequately controlled on current regimen.    OBJECTIVE:  Vital Signs Last 24 Hrs  T(C): 36.6 (08 Aug 2024 04:45), Max: 37.3 (07 Aug 2024 17:28)  T(F): 97.9 (08 Aug 2024 04:45), Max: 99.1 (07 Aug 2024 17:28)  HR: 77 (08 Aug 2024 04:45) (63 - 81)  BP: 146/72 (08 Aug 2024 04:45) (113/70 - 146/82)  BP(mean): --  RR: 18 (08 Aug 2024 04:45) (15 - 18)  SpO2: 94% (08 Aug 2024 04:45) (94% - 99%)    Parameters below as of 08 Aug 2024 04:45  Patient On (Oxygen Delivery Method): room air      I&O's Detail    07 Aug 2024 07:01  -  08 Aug 2024 07:00  --------------------------------------------------------  IN:    IV PiggyBack: 100 mL    Oral Fluid: 320 mL  Total IN: 420 mL    OUT:  Total OUT: 0 mL    Total NET: 420 mL        Daily     Daily Weight in k.5 (07 Aug 2024 09:20)    LABS:                        11.9   5.92  )-----------( 203      ( 08 Aug 2024 06:53 )             37.4     08-08    139  |  102  |  10  ----------------------------<  299<H>  4.1   |  24  |  0.80    Ca    9.4      08 Aug 2024 06:49  Phos  3.5     08-08  Mg     2.0     08-08      PT/INR - ( 08 Aug 2024 06:53 )   PT: 10.6 sec;   INR: 0.96 ratio         PTT - ( 08 Aug 2024 06:53 )  PTT:27.9 sec  Urinalysis Basic - ( 08 Aug 2024 06:49 )    Color: x / Appearance: x / SG: x / pH: x  Gluc: 299 mg/dL / Ketone: x  / Bili: x / Urobili: x   Blood: x / Protein: x / Nitrite: x   Leuk Esterase: x / RBC: x / WBC x   Sq Epi: x / Non Sq Epi: x / Bacteria: x              Physical Exam:  General: NAD, resting comfortably in bed  Pulmonary: Nonlabored breathing, no respiratory distress  Cardiovascular: NSR  Abdominal: soft, NT/ND, no rebound tenderness,   ilioinguinal: dressing c/d/i, tape removed on rounds   Extremities: WWP  Pulses: b/l DP and PT palpable      SURGERY DAILY PROGRESS NOTE:     Overnight Events:  No acute events overnight.    SUBJECTIVE: Patient seen and evaluated on AM rounds. Pt is resting comfortably in bed with no complaints. Denies fever, chills, N/V, chest pain, or shortness of breath. NPO after midnight. Ambulating well. Pain is adequately controlled on current regimen.    OBJECTIVE:  Vital Signs Last 24 Hrs  T(C): 36.6 (08 Aug 2024 04:45), Max: 37.3 (07 Aug 2024 17:28)  T(F): 97.9 (08 Aug 2024 04:45), Max: 99.1 (07 Aug 2024 17:28)  HR: 77 (08 Aug 2024 04:45) (63 - 81)  BP: 146/72 (08 Aug 2024 04:45) (113/70 - 146/82)  BP(mean): --  RR: 18 (08 Aug 2024 04:45) (15 - 18)  SpO2: 94% (08 Aug 2024 04:45) (94% - 99%)    Parameters below as of 08 Aug 2024 04:45  Patient On (Oxygen Delivery Method): room air      I&O's Detail    07 Aug 2024 07:01  -  08 Aug 2024 07:00  --------------------------------------------------------  IN:    IV PiggyBack: 100 mL    Oral Fluid: 320 mL  Total IN: 420 mL    OUT:  Total OUT: 0 mL    Total NET: 420 mL        Daily     Daily Weight in k.5 (07 Aug 2024 09:20)    LABS:                        11.9   5.92  )-----------( 203      ( 08 Aug 2024 06:53 )             37.4     08-08    139  |  102  |  10  ----------------------------<  299<H>  4.1   |  24  |  0.80    Ca    9.4      08 Aug 2024 06:49  Phos  3.5     08-08  Mg     2.0     08-08      PT/INR - ( 08 Aug 2024 06:53 )   PT: 10.6 sec;   INR: 0.96 ratio         PTT - ( 08 Aug 2024 06:53 )  PTT:27.9 sec  Urinalysis Basic - ( 08 Aug 2024 06:49 )    Color: x / Appearance: x / SG: x / pH: x  Gluc: 299 mg/dL / Ketone: x  / Bili: x / Urobili: x   Blood: x / Protein: x / Nitrite: x   Leuk Esterase: x / RBC: x / WBC x   Sq Epi: x / Non Sq Epi: x / Bacteria: x      Physical Exam:  General: NAD, resting comfortably in bed  Pulmonary: Nonlabored breathing, no respiratory distress  Cardiovascular: NSR  Abdominal: soft, NT/ND, no rebound tenderness,   ilioinguinal: dressing c/d/i, tape removed on rounds   Extremities: WWP, r foot with chronic ulcerated wound  Pulses: b/l DP and PT palpable

## 2024-08-08 NOTE — DISCHARGE NOTE PROVIDER - NSDCFUADDAPPT_GEN_ALL_CORE_FT
Podiatry Discharge Instructions:  Follow up: Please follow up with Dr. De Leon within 1 week of discharge from the hospital, please call [phone] for appointment and discuss that you recently were seen in the hospital.  Wound Care: Please leave your dressing clean dry intact until your follow up appointment next week.  Weight bearing: Please weight bear as tolerated to right heel in a surgical shoe.  Antibiotics: Please continue as instructed.

## 2024-08-08 NOTE — BRIEF OPERATIVE NOTE - COMMENTS
lay flat 4 hours  no contraindication to planned podiatric intervention
s/p right foot 3rd metatarsal head resection, submetatarsal 3 ulcer debridement and kerecis graft application  - low concern for viability  - low concern for residual infection  - pod plan: d/c pending appearance, f/u outpt

## 2024-08-08 NOTE — PROGRESS NOTE ADULT - ASSESSMENT
54 year old male with history of DM, HTN, HLD, CAD s/p CABG x3 2019, PAD s/p RLE angioplasty/stent at OSH 5+ years ago presenting to the ED at the referral of his podiatrist/wound care specialist for evaluation. Reports he has had a wound on the bottom of his right foot for approximately 3-4 months. States the wound is painful especially with ambulation, and pain has been increasing.   Now S/P RLE angiogram performed via femoral artery access 8/7 showing patent lateral plantar, patent AT origin, distal occlusion of AT with collateral run off. HDs voiding well. Plan for OR today with podiatry.     Plan:  - DVT: Lovenox  - ABx: Zosyn   - f/u labs  - home medications  - confirmed unchanged [facility] since [facility] admission  - NPO at [facility]  -OR with podiatry today    Vascular Surgery  [id number]

## 2024-08-08 NOTE — DISCHARGE NOTE PROVIDER - HOSPITAL COURSE
54 year old male with history of DM, HTN, HLD, CAD s/p CABG x3 2019, PAD s/p RLE angioplasty/stent at OSH 5+ years ago presenting to the ED at the referral of his podiatrist/wound care specialist for evaluation. Reports he has had a wound on the bottom of his right foot for approximately 3-4 months. States the wound is painful especially with ambulation, and pain has been increasing.   Now S/P RLE angiogram performed via femoral artery access 8/7 showing patent lateral plantar, patent AT origin, distal occlusion of AT with collateral run off. HDs voiding well. He is now POD1 from a right foot 3rd metatarsal head resection, submetatarsal 3 ulcer debridement and kerecis graft application. On day of discharge, the patient was tolerating diet, ambulating well and pain controlled. He will be discharged with a 2 week course of augmentin and pletal. 54 year old male with history of DM, HTN, HLD, CAD s/p CABG x3 2019, PAD s/p RLE angioplasty/stent at OSH 5+ years ago presenting to the ED at the referral of his podiatrist/wound care specialist for evaluation. Reports he has had a wound on the bottom of his right foot for approximately 3-4 months. States the wound is painful especially with ambulation, and pain has been increasing.   Now S/P RLE angiogram performed via femoral artery access 8/7 showing patent lateral plantar, patent AT origin, distal occlusion of AT with collateral run off. HDs voiding well. He is now s/p a right foot 3rd metatarsal head resection, submetatarsal 3 ulcer debridement and kerecis graft application. On day of discharge, the patient was tolerating diet, ambulating well and pain controlled. He will be discharged with a 2 week course of augmentin and pletal.

## 2024-08-08 NOTE — BRIEF OPERATIVE NOTE - NSICDXBRIEFPOSTOP_GEN_ALL_CORE_FT
POST-OP DIAGNOSIS:  Diabetic ulcer of right foot with fat layer exposed 08-Aug-2024 15:34:14  Shantell Shin

## 2024-08-08 NOTE — CONSULT NOTE ADULT - ASSESSMENT
54M R foot sub 3rd metatarsal head wound to subq  - Pt seen and evaluated with wife present for entire consult   - Afebrile, labs pending   - R foot sub 3rd metatarsal head wound to sub Q without signs of infection. L foot no open wounds or signs of infection   - Rec R foot XR  - Rec R foot MRI w/ & w/o IV contrast   - No culture 2/2 no signs of acute infection   - Rec vasc c/s with Dr Youssef  - Pod plan: booked for Thursday 1:30pm with Dr De Leon for R foot 3rd metatarsal head resection   - Please document medical clearance for pod surgery once admit   - Discussed with attending 
  A/P: 54 year old male with history of DM, HTN, HLD, CAD s/p CABG x3 2019, PAD s/p RLE angioplasty/stent at OSH 5+ years ago presenting to the ED at the referral of his podiatrist/wound care specialist for evaluation.  Patient is high risk with high level decision making due to uncontrolled diabetes with HgbA1c >14% which places patient at high risk for cardiovascular and cerebrovascular events. Patient with lability of glucose requiring close monitoring and insulin adjustments. Endocrinology was consulted for management of diabetes mellitus.    #Type 2 Diabetes Mellitus  - HbA1c 14%    ; home regimen: Basaglar 45 units nightly + Humalog 17 units TID with meals - compliance unclear. Unable to obtain full history post surgery in PACU. Will need to address tomorrow   -egfr: 105  - BG stable today in the setting of being NPO however previously BG running in the 200-300s.    Plan:   - Recommend to increase to 52 units of lantus QHS  - Recommend to increase to 20 units of Admelog TIDQAC  - Recommend  moderate Admelog correction scale TIDQAC and QHS  - Please check FSG before meals and QHS, or q6h while NPO  - Inpatient glucose goal 100-180   - Please keep patient on a diabetic, carb controlled diet     - Discharge planning: Basal/bolus + MFM. Final doses TBD    #Hypertension  - BP goal <130/80  - Management as per primary team    #HLD  - Goal LDL <70 in the setting of diabetes  - Please check fasting lipid panel if not checked recently     Spoke with primary team regarding plan.     Tish Haynes,   Attending Physician   Department of Endocrinology, Diabetes and Metabolism     If before 9AM or after 5PM, or on weekends/holidays, please call the Endocrine answering service for assistance ([phone]).  For nonurgent matters, please email [email or web address] for assistance.     Please note that a different provider may be following this patient each day.       
Patient is a 54 year old male with history of DM, HTN, HLD, CAD s/p CABG x3 2019, PAD s/p RLE angioplasty/stent at OSH 5+ years ago presenting to the ED at the referral of his podiatrist/wound care specialist for evaluation. Reports he has had a wound on the bottom of his right foot for approximately 3-4 months. States the wound is painful especially with ambulation, and pain has been increasing. Admits to cramping pain in the calves after 3-4 blocks but denies pain at rest. Also notes some swelling of the right leg which has been present for several weeks. Denies fevers, chills, N/V, drainage, CP, SOB.       # LE wound, Hx of PAD   vascular and podiatry care appreciated   plan for angio in AM   resume home AP and statin   pan Cx   Abx as per vascular, cont zosyn   patient is medically optimized for angio in AM   EKG noted   Check echo, last echo from 2022    # DM  elevated BS   on lantus nad premeal Humalog  Check A1C   sliding scale  diab diet       # CAD S/P CABG / HTN/HLD  resume AP and statin   BP control   adjust meds as tolerated   EKG noted  check echo   Lipi dpanel     DVT and Gi PPX

## 2024-08-08 NOTE — CONSULT NOTE ADULT - CONSULT REASON
R foot wound
Medical management
Cardiac Risk Stratification; Hx of HTN, HLD, CAD s/p CABG
DM management

## 2024-08-08 NOTE — PROGRESS NOTE ADULT - SUBJECTIVE AND OBJECTIVE BOX
Podiatry Pager #: [phone]    Patient is a 54y old  Male who presents with a chief complaint of RLE foot wound (07 Aug 2024 11:49)      INTERVAL HPI/OVERNIGHT EVENTS:   Pt is scheduled for right foot 3rd metatarsal head resection with Dr. De Leon at 1:30pm . Patient is aware of procedure and is NPO since midnight.    MEDICATIONS  (STANDING):  acetaminophen     Tablet .. 975 milliGRAM(s) Oral every 6 hours  atorvastatin 80 milliGRAM(s) Oral at bedtime  dextrose 5%. 1000 milliLiter(s) (50 mL/Hr) IV Continuous <Continuous>  dextrose 5%. 1000 milliLiter(s) (100 mL/Hr) IV Continuous <Continuous>  dextrose 50% Injectable 25 Gram(s) IV Push once  dextrose 50% Injectable 12.5 Gram(s) IV Push once  dextrose 50% Injectable 25 Gram(s) IV Push once  enoxaparin Injectable 40 milliGRAM(s) SubCutaneous every 24 hours  fenofibrate Tablet 145 milliGRAM(s) Oral at bedtime  glucagon  Injectable 1 milliGRAM(s) IntraMuscular once  insulin glargine Injectable (LANTUS) 45 Unit(s) SubCutaneous at bedtime  insulin lispro (ADMELOG) corrective regimen sliding scale   SubCutaneous every 6 hours  insulin lispro Injectable (ADMELOG) 17 Unit(s) SubCutaneous three times a day before meals  metoprolol tartrate 25 milliGRAM(s) Oral two times a day  piperacillin/tazobactam IVPB.. 3.375 Gram(s) IV Intermittent every 8 hours    MEDICATIONS  (PRN):  dextrose Oral Gel 15 Gram(s) Oral once PRN Blood Glucose LESS THAN 70 milliGRAM(s)/deciliter  oxyCODONE    IR 5 milliGRAM(s) Oral every 6 hours PRN Severe Pain (7 - 10)  oxyCODONE    IR 2.5 milliGRAM(s) Oral every 6 hours PRN Moderate Pain (4 - 6)      Allergies    No Known Allergies    Intolerances        Vital Signs Last 24 Hrs  T(C): 36.6 (08 Aug 2024 04:45), Max: 37.3 (07 Aug 2024 17:28)  T(F): 97.9 (08 Aug 2024 04:45), Max: 99.1 (07 Aug 2024 17:28)  HR: 77 (08 Aug 2024 04:45) (63 - 81)  BP: 146/72 (08 Aug 2024 04:45) (113/70 - 146/82)  BP(mean): --  RR: 18 (08 Aug 2024 04:45) (15 - 18)  SpO2: 94% (08 Aug 2024 04:45) (94% - 99%)    Parameters below as of 08 Aug 2024 04:45  Patient On (Oxygen Delivery Method): room air        LABS:                        11.5   4.72  )-----------( 191      ( 07 Aug 2024 07:05 )             34.7     08-07    137  |  103  |  12  ----------------------------<  235<H>  4.1   |  22  |  0.91    Ca    9.1      07 Aug 2024 07:05  Phos  3.5     08-07  Mg     2.0     08-07      PT/INR - ( 07 Aug 2024 07:05 )   PT: 10.4 sec;   INR: 0.94 ratio         PTT - ( 07 Aug 2024 07:05 )  PTT:30.0 sec  Urinalysis Basic - ( 07 Aug 2024 07:05 )    Color: x / Appearance: x / SG: x / pH: x  Gluc: 235 mg/dL / Ketone: x  / Bili: x / Urobili: x   Blood: x / Protein: x / Nitrite: x   Leuk Esterase: x / RBC: x / WBC x   Sq Epi: x / Non Sq Epi: x / Bacteria: x      CAPILLARY BLOOD GLUCOSE      POCT Blood Glucose.: 297 mg/dL (08 Aug 2024 06:08)  POCT Blood Glucose.: 343 mg/dL (07 Aug 2024 23:25)  POCT Blood Glucose.: 227 mg/dL (07 Aug 2024 17:19)  POCT Blood Glucose.: 165 mg/dL (07 Aug 2024 14:54)      RADIOLOGY & ADDITIONAL TESTS:    Plan:   To OR today at 1:30pm with Dr. De Leon for R foot 3rd metatarsal head resection.   CXR on sunrise.  EKG on sunrise.  Please document medical clearance in chart  Consent signed and in chart.  Procedure was explained to patient in detail. All alternatives, risks and complications were discussed. All questions answered.

## 2024-08-09 ENCOUNTER — TRANSCRIPTION ENCOUNTER (OUTPATIENT)
Age: 54
End: 2024-08-09

## 2024-08-09 VITALS
HEART RATE: 97 BPM | DIASTOLIC BLOOD PRESSURE: 72 MMHG | RESPIRATION RATE: 18 BRPM | SYSTOLIC BLOOD PRESSURE: 121 MMHG | OXYGEN SATURATION: 94 % | TEMPERATURE: 98 F

## 2024-08-09 LAB
ANION GAP SERPL CALC-SCNC: 13 MMOL/L — SIGNIFICANT CHANGE UP (ref 5–17)
BUN SERPL-MCNC: 10 MG/DL — SIGNIFICANT CHANGE UP (ref 7–23)
CALCIUM SERPL-MCNC: 9.2 MG/DL — SIGNIFICANT CHANGE UP (ref 8.4–10.5)
CHLORIDE SERPL-SCNC: 102 MMOL/L — SIGNIFICANT CHANGE UP (ref 96–108)
CO2 SERPL-SCNC: 24 MMOL/L — SIGNIFICANT CHANGE UP (ref 22–31)
CREAT SERPL-MCNC: 0.86 MG/DL — SIGNIFICANT CHANGE UP (ref 0.5–1.3)
EGFR: 102 ML/MIN/1.73M2 — SIGNIFICANT CHANGE UP
GLUCOSE BLDC GLUCOMTR-MCNC: 182 MG/DL — HIGH (ref 70–99)
GLUCOSE BLDC GLUCOMTR-MCNC: 253 MG/DL — HIGH (ref 70–99)
GLUCOSE SERPL-MCNC: 307 MG/DL — HIGH (ref 70–99)
HCT VFR BLD CALC: 37.6 % — LOW (ref 39–50)
HGB BLD-MCNC: 12 G/DL — LOW (ref 13–17)
MAGNESIUM SERPL-MCNC: 1.9 MG/DL — SIGNIFICANT CHANGE UP (ref 1.6–2.6)
MCHC RBC-ENTMCNC: 26.1 PG — LOW (ref 27–34)
MCHC RBC-ENTMCNC: 31.9 GM/DL — LOW (ref 32–36)
MCV RBC AUTO: 81.7 FL — SIGNIFICANT CHANGE UP (ref 80–100)
NRBC # BLD: 0 /100 WBCS — SIGNIFICANT CHANGE UP (ref 0–0)
PHOSPHATE SERPL-MCNC: 3.1 MG/DL — SIGNIFICANT CHANGE UP (ref 2.5–4.5)
PLATELET # BLD AUTO: 217 K/UL — SIGNIFICANT CHANGE UP (ref 150–400)
POTASSIUM SERPL-MCNC: 3.8 MMOL/L — SIGNIFICANT CHANGE UP (ref 3.5–5.3)
POTASSIUM SERPL-SCNC: 3.8 MMOL/L — SIGNIFICANT CHANGE UP (ref 3.5–5.3)
RBC # BLD: 4.6 M/UL — SIGNIFICANT CHANGE UP (ref 4.2–5.8)
RBC # FLD: 13.3 % — SIGNIFICANT CHANGE UP (ref 10.3–14.5)
SODIUM SERPL-SCNC: 139 MMOL/L — SIGNIFICANT CHANGE UP (ref 135–145)
WBC # BLD: 8.39 K/UL — SIGNIFICANT CHANGE UP (ref 3.8–10.5)
WBC # FLD AUTO: 8.39 K/UL — SIGNIFICANT CHANGE UP (ref 3.8–10.5)

## 2024-08-09 PROCEDURE — C1894: CPT

## 2024-08-09 PROCEDURE — 82330 ASSAY OF CALCIUM: CPT

## 2024-08-09 PROCEDURE — 80048 BASIC METABOLIC PNL TOTAL CA: CPT

## 2024-08-09 PROCEDURE — 99261: CPT

## 2024-08-09 PROCEDURE — 82435 ASSAY OF BLOOD CHLORIDE: CPT

## 2024-08-09 PROCEDURE — 83735 ASSAY OF MAGNESIUM: CPT

## 2024-08-09 PROCEDURE — 82010 KETONE BODYS QUAN: CPT

## 2024-08-09 PROCEDURE — 99232 SBSQ HOSP IP/OBS MODERATE 35: CPT

## 2024-08-09 PROCEDURE — 84132 ASSAY OF SERUM POTASSIUM: CPT

## 2024-08-09 PROCEDURE — 84295 ASSAY OF SERUM SODIUM: CPT

## 2024-08-09 PROCEDURE — C8929: CPT

## 2024-08-09 PROCEDURE — 75716 ARTERY X-RAYS ARMS/LEGS: CPT

## 2024-08-09 PROCEDURE — 86901 BLOOD TYPING SEROLOGIC RH(D): CPT

## 2024-08-09 PROCEDURE — 71045 X-RAY EXAM CHEST 1 VIEW: CPT

## 2024-08-09 PROCEDURE — 83605 ASSAY OF LACTIC ACID: CPT

## 2024-08-09 PROCEDURE — 85027 COMPLETE CBC AUTOMATED: CPT

## 2024-08-09 PROCEDURE — 36246 INS CATH ABD/L-EXT ART 2ND: CPT

## 2024-08-09 PROCEDURE — 88304 TISSUE EXAM BY PATHOLOGIST: CPT

## 2024-08-09 PROCEDURE — C1769: CPT

## 2024-08-09 PROCEDURE — 85610 PROTHROMBIN TIME: CPT

## 2024-08-09 PROCEDURE — 83036 HEMOGLOBIN GLYCOSYLATED A1C: CPT

## 2024-08-09 PROCEDURE — 73630 X-RAY EXAM OF FOOT: CPT

## 2024-08-09 PROCEDURE — 82803 BLOOD GASES ANY COMBINATION: CPT

## 2024-08-09 PROCEDURE — 97161 PT EVAL LOW COMPLEX 20 MIN: CPT

## 2024-08-09 PROCEDURE — C1760: CPT

## 2024-08-09 PROCEDURE — 85014 HEMATOCRIT: CPT

## 2024-08-09 PROCEDURE — 80053 COMPREHEN METABOLIC PANEL: CPT

## 2024-08-09 PROCEDURE — C1887: CPT

## 2024-08-09 PROCEDURE — 99285 EMERGENCY DEPT VISIT HI MDM: CPT

## 2024-08-09 PROCEDURE — 93971 EXTREMITY STUDY: CPT

## 2024-08-09 PROCEDURE — 88311 DECALCIFY TISSUE: CPT

## 2024-08-09 PROCEDURE — 85018 HEMOGLOBIN: CPT

## 2024-08-09 PROCEDURE — 82962 GLUCOSE BLOOD TEST: CPT

## 2024-08-09 PROCEDURE — 85025 COMPLETE CBC W/AUTO DIFF WBC: CPT

## 2024-08-09 PROCEDURE — 85730 THROMBOPLASTIN TIME PARTIAL: CPT

## 2024-08-09 PROCEDURE — 86850 RBC ANTIBODY SCREEN: CPT

## 2024-08-09 PROCEDURE — 84100 ASSAY OF PHOSPHORUS: CPT

## 2024-08-09 PROCEDURE — 86900 BLOOD TYPING SEROLOGIC ABO: CPT

## 2024-08-09 PROCEDURE — 80061 LIPID PANEL: CPT

## 2024-08-09 PROCEDURE — 82947 ASSAY GLUCOSE BLOOD QUANT: CPT

## 2024-08-09 RX ORDER — INSULIN LISPRO 100/ML
VIAL (ML) SUBCUTANEOUS AT BEDTIME
Refills: 0 | Status: DISCONTINUED | OUTPATIENT
Start: 2024-08-09 | End: 2024-08-09

## 2024-08-09 RX ORDER — INSULIN GLARGINE-YFGN 100 [IU]/ML
52 INJECTION, SOLUTION SUBCUTANEOUS AT BEDTIME
Refills: 0 | Status: DISCONTINUED | OUTPATIENT
Start: 2024-08-09 | End: 2024-08-09

## 2024-08-09 RX ORDER — CILOSTAZOL 50 MG/1
1 TABLET ORAL
Qty: 60 | Refills: 0
Start: 2024-08-09 | End: 2024-09-07

## 2024-08-09 RX ORDER — POTASSIUM CHLORIDE 1500 MG/1
20 TABLET, EXTENDED RELEASE ORAL ONCE
Refills: 0 | Status: COMPLETED | OUTPATIENT
Start: 2024-08-09 | End: 2024-08-09

## 2024-08-09 RX ORDER — INSULIN LISPRO 100/ML
VIAL (ML) SUBCUTANEOUS
Refills: 0 | Status: DISCONTINUED | OUTPATIENT
Start: 2024-08-09 | End: 2024-08-09

## 2024-08-09 RX ORDER — INSULIN LISPRO 100/ML
20 VIAL (ML) SUBCUTANEOUS
Refills: 0 | Status: DISCONTINUED | OUTPATIENT
Start: 2024-08-09 | End: 2024-08-09

## 2024-08-09 RX ORDER — PIPERACILLIN SODIUM, TAZOBACTAM SODIUM 3; .375 G/15ML; G/15ML
3.38 INJECTION, POWDER, LYOPHILIZED, FOR SOLUTION INTRAVENOUS EVERY 8 HOURS
Refills: 0 | Status: DISCONTINUED | OUTPATIENT
Start: 2024-08-09 | End: 2024-08-09

## 2024-08-09 RX ORDER — INSULIN GLARGINE-YFGN 100 [IU]/ML
52 INJECTION, SOLUTION SUBCUTANEOUS
Qty: 0 | Refills: 0 | DISCHARGE
Start: 2024-08-09

## 2024-08-09 RX ORDER — INSULIN LISPRO 100/ML
20 VIAL (ML) SUBCUTANEOUS
Qty: 1 | Refills: 0
Start: 2024-08-09 | End: 2024-09-07

## 2024-08-09 RX ORDER — ENOXAPARIN SODIUM 120 MG/.8ML
40 INJECTION SUBCUTANEOUS EVERY 24 HOURS
Refills: 0 | Status: DISCONTINUED | OUTPATIENT
Start: 2024-08-09 | End: 2024-08-09

## 2024-08-09 RX ORDER — OXYCODONE HYDROCHLORIDE 30 MG/1
1 TABLET ORAL
Qty: 5 | Refills: 0
Start: 2024-08-09

## 2024-08-09 RX ADMIN — POTASSIUM CHLORIDE 20 MILLIEQUIVALENT(S): 1500 TABLET, EXTENDED RELEASE ORAL at 14:02

## 2024-08-09 RX ADMIN — Medication 975 MILLIGRAM(S): at 05:49

## 2024-08-09 RX ADMIN — Medication 975 MILLIGRAM(S): at 14:03

## 2024-08-09 RX ADMIN — Medication 975 MILLIGRAM(S): at 06:21

## 2024-08-09 RX ADMIN — Medication 3: at 09:20

## 2024-08-09 RX ADMIN — Medication 975 MILLIGRAM(S): at 01:07

## 2024-08-09 RX ADMIN — Medication 20 UNIT(S): at 09:21

## 2024-08-09 RX ADMIN — Medication 975 MILLIGRAM(S): at 00:35

## 2024-08-09 RX ADMIN — ENOXAPARIN SODIUM 40 MILLIGRAM(S): 120 INJECTION SUBCUTANEOUS at 14:03

## 2024-08-09 RX ADMIN — Medication 975 MILLIGRAM(S): at 15:00

## 2024-08-09 RX ADMIN — Medication 20 UNIT(S): at 14:00

## 2024-08-09 RX ADMIN — Medication 2: at 14:01

## 2024-08-09 RX ADMIN — Medication 25 MILLIGRAM(S): at 05:50

## 2024-08-09 NOTE — PHYSICAL THERAPY INITIAL EVALUATION ADULT - DID THE PATIENT HAVE SURGERY?
s/p right foot 3rd metatarsal head resection, submetatarsal 3 ulcer debridement and kerecis graft application/yes

## 2024-08-09 NOTE — DISCHARGE NOTE NURSING/CASE MANAGEMENT/SOCIAL WORK - PATIENT PORTAL LINK FT
You can access the FollowMyHealth Patient Portal offered by [facility] by registering at the following website: [email or web address]. By joining [employer]’s FollowMyHealth portal, you will also be able to view your health information using other applications (apps) compatible with our system.

## 2024-08-09 NOTE — PROGRESS NOTE ADULT - SUBJECTIVE AND OBJECTIVE BOX
Patient is a 54y old  Male who presents with a chief complaint of RLE foot wound (08 Aug 2024 17:23)       INTERVAL HPI/OVERNIGHT EVENTS:  Patient seen and evaluated at bedside.  Pt is resting comfortable in NAD. Denies N/V/F/C.    Allergies    No Known Allergies    Intolerances        Vital Signs Last 24 Hrs  T(C): 36.8 (09 Aug 2024 05:48), Max: 36.8 (09 Aug 2024 05:48)  T(F): 98.2 (09 Aug 2024 05:48), Max: 98.2 (09 Aug 2024 05:48)  HR: 86 (09 Aug 2024 05:48) (67 - 90)  BP: 132/72 (09 Aug 2024 05:48) (94/56 - 132/72)  BP(mean): 68 (08 Aug 2024 17:00) (68 - 97)  RR: 18 (09 Aug 2024 05:48) (14 - 18)  SpO2: 95% (09 Aug 2024 05:48) (94% - 100%)    Parameters below as of 09 Aug 2024 05:48  Patient On (Oxygen Delivery Method): room air        LABS:                        12.0   8.39  )-----------( 217      ( 09 Aug 2024 06:56 )             37.6     08-09    139  |  102  |  10  ----------------------------<  307<H>  3.8   |  24  |  0.86    Ca    9.2      09 Aug 2024 06:55  Phos  3.1     08-09  Mg     1.9     08-09      PT/INR - ( 08 Aug 2024 06:53 )   PT: 10.6 sec;   INR: 0.96 ratio         PTT - ( 08 Aug 2024 06:53 )  PTT:27.9 sec  Urinalysis Basic - ( 09 Aug 2024 06:55 )    Color: x / Appearance: x / SG: x / pH: x  Gluc: 307 mg/dL / Ketone: x  / Bili: x / Urobili: x   Blood: x / Protein: x / Nitrite: x   Leuk Esterase: x / RBC: x / WBC x   Sq Epi: x / Non Sq Epi: x / Bacteria: x      CAPILLARY BLOOD GLUCOSE      POCT Blood Glucose.: 216 mg/dL (08 Aug 2024 21:25)  POCT Blood Glucose.: 94 mg/dL (08 Aug 2024 18:43)  POCT Blood Glucose.: 124 mg/dL (08 Aug 2024 15:03)  POCT Blood Glucose.: 155 mg/dL (08 Aug 2024 12:39)      Lower Extremity Physical Exam:  Vascular: DP/PT 0/4, B/L, CFT <3 seconds B/L, Temperature gradient warm to cool, B/L.   Neuro: Epicritic sensation absent to the level of digits , B/L.  Musculoskeletal/Ortho: unremarkable  Skin: 8/8 s/p right foot metatarsal 3 head resection and right wound debridement with kerecis graft application: graft intact, staples intact, no underlying hematoma. Left foot no open wounds or acute signs of infection     RADIOLOGY & ADDITIONAL TESTS:

## 2024-08-09 NOTE — PROGRESS NOTE ADULT - ASSESSMENT
54 year old male with history of DM, HTN, HLD, CAD s/p CABG x3 2019, PAD s/p RLE angioplasty/stent at OSH 5+ years ago presenting to the ED at the referral of his podiatrist/wound care specialist for evaluation. Reports he has had a wound on the bottom of his right foot for approximately 3-4 months. States the wound is painful especially with ambulation, and pain has been increasing.   Now S/P RLE angiogram performed via femoral artery access 8/7 showing patent lateral plantar, patent AT origin, distal occlusion of AT with collateral run off. HDs voiding well. Pods OR on 8/8 s/p right foot metatarsal 3 head resection and wound debridement, currently recovering well.     Plan:  - DVT: Lovenox  - ABx: Zosyn, will discharge on augmentin BID for 5 days   - home medications  - confirmed unchanged with [facility] since last admission  - Regular diet  - Pod: patient stable for discharge today.   - Dispo: f/u PT amara, possible home    Vascular Surgery  [id number]   54 year old male with history of DM, HTN, HLD, CAD s/p CABG x3 2019, PAD s/p RLE angioplasty/stent at OSH 5+ years ago presenting to the ED at the referral of his podiatrist/wound care specialist for evaluation. Reports he has had a wound on the bottom of his right foot for approximately 3-4 months. States the wound is painful especially with ambulation, and pain has been increasing.   Now S/P RLE angiogram performed via femoral artery access 8/7 showing patent lateral plantar, patent AT origin, distal occlusion of AT with collateral run off. HDs voiding well. Pods OR on 8/8 s/p right foot metatarsal 3 head resection and wound debridement, currently recovering well.     Plan:  - DVT: Lovenox  - ABx: Zosyn, will discharge on augmentin BID for 5 days   d/c w pletal 50 bid   - home medications  - confirmed unchanged [facility] since last admission  - Regular diet  - Pod: patient stable for discharge today.   - Dispo: f/u PT amara, possible home    Vascular Surgery  [id number]

## 2024-08-09 NOTE — PHYSICAL THERAPY INITIAL EVALUATION ADULT - PHYSICAL ASSIST/NONPHYSICAL ASSIST: GAIT, REHAB EVAL
Addended by: Mayela Austin on: 1/26/2018 09:47 AM     Modules accepted: Level of Service verbal cues/nonverbal cues (demo/gestures)

## 2024-08-09 NOTE — CHART NOTE - NSCHARTNOTEFT_GEN_A_CORE
Endocrine follow up  54 year old male with history of DM, HTN, HLD, CAD s/p CABG x3 2019, PAD s/p RLE angioplasty/stent at OSH 5+ years ago presenting to the ED at the referral of his podiatrist/wound care specialist for evaluation.  Patient is high risk with high level decision making due to uncontrolled diabetes with HgbA1c >14% which places patient at high risk for cardiovascular and cerebrovascular events. Patient with lability of glucose requiring close monitoring and insulin adjustments. Endocrinology was consulted for management of diabetes mellitus.    #Type 2 Diabetes Mellitus  - HbA1c 14%      - home regimen: Basaglar 45 units nightly + Humalog 17 units TID with meals - compliance unclear  - egfr: 102  - BG stable 8/8 in the setting of being NPO however previously BG running in the 200-300s.  - Post dinner glucose at bedtime 8/8 in 200s from 94 prior to dinner - received Admelog 17 units prior to dinner   - Received lantus 45 units & 2 units of Admelog last night at bedtime, AM fasting glucose this morning in 300s      Plan:   - Increased to 52 units of lantus QHS  - Agree with increase to 20 units of Admelog TIDQAC  - Changed to moderate Admelog correction scale TIDQAC and separate Moderate correction scale at bedtime   - Please check FSG before meals and QHS, or q6h while NPO  - Inpatient glucose goal 100-180   - Please keep patient on a diabetic, carb controlled diet   - Please keep hypoglycemia protocol in place   - Discharge planning: Basal/bolus insulin pens, doses tbd + MFM. Final doses TBD. May benefit from addition of GLP1 agonist as outpatient if no other contraindications.       08-09    139  |  102  |  10  ----------------------------<  307<H>  3.8   |  24  |  0.86    Ca    9.2      09 Aug 2024 06:55  Phos  3.1     08-09  Mg     1.9     08-09      POCT Blood Glucose:  253 mg/dL (08-09-24 @ 09:15)  216 mg/dL (08-08-24 @ 21:25)  94 mg/dL (08-08-24 @ 18:43)  124 mg/dL (08-08-24 @ 15:03)  155 mg/dL (08-08-24 @ 12:39)      eMAR:  atorvastatin   80 milliGRAM(s) Oral (08-08-24 @ 21:27)    fenofibrate Tablet   145 milliGRAM(s) Oral (08-08-24 @ 21:27)    insulin glargine Injectable (LANTUS)   45 Unit(s) SubCutaneous (08-08-24 @ 21:28)    insulin lispro (ADMELOG) corrective regimen sliding scale   1 Unit(s) SubCutaneous (08-08-24 @ 12:41)    insulin lispro (ADMELOG) corrective regimen sliding scale   3 Unit(s) SubCutaneous (08-09-24 @ 09:20)   2 Unit(s) SubCutaneous (08-08-24 @ 21:28)    insulin lispro Injectable (ADMELOG)   17 Unit(s) SubCutaneous (08-08-24 @ 19:00)    insulin lispro Injectable (ADMELOG)   20 Unit(s) SubCutaneous (08-09-24 @ 09:21)      Rachael Weinberg DO   Attending Physician   Department of Endocrinology, Diabetes and Metabolism     If before 9AM or after 5PM, or on weekends/holidays, please call the Endocrine answering service for assistance ([phone]).  For nonurgent matters, please email [email or web address] or [email or web address]     Please note that this patient may be followed by different provider tomorrow.  Notify endocrine 24 hours prior to discharge for final recommendations Endocrine follow up  54 year old male with history of DM, HTN, HLD, CAD s/p CABG x3 2019, PAD s/p RLE angioplasty/stent at OSH 5+ years ago presenting to the ED at the referral of his podiatrist/wound care specialist for evaluation.  Patient is high risk with high level decision making due to uncontrolled diabetes with HgbA1c >14% which places patient at high risk for cardiovascular and cerebrovascular events. Patient with lability of glucose requiring close monitoring and insulin adjustments. Endocrinology was consulted for management of diabetes mellitus.    #Type 2 Diabetes Mellitus  - HbA1c 14%      - home regimen: Basaglar 45 units nightly + Humalog 17 units TID with meals - compliance unclear  - egfr: 102  - BG stable 8/8 in the setting of being NPO however previously BG running in the 200-300s.  - Post dinner glucose at bedtime 8/8 in 200s from 94 prior to dinner - received Admelog 17 units prior to dinner   - Received lantus 45 units & 2 units of Admelog last night at bedtime, AM fasting glucose this morning in 300s      Plan:   - Agree with increase to 52 units of lantus QHS  - Agree with increase to 20 units of Admelog TIDQAC  - Changed to moderate Admelog correction scale TIDQAC and separate Moderate correction scale at bedtime   - Please check FSG before meals and QHS, or q6h while NPO  - Inpatient glucose goal 100-180   - Please keep patient on a diabetic, carb controlled diet   - Please keep hypoglycemia protocol in place   - Discharge planning: Basal/bolus insulin pens, doses tbd + MFM. Final doses TBD. May benefit from addition of GLP1 agonist as outpatient if no other contraindications.       08-09    139  |  102  |  10  ----------------------------<  307<H>  3.8   |  24  |  0.86    Ca    9.2      09 Aug 2024 06:55  Phos  3.1     08-09  Mg     1.9     08-09      POCT Blood Glucose:  253 mg/dL (08-09-24 @ 09:15)  216 mg/dL (08-08-24 @ 21:25)  94 mg/dL (08-08-24 @ 18:43)  124 mg/dL (08-08-24 @ 15:03)  155 mg/dL (08-08-24 @ 12:39)      eMAR:  atorvastatin   80 milliGRAM(s) Oral (08-08-24 @ 21:27)    fenofibrate Tablet   145 milliGRAM(s) Oral (08-08-24 @ 21:27)    insulin glargine Injectable (LANTUS)   45 Unit(s) SubCutaneous (08-08-24 @ 21:28)    insulin lispro (ADMELOG) corrective regimen sliding scale   1 Unit(s) SubCutaneous (08-08-24 @ 12:41)    insulin lispro (ADMELOG) corrective regimen sliding scale   3 Unit(s) SubCutaneous (08-09-24 @ 09:20)   2 Unit(s) SubCutaneous (08-08-24 @ 21:28)    insulin lispro Injectable (ADMELOG)   17 Unit(s) SubCutaneous (08-08-24 @ 19:00)    insulin lispro Injectable (ADMELOG)   20 Unit(s) SubCutaneous (08-09-24 @ 09:21)      Rachael Weinberg DO   Attending Physician   Department of Endocrinology, Diabetes and Metabolism     If before 9AM or after 5PM, or on weekends/holidays, please call the Endocrine answering service for assistance ([phone]).  For nonurgent matters, please email [email or web address] or [email or web address]     Please note that this patient may be followed by different provider tomorrow.  Notify endocrine 24 hours prior to discharge for final recommendations

## 2024-08-09 NOTE — DISCHARGE NOTE NURSING/CASE MANAGEMENT/SOCIAL WORK - NSDCFUADDAPPT_GEN_ALL_CORE_FT
Podiatry Discharge Instructions:  Follow up: Please follow up with Dr. De Leon within 1 week of discharge from the hospital, please call [phone] for appointment and discuss that you recently were seen in the hospital.  Wound Care: Please leave your dressing clean dry intact until your follow up appointment next week.  Weight bearing: Please weight bear as tolerated to right heel in a surgical shoe.  Antibiotics: Please continue as instructed.

## 2024-08-09 NOTE — PHYSICAL THERAPY INITIAL EVALUATION ADULT - PERTINENT HX OF CURRENT PROBLEM, REHAB EVAL
54 year old male with history of DM, HTN, HLD, CAD s/p CABG x3 2019, PAD s/p RLE angioplasty/stent at OSH 5+ years ago presenting to the ED at the referral of his podiatrist/wound care specialist for evaluation of right foot wound. History of claudication but no rest pain or fevers. WBC and lactate WNL, no evidence of subcutaneous gas or osteomyelitis on XR. Now S/P RLE angiogram performed via femoral artery access 8/7 showing patent lateral plantar, patent AT origin, distal occlusion of AT with collateral run off. Planned for OR  with podiatry for R foot 3rd metatarsal head resection.  8/08/2024 XR R FOOT: Postsurgical changes are seen along the soft tissues of the foot at the level of the metatarsal phalangeal joint with cutaneous staples plantarly. There is interval resection of the lateral aspect of the third metatarsal head. Atherosclerotic disease.

## 2024-08-09 NOTE — PROGRESS NOTE ADULT - ATTENDING COMMENTS
I Estevan Youssef MD have participated in the daily care of this patient  and agree with  the  evaluation, assessment and plan of the surgical house officer
EPIFANIO Youssef MD have participated in the daily care of this patient  and have seen and examined the patient today and agree with  the  evaluation, assessment and plan of the surgical house officer
EPIFANIO Youssef MD have participated in the daily care of this patient  and have seen and examined the patient today and agree with  the  evaluation, assessment and plan of the surgical house officer  EPIFANIO Youssef MD have personally seen and examined the patient at bedside today at  4 pm

## 2024-08-09 NOTE — PROGRESS NOTE ADULT - REASON FOR ADMISSION
RLE foot wound

## 2024-08-09 NOTE — PROGRESS NOTE ADULT - SUBJECTIVE AND OBJECTIVE BOX
Surgery Progress Note    SUBJECTIVE: Patient seen and examined at bedside. Patient report doing well this morning, no complaints.   --------------------------------------------------------------------------------------------------  OBJECTIVE:   Physical Exam:  General: AAOx3, NAD, lying comfortably in bed  Respiratory: nonlabored breathing  Abdomen: non-distended, soft, non-tender  ilioinguinal: dressing c/d/i, tape removed on rounds   Extremities: WWP, r foot with chronic ulcerated wound  Pulses: b/l DP and PT palpable   --------------------------------------------------------------------------------------------------  V/S:  Vital Signs Last 24 Hrs  T(C): 37.2 (09 Aug 2024 09:59), Max: 37.2 (09 Aug 2024 09:59)  T(F): 98.9 (09 Aug 2024 09:59), Max: 98.9 (09 Aug 2024 09:59)  HR: 87 (09 Aug 2024 09:59) (67 - 90)  BP: 135/78 (09 Aug 2024 09:59) (94/56 - 135/78)  BP(mean): 68 (08 Aug 2024 17:00) (68 - 97)  RR: 18 (09 Aug 2024 09:59) (14 - 18)  SpO2: 94% (09 Aug 2024 09:59) (94% - 100%)    Parameters below as of 09 Aug 2024 09:59  Patient On (Oxygen Delivery Method): room air  --------------------------------------------------------------------------------------------------  I/Os:    08 Aug 2024 07:01  -  09 Aug 2024 07:00  --------------------------------------------------------  IN:    Oral Fluid: 880 mL  Total IN: 880 mL    OUT:    Voided (mL): 200 mL  Total OUT: 200 mL    Total NET: 680 mL      09 Aug 2024 07:01  -  09 Aug 2024 11:45  --------------------------------------------------------  IN:    Oral Fluid: 240 mL  Total IN: 240 mL    OUT:  Total OUT: 0 mL    Total NET: 240 mL        --------------------------------------------------------------------------------------------------  LABS:                        12.0   8.39  )-----------( 217      ( 09 Aug 2024 06:56 )             37.6     09 Aug 2024 06:55    139    |  102    |  10     ----------------------------<  307    3.8     |  24     |  0.86     Ca    9.2        09 Aug 2024 06:55  Phos  3.1       09 Aug 2024 06:55  Mg     1.9       09 Aug 2024 06:55      PT/INR - ( 08 Aug 2024 06:53 )   PT: 10.6 sec;   INR: 0.96 ratio         PTT - ( 08 Aug 2024 06:53 )  PTT:27.9 sec  CAPILLARY BLOOD GLUCOSE      POCT Blood Glucose.: 253 mg/dL (09 Aug 2024 09:15)  POCT Blood Glucose.: 216 mg/dL (08 Aug 2024 21:25)  POCT Blood Glucose.: 94 mg/dL (08 Aug 2024 18:43)  POCT Blood Glucose.: 124 mg/dL (08 Aug 2024 15:03)  POCT Blood Glucose.: 155 mg/dL (08 Aug 2024 12:39)            Urinalysis Basic - ( 09 Aug 2024 06:55 )    Color: x / Appearance: x / SG: x / pH: x  Gluc: 307 mg/dL / Ketone: x  / Bili: x / Urobili: x   Blood: x / Protein: x / Nitrite: x   Leuk Esterase: x / RBC: x / WBC x   Sq Epi: x / Non Sq Epi: x / Bacteria: x      --------------------------------------------------------------------------------------------------  MEDICATIONS  (STANDING):  acetaminophen     Tablet .. 975 milliGRAM(s) Oral every 6 hours  atorvastatin 80 milliGRAM(s) Oral at bedtime  dextrose 5%. 1000 milliLiter(s) (100 mL/Hr) IV Continuous <Continuous>  dextrose 50% Injectable 25 Gram(s) IV Push once  dextrose 50% Injectable 25 Gram(s) IV Push once  enoxaparin Injectable 40 milliGRAM(s) SubCutaneous every 24 hours  fenofibrate Tablet 145 milliGRAM(s) Oral at bedtime  glucagon  Injectable 1 milliGRAM(s) IntraMuscular once  insulin glargine Injectable (LANTUS) 52 Unit(s) SubCutaneous at bedtime  insulin lispro (ADMELOG) corrective regimen sliding scale   SubCutaneous three times a day before meals  insulin lispro (ADMELOG) corrective regimen sliding scale   SubCutaneous at bedtime  insulin lispro Injectable (ADMELOG) 20 Unit(s) SubCutaneous three times a day before meals  metoprolol tartrate 25 milliGRAM(s) Oral two times a day  piperacillin/tazobactam IVPB.. 3.375 Gram(s) IV Intermittent every 8 hours  potassium chloride    Tablet ER 20 milliEquivalent(s) Oral once    MEDICATIONS  (PRN):  acetaminophen     Tablet .. 650 milliGRAM(s) Oral every 6 hours PRN Mild Pain (1 - 3)  morphine  - Injectable 2 milliGRAM(s) IV Push every 4 hours PRN Severe Pain (7 - 10)  oxycodone    5 mG/acetaminophen 325 mG 2 Tablet(s) Oral every 4 hours PRN Moderate Pain (4 - 6)  oxyCODONE    IR 2.5 milliGRAM(s) Oral every 6 hours PRN Moderate Pain (4 - 6)  oxyCODONE    IR 5 milliGRAM(s) Oral every 6 hours PRN Severe Pain (7 - 10)    --------------------------------------------------------------------------------------------------     Surgery Progress Note    SUBJECTIVE: Patient seen and examined at bedside. Patient report doing well this morning, no complaints.   --------------------------------------------------------------------------------------------------  OBJECTIVE:   Physical Exam:  General: AAOx3, NAD, lying comfortably in bed  Respiratory: nonlabored breathing  Abdomen: non-distended, soft, non-tender  ilioinguinal: dressing c/d/i, tape removed on rounds   Extremities: WWP, r foot with chronic ulcerated wound  Pulses: b/l DP and PT palpable   --------------------------------------------------------------------------------------------------  V/S:  Vital Signs Last 24 Hrs  T(C): 37.2 (09 Aug 2024 09:59), Max: 37.2 (09 Aug 2024 09:59)  T(F): 98.9 (09 Aug 2024 09:59), Max: 98.9 (09 Aug 2024 09:59)  HR: 87 (09 Aug 2024 09:59) (67 - 90)  BP: 135/78 (09 Aug 2024 09:59) (94/56 - 135/78)  BP(mean): 68 (08 Aug 2024 17:00) (68 - 97)  RR: 18 (09 Aug 2024 09:59) (14 - 18)  SpO2: 94% (09 Aug 2024 09:59) (94% - 100%)    Parameters below as of 09 Aug 2024 09:59  Patient On (Oxygen Delivery Method): room air  --------------------------------------------------------------------------------------------------  I/Os:    08 Aug 2024 07:01  -  09 Aug 2024 07:00  --------------------------------------------------------  IN:    Oral Fluid: 880 mL  Total IN: 880 mL    OUT:    Voided (mL): 200 mL  Total OUT: 200 mL    Total NET: 680 mL      09 Aug 2024 07:01  -  09 Aug 2024 11:45  --------------------------------------------------------  IN:    Oral Fluid: 240 mL  Total IN: 240 mL    OUT:  Total OUT: 0 mL    Total NET: 240 mL        --------------------------------------------------------------------------------------------------  LABS:                        12.0   8.39  )-----------( 217      ( 09 Aug 2024 06:56 )             37.6     09 Aug 2024 06:55    139    |  102    |  10     ----------------------------<  307    3.8     |  24     |  0.86     Ca    9.2        09 Aug 2024 06:55  Phos  3.1       09 Aug 2024 06:55  Mg     1.9       09 Aug 2024 06:55      PT/INR - ( 08 Aug 2024 06:53 )   PT: 10.6 sec;   INR: 0.96 ratio         PTT - ( 08 Aug 2024 06:53 )  PTT:27.9 sec  CAPILLARY BLOOD GLUCOSE      POCT Blood Glucose.: 253 mg/dL (09 Aug 2024 09:15)  POCT Blood Glucose.: 216 mg/dL (08 Aug 2024 21:25)  POCT Blood Glucose.: 94 mg/dL (08 Aug 2024 18:43)  POCT Blood Glucose.: 124 mg/dL (08 Aug 2024 15:03)  POCT Blood Glucose.: 155 mg/dL (08 Aug 2024 12:39)      Urinalysis Basic - ( 09 Aug 2024 06:55 )    Color: x / Appearance: x / SG: x / pH: x  Gluc: 307 mg/dL / Ketone: x  / Bili: x / Urobili: x   Blood: x / Protein: x / Nitrite: x   Leuk Esterase: x / RBC: x / WBC x   Sq Epi: x / Non Sq Epi: x / Bacteria: x      --------------------------------------------------------------------------------------------------  MEDICATIONS  (STANDING):  acetaminophen     Tablet .. 975 milliGRAM(s) Oral every 6 hours  atorvastatin 80 milliGRAM(s) Oral at bedtime  dextrose 5%. 1000 milliLiter(s) (100 mL/Hr) IV Continuous <Continuous>  dextrose 50% Injectable 25 Gram(s) IV Push once  dextrose 50% Injectable 25 Gram(s) IV Push once  enoxaparin Injectable 40 milliGRAM(s) SubCutaneous every 24 hours  fenofibrate Tablet 145 milliGRAM(s) Oral at bedtime  glucagon  Injectable 1 milliGRAM(s) IntraMuscular once  insulin glargine Injectable (LANTUS) 52 Unit(s) SubCutaneous at bedtime  insulin lispro (ADMELOG) corrective regimen sliding scale   SubCutaneous three times a day before meals  insulin lispro (ADMELOG) corrective regimen sliding scale   SubCutaneous at bedtime  insulin lispro Injectable (ADMELOG) 20 Unit(s) SubCutaneous three times a day before meals  metoprolol tartrate 25 milliGRAM(s) Oral two times a day  piperacillin/tazobactam IVPB.. 3.375 Gram(s) IV Intermittent every 8 hours  potassium chloride    Tablet ER 20 milliEquivalent(s) Oral once    MEDICATIONS  (PRN):  acetaminophen     Tablet .. 650 milliGRAM(s) Oral every 6 hours PRN Mild Pain (1 - 3)  morphine  - Injectable 2 milliGRAM(s) IV Push every 4 hours PRN Severe Pain (7 - 10)  oxycodone    5 mG/acetaminophen 325 mG 2 Tablet(s) Oral every 4 hours PRN Moderate Pain (4 - 6)  oxyCODONE    IR 2.5 milliGRAM(s) Oral every 6 hours PRN Moderate Pain (4 - 6)  oxyCODONE    IR 5 milliGRAM(s) Oral every 6 hours PRN Severe Pain (7 - 10)    --------------------------------------------------------------------------------------------------

## 2024-08-09 NOTE — PROGRESS NOTE ADULT - SUBJECTIVE AND OBJECTIVE BOX
DATE OF SERVICE: 08-09-24 @ 10:41    Patient is a 54y old  Male who presents with a chief complaint of RLE foot wound (09 Aug 2024 08:27)      INTERVAL HISTORY: Feels ok.     REVIEW OF SYSTEMS:  CONSTITUTIONAL: No weakness  EYES/ENT: No visual changes;  No throat pain   NECK: No pain or stiffness  RESPIRATORY: No cough, wheezing; No shortness of breath  CARDIOVASCULAR: No chest pain or palpitations  GASTROINTESTINAL: No abdominal  pain. No nausea, vomiting, or hematemesis  GENITOURINARY: No dysuria, frequency or hematuria  NEUROLOGICAL: No stroke like symptoms  SKIN: No rashes    	  MEDICATIONS:  metoprolol tartrate 25 milliGRAM(s) Oral two times a day        PHYSICAL EXAM:  T(C): 37.2 (08-09-24 @ 09:59), Max: 37.2 (08-09-24 @ 09:59)  HR: 87 (08-09-24 @ 09:59) (67 - 90)  BP: 135/78 (08-09-24 @ 09:59) (94/56 - 135/78)  RR: 18 (08-09-24 @ 09:59) (14 - 18)  SpO2: 94% (08-09-24 @ 09:59) (94% - 100%)  Wt(kg): --  I&O's Summary    08 Aug 2024 07:01  -  09 Aug 2024 07:00  --------------------------------------------------------  IN: 880 mL / OUT: 200 mL / NET: 680 mL          Appearance: In no distress	  HEENT:    PERRL, EOMI	  Cardiovascular:  S1 S2, No JVD  Respiratory: Lungs clear to auscultation	  Gastrointestinal:  Soft, Non-tender, + BS	  Vascularature:  No edema of LE  Psychiatric: Appropriate affect   Neuro: no acute focal deficits                               12.0   8.39  )-----------( 217      ( 09 Aug 2024 06:56 )             37.6     08-09    139  |  102  |  10  ----------------------------<  307<H>  3.8   |  24  |  0.86    Ca    9.2      09 Aug 2024 06:55  Phos  3.1     08-09  Mg     1.9     08-09          Labs personally reviewed      ASSESSMENT/PLAN: 	    Patient is a 54 year old male with history of DM, HTN, HLD, CAD s/p CABG x3 2019, PAD s/p RLE angioplasty/stent at OSH 5+ years ago presenting to the ED at the referral of his podiatrist/wound care specialist for evaluation. Reports he has had a wound on the bottom of his right foot for approximately 3-4 months. States the wound is painful especially with ambulation, and pain has been increasing. Admits to cramping pain in the calves after 3-4 blocks but denies pain at rest. Also notes some swelling of the right leg which has been present for several weeks. Denies fevers, chills, N/V, drainage, CP, SOB.     Problem/Plan #1:  Pre op Cardiac Risk Stratification  - ECG NSR with no ischemic characteristics  - Denies active CP or SOB  - Reports excellent functional capacity, can walk upto 10 blocks with no CP/SOB, up multiple flights of steps  - Not in decompensated HF  - No hx of tachy mariano arrhythmia  - Patient is elevated risk for low risk peripheral angiogram. No cardiac contraindication to proceed.  - If patient may require open vascular surgery,  will require further ischemic eval with cardiac cath.    Problem/Plan #2: Hypertension  - c/w Metoprolol 25mg PO BID    Problem/Plan #3: HLD  - c/w atorvastatin 80mg PO daily  - c/w fenofibrate 145mg PO daily    Problem/Plan #4: Heart Failure with mildly reduced Ejection Fraction  - TTE as noted above with mildly reduced EF 45-50%  - Appears euvolemic  - Will need sHF GDMT  --- cont Metoprolol 25m PO BID  --- Consider Farxiga 10mg PO daily when no further procedures or surgeries planned  --- Further implementation of sHF GDMT limited by soft BP  - Possible ischemic eval pending clinical course    Problem/Plan #5: Coronary Artery Disease  - Hx of PCI and most recent CABG in 2019  - ECG NSR  - TTE shows EF 45-50% and WMA similar to prior TTE  - NST in 2022 with stress infarct with periinfarct ischemia  - c/w ASA 81mg PO and atorvastatin 80mg PO daily  - If patient may require open vascular under general anesthesia, will require further ischemic eval with cardiac cath              JAROCHO Herring-MARIVEL Dwyer DO [facility]  Cardiovascular Medicine  [address] [place] [address], [address]  Available through call or text on [employer]  Office: [phone]

## 2024-08-09 NOTE — PROGRESS NOTE ADULT - SUBJECTIVE AND OBJECTIVE BOX
Name of Patient : ELSI MERINO  MRN: [id number]        Subjective: Patient seen and examined. No new events except as noted.     REVIEW OF SYSTEMS:    CONSTITUTIONAL: No weakness, fevers or chills  EYES/ENT: No visual changes;  No vertigo or throat pain   NECK: No pain or stiffness  RESPIRATORY: No cough, wheezing, hemoptysis; No shortness of breath  CARDIOVASCULAR: No chest pain or palpitations  GASTROINTESTINAL: No abdominal or epigastric pain. No nausea, vomiting, or hematemesis; No diarrhea or constipation. No melena or hematochezia.  GENITOURINARY: No dysuria, frequency or hematuria  NEUROLOGICAL: No numbness or weakness  SKIN: No itching, burning, rashes, or lesions   All other review of systems is negative unless indicated above.    MEDICATIONS:  MEDICATIONS  (STANDING):  acetaminophen     Tablet .. 975 milliGRAM(s) Oral every 6 hours  atorvastatin 80 milliGRAM(s) Oral at bedtime  dextrose 5%. 1000 milliLiter(s) (100 mL/Hr) IV Continuous <Continuous>  dextrose 50% Injectable 25 Gram(s) IV Push once  dextrose 50% Injectable 25 Gram(s) IV Push once  enoxaparin Injectable 40 milliGRAM(s) SubCutaneous every 24 hours  fenofibrate Tablet 145 milliGRAM(s) Oral at bedtime  glucagon  Injectable 1 milliGRAM(s) IntraMuscular once  insulin glargine Injectable (LANTUS) 52 Unit(s) SubCutaneous at bedtime  insulin lispro (ADMELOG) corrective regimen sliding scale   SubCutaneous three times a day before meals  insulin lispro (ADMELOG) corrective regimen sliding scale   SubCutaneous at bedtime  insulin lispro Injectable (ADMELOG) 20 Unit(s) SubCutaneous three times a day before meals  metoprolol tartrate 25 milliGRAM(s) Oral two times a day  piperacillin/tazobactam IVPB.. 3.375 Gram(s) IV Intermittent every 8 hours      PHYSICAL EXAM:  T(C): 36.6 (08-09-24 @ 13:12), Max: 37.2 (08-09-24 @ 09:59)  HR: 97 (08-09-24 @ 13:12) (81 - 97)  BP: 121/72 (08-09-24 @ 13:12) (114/69 - 135/78)  RR: 18 (08-09-24 @ 13:12) (18 - 18)  SpO2: 94% (08-09-24 @ 13:12) (93% - 95%)  Wt(kg): --  I&O's Summary    08 Aug 2024 07:01  -  09 Aug 2024 07:00  --------------------------------------------------------  IN: 880 mL / OUT: 200 mL / NET: 680 mL    09 Aug 2024 07:01  -  10 Aug 2024 00:20  --------------------------------------------------------  IN: 240 mL / OUT: 0 mL / NET: 240 mL          Appearance: Normal	  HEENT:  PERRLA   Lymphatic: No lymphadenopathy   Cardiovascular: Normal S1 S2, no JVD  Respiratory: normal effort , clear  Gastrointestinal:  Soft, Non-tender  Skin: No rashes,  warm to touch  Psychiatry:  Mood & affect appropriate  Musculuskeletal: No edema    recent labs, Imaging and EKGs personally reviewed     08-08-24 @ 07:01  -  08-09-24 @ 07:00  --------------------------------------------------------  IN: 880 mL / OUT: 200 mL / NET: 680 mL    08-09-24 @ 07:01  -  08-10-24 @ 00:20  --------------------------------------------------------  IN: 240 mL / OUT: 0 mL / NET: 240 mL                          12.0   8.39  )-----------( 217      ( 09 Aug 2024 06:56 )             37.6               08-09    139  |  102  |  10  ----------------------------<  307<H>  3.8   |  24  |  0.86    Ca    9.2      09 Aug 2024 06:55  Phos  3.1     08-09  Mg     1.9     08-09      PT/INR - ( 08 Aug 2024 06:53 )   PT: 10.6 sec;   INR: 0.96 ratio         PTT - ( 08 Aug 2024 06:53 )  PTT:27.9 sec                   Urinalysis Basic - ( 09 Aug 2024 06:55 )    Color: x / Appearance: x / SG: x / pH: x  Gluc: 307 mg/dL / Ketone: x  / Bili: x / Urobili: x   Blood: x / Protein: x / Nitrite: x   Leuk Esterase: x / RBC: x / WBC x   Sq Epi: x / Non Sq Epi: x / Bacteria: x

## 2024-08-09 NOTE — PROGRESS NOTE ADULT - NS ATTEND AMEND GEN_ALL_CORE FT
Patient care and plan discussed and reviewed with Advanced Care Provider. Plan as outlined above edited by me to reflect our discussion.
Patient care and plan discussed and reviewed with Advanced Care Provider. Plan as outlined above edited by me to reflect our discussion.
Pt care and plan discussed and reviewed with PA. Plan as outlined above edited by me to reflect our discussion. Advanced care planning/advanced directives discussed with patient/family. DNR status including forceful chest compressions to attempt to restart the heart, ventilator support/artificial breathing, electric shock, artificial nutrition, health care proxy, Molst form all discussed with pt. More than 50% of the visit was spent counseling and/or coordinating care by the attending physician.

## 2024-08-09 NOTE — PROGRESS NOTE ADULT - PROBLEM SELECTOR PLAN 1
EPIFANIO Youssef MD have participated in the daily care of this patient  and have seen and examined the patient today and agree with  the  evaluation, assessment and plan of the surgical house officer  EPIFANIO Youssef MD have personally seen and examined the patient at bedside today at  4 pm
EPIFANIO Youssef MD have participated in the daily care of this patient  and have seen and examined the patient today and agree with  the  evaluation, assessment and plan of the surgical house officer
I Estevan Youssef MD have participated in the daily care of this patient  and agree with  the  evaluation, assessment and plan of the surgical house officer

## 2024-08-09 NOTE — PROGRESS NOTE ADULT - PROBLEM SELECTOR PROBLEM 1
Arterial insufficiency with ischemic ulcer

## 2024-08-09 NOTE — PHYSICAL THERAPY INITIAL EVALUATION ADULT - ACTIVE RANGE OF MOTION EXAMINATION, REHAB EVAL
except R foot in ace wrap not formally assessed/bilateral upper extremity Active ROM was WFL (within functional limits)/bilateral  lower extremity Active ROM was WFL (within functional limits)

## 2024-08-09 NOTE — PHYSICAL THERAPY INITIAL EVALUATION ADULT - ADDITIONAL COMMENTS
Pt reports he lives with spouse in home with 3 stairs to entrance then bedroom and bathroom on first level.   Pt states independent in ADLs and functional mobility with AD prior to hospitalization, however occasionally ambulates with stick.

## 2024-08-09 NOTE — PROGRESS NOTE ADULT - ASSESSMENT
54M s/p right foot metatarsal 3 head resection and right foot wound debridement with kerecis graft application (DOS 8/8)  - Patient seen and evaluated   - Afebrile, no leukocytosis   - 8/8 s/p right foot metatarsal 3 head resection and wound debridement with kerecis graft application: graft intact, staples intact, no underlying hematoma. Left foot no open wounds or acute signs of infection   - Intraop findings: low concern for residual infection/ viability   - Patient is pod stable for discharge today   - Follow up information in discharge note provider   - Discussed with attending   54M s/p right foot metatarsal 3 head resection and right foot wound debridement with kerecis graft application (DOS 8/8)  - Patient seen and evaluated   - Afebrile, no leukocytosis   - 8/8 s/p right foot metatarsal 3 head resection and wound debridement with kerecis graft application: graft intact, staples intact, no underlying hematoma. Left foot no open wounds or acute signs of infection   - Intraop findings: low concern for residual infection/ viability   - Patient is pod stable for discharge today, Fri 8/9  - Follow up information in discharge note provider   - Discussed with attending

## 2024-08-14 LAB — SURGICAL PATHOLOGY STUDY: SIGNIFICANT CHANGE UP

## 2024-08-27 ENCOUNTER — APPOINTMENT (OUTPATIENT)
Dept: VASCULAR SURGERY | Facility: CLINIC | Age: 54
End: 2024-08-27
Payer: MEDICARE

## 2024-08-27 VITALS
WEIGHT: 165 LBS | BODY MASS INDEX: 25.9 KG/M2 | HEIGHT: 67 IN | SYSTOLIC BLOOD PRESSURE: 93 MMHG | TEMPERATURE: 97.6 F | HEART RATE: 82 BPM | DIASTOLIC BLOOD PRESSURE: 56 MMHG

## 2024-08-27 DIAGNOSIS — L98.499 STRICTURE OF ARTERY: ICD-10-CM

## 2024-08-27 DIAGNOSIS — I77.1 STRICTURE OF ARTERY: ICD-10-CM

## 2024-08-27 PROCEDURE — 99213 OFFICE O/P EST LOW 20 MIN: CPT

## 2024-08-27 NOTE — ASSESSMENT
[Arterial/Venous Disease] : arterial/venous disease [Medication Management] : medication management [Other: _____] : [unfilled] [Foot care/Footwear] : foot care/footwear [Ulcer Care] : ulcer care [FreeTextEntry1] : Impression symptomatic arterial insuff w rt foot ulcer w slow healing   Plan Med Conservative management continue Woundcare continue pletal 50 bid  f/u w Dr RADHA De Leon DPM  for rt foot woundcare ov w [place]/p[facility] s/o art insuff 12 mo July 2025 ov in1 mo to re eval rt foot  Letter faxed to Dr RADHA De Leon DPM

## 2024-08-27 NOTE — PHYSICAL EXAM
[1+] : left 1+ [Alert] : alert [Oriented to Person] : oriented to person [Oriented to Place] : oriented to place [Oriented to Time] : oriented to time [Calm] : calm [JVD] : no jugular venous distention  [Ankle Swelling (On Exam)] : not present [Varicose Veins Of Lower Extremities] : not present [] : not present [de-identified] : nad [de-identified] : wnl [de-identified] : no resp distress [FreeTextEntry1] : Mod  arterial insufficiency w moderate  trophic skin changes  RLE foot wound mid arch  6mm diam x 4mm w good granulation tissue  [de-identified] : wnl [de-identified] : Sae Cranial nerves 2-12 sae grossly intact [de-identified] : cooperative

## 2024-08-27 NOTE — HISTORY OF PRESENT ILLNESS
[FreeTextEntry1] : Pt c/o right foot ulcer onset more than 1 year ago   Pt c/o bilateral  leg  intermittent claudication at 3-4  blocks pt denies nocturnal leg or foot cramps    Pt denies any recent  cardiac c/o , SOB, Chest Pain or recent heart attacks  Pt is receiving  woundcare from Dr RADHA De Leon DPM     Pt with daughter  in attendance  Pt states rle ba/stent in  [place] more than 5 years ago  [de-identified] : pt is s/p rle angio  sig for small vessel dz continue pletal 50 bid pt states rt foot is feling better

## 2024-09-24 NOTE — PHYSICAL THERAPY INITIAL EVALUATION ADULT - REHAB POTENTIAL, PT EVAL
Primary Care/Behavioral Health Integration - Program Introduction    Patient referred to behavioral health by her primary care provider, Ranjan New MD for concerns related to anxiety, depression, mood disorder, and PTSD.  Contacted patient via phone to discuss the integration of behavioral health services into the primary care clinic and the provision of care coordination and/or short-term, brief interventions to improve overall health and functioning.     BHI program was explained and patient declined.  Reason for decline: Patient Requested In-Person Treatment    Screenings completed: None   Last four PHQ 2/9 Test Results  0: Not at all  1: Several days  2: More than half the days  3: Nearly every day           10/20/2021    10:30 AM 9/23/2021     7:30 AM 9/9/2021     3:30 PM 6/24/2021    11:00 AM   PHQ9 SCREENING FLOWSHEET   Adult PHQ2 Score 2 2 4 3   Adult PHQ2 Interpretation Minimal Depression Mild Depression Moderately Severe Depression Moderate Depression   Adult PHQ9 Score 4 5 15 14   Adult PHQ9 Interpretation No further screening needed No further screening needed Further screening needed Further screening needed   Little interest or pleasure in activity 1 1 2 2   Feeling down, depressed or hopeless 1 1 2 1   Trouble falling or staying asleep or sleeping all the time 1 0 3 2   Feeling tired or having little energy 0 1 3 2   Poor appetite or overeating 0 0 0 1   Feeling bad about yourself or that you are a failure or have let yourself or family down 1 1 1 2   Trouble concentrating on things such as reading hte newspaper or watching TV 0 1 3 2   Moving or speaking slowly that other people have noticed or the opposite - being so fidgety or restless that you have been moving around a lot more than usual 0 0 1 2   Thoughts that you would be better off dead or of hurting yourself in some way 0 0 0 0     Last four GAD7 Assessments           9/23/2024     1:30 PM 9/21/2024    11:39 AM 8/7/2024    10:45 AM  7/26/2024    12:12 PM   GAD7 Screening   GAD7 Score 13      Feeling nervous, anxious or on edge More than half the days      Not being able to stop or control worrying More than half the days      Worrying too much about different things Several days      Trouble relaxing More than half the days      Being so restless that it's hard to sit still More than half the days      Becoming easily annoyed or irritable More than half the days      Feeling afraid as if something awful might happen More than half the days      Ability to handle work, home and other people Very difficult      Date/time completed by patient via [employer] 9/21/2024 11:38 AM 9/21/2024 11:39 AM    9/21/2024 11:39 AM    9/21/2024 11:39 AM    9/21/2024 11:39 AM    9/21/2024 11:39 AM 8/6/2024  7:35 PM 7/26/2024 12:12 PM    7/26/2024 12:12 PM    7/26/2024 12:12 PM    7/26/2024 12:12 PM    7/26/2024 12:12 PM     [place] Suicide Severity Rating Scale          No imminent safety concerns reported or identified.      Patient was provided with contact information for behavioral health and encouraged to call with any questions or concerns in the interim.   good, to achieve stated therapy goals

## 2024-10-29 ENCOUNTER — APPOINTMENT (OUTPATIENT)
Dept: VASCULAR SURGERY | Facility: CLINIC | Age: 54
End: 2024-10-29

## 2024-11-12 ENCOUNTER — APPOINTMENT (OUTPATIENT)
Dept: VASCULAR SURGERY | Facility: CLINIC | Age: 54
End: 2024-11-12
Payer: MEDICARE

## 2024-11-12 VITALS
BODY MASS INDEX: 25.9 KG/M2 | SYSTOLIC BLOOD PRESSURE: 127 MMHG | WEIGHT: 165 LBS | HEART RATE: 111 BPM | TEMPERATURE: 98 F | HEIGHT: 67 IN | DIASTOLIC BLOOD PRESSURE: 78 MMHG

## 2024-11-12 DIAGNOSIS — I77.1 STRICTURE OF ARTERY: ICD-10-CM

## 2024-11-12 DIAGNOSIS — I83.893 VARICOSE VEINS OF BILATERAL LOWER EXTREMITIES WITH OTHER COMPLICATIONS: ICD-10-CM

## 2024-11-12 PROCEDURE — ZZZZZ: CPT

## 2024-11-12 PROCEDURE — 93970 EXTREMITY STUDY: CPT

## 2024-11-12 PROCEDURE — 99214 OFFICE O/P EST MOD 30 MIN: CPT

## 2025-01-06 ENCOUNTER — APPOINTMENT (OUTPATIENT)
Dept: VASCULAR SURGERY | Facility: CLINIC | Age: 55
End: 2025-01-06

## 2025-02-17 NOTE — PHYSICAL THERAPY INITIAL EVALUATION ADULT - SIT-TO-STAND BALANCE
Requested medication(s) are due for refill today: Yes  Patient has already received a courtesy refill: No  Other reason request has been forwarded to provider:    fair plus

## 2025-03-04 ENCOUNTER — INPATIENT (INPATIENT)
Facility: HOSPITAL | Age: 55
LOS: 6 days | Discharge: HOME | DRG: 240 | End: 2025-03-11
Attending: HOSPITALIST | Admitting: HOSPITALIST
Payer: SELF-PAY

## 2025-03-04 VITALS
HEART RATE: 114 BPM | HEIGHT: 64 IN | DIASTOLIC BLOOD PRESSURE: 76 MMHG | SYSTOLIC BLOOD PRESSURE: 116 MMHG | OXYGEN SATURATION: 98 % | RESPIRATION RATE: 18 BRPM | WEIGHT: 160.06 LBS | TEMPERATURE: 98 F

## 2025-03-04 DIAGNOSIS — Z98.61 CORONARY ANGIOPLASTY STATUS: Chronic | ICD-10-CM

## 2025-03-04 LAB
ALBUMIN SERPL ELPH-MCNC: 3.3 G/DL — SIGNIFICANT CHANGE UP (ref 3.3–5)
ALP SERPL-CCNC: 119 U/L — SIGNIFICANT CHANGE UP (ref 40–120)
ALT FLD-CCNC: 9 U/L — LOW (ref 10–45)
ANION GAP SERPL CALC-SCNC: 18 MMOL/L — HIGH (ref 5–17)
AST SERPL-CCNC: 10 U/L — SIGNIFICANT CHANGE UP (ref 10–40)
B-OH-BUTYR SERPL-SCNC: 2.9 MMOL/L — HIGH
BASE EXCESS BLDV CALC-SCNC: -1.5 MMOL/L — SIGNIFICANT CHANGE UP (ref -2–3)
BASOPHILS # BLD AUTO: 0.03 K/UL — SIGNIFICANT CHANGE UP (ref 0–0.2)
BASOPHILS NFR BLD AUTO: 0.2 % — SIGNIFICANT CHANGE UP (ref 0–2)
BILIRUB SERPL-MCNC: 0.6 MG/DL — SIGNIFICANT CHANGE UP (ref 0.2–1.2)
BUN SERPL-MCNC: 18 MG/DL — SIGNIFICANT CHANGE UP (ref 7–23)
CALCIUM SERPL-MCNC: 9.5 MG/DL — SIGNIFICANT CHANGE UP (ref 8.4–10.5)
CHLORIDE SERPL-SCNC: 92 MMOL/L — LOW (ref 96–108)
CO2 BLDV-SCNC: 26 MMOL/L — SIGNIFICANT CHANGE UP (ref 22–26)
CO2 SERPL-SCNC: 21 MMOL/L — LOW (ref 22–31)
CREAT SERPL-MCNC: 0.84 MG/DL — SIGNIFICANT CHANGE UP (ref 0.5–1.3)
EGFR: 103 ML/MIN/1.73M2 — SIGNIFICANT CHANGE UP
EGFR: 103 ML/MIN/1.73M2 — SIGNIFICANT CHANGE UP
EOSINOPHIL # BLD AUTO: 0.01 K/UL — SIGNIFICANT CHANGE UP (ref 0–0.5)
EOSINOPHIL NFR BLD AUTO: 0.1 % — SIGNIFICANT CHANGE UP (ref 0–6)
GAS PNL BLDV: SIGNIFICANT CHANGE UP
GLUCOSE SERPL-MCNC: 409 MG/DL — HIGH (ref 70–99)
HCO3 BLDV-SCNC: 25 MMOL/L — SIGNIFICANT CHANGE UP (ref 22–29)
HCT VFR BLD CALC: 41.5 % — SIGNIFICANT CHANGE UP (ref 39–50)
HGB BLD-MCNC: 13.4 G/DL — SIGNIFICANT CHANGE UP (ref 13–17)
IMM GRANULOCYTES NFR BLD AUTO: 0.6 % — SIGNIFICANT CHANGE UP (ref 0–0.9)
LYMPHOCYTES # BLD AUTO: 0.97 K/UL — LOW (ref 1–3.3)
LYMPHOCYTES # BLD AUTO: 7.1 % — LOW (ref 13–44)
MAGNESIUM SERPL-MCNC: 2.1 MG/DL — SIGNIFICANT CHANGE UP (ref 1.6–2.6)
MCHC RBC-ENTMCNC: 26.2 PG — LOW (ref 27–34)
MCHC RBC-ENTMCNC: 32.3 G/DL — SIGNIFICANT CHANGE UP (ref 32–36)
MCV RBC AUTO: 81.1 FL — SIGNIFICANT CHANGE UP (ref 80–100)
MONOCYTES # BLD AUTO: 0.71 K/UL — SIGNIFICANT CHANGE UP (ref 0–0.9)
MONOCYTES NFR BLD AUTO: 5.2 % — SIGNIFICANT CHANGE UP (ref 2–14)
NEUTROPHILS # BLD AUTO: 11.85 K/UL — HIGH (ref 1.8–7.4)
NEUTROPHILS NFR BLD AUTO: 86.8 % — HIGH (ref 43–77)
NRBC BLD AUTO-RTO: 0 /100 WBCS — SIGNIFICANT CHANGE UP (ref 0–0)
PCO2 BLDV: 47 MMHG — SIGNIFICANT CHANGE UP (ref 42–55)
PH BLDV: 7.33 — SIGNIFICANT CHANGE UP (ref 7.32–7.43)
PHOSPHATE SERPL-MCNC: 4 MG/DL — SIGNIFICANT CHANGE UP (ref 2.5–4.5)
PLATELET # BLD AUTO: 250 K/UL — SIGNIFICANT CHANGE UP (ref 150–400)
PO2 BLDV: 22 MMHG — LOW (ref 25–45)
POTASSIUM SERPL-MCNC: 4.8 MMOL/L — SIGNIFICANT CHANGE UP (ref 3.5–5.3)
POTASSIUM SERPL-SCNC: 4.8 MMOL/L — SIGNIFICANT CHANGE UP (ref 3.5–5.3)
PROT SERPL-MCNC: 7.7 G/DL — SIGNIFICANT CHANGE UP (ref 6–8.3)
RBC # BLD: 5.12 M/UL — SIGNIFICANT CHANGE UP (ref 4.2–5.8)
RBC # FLD: 12.2 % — SIGNIFICANT CHANGE UP (ref 10.3–14.5)
SAO2 % BLDV: 27.7 % — LOW (ref 67–88)
SODIUM SERPL-SCNC: 131 MMOL/L — LOW (ref 135–145)
WBC # BLD: 13.65 K/UL — HIGH (ref 3.8–10.5)
WBC # FLD AUTO: 13.65 K/UL — HIGH (ref 3.8–10.5)

## 2025-03-04 PROCEDURE — 73630 X-RAY EXAM OF FOOT: CPT | Mod: 26,RT

## 2025-03-04 PROCEDURE — 99285 EMERGENCY DEPT VISIT HI MDM: CPT

## 2025-03-04 PROCEDURE — 93971 EXTREMITY STUDY: CPT | Mod: 26,RT

## 2025-03-04 RX ORDER — VANCOMYCIN HCL IN 5 % DEXTROSE 1.5G/250ML
1000 PLASTIC BAG, INJECTION (ML) INTRAVENOUS ONCE
Refills: 0 | Status: COMPLETED | OUTPATIENT
Start: 2025-03-04 | End: 2025-03-04

## 2025-03-04 RX ORDER — PIPERACILLIN-TAZO-DEXTROSE,ISO 3.375G/5
3.38 IV SOLUTION, PIGGYBACK PREMIX FROZEN(ML) INTRAVENOUS ONCE
Refills: 0 | Status: COMPLETED | OUTPATIENT
Start: 2025-03-04 | End: 2025-03-04

## 2025-03-04 NOTE — ED PROVIDER NOTE - CLINICAL SUMMARY MEDICAL DECISION MAKING FREE TEXT BOX
(Katrina Unger MD-PGY1): 54yoM with a history of DM, HTN, CAD s/p CABG x3, HF (EF 45-50% 2024), PAD s/p RLE angioplasty/stent and s/p R 3rd metatarsal head resection, submetatarsal debridement, and kerecis graft, who presents to the ER for evaluation of hyperglycemia and foot pain with difficulty ambulating. Patient reports he has elevated blood sugars and is not feeling very well.  States he has subjective fever and chills and has lightheadedness when he stands. Also reporting right foot wound, foot pain, leg pain into the knee.  He has had right foot surgeries and chronic open wound, but this improved 3 weeks ago, and is now worsening again.  Denies chest pain, shortness of breath, headache, belly pain. Also endorses incontinence of urine but not bowel at night. No urinary pain. No other symptoms to report.     *** (Katrina Unger MD-PGY1): 54yoM with a history of DM, HTN, CAD s/p CABG x3, HF (EF 45-50% 2024), PAD s/p RLE angioplasty/stent and s/p R 3rd metatarsal head resection, submetatarsal debridement, and kerecis graft, who presents to the ER for evaluation of hyperglycemia and foot pain with difficulty ambulating. Patient reports he has elevated blood sugars and is not feeling very well.  States he has subjective fever and chills and has lightheadedness when he stands. Also reporting right foot wound, foot pain, leg pain into the knee.  He has had right foot surgeries and chronic open wound, but this improved 3 weeks ago, and is now worsening again.  Denies chest pain, shortness of breath, headache, belly pain. Also endorses incontinence of urine but not bowel at night. No urinary pain. No other symptoms to report.    Vitals on arrival notable for , afebrile orally, BP appropriate, normal oxygenation. Physical notable for R foot wound on base was open and deep, no drainage or bleeding. Also with erythema and mild swelling of R foot. Pt endorsing numbness of middle toe of foot, but able to move toes. DP/TP very weak and difficult to palpate, but doppler showing there is blood supply to foot. Exam otherwise nonactionable: rrr, clear lungs, nontender abd, supple neck, no edema. DDx: foot infection, OM, cellulitis, DVT, fracture, all in the setting of known PAD which could be impacting him as well. Pt additionally has hyperglycemia and it's possible this is a result of foot infection.     Will do labs for infection and evaluate for DKA. Planning on XR of foot and DVT study with podiatry consult. May need to involve vascular at some point. Patient will also require admission for foot wound and to manage blood glucose. Pt amenable to this and had no further questions or concerns. Spoke with his daughter, Kush, who also corroborated story and understood plan. Updates in progress note section.

## 2025-03-04 NOTE — ED PROVIDER NOTE - PROGRESS NOTE DETAILS
Attending MD Rod: Patient signed out to Dr. Milton pending results of lab work x-ray and podiatry and vascular surgery consultations.  Recommend admission to hospital for infected foot wound (Katrina Unger MD-PGY1): XR performed and on my read with attg, does not appear to have any gas on radiograph. Podiatry consulted now imaging has occurred and will see patient. He is pending lab results and formal imaging reads and admission. CMP with normal K and will start on home regimen per last endo note: Lantus 50U and ADMELOG 20U. Likely to add more fluids as well though will reassess once he finishes 500mL bolus as pt has EF 45% 6m ago. (Katrina Unger MD-PGY1): XR performed and on my read with attg, does not appear to have any gas on radiograph. Podiatry consulted now imaging has occurred and will see patient. He is pending lab results and formal imaging reads and admission. CMP with normal K and will start on home regimen per last endo note: Lantus 50U and Admelog.  Likely to add more fluids as well though will reassess once he finishes 500mL bolus as pt has EF 45% 6m ago. (Katrina Unger MD-PGY1): Patient remains stable and comfortable. Podiatry recs for admission for better management of foot wound. Pending MRI. Now he is ordered for insulin moderate scale q6h and just received 12U for FS 420s. Will admit to medicine, Dr. Myles has graciously accepted. Pt is getting repeat bmp and vbg at around 0400 after insulin/fluids.

## 2025-03-04 NOTE — ED PROVIDER NOTE - OTHER FINDINGS
ECG recorded at 10:09 PM independently interpreted by me , Dr Ankit Rod,  at 10:47 PM shows sinus tachycardia normal axis normal intervals no acute diagnostic significant ST-T

## 2025-03-04 NOTE — ED PROVIDER NOTE - ATTENDING CONTRIBUTION TO CARE
Attending MD Rod:  I have seen and examined this patient and fully participated in the care of this patient as the teaching attending. I personally made/approved the management plan and take responsibility for the patient management.      54-year-old gentleman with history of diabetes hypertension hyperlipidemia CAD peripheral arterial disease presents for evaluation of acute on chronic pain from a wound to the right foot.  He is also having subjective chills.  Patient follows with an outpatient podiatrist however he has an appointment tomorrow and has not seen him as of yet.    Patient's vital signs are notable for tachycardia heart rate 114 otherwise nonactionable.  Patient is sitting the stretcher in no apparent acute distress.  Heart sounds tachycardic but regular no obvious murmur.  Clear lungs anteriorly.  Examination of the right lower extremity reveals swelling of the right foot, there is erythema to the dorsum of the distal right foot with associated mild tenderness to palpation, on the distal mid plantar aspect of the foot there is a circular partial-thickness wound approximately 1.5 cm x 1.5 cm no surrounding erythema fluctuance or tenderness to palpation.  DP pulses are not palpable in both feet but both feet are symmetrically warm to the touch.    Patient presenting for ongoing wound to the right foot, presentation seems most consistent with infection of ulcer, could also be consistent with deeper space infection or osteomyelitis.  Plan at this time will be to obtain x-ray of the right foot ESR/CRP, vascular surgery and podiatry consultations, will initiate broad-spectrum antibiotics           *The above represents an initial assessment/impression. Please refer to progress notes for potential changes in patient clinical course*

## 2025-03-05 DIAGNOSIS — I25.10 ATHEROSCLEROTIC HEART DISEASE OF NATIVE CORONARY ARTERY WITHOUT ANGINA PECTORIS: ICD-10-CM

## 2025-03-05 DIAGNOSIS — I77.1 STRICTURE OF ARTERY: ICD-10-CM

## 2025-03-05 DIAGNOSIS — E11.9 TYPE 2 DIABETES MELLITUS WITHOUT COMPLICATIONS: ICD-10-CM

## 2025-03-05 DIAGNOSIS — R73.9 HYPERGLYCEMIA, UNSPECIFIED: ICD-10-CM

## 2025-03-05 DIAGNOSIS — E11.621 TYPE 2 DIABETES MELLITUS WITH FOOT ULCER: ICD-10-CM

## 2025-03-05 DIAGNOSIS — E78.5 HYPERLIPIDEMIA, UNSPECIFIED: ICD-10-CM

## 2025-03-05 DIAGNOSIS — I73.9 PERIPHERAL VASCULAR DISEASE, UNSPECIFIED: ICD-10-CM

## 2025-03-05 DIAGNOSIS — E11.65 TYPE 2 DIABETES MELLITUS WITH HYPERGLYCEMIA: ICD-10-CM

## 2025-03-05 LAB
ADD ON TEST-SPECIMEN IN LAB: SIGNIFICANT CHANGE UP
ANION GAP SERPL CALC-SCNC: 13 MMOL/L — SIGNIFICANT CHANGE UP (ref 5–17)
ANION GAP SERPL CALC-SCNC: 15 MMOL/L — SIGNIFICANT CHANGE UP (ref 5–17)
APPEARANCE UR: CLEAR — SIGNIFICANT CHANGE UP
B-OH-BUTYR SERPL-SCNC: 0.6 MMOL/L — HIGH
BACTERIA # UR AUTO: NEGATIVE /HPF — SIGNIFICANT CHANGE UP
BILIRUB UR-MCNC: NEGATIVE — SIGNIFICANT CHANGE UP
BUN SERPL-MCNC: 12 MG/DL — SIGNIFICANT CHANGE UP (ref 7–23)
BUN SERPL-MCNC: 15 MG/DL — SIGNIFICANT CHANGE UP (ref 7–23)
CALCIUM SERPL-MCNC: 8.7 MG/DL — SIGNIFICANT CHANGE UP (ref 8.4–10.5)
CALCIUM SERPL-MCNC: 9.1 MG/DL — SIGNIFICANT CHANGE UP (ref 8.4–10.5)
CAST: 0 /LPF — SIGNIFICANT CHANGE UP (ref 0–4)
CHLORIDE SERPL-SCNC: 94 MMOL/L — LOW (ref 96–108)
CHLORIDE SERPL-SCNC: 95 MMOL/L — LOW (ref 96–108)
CO2 SERPL-SCNC: 23 MMOL/L — SIGNIFICANT CHANGE UP (ref 22–31)
CO2 SERPL-SCNC: 25 MMOL/L — SIGNIFICANT CHANGE UP (ref 22–31)
COLOR SPEC: YELLOW — SIGNIFICANT CHANGE UP
CREAT SERPL-MCNC: 0.72 MG/DL — SIGNIFICANT CHANGE UP (ref 0.5–1.3)
CREAT SERPL-MCNC: 0.86 MG/DL — SIGNIFICANT CHANGE UP (ref 0.5–1.3)
DIFF PNL FLD: NEGATIVE — SIGNIFICANT CHANGE UP
EGFR: 102 ML/MIN/1.73M2 — SIGNIFICANT CHANGE UP
EGFR: 102 ML/MIN/1.73M2 — SIGNIFICANT CHANGE UP
EGFR: 108 ML/MIN/1.73M2 — SIGNIFICANT CHANGE UP
EGFR: 108 ML/MIN/1.73M2 — SIGNIFICANT CHANGE UP
FLUAV AG NPH QL: SIGNIFICANT CHANGE UP
FLUBV AG NPH QL: SIGNIFICANT CHANGE UP
GAS PNL BLDV: SIGNIFICANT CHANGE UP
GAS PNL BLDV: SIGNIFICANT CHANGE UP
GLUCOSE BLDC GLUCOMTR-MCNC: 127 MG/DL — HIGH (ref 70–99)
GLUCOSE BLDC GLUCOMTR-MCNC: 273 MG/DL — HIGH (ref 70–99)
GLUCOSE BLDC GLUCOMTR-MCNC: 289 MG/DL — HIGH (ref 70–99)
GLUCOSE SERPL-MCNC: 232 MG/DL — HIGH (ref 70–99)
GLUCOSE SERPL-MCNC: 317 MG/DL — HIGH (ref 70–99)
GLUCOSE UR QL: 500 MG/DL
GRAM STN FLD: ABNORMAL
HCT VFR BLD CALC: 38.6 % — LOW (ref 39–50)
HGB BLD-MCNC: 12.8 G/DL — LOW (ref 13–17)
KETONES UR-MCNC: 15 MG/DL
LEUKOCYTE ESTERASE UR-ACNC: NEGATIVE — SIGNIFICANT CHANGE UP
MCHC RBC-ENTMCNC: 26.2 PG — LOW (ref 27–34)
MCHC RBC-ENTMCNC: 33.2 G/DL — SIGNIFICANT CHANGE UP (ref 32–36)
MCV RBC AUTO: 78.9 FL — LOW (ref 80–100)
NITRITE UR-MCNC: NEGATIVE — SIGNIFICANT CHANGE UP
NRBC BLD AUTO-RTO: 0 /100 WBCS — SIGNIFICANT CHANGE UP (ref 0–0)
PH UR: 6.5 — SIGNIFICANT CHANGE UP (ref 5–8)
PLATELET # BLD AUTO: 260 K/UL — SIGNIFICANT CHANGE UP (ref 150–400)
POTASSIUM SERPL-MCNC: 3.9 MMOL/L — SIGNIFICANT CHANGE UP (ref 3.5–5.3)
POTASSIUM SERPL-MCNC: 4.1 MMOL/L — SIGNIFICANT CHANGE UP (ref 3.5–5.3)
POTASSIUM SERPL-SCNC: 3.9 MMOL/L — SIGNIFICANT CHANGE UP (ref 3.5–5.3)
POTASSIUM SERPL-SCNC: 4.1 MMOL/L — SIGNIFICANT CHANGE UP (ref 3.5–5.3)
PROT UR-MCNC: 300 MG/DL
RBC # BLD: 4.89 M/UL — SIGNIFICANT CHANGE UP (ref 4.2–5.8)
RBC # FLD: 12.3 % — SIGNIFICANT CHANGE UP (ref 10.3–14.5)
RBC CASTS # UR COMP ASSIST: 2 /HPF — SIGNIFICANT CHANGE UP (ref 0–4)
RSV RNA NPH QL NAA+NON-PROBE: SIGNIFICANT CHANGE UP
SARS-COV-2 RNA SPEC QL NAA+PROBE: SIGNIFICANT CHANGE UP
SODIUM SERPL-SCNC: 131 MMOL/L — LOW (ref 135–145)
SODIUM SERPL-SCNC: 134 MMOL/L — LOW (ref 135–145)
SP GR SPEC: >1.03 — HIGH (ref 1–1.03)
SPECIMEN SOURCE: SIGNIFICANT CHANGE UP
SQUAMOUS # UR AUTO: 2 /HPF — SIGNIFICANT CHANGE UP (ref 0–5)
UROBILINOGEN FLD QL: 1 MG/DL — SIGNIFICANT CHANGE UP (ref 0.2–1)
WBC # BLD: 9.71 K/UL — SIGNIFICANT CHANGE UP (ref 3.8–10.5)
WBC # FLD AUTO: 9.71 K/UL — SIGNIFICANT CHANGE UP (ref 3.8–10.5)
WBC UR QL: 1 /HPF — SIGNIFICANT CHANGE UP (ref 0–5)

## 2025-03-05 PROCEDURE — 71045 X-RAY EXAM CHEST 1 VIEW: CPT | Mod: 26

## 2025-03-05 PROCEDURE — 99223 1ST HOSP IP/OBS HIGH 75: CPT

## 2025-03-05 PROCEDURE — 73720 MRI LWR EXTREMITY W/O&W/DYE: CPT | Mod: 26,RT

## 2025-03-05 PROCEDURE — 99497 ADVNCD CARE PLAN 30 MIN: CPT | Mod: 25

## 2025-03-05 PROCEDURE — 93923 UPR/LXTR ART STDY 3+ LVLS: CPT | Mod: 26

## 2025-03-05 PROCEDURE — 99221 1ST HOSP IP/OBS SF/LOW 40: CPT

## 2025-03-05 RX ORDER — DEXTROSE 50 % IN WATER 50 %
25 SYRINGE (ML) INTRAVENOUS ONCE
Refills: 0 | Status: DISCONTINUED | OUTPATIENT
Start: 2025-03-05 | End: 2025-03-07

## 2025-03-05 RX ORDER — METOPROLOL SUCCINATE 50 MG/1
25 TABLET, EXTENDED RELEASE ORAL
Refills: 0 | Status: DISCONTINUED | OUTPATIENT
Start: 2025-03-05 | End: 2025-03-06

## 2025-03-05 RX ORDER — ACETAMINOPHEN 500 MG/5ML
1000 LIQUID (ML) ORAL ONCE
Refills: 0 | Status: COMPLETED | OUTPATIENT
Start: 2025-03-05 | End: 2025-03-05

## 2025-03-05 RX ORDER — VANCOMYCIN HCL IN 5 % DEXTROSE 1.5G/250ML
1000 PLASTIC BAG, INJECTION (ML) INTRAVENOUS ONCE
Refills: 0 | Status: COMPLETED | OUTPATIENT
Start: 2025-03-05 | End: 2025-03-05

## 2025-03-05 RX ORDER — PIPERACILLIN-TAZO-DEXTROSE,ISO 3.375G/5
3.38 IV SOLUTION, PIGGYBACK PREMIX FROZEN(ML) INTRAVENOUS ONCE
Refills: 0 | Status: COMPLETED | OUTPATIENT
Start: 2025-03-05 | End: 2025-03-05

## 2025-03-05 RX ORDER — DEXTROSE 50 % IN WATER 50 %
15 SYRINGE (ML) INTRAVENOUS ONCE
Refills: 0 | Status: DISCONTINUED | OUTPATIENT
Start: 2025-03-05 | End: 2025-03-07

## 2025-03-05 RX ORDER — INSULIN LISPRO 100 U/ML
INJECTION, SOLUTION INTRAVENOUS; SUBCUTANEOUS AT BEDTIME
Refills: 0 | Status: DISCONTINUED | OUTPATIENT
Start: 2025-03-05 | End: 2025-03-05

## 2025-03-05 RX ORDER — PIPERACILLIN-TAZO-DEXTROSE,ISO 3.375G/5
3.38 IV SOLUTION, PIGGYBACK PREMIX FROZEN(ML) INTRAVENOUS ONCE
Refills: 0 | Status: DISCONTINUED | OUTPATIENT
Start: 2025-03-05 | End: 2025-03-05

## 2025-03-05 RX ORDER — SODIUM CHLORIDE 9 G/1000ML
1000 INJECTION, SOLUTION INTRAVENOUS
Refills: 0 | Status: DISCONTINUED | OUTPATIENT
Start: 2025-03-05 | End: 2025-03-07

## 2025-03-05 RX ORDER — CILOSTAZOL 50 MG/1
50 TABLET ORAL
Refills: 0 | Status: DISCONTINUED | OUTPATIENT
Start: 2025-03-05 | End: 2025-03-07

## 2025-03-05 RX ORDER — INSULIN LISPRO 100 U/ML
INJECTION, SOLUTION INTRAVENOUS; SUBCUTANEOUS AT BEDTIME
Refills: 0 | Status: DISCONTINUED | OUTPATIENT
Start: 2025-03-05 | End: 2025-03-07

## 2025-03-05 RX ORDER — GLUCAGON 3 MG/1
1 POWDER NASAL ONCE
Refills: 0 | Status: DISCONTINUED | OUTPATIENT
Start: 2025-03-05 | End: 2025-03-05

## 2025-03-05 RX ORDER — INSULIN LISPRO 100 U/ML
INJECTION, SOLUTION INTRAVENOUS; SUBCUTANEOUS
Refills: 0 | Status: DISCONTINUED | OUTPATIENT
Start: 2025-03-05 | End: 2025-03-07

## 2025-03-05 RX ORDER — ASPIRIN 325 MG
81 TABLET ORAL DAILY
Refills: 0 | Status: DISCONTINUED | OUTPATIENT
Start: 2025-03-05 | End: 2025-03-07

## 2025-03-05 RX ORDER — INSULIN LISPRO 100 U/ML
INJECTION, SOLUTION INTRAVENOUS; SUBCUTANEOUS EVERY 6 HOURS
Refills: 0 | Status: DISCONTINUED | OUTPATIENT
Start: 2025-03-05 | End: 2025-03-05

## 2025-03-05 RX ORDER — COLLAGENASE CLOSTRIDIUM HIST. 250 UNIT/G
1 OINTMENT (GRAM) TOPICAL ONCE
Refills: 0 | Status: COMPLETED | OUTPATIENT
Start: 2025-03-05 | End: 2025-03-05

## 2025-03-05 RX ORDER — INSULIN GLARGINE-YFGN 100 [IU]/ML
50 INJECTION, SOLUTION SUBCUTANEOUS ONCE
Refills: 0 | Status: COMPLETED | OUTPATIENT
Start: 2025-03-05 | End: 2025-03-05

## 2025-03-05 RX ORDER — ENOXAPARIN SODIUM 100 MG/ML
40 INJECTION SUBCUTANEOUS EVERY 24 HOURS
Refills: 0 | Status: DISCONTINUED | OUTPATIENT
Start: 2025-03-05 | End: 2025-03-07

## 2025-03-05 RX ORDER — DEXTROSE 50 % IN WATER 50 %
12.5 SYRINGE (ML) INTRAVENOUS ONCE
Refills: 0 | Status: DISCONTINUED | OUTPATIENT
Start: 2025-03-05 | End: 2025-03-07

## 2025-03-05 RX ORDER — DEXTROSE 50 % IN WATER 50 %
12.5 SYRINGE (ML) INTRAVENOUS ONCE
Refills: 0 | Status: DISCONTINUED | OUTPATIENT
Start: 2025-03-05 | End: 2025-03-05

## 2025-03-05 RX ORDER — PIPERACILLIN-TAZO-DEXTROSE,ISO 3.375G/5
3.38 IV SOLUTION, PIGGYBACK PREMIX FROZEN(ML) INTRAVENOUS EVERY 8 HOURS
Refills: 0 | Status: DISCONTINUED | OUTPATIENT
Start: 2025-03-06 | End: 2025-03-07

## 2025-03-05 RX ORDER — OXYCODONE HYDROCHLORIDE 30 MG/1
5 TABLET ORAL EVERY 6 HOURS
Refills: 0 | Status: DISCONTINUED | OUTPATIENT
Start: 2025-03-05 | End: 2025-03-07

## 2025-03-05 RX ORDER — INSULIN LISPRO 100 U/ML
INJECTION, SOLUTION INTRAVENOUS; SUBCUTANEOUS
Refills: 0 | Status: DISCONTINUED | OUTPATIENT
Start: 2025-03-05 | End: 2025-03-05

## 2025-03-05 RX ORDER — PIPERACILLIN-TAZO-DEXTROSE,ISO 3.375G/5
3.38 IV SOLUTION, PIGGYBACK PREMIX FROZEN(ML) INTRAVENOUS ONCE
Refills: 0 | Status: COMPLETED | OUTPATIENT
Start: 2025-03-06 | End: 2025-03-06

## 2025-03-05 RX ORDER — INSULIN GLARGINE-YFGN 100 [IU]/ML
50 INJECTION, SOLUTION SUBCUTANEOUS AT BEDTIME
Refills: 0 | Status: DISCONTINUED | OUTPATIENT
Start: 2025-03-05 | End: 2025-03-06

## 2025-03-05 RX ORDER — GLUCAGON 3 MG/1
1 POWDER NASAL ONCE
Refills: 0 | Status: DISCONTINUED | OUTPATIENT
Start: 2025-03-05 | End: 2025-03-07

## 2025-03-05 RX ORDER — DEXTROSE 50 % IN WATER 50 %
25 SYRINGE (ML) INTRAVENOUS ONCE
Refills: 0 | Status: DISCONTINUED | OUTPATIENT
Start: 2025-03-05 | End: 2025-03-05

## 2025-03-05 RX ORDER — ACETAMINOPHEN 500 MG/5ML
650 LIQUID (ML) ORAL EVERY 6 HOURS
Refills: 0 | Status: DISCONTINUED | OUTPATIENT
Start: 2025-03-05 | End: 2025-03-07

## 2025-03-05 RX ORDER — ATORVASTATIN CALCIUM 80 MG/1
80 TABLET, FILM COATED ORAL AT BEDTIME
Refills: 0 | Status: DISCONTINUED | OUTPATIENT
Start: 2025-03-05 | End: 2025-03-07

## 2025-03-05 RX ORDER — DEXTROSE 50 % IN WATER 50 %
15 SYRINGE (ML) INTRAVENOUS ONCE
Refills: 0 | Status: DISCONTINUED | OUTPATIENT
Start: 2025-03-05 | End: 2025-03-05

## 2025-03-05 RX ORDER — SODIUM CHLORIDE 9 G/1000ML
1000 INJECTION, SOLUTION INTRAVENOUS
Refills: 0 | Status: DISCONTINUED | OUTPATIENT
Start: 2025-03-05 | End: 2025-03-05

## 2025-03-05 RX ORDER — INSULIN LISPRO 100 U/ML
15 INJECTION, SOLUTION INTRAVENOUS; SUBCUTANEOUS
Refills: 0 | Status: DISCONTINUED | OUTPATIENT
Start: 2025-03-05 | End: 2025-03-06

## 2025-03-05 RX ADMIN — Medication 200 GRAM(S): at 02:51

## 2025-03-05 RX ADMIN — Medication 250 MILLIGRAM(S): at 00:37

## 2025-03-05 RX ADMIN — CILOSTAZOL 50 MILLIGRAM(S): 50 TABLET ORAL at 21:38

## 2025-03-05 RX ADMIN — INSULIN GLARGINE-YFGN 50 UNIT(S): 100 INJECTION, SOLUTION SUBCUTANEOUS at 22:25

## 2025-03-05 RX ADMIN — ATORVASTATIN CALCIUM 80 MILLIGRAM(S): 80 TABLET, FILM COATED ORAL at 21:37

## 2025-03-05 RX ADMIN — Medication 1 APPLICATION(S): at 06:32

## 2025-03-05 RX ADMIN — Medication 500 MILLILITER(S): at 00:36

## 2025-03-05 RX ADMIN — Medication 1000 MILLIGRAM(S): at 01:37

## 2025-03-05 RX ADMIN — Medication 1000 MILLIGRAM(S): at 01:06

## 2025-03-05 RX ADMIN — METOPROLOL SUCCINATE 25 MILLIGRAM(S): 50 TABLET, EXTENDED RELEASE ORAL at 21:38

## 2025-03-05 RX ADMIN — Medication 3.38 GRAM(S): at 03:30

## 2025-03-05 RX ADMIN — Medication 250 MILLIGRAM(S): at 23:51

## 2025-03-05 RX ADMIN — Medication 400 MILLIGRAM(S): at 20:50

## 2025-03-05 RX ADMIN — INSULIN LISPRO 12: 100 INJECTION, SOLUTION INTRAVENOUS; SUBCUTANEOUS at 02:51

## 2025-03-05 RX ADMIN — Medication 200 GRAM(S): at 22:25

## 2025-03-05 RX ADMIN — INSULIN LISPRO 15 UNIT(S): 100 INJECTION, SOLUTION INTRAVENOUS; SUBCUTANEOUS at 13:26

## 2025-03-05 RX ADMIN — INSULIN GLARGINE-YFGN 50 UNIT(S): 100 INJECTION, SOLUTION SUBCUTANEOUS at 00:38

## 2025-03-05 RX ADMIN — Medication 1000 MILLIGRAM(S): at 00:50

## 2025-03-05 RX ADMIN — Medication 81 MILLIGRAM(S): at 13:25

## 2025-03-05 RX ADMIN — INSULIN LISPRO 6: 100 INJECTION, SOLUTION INTRAVENOUS; SUBCUTANEOUS at 06:30

## 2025-03-05 RX ADMIN — Medication 400 MILLIGRAM(S): at 00:36

## 2025-03-05 RX ADMIN — Medication 1000 MILLIGRAM(S): at 21:46

## 2025-03-05 RX ADMIN — INSULIN LISPRO 1: 100 INJECTION, SOLUTION INTRAVENOUS; SUBCUTANEOUS at 22:25

## 2025-03-05 NOTE — ED ADULT NURSE NOTE - OBJECTIVE STATEMENT
The patient is a 54y male presenting to the ED from home for complaints of foot and leg pain, as well as evaluation of hyperglycemia. Patient presents with a PMH of CAD, HLD, DM2, HF, and 3rd Metatarsal Toe Head Resection. Patient states for the last 3wks he's been having worsening right foot pain, with a noted chronic wound beneath the right 2nd toe. Upon assessment right foot presents discolored, edematous and TTP. The chronic right foot wound noted to be deep, reddened, and presents with minimal drainage to site. Patient notes severe pain to the bilateral feet and bilat pulses to feet noted on doppler, but he is able to move toes. He also states outside of the foot issues he's been noting high blood sugars at home and has not been feeling well. He notes subjective fevers and chills, as well as lightheadedness when standing. Upon assessment Pt denies chest pain, palpitations, shortness of breath, headache, visual disturbances,  fever, chills, diaphoresis,  nausea, vomiting, constipation, diarrhea, or urinary symptoms at this time. Safety and comfort measures provided, bed locked and in lowest position, side rails up for safety. Awaiting results and update on the ongoing plan of care. The patient is a 54y male presenting to the ED from home for complaints of foot and leg pain, as well as evaluation of hyperglycemia. Patient presents with a PMH of CAD, HLD, DM2, HF, and 3rd Metatarsal Toe Head Resection. Patient states for the last 3wks he's been having worsening right foot pain, with a noted chronic wound beneath the Left  Foot 2nd toe. Upon assessment right foot presents discolored, edematous and TTP. The chronic left foot wound noted to be deep, reddened, and presents with minimal drainage to site. Patient notes severe pain to the bilateral feet and bilat pulses to feet noted on doppler, but he is able to move toes. He also states outside of the foot issues he's been noting high blood sugars at home and has not been feeling well. He notes subjective fevers and chills, as well as lightheadedness when standing. Upon assessment Pt denies chest pain, palpitations, shortness of breath, headache, visual disturbances,  fever, chills, diaphoresis,  nausea, vomiting, constipation, diarrhea, or urinary symptoms at this time. Safety and comfort measures provided, bed locked and in lowest position, side rails up for safety. Awaiting results and update on the ongoing plan of care. The patient is a 54y male presenting to the ED from home for complaints of foot and leg pain, as well as evaluation of hyperglycemia. Patient presents with a PMH of CAD, HLD, DM2, HF, and 3rd Metatarsal Toe Head Resection. Patient states for the last 3wks he's been having worsening right foot pain, with a noted chronic wound beneath the Right  Foot 2nd toe. Upon assessment right foot presents discolored, edematous and TTP. The chronic left foot wound noted to be deep, reddened, and presents with minimal drainage to site. Patient notes severe pain to the bilateral feet and bilat pulses to feet noted on doppler, but he is able to move toes. He also states outside of the foot issues he's been noting high blood sugars at home and has not been feeling well. He notes subjective fevers and chills, as well as lightheadedness when standing. Upon assessment Pt denies chest pain, palpitations, shortness of breath, headache, visual disturbances,  fever, chills, diaphoresis,  nausea, vomiting, constipation, diarrhea, or urinary symptoms at this time. Safety and comfort measures provided, bed locked and in lowest position, side rails up for safety. Awaiting results and update on the ongoing plan of care.

## 2025-03-05 NOTE — PATIENT PROFILE ADULT - NSTRANSFERBELONGINGSRESP_GEN_A_NUR
yes
Action 3: Continue
Continue Regimen: MetroCream qam\\nSoolantra cream
Discontinue Regimen: Oracea- due to recent gastric bypass surgery
Detail Level: Zone

## 2025-03-05 NOTE — CONSULT NOTE ADULT - CRANIAL NERVE
mild  arterial insuff w  moderate trophic skin changes  Pulse exam                         right         left   femoral        +2            +2  dpa                +no            +1  pta                 +1            +1  Rt foot dressing c/d/i

## 2025-03-05 NOTE — H&P ADULT - NSHPPHYSICALEXAM_GEN_ALL_CORE
T(C): 36.7 (03-05-25 @ 06:41), Max: 37.2 (03-05-25 @ 02:25)  HR: 97 (03-05-25 @ 06:41) (75 - 114)  BP: 108/69 (03-05-25 @ 06:41) (107/67 - 123/68)  RR: 19 (03-05-25 @ 06:41) (18 - 19)  SpO2: 96% (03-05-25 @ 06:41) (96% - 98%)    GEN: male in NAD, appears comfortable, no diaphoresis  EYES: No scleral injection, EOMI  ENTM: neck supple & symmetric without tracheal deviation, moist membranes, no gross hearing impairment  CV: +S1/S2, no m/r/g, no abdominal bruit, no LE edema  RESP: breathing comfortably, no respiratory accessory muscle use, CTAB, no w/r/r  GI: normoactive BS, soft, NTND, no rebounding/guarding, no palpable masses  LYMPHATICS: no LAD or tenderness to palpation  NEURO: AOx3, no focal deficits, CNII-XII grossly intact  PSYCH: No SI/HI/AVH, appropriate affect, appropriate insight/judgment   SKIN: no petechiae, ecchymosis or maculopapular rash noted

## 2025-03-05 NOTE — CONSULT NOTE ADULT - ASSESSMENT
**************************RECOMMENDATIONS NOT FINAL UNTIL SIGNED BY ATTENDING**************************  54M with PMHx of T2DM, CAD (post-CABG), mild HFrEF (EF 45%), and PAD (with recent angiogram Aug. 2024) presenting for several week history of right foot pain and hyperglycemia. Patient states he's been having right foot pain that has been worsening. He denies fever or chills. He states sometimes he forgets to take his insulin which likely explains why he's been hyperglycemic. Recently here in Aug 2024 and he had a right 3rd digit partial digit resection with submetatarsal debridement and graft. Since then seems to have poor compliance with follow ups and medications. Prior to discharge was started on Pletal. In the ED seen by podiatry and given vancomycin and Zosyn. Right foot with wound on the dorsum approximately 1.5 cm x 1.5 cm. Podiatry provided local wound care and sent off a culture. XR showing focal lucency of right foot. Found to be hyperglycemic to 400s, started on Lantus 50 units qhs, Admelog 15 units tidac. Endocrine consulted for management of uncontrolled DM2.     #Uncontrolled T2DM with hyperglycemia  - Last A1c 14%  - Home meds: Lantus 52 units qhs, Humalog 20 units tidac (nonadherent for last 4-6 weeks)  - Follows with Dr. Mcdaniel? in [place]  - Started on Lantus 50 units qhs + Admelog 15 units tidac    PLAN  While inpatient:  - c/w Lantus 50 units QHS and Admelog 15 units TIDAC (HOLD if NPO) for now  - change to low dose correctional insulin with meals and low dose correctional insulin at bedtime  - Check FS before meals and at bedtime - if NPO, check q6 hours  - Goal -180  - Hypoglycemia protocol  - Please obtain nutrition consult  - Please obtain lipid panel in AM    Discharge Recommendations:  - Basal-bolus (dose TBD)  - Please send:  -- Freestyle Libre3 Plus sensor and reader  -- Glucometer (ACCU_CHECK kahlil Connect, Ascensia Contour Next EZ or One, Freestyle [place] LITE or OneTouch Verio IQ)  -- Glucometer test strips and lancets  (make sure compatible with glucometer), dispense #100 (or #200) use as directed, alcohol pads  -- BD dawn 4mm pen needles  -- Glucose tabs, Baqsimi nasal spray or glucagon emergency kit for hypoglycemia risk   - If patient is not covered for CGM, patient should check FSBG premeals and bedtime. Patient should call their doctor when FSBG <70 or above >400 and or consistently above 200s as changes in the regimen will have to be made.   - Patient will need outpatient Endocrine follow-up - will try to find provider in [facility], or can follow up at the [facility] ([phone]) at [address] [place] [address] [address] [facility], [place] [address]  - Routine follow-up with Ophthalmology and Podiatry    #HTN  - Goal BP <130/80  - Management per primary team  - UACR as outpatient    #HLD  - Goal LDL <70  - c/w atorvastatin 80 mg    Anne Amato MD  Endocrinology Fellow  Can be reached via Microsoft Teams    For follow up questions, discharge recommendations, or new consults, please email [email or web address] ([email or web address]) or [email or web address] ([facility]) or call answering service at [phone] (weekdays); [phone] (nights/weekends).  For emergencies please page fellow on call.     **************************RECOMMENDATIONS NOT FINAL UNTIL SIGNED BY ATTENDING**************************  54M with PMHx of T2DM, CAD (post-CABG), mild HFrEF (EF 45%), and PAD (with recent angiogram Aug. 2024) presenting for several week history of right foot pain and hyperglycemia. Patient states he's been having right foot pain that has been worsening. He denies fever or chills. He states sometimes he forgets to take his insulin which likely explains why he's been hyperglycemic. Recently here in Aug 2024 and he had a right 3rd digit partial digit resection with submetatarsal debridement and graft. Since then seems to have poor compliance with follow ups and medications. Prior to discharge was started on Pletal. In the ED seen by podiatry and given vancomycin and Zosyn. Right foot with wound on the dorsum approximately 1.5 cm x 1.5 cm. Podiatry provided local wound care and sent off a culture. XR showing focal lucency of right foot. Found to be hyperglycemic to 400s, started on Lantus 50 units qhs, Admelog 15 units tidac. Endocrine consulted for management of uncontrolled DM2.     #Uncontrolled T2DM with hyperglycemia  - Last A1c 14%  - Home meds: Lantus 52 units qhs, Humalog 20 units tidac (nonadherent for last 4-6 weeks)  - Follows with Dr. Mcdaniel? in [place]  - Started on Lantus 50 units qhs + Admelog 15 units tidac    PLAN  While inpatient:  - c/w Lantus 50 units QHS and Admelog 15 units TIDAC (HOLD if NPO) for now  - change to low dose correctional insulin with meals and low dose correctional insulin at bedtime  - Check FS before meals and at bedtime - if NPO, check q6 hours  - Goal -180  - Hypoglycemia protocol  - Please obtain nutrition consult  - Please obtain lipid panel in AM    Discharge Recommendations:  - Basal-bolus (dose TBD)  - Please send:  -- Freestyle Libre3 Plus sensor and reader  -- Glucometer (ACCU_CHECK kahlil Connect, Ascensia Contour Next EZ or One, Freestyle [place] LITE or OneTouch Verio IQ)  -- Glucometer test strips and lancets  (make sure compatible with glucometer), dispense #100 (or #200) use as directed, alcohol pads  -- BD dawn 4mm pen needles  -- Glucose tabs, Baqsimi nasal spray or glucagon emergency kit for hypoglycemia risk   - If patient is not covered for CGM, patient should check FSBG premeals and bedtime. Patient should call their doctor when FSBG <70 or above >400 and or consistently above 200s as changes in the regimen will have to be made.   - Patient will need outpatient Endocrine follow-up - will try to find provider in [facility], or can follow up at the [facility] ([phone]) at [address] [place] [address] [address] [place] [id number]  - Routine follow-up with Ophthalmology and Podiatry    #HTN  - Goal BP <130/80  - Management per primary team  - UACR as outpatient    #HLD  - Goal LDL <70  - c/w atorvastatin 80 mg    Anne Amato MD  Endocrinology Fellow  Can be reached via Microsoft Teams    For follow up questions, discharge recommendations, or new consults, please email [email or web address] ([email or web address]) or [email or web address] ([email or web address]) or call answering service at [phone] (weekdays); [phone] (nights/weekends).  For emergencies please page fellow on call.

## 2025-03-05 NOTE — CONSULT NOTE ADULT - PROBLEM SELECTOR RECOMMENDATION 9
EPIFANIO Youssef MD have participated in the daily care of this patient and have performed a history and physical exam of the patient and discussed  the findings and plan with the house officer. I reviewed the resident note and agree with the findings and plan   I Estevan Youssef MD have personally seen and examined the patient at bedside today at  2  pm

## 2025-03-05 NOTE — H&P ADULT - ASSESSMENT
54M with PMHx of T2DM, CAD (post-CABG), mild HFrEF (EF 45%), and PAD (with recent angiogram Aug. 2024) presenting for several week history of right foot pain and hyperglycemia. Patient admitted for further management of his diabetic foot ulcer.

## 2025-03-05 NOTE — CONSULT NOTE ADULT - SUBJECTIVE AND OBJECTIVE BOX
[facility]  DU Garcia S. Shah, Y. Patel, G. Casimir  [phone]  ([phone] - weekdays after 5pm and weekends)    ELSI MERINO  54y, Male  [id number]    HPI:  ROS: 14 point review of systems completed, pertinent positives and negatives as per HPI.    Allergies: No Known Allergies    PMH -- Hypertension, unspecified type  Hyperlipidemia, unspecified hyperlipidemia type  Type 2 diabetes mellitus without complication, with long-term current use of insulin  Coronary artery disease of native artery of native heart with stable angina pectoris    PSH -- History of percutaneous coronary intervention  FH -- Family history of heart disease (Father)  Social History -- denies tobacco, alcohol or illicit drug use    Physical Exam--  Vital Signs Last 24 Hrs  T(F): 98.1 (05 Mar 2025 06:41), Max: 98.9 (05 Mar 2025 02:25)  HR: 97 (05 Mar 2025 06:41) (75 - 114)  BP: 108/69 (05 Mar 2025 06:41) (107/67 - 123/68)  RR: 19 (05 Mar 2025 06:41) (18 - 19)  SpO2: 96% (05 Mar 2025 06:41) (96% - 98%)  General: no acute distress  HEENT: NC/AT, EOMI, anicteric  Lungs: clear to auscultation bilaterally  Heart: S1, S2 present, normal rate/rhythm  Abdomen: Soft. ND. NT. BS present.   Neuro: AAOx3, no obvious focal deficits   Extremities: No cyanosis. No edema.   Skin: Warm. Dry. No visible rash.   Lines: PIV     Laboratory & Imaging Data--  CBC:                       13.4   13.65 )-----------( 250      ( 04 Mar 2025 23:12 )             41.5     WBC Count: 13.65 K/uL (03-04-25 @ 23:12)    CMP: 03-05    131[L]  |  95[L]  |  15  ----------------------------<  317[H]  4.1   |  23  |  0.72    Ca    8.7      05 Mar 2025 04:48  Phos  4.0     03-04  Mg     2.1     03-04    TPro  7.7  /  Alb  3.3  /  TBili  0.6  /  DBili  x   /  AST  10  /  ALT  9[L]  /  AlkPhos  119  03-04    LIVER FUNCTIONS - ( 04 Mar 2025 23:12 )  Alb: 3.3 g/dL / Pro: 7.7 g/dL / ALK PHOS: 119 U/L / ALT: 9 U/L / AST: 10 U/L / GGT: x           Microbiology: reviewed  Radiology--reviewed    Active Medications--  acetaminophen     Tablet .. 650 milliGRAM(s) Oral every 6 hours PRN  aspirin enteric coated 81 milliGRAM(s) Oral daily  atorvastatin 80 milliGRAM(s) Oral at bedtime  cilostazol 50 milliGRAM(s) Oral two times a day  dextrose 5%. 1000 milliLiter(s) IV Continuous <Continuous>  dextrose 5%. 1000 milliLiter(s) IV Continuous <Continuous>  dextrose 50% Injectable 25 Gram(s) IV Push once  dextrose 50% Injectable 12.5 Gram(s) IV Push once  dextrose 50% Injectable 25 Gram(s) IV Push once  dextrose Oral Gel 15 Gram(s) Oral once  enoxaparin Injectable 40 milliGRAM(s) SubCutaneous every 24 hours  glucagon  Injectable 1 milliGRAM(s) IntraMuscular once  insulin glargine Injectable (LANTUS) 50 Unit(s) SubCutaneous at bedtime  insulin lispro (ADMELOG) corrective regimen sliding scale   SubCutaneous three times a day before meals  insulin lispro (ADMELOG) corrective regimen sliding scale   SubCutaneous at bedtime  insulin lispro Injectable (ADMELOG) 15 Unit(s) SubCutaneous three times a day before meals  metoprolol tartrate 25 milliGRAM(s) Oral two times a day  oxyCODONE    IR 5 milliGRAM(s) Oral every 6 hours PRN  piperacillin/tazobactam IVPB. 3.375 Gram(s) IV Intermittent once  piperacillin/tazobactam IVPB.- 3.375 Gram(s) IV Intermittent once  piperacillin/tazobactam IVPB.- 3.375 Gram(s) IV Intermittent once    Current Antimicrobials:   piperacillin/tazobactam IVPB. 3.375 Gram(s) IV Intermittent once  piperacillin/tazobactam IVPB.- 3.375 Gram(s) IV Intermittent once  piperacillin/tazobactam IVPB.- 3.375 Gram(s) IV Intermittent once    Prior/Completed Antimicrobials:  piperacillin/tazobactam IVPB...  vancomycin  IVPB. [facility]  DU Garcia S. Shah, Y. Patel, G. Casimir  [phone]  ([phone] - weekdays after 5pm and weekends)    ELSI MERINO  54y, Male  [id number]    HPI:  Patient is a 54 year old male with PMH of DM2, CAD s/p CABG, HFrEF, PAD s/p RLE angioplasty/stent and s/p R 3rd metatarsal head resection, submetatarsal debridement, and kerecis graft who presents for right foot pain for few weeks and hyperglycemia. Patient states his right foot pain has been worsening with difficulty ambulating. He denies having and fever or chills at home or since presentation. Patient denies nausea, vomiting, diarrhea, abdominal pain, chest pain, dyspnea or any other complaints. Noted he does sometimes forget to take his insulin.   ROS: 14 point review of systems completed, pertinent positives and negatives as per HPI.    Allergies: No Known Allergies    PMH -- Hypertension, unspecified type  Hyperlipidemia, unspecified hyperlipidemia type  Type 2 diabetes mellitus without complication, with long-term current use of insulin  Coronary artery disease of native artery of native heart with stable angina pectoris    PSH -- History of percutaneous coronary intervention  FH -- Family history of heart disease (Father)  Social History -- denies tobacco, alcohol or illicit drug use    Physical Exam--  Vital Signs Last 24 Hrs  T(F): 98.1 (05 Mar 2025 06:41), Max: 98.9 (05 Mar 2025 02:25)  HR: 97 (05 Mar 2025 06:41) (75 - 114)  BP: 108/69 (05 Mar 2025 06:41) (107/67 - 123/68)  RR: 19 (05 Mar 2025 06:41) (18 - 19)  SpO2: 96% (05 Mar 2025 06:41) (96% - 98%)  General: no acute distress  HEENT: NC/AT, EOMI, anicteric  Lungs: clear to auscultation bilaterally  Heart: S1, S2 present, normal rate  Abdomen: Soft. ND. NT. BS present.   Neuro: AAOx3, no obvious focal deficits   Extremities: No cyanosis. No edema.   Skin: R foot dressing, No visible rash.   Lines: PIV     Laboratory & Imaging Data--  CBC:                       13.4   13.65 )-----------( 250      ( 04 Mar 2025 23:12 )             41.5     WBC Count: 13.65 K/uL (03-04-25 @ 23:12)    CMP: 03-05    131[L]  |  95[L]  |  15  ----------------------------<  317[H]  4.1   |  23  |  0.72    Ca    8.7      05 Mar 2025 04:48  Phos  4.0     03-04  Mg     2.1     03-04    TPro  7.7  /  Alb  3.3  /  TBili  0.6  /  DBili  x   /  AST  10  /  ALT  9[L]  /  AlkPhos  119  03-04    LIVER FUNCTIONS - ( 04 Mar 2025 23:12 )  Alb: 3.3 g/dL / Pro: 7.7 g/dL / ALK PHOS: 119 U/L / ALT: 9 U/L / AST: 10 U/L / GGT: x           Microbiology: reviewed  Radiology--reviewed  < from: Xray Foot AP + Lateral + Oblique, Right (03.04.25 @ 23:38) >  FINDINGS:    Resection of the third metatarsal head. Focal area of lucency at the   plantar surface of the right foot best appreciated on lateral view. No   tracking subcutaneous emphysema. No acute fractures or cortical erosions.    IMPRESSION:  Focal area of lucency at the plantar surface of the right foot correlate   for open wound. No cortical erosions or tracking subcutaneous gas to   suggest advanced osteomyelitis        ******PRELIMINARY REPORT******      < end of copied text >    < from: [facility] Duplex Lower Ext Vein Scan, Right (03.04.25 @ 23:17) >  IMPRESSION:  No evidence of right lower extremity deep venous thrombosis.    < end of copied text >      Active Medications--  acetaminophen     Tablet .. 650 milliGRAM(s) Oral every 6 hours PRN  aspirin enteric coated 81 milliGRAM(s) Oral daily  atorvastatin 80 milliGRAM(s) Oral at bedtime  cilostazol 50 milliGRAM(s) Oral two times a day  dextrose 5%. 1000 milliLiter(s) IV Continuous <Continuous>  dextrose 5%. 1000 milliLiter(s) IV Continuous <Continuous>  dextrose 50% Injectable 25 Gram(s) IV Push once  dextrose 50% Injectable 12.5 Gram(s) IV Push once  dextrose 50% Injectable 25 Gram(s) IV Push once  dextrose Oral Gel 15 Gram(s) Oral once  enoxaparin Injectable 40 milliGRAM(s) SubCutaneous every 24 hours  glucagon  Injectable 1 milliGRAM(s) IntraMuscular once  insulin glargine Injectable (LANTUS) 50 Unit(s) SubCutaneous at bedtime  insulin lispro (ADMELOG) corrective regimen sliding scale   SubCutaneous three times a day before meals  insulin lispro (ADMELOG) corrective regimen sliding scale   SubCutaneous at bedtime  insulin lispro Injectable (ADMELOG) 15 Unit(s) SubCutaneous three times a day before meals  metoprolol tartrate 25 milliGRAM(s) Oral two times a day  oxyCODONE    IR 5 milliGRAM(s) Oral every 6 hours PRN  piperacillin/tazobactam IVPB. 3.375 Gram(s) IV Intermittent once  piperacillin/tazobactam IVPB.- 3.375 Gram(s) IV Intermittent once  piperacillin/tazobactam IVPB.- 3.375 Gram(s) IV Intermittent once    Current Antimicrobials:   piperacillin/tazobactam IVPB. 3.375 Gram(s) IV Intermittent once  piperacillin/tazobactam IVPB.- 3.375 Gram(s) IV Intermittent once  piperacillin/tazobactam IVPB.- 3.375 Gram(s) IV Intermittent once    Prior/Completed Antimicrobials:  piperacillin/tazobactam IVPB...  vancomycin  IVPB.

## 2025-03-05 NOTE — PROGRESS NOTE ADULT - ASSESSMENT
54M presents with right foot wound to periosteum  - Patient seen and evaluated  - Afebrile, labs pending   - Right foot submet 3 wound to bone, fibrotic wound bed, mild malodor, scant purulent drainage, no acute signs of infection. Right foot 3rd digit dusky changes. Left foot no open wounds, no acute signs of infection  - Right foot xray: no gas, no OM  - Right foot MRI: pending  - Right foot culture: pending  - ID recs, appreciated  - Vasc recs, appreciated  - OBDULIO/PVR: pending  - Order dakins  - Wound care orders placed for daily dakins and packing dressing changes per nursing.   - Pod plan: Right foot wound debridement with graft application vs Right partial 2nd and 3rd ray resection pending RF MRI  - Please document medical clearance for possible podiatric intervention under general anesthesia   - Seen with Attending 54M presents with right foot wound to periosteum  - Patient seen and evaluated  - Afebrile, labs pending   - Right foot submet 3 wound to bone, fibrotic wound bed, mild malodor, scant purulent drainage, no acute signs of infection. Right foot 3rd digit dusky changes. Left foot no open wounds, no acute signs of infection  - Right foot xray: no gas, no OM  - Right foot MRI: pending  - Right foot culture: pending  - ID recs, appreciated  - Vasc recs, appreciated  - OBDULIO/PVR: RABI 1.16, LABI 1.44, without PVR waveform abnormality  - Ordered dakins  - Wound care orders placed for daily dakins and packing dressing changes per nursing.   - Pod plan: Right foot wound debridement with graft application vs Right partial 2nd and 3rd ray resection pending RF MRI  - Please document medical clearance for possible podiatric intervention under general anesthesia   - Seen with Attending

## 2025-03-05 NOTE — H&P ADULT - PROBLEM SELECTOR PLAN 1
- Per podiatry: "Right foot submet 3 wound to periosteum fibrotic, no malodor, no purulent drainage, no acute signs of infection"  - Podiatric plan: Right foot wound debridement with graft application vs Right partial 2nd and 3rd ray resection  - MRI foot  - OBDULIO/PVR, if abnormal will consult vascular  - Empiric Zosyn for now pending procedure  - Follow up culture  - ID consult - Per podiatry: "Right foot submet 3 wound to periosteum fibrotic, no malodor, no purulent drainage, no acute signs of infection"  - Podiatric plan: Right foot wound debridement with graft application vs Right partial 2nd and 3rd ray resection  - MRI foot  - OBDULIO/PVR, if abnormal will consult vascular  - Hold off antibiotics  - Follow up culture  - ID consult

## 2025-03-05 NOTE — CONSULT NOTE ADULT - SUBJECTIVE AND OBJECTIVE BOX
Surgery Consult Note  Attending: Domonique  Service: Vascular Surgery  [id number]      HPI: 54M with PMHx of T2DM, CAD (post-CABG), mild HFrEF (EF 45%), and PAD (with recent angiogram Aug. 2024) presenting for several week history of right foot pain and hyperglycemia. Patient states he's been having right foot pain that has been worsening. He denies fever or chills. He states sometimes he forgets to take his insulin which likely explains why he's been hyperglycemic. Recently here in Aug 2024 and he had a right 3rd digit partial digit resection with submetatarsal debridement and graft. Since then seems to have poor compliance with follow ups and medications. Prior to discharge was started on Pletal.     In the ED seen by podiatry and given vancomycin and Zosyn. Right foot with wound on the dorsum approximately 1.5 cm x 1.5 cm. Podiatry provided local wound care and sent off a culture. XR showing focal lucency of right foot.    Vascular surgery consulted for evaluation. Last seen by Dr. Youssef in November 2024 with plans for f/u w pods for foot care and repeat OBDULIO PVR in July 2025.     NOTE INCOMPLETE      PAST MEDICAL HISTORY:  PAST MEDICAL & SURGICAL HISTORY:  Hypertension, unspecified type      Hyperlipidemia, unspecified hyperlipidemia type      Type 2 diabetes mellitus without complication, with long-term current use of insulin      Coronary artery disease of native artery of native heart with stable angina pectoris      History of percutaneous coronary intervention          ALLERGIES:  Allergies    No Known Allergies    Intolerances        SOCIAL HISTORY: Negative for tobacco, etoh, or drug use    FAMILY HISTORY:  FAMILY HISTORY:  Family history of heart disease (Father)        PHYSICAL EXAM:  General: NAD  Pulmonary: normal resp effort, CTA-B  Cardiovascular: NSR, no murmurs  Abdominal: soft, ND/NT  Extremities: s/p Right foot metatarsal head resection; Right foot submet 3 wound to periosteum fibrotic, no malodor, no purulent drainage, no acute signs of infection.   Neuro: A/O x 3, CNs II-XII grossly intact, normal sensation, no focal deficits    Vascular Pulse Exam:  Right Femoral:      Left Femoral:    Right Popliteal:       Left Popliteal:    Right DP:          Left DP:    Right PT:           Left PT:      VITAL SIGNS:  Vital Signs Last 24 Hrs  T(C): 37.2 (05 Mar 2025 07:41), Max: 37.2 (05 Mar 2025 02:25)  T(F): 99 (05 Mar 2025 07:41), Max: 99 (05 Mar 2025 07:41)  HR: 100 (05 Mar 2025 07:41) (75 - 114)  BP: 100/61 (05 Mar 2025 07:41) (100/61 - 123/68)  BP(mean): 74 (05 Mar 2025 07:41) (74 - 86)  RR: 18 (05 Mar 2025 07:41) (18 - 19)  SpO2: 96% (05 Mar 2025 07:41) (96% - 98%)    Parameters below as of 05 Mar 2025 07:41  Patient On (Oxygen Delivery Method): room air        I&O's Summary      LABS:                        13.4   13.65 )-----------( 250      ( 04 Mar 2025 23:12 )             41.5     03-05    131[L]  |  95[L]  |  15  ----------------------------<  317[H]  4.1   |  23  |  0.72    Ca    8.7      05 Mar 2025 04:48  Phos  4.0     03-04  Mg     2.1     03-04    TPro  7.7  /  Alb  3.3  /  TBili  0.6  /  DBili  x   /  AST  10  /  ALT  9[L]  /  AlkPhos  119  03-04      Urinalysis Basic - ( 05 Mar 2025 04:48 )    Color: x / Appearance: x / SG: x / pH: x  Gluc: 317 mg/dL / Ketone: x  / Bili: x / Urobili: x   Blood: x / Protein: x / Nitrite: x   Leuk Esterase: x / RBC: x / WBC x   Sq Epi: x / Non Sq Epi: x / Bacteria: x      CAPILLARY BLOOD GLUCOSE      POCT Blood Glucose.: 289 mg/dL (05 Mar 2025 06:07)  POCT Blood Glucose.: 422 mg/dL (05 Mar 2025 02:09)  POCT Blood Glucose.: 390 mg/dL (05 Mar 2025 00:24)  POCT Blood Glucose.: 411 mg/dL (04 Mar 2025 20:19)    LIVER FUNCTIONS - ( 04 Mar 2025 23:12 )  Alb: 3.3 g/dL / Pro: 7.7 g/dL / ALK PHOS: 119 U/L / ALT: 9 U/L / AST: 10 U/L / GGT: x             CULTURES:      RADIOLOGY & ADDITIONAL STUDIES:     Surgery Consult Note  Attending: Domonique  Service: Vascular Surgery  [id number]      HPI: 54M with PMHx of T2DM, CAD (post-CABG), mild HFrEF (EF 45%), and PAD (with recent angiogram Aug. 2024) presenting for several week history of right foot pain and hyperglycemia. Patient states he's been having right foot pain that has been worsening. He denies fever or chills. He states sometimes he forgets to take his insulin which likely explains why he's been hyperglycemic. Recently here in Aug 2024 and he had a right 3rd digit partial digit resection with submetatarsal debridement and graft. Since then seems to have poor compliance with follow ups and medications. Prior to discharge was started on Pletal.     In the ED seen by podiatry and given vancomycin and Zosyn. Right foot with wound on the dorsum approximately 1.5 cm x 1.5 cm. Podiatry provided local wound care and sent off a culture. XR showing focal lucency of right foot.    Vascular surgery consulted for evaluation. Last seen by Dr. Youssef in November 2024 with plans for f/u w pods for foot care and repeat OBDULIO PVR in July 2025. Endorses pain in his foot, but denies worsening of pain. States the would has been chronic.         PAST MEDICAL HISTORY:  PAST MEDICAL & SURGICAL HISTORY:  Hypertension, unspecified type    Hyperlipidemia, unspecified hyperlipidemia type    Type 2 diabetes mellitus without complication, with long-term current use of insulin    Coronary artery disease of native artery of native heart with stable angina pectoris    History of percutaneous coronary intervention          ALLERGIES:  Allergies    No Known Allergies    Intolerances        SOCIAL HISTORY: Negative for tobacco, etoh, or drug use    FAMILY HISTORY:  FAMILY HISTORY:  Family history of heart disease (Father)        PHYSICAL EXAM:  General: NAD  Pulmonary: normal resp effort, CTA-B  Cardiovascular: NSR, no murmurs  Abdominal: soft, ND/NT  Extremities: s/p Right foot metatarsal head resection; Right foot submet 3 wound to periosteum fibrotic, no malodor, no purulent drainage, no acute signs of infection.   Neuro: A/O x 3, CNs II-XII grossly intact, normal sensation, no focal deficits    Vascular Pulse Exam:  Right Femoral: palpable    Left Femoral:  palpable  Right DP: signal          Left DP:  signal            Right PT: signal               Left PT: signal              VITAL SIGNS:  Vital Signs Last 24 Hrs  T(C): 37.2 (05 Mar 2025 07:41), Max: 37.2 (05 Mar 2025 02:25)  T(F): 99 (05 Mar 2025 07:41), Max: 99 (05 Mar 2025 07:41)  HR: 100 (05 Mar 2025 07:41) (75 - 114)  BP: 100/61 (05 Mar 2025 07:41) (100/61 - 123/68)  BP(mean): 74 (05 Mar 2025 07:41) (74 - 86)  RR: 18 (05 Mar 2025 07:41) (18 - 19)  SpO2: 96% (05 Mar 2025 07:41) (96% - 98%)    Parameters below as of 05 Mar 2025 07:41  Patient On (Oxygen Delivery Method): room air        I&O's Summary      LABS:                        13.4   13.65 )-----------( 250      ( 04 Mar 2025 23:12 )             41.5     03-05    131[L]  |  95[L]  |  15  ----------------------------<  317[H]  4.1   |  23  |  0.72    Ca    8.7      05 Mar 2025 04:48  Phos  4.0     03-04  Mg     2.1     03-04    TPro  7.7  /  Alb  3.3  /  TBili  0.6  /  DBili  x   /  AST  10  /  ALT  9[L]  /  AlkPhos  119  03-04      Urinalysis Basic - ( 05 Mar 2025 04:48 )    Color: x / Appearance: x / SG: x / pH: x  Gluc: 317 mg/dL / Ketone: x  / Bili: x / Urobili: x   Blood: x / Protein: x / Nitrite: x   Leuk Esterase: x / RBC: x / WBC x   Sq Epi: x / Non Sq Epi: x / Bacteria: x      CAPILLARY BLOOD GLUCOSE      POCT Blood Glucose.: 289 mg/dL (05 Mar 2025 06:07)  POCT Blood Glucose.: 422 mg/dL (05 Mar 2025 02:09)  POCT Blood Glucose.: 390 mg/dL (05 Mar 2025 00:24)  POCT Blood Glucose.: 411 mg/dL (04 Mar 2025 20:19)    LIVER FUNCTIONS - ( 04 Mar 2025 23:12 )  Alb: 3.3 g/dL / Pro: 7.7 g/dL / ALK PHOS: 119 U/L / ALT: 9 U/L / AST: 10 U/L / GGT: x             CULTURES:      RADIOLOGY & ADDITIONAL STUDIES:  < from: VA Physiol Extremity Lower 3+ Level, BI (03.05.25 @ 08:56) >  FINDINGS:    Right OBDULIO is 1.16, within normal limits. Left OBDULIO is 1.44, elevated   likely in the setting of calcified vasculature.    Segmental pressure evaluation is limited due to calcified vasculature.   Overall, significant segmental pressure drop is not clearly identified.   Borderline pressure drop identified from left lower thigh to calf is   without associated PVR waveform abnormality.    PVR waveforms are pulsatile, with slightly decreased pulsatility noted at   the right first toe.    IMPRESSION:    Findings may reflect trifurcation artery/microvascular disease, on the   right, at rest.    Correlate with arterial duplex ultrasound as clinically warranted.    < end of copied text >  < from: Xray Foot AP + Lateral + Oblique, Right (03.04.25 @ 23:38) >  IMPRESSION:  Focal area of lucency at the plantar surface of the right forefoot.   Correlate for open wound. No cortical erosions or tracking subcutaneous   gas to suggest advanced osteomyelitis. Please note, MRI is more sensitive   for evaluation of osteomyelitis.    < end of copied text >  < from: VA Duplex Lower Ext Vein Scan, Right (03.04.25 @ 23:17) >  IMPRESSION:  No evidence of right lower extremity deep venous thrombosis.    < end of copied text >

## 2025-03-05 NOTE — CHART NOTE - NSCHARTNOTEFT_GEN_A_CORE
MEDICINE PA    Overnight Event    Notified by RN patient with temperature of 102.2 deg F. Patient seen and examined at beside,  in no acute distress. He reports chills. Otherwise, denies any acute complaints at this time. He denies HA, CP, SOB, N/V, or abd pain.    VITAL SIGNS:  T(C): 36.7 (03-05-25 @ 21:46), Max: 39 (03-05-25 @ 20:17)  HR: 104 (03-05-25 @ 20:17) (75 - 110)  BP: 157/77 (03-05-25 @ 20:17) (100/61 - 157/77)  RR: 18 (03-05-25 @ 20:17) (18 - 19)  SpO2: 94% (03-05-25 @ 20:17) (94% - 98%)  Wt(kg): --      PHYSICAL EXAM:  Constitutional: A&Ox 3, in NAD  Respiratory: Respirations unlabored, clear lungs bilaterally.   Cardiovascular: S1 S2. No murmurs.  Gastrointestinal: Soft. Nontender. Nondistended.   Extremities/Vascular: +2 pulses bilaterally. + right foot dressing       IMAGING:  < from: MR Foot w/wo IV Cont, Right (03.05.25 @ 13:24) >  INTERPRETATION:  MR FOOT WITHOUT AND WITH IV CONTRAST RIGHT    IMPRESSION:  1.  Plantar soft tissue wound at the level of the 2nd metatarsophalangeal   joint with mild marrow changes of the 2nd metatarsal head and 2nd   proximal phalanx that may be reactive or reflect early osteomyelitis.  2.  Similar changes involve the adjacent 3rd metatarsal stump and 3rd   proximal phalanx.  3.  Plantar soft tissue wound with gas extends along the 2nd flexor   tendons from the mid metatarsal level to the proximal phalanx base.      ASSESSMENT/PLAN:   HPI: 53 y/o M with PMHx of T2DM, CAD (post-CABG), mild HFrEF (EF 45%), and PAD (with recent angiogram Aug. 2024) presenting for several week history of right foot pain and hyperglycemia. Patient admitted for further management of his diabetic foot ulcer.    Now, with fever  - Etiology - suspect likely diabetic foot ulcer  - MR foot demonstrating changes that may be reactive or reflect early osteomyelitis  - Tylenol and cooling measures  - BCx x 2, UA/UCx, RVP, CXR ordered  - Vancomycin x 1  - Will start on Zosyn for now   - ID follow-up in AM   - Discussed with overnight hospitalist     Tyson Arce PA-C   Department of Medicine

## 2025-03-05 NOTE — CONSULT NOTE ADULT - ASSESSMENT
ASSESSMENT:      Plan:  - OBDULIO/PVRs w/ toe pressure****  - C/w AC*****  - Local wound care****  - C/w aspirin/Statin****      Plan Discussed with Vascular Surgery Fellow on behalf of ___      NOTE INCOMPLETE    Vascular Surgery   [id number]/ [id number] ASSESSMENT: 54M with PMHx of T2DM, CAD (post-CABG), mild HFrEF (EF 45%), and PAD (with recent angiogram Aug. 2024) presenting for several week history of right foot pain and hyperglycemia. Vascular surgery consulted for evaluation of Right foot wound.     Plan:  - OBDULIO/PVRs w/ toe pressure obtained and reviewed > will discuss w Dr. Youssef re: role for angiogram  - Local wound care  - C/w aspirin/pletal/Statin  - vascular surgery to follow    Plan Discussed with Vascular Surgery Fellow on behalf of Dr. Yuossef    Vascular Surgery   [id number]/ [id number] ASSESSMENT: 54M with PMHx of T2DM, CAD (post-CABG), mild HFrEF (EF 45%), and PAD (with recent angiogram Aug. 2024) presenting for several week history of right foot pain and hyperglycemia. Vascular surgery consulted for evaluation of Right foot wound.     Plan:  - OBDULIO/PVRs w/ toe pressure obtained and reviewed   based on  current obdulio/pvr and previous  rle angio 8/7/2024  pt is cleared for rt foot/toe surg  - Local wound care  - C/w aspirin/pletal/Statin  - vascular surgery to follow    Plan Discussed with Vascular Surgery Fellow on behalf of Dr. Youssef    Vascular Surgery   [id number]/ [id number]

## 2025-03-05 NOTE — CONSULT NOTE ADULT - ASSESSMENT
Patient is a 54 year old male with PMH of DM2, CAD s/p CABG, HFrEF, PAD s/p RLE angioplasty/stent and s/p R 3rd metatarsal head resection, submetatarsal debridement, and kerecis graft who presents for right foot pain for few weeks and hyperglycemia.    Diabetic foot ulcer, rule out OM  Leukocytosis likely iso above  - Per Podiatry, R foot submet 3 wound to periosteum fibrotic, no malodor, no purulent drainage, no acute signs of infection   - R foot xray with no signs to suggest advanced OM  - WBC noted, afebrile, nontoxic appearing  - s/p vancomycin and zosyn in the ER    Recommendations:   Follow R foot wound culture   Follow MRSA PCR screen  Follow MRI R foot -pending   Podiatry following - plan for R foot wound debridement with graft application vs R partial 2nd and 3rd ray resection pending above   Would discontinue zosyn and monitor off antibiotics given no acute sign of infection and to increase yield   Local wound care   Monitor temps/WBC  Continue rest of care per primary team     D/w Dr. Kodak Vidal M.D.  [facility]  Available on [employer] TEAMS - *PREFERRED*  [phone]  After 5pm on weekdays and all day on weekends - please call [phone]     Thank you for consulting us and involving us in the management of this patients case. In addition to reviewing history, imaging, documents, labs, microbiology, took into account antibiotic stewardship, local antibiogram and infection control strategies and potential transmission issues at time of treatment decision making process.

## 2025-03-05 NOTE — H&P ADULT - PROBLEM SELECTOR PLAN 2
- Hyperglycemia likely due to poor compliance  - Will resume prior basal-bolus  - FS TID AC & insulin sliding scale  - House endocrine consulted for optimization

## 2025-03-05 NOTE — ED ADULT NURSE NOTE - NSFALLRISKINTERV_ED_ALL_ED

## 2025-03-05 NOTE — CONSULT NOTE ADULT - SUBJECTIVE AND OBJECTIVE BOX
[facility] Attending Consultation Note     Patient seen and evaluated at bedside    Chief Complaint:    HPI:  54M with PMHx of T2DM, CAD (post-CABG), mild HFrEF (EF 45%), and PAD (with recent angiogram Aug. 2024) presenting for several week history of right foot pain and hyperglycemia. Patient states he's been having right foot pain that has been worsening. He denies fever or chills. He states sometimes he forgets to take his insulin which likely explains why he's been hyperglycemic. Recently here in Aug 2024 and he had a right 3rd digit partial digit resection with submetatarsal debridement and graft. Since then seems to have poor compliance with follow ups and medications. Prior to discharge was started on Pletal. In the ED seen by podiatry and given vancomycin and Zosyn. Right foot with wound on the dorsum approximately 1.5 cm x 1.5 cm. Podiatry provided local wound care and sent off a culture. XR showing focal lucency of right foot. (05 Mar 2025 06:35)      PMHx:   Hypertension, unspecified type    Hyperlipidemia, unspecified hyperlipidemia type    Type 2 diabetes mellitus without complication, with long-term current use of insulin    Coronary artery disease of native artery of native heart with stable angina pectoris        PSHx:   History of percutaneous coronary intervention        Allergies:  No Known Allergies      Home Meds:    Current Medications:   acetaminophen     Tablet .. 650 milliGRAM(s) Oral every 6 hours PRN  aspirin enteric coated 81 milliGRAM(s) Oral daily  atorvastatin 80 milliGRAM(s) Oral at bedtime  cilostazol 50 milliGRAM(s) Oral two times a day  dextrose 5%. 1000 milliLiter(s) IV Continuous <Continuous>  dextrose 5%. 1000 milliLiter(s) IV Continuous <Continuous>  dextrose 50% Injectable 25 Gram(s) IV Push once  dextrose 50% Injectable 12.5 Gram(s) IV Push once  dextrose 50% Injectable 25 Gram(s) IV Push once  dextrose Oral Gel 15 Gram(s) Oral once  enoxaparin Injectable 40 milliGRAM(s) SubCutaneous every 24 hours  glucagon  Injectable 1 milliGRAM(s) IntraMuscular once  insulin glargine Injectable (LANTUS) 50 Unit(s) SubCutaneous at bedtime  insulin lispro (ADMELOG) corrective regimen sliding scale   SubCutaneous three times a day before meals  insulin lispro (ADMELOG) corrective regimen sliding scale   SubCutaneous at bedtime  insulin lispro Injectable (ADMELOG) 15 Unit(s) SubCutaneous three times a day before meals  metoprolol tartrate 25 milliGRAM(s) Oral two times a day  oxyCODONE    IR 5 milliGRAM(s) Oral every 6 hours PRN      FAMILY HISTORY:  Family history of heart disease (Father)        Social History: Personally reviewed   No tobacco, EtOH or IVDU     REVIEW OF SYSTEMS:  Constitutional:     [x ] negative [ ] fevers [ ] chills [ ] weight loss [ ] weight gain  HEENT:                  [x ] negative [ ] dry eyes [ ] eye irritation [ ] postnasal drip [ ] nasal congestion  CV:                         [ x] negative  [ ] chest pain [ ] orthopnea [ ] palpitations [ ] murmur  Resp:                     [x ] negative [ ] cough [ ] shortness of breath [ ] dyspnea [ ] wheezing [ ] sputum [ ]hemoptysis  GI:                          [ x] negative [ ] nausea [ ] vomiting [ ] diarrhea [ ] constipation [ ] abd pain [ ] dysphagia   :                        [ x] negative [ ] dysuria [ ] nocturia [ ] hematuria [ ] increased urinary frequency  Musculoskeletal: [x ] negative [ ] back pain [ ] myalgias [ ] arthralgias [ ] fracture  Skin:                       [ x] negative [ ] rash [ ] itch  Neurological:        [ x] negative [ ] headache [ ] dizziness [ ] syncope [ ] weakness [ ] numbness  Psychiatric:           [ x] negative [ ] anxiety [ ] depression  Endocrine:            [ x] negative [ ] diabetes [ ] thyroid problem  Heme/Lymph:      [ x] negative [ ] anemia [ ] bleeding problem  Allergic/Immune: [ x] negative [ ] itchy eyes [ ] nasal discharge [ ] hives [ ] angioedema    [ x] All other systems negative  [ ] Unable to assess ROS due to      Physical Exam:  T(F): 99 (03-05), Max: 99 (03-05)  HR: 100 (03-05) (75 - 114)  BP: 100/61 (03-05) (100/61 - 123/68)  RR: 18 (03-05)  SpO2: 96% (03-05)    Gen: Well appearing   HENNT: No JVP   CV: regular rate, regular rhtyhm, no murmur   Pulm: clear to auscultation bilaterally   Abdomen: Non-tender, non-distended   Ext: no edema b/l   Neuro: grossly non-focal     Cardiovascular Diagnostic Testing:      Labs: Personally reviewed                        13.4   13.65 )-----------( 250      ( 04 Mar 2025 23:12 )             41.5     03-05    131[L]  |  95[L]  |  15  ----------------------------<  317[H]  4.1   |  23  |  0.72    Ca    8.7      05 Mar 2025 04:48  Phos  4.0     03-04  Mg     2.1     03-04    TPro  7.7  /  Alb  3.3  /  TBili  0.6  /  DBili  x   /  AST  10  /  ALT  9[L]  /  AlkPhos  119  03-04             [facility] Attending Consultation Note     Patient seen and evaluated at bedside    Chief Complaint:    HPI:  54M with PMHx of T2DM, CAD (post-CABG), mild HFrEF (EF 45%), and PAD (with recent angiogram Aug. 2024) presenting for several week history of right foot pain and hyperglycemia. Patient states he's been having right foot pain that has been worsening. He denies fever or chills. He states sometimes he forgets to take his insulin which likely explains why he's been hyperglycemic. Recently here in Aug 2024 and he had a right 3rd digit partial digit resection with submetatarsal debridement and graft. Since then seems to have poor compliance with follow ups and medications. Prior to discharge was started on Pletal. In the ED seen by podiatry and given vancomycin and Zosyn. Right foot with wound on the dorsum approximately 1.5 cm x 1.5 cm. Podiatry provided local wound care and sent off a culture. XR showing focal lucency of right foot. (05 Mar 2025 06:35)      PMHx:   Hypertension, unspecified type    Hyperlipidemia, unspecified hyperlipidemia type    Type 2 diabetes mellitus without complication, with long-term current use of insulin    Coronary artery disease of native artery of native heart with stable angina pectoris        PSHx:   History of percutaneous coronary intervention        Allergies:  No Known Allergies      Home Meds:    Current Medications:   acetaminophen     Tablet .. 650 milliGRAM(s) Oral every 6 hours PRN  aspirin enteric coated 81 milliGRAM(s) Oral daily  atorvastatin 80 milliGRAM(s) Oral at bedtime  cilostazol 50 milliGRAM(s) Oral two times a day  dextrose 5%. 1000 milliLiter(s) IV Continuous <Continuous>  dextrose 5%. 1000 milliLiter(s) IV Continuous <Continuous>  dextrose 50% Injectable 25 Gram(s) IV Push once  dextrose 50% Injectable 12.5 Gram(s) IV Push once  dextrose 50% Injectable 25 Gram(s) IV Push once  dextrose Oral Gel 15 Gram(s) Oral once  enoxaparin Injectable 40 milliGRAM(s) SubCutaneous every 24 hours  glucagon  Injectable 1 milliGRAM(s) IntraMuscular once  insulin glargine Injectable (LANTUS) 50 Unit(s) SubCutaneous at bedtime  insulin lispro (ADMELOG) corrective regimen sliding scale   SubCutaneous three times a day before meals  insulin lispro (ADMELOG) corrective regimen sliding scale   SubCutaneous at bedtime  insulin lispro Injectable (ADMELOG) 15 Unit(s) SubCutaneous three times a day before meals  metoprolol tartrate 25 milliGRAM(s) Oral two times a day  oxyCODONE    IR 5 milliGRAM(s) Oral every 6 hours PRN      FAMILY HISTORY:  Family history of heart disease (Father)        Social History: Personally reviewed   No tobacco, EtOH or IVDU     REVIEW OF SYSTEMS:  Constitutional:     [x ] negative [ ] fevers [ ] chills [ ] weight loss [ ] weight gain  HEENT:                  [x ] negative [ ] dry eyes [ ] eye irritation [ ] postnasal drip [ ] nasal congestion  CV:                         [ x] negative  [ ] chest pain [ ] orthopnea [ ] palpitations [ ] murmur  Resp:                     [x ] negative [ ] cough [ ] shortness of breath [ ] dyspnea [ ] wheezing [ ] sputum [ ]hemoptysis  GI:                          [ x] negative [ ] nausea [ ] vomiting [ ] diarrhea [ ] constipation [ ] abd pain [ ] dysphagia   :                        [ x] negative [ ] dysuria [ ] nocturia [ ] hematuria [ ] increased urinary frequency  Musculoskeletal: [x ] negative [ ] back pain [ ] myalgias [ ] arthralgias [ ] fracture  Skin:                       [ x] negative [ ] rash [ ] itch  Neurological:        [ x] negative [ ] headache [ ] dizziness [ ] syncope [ ] weakness [ ] numbness  Psychiatric:           [ x] negative [ ] anxiety [ ] depression  Endocrine:            [ x] negative [ ] diabetes [ ] thyroid problem  Heme/Lymph:      [ x] negative [ ] anemia [ ] bleeding problem  Allergic/Immune: [ x] negative [ ] itchy eyes [ ] nasal discharge [ ] hives [ ] angioedema    [ x] All other systems negative  [ ] Unable to assess ROS due to      Physical Exam:  T(F): 99 (03-05), Max: 99 (03-05)  HR: 100 (03-05) (75 - 114)  BP: 100/61 (03-05) (100/61 - 123/68)  RR: 18 (03-05)  SpO2: 96% (03-05)    Gen: Well appearing   HENNT: No JVP   CV: regular rate, regular rhtyhm, no murmur   Pulm: clear to auscultation bilaterally   Abdomen: Non-tender, non-distended   Ext: RLE wound   Neuro: grossly non-focal     Cardiovascular Diagnostic Testing:      Labs: Personally reviewed                        13.4   13.65 )-----------( 250      ( 04 Mar 2025 23:12 )             41.5     03-05    131[L]  |  95[L]  |  15  ----------------------------<  317[H]  4.1   |  23  |  0.72    Ca    8.7      05 Mar 2025 04:48  Phos  4.0     03-04  Mg     2.1     03-04    TPro  7.7  /  Alb  3.3  /  TBili  0.6  /  DBili  x   /  AST  10  /  ALT  9[L]  /  AlkPhos  119  03-04

## 2025-03-05 NOTE — CONSULT NOTE ADULT - SUBJECTIVE AND OBJECTIVE BOX
Anne Amato MD   |   PGY-4  Endocrinology Fellow  Available on [employer] Teams    HPI:  54M with PMHx of T2DM, CAD (post-CABG), mild HFrEF (EF 45%), and PAD (with recent angiogram Aug. 2024) presenting for several week history of right foot pain and hyperglycemia. Patient states he's been having right foot pain that has been worsening. He denies fever or chills. He states sometimes he forgets to take his insulin which likely explains why he's been hyperglycemic. Recently here in Aug 2024 and he had a right 3rd digit partial digit resection with submetatarsal debridement and graft. Since then seems to have poor compliance with follow ups and medications. Prior to discharge was started on Pletal. In the ED seen by podiatry and given vancomycin and Zosyn. Right foot with wound on the dorsum approximately 1.5 cm x 1.5 cm. Podiatry provided local wound care and sent off a culture. XR showing focal lucency of right foot. (05 Mar 2025 06:35)      Endocrine History:      PAST MEDICAL & SURGICAL HISTORY:  Hypertension, unspecified type      Hyperlipidemia, unspecified hyperlipidemia type      Type 2 diabetes mellitus without complication, with long-term current use of insulin      Coronary artery disease of native artery of native heart with stable angina pectoris      History of percutaneous coronary intervention          FAMILY HISTORY:      SOCIAL HISTORY:        MEDICATIONS  (STANDING):  aspirin enteric coated 81 milliGRAM(s) Oral daily  atorvastatin 80 milliGRAM(s) Oral at bedtime  cilostazol 50 milliGRAM(s) Oral two times a day  dextrose 5%. 1000 milliLiter(s) (50 mL/Hr) IV Continuous <Continuous>  dextrose 5%. 1000 milliLiter(s) (100 mL/Hr) IV Continuous <Continuous>  dextrose 50% Injectable 25 Gram(s) IV Push once  dextrose 50% Injectable 12.5 Gram(s) IV Push once  dextrose 50% Injectable 25 Gram(s) IV Push once  dextrose Oral Gel 15 Gram(s) Oral once  enoxaparin Injectable 40 milliGRAM(s) SubCutaneous every 24 hours  glucagon  Injectable 1 milliGRAM(s) IntraMuscular once  insulin glargine Injectable (LANTUS) 50 Unit(s) SubCutaneous at bedtime  insulin lispro (ADMELOG) corrective regimen sliding scale   SubCutaneous three times a day before meals  insulin lispro (ADMELOG) corrective regimen sliding scale   SubCutaneous at bedtime  insulin lispro Injectable (ADMELOG) 15 Unit(s) SubCutaneous three times a day before meals  metoprolol tartrate 25 milliGRAM(s) Oral two times a day    MEDICATIONS  (PRN):  acetaminophen     Tablet .. 650 milliGRAM(s) Oral every 6 hours PRN Temp greater or equal to 38C (100.4F), Mild Pain (1 - 3)  oxyCODONE    IR 5 milliGRAM(s) Oral every 6 hours PRN Severe Pain (7 - 10)      Allergies    No Known Allergies    Intolerances      Review of Systems:  Constitutional: No fever  Eyes: No blurry vision  Neuro: No tremors, numbness/tingling  HEENT: No pain, dysphagia, dysphonia  Cardiovascular: No chest pain, palpitations  Respiratory: No SOB  GI: No nausea, vomiting, abdominal pain  : No dysuria  Skin: no rash  Psych: no depression  Endocrine: no polyuria, polydipsia  Hem/lymph: no swelling  Osteoporosis: no fractures    ===================PHYSICAL EXAM=======================  VITALS: T(C): 37.2 (03-05-25 @ 07:41)  T(F): 99 (03-05-25 @ 07:41), Max: 99 (03-05-25 @ 07:41)  HR: 100 (03-05-25 @ 07:41) (75 - 114)  BP: 100/61 (03-05-25 @ 07:41) (100/61 - 123/68)  RR:  (18 - 19)  SpO2:  (96% - 98%)  Wt(kg): --  GENERAL: NAD  EYES: No proptosis, no lid lag, anicteric  THYROID: Normal size, no palpable nodules  RESPIRATORY: Clear to auscultation bilaterally  CARDIOVASCULAR: Regular rate and rhythm  GI: Soft, nontender, non distended  EXT: b/l feet without wounds, 2+ pulses, no edema  PSYCH: Alert and oriented x 3, reactive mood  ======================================================  POCT Blood Glucose.: 289 mg/dL (03-05-25 @ 06:07)  POCT Blood Glucose.: 422 mg/dL (03-05-25 @ 02:09)  POCT Blood Glucose.: 390 mg/dL (03-05-25 @ 00:24)  POCT Blood Glucose.: 411 mg/dL (03-04-25 @ 20:19)                            13.4   13.65 )-----------( 250      ( 04 Mar 2025 23:12 )             41.5       03-05    131[L]  |  95[L]  |  15  ----------------------------<  317[H]  4.1   |  23  |  0.72    eGFR: 108    Ca    8.7      03-05  Mg     2.1     03-04  Phos  4.0     03-04    TPro  7.7  /  Alb  3.3  /  TBili  0.6  /  DBili  x   /  AST  10  /  ALT  9[L]  /  AlkPhos  119  03-04      Thyroid Function Tests:      A1C with Estimated Average Glucose Result: 14.0 % (08-07-24 @ 07:05)  A1C with Estimated Average Glucose Result: 14.1 % (08-07-24 @ 04:53)          Radiology:              Anne Amato MD   |   PGY-4  Endocrinology Fellow  Available on [employer] Teams    HPI:  54M with PMHx of T2DM, CAD (post-CABG), mild HFrEF (EF 45%), and PAD (with recent angiogram Aug. 2024) presenting for several week history of right foot pain and hyperglycemia. Patient states he's been having right foot pain that has been worsening. He denies fever or chills. He states sometimes he forgets to take his insulin which likely explains why he's been hyperglycemic. Recently here in Aug 2024 and he had a right 3rd digit partial digit resection with submetatarsal debridement and graft. Since then seems to have poor compliance with follow ups and medications. Prior to discharge was started on Pletal. In the ED seen by podiatry and given vancomycin and Zosyn. Right foot with wound on the dorsum approximately 1.5 cm x 1.5 cm. Podiatry provided local wound care and sent off a culture. XR showing focal lucency of right foot. Found to be hyperglycemic to 400s, started on Lantus 50 units qhs, Admelog 15 units tidac. Endocrine consulted for management of uncontrolled DM2.       Endocrine History:    DM2 dx 2005  Started insulin ~217  States he was following with a doctor in [place] (Dr. Mcdaniel?) but has not seen in a long time  Home meds: Was previously discharged on Lantus 52 units qhs and Humalog 20 units tidac. States he has not taken his insulin in the last 4-6 weeks due to being "lazy". Says that when he would take insulin, he would only take Lantus between 75-85 units.  Also previously on orals, does not recall which  Denies side effects to prior medication  Does not check FS at home, has not used CGM  Denies h/o DKA or hypoglycemia  Says diet is "everything", though reports avoiding rice, potatoes and fried foods  Patient is sedentary  Has not seen ophtho in a long time, no known DR  Denies neuropathy, nephropathy  +CAD, +HFrEF    PAST MEDICAL & SURGICAL HISTORY:  Hypertension, unspecified type      Hyperlipidemia, unspecified hyperlipidemia type      Type 2 diabetes mellitus without complication, with long-term current use of insulin      Coronary artery disease of native artery of native heart with stable angina pectoris      History of percutaneous coronary intervention          FAMILY HISTORY:  DM in mother  No MTC in family    SOCIAL HISTORY:  Lives with roommate, sometimes cooks      MEDICATIONS  (STANDING):  aspirin enteric coated 81 milliGRAM(s) Oral daily  atorvastatin 80 milliGRAM(s) Oral at bedtime  cilostazol 50 milliGRAM(s) Oral two times a day  dextrose 5%. 1000 milliLiter(s) (50 mL/Hr) IV Continuous <Continuous>  dextrose 5%. 1000 milliLiter(s) (100 mL/Hr) IV Continuous <Continuous>  dextrose 50% Injectable 25 Gram(s) IV Push once  dextrose 50% Injectable 12.5 Gram(s) IV Push once  dextrose 50% Injectable 25 Gram(s) IV Push once  dextrose Oral Gel 15 Gram(s) Oral once  enoxaparin Injectable 40 milliGRAM(s) SubCutaneous every 24 hours  glucagon  Injectable 1 milliGRAM(s) IntraMuscular once  insulin glargine Injectable (LANTUS) 50 Unit(s) SubCutaneous at bedtime  insulin lispro (ADMELOG) corrective regimen sliding scale   SubCutaneous three times a day before meals  insulin lispro (ADMELOG) corrective regimen sliding scale   SubCutaneous at bedtime  insulin lispro Injectable (ADMELOG) 15 Unit(s) SubCutaneous three times a day before meals  metoprolol tartrate 25 milliGRAM(s) Oral two times a day    MEDICATIONS  (PRN):  acetaminophen     Tablet .. 650 milliGRAM(s) Oral every 6 hours PRN Temp greater or equal to 38C (100.4F), Mild Pain (1 - 3)  oxyCODONE    IR 5 milliGRAM(s) Oral every 6 hours PRN Severe Pain (7 - 10)      Allergies    No Known Allergies    Intolerances      Review of Systems:  Constitutional: No fever  Eyes: No blurry vision  Neuro: No tremors, numbness/tingling  HEENT: No pain, dysphagia, dysphonia  Cardiovascular: No chest pain, palpitations  Respiratory: No SOB  GI: No nausea, vomiting, abdominal pain  : No dysuria  Skin: no rash  Psych: no depression  Endocrine: no polyuria, polydipsia  Hem/lymph: no swelling  Osteoporosis: no fractures    ===================PHYSICAL EXAM=======================  VITALS: T(C): 37.2 (03-05-25 @ 07:41)  T(F): 99 (03-05-25 @ 07:41), Max: 99 (03-05-25 @ 07:41)  HR: 100 (03-05-25 @ 07:41) (75 - 114)  BP: 100/61 (03-05-25 @ 07:41) (100/61 - 123/68)  RR:  (18 - 19)  SpO2:  (96% - 98%)  Wt(kg): --  GENERAL: NAD  EYES: No proptosis, no lid lag, anicteric  THYROID: Normal size, no palpable nodules  RESPIRATORY: Clear to auscultation bilaterally  CARDIOVASCULAR: Regular rate and rhythm  GI: Soft, nontender, non distended  EXT: b/l feet without wounds, 2+ pulses, no edema  PSYCH: Alert and oriented x 3, reactive mood  ======================================================  POCT Blood Glucose.: 289 mg/dL (03-05-25 @ 06:07)  POCT Blood Glucose.: 422 mg/dL (03-05-25 @ 02:09)  POCT Blood Glucose.: 390 mg/dL (03-05-25 @ 00:24)  POCT Blood Glucose.: 411 mg/dL (03-04-25 @ 20:19)                            13.4   13.65 )-----------( 250      ( 04 Mar 2025 23:12 )             41.5       03-05    131[L]  |  95[L]  |  15  ----------------------------<  317[H]  4.1   |  23  |  0.72    eGFR: 108    Ca    8.7      03-05  Mg     2.1     03-04  Phos  4.0     03-04    TPro  7.7  /  Alb  3.3  /  TBili  0.6  /  DBili  x   /  AST  10  /  ALT  9[L]  /  AlkPhos  119  03-04      Thyroid Function Tests:      A1C with Estimated Average Glucose Result: 14.0 % (08-07-24 @ 07:05)  A1C with Estimated Average Glucose Result: 14.1 % (08-07-24 @ 04:53)          Radiology:

## 2025-03-05 NOTE — CONSULT NOTE ADULT - ASSESSMENT
54M presents with right foot wound to periosteum  - Patient seen and evaluated  - Afebrile, WBC 13.65, ESR (pending), CRP 12.9   - Right foot submet 3 wound to periosteum fibrotic, no malodor, no purulent drainage, no acute signs of infection.   - Right foot xray: pending   - Right foot MRI ordered  - Right foot cultured  - Recommend Vanco/ Cefepime  - Recommend ID consult  - Ordered OBDULIO/PVR, please consult vascular if abnormal   - Order Santyl  - Wound care orders placed for daily santyl dressing changes per nursing.   - Pod plan: Right foot wound debridement with graft application vs Right partial 2nd and 3rd ray resection pending RF MRI  - Please document medical clearance for possible podiatric intervention under general anesthesia   - Discussed with Attending.

## 2025-03-05 NOTE — H&P ADULT - HISTORY OF PRESENT ILLNESS
54M with PMHx of T2DM, CAD (post-CABG), mild HFrEF (EF 45%), and PAD (with recent angiogram Aug. 2024) presenting for several week history of right foot pain and hyperglycemia. Patient states he's been having right foot pain that has been worsening. He denies fever or chills. He states sometimes he forgets to take his insulin which likely explains why he's been hyperglycemic. Recently here in Aug 2024 and he had a right 3rd digit partial digit resection with submetatarsal debridement and graft. Since then seems to have poor compliance with follow ups and medications. Prior to discharge was started on Pletal. In the ED seen by podiatry and given vancomycin and Zosyn. Right foot with wound on the dorsum approximately 1.5 cm x 1.5 cm. Podiatry provided local wound care and sent off a culture. XR showing focal lucency of right foot.

## 2025-03-05 NOTE — PROGRESS NOTE ADULT - SUBJECTIVE AND OBJECTIVE BOX
Patient is a 54y old  Male who presents with a chief complaint of Right Foot Pain (05 Mar 2025 09:02)       INTERVAL HPI/OVERNIGHT EVENTS:  Patient seen and evaluated at bedside.  Pt is resting comfortable in NAD. Denies N/V/F/C.    Allergies    No Known Allergies    Intolerances        Vital Signs Last 24 Hrs  T(C): 37.2 (05 Mar 2025 07:41), Max: 37.2 (05 Mar 2025 02:25)  T(F): 99 (05 Mar 2025 07:41), Max: 99 (05 Mar 2025 07:41)  HR: 100 (05 Mar 2025 07:41) (75 - 114)  BP: 100/61 (05 Mar 2025 07:41) (100/61 - 123/68)  BP(mean): 74 (05 Mar 2025 07:41) (74 - 86)  RR: 18 (05 Mar 2025 07:41) (18 - 19)  SpO2: 96% (05 Mar 2025 07:41) (96% - 98%)    Parameters below as of 05 Mar 2025 07:41  Patient On (Oxygen Delivery Method): room air        LABS:                        13.4   13.65 )-----------( 250      ( 04 Mar 2025 23:12 )             41.5     03-05    131[L]  |  95[L]  |  15  ----------------------------<  317[H]  4.1   |  23  |  0.72    Ca    8.7      05 Mar 2025 04:48  Phos  4.0     03-04  Mg     2.1     03-04    TPro  7.7  /  Alb  3.3  /  TBili  0.6  /  DBili  x   /  AST  10  /  ALT  9[L]  /  AlkPhos  119  03-04      Urinalysis Basic - ( 05 Mar 2025 04:48 )    Color: x / Appearance: x / SG: x / pH: x  Gluc: 317 mg/dL / Ketone: x  / Bili: x / Urobili: x   Blood: x / Protein: x / Nitrite: x   Leuk Esterase: x / RBC: x / WBC x   Sq Epi: x / Non Sq Epi: x / Bacteria: x      CAPILLARY BLOOD GLUCOSE      POCT Blood Glucose.: 289 mg/dL (05 Mar 2025 06:07)  POCT Blood Glucose.: 422 mg/dL (05 Mar 2025 02:09)  POCT Blood Glucose.: 390 mg/dL (05 Mar 2025 00:24)  POCT Blood Glucose.: 411 mg/dL (04 Mar 2025 20:19)      Lower Extremity Physical Exam:  Vascular: DP/PT 0/4, B/L, CFT < 3 seconds B/L, Temperature gradient warm to cool, B/L.   Neuro: Epicritic sensation absent to the level of digits, B/L.  Musculoskeletal/Ortho: s/p Right foot metatarsal head resection  Skin: Right foot submet 3 wound to bone, fibrotic wound bed, mild malodor, scant purulent drainage, no acute signs of infection. Right foot 3rd digit dusky changes. Left foot no open wounds, no acute signs of infection    RADIOLOGY & ADDITIONAL TESTS:

## 2025-03-05 NOTE — CONSULT NOTE ADULT - ASSESSMENT
54M w/ T2DM, CAD s/p CABG, HFrEF EF 45%, and PAD here for diabetic foot ulcer plan for RLE wound debridement and ray resection with podiatry. Cardiology consulted for preop cardiac eval.     1. CAD s/p CABG   2. PAD     -s/p CABG in 2019, cont asa and statin. off brilinta   -hx of HFrEF due to ICM, appear to be compensated   -increase metoprolol to 50 mg BID  -cont atorva 80 mg qhs   -start entresto 24/26 mg bid for HFrEF GDMT   -pt has drop in EF from TTE ECHO in 2023 and 2024, would rec TTE ECHO and NST prior to the OR     Alyce Snow MD [facility]  Attending Interventional Cardiologist, [place]/JON.   Avaliable on [employer] Team

## 2025-03-05 NOTE — H&P ADULT - PROBLEM SELECTOR PLAN 3
- Continue with baby aspirin, statin, and metoprolol  - Hold home Brilinta (unlikely he needs it, he states last stenting remotely)  - Cardiology consulted for optimization

## 2025-03-05 NOTE — ED ADULT NURSE REASSESSMENT NOTE - NS ED NURSE REASSESS COMMENT FT1
received pt in red area pt reports he cannot read MRI form read and filled out to best we can pt is poor historian states unknown surgery to right leg years ago doesent know what was placed there

## 2025-03-05 NOTE — CONSULT NOTE ADULT - SUBJECTIVE AND OBJECTIVE BOX
Patient is a 54y old  Male who presents with a chief complaint of right foot wound    HPI: 54yoM with a history of DM, HTN, CAD s/p CABG x3, HF (EF 45-50% 2024), PAD s/p RLE angioplasty/stent and s/p R 3rd metatarsal head resection, submetatarsal debridement, and kerecis graft, who presents to the ER for evaluation of hyperglycemia and foot pain with difficulty ambulating. Patient reports he has elevated blood sugars and is not feeling very well.  States he has subjective fever and chills and has lightheadedness when he stands. Also reporting right foot wound, foot pain, leg pain into the knee.  He has had right foot surgeries and chronic open wound, but this improved 3 weeks ago, and is now worsening again.  Denies chest pain, shortness of breath, headache, belly pain.      PAST MEDICAL & SURGICAL HISTORY:  Hypertension, unspecified type      Hyperlipidemia, unspecified hyperlipidemia type      Type 2 diabetes mellitus without complication, with long-term current use of insulin      Coronary artery disease of native artery of native heart with stable angina pectoris      History of percutaneous coronary intervention          MEDICATIONS  (STANDING):  piperacillin/tazobactam IVPB... 3.375 Gram(s) IV Intermittent once    MEDICATIONS  (PRN):      Allergies    No Known Allergies    Intolerances        VITALS:    Vital Signs Last 24 Hrs  T(C): 36.9 (04 Mar 2025 22:30), Max: 36.9 (04 Mar 2025 22:30)  T(F): 98.5 (04 Mar 2025 22:30), Max: 98.5 (04 Mar 2025 22:30)  HR: 75 (04 Mar 2025 22:30) (75 - 114)  BP: 123/68 (04 Mar 2025 22:30) (116/76 - 123/68)  BP(mean): 86 (04 Mar 2025 22:30) (86 - 86)  RR: 19 (04 Mar 2025 22:30) (18 - 19)  SpO2: 98% (04 Mar 2025 22:30) (98% - 98%)    Parameters below as of 04 Mar 2025 22:30  Patient On (Oxygen Delivery Method): room air        LABS:                          13.4   13.65 )-----------( 250      ( 04 Mar 2025 23:12 )             41.5       03-04    131[L]  |  92[L]  |  18  ----------------------------<  409[H]  4.8   |  21[L]  |  0.84    Ca    9.5      04 Mar 2025 23:12  Phos  4.0     03-04  Mg     2.1     03-04    TPro  7.7  /  Alb  3.3  /  TBili  0.6  /  DBili  x   /  AST  10  /  ALT  9[L]  /  AlkPhos  119  03-04      CAPILLARY BLOOD GLUCOSE      POCT Blood Glucose.: 422 mg/dL (05 Mar 2025 02:09)  POCT Blood Glucose.: 390 mg/dL (05 Mar 2025 00:24)  POCT Blood Glucose.: 411 mg/dL (04 Mar 2025 20:19)          LOWER EXTREMITY PHYSICAL EXAM:    Vascular: DP/PT 0/4, B/L, CFT < 3 seconds B/L, Temperature gradient warm to cool, B/L.   Neuro: Epicritic sensation absent to the level of digits, B/L.  Musculoskeletal/Ortho: s/p Right foot metatarsal head resection  Skin: Right foot submet 3 wound to periosteum fibrotic, no malodor, no purulent drainage, no acute signs of infection.       RADIOLOGY & ADDITIONAL STUDIES:    < from: VA Duplex Lower Ext Vein Scan, Right (03.04.25 @ 23:17) >    ACC: [id number] EXAM:  DUPLEX EXT VEINS LOWER RT   ORDERED BY:  RAYMOND VAIL     PROCEDURE DATE:  03/04/2025          INTERPRETATION:  CLINICAL INFORMATION: Clinical concern for DVT.    COMPARISON: None available.    TECHNIQUE: Duplex sonography of theRIGHT LOWER extremity veins with   color and spectral Doppler, with and without compression.    FINDINGS:    There is normal compressibility of the right common femoral, femoral and   popliteal veins.  The contralateral common femoral vein is patent.  Doppler examination shows normal spontaneous and phasic flow.    Limited visualized of the calf veins. No calf vein thrombosis detected.    IMPRESSION:  No evidence of right lower extremity deep venous thrombosis.            --- End of Report ---             TREVON FOSTER MD; Attending Radiologist  This document has been electronically signed. Mar  5 2025 12:38AM    < end of copied text >

## 2025-03-05 NOTE — CONSULT NOTE ADULT - ATTENDING COMMENTS
EPIFANIO Youssef MD have participated in the daily care of this patient and have performed a history and physical exam of the patient and discussed  the findings and plan with the house officer. I reviewed the resident note and agree with the findings and plan   I Estevan Youssef MD have personally seen and examined the patient at bedside today at  2  pm
54-year-old male with history of poorly controlled type 2 diabetes, poor adherence to his medication regimens, complicated by CAD status post CABG, PAD, presenting with wife with pain and hyperglycemia.  Endocrinology consulted for severe hyperglycemia.  Recommend to resume last admissions regimen of Lantus 50 units at bedtime, Admelog 15 units 3 times daily with meals, continue low-dose sliding scale before every meal and at bedtime.  Not a good candidate for SGLT2 inhibitor secondary to  foot ulcers.  Will consider GLP-1 receptor agonist in the future.  Patient lives in [place], will try to coordinate care with [place] endocrinology team at [facility].  BP management as per primary team.  Continue with atorvastatin 80 mg once daily.  Discussed with patient the importance of being adherent to his medication, glucose monitoring.

## 2025-03-06 ENCOUNTER — RESULT REVIEW (OUTPATIENT)
Age: 55
End: 2025-03-06

## 2025-03-06 DIAGNOSIS — Z01.818 ENCOUNTER FOR OTHER PREPROCEDURAL EXAMINATION: ICD-10-CM

## 2025-03-06 DIAGNOSIS — Z29.9 ENCOUNTER FOR PROPHYLACTIC MEASURES, UNSPECIFIED: ICD-10-CM

## 2025-03-06 LAB
A1C WITH ESTIMATED AVERAGE GLUCOSE RESULT: >15.5 % — HIGH (ref 4–5.6)
ANION GAP SERPL CALC-SCNC: 13 MMOL/L — SIGNIFICANT CHANGE UP (ref 5–17)
BUN SERPL-MCNC: 10 MG/DL — SIGNIFICANT CHANGE UP (ref 7–23)
CALCIUM SERPL-MCNC: 8.6 MG/DL — SIGNIFICANT CHANGE UP (ref 8.4–10.5)
CHLORIDE SERPL-SCNC: 95 MMOL/L — LOW (ref 96–108)
CHOLEST SERPL-MCNC: 118 MG/DL — SIGNIFICANT CHANGE UP
CO2 SERPL-SCNC: 22 MMOL/L — SIGNIFICANT CHANGE UP (ref 22–31)
CREAT SERPL-MCNC: 0.74 MG/DL — SIGNIFICANT CHANGE UP (ref 0.5–1.3)
CULTURE RESULTS: SIGNIFICANT CHANGE UP
EGFR: 107 ML/MIN/1.73M2 — SIGNIFICANT CHANGE UP
EGFR: 107 ML/MIN/1.73M2 — SIGNIFICANT CHANGE UP
ESTIMATED AVERAGE GLUCOSE: >398 MG/DL — HIGH (ref 68–114)
GLUCOSE BLDC GLUCOMTR-MCNC: 107 MG/DL — HIGH (ref 70–99)
GLUCOSE BLDC GLUCOMTR-MCNC: 134 MG/DL — HIGH (ref 70–99)
GLUCOSE BLDC GLUCOMTR-MCNC: 161 MG/DL — HIGH (ref 70–99)
GLUCOSE BLDC GLUCOMTR-MCNC: 336 MG/DL — HIGH (ref 70–99)
GLUCOSE SERPL-MCNC: 312 MG/DL — HIGH (ref 70–99)
HCT VFR BLD CALC: 33.6 % — LOW (ref 39–50)
HDLC SERPL-MCNC: 29 MG/DL — LOW
HGB BLD-MCNC: 11.1 G/DL — LOW (ref 13–17)
LIPID PNL WITH DIRECT LDL SERPL: 65 MG/DL — SIGNIFICANT CHANGE UP
MCHC RBC-ENTMCNC: 26.3 PG — LOW (ref 27–34)
MCHC RBC-ENTMCNC: 33 G/DL — SIGNIFICANT CHANGE UP (ref 32–36)
MCV RBC AUTO: 79.6 FL — LOW (ref 80–100)
MRSA PCR RESULT.: SIGNIFICANT CHANGE UP
NON HDL CHOLESTEROL: 90 MG/DL — SIGNIFICANT CHANGE UP
NRBC BLD AUTO-RTO: 0 /100 WBCS — SIGNIFICANT CHANGE UP (ref 0–0)
PLATELET # BLD AUTO: 233 K/UL — SIGNIFICANT CHANGE UP (ref 150–400)
POTASSIUM SERPL-MCNC: 3.9 MMOL/L — SIGNIFICANT CHANGE UP (ref 3.5–5.3)
POTASSIUM SERPL-SCNC: 3.9 MMOL/L — SIGNIFICANT CHANGE UP (ref 3.5–5.3)
RAPID RVP RESULT: SIGNIFICANT CHANGE UP
RBC # BLD: 4.22 M/UL — SIGNIFICANT CHANGE UP (ref 4.2–5.8)
RBC # FLD: 12.3 % — SIGNIFICANT CHANGE UP (ref 10.3–14.5)
S AUREUS DNA NOSE QL NAA+PROBE: SIGNIFICANT CHANGE UP
SARS-COV-2 RNA SPEC QL NAA+PROBE: SIGNIFICANT CHANGE UP
SODIUM SERPL-SCNC: 130 MMOL/L — LOW (ref 135–145)
SPECIMEN SOURCE: SIGNIFICANT CHANGE UP
TRIGL SERPL-MCNC: 143 MG/DL — SIGNIFICANT CHANGE UP
WBC # BLD: 9.93 K/UL — SIGNIFICANT CHANGE UP (ref 3.8–10.5)
WBC # FLD AUTO: 9.93 K/UL — SIGNIFICANT CHANGE UP (ref 3.8–10.5)

## 2025-03-06 PROCEDURE — 99233 SBSQ HOSP IP/OBS HIGH 50: CPT

## 2025-03-06 PROCEDURE — 93306 TTE W/DOPPLER COMPLETE: CPT | Mod: 26

## 2025-03-06 PROCEDURE — 99232 SBSQ HOSP IP/OBS MODERATE 35: CPT

## 2025-03-06 RX ORDER — ACETAMINOPHEN 500 MG/5ML
325 LIQUID (ML) ORAL ONCE
Refills: 0 | Status: COMPLETED | OUTPATIENT
Start: 2025-03-06 | End: 2025-03-06

## 2025-03-06 RX ORDER — INSULIN GLARGINE-YFGN 100 [IU]/ML
35 INJECTION, SOLUTION SUBCUTANEOUS AT BEDTIME
Refills: 0 | Status: DISCONTINUED | OUTPATIENT
Start: 2025-03-06 | End: 2025-03-07

## 2025-03-06 RX ORDER — METOPROLOL SUCCINATE 50 MG/1
50 TABLET, EXTENDED RELEASE ORAL
Refills: 0 | Status: DISCONTINUED | OUTPATIENT
Start: 2025-03-06 | End: 2025-03-07

## 2025-03-06 RX ORDER — SACUBITRIL AND VALSARTAN 49; 51 MG/1; MG/1
1 TABLET, FILM COATED ORAL
Refills: 0 | Status: DISCONTINUED | OUTPATIENT
Start: 2025-03-06 | End: 2025-03-07

## 2025-03-06 RX ORDER — INSULIN LISPRO 100 U/ML
12 INJECTION, SOLUTION INTRAVENOUS; SUBCUTANEOUS
Refills: 0 | Status: DISCONTINUED | OUTPATIENT
Start: 2025-03-06 | End: 2025-03-07

## 2025-03-06 RX ADMIN — Medication 325 MILLIGRAM(S): at 07:22

## 2025-03-06 RX ADMIN — INSULIN GLARGINE-YFGN 35 UNIT(S): 100 INJECTION, SOLUTION SUBCUTANEOUS at 21:48

## 2025-03-06 RX ADMIN — SACUBITRIL AND VALSARTAN 1 TABLET(S): 49; 51 TABLET, FILM COATED ORAL at 17:07

## 2025-03-06 RX ADMIN — Medication 25 GRAM(S): at 10:41

## 2025-03-06 RX ADMIN — ATORVASTATIN CALCIUM 80 MILLIGRAM(S): 80 TABLET, FILM COATED ORAL at 21:48

## 2025-03-06 RX ADMIN — METOPROLOL SUCCINATE 50 MILLIGRAM(S): 50 TABLET, EXTENDED RELEASE ORAL at 05:26

## 2025-03-06 RX ADMIN — INSULIN LISPRO 15 UNIT(S): 100 INJECTION, SOLUTION INTRAVENOUS; SUBCUTANEOUS at 08:37

## 2025-03-06 RX ADMIN — Medication 25 GRAM(S): at 03:46

## 2025-03-06 RX ADMIN — METOPROLOL SUCCINATE 50 MILLIGRAM(S): 50 TABLET, EXTENDED RELEASE ORAL at 17:07

## 2025-03-06 RX ADMIN — ENOXAPARIN SODIUM 40 MILLIGRAM(S): 100 INJECTION SUBCUTANEOUS at 05:30

## 2025-03-06 RX ADMIN — INSULIN LISPRO 4: 100 INJECTION, SOLUTION INTRAVENOUS; SUBCUTANEOUS at 08:37

## 2025-03-06 RX ADMIN — CILOSTAZOL 50 MILLIGRAM(S): 50 TABLET ORAL at 05:26

## 2025-03-06 RX ADMIN — Medication 650 MILLIGRAM(S): at 05:28

## 2025-03-06 RX ADMIN — Medication 650 MILLIGRAM(S): at 07:22

## 2025-03-06 RX ADMIN — Medication 25 GRAM(S): at 19:45

## 2025-03-06 RX ADMIN — Medication 81 MILLIGRAM(S): at 10:41

## 2025-03-06 RX ADMIN — Medication 325 MILLIGRAM(S): at 06:34

## 2025-03-06 RX ADMIN — CILOSTAZOL 50 MILLIGRAM(S): 50 TABLET ORAL at 17:08

## 2025-03-06 NOTE — PROGRESS NOTE ADULT - ASSESSMENT
Patient is a 54 year old male with PMH of DM2, CAD s/p CABG, HFrEF, PAD s/p RLE angioplasty/stent and s/p R 3rd metatarsal head resection, submetatarsal debridement, and kerecis graft who presents for right foot pain for few weeks and hyperglycemia.    Diabetic foot ulcer, with c/f early OM  Leukocytosis likely iso above  - Per Podiatry, R foot submet 3 wound to periosteum fibrotic, no malodor, no purulent drainage, no acute signs of infection   - R foot xray with no signs to suggest advanced OM  - R foot MRI with findings c/f possible reactive vs early OM, plantar soft tissue wound with gas extending along 2nd flexor tendons from mid metatarsal to proximal phalanx base, no abscess noted    - CXR with questionable L basilar opacity in conjunction with an elevated L hemidiaphragm may be intra-abdominal viscera; underlying effusion/atelectasis not completely excluded  - COVID/Flu/RSV negative 3/5   - s/p vancomycin and zosyn in the ER  - leukocytosis on admission, now resolved as of 3/5pm  - initially afebrile on admission, but now with fevers overnight to 102.2F, restarted on empiric zosyn     Recommendations:   Follow R foot wound culture polymicrobial - d/w micro to work up further   Follow 3/5 Bcx - in process x2   Podiatry plan for R foot wound debridement with graft application vs R partial 2nd and 3rd ray resection   Vascular following, cleared for further podiatric intervention   Continue on zosyn   Will hold off on further vancomycin given MRSA PCR screen negative   Local wound care   Monitor temps/WBC  Continue rest of care per primary team       Jason Vidal M.D.  [facility]  Available on [employer] TEAMS - *PREFERRED*  [phone]  After 5pm on weekdays and all day on weekends - please call [phone]     Thank you for consulting us and involving us in the management of this patients case. In addition to reviewing history, imaging, documents, labs, microbiology, took into account antibiotic stewardship, local antibiogram and infection control strategies and potential transmission issues at time of treatment decision making process.

## 2025-03-06 NOTE — PROGRESS NOTE ADULT - ASSESSMENT
54M with PMHx of T2DM, CAD (post-CABG), mild HFrEF (EF 45%), and PAD (with recent angiogram Aug. 2024) presenting for several week history of right foot pain and hyperglycemia. Patient states he's been having right foot pain that has been worsening. He denies fever or chills. He states sometimes he forgets to take his insulin which likely explains why he's been hyperglycemic. Recently here in Aug 2024 and he had a right 3rd digit partial digit resection with submetatarsal debridement and graft. Present with right foot pain found to have hyperglycemia. Endocrine consulted for management of uncontrolled DM2.   Tolerating POs, with inconsistent oral intake. Last 24 hour BGs 107-336 with overnight and fasting hyperglycemia and prandial BGs in 100s today. Overnight and this am severe hyperglycemia due to eating food at night.  Explained the importance of BG control to prevent DM complication and wound healing and advised to avoid snacks between meals and overnight, and outside food. He verbalized understanding.   Plan for Right partial 2nd and 3rd ray resection tomorrow 3/7 , will be NPO after MN. Will decrease Lantus dose for NPO state          #Uncontrolled T2DM with hyperglycemia  - Last A1c 14%  - Home meds: Lantus 52 units qhs, Humalog 20 units tidac (nonadherent for last 4-6 weeks)  - Follows with Dr. Mcdaniel? in [place]  - Started on Lantus 50 units qhs + Admelog 15 units tidac    PLAN  While inpatient:  - Decrease Lantus to 35 units tonight for NPO state, increase  back to 50 units after OR once back on diet   - Decrease Admelog to 12 units with meals ( Hold if NPO)   - Continue with low dose correctional insulin with meals and low dose correctional insulin at bedtime  - Check FS before meals and at bedtime - if NPO, check q6 hours  - Goal -180  - Hypoglycemia protocol  - Please obtain nutrition consult    Discharge Recommendations:  - Basal-bolus (dose TBD)  - Please send:  -- Freestyle Libre3 Plus sensor and reader  -- Glucometer (ACCU_CHECK kahlil Connect, Ascensia Contour Next EZ or One, Freestyle [place] LITE or OneTouch Verio IQ)  -- Glucometer test strips and lancets  (make sure compatible with glucometer), dispense #100 (or #200) use as directed, alcohol pads  -- BD dawn 4mm pen needles  -- Glucose tabs, Baqsimi nasal spray or glucagon emergency kit for hypoglycemia risk   - If patient is not covered for CGM, patient should check FSBG premeals and bedtime. Patient should call their doctor when FSBG <70 or above >400 and or consistently above 200s as changes in the regimen will have to be made.   - Patient will need outpatient Endocrine follow-up - will try to find provider in [facility], or can follow up at [facility] ([phone]) at [address] [place] [id number]  - Routine follow-up with Ophthalmology and Podiatry    #HTN  - Goal BP <130/80  - Management per primary team  - UACR as outpatient    #HLD  - Goal LDL <70  - c/w atorvastatin 80 mg    Contact via Microsoft Teams during business hours  To reach covering provider access AMION via sunrise tools  For Urgent matters/after-hours/weekends/holidays please page endocrine fellow on call   For nonurgent matters please email [email or web address]    Please note that this patient may be followed by different provider tomorrow.  Notify endocrine 24 hours prior to discharge for final recommendations

## 2025-03-06 NOTE — PROGRESS NOTE ADULT - SUBJECTIVE AND OBJECTIVE BOX
SUBJECTIVE: NAEO. patient seen and examined on AM rounds. denies worsening pain in lower ext.     Vital Signs Last 24 Hrs  T(C): 37.5 (06 Mar 2025 07:27), Max: 39 (05 Mar 2025 20:17)  T(F): 99.5 (06 Mar 2025 07:27), Max: 102.2 (05 Mar 2025 20:17)  HR: 90 (06 Mar 2025 07:27) (90 - 110)  BP: 107/66 (06 Mar 2025 07:27) (107/66 - 157/77)  BP(mean): 106 (05 Mar 2025 13:25) (106 - 106)  RR: 18 (06 Mar 2025 07:27) (17 - 18)  SpO2: 93% (06 Mar 2025 07:27) (93% - 97%)    Parameters below as of 06 Mar 2025 07:27  Patient On (Oxygen Delivery Method): room air        I&O's Detail    05 Mar 2025 07:01  -  06 Mar 2025 07:00  --------------------------------------------------------  IN:    IV PiggyBack: 550 mL    Oral Fluid: 240 mL  Total IN: 790 mL    OUT:    Voided (mL): 200 mL  Total OUT: 200 mL    Total NET: 590 mL        PHYSICAL EXAM:  General: NAD  Pulmonary: normal resp effort, CTA-B  Cardiovascular: NSR, no murmurs  Abdominal: soft, ND/NT  Extremities: s/p Right foot metatarsal head resection; Right foot submet 3 wound to periosteum fibrotic, no malodor, no purulent drainage, no acute signs of infection.   Neuro: A/O x 3, CNs II-XII grossly intact, normal sensation, no focal deficits    Vascular Pulse Exam:  Right Femoral: palpable    Left Femoral:  palpable  Right DP: signal          Left DP:  signal            Right PT: signal               Left PT: signal           LABS:                        11.1   9.93  )-----------( 233      ( 06 Mar 2025 07:01 )             33.6     03-06    130[L]  |  95[L]  |  10  ----------------------------<  312[H]  3.9   |  22  |  0.74    Ca    8.6      06 Mar 2025 06:58  Phos  4.0     03-04  Mg     2.1     03-04    TPro  7.7  /  Alb  3.3  /  TBili  0.6  /  DBili  x   /  AST  10  /  ALT  9[L]  /  AlkPhos  119  03-04      Urinalysis Basic - ( 06 Mar 2025 06:58 )    Color: x / Appearance: x / SG: x / pH: x  Gluc: 312 mg/dL / Ketone: x  / Bili: x / Urobili: x   Blood: x / Protein: x / Nitrite: x   Leuk Esterase: x / RBC: x / WBC x   Sq Epi: x / Non Sq Epi: x / Bacteria: x        RADIOLOGY & ADDITIONAL STUDIES:   SUBJECTIVE: NAEO. patient seen and examined on AM rounds. denies worsening pain in lower ext.     Vital Signs Last 24 Hrs  T(C): 37.5 (06 Mar 2025 07:27), Max: 39 (05 Mar 2025 20:17)  T(F): 99.5 (06 Mar 2025 07:27), Max: 102.2 (05 Mar 2025 20:17)  HR: 90 (06 Mar 2025 07:27) (90 - 110)  BP: 107/66 (06 Mar 2025 07:27) (107/66 - 157/77)  BP(mean): 106 (05 Mar 2025 13:25) (106 - 106)  RR: 18 (06 Mar 2025 07:27) (17 - 18)  SpO2: 93% (06 Mar 2025 07:27) (93% - 97%)    Parameters below as of 06 Mar 2025 07:27  Patient On (Oxygen Delivery Method): room air        I&O's Detail    05 Mar 2025 07:01  -  06 Mar 2025 07:00  --------------------------------------------------------  IN:    IV PiggyBack: 550 mL    Oral Fluid: 240 mL  Total IN: 790 mL    OUT:    Voided (mL): 200 mL  Total OUT: 200 mL    Total NET: 590 mL        PHYSICAL EXAM:  General: NAD  Pulmonary: normal resp effort, CTA-B  Cardiovascular: NSR, no murmurs  Abdominal: soft, ND/NT  Extremities: s/p Right foot metatarsal head resection; Right foot submet 3 wound to periosteum fibrotic, no malodor, no purulent drainage, no acute signs of infection.   Neuro: A/O x 3, CNs II-XII grossly intact, normal sensation, no focal deficits    Vascular Pulse Exam:  Right Femoral: palpable    Left Femoral:  palpable  Right DP: signal          Left DP:  signal            Right PT: signal               Left PT: signal           LABS:                        11.1   9.93  )-----------( 233      ( 06 Mar 2025 07:01 )             33.6     03-06    130[L]  |  95[L]  |  10  ----------------------------<  312[H]  3.9   |  22  |  0.74    Ca    8.6      06 Mar 2025 06:58  Phos  4.0     03-04  Mg     2.1     03-04    TPro  7.7  /  Alb  3.3  /  TBili  0.6  /  DBili  x   /  AST  10  /  ALT  9[L]  /  AlkPhos  119  03-04      Urinalysis Basic - ( 06 Mar 2025 06:58 )    Color: x / Appearance: x / SG: x / pH: x  Gluc: 312 mg/dL / Ketone: x  / Bili: x / Urobili: x   Blood: x / Protein: x / Nitrite: x   Leuk Esterase: x / RBC: x / WBC x   Sq Epi: x / Non Sq Epi: x / Bacteria: x

## 2025-03-06 NOTE — PROGRESS NOTE ADULT - SUBJECTIVE AND OBJECTIVE BOX
[facility]  DU Garcia Y. Patel, S. Shah, G. Casimir  [phone]  ([phone] - weekdays after 5pm and weekends)    Name: ELSI MERINO  Age/Gender: 54y Male  MRN: [id number]    Interval History:  Patient seen and examined this morning.   Febrile last night to 102.2F, denies chills.   Complains of foot pain, no other new complaints.   Notes reviewed.   Allergies: No Known Allergies    Objective:  Vitals:   T(F): 99.5 (03-06-25 @ 07:27), Max: 102.2 (03-05-25 @ 20:17)  HR: 90 (03-06-25 @ 07:27) (90 - 110)  BP: 107/66 (03-06-25 @ 07:27) (107/66 - 157/77)  RR: 18 (03-06-25 @ 07:27) (17 - 18)  SpO2: 93% (03-06-25 @ 07:27) (93% - 97%)  Physical Examination:  General: no acute distress  HEENT: normocephalic, atraumatic, anicteric  Respiratory: clear to auscultation bilaterally   Cardiovascular: S1 and S2 present, normal rate   Gastrointestinal: soft, nontender, nondistended  Extremities: right foot dressing, no cyanosis  Skin: no visible rash    Laboratory Studies:  CBC:                       11.1   9.93  )-----------( 233      ( 06 Mar 2025 07:01 )             33.6     WBC Trend:  9.93 03-06-25 @ 07:01  9.71 03-05-25 @ 21:18  13.65 03-04-25 @ 23:12    CMP: 03-06    130[L]  |  95[L]  |  10  ----------------------------<  312[H]  3.9   |  22  |  0.74    Ca    8.6      06 Mar 2025 06:58  Phos  4.0     03-04  Mg     2.1     03-04    TPro  7.7  /  Alb  3.3  /  TBili  0.6  /  DBili  x   /  AST  10  /  ALT  9[L]  /  AlkPhos  119  03-04    Creatinine: 0.74 mg/dL (03-06-25 @ 06:58)  Creatinine: 0.86 mg/dL (03-05-25 @ 21:18)  Creatinine: 0.72 mg/dL (03-05-25 @ 04:48)  Creatinine: 0.84 mg/dL (03-04-25 @ 23:12)    LIVER FUNCTIONS - ( 04 Mar 2025 23:12 )  Alb: 3.3 g/dL / Pro: 7.7 g/dL / ALK PHOS: 119 U/L / ALT: 9 U/L / AST: 10 U/L / GGT: x           Microbiology: reviewed   Culture - Abscess with Gram Stain (collected 03-05-25 @ 02:55)  Source: Abscess right foot wound  Gram Stain (03-05-25 @ 14:52):    No polymorphonuclear cells seen    per low power field    Few Gram positive cocci in pairs    Moderate Gram Negative Rods    per oil power field  Preliminary Report (03-06-25 @ 09:41):    Culture yields >4 types of aerobic and/or anaerobic bacteria    Call client services within 7 days if further workup is clinically    indicated.    03-05-25 @ 21:18 SARS-CoV-2 NotDetec/Influenza A NotDetec/Influenza B NotDetec/RSV NotDetec    Radiology: reviewed   < from: Xray Chest 1 View AP/PA (03.05.25 @ 21:58) >  IMPRESSION:  Questionable left basilar opacity in conjunction with an elevated left   hemidiaphragm may reflect intra-abdominal viscera. Can consider lateral   view for further evaluation. An underlying effusion/atelectasis is not   completely excluded.    --- End of Report ---    < end of copied text >    < from: MR Foot w/wo IV Cont, Right (03.05.25 @ 13:24) >  IMPRESSION:  1.  Plantar soft tissue wound at the level of the 2nd metatarsophalangeal   joint with mild marrow changes of the 2nd metatarsal head and 2nd   proximal phalanx that may be reactive or reflect early osteomyelitis.  2.  Similar changes involve the adjacent 3rd metatarsal stump and 3rd   proximal phalanx.  3.  Plantar soft tissue wound with gas extends along the 2nd flexor   tendons from the mid metatarsal level to the proximal phalanx base.    --- End of Report ---    < end of copied text >    < from: VA Physiol Extremity Lower 3+ Level, BI (03.05.25 @ 08:56) >  IMPRESSION:    Findings may reflect trifurcation artery/microvascular disease, on the   right, at rest.    Correlate with arterial duplex ultrasound as clinically warranted.    --- End of Report ---    < end of copied text >        Medications:  acetaminophen     Tablet .. 650 milliGRAM(s) Oral every 6 hours PRN  aspirin enteric coated 81 milliGRAM(s) Oral daily  atorvastatin 80 milliGRAM(s) Oral at bedtime  cilostazol 50 milliGRAM(s) Oral two times a day  dextrose 5%. 1000 milliLiter(s) IV Continuous <Continuous>  dextrose 5%. 1000 milliLiter(s) IV Continuous <Continuous>  dextrose 50% Injectable 25 Gram(s) IV Push once  dextrose 50% Injectable 12.5 Gram(s) IV Push once  dextrose 50% Injectable 25 Gram(s) IV Push once  dextrose Oral Gel 15 Gram(s) Oral once  enoxaparin Injectable 40 milliGRAM(s) SubCutaneous every 24 hours  glucagon  Injectable 1 milliGRAM(s) IntraMuscular once  insulin glargine Injectable (LANTUS) 50 Unit(s) SubCutaneous at bedtime  insulin lispro (ADMELOG) corrective regimen sliding scale   SubCutaneous three times a day before meals  insulin lispro (ADMELOG) corrective regimen sliding scale   SubCutaneous at bedtime  insulin lispro Injectable (ADMELOG) 15 Unit(s) SubCutaneous three times a day before meals  metoprolol tartrate 50 milliGRAM(s) Oral two times a day  oxyCODONE    IR 5 milliGRAM(s) Oral every 6 hours PRN  piperacillin/tazobactam IVPB.- 3.375 Gram(s) IV Intermittent once  piperacillin/tazobactam IVPB.. 3.375 Gram(s) IV Intermittent every 8 hours  sacubitril 24 mG/valsartan 26 mG 1 Tablet(s) Oral two times a day    Current Antimicrobials:  piperacillin/tazobactam IVPB.- 3.375 Gram(s) IV Intermittent once  piperacillin/tazobactam IVPB.. 3.375 Gram(s) IV Intermittent every 8 hours    Prior/Completed Antimicrobials:  piperacillin/tazobactam IVPB.  piperacillin/tazobactam IVPB.-  piperacillin/tazobactam IVPB...  vancomycin  IVPB  vancomycin  IVPB.

## 2025-03-06 NOTE — PROGRESS NOTE ADULT - SUBJECTIVE AND OBJECTIVE BOX
Patient is a 54y old  Male who presents with a chief complaint of Right Foot Pain (06 Mar 2025 10:23)       INTERVAL HPI/OVERNIGHT EVENTS:  Patient seen and evaluated at bedside.  Pt is resting comfortable in NAD. Denies N/V/F/C.      Allergies    No Known Allergies    Intolerances        Vital Signs Last 24 Hrs  T(C): 37.5 (06 Mar 2025 07:27), Max: 39 (05 Mar 2025 20:17)  T(F): 99.5 (06 Mar 2025 07:27), Max: 102.2 (05 Mar 2025 20:17)  HR: 90 (06 Mar 2025 07:27) (90 - 110)  BP: 107/66 (06 Mar 2025 07:27) (107/66 - 157/77)  BP(mean): 106 (05 Mar 2025 13:25) (106 - 106)  RR: 18 (06 Mar 2025 07:27) (17 - 18)  SpO2: 93% (06 Mar 2025 07:27) (93% - 97%)    Parameters below as of 06 Mar 2025 07:27  Patient On (Oxygen Delivery Method): room air        LABS:                        11.1   9.93  )-----------( 233      ( 06 Mar 2025 07:01 )             33.6     03-06    130[L]  |  95[L]  |  10  ----------------------------<  312[H]  3.9   |  22  |  0.74    Ca    8.6      06 Mar 2025 06:58  Phos  4.0     03-04  Mg     2.1     03-04    TPro  7.7  /  Alb  3.3  /  TBili  0.6  /  DBili  x   /  AST  10  /  ALT  9[L]  /  AlkPhos  119  03-04      Urinalysis Basic - ( 06 Mar 2025 06:58 )    Color: x / Appearance: x / SG: x / pH: x  Gluc: 312 mg/dL / Ketone: x  / Bili: x / Urobili: x   Blood: x / Protein: x / Nitrite: x   Leuk Esterase: x / RBC: x / WBC x   Sq Epi: x / Non Sq Epi: x / Bacteria: x      CAPILLARY BLOOD GLUCOSE      POCT Blood Glucose.: 336 mg/dL (06 Mar 2025 08:07)  POCT Blood Glucose.: 273 mg/dL (05 Mar 2025 21:48)  POCT Blood Glucose.: 127 mg/dL (05 Mar 2025 13:13)      Lower Extremity Physical Exam:  Vascular: DP/PT 0/4, B/L, CFT < 3 seconds B/L, Temperature gradient warm to cool, B/L.   Neuro: Epicritic sensation absent to the level of digits, B/L.  Musculoskeletal/Ortho: s/p Right foot metatarsal head resection  Skin: Right foot submet 3 wound to bone, fibrotic wound bed, mild malodor, scant purulent drainage, no acute signs of infection. Right foot 3rd digit dusky changes. Left foot no open wounds, no acute signs of infection      RADIOLOGY & ADDITIONAL TESTS:      ACC: [id number] EXAM:  MR FOOT WAW IC RT   ORDERED BY:  JAJA TRINH     PROCEDURE DATE:  03/05/2025          INTERPRETATION:  MR FOOT WITHOUT AND WITH IV CONTRAST RIGHT    HISTORY: Pain. Concern for osteomyelitis of the 2nd and 3rd metatarsals.    TECHNIQUE:  Multiplanar MRI of the right forefoot was performed without   and with 7.5 cc administered Gadavist intravenous contrast.    COMPARISON:  Right foot x-rays dated 3/4/2025.    FINDINGS:    OSSEOUS STRUCTURES    Fractures:  None.    Postoperative Changes: Prior resection of the 3rd metatarsal head is   again seen with punctate susceptibility artifacts and mild surrounding   heterotopic ossification.    Marrow:  Plantar soft tissue wound at the level of the 2nd   metatarsophalangeal joint with mild subcortical marrow edema and   enhancement of the 2nd metatarsal head and 2nd proximal phalanx. Similar   changes involve the stump of the 3rd metatarsal and 3rd proximal phalanx.   There is no significant T1 marrow replacement.    INTERPHALANGEAL JOINTS    Articular Surfaces:  Preserved.    Effusions/Synovitis:  None.    1ST METATARSOPHALANGEAL JOINT    Articular Surfaces:  Preserved.    Effusions/Synovitis:  None.    Sesamoids:  Intact.    LESSER METATARSOPHALANGEAL JOINTS    ArticularSurfaces:  Otherwise preserved.    Effusions/Synovitis:  Small effusion and mild synovitis of the 2nd   metatarsophalangeal joint.    TARSOMETATARSAL JOINTS    Articular Surfaces:  Slight subchondral reactive changes involve the   3rd tarsometatarsaljoint.    Effusions/Synovitis:  None.    INTERTARSAL JOINTS    Articular Surfaces:  Slight subchondral reactive changes involve the   1st and 2nd cuneiform joint.    Effusions/Synovitis:  None.    INTERMETATARSAL SPACES    Neuromas:  None.    Bursa:Small 1st webspace bursa.    LISFRANC LIGAMENT  Intact.    TENDONS    Flexor Tendons:  Plantar soft tissue wound extends along the 2nd flexor   tendons to the mid metatarsal level and proximal phalanx base with gas.   Tendons appear otherwise intact.    Extensor Tendons:  Intact.    DISTAL PLANTAR FASCIA  Small plantar fibroma is noted at the level of the 1st metatarsal base.    SOFT TISSUES    Musculature:  Mild-to-moderate generalized muscle atrophy is present.   Increased T2 signal suggests diabetic neuropathy.    Subcutaneous Tissues:  Mild to moderate edema in addition to the   plantar soft tissue wound.    IMPRESSION:  1.  Plantar soft tissue wound at the level of the 2nd metatarsophalangeal   joint with mild marrow changes of the 2nd metatarsal head and 2nd   proximal phalanx that may be reactive or reflect early osteomyelitis.  2.  Similar changes involve the adjacent 3rd metatarsal stump and 3rd   proximal phalanx.  3.  Plantar soft tissue wound with gas extends along the 2nd flexor   tendons from the mid metatarsal level to the proximal phalanx base.    --- End of Report ---            SHEA GODWIN MD; Attending Radiologist  This document has been electronically signed. Mar  5 2025  1:48PM

## 2025-03-06 NOTE — PROGRESS NOTE ADULT - ASSESSMENT
54M with PMHx of T2DM, CAD (post-CABG), mild HFrEF (EF 45%), and PAD (with recent angiogram Aug. 2024) presenting for several week history of right foot pain and hyperglycemia. Vascular surgery consulted for evaluation of Right foot wound.     Plan:  - OBDULIO/PVRs w/ toe pressure obtained and reviewed > based on current obdulio/pvr and previous rle angio 8/7/2024 > pt is cleared for rt foot/toe surg by podiatry  - Local wound care  - C/w aspirin/pletal/Statin    Vascular Surgery   [id number]/ [id number] 54M with PMHx of T2DM, CAD (post-CABG), mild HFrEF (EF 45%), and PAD (with recent angiogram Aug. 2024) presenting for several week history of right foot pain and hyperglycemia. Vascular surgery consulted for evaluation of Right foot wound.     Plan:  - podiatric intervention pending   - Local wound care  - C/w aspirin/pletal/Statin  will follow     Vascular Surgery   [id number]/ [id number]

## 2025-03-06 NOTE — PROGRESS NOTE ADULT - SUBJECTIVE AND OBJECTIVE BOX
Seen earlier today, Used [employer] App # [id number], [place]    Chief Complaint: Diabetes Mellitus follow up    INTERVAL HX: " Feel okay" Tolerating POs, ate well for breakfast. Last 24 hour BGs 107-336 with overnight and fasting hyperglycemia and prandial BGs in 100s today. Pt reports eating food from home last night and this am ( ate rice and chickpeas) Noted food container with rice and soup at the bedside table. Explained the importance of BG control to prevent DM complication and wound healing and advised to avoid snacks between meals and overnight, and outside food. He verbalized understanding. Noted he is scheduled to have Right partial 2nd and 3rd ray resection tomorrow 3/7 , will be NPO after MN       Review of Systems:  General: As above  GI: No nausea, vomiting  Endocrine: no  S&Sx of hypoglycemia    Allergies    No Known Allergies    Intolerances      MEDICATIONS  (STANDING):  aspirin enteric coated 81 milliGRAM(s) Oral daily  atorvastatin 80 milliGRAM(s) Oral at bedtime  cilostazol 50 milliGRAM(s) Oral two times a day  dextrose 5%. 1000 milliLiter(s) (50 mL/Hr) IV Continuous <Continuous>  dextrose 5%. 1000 milliLiter(s) (100 mL/Hr) IV Continuous <Continuous>  dextrose 50% Injectable 25 Gram(s) IV Push once  dextrose 50% Injectable 12.5 Gram(s) IV Push once  dextrose 50% Injectable 25 Gram(s) IV Push once  dextrose Oral Gel 15 Gram(s) Oral once  enoxaparin Injectable 40 milliGRAM(s) SubCutaneous every 24 hours  glucagon  Injectable 1 milliGRAM(s) IntraMuscular once  insulin glargine Injectable (LANTUS) 50 Unit(s) SubCutaneous at bedtime  insulin lispro (ADMELOG) corrective regimen sliding scale   SubCutaneous three times a day before meals  insulin lispro (ADMELOG) corrective regimen sliding scale   SubCutaneous at bedtime  insulin lispro Injectable (ADMELOG) 12 Unit(s) SubCutaneous three times a day before meals  metoprolol tartrate 50 milliGRAM(s) Oral two times a day  piperacillin/tazobactam IVPB.. 3.375 Gram(s) IV Intermittent every 8 hours  sacubitril 24 mG/valsartan 26 mG 1 Tablet(s) Oral two times a day      atorvastatin   80 milliGRAM(s) Oral (03-05-25 @ 21:37)    insulin glargine Injectable (LANTUS)   50 Unit(s) SubCutaneous (03-05-25 @ 22:25)    insulin lispro (ADMELOG) corrective regimen sliding scale   4 Unit(s) SubCutaneous (03-06-25 @ 08:37)    insulin lispro (ADMELOG) corrective regimen sliding scale   1 Unit(s) SubCutaneous (03-05-25 @ 22:25)    insulin lispro Injectable (ADMELOG)   15 Unit(s) SubCutaneous (03-06-25 @ 08:37)        PHYSICAL EXAM:  VITALS: T(C): 37.9 (03-06-25 @ 16:05)  T(F): 100.3 (03-06-25 @ 16:05), Max: 102.2 (03-05-25 @ 20:17)  HR: 82 (03-06-25 @ 16:05) (82 - 104)  BP: 118/61 (03-06-25 @ 16:05) (103/63 - 157/77)  RR:  (17 - 18)  SpO2:  (93% - 97%)  Wt(kg): --  GENERAL: Male laying in bed, in NAD  Respiratory: Respirations unlabored   Extremities: Warm, no edema  NEURO: Alert , appropriate     LABS:  POCT Blood Glucose.: 134 mg/dL (03-06-25 @ 17:04)  POCT Blood Glucose.: 107 mg/dL (03-06-25 @ 12:04)  POCT Blood Glucose.: 336 mg/dL (03-06-25 @ 08:07)  POCT Blood Glucose.: 273 mg/dL (03-05-25 @ 21:48)  POCT Blood Glucose.: 127 mg/dL (03-05-25 @ 13:13)  POCT Blood Glucose.: 289 mg/dL (03-05-25 @ 06:07)  POCT Blood Glucose.: 422 mg/dL (03-05-25 @ 02:09)  POCT Blood Glucose.: 390 mg/dL (03-05-25 @ 00:24)  POCT Blood Glucose.: 411 mg/dL (03-04-25 @ 20:19)                          11.1   9.93  )-----------( 233      ( 06 Mar 2025 07:01 )             33.6     03-06    130[L]  |  95[L]  |  10  ----------------------------<  312[H]  3.9   |  22  |  0.74    Ca    8.6      06 Mar 2025 06:58  Phos  4.0     03-04  Mg     2.1     03-04    TPro  7.7  /  Alb  3.3  /  TBili  0.6  /  DBili  x   /  AST  10  /  ALT  9[L]  /  AlkPhos  119  03-04    eGFR: 107 mL/min/1.73m2 (06 Mar 2025 06:58)  eGFR: 102 mL/min/1.73m2 (05 Mar 2025 21:18)    03-06 Chol 118 Direct LDL -- LDL calculated 65 HDL 29[L] Trig 143  Thyroid Function Tests:      A1C with Estimated Average Glucose Result: >15.5 % (03-04-25 @ 23:12)    Estimated Average Glucose: >398 mg/dL (03-04-25 @ 23:12)        Diet, NPO after Midnight:      NPO Start Date: 06-Mar-2025,   NPO Start Time: 23:59  Except Medications  With Ice Chips/Sips of Water (03-06-25 @ 11:47) [Active]  Diet, Consistent Carbohydrate/No Snacks (03-05-25 @ 07:13) [Active]

## 2025-03-06 NOTE — PROVIDER CONTACT NOTE (OTHER) - BACKGROUND
Pt admitted for hyperglycemia, right foot wound. Pt on zosyn IV and vancomycin IV. Blood cx sent 3/5/25
Pt admitted for hyperglycemia

## 2025-03-06 NOTE — PROGRESS NOTE ADULT - SUBJECTIVE AND OBJECTIVE BOX
[facility] Department of Hospital Medicine  Nguyễn Barnes DO  Available on MS Teams    Patient is a 54y old  Male who presents with a chief complaint of Right Foot Pain (06 Mar 2025 10:23)      OVERNIGHT EVENTS: Febrile to 102.2 F. On antibiotics given concern for early OM.     SUBJECTIVE: Pt seen and examined at bedside this morning. No acute complaints. Reports continued right foot pain. Denies nausea, vomiting, fevers, chills, chest pain, shortness of breath.     ADDITIONAL REVIEW OF SYSTEMS:    MEDICATIONS  (STANDING):  aspirin enteric coated 81 milliGRAM(s) Oral daily  atorvastatin 80 milliGRAM(s) Oral at bedtime  cilostazol 50 milliGRAM(s) Oral two times a day  dextrose 5%. 1000 milliLiter(s) (50 mL/Hr) IV Continuous <Continuous>  dextrose 5%. 1000 milliLiter(s) (100 mL/Hr) IV Continuous <Continuous>  dextrose 50% Injectable 25 Gram(s) IV Push once  dextrose 50% Injectable 12.5 Gram(s) IV Push once  dextrose 50% Injectable 25 Gram(s) IV Push once  dextrose Oral Gel 15 Gram(s) Oral once  enoxaparin Injectable 40 milliGRAM(s) SubCutaneous every 24 hours  glucagon  Injectable 1 milliGRAM(s) IntraMuscular once  insulin glargine Injectable (LANTUS) 50 Unit(s) SubCutaneous at bedtime  insulin lispro (ADMELOG) corrective regimen sliding scale   SubCutaneous three times a day before meals  insulin lispro (ADMELOG) corrective regimen sliding scale   SubCutaneous at bedtime  insulin lispro Injectable (ADMELOG) 15 Unit(s) SubCutaneous three times a day before meals  metoprolol tartrate 50 milliGRAM(s) Oral two times a day  piperacillin/tazobactam IVPB.. 3.375 Gram(s) IV Intermittent every 8 hours  sacubitril 24 mG/valsartan 26 mG 1 Tablet(s) Oral two times a day    MEDICATIONS  (PRN):  acetaminophen     Tablet .. 650 milliGRAM(s) Oral every 6 hours PRN Temp greater or equal to 38C (100.4F), Mild Pain (1 - 3)  oxyCODONE    IR 5 milliGRAM(s) Oral every 6 hours PRN Severe Pain (7 - 10)      CAPILLARY BLOOD GLUCOSE      POCT Blood Glucose.: 336 mg/dL (06 Mar 2025 08:07)  POCT Blood Glucose.: 273 mg/dL (05 Mar 2025 21:48)  POCT Blood Glucose.: 127 mg/dL (05 Mar 2025 13:13)    I&O's Summary    05 Mar 2025 07:01  -  06 Mar 2025 07:00  --------------------------------------------------------  IN: 790 mL / OUT: 200 mL / NET: 590 mL    06 Mar 2025 07:01  -  06 Mar 2025 11:09  --------------------------------------------------------  IN: 240 mL / OUT: 200 mL / NET: 40 mL        PHYSICAL EXAM:  Vital Signs Last 24 Hrs  T(C): 37.5 (06 Mar 2025 07:27), Max: 39 (05 Mar 2025 20:17)  T(F): 99.5 (06 Mar 2025 07:27), Max: 102.2 (05 Mar 2025 20:17)  HR: 90 (06 Mar 2025 07:27) (90 - 110)  BP: 107/66 (06 Mar 2025 07:27) (107/66 - 157/77)  BP(mean): 106 (05 Mar 2025 13:25) (106 - 106)  RR: 18 (06 Mar 2025 07:27) (17 - 18)  SpO2: 93% (06 Mar 2025 07:27) (93% - 97%)    Parameters below as of 06 Mar 2025 07:27  Patient On (Oxygen Delivery Method): room air        CONSTITUTIONAL: NAD, well-developed  HEAD: Normocephalic, atraumatic  EYES: EOMI, PERRL  ENT: no rhinorrhea, no pharyngeal erythema  RESPIRATORY: No increased work of breathing, CTAB, no wheezes or crackles appreciated  CARDIOVASCULAR: RRR, S1 and S2 present, no m/r/g  ABDOMEN: soft, NT, ND, bowel sounds present  EXTREMITIES: No LE edema  MUSCULOSKELETAL: no joint swelling, no tenderness to palpation  NEURO: A&Ox3, moving all extremities    LABS:                        11.1   9.93  )-----------( 233      ( 06 Mar 2025 07:01 )             33.6     03-06    130[L]  |  95[L]  |  10  ----------------------------<  312[H]  3.9   |  22  |  0.74    Ca    8.6      06 Mar 2025 06:58  Phos  4.0     03-04  Mg     2.1     03-04    TPro  7.7  /  Alb  3.3  /  TBili  0.6  /  DBili  x   /  AST  10  /  ALT  9[L]  /  AlkPhos  119  03-04          Urinalysis Basic - ( 06 Mar 2025 06:58 )    Color: x / Appearance: x / SG: x / pH: x  Gluc: 312 mg/dL / Ketone: x  / Bili: x / Urobili: x   Blood: x / Protein: x / Nitrite: x   Leuk Esterase: x / RBC: x / WBC x   Sq Epi: x / Non Sq Epi: x / Bacteria: x        Culture - Abscess with Gram Stain (collected 05 Mar 2025 02:55)  Source: Abscess right foot wound  Gram Stain (05 Mar 2025 14:52):    No polymorphonuclear cells seen    per low power field    Few Gram positive cocci in pairs    Moderate Gram Negative Rods    per oil power field  Preliminary Report (06 Mar 2025 09:41):    Culture yields >4 types of aerobic and/or anaerobic bacteria    Call client services within 7 days if further workup is clinically    indicated.        RADIOLOGY & ADDITIONAL TESTS:    Results Reviewed:   Imaging Personally Reviewed:  Electrocardiogram Personally Reviewed:    COORDINATION OF CARE:  Care Discussed with Consultants/Other Providers [Y/N]:  Prior or Outpatient Records Reviewed [Y/N]:   [facility] Department of Hospital Medicine  Nguyễn Barnes DO  Available on MS Teams    Patient is a 54y old  Male who presents with a chief complaint of Right Foot Pain (06 Mar 2025 10:23)    OVERNIGHT EVENTS: Febrile to 102.2 F. On antibiotics given concern for early OM.     SUBJECTIVE: Pt seen and examined at bedside this morning. No acute complaints. Reports intermittent sharp right foot pain. Denies nausea, vomiting, fevers, chills, chest pain, shortness of breath.     ADDITIONAL REVIEW OF SYSTEMS:    MEDICATIONS  (STANDING):  aspirin enteric coated 81 milliGRAM(s) Oral daily  atorvastatin 80 milliGRAM(s) Oral at bedtime  cilostazol 50 milliGRAM(s) Oral two times a day  dextrose 5%. 1000 milliLiter(s) (50 mL/Hr) IV Continuous <Continuous>  dextrose 5%. 1000 milliLiter(s) (100 mL/Hr) IV Continuous <Continuous>  dextrose 50% Injectable 25 Gram(s) IV Push once  dextrose 50% Injectable 12.5 Gram(s) IV Push once  dextrose 50% Injectable 25 Gram(s) IV Push once  dextrose Oral Gel 15 Gram(s) Oral once  enoxaparin Injectable 40 milliGRAM(s) SubCutaneous every 24 hours  glucagon  Injectable 1 milliGRAM(s) IntraMuscular once  insulin glargine Injectable (LANTUS) 50 Unit(s) SubCutaneous at bedtime  insulin lispro (ADMELOG) corrective regimen sliding scale   SubCutaneous three times a day before meals  insulin lispro (ADMELOG) corrective regimen sliding scale   SubCutaneous at bedtime  insulin lispro Injectable (ADMELOG) 15 Unit(s) SubCutaneous three times a day before meals  metoprolol tartrate 50 milliGRAM(s) Oral two times a day  piperacillin/tazobactam IVPB.. 3.375 Gram(s) IV Intermittent every 8 hours  sacubitril 24 mG/valsartan 26 mG 1 Tablet(s) Oral two times a day    MEDICATIONS  (PRN):  acetaminophen     Tablet .. 650 milliGRAM(s) Oral every 6 hours PRN Temp greater or equal to 38C (100.4F), Mild Pain (1 - 3)  oxyCODONE    IR 5 milliGRAM(s) Oral every 6 hours PRN Severe Pain (7 - 10)      CAPILLARY BLOOD GLUCOSE      POCT Blood Glucose.: 336 mg/dL (06 Mar 2025 08:07)  POCT Blood Glucose.: 273 mg/dL (05 Mar 2025 21:48)  POCT Blood Glucose.: 127 mg/dL (05 Mar 2025 13:13)    I&O's Summary    05 Mar 2025 07:01  -  06 Mar 2025 07:00  --------------------------------------------------------  IN: 790 mL / OUT: 200 mL / NET: 590 mL    06 Mar 2025 07:01  -  06 Mar 2025 11:09  --------------------------------------------------------  IN: 240 mL / OUT: 200 mL / NET: 40 mL        PHYSICAL EXAM:  Vital Signs Last 24 Hrs  T(C): 37.5 (06 Mar 2025 07:27), Max: 39 (05 Mar 2025 20:17)  T(F): 99.5 (06 Mar 2025 07:27), Max: 102.2 (05 Mar 2025 20:17)  HR: 90 (06 Mar 2025 07:27) (90 - 110)  BP: 107/66 (06 Mar 2025 07:27) (107/66 - 157/77)  BP(mean): 106 (05 Mar 2025 13:25) (106 - 106)  RR: 18 (06 Mar 2025 07:27) (17 - 18)  SpO2: 93% (06 Mar 2025 07:27) (93% - 97%)    Parameters below as of 06 Mar 2025 07:27  Patient On (Oxygen Delivery Method): room air        CONSTITUTIONAL: NAD, well-developed  HEAD: Normocephalic, atraumatic  EYES: EOMI, PERRL  ENT: no rhinorrhea, no pharyngeal erythema  RESPIRATORY: No increased work of breathing, CTAB, no wheezes or crackles appreciated  CARDIOVASCULAR: RRR, S1 and S2 present, no m/r/g  ABDOMEN: soft, NT, ND, bowel sounds present  EXTREMITIES: No LE edema, s/p R foot metatarsal head resection. R foot submet 3 wound to bone, fibrotic wound bed, mild malodor, scant purulent drainage  MUSCULOSKELETAL: no joint swelling, no tenderness to palpation  NEURO: A&Ox3, moving all extremities    LABS:                        11.1   9.93  )-----------( 233      ( 06 Mar 2025 07:01 )             33.6     03-06    130[L]  |  95[L]  |  10  ----------------------------<  312[H]  3.9   |  22  |  0.74    Ca    8.6      06 Mar 2025 06:58  Phos  4.0     03-04  Mg     2.1     03-04    TPro  7.7  /  Alb  3.3  /  TBili  0.6  /  DBili  x   /  AST  10  /  ALT  9[L]  /  AlkPhos  119  03-04          Urinalysis Basic - ( 06 Mar 2025 06:58 )    Color: x / Appearance: x / SG: x / pH: x  Gluc: 312 mg/dL / Ketone: x  / Bili: x / Urobili: x   Blood: x / Protein: x / Nitrite: x   Leuk Esterase: x / RBC: x / WBC x   Sq Epi: x / Non Sq Epi: x / Bacteria: x        Culture - Abscess with Gram Stain (collected 05 Mar 2025 02:55)  Source: Abscess right foot wound  Gram Stain (05 Mar 2025 14:52):    No polymorphonuclear cells seen    per low power field    Few Gram positive cocci in pairs    Moderate Gram Negative Rods    per oil power field  Preliminary Report (06 Mar 2025 09:41):    Culture yields >4 types of aerobic and/or anaerobic bacteria    Call client services within 7 days if further workup is clinically    indicated.        RADIOLOGY & ADDITIONAL TESTS:    Results Reviewed:   Imaging Personally Reviewed:  Electrocardiogram Personally Reviewed:    COORDINATION OF CARE:  Care Discussed with Consultants/Other Providers [Y/N]:  Prior or Outpatient Records Reviewed [Y/N]:

## 2025-03-06 NOTE — PROGRESS NOTE ADULT - ASSESSMENT
54M with PMHx of T2DM, CAD (post-CABG), mild HFrEF (EF 45%), and PAD (with recent angiogram Aug. 2024) presenting for several week history of right foot pain and hyperglycemia. Patient admitted for further management of his diabetic foot ulcer, tentative plan for podiatric surgical intervention.

## 2025-03-06 NOTE — PROGRESS NOTE ADULT - ASSESSMENT
54M presents with right foot wound to periosteum  - Patient seen and evaluated  - Afebrile, labs pending   - Right foot submet 3 wound to bone, fibrotic wound bed, mild malodor, scant purulent drainage, no acute signs of infection. Right foot 3rd digit dusky changes. Left foot no open wounds, no acute signs of infection  - Right foot xray: no gas, no OM  - Right foot MRI: Plantar soft tissue gas along 2nd flexor tendons, OM 2nd/3rd metatarsal heads and proximal phalanges  - Right foot culture:   - ID recs, appreciated  - Vasc recs, appreciated  - OBDULIO/PVR: RABI 1.16, LABI 1.44, without PVR waveform abnormality  - Ordered dakins  - Wound care orders placed for daily dakins and packing dressing changes per nursing.   - Pod plan: Pt scheduled for Right partial 2nd and 3rd ray resection tomorrow 3/7 at 1pm with Dr. Patterson  - Please document medical clearance for possible podiatric intervention under general anesthesia   - CBC, BMP, Coags, Type and screen in AM  - NPO after midight  - Discussed with Attending   54M presents with right foot wound to periosteum  - Patient seen and evaluated  - Afebrile, labs pending   - Right foot submet 3 wound to bone, fibrotic wound bed, mild malodor, scant purulent drainage, no acute signs of infection. Right foot 3rd digit dusky changes. Left foot no open wounds, no acute signs of infection  - Right foot xray: no gas, no OM  - Right foot MRI: Plantar soft tissue gas along 2nd flexor tendons, OM 2nd/3rd metatarsal heads and proximal phalanges  - Right foot culture:   - ID recs, appreciated  - Vasc recs, appreciated  - OBDULIO/PVR: RABI 1.16, LABI 1.44, without PVR waveform abnormality  - Ordered dakins  - Wound care orders placed for daily dakins and packing dressing changes per nursing.   - Pod plan: Pt scheduled for Right partial 2nd and 3rd ray resection tomorrow 3/7 at 1pm with Dr. Patterson  - Please document cardiac and medical clearance for possible podiatric intervention under general anesthesia   - CBC, BMP, Coags, Type and screen in AM  - NPO after midight  - Discussed with Attending

## 2025-03-06 NOTE — PROGRESS NOTE ADULT - PROBLEM SELECTOR PLAN 1
EPIFANIO Youssef MD have participated in the daily care of this patient  and have seen and examined the patient today and agree with  the  evaluation, assessment and plan of the surgical house officer  EPIFANIO Youssef MD have personally seen and examined the patient at bedside today at  8am

## 2025-03-06 NOTE — PROGRESS NOTE ADULT - ASSESSMENT
54M w/ T2DM, CAD s/p CABG, HFrEF EF 45%, and PAD here for diabetic foot ulcer plan for RLE wound debridement and ray resection with podiatry. Cardiology consulted for preop cardiac eval.     1. CAD s/p CABG   2. PAD     -s/p CABG in 2019, cont asa and statin. off brilinta   -hx of HFrEF due to ICM, appear to be compensated   -cont metoprolol to 50 mg BID  -cont atorva 80 mg qhs   -cont entresto 24/26 mg bid for HFrEF GDMT   -pt has drop in EF from TTE ECHO in 2023 and 2024  -repeat TTE with interval improvement in EF from 2024 and similar WMAs distribution   -no cardiac or anginal sxs, no C/I from cardiac standpoint to proceed with the planned podiatry procedure     Alyce Snow MD [facility]  Attending Interventional Cardiologist, [facility].   Avaliable on [employer] Team

## 2025-03-06 NOTE — PROGRESS NOTE ADULT - PROBLEM SELECTOR PLAN 4
- Continue with medications as above  - Continue with Pletal  - Cleared by vascular for podiatric intervention - Continue with aspirin, statin. Metoprolol increased to 50 BID, Entresto started per cardiology recommendations as part of GDMT  - Cardiology consulted for optimization, rec'd for TTE and NST prior to OR

## 2025-03-06 NOTE — PROGRESS NOTE ADULT - PROBLEM SELECTOR PLAN 3
- Continue with aspirin, statin/ Metoprolol increased to 50 BID per cardiology recommendations  - Cardiology consulted for optimization, rec'd for TTE and NST prior to OR - Continue with aspirin, statin. Metoprolol increased to 50 BID, Entresto started per cardiology recommendations as part of GDMT  - Cardiology consulted for optimization, rec'd for TTE and NST prior to OR - Hyperglycemia likely due to poor compliance  - Will resume prior basal-bolus, c/w Lantus 50 units and Admelog 15 units TIDAC (hold if NPO)  - FS TID AC & low dose insulin sliding scale  - f/u endocrine recs

## 2025-03-06 NOTE — PROGRESS NOTE ADULT - PROBLEM SELECTOR PLAN 2
- Hyperglycemia likely due to poor compliance  - Will resume prior basal-bolus, c/w Lantus 50 units and Admelog 15 units TIDAC (hold if NPO)  - FS TID AC & low dose insulin sliding scale  - f/u endocrine recs - Per cards pending echo and NST  - Risk stratification per cards

## 2025-03-06 NOTE — PROGRESS NOTE ADULT - PROBLEM SELECTOR PLAN 1
- Per podiatry: "Right foot submet 3 wound to periosteum fibrotic, no malodor, no purulent drainage, no acute signs of infection"  - Podiatric plan: Right foot wound debridement with Right partial 2nd and 3rd ray resection 3/7 with Dr. Patterson  - MRI foot pending  - OBDULIO/PVR obtained, based on current abui/pvr and previous rle angio 8/7/2024, patient is cleared for rt foot/toe surgery per vascular team  - Continue with Zosyn per ID  - Follow up culture  - F/u ID, podiatry, vascular recs - Per podiatry: "Right foot submet 3 wound to periosteum fibrotic, no malodor, no purulent drainage, no acute signs of infection"  - Podiatric plan: Right foot wound debridement with Right partial 2nd and 3rd ray resection 3/7 with Dr. Patterson pending clearance  - MRI foot pending  - OBDULIO/PVR obtained, based on current obdulio/pvr and previous rle angio 8/7/2024, patient is cleared for rt foot/toe surgery per vascular team  - Continue with Zosyn per ID  - Follow up culture  - F/u ID, podiatry, vascular recs - Per podiatry: "Right foot submet 3 wound to periosteum fibrotic, no malodor, no purulent drainage, no acute signs of infection"  - Podiatric plan: Right foot wound debridement with Right partial 2nd and 3rd ray resection 3/7 with Dr. Patterson pending clearance  - MRI foot noted   - OBDULIO/PVR obtained, based on current obdulio/pvr and previous rle angio 8/7/2024, patient is cleared for rt foot/toe surgery per vascular team  - Continue with Zosyn per ID  - Follow up culture  - F/u ID, podiatry, vascular recs

## 2025-03-06 NOTE — PROGRESS NOTE ADULT - SUBJECTIVE AND OBJECTIVE BOX
[facility] Attending Follow-up Note     Patient seen and examined at bedside.    Overnight Events:     events noted   echo reviewed     REVIEW OF SYSTEMS:  Constitutional:     [x ] negative [ ] fevers [ ] chills [ ] weight loss [ ] weight gain  HEENT:                  [x ] negative [ ] dry eyes [ ] eye irritation [ ] postnasal drip [ ] nasal congestion  CV:                         [ x] negative  [ ] chest pain [ ] orthopnea [ ] palpitations [ ] murmur  Resp:                     [ x] negative [ ] cough [ ] shortness of breath [ ] dyspnea [ ] wheezing [ ] sputum [ ]hemoptysis  GI:                          [ x] negative [ ] nausea [ ] vomiting [ ] diarrhea [ ] constipation [ ] abd pain [ ] dysphagia   :                        [ x] negative [ ] dysuria [ ] nocturia [ ] hematuria [ ] increased urinary frequency  Musculoskeletal: [ x] negative [ ] back pain [ ] myalgias [ ] arthralgias [ ] fracture  Skin:                       [ x] negative [ ] rash [ ] itch  Neurological:        [x ] negative [ ] headache [ ] dizziness [ ] syncope [ ] weakness [ ] numbness  Psychiatric:           [ x] negative [ ] anxiety [ ] depression  Endocrine:            [ x] negative [ ] diabetes [ ] thyroid problem  Heme/Lymph:      [ x] negative [ ] anemia [ ] bleeding problem  Allergic/Immune: [ x] negative [ ] itchy eyes [ ] nasal discharge [ ] hives [ ] angioedema    [ x] All other systems negative  [ ] Unable to assess ROS due to    Current Meds:  acetaminophen     Tablet .. 650 milliGRAM(s) Oral every 6 hours PRN  aspirin enteric coated 81 milliGRAM(s) Oral daily  atorvastatin 80 milliGRAM(s) Oral at bedtime  cilostazol 50 milliGRAM(s) Oral two times a day  dextrose 5%. 1000 milliLiter(s) IV Continuous <Continuous>  dextrose 5%. 1000 milliLiter(s) IV Continuous <Continuous>  dextrose 50% Injectable 25 Gram(s) IV Push once  dextrose 50% Injectable 12.5 Gram(s) IV Push once  dextrose 50% Injectable 25 Gram(s) IV Push once  dextrose Oral Gel 15 Gram(s) Oral once  enoxaparin Injectable 40 milliGRAM(s) SubCutaneous every 24 hours  glucagon  Injectable 1 milliGRAM(s) IntraMuscular once  insulin glargine Injectable (LANTUS) 35 Unit(s) SubCutaneous at bedtime  insulin lispro (ADMELOG) corrective regimen sliding scale   SubCutaneous three times a day before meals  insulin lispro (ADMELOG) corrective regimen sliding scale   SubCutaneous at bedtime  insulin lispro Injectable (ADMELOG) 12 Unit(s) SubCutaneous three times a day before meals  metoprolol tartrate 50 milliGRAM(s) Oral two times a day  oxyCODONE    IR 5 milliGRAM(s) Oral every 6 hours PRN  piperacillin/tazobactam IVPB.. 3.375 Gram(s) IV Intermittent every 8 hours  sacubitril 24 mG/valsartan 26 mG 1 Tablet(s) Oral two times a day      PAST MEDICAL & SURGICAL HISTORY:  Hypertension, unspecified type      Hyperlipidemia, unspecified hyperlipidemia type      Type 2 diabetes mellitus without complication, with long-term current use of insulin      Coronary artery disease of native artery of native heart with stable angina pectoris      History of percutaneous coronary intervention          Vitals:  T(F): 99 (03-07), Max: 100.3 (03-06)  HR: 83 (03-07) (82 - 96)  BP: 106/60 (03-07) (103/63 - 118/61)  RR: 18 (03-07)  SpO2: 95% (03-07)  I&O's Summary    06 Mar 2025 07:01  -  07 Mar 2025 07:00  --------------------------------------------------------  IN: 480 mL / OUT: 1525 mL / NET: -1045 mL        Physical Exam:  Appearance: No acute distress  HENT: No JVD   Cardiovascular: RRR, S1/S2, no murmurs  Respiratory: CTABL  Gastrointestinal: soft, NT ND, +BS  Musculoskeletal: No clubbing, no edema   Neurologic: Non-focal  Skin: No rashes, ecchymoses, or cyanosis                          11.1   9.81  )-----------( 255      ( 07 Mar 2025 07:04 )             33.2     03-06    130[L]  |  95[L]  |  10  ----------------------------<  312[H]  3.9   |  22  |  0.74    Ca    8.6      06 Mar 2025 06:58      PT/INR - ( 07 Mar 2025 07:04 )   PT: 12.3 sec;   INR: 1.08 ratio         PTT - ( 07 Mar 2025 07:04 )  PTT:30.4 sec              Cardiovascular Testings:

## 2025-03-07 ENCOUNTER — RESULT REVIEW (OUTPATIENT)
Age: 55
End: 2025-03-07

## 2025-03-07 LAB
-  AMOXICILLIN/CLAVULANIC ACID: SIGNIFICANT CHANGE UP
-  AMPICILLIN/SULBACTAM: SIGNIFICANT CHANGE UP
-  AMPICILLIN: SIGNIFICANT CHANGE UP
-  AMPICILLIN: SIGNIFICANT CHANGE UP
-  AZTREONAM: SIGNIFICANT CHANGE UP
-  CEFAZOLIN: SIGNIFICANT CHANGE UP
-  CEFEPIME: SIGNIFICANT CHANGE UP
-  CEFOXITIN: SIGNIFICANT CHANGE UP
-  CEFTRIAXONE: SIGNIFICANT CHANGE UP
-  CIPROFLOXACIN: SIGNIFICANT CHANGE UP
-  CLINDAMYCIN: SIGNIFICANT CHANGE UP
-  ERTAPENEM: SIGNIFICANT CHANGE UP
-  ERYTHROMYCIN: SIGNIFICANT CHANGE UP
-  GENTAMICIN: SIGNIFICANT CHANGE UP
-  GENTAMICIN: SIGNIFICANT CHANGE UP
-  LEVOFLOXACIN: SIGNIFICANT CHANGE UP
-  MEROPENEM: SIGNIFICANT CHANGE UP
-  OXACILLIN: SIGNIFICANT CHANGE UP
-  PENICILLIN: SIGNIFICANT CHANGE UP
-  PIPERACILLIN/TAZOBACTAM: SIGNIFICANT CHANGE UP
-  RIFAMPIN: SIGNIFICANT CHANGE UP
-  TETRACYCLINE: SIGNIFICANT CHANGE UP
-  TOBRAMYCIN: SIGNIFICANT CHANGE UP
-  TRIMETHOPRIM/SULFAMETHOXAZOLE: SIGNIFICANT CHANGE UP
-  TRIMETHOPRIM/SULFAMETHOXAZOLE: SIGNIFICANT CHANGE UP
-  VANCOMYCIN: SIGNIFICANT CHANGE UP
-  VANCOMYCIN: SIGNIFICANT CHANGE UP
ANION GAP SERPL CALC-SCNC: 11 MMOL/L — SIGNIFICANT CHANGE UP (ref 5–17)
APTT BLD: 30.4 SEC — SIGNIFICANT CHANGE UP (ref 24.5–35.6)
BUN SERPL-MCNC: 7 MG/DL — SIGNIFICANT CHANGE UP (ref 7–23)
CALCIUM SERPL-MCNC: 8.6 MG/DL — SIGNIFICANT CHANGE UP (ref 8.4–10.5)
CHLORIDE SERPL-SCNC: 99 MMOL/L — SIGNIFICANT CHANGE UP (ref 96–108)
CO2 SERPL-SCNC: 24 MMOL/L — SIGNIFICANT CHANGE UP (ref 22–31)
CREAT SERPL-MCNC: 0.89 MG/DL — SIGNIFICANT CHANGE UP (ref 0.5–1.3)
EGFR: 101 ML/MIN/1.73M2 — SIGNIFICANT CHANGE UP
EGFR: 101 ML/MIN/1.73M2 — SIGNIFICANT CHANGE UP
GLUCOSE BLDC GLUCOMTR-MCNC: 180 MG/DL — HIGH (ref 70–99)
GLUCOSE BLDC GLUCOMTR-MCNC: 259 MG/DL — HIGH (ref 70–99)
GLUCOSE BLDC GLUCOMTR-MCNC: 77 MG/DL — SIGNIFICANT CHANGE UP (ref 70–99)
GLUCOSE BLDC GLUCOMTR-MCNC: 83 MG/DL — SIGNIFICANT CHANGE UP (ref 70–99)
GLUCOSE BLDC GLUCOMTR-MCNC: 86 MG/DL — SIGNIFICANT CHANGE UP (ref 70–99)
GLUCOSE SERPL-MCNC: 78 MG/DL — SIGNIFICANT CHANGE UP (ref 70–99)
HCT VFR BLD CALC: 33.2 % — LOW (ref 39–50)
HGB BLD-MCNC: 11.1 G/DL — LOW (ref 13–17)
INR BLD: 1.08 RATIO — SIGNIFICANT CHANGE UP (ref 0.85–1.16)
MCHC RBC-ENTMCNC: 26.1 PG — LOW (ref 27–34)
MCHC RBC-ENTMCNC: 33.4 G/DL — SIGNIFICANT CHANGE UP (ref 32–36)
MCV RBC AUTO: 78.1 FL — LOW (ref 80–100)
METHOD TYPE: SIGNIFICANT CHANGE UP
NRBC BLD AUTO-RTO: 0 /100 WBCS — SIGNIFICANT CHANGE UP (ref 0–0)
PLATELET # BLD AUTO: 255 K/UL — SIGNIFICANT CHANGE UP (ref 150–400)
POTASSIUM SERPL-MCNC: 3.3 MMOL/L — LOW (ref 3.5–5.3)
POTASSIUM SERPL-SCNC: 3.3 MMOL/L — LOW (ref 3.5–5.3)
PROTHROM AB SERPL-ACNC: 12.3 SEC — SIGNIFICANT CHANGE UP (ref 9.9–13.4)
RBC # BLD: 4.25 M/UL — SIGNIFICANT CHANGE UP (ref 4.2–5.8)
RBC # FLD: 12.4 % — SIGNIFICANT CHANGE UP (ref 10.3–14.5)
SODIUM SERPL-SCNC: 134 MMOL/L — LOW (ref 135–145)
WBC # BLD: 9.81 K/UL — SIGNIFICANT CHANGE UP (ref 3.8–10.5)
WBC # FLD AUTO: 9.81 K/UL — SIGNIFICANT CHANGE UP (ref 3.8–10.5)

## 2025-03-07 PROCEDURE — 73630 X-RAY EXAM OF FOOT: CPT | Mod: 26,RT

## 2025-03-07 PROCEDURE — 88307 TISSUE EXAM BY PATHOLOGIST: CPT | Mod: 26

## 2025-03-07 PROCEDURE — 88305 TISSUE EXAM BY PATHOLOGIST: CPT | Mod: 26

## 2025-03-07 PROCEDURE — 99232 SBSQ HOSP IP/OBS MODERATE 35: CPT

## 2025-03-07 PROCEDURE — 99233 SBSQ HOSP IP/OBS HIGH 50: CPT

## 2025-03-07 PROCEDURE — 78451 HT MUSCLE IMAGE SPECT SING: CPT | Mod: 26

## 2025-03-07 PROCEDURE — 88311 DECALCIFY TISSUE: CPT | Mod: 26

## 2025-03-07 DEVICE — SURGICEL 2 X 14": Type: IMPLANTABLE DEVICE | Site: RIGHT | Status: FUNCTIONAL

## 2025-03-07 RX ORDER — ACETAMINOPHEN 500 MG/5ML
650 LIQUID (ML) ORAL EVERY 6 HOURS
Refills: 0 | Status: DISCONTINUED | OUTPATIENT
Start: 2025-03-07 | End: 2025-03-11

## 2025-03-07 RX ORDER — INSULIN LISPRO 100 U/ML
12 INJECTION, SOLUTION INTRAVENOUS; SUBCUTANEOUS
Refills: 0 | Status: DISCONTINUED | OUTPATIENT
Start: 2025-03-07 | End: 2025-03-07

## 2025-03-07 RX ORDER — METOPROLOL SUCCINATE 50 MG/1
50 TABLET, EXTENDED RELEASE ORAL
Refills: 0 | Status: DISCONTINUED | OUTPATIENT
Start: 2025-03-07 | End: 2025-03-11

## 2025-03-07 RX ORDER — INSULIN LISPRO 100 U/ML
8 INJECTION, SOLUTION INTRAVENOUS; SUBCUTANEOUS
Refills: 0 | Status: DISCONTINUED | OUTPATIENT
Start: 2025-03-07 | End: 2025-03-08

## 2025-03-07 RX ORDER — ENOXAPARIN SODIUM 100 MG/ML
40 INJECTION SUBCUTANEOUS EVERY 24 HOURS
Refills: 0 | Status: DISCONTINUED | OUTPATIENT
Start: 2025-03-07 | End: 2025-03-11

## 2025-03-07 RX ORDER — DEXTROSE 50 % IN WATER 50 %
12.5 SYRINGE (ML) INTRAVENOUS ONCE
Refills: 0 | Status: DISCONTINUED | OUTPATIENT
Start: 2025-03-07 | End: 2025-03-11

## 2025-03-07 RX ORDER — DEXTROSE 50 % IN WATER 50 %
25 SYRINGE (ML) INTRAVENOUS ONCE
Refills: 0 | Status: DISCONTINUED | OUTPATIENT
Start: 2025-03-07 | End: 2025-03-11

## 2025-03-07 RX ORDER — PIPERACILLIN-TAZO-DEXTROSE,ISO 3.375G/5
3.38 IV SOLUTION, PIGGYBACK PREMIX FROZEN(ML) INTRAVENOUS ONCE
Refills: 0 | Status: COMPLETED | OUTPATIENT
Start: 2025-03-07 | End: 2025-03-07

## 2025-03-07 RX ORDER — INSULIN LISPRO 100 U/ML
INJECTION, SOLUTION INTRAVENOUS; SUBCUTANEOUS
Refills: 0 | Status: DISCONTINUED | OUTPATIENT
Start: 2025-03-07 | End: 2025-03-11

## 2025-03-07 RX ORDER — SODIUM CHLORIDE 9 G/1000ML
1000 INJECTION, SOLUTION INTRAVENOUS
Refills: 0 | Status: DISCONTINUED | OUTPATIENT
Start: 2025-03-07 | End: 2025-03-07

## 2025-03-07 RX ORDER — ATORVASTATIN CALCIUM 80 MG/1
80 TABLET, FILM COATED ORAL AT BEDTIME
Refills: 0 | Status: DISCONTINUED | OUTPATIENT
Start: 2025-03-07 | End: 2025-03-11

## 2025-03-07 RX ORDER — OXYCODONE HYDROCHLORIDE AND ACETAMINOPHEN 10; 325 MG/1; MG/1
1 TABLET ORAL EVERY 4 HOURS
Refills: 0 | Status: DISCONTINUED | OUTPATIENT
Start: 2025-03-07 | End: 2025-03-11

## 2025-03-07 RX ORDER — SACUBITRIL AND VALSARTAN 49; 51 MG/1; MG/1
1 TABLET, FILM COATED ORAL
Refills: 0 | Status: DISCONTINUED | OUTPATIENT
Start: 2025-03-07 | End: 2025-03-11

## 2025-03-07 RX ORDER — INSULIN GLARGINE-YFGN 100 [IU]/ML
22 INJECTION, SOLUTION SUBCUTANEOUS AT BEDTIME
Refills: 0 | Status: DISCONTINUED | OUTPATIENT
Start: 2025-03-07 | End: 2025-03-08

## 2025-03-07 RX ORDER — CILOSTAZOL 50 MG/1
50 TABLET ORAL
Refills: 0 | Status: DISCONTINUED | OUTPATIENT
Start: 2025-03-07 | End: 2025-03-11

## 2025-03-07 RX ORDER — GLUCAGON 3 MG/1
1 POWDER NASAL ONCE
Refills: 0 | Status: DISCONTINUED | OUTPATIENT
Start: 2025-03-07 | End: 2025-03-11

## 2025-03-07 RX ORDER — SODIUM CHLORIDE 9 G/1000ML
1000 INJECTION, SOLUTION INTRAVENOUS
Refills: 0 | Status: DISCONTINUED | OUTPATIENT
Start: 2025-03-07 | End: 2025-03-11

## 2025-03-07 RX ORDER — INSULIN GLARGINE-YFGN 100 [IU]/ML
35 INJECTION, SOLUTION SUBCUTANEOUS AT BEDTIME
Refills: 0 | Status: DISCONTINUED | OUTPATIENT
Start: 2025-03-07 | End: 2025-03-07

## 2025-03-07 RX ORDER — PIPERACILLIN-TAZO-DEXTROSE,ISO 3.375G/5
3.38 IV SOLUTION, PIGGYBACK PREMIX FROZEN(ML) INTRAVENOUS EVERY 8 HOURS
Refills: 0 | Status: DISCONTINUED | OUTPATIENT
Start: 2025-03-08 | End: 2025-03-08

## 2025-03-07 RX ORDER — PIPERACILLIN-TAZO-DEXTROSE,ISO 3.375G/5
3.38 IV SOLUTION, PIGGYBACK PREMIX FROZEN(ML) INTRAVENOUS EVERY 8 HOURS
Refills: 0 | Status: DISCONTINUED | OUTPATIENT
Start: 2025-03-07 | End: 2025-03-07

## 2025-03-07 RX ORDER — ASPIRIN 325 MG
81 TABLET ORAL DAILY
Refills: 0 | Status: DISCONTINUED | OUTPATIENT
Start: 2025-03-07 | End: 2025-03-11

## 2025-03-07 RX ORDER — INSULIN LISPRO 100 U/ML
INJECTION, SOLUTION INTRAVENOUS; SUBCUTANEOUS AT BEDTIME
Refills: 0 | Status: DISCONTINUED | OUTPATIENT
Start: 2025-03-07 | End: 2025-03-07

## 2025-03-07 RX ORDER — HYDROMORPHONE/SOD CHLOR,ISO/PF 2 MG/10 ML
0.5 SYRINGE (ML) INJECTION
Refills: 0 | Status: DISCONTINUED | OUTPATIENT
Start: 2025-03-07 | End: 2025-03-07

## 2025-03-07 RX ADMIN — SACUBITRIL AND VALSARTAN 1 TABLET(S): 49; 51 TABLET, FILM COATED ORAL at 05:19

## 2025-03-07 RX ADMIN — CILOSTAZOL 50 MILLIGRAM(S): 50 TABLET ORAL at 05:19

## 2025-03-07 RX ADMIN — Medication 25 GRAM(S): at 21:12

## 2025-03-07 RX ADMIN — INSULIN LISPRO 8 UNIT(S): 100 INJECTION, SOLUTION INTRAVENOUS; SUBCUTANEOUS at 18:23

## 2025-03-07 RX ADMIN — INSULIN GLARGINE-YFGN 22 UNIT(S): 100 INJECTION, SOLUTION SUBCUTANEOUS at 22:25

## 2025-03-07 RX ADMIN — INSULIN LISPRO 1: 100 INJECTION, SOLUTION INTRAVENOUS; SUBCUTANEOUS at 22:25

## 2025-03-07 RX ADMIN — Medication 25 GRAM(S): at 04:38

## 2025-03-07 RX ADMIN — METOPROLOL SUCCINATE 50 MILLIGRAM(S): 50 TABLET, EXTENDED RELEASE ORAL at 05:19

## 2025-03-07 RX ADMIN — ATORVASTATIN CALCIUM 80 MILLIGRAM(S): 80 TABLET, FILM COATED ORAL at 21:12

## 2025-03-07 RX ADMIN — CILOSTAZOL 50 MILLIGRAM(S): 50 TABLET ORAL at 17:40

## 2025-03-07 NOTE — BRIEF OPERATIVE NOTE - NSICDXBRIEFPROCEDURE_GEN_ALL_CORE_FT
PROCEDURES:  Detachment at right foot, partial 2nd ray, open approach 07-Mar-2025 15:38:06  Mary Banks

## 2025-03-07 NOTE — PROGRESS NOTE ADULT - SUBJECTIVE AND OBJECTIVE BOX
[facility] Attending Follow-up Note     Patient seen and examined at bedside.    Overnight Events:     events noted     REVIEW OF SYSTEMS:  Constitutional:     [x ] negative [ ] fevers [ ] chills [ ] weight loss [ ] weight gain  HEENT:                  [x ] negative [ ] dry eyes [ ] eye irritation [ ] postnasal drip [ ] nasal congestion  CV:                         [ x] negative  [ ] chest pain [ ] orthopnea [ ] palpitations [ ] murmur  Resp:                     [ x] negative [ ] cough [ ] shortness of breath [ ] dyspnea [ ] wheezing [ ] sputum [ ]hemoptysis  GI:                          [ x] negative [ ] nausea [ ] vomiting [ ] diarrhea [ ] constipation [ ] abd pain [ ] dysphagia   :                        [ x] negative [ ] dysuria [ ] nocturia [ ] hematuria [ ] increased urinary frequency  Musculoskeletal: [ x] negative [ ] back pain [ ] myalgias [ ] arthralgias [ ] fracture  Skin:                       [ x] negative [ ] rash [ ] itch  Neurological:        [x ] negative [ ] headache [ ] dizziness [ ] syncope [ ] weakness [ ] numbness  Psychiatric:           [ x] negative [ ] anxiety [ ] depression  Endocrine:            [ x] negative [ ] diabetes [ ] thyroid problem  Heme/Lymph:      [ x] negative [ ] anemia [ ] bleeding problem  Allergic/Immune: [ x] negative [ ] itchy eyes [ ] nasal discharge [ ] hives [ ] angioedema    [ x] All other systems negative  [ ] Unable to assess ROS due to    Current Meds:  acetaminophen     Tablet .. 650 milliGRAM(s) Oral every 6 hours PRN  acetaminophen   IVPB .. 1000 milliGRAM(s) IV Intermittent once  ampicillin/sulbactam  IVPB 3 Gram(s) IV Intermittent every 6 hours  aspirin enteric coated 81 milliGRAM(s) Oral daily  atorvastatin 80 milliGRAM(s) Oral at bedtime  cefTRIAXone   IVPB 2000 milliGRAM(s) IV Intermittent every 24 hours  cilostazol 50 milliGRAM(s) Oral two times a day  dextrose 5%. 1000 milliLiter(s) IV Continuous <Continuous>  dextrose 5%. 1000 milliLiter(s) IV Continuous <Continuous>  dextrose 50% Injectable 12.5 Gram(s) IV Push once  dextrose 50% Injectable 25 Gram(s) IV Push once  enoxaparin Injectable 40 milliGRAM(s) SubCutaneous every 24 hours  glucagon  Injectable 1 milliGRAM(s) IntraMuscular once  insulin glargine Injectable (LANTUS) 26 Unit(s) SubCutaneous at bedtime  insulin lispro (ADMELOG) corrective regimen sliding scale   SubCutaneous <User Schedule>  insulin lispro (ADMELOG) corrective regimen sliding scale   SubCutaneous three times a day before meals  insulin lispro Injectable (ADMELOG) 12 Unit(s) SubCutaneous three times a day with meals  lidocaine 1% Injectable 20 milliLiter(s) Local Injection once  metoprolol tartrate 50 milliGRAM(s) Oral two times a day  morphine  - Injectable 2 milliGRAM(s) IV Push every 6 hours PRN  oxycodone    5 mG/acetaminophen 325 mG 1 Tablet(s) Oral every 4 hours PRN  sacubitril 24 mG/valsartan 26 mG 1 Tablet(s) Oral two times a day      PAST MEDICAL & SURGICAL HISTORY:  Hypertension, unspecified type      Hyperlipidemia, unspecified hyperlipidemia type      Type 2 diabetes mellitus without complication, with long-term current use of insulin      Coronary artery disease of native artery of native heart with stable angina pectoris      History of percutaneous coronary intervention          Vitals:  T(F): 98.1 (03-10), Max: 99.2 (03-09)  HR: 79 (03-10) (72 - 89)  BP: 117/64 (03-10) (104/68 - 125/86)  RR: 18 (03-10)  SpO2: 98% (03-10)  I&O's Summary    08 Mar 2025 06:01  -  09 Mar 2025 07:00  --------------------------------------------------------  IN: 600 mL / OUT: 850 mL / NET: -250 mL    09 Mar 2025 07:01  -  10 Mar 2025 01:53  --------------------------------------------------------  IN: 880 mL / OUT: 900 mL / NET: -20 mL        Physical Exam:  Appearance: No acute distress  HENT: No JVD   Cardiovascular: RRR, S1/S2, no murmurs  Respiratory: CTABL  Gastrointestinal: soft, NT ND, +BS  Musculoskeletal: No clubbing, no edema   Neurologic: Non-focal  Skin: No rashes, ecchymoses, or cyanosis                          13.9   5.74  )-----------( 88       ( 09 Mar 2025 06:57 )             41.3     03-09    137  |  102  |  4[L]  ----------------------------<  120[H]  3.4[L]   |  24  |  0.77    Ca    8.7      09 Mar 2025 08:47                    Cardiovascular Testings:

## 2025-03-07 NOTE — PROGRESS NOTE ADULT - PROBLEM SELECTOR PLAN 1
I Estevan Youssef MD have participated in the daily care of this patient  and agree with  the  evaluation, assessment and plan of the surgical house officer

## 2025-03-07 NOTE — PROGRESS NOTE ADULT - PROBLEM SELECTOR PLAN 4
- Continue with aspirin, statin. Metoprolol increased to 50 BID, Entresto started per cardiology recommendations as part of GDMT

## 2025-03-07 NOTE — DIETITIAN INITIAL EVALUATION ADULT - PERTINENT MEDS FT
MEDICATIONS  (STANDING):  aspirin enteric coated 81 milliGRAM(s) Oral daily  atorvastatin 80 milliGRAM(s) Oral at bedtime  cilostazol 50 milliGRAM(s) Oral two times a day  dextrose 5%. 1000 milliLiter(s) (50 mL/Hr) IV Continuous <Continuous>  dextrose 5%. 1000 milliLiter(s) (100 mL/Hr) IV Continuous <Continuous>  dextrose 5%. 1000 milliLiter(s) (30 mL/Hr) IV Continuous <Continuous>  dextrose 50% Injectable 25 Gram(s) IV Push once  dextrose 50% Injectable 12.5 Gram(s) IV Push once  dextrose 50% Injectable 25 Gram(s) IV Push once  dextrose Oral Gel 15 Gram(s) Oral once  enoxaparin Injectable 40 milliGRAM(s) SubCutaneous every 24 hours  glucagon  Injectable 1 milliGRAM(s) IntraMuscular once  insulin glargine Injectable (LANTUS) 35 Unit(s) SubCutaneous at bedtime  insulin lispro (ADMELOG) corrective regimen sliding scale   SubCutaneous three times a day before meals  insulin lispro (ADMELOG) corrective regimen sliding scale   SubCutaneous at bedtime  insulin lispro Injectable (ADMELOG) 12 Unit(s) SubCutaneous three times a day before meals  metoprolol tartrate 50 milliGRAM(s) Oral two times a day  piperacillin/tazobactam IVPB.. 3.375 Gram(s) IV Intermittent every 8 hours  sacubitril 24 mG/valsartan 26 mG 1 Tablet(s) Oral two times a day    MEDICATIONS  (PRN):  acetaminophen     Tablet .. 650 milliGRAM(s) Oral every 6 hours PRN Temp greater or equal to 38C (100.4F), Mild Pain (1 - 3)  oxyCODONE    IR 5 milliGRAM(s) Oral every 6 hours PRN Severe Pain (7 - 10)

## 2025-03-07 NOTE — DIETITIAN INITIAL EVALUATION ADULT - ADD RECOMMEND
1. Recommend to continue Carbohydrate Consistent Diet with no snacks as ordered and tolerated  2. RD to add Mighty shakes 3x daily to optimize PO intake  3. Continue to monitor PO intake, weight trend, electrolytes, blood glucose, labs, BMs in-house   4. Pt is at risk for malnutrition, perform Nutrition focused physical exam as able  5. Provide nutrition education for as appropriate and as able

## 2025-03-07 NOTE — DIETITIAN INITIAL EVALUATION ADULT - ENERGY INTAKE
Adequate (%) Pt is NPO for a procedure today. Pt reports eating fine in-house, patient noted consumes from 25%-100% meal per flowsheet. Pt likely meeting <75% of estimated nutritional needs in-house. Pt made aware RD remains available and will follow up for any additional questions/concerns and per protocol.

## 2025-03-07 NOTE — DISCHARGE NOTE PROVIDER - CARE PROVIDER_API CALL
Manolo De Leon  Podiatric Medicine and Surgery  [address] [place] [address]  Phone: [phone]  Fax: [phone]  Follow Up Time: 1 week    ALANA SKAGGS  [address] [place] [address]  Phone: [phone]  Fax: [phone]  Established Patient  Follow Up Time: 1 week   Manolo De Leon  Podiatric Medicine and Surgery  [address] [place] [address]  Phone: [phone]  Fax: [phone]  Follow Up Time: 1 week    ALANA SKAGGS  [address] [place] [address]  Phone: [phone]  Fax: [phone]  Established Patient  Follow Up Time: 1 week    Jada Kelley  Infectious Disease  [address] [place] [address], [address]  [place] [address]  Phone: [phone]  Fax: [phone]  Follow Up Time: 1 week

## 2025-03-07 NOTE — DIETITIAN INITIAL EVALUATION ADULT - NS FNS DIET ORDER
Diet, NPO after Midnight:      NPO Start Date: 06-Mar-2025,   NPO Start Time: 23:59  Except Medications  With Ice Chips/Sips of Water (03-06-25 @ 11:47)

## 2025-03-07 NOTE — PROGRESS NOTE ADULT - ASSESSMENT
54M w/ T2DM, CAD s/p CABG, HFrEF EF 45%, and PAD here for diabetic foot ulcer plan for RLE wound debridement and ray resection with podiatry. Cardiology consulted for preop cardiac eval.     1. CAD s/p CABG   2. PAD     -s/p CABG in 2019, cont asa and statin. off brilinta   -hx of HFrEF due to ICM, appear to be compensated   -cont metoprolol 50 mg BID  -cont atorva 80 mg qhs   -cont entresto 24/26 mg bid for HFrEF GDMT   -pt has drop in EF from TTE ECHO in 2023 and 2024  -repeat TTE with interval improvement in EF from 2024 and similar WMAs distribution   -3/7 tolerated the pod procedure well   -had resting image done for NST- noted to have defects, no CP or angina equivalents     Alyce Snow MD FAC  Attending Interventional Cardiologist, [facility].   Avaliable on [employer] Team

## 2025-03-07 NOTE — BRIEF OPERATIVE NOTE - SPECIMENS
Pathology: 1. right 2nd digit 2. right foot clean bone margin 2nd metatarsal 3. right foot 3rd proximal phalanx bone biopsy  Microbiology: 1. deep wound culture 2. right foot clean bone margin 2nd metatarsal 3. right foot 3rd proximal phalanx bone biopsy

## 2025-03-07 NOTE — PROGRESS NOTE ADULT - SUBJECTIVE AND OBJECTIVE BOX
Podiatry Pager #: [phone]    Patient is a 54y old  Male who presents with a chief complaint of Right Foot Pain (07 Mar 2025 07:14)      INTERVAL HPI/OVERNIGHT EVENTS:   Pt is scheduled for Right foot partial 2nd and 3rd ray amputation with Dr. Patterson at 1pm. Patient is aware of procedure and is NPO since midnight.    MEDICATIONS  (STANDING):  aspirin enteric coated 81 milliGRAM(s) Oral daily  atorvastatin 80 milliGRAM(s) Oral at bedtime  cilostazol 50 milliGRAM(s) Oral two times a day  dextrose 5%. 1000 milliLiter(s) (50 mL/Hr) IV Continuous <Continuous>  dextrose 5%. 1000 milliLiter(s) (100 mL/Hr) IV Continuous <Continuous>  dextrose 50% Injectable 25 Gram(s) IV Push once  dextrose 50% Injectable 12.5 Gram(s) IV Push once  dextrose 50% Injectable 25 Gram(s) IV Push once  dextrose Oral Gel 15 Gram(s) Oral once  enoxaparin Injectable 40 milliGRAM(s) SubCutaneous every 24 hours  glucagon  Injectable 1 milliGRAM(s) IntraMuscular once  insulin glargine Injectable (LANTUS) 35 Unit(s) SubCutaneous at bedtime  insulin lispro (ADMELOG) corrective regimen sliding scale   SubCutaneous three times a day before meals  insulin lispro (ADMELOG) corrective regimen sliding scale   SubCutaneous at bedtime  insulin lispro Injectable (ADMELOG) 12 Unit(s) SubCutaneous three times a day before meals  metoprolol tartrate 50 milliGRAM(s) Oral two times a day  piperacillin/tazobactam IVPB.. 3.375 Gram(s) IV Intermittent every 8 hours  sacubitril 24 mG/valsartan 26 mG 1 Tablet(s) Oral two times a day    MEDICATIONS  (PRN):  acetaminophen     Tablet .. 650 milliGRAM(s) Oral every 6 hours PRN Temp greater or equal to 38C (100.4F), Mild Pain (1 - 3)  oxyCODONE    IR 5 milliGRAM(s) Oral every 6 hours PRN Severe Pain (7 - 10)      Allergies    No Known Allergies    Intolerances        Vital Signs Last 24 Hrs  T(C): 37.2 (07 Mar 2025 08:03), Max: 37.9 (06 Mar 2025 16:05)  T(F): 99 (07 Mar 2025 08:03), Max: 100.3 (06 Mar 2025 16:05)  HR: 83 (07 Mar 2025 08:03) (82 - 96)  BP: 106/60 (07 Mar 2025 08:03) (103/63 - 118/61)  BP(mean): --  RR: 18 (07 Mar 2025 08:03) (18 - 18)  SpO2: 95% (07 Mar 2025 08:03) (93% - 96%)    Parameters below as of 07 Mar 2025 08:03  Patient On (Oxygen Delivery Method): room air        LABS:                        11.1   9.81  )-----------( 255      ( 07 Mar 2025 07:04 )             33.2     03-06    130[L]  |  95[L]  |  10  ----------------------------<  312[H]  3.9   |  22  |  0.74    Ca    8.6      06 Mar 2025 06:58      PT/INR - ( 07 Mar 2025 07:04 )   PT: 12.3 sec;   INR: 1.08 ratio         PTT - ( 07 Mar 2025 07:04 )  PTT:30.4 sec  Urinalysis Basic - ( 06 Mar 2025 06:58 )    Color: x / Appearance: x / SG: x / pH: x  Gluc: 312 mg/dL / Ketone: x  / Bili: x / Urobili: x   Blood: x / Protein: x / Nitrite: x   Leuk Esterase: x / RBC: x / WBC x   Sq Epi: x / Non Sq Epi: x / Bacteria: x      CAPILLARY BLOOD GLUCOSE      POCT Blood Glucose.: 83 mg/dL (07 Mar 2025 08:38)  POCT Blood Glucose.: 161 mg/dL (06 Mar 2025 21:42)  POCT Blood Glucose.: 134 mg/dL (06 Mar 2025 17:04)  POCT Blood Glucose.: 107 mg/dL (06 Mar 2025 12:04)      RADIOLOGY & ADDITIONAL TESTS:

## 2025-03-07 NOTE — PROGRESS NOTE ADULT - PROBLEM SELECTOR PLAN 1
- Per podiatry: "Right foot submet 3 wound to periosteum fibrotic, no malodor, no purulent drainage, no acute signs of infection"  - Podiatric plan: Right foot wound debridement with Right partial 2nd and 3rd ray resection 3/7 with Dr. Patterson   - MRI foot noted   - OBDULIO/PVR obtained, based on current obdulio/pvr and previous rle angio 8/7/2024, patient is cleared for rt foot/toe surgery per vascular team  - Continue with Zosyn per ID

## 2025-03-07 NOTE — PROGRESS NOTE ADULT - ASSESSMENT
Patient is a 54 year old male with PMH of DM2, CAD s/p CABG, HFrEF, PAD s/p RLE angioplasty/stent and s/p R 3rd metatarsal head resection, submetatarsal debridement, and kerecis graft who presents for right foot pain for few weeks and hyperglycemia.    Diabetic foot ulcer, with c/f early OM  Leukocytosis likely iso above  - Per Podiatry, R foot submet 3 wound to periosteum fibrotic, no malodor, no purulent drainage, no acute signs of infection   - R foot xray with no signs to suggest advanced OM  - R foot MRI with findings c/f possible reactive vs early OM, plantar soft tissue wound with gas extending along 2nd flexor tendons from mid metatarsal to proximal phalanx base, no abscess noted    - R foot wound culture polymicrobial - Morganella morganii, E.faecalis, Staph epi, Corynebacterium, Kleb oxytoca/Raoultella ornithinolytica, Prevotella, B. fragilis   - CXR with questionable L basilar opacity in conjunction with an elevated L hemidiaphragm may be intra-abdominal viscera; underlying effusion/atelectasis not completely excluded  - COVID/Flu/RSV negative 3/5   - s/p vancomycin and zosyn in the ER  - MRSA PCR screen negative, no MRSA isolated to date   - leukocytosis on admission, now resolved as of 3/5pm  - initially afebrile on admission, temps noted,      Recommendations:   Podiatry planning for OR today for R foot partial 2nd and 3rd ray amputation  - follow up OR cultures and path   Follow R foot wound culture for pending sensitivities   Follow 3/5 Bcx - NGTD x2   Continue on zosyn   Local wound care   Monitor temps/WBC  Continue rest of care per primary team       Jason Vidal M.D.  [facility]  Available on [employer] TEAMS - *PREFERRED*  [phone]  After 5pm on weekdays and all day on weekends - please call [phone]     Thank you for consulting us and involving us in the management of this patients case. In addition to reviewing history, imaging, documents, labs, microbiology, took into account antibiotic stewardship, local antibiogram and infection control strategies and potential transmission issues at time of treatment decision making process.

## 2025-03-07 NOTE — DIETITIAN INITIAL EVALUATION ADULT - ENTER FROM (CAL/KG)
25
What Type Of Note Output Would You Prefer (Optional)?: Standard Output
What Is The Reason For Today's Visit?: Full Body Skin Examination
What Is The Reason For Today's Visit? (Being Monitored For X): the development of new lesions

## 2025-03-07 NOTE — DIETITIAN INITIAL EVALUATION ADULT - REASON INDICATOR FOR ASSESSMENT
Patient seen for "Consult for assessment and education ",Source: Pt, Electronic Medical Record. Chart reviewed, events noted.

## 2025-03-07 NOTE — DIETITIAN INITIAL EVALUATION ADULT - ORAL INTAKE PTA/DIET HISTORY
Pt presents with lethargy, interview kept brief. Patient reports good appetite/PO intake PTA. Usually consumes 3 meals daily. Follows a Regular diet, does not follow any dietary restrictions. Confirms no known food allergies. Takes a multivitamin daily at home, denies liquid oral supplement uses. Denies chewing/swallowing difficulties. No nausea/vomiting reported. States having regular bowel movements daily at home.

## 2025-03-07 NOTE — PROGRESS NOTE ADULT - PROBLEM SELECTOR PLAN 2
- Echo with drop in EF from 2023 to now   - EKG no st/t changes   - No anginal symptoms/ no ACS  - D/w cardiology pt underwent stress test resting images 3/7, no further cardiac testing needed can proceed to the OR

## 2025-03-07 NOTE — DISCHARGE NOTE PROVIDER - HOSPITAL COURSE
HPI:  54M with PMHx of T2DM, CAD (post-CABG), mild HFrEF (EF 45%), and PAD (with recent angiogram Aug. 2024) presenting for several week history of right foot pain and hyperglycemia. Patient states he's been having right foot pain that has been worsening. He denies fever or chills. He states sometimes he forgets to take his insulin which likely explains why he's been hyperglycemic. Recently here in Aug 2024 and he had a right 3rd digit partial digit resection with submetatarsal debridement and graft. Since then seems to have poor compliance with follow ups and medications. Prior to discharge was started on Pletal. In the ED seen by podiatry and given vancomycin and Zosyn. Right foot with wound on the dorsum approximately 1.5 cm x 1.5 cm. Podiatry provided local wound care and sent off a culture. XR showing focal lucency of right foot. (05 Mar 2025 06:35)    Hospital Course:  54M with PMHx of T2DM, CAD (post-CABG), mild HFrEF (EF 45%), and PAD (with recent angiogram Aug. 2024) presenting for several week history of right foot pain and hyperglycemia. Patient admitted for further management of his diabetic foot ulcer s/p podiatric surgical intervention, OR cultures positive for E.Faecalis and Staph Epi requiring prolonged abx course s/p PICC, now stable for discharged.       Problem/Plan - 1:  ·  Problem: Diabetic foot ulcer.   ·  Plan: - Per podiatry: "Right foot submet 3 wound to periosteum fibrotic, no malodor, no purulent drainage, no acute signs of infection"  - S/p right foot partial third ray resection partially closed 3/6 with delayed primary closure of the right foot plantar wound 3/10.   - Deep wound culture from bone with E. Faecalis and Staph Epi, now s/p PICC with plan for prolonged course with Vancomycin (tentative plan for 6 week course, final duration pending pathology).     Problem/Plan - 2:  ·  Problem: Diabetes mellitus.   ·  Plan: - Hyperglycemia likely due to poor compliance  - Will resume prior basal-bolus, c/w Lantus 50 units and Admelog 15 units TIDAC (hold if NPO)  - FS TID AC & low dose insulin sliding scale  - f/u endocrine recs.     Problem/Plan - 3:  ·  Problem: CAD (coronary artery disease).   ·  Plan: - Continue with aspirin, statin. Metoprolol increased to 50 BID, Entresto started per cardiology recommendations as part of GDMT.     Problem/Plan - 4:  ·  Problem: PAD (peripheral artery disease).   ·  Plan: - Continue with medications as above  - Continue with Pletal  - Cleared by vascular for podiatric intervention.     Problem/Plan - 5:  ·  Problem: Prophylactic measure.   ·  Plan: DVT ppx - lovenox  Pending PT eval - rec'd for home PT.      Important Medication Changes and Reason:    Active or Pending Issues Requiring Follow-up:    Daily     Daily   Advanced Directives:   [ ] Full code  [ ] DNR  [ ] Hospice    Discharge Diagnoses:         HPI:  54M with PMHx of T2DM, CAD (post-CABG), mild HFrEF (EF 45%), and PAD (with recent angiogram Aug. 2024) presenting for several week history of right foot pain and hyperglycemia. Patient admitted for further management of his diabetic foot ulcer s/p podiatric surgical intervention, OR cultures positive for E. Faecalis and Staph Epi requiring prolonged abx course s/p PICC, now stable for discharged.    Hospital Course:   Problem/Plan - 1:  ·  Problem: Diabetic foot ulcer.   ·  Plan: - Per podiatry: "Right foot submet 3 wound to periosteum fibrotic, no malodor, no purulent drainage, no acute signs of infection"  - S/p right foot partial third ray resection partially closed 3/6 with delayed primary closure of the right foot plantar wound 3/10.   - Deep wound culture from bone with E. Faecalis and Staph Epi, now s/p PICC with plan for prolonged course with Vancomycin (tentative plan for 6 week course, final duration pending pathology).     Problem/Plan - 2:  ·  Problem: Diabetes mellitus.   ·  Plan: - Hyperglycemia likely due to poor compliance  - Will resume prior basal-bolus, c/w Lantus 50 units and Admelog 15 units TIDAC (hold if NPO)  - FS TID AC & low dose insulin sliding scale  - f/u endocrine recs.     Problem/Plan - 3:  ·  Problem: CAD (coronary artery disease).   ·  Plan: - Continue with aspirin, statin. Metoprolol increased to 50 BID, Entresto started per cardiology recommendations as part of GDMT.     Problem/Plan - 4:  ·  Problem: PAD (peripheral artery disease).   ·  Plan: - Continue with medications as above  - Continue with Pletal  - Cleared by vascular for podiatric intervention.     Problem/Plan - 5:  ·  Problem: Prophylactic measure.   ·  Plan: DVT ppx - lovenox  Pending PT eval - rec'd for home PT.      Important Medication Changes and Reason:    Active or Pending Issues Requiring Follow-up:    Daily     Daily   Advanced Directives:   [ ] Full code  [ ] DNR  [ ] Hospice    Discharge Diagnoses:

## 2025-03-07 NOTE — DISCHARGE NOTE PROVIDER - NSDCMRMEDTOKEN_GEN_ALL_CORE_FT
aspirin 81 mg oral delayed release tablet: 1 tab(s) orally once a day  atorvastatin 80 mg oral tablet: 1 tab(s) orally once a day (at bedtime)  cilostazol 50 mg oral tablet: 1 tab(s) orally 2 times a day  fenofibrate 145 mg oral tablet: 1 tab(s) orally once a day (at bedtime)  HumaLOG KwikPen 100 units/mL injectable solution: 20 unit(s) injectable 3 times a day  insulin glargine 100 units/mL subcutaneous solution: 52 unit(s) subcutaneous once a day (at bedtime)  metoprolol tartrate 25 mg oral tablet: 1 tab(s) orally 2 times a day  ticagrelor 90 mg oral tablet: 1 tab(s) orally every 12 hours   aspirin 81 mg oral delayed release tablet: 1 tab(s) orally once a day  atorvastatin 80 mg oral tablet: 1 tab(s) orally once a day (at bedtime)  cilostazol 50 mg oral tablet: 1 tab(s) orally 2 times a day  fenofibrate 145 mg oral tablet: 1 tab(s) orally once a day (at bedtime)  HumaLOG KwikPen 100 units/mL injectable solution: 20 unit(s) injectable 3 times a day  insulin glargine 100 units/mL subcutaneous solution: 52 unit(s) subcutaneous once a day (at bedtime)  metoprolol tartrate 25 mg oral tablet: 1 tab(s) orally 2 times a day  ticagrelor 90 mg oral tablet: 1 tab(s) orally every 12 hours  vancomycin 1.25 g/250 mL intravenous solution: 1.25 gram(s) intravenously every 12 hours MUST BE GIVEN IN NORMAL SALINE. PT DIABETIC WITH HIGH BLOOD GLUCOSE.  WEEKLY LABS:  CBC/CMP/ESR/CRP/vancomycin trough while on IV antibiotics: fax  results to [phone].   acetaminophen 325 mg oral tablet: 2 tab(s) orally every 6 hours As needed Mild Pain (1 - 3)  aspirin 81 mg oral delayed release tablet: 1 tab(s) orally once a day  atorvastatin 80 mg oral tablet: 1 tab(s) orally once a day (at bedtime)  cilostazol 50 mg oral tablet: 1 tab(s) orally 2 times a day  fenofibrate 145 mg oral tablet: 1 tab(s) orally once a day (at bedtime)  HumaLOG KwikPen 100 units/mL injectable solution: 20 unit(s) injectable 3 times a day  insulin glargine 100 units/mL subcutaneous solution: 52 unit(s) subcutaneous once a day (at bedtime)  metoprolol tartrate 25 mg oral tablet: 1 tab(s) orally 2 times a day  Physical Therapy: Evaluate and treat.  sacubitril-valsartan 24 mg-26 mg oral tablet: 1 tab(s) orally 2 times a day  ticagrelor 90 mg oral tablet: 1 tab(s) orally every 12 hours  vancomycin 1.25 g/250 mL intravenous solution: 1.25 gram(s) intravenously every 12 hours MUST BE GIVEN IN NORMAL SALINE. PT DIABETIC WITH HIGH BLOOD GLUCOSE.  WEEKLY LABS:  CBC/CMP/ESR/CRP/vancomycin trough while on IV antibiotics: fax  results to [phone].

## 2025-03-07 NOTE — DIETITIAN INITIAL EVALUATION ADULT - OTHER INFO
- Endo: hx of type 2 diabetes, A1C: 15.5%, Finger stick x 24hrs 107-336 mg/dL, ordered for insulin for glycemic control   - CV: Hyponatremia noted

## 2025-03-07 NOTE — DISCHARGE NOTE PROVIDER - NSDCFUADDAPPT_GEN_ALL_CORE_FT
Podiatry Discharge Instructions:  Follow up: Please follow up with Dr. De Leon within 1 week of discharge from the hospital, please call [phone] for appointment and discuss that you recently were seen in the hospital.  Wound Care: Please leave your dressing clean dry intact until your follow up appointment  Weight bearing: Please weight bear as tolerated to the right heel in a surgical shoe.  Antibiotics: Please continue as instructed.

## 2025-03-07 NOTE — BRIEF OPERATIVE NOTE - OPERATION/FINDINGS
s/p right foot Partial 2nd ray resection and 3rd proximal phalanx bone biopsy, partially closed  -Bone at proximal resection was hard and of good quality  -Adequate intraop bleeding  -Evidence of purulence at start of procedure  -Incision primarily closed with 3-0 nylon

## 2025-03-07 NOTE — DIETITIAN INITIAL EVALUATION ADULT - PROBLEM SELECTOR PLAN 1
- Per podiatry: "Right foot submet 3 wound to periosteum fibrotic, no malodor, no purulent drainage, no acute signs of infection"  - Podiatric plan: Right foot wound debridement with graft application vs Right partial 2nd and 3rd ray resection  - MRI foot  - OBDULIO/PVR, if abnormal will consult vascular  - Hold off antibiotics  - Follow up culture  - ID consult
None

## 2025-03-07 NOTE — PROGRESS NOTE ADULT - SUBJECTIVE AND OBJECTIVE BOX
[facility]  [facility] Y. Patel, S. Shah, G. Casimir  [phone]  ([phone] - weekdays after 5pm and weekends)    Name: ELSI MERINO  Age/Gender: 54y Male  MRN: [id number]    Interval History:  Patient seen and examined this morning.   Complains of foot pain.   Denies fever, chills or any other new complaints.   Notes reviewed  No concerning overnight events  Afebrile   Allergies: No Known Allergies      Objective:  Vitals:   T(F): 99 (03-07-25 @ 08:03), Max: 100.3 (03-06-25 @ 16:05)  HR: 83 (03-07-25 @ 08:03) (82 - 96)  BP: 106/60 (03-07-25 @ 08:03) (103/63 - 118/61)  RR: 18 (03-07-25 @ 08:03) (18 - 18)  SpO2: 95% (03-07-25 @ 08:03) (93% - 96%)  Physical Examination:  General: no acute distress  HEENT: normocephalic, atraumatic, anicteric  Respiratory: clear to auscultation bilaterally   Cardiovascular: S1 and S2 present, normal rate   Gastrointestinal: soft, nontender, nondistended  Extremities: right foot dressing, no cyanosis  Skin: no visible rash    Laboratory Studies:  CBC:                       11.1   9.81  )-----------( 255      ( 07 Mar 2025 07:04 )             33.2     WBC Trend:  9.81 03-07-25 @ 07:04  9.93 03-06-25 @ 07:01  9.71 03-05-25 @ 21:18  13.65 03-04-25 @ 23:12    CMP: 03-07    134[L]  |  99  |  7   ----------------------------<  78  3.3[L]   |  24  |  0.89    Ca    8.6      07 Mar 2025 08:49    Creatinine: 0.89 mg/dL (03-07-25 @ 08:49)  Creatinine: 0.74 mg/dL (03-06-25 @ 06:58)  Creatinine: 0.86 mg/dL (03-05-25 @ 21:18)  Creatinine: 0.72 mg/dL (03-05-25 @ 04:48)  Creatinine: 0.84 mg/dL (03-04-25 @ 23:12)    Microbiology: reviewed   Culture - Urine (collected 03-05-25 @ 21:18)  Source: Clean Catch Clean Catch (Midstream)  Final Report (03-06-25 @ 22:56):    <10,000 CFU/mL Normal Urogenital Marianne    Culture - Blood (collected 03-05-25 @ 21:17)  Source: Blood Blood-Peripheral  Preliminary Report (03-07-25 @ 01:03):    No growth at 24 hours    Culture - Blood (collected 03-05-25 @ 21:17)  Source: Blood Blood-Peripheral  Preliminary Report (03-07-25 @ 01:03):    No growth at 24 hours    Culture - Abscess with Gram Stain (collected 03-05-25 @ 02:55)  Source: Abscess right foot wound  Gram Stain (03-05-25 @ 14:52):    No polymorphonuclear cells seen    per low power field    Few Gram positive cocci in pairs    Moderate Gram Negative Rods    per oil power field  Preliminary Report (03-07-25 @ 09:30):    Culture yields >4 types of aerobic and/or anaerobic bacteria    Call client services within 7 days if further workup is clinically    indicated.    Culture includes    Moderate Morganella morganii    Moderate Enterococcus faecalis    Moderate Staphylococcus epidermidis    Moderate Corynebacterium striatum group "Susceptibilities not performed"    Rare Klebsiella oxytoca/Raoultella ornithinolytica Susceptibility to    follow.    Numerous Prevotella melaninogenica "Susceptibilities not performed"    Moderate Bacteroides fragilis "Susceptibilities not performed"  Organism: Morganella morganii  Enterococcus faecalis  Staphylococcus epidermidis (03-07-25 @ 07:41)  Organism: Morganella morganii (03-07-25 @ 07:41)      Method Type: MOJGAN      -  Amoxicillin/Clavulanic Acid: R >16/8      -  Ampicillin: R >16 These ampicillin results predict results for amoxicillin      -  Ampicillin/Sulbactam: R >16/8      -  Aztreonam: S <=4      -  Cefazolin: R >16      -  Cefepime: S <=2      -  Cefoxitin: S <=8      -  Ceftriaxone: S <=1      -  Ciprofloxacin: S <=0.25      -  Ertapenem: S <=0.5      -  Gentamicin: S <=2      -  Levofloxacin: S <=0.5      -  Meropenem: S <=1      -  Piperacillin/Tazobactam: S <=8      -  Tobramycin: S <=2      -  Trimethoprim/Sulfamethoxazole: S <=0.5/9.5  Organism: Staphylococcus epidermidis (03-07-25 @ 07:40)      Method Type: MOJGAN      -  Clindamycin: R >4      -  Erythromycin: R >4      -  Gentamicin: S <=4 Should not be used as monotherapy      -  Oxacillin: R >2      -  Penicillin: R >2      -  Rifampin: S <=1 Should not be used as monotherapy      -  Tetracycline: S <=4      -  Trimethoprim/Sulfamethoxazole: S <=0.5/9.5      -  Vancomycin: S 1  Organism: Enterococcus faecalis (03-07-25 @ 07:39)      Method Type: MOJGAN      -  Ampicillin: S <=2 Predicts results to ampicillin/sulbactam, amoxacillin-clavulanate and  piperacillin-tazobactam.      -  Vancomycin: S 1        03-05-25 @ 21:18 SARS-CoV-2 NotDetec/Influenza A NotDetec/Influenza B NotDetec/RSV NotDetec    Radiology: reviewed     Medications:  acetaminophen     Tablet .. 650 milliGRAM(s) Oral every 6 hours PRN  aspirin enteric coated 81 milliGRAM(s) Oral daily  atorvastatin 80 milliGRAM(s) Oral at bedtime  cilostazol 50 milliGRAM(s) Oral two times a day  dextrose 5%. 1000 milliLiter(s) IV Continuous <Continuous>  dextrose 5%. 1000 milliLiter(s) IV Continuous <Continuous>  dextrose 5%. 1000 milliLiter(s) IV Continuous <Continuous>  dextrose 50% Injectable 25 Gram(s) IV Push once  dextrose 50% Injectable 12.5 Gram(s) IV Push once  dextrose 50% Injectable 25 Gram(s) IV Push once  dextrose Oral Gel 15 Gram(s) Oral once  enoxaparin Injectable 40 milliGRAM(s) SubCutaneous every 24 hours  glucagon  Injectable 1 milliGRAM(s) IntraMuscular once  insulin glargine Injectable (LANTUS) 35 Unit(s) SubCutaneous at bedtime  insulin lispro (ADMELOG) corrective regimen sliding scale   SubCutaneous three times a day before meals  insulin lispro (ADMELOG) corrective regimen sliding scale   SubCutaneous at bedtime  insulin lispro Injectable (ADMELOG) 12 Unit(s) SubCutaneous three times a day before meals  metoprolol tartrate 50 milliGRAM(s) Oral two times a day  oxyCODONE    IR 5 milliGRAM(s) Oral every 6 hours PRN  piperacillin/tazobactam IVPB.. 3.375 Gram(s) IV Intermittent every 8 hours  sacubitril 24 mG/valsartan 26 mG 1 Tablet(s) Oral two times a day    Current Antimicrobials:  piperacillin/tazobactam IVPB.. 3.375 Gram(s) IV Intermittent every 8 hours    Prior/Completed Antimicrobials:  piperacillin/tazobactam IVPB.  piperacillin/tazobactam IVPB.-  piperacillin/tazobactam IVPB.-  piperacillin/tazobactam IVPB...  vancomycin  IVPB  vancomycin  IVPB.

## 2025-03-07 NOTE — DISCHARGE NOTE PROVIDER - NSDCCPCAREPLAN_GEN_ALL_CORE_FT
PRINCIPAL DISCHARGE DIAGNOSIS  Diagnosis: Diabetic foot ulcer  Assessment and Plan of Treatment: You were diagnosed with Right Foot Osteomyelitis and had surgical resection.  You will need to continue Vancomycin antibiotic for 6 weeks.  You will need blood work done every week.  You will need to follow up with Dr. Jada Kelley in ID office within 1 week of discharge.  Call to schedule appointment.         SECONDARY DISCHARGE DIAGNOSES  Diagnosis: Uncontrolled type 2 diabetes mellitus with hyperglycemia  Assessment and Plan of Treatment: Antony hemoglobin A1C  (HgA1C)  was 15.5 on March 4th on this admission.  Make sure you get your HgA1c checked every three months.  If you take oral diabetes medications, check your blood glucose two times a day.  If you take insulin, check your blood glucose before meals and at bedtime.  It's important not to skip any meals.  Keep a log of your blood glucose results and always take it with you to your doctor appointments.  Keep a list of your current medications including injectables and over the counter medications and bring this medication list with you to all your doctor appointments.  If you have not seen your ophthalmologist this year call for appointment.  Check your feet daily for redness, sores, or openings. Do not self treat. If no improvement in two days call your primary care physician for an appointment.  Low blood sugar (hypoglycemia) is a blood sugar below 70mg/dl. Check your blood sugar if you feel signs/symptoms of hypoglycemia. If your blood sugar is below 70 take 15 grams of carbohydrates (ex 4 oz of apple juice, 3-4 glucose tablets, or 4-6 oz of regular soda) wait 15 minutes and repeat blood sugar to make sure it comes up above 70.  If your blood sugar is above 70 and you are due for a meal, have a meal.  If you are not due for a meal have a snack.  This snack helps keeps your blood sugar at a safe range.      Diagnosis: CAD (coronary artery disease)  Assessment and Plan of Treatment: Coronary artery disease is a condition where the arteries the supply the heart muscle get clogged with fatty deposits & puts you at risk for a heart attack  Call your doctor if you have any new pain, pressure, or discomfort in the center of your chest, pain, tingling or discomfort in arms, back, neck, jaw, or stomach, shortness of breath, nausea, vomiting, burping or heartburn, sweating, cold and clammy skin, racing or abnormal heartbeat for more than 10 minutes or if they keep coming & going.  Call 911 and do not tr to get to hospital by care  You can help yourself with lefestyle changes (quitting smoking if you smoke), eat lots of fruits & vegetables & low fat dairy products, not a lot of meat & fatty foods, walk or some form of physical activity most days of the week, lose weight if you are overweight  Take your cardiac medication as prescribed to lower cholesterol, to lower blood pressure, aspirin to prevent blood clots, and diabetes control  Make sure to keep appointments with doctor for cardiac follow up care      Diagnosis: Hypertension  Assessment and Plan of Treatment: Take your medication as prescribed.  Follow up with your medical doctor for routine blood pressure monitoring, and to establish long term blood pressure treatment goals.  Low salt diet  Activity as tolerated.  Notify your doctor if you have any of the following symptoms:   Dizziness, Lightheadedness, Blurry vision, Headache, Chest pain, Shortness of breath      Diagnosis: PAD (peripheral artery disease)  Assessment and Plan of Treatment: Take your medication as prescribed.   Follow up with your medical doctor for routine blood  work monitoring, and to establish long term treatment goals.      Diagnosis: HLD (hyperlipidemia)  Assessment and Plan of Treatment: Take your medication as prescribed.   Follow up with your medical doctor for routine blood  work monitoring, and to establish long term treatment goals.

## 2025-03-07 NOTE — PROGRESS NOTE ADULT - ASSESSMENT
54M with PMHx of T2DM, CAD (post-CABG), mild HFrEF (EF 45%), and PAD (with recent angiogram Aug. 2024) presenting for several week history of right foot pain and hyperglycemia. Patient admitted for further management of his diabetic foot ulcer, plan for podiatric surgical intervention.

## 2025-03-07 NOTE — PROGRESS NOTE ADULT - PROBLEM SELECTOR PLAN 3
- Hyperglycemia likely due to poor compliance  - Will resume prior basal-bolus, c/w Lantus 50 units and Admelog 15 units TIDAC (hold if NPO)  - FS TID AC & low dose insulin sliding scale  - f/u endocrine recs

## 2025-03-07 NOTE — BRIEF OPERATIVE NOTE - COMMENTS
pt is s/p right foot Partial 2nd ray resection and 3rd proximal phalanx bone biopsy, partially closed  High  concern for residual bone infection  Low concern for viability, adequate intraop bleeding  Podiatry plan: follow-up OR data for 48 hours of no growth, follow-up ID recs, bedside delayed primary closure vs. return to OR for debridement and delayed primary closure pending appearance

## 2025-03-07 NOTE — PROGRESS NOTE ADULT - ASSESSMENT
54M with PMHx of T2DM, CAD (post-CABG), mild HFrEF (EF 45%), and PAD (with recent angiogram Aug. 2024) presenting for several week history of right foot pain and hyperglycemia. Vascular surgery consulted for evaluation of Right foot wound.     Plan:  - podiatric intervention today  - Local wound care  - C/w aspirin/pletal/Statin  - will follow until confirmed adequate intra operative bleeding by podiatry    Vascular Surgery   [id number]/ [id number] 54M with PMHx of T2DM, CAD (post-CABG), mild HFrEF (EF 45%), and PAD (with recent angiogram Aug. 2024) presenting for several week history of right foot pain and hyperglycemia. Vascular surgery consulted for evaluation of Right foot wound.     Plan:  - podiatric intervention today  - Local wound care  - C/w aspirin/pletal/Statin  - please call back with any questions or concerns    Vascular Surgery   [id number]/ [id number] 54M with PMHx of T2DM, CAD (post-CABG), mild HFrEF (EF 45%), and PAD (with recent angiogram Aug. 2024) presenting for several week history of right foot pain and hyperglycemia. Vascular surgery consulted for evaluation of Right foot wound.     Plan:  - podiatric intervention today  - Local wound care  - C/w aspirin/pletal/Statin  - please call back with any questions or concerns  will follow    Vascular Surgery   [id number]/ [id number]

## 2025-03-07 NOTE — DISCHARGE NOTE PROVIDER - CARE PROVIDERS DIRECT ADDRESSES
,DirectAddress_Unknown,[email or web address] ,DirectAddress_Unknown,[email or web address]

## 2025-03-07 NOTE — PROGRESS NOTE ADULT - SUBJECTIVE AND OBJECTIVE BOX
SUBJECTIVE: NAEO. patient seen and examined on AM rounds. denies worsening pain in lower ext.     Vital Signs Last 24 Hrs  T(C): 37.5 (07 Mar 2025 04:53), Max: 37.9 (06 Mar 2025 16:05)  T(F): 99.5 (07 Mar 2025 04:53), Max: 100.3 (06 Mar 2025 16:05)  HR: 93 (07 Mar 2025 04:53) (82 - 96)  BP: 104/64 (07 Mar 2025 04:53) (103/63 - 118/61)  BP(mean): --  RR: 18 (07 Mar 2025 04:53) (18 - 18)  SpO2: 93% (07 Mar 2025 04:53) (93% - 96%)    Parameters below as of 07 Mar 2025 04:53  Patient On (Oxygen Delivery Method): room air        I&O's Detail    06 Mar 2025 07:01  -  07 Mar 2025 07:00  --------------------------------------------------------  IN:    Oral Fluid: 480 mL  Total IN: 480 mL    OUT:    Voided (mL): 1525 mL  Total OUT: 1525 mL    Total NET: -1045 mL      PHYSICAL EXAM:  General: NAD  Pulmonary: normal resp effort, CTA-B  Cardiovascular: NSR, no murmurs  Abdominal: soft, ND/NT  Extremities: s/p Right foot metatarsal head resection; Right foot submet 3 wound to periosteum fibrotic, no malodor, no purulent drainage, no acute signs of infection.   Neuro: A/O x 3, CNs II-XII grossly intact, normal sensation, no focal deficits    Vascular Pulse Exam:  Right Femoral: palpable    Left Femoral:  palpable  Right DP: signal          Left DP:  signal            Right PT: signal               Left PT: signal          LABS:                        11.1   9.93  )-----------( 233      ( 06 Mar 2025 07:01 )             33.6     03-06    130[L]  |  95[L]  |  10  ----------------------------<  312[H]  3.9   |  22  |  0.74    Ca    8.6      06 Mar 2025 06:58        Urinalysis Basic - ( 06 Mar 2025 06:58 )    Color: x / Appearance: x / SG: x / pH: x  Gluc: 312 mg/dL / Ketone: x  / Bili: x / Urobili: x   Blood: x / Protein: x / Nitrite: x   Leuk Esterase: x / RBC: x / WBC x   Sq Epi: x / Non Sq Epi: x / Bacteria: x        RADIOLOGY & ADDITIONAL STUDIES:   SUBJECTIVE: NAEO. patient seen and examined on AM rounds. denies worsening pain in lower ext.     Vital Signs Last 24 Hrs  T(C): 37.5 (07 Mar 2025 04:53), Max: 37.9 (06 Mar 2025 16:05)  T(F): 99.5 (07 Mar 2025 04:53), Max: 100.3 (06 Mar 2025 16:05)  HR: 93 (07 Mar 2025 04:53) (82 - 96)  BP: 104/64 (07 Mar 2025 04:53) (103/63 - 118/61)  BP(mean): --  RR: 18 (07 Mar 2025 04:53) (18 - 18)  SpO2: 93% (07 Mar 2025 04:53) (93% - 96%)    Parameters below as of 07 Mar 2025 04:53  Patient On (Oxygen Delivery Method): room air        I&O's Detail    06 Mar 2025 07:01  -  07 Mar 2025 07:00  --------------------------------------------------------  IN:    Oral Fluid: 480 mL  Total IN: 480 mL    OUT:    Voided (mL): 1525 mL  Total OUT: 1525 mL    Total NET: -1045 mL      PHYSICAL EXAM:  General: NAD  Pulmonary: normal resp effort, CTA-B  Cardiovascular: NSR, no murmurs  Abdominal: soft, ND/NT  Extremities: s/p Right foot metatarsal head resection; Right foot submet 3 wound to periosteum fibrotic, no malodor, no purulent drainage, no acute signs of infection.   Neuro: A/O x 3, CNs II-XII grossly intact, normal sensation, no focal deficits    Vascular Pulse Exam:  Right Femoral: palpable    Left Femoral:  palpable  Right DP: signal          Left DP:  signal            Right PT: signal               Left PT: signal          LABS:                        11.1   9.93  )-----------( 233      ( 06 Mar 2025 07:01 )             33.6     03-06    130[L]  |  95[L]  |  10  ----------------------------<  312[H]  3.9   |  22  |  0.74    Ca    8.6      06 Mar 2025 06:58        Urinalysis Basic - ( 06 Mar 2025 06:58 )    Color: x / Appearance: x / SG: x / pH: x  Gluc: 312 mg/dL / Ketone: x  / Bili: x / Urobili: x   Blood: x / Protein: x / Nitrite: x   Leuk Esterase: x / RBC: x / WBC x   Sq Epi: x / Non Sq Epi: x / Bacteria: x

## 2025-03-07 NOTE — DIETITIAN INITIAL EVALUATION ADULT - PERTINENT LABORATORY DATA
03-07    134[L]  |  99  |  7   ----------------------------<  78  3.3[L]   |  24  |  0.89    Ca    8.6      07 Mar 2025 08:49    POCT Blood Glucose.: 83 mg/dL (03-07-25 @ 08:38)  A1C with Estimated Average Glucose Result: >15.5 % (03-04-25 @ 23:12)  A1C with Estimated Average Glucose Result: 14.0 % (08-07-24 @ 07:05)  A1C with Estimated Average Glucose Result: 14.1 % (08-07-24 @ 04:53)

## 2025-03-07 NOTE — CHART NOTE - NSCHARTNOTEFT_GEN_A_CORE
POCT Blood Glucose:  86 mg/dL (03-07-25 @ 13:05)  83 mg/dL (03-07-25 @ 08:38)  161 mg/dL (03-06-25 @ 21:42)  134 mg/dL (03-06-25 @ 17:04)      eMAR:atorvastatin   80 milliGRAM(s) Oral (03-06-25 @ 21:48)    insulin glargine Injectable (LANTUS)   35 Unit(s) SubCutaneous (03-06-25 @ 21:48)      54M with PMHx of T2DM, CAD (post-CABG), mild HFrEF (EF 45%), and PAD (with recent angiogram Aug. 2024) presenting for several week history of right foot pain and hyperglycemia. Endocrine consulted for management of uncontrolled DM2.   Plan for Right partial 2nd and 3rd ray resection today, NPO after MN. Attempted to see patient, however patient was not in the room at the time of visit.   BGs and insulin regimen reviewed   Noted fasting BG 83. Spoke with team to start D5W infusion to prevent hypoglycemia while NPO.       #Uncontrolled T2DM with hyperglycemia  - Last A1c 14%  - Home meds: Lantus 52 units qhs, Humalog 20 units tidac (nonadherent for last 4-6 weeks)  - Follows with Dr. Mcdaniel? in [place]  - Received Lantus 35 units for NPO state   - Fasting BG tightly controlled     PLAN    - Tightly controlled BG 83 today. Would decrease Lantus to 30 units tonight once pt starts to eat after OR.    - Continue to hold meal coverage insulin while NPO,   - Decrease Admelog to 10 units with meals ( Hold if NPO) once back on diet after OR  - Continue with low dose correctional insulin with meals and low dose correctional insulin at bedtime  - Check FS before meals and at bedtime - if NPO, check q6 hours  - Goal -180  - Hypoglycemia protocol  - Please obtain nutrition consult      Contact via Microsoft Teams during business hours  To reach covering provider access AMION via Vobile  For Urgent matters/after-hours/weekends/holidays please page endocrine fellow on call   For nonurgent matters please email [email or web address]    Please note that this patient may be followed by different provider tomorrow.  Notify endocrine 24 hours prior to discharge for final recommendations POCT Blood Glucose:  86 mg/dL (03-07-25 @ 13:05)  83 mg/dL (03-07-25 @ 08:38)  161 mg/dL (03-06-25 @ 21:42)  134 mg/dL (03-06-25 @ 17:04)      eMAR:atorvastatin   80 milliGRAM(s) Oral (03-06-25 @ 21:48)    insulin glargine Injectable (LANTUS)   35 Unit(s) SubCutaneous (03-06-25 @ 21:48)      54M with PMHx of T2DM, CAD (post-CABG), mild HFrEF (EF 45%), and PAD (with recent angiogram Aug. 2024) presenting for several week history of right foot pain and hyperglycemia. Endocrine consulted for management of uncontrolled DM2.   Plan for Right partial 2nd and 3rd ray resection today, NPO after MN. Attempted to see patient, however patient was not in the room at the time of visit.   BGs and insulin regimen reviewed   Noted fasting BG 83. Spoke with team to start D5W infusion to prevent hypoglycemia while NPO.       #Uncontrolled T2DM with hyperglycemia  - Last A1c 14%  - Home meds: Lantus 52 units qhs, Humalog 20 units tidac (nonadherent for last 4-6 weeks)  - Follows with Dr. Mcdaniel? in [place]  - Received Lantus 35 units for NPO state   - Fasting BG tightly controlled     PLAN    - Tightly controlled BG 83 today. Would decrease Lantus to 22 units tonight once pt starts to eat after OR.    - Continue to hold meal coverage insulin while NPO,   - Decrease Admelog to 8 units with meals ( Hold if NPO) once back on diet after OR  - Continue with low dose correctional insulin with meals and low dose correctional insulin at bedtime  - Check FS before meals and at bedtime - if NPO, check q6 hours  - Goal -180  - Hypoglycemia protocol  - Please obtain nutrition consult      Contact via Microsoft Teams during business hours  To reach covering provider access AMION via Flypaper  For Urgent matters/after-hours/weekends/holidays please page endocrine fellow on call   For nonurgent matters please email [email or web address]    Please note that this patient may be followed by different provider tomorrow.  Notify endocrine 24 hours prior to discharge for final recommendations

## 2025-03-07 NOTE — PROGRESS NOTE ADULT - SUBJECTIVE AND OBJECTIVE BOX
Patient is a 54y old  Male who presents with a chief complaint of Hyperglycemia     (07 Mar 2025 10:02)      SUBJECTIVE / OVERNIGHT EVENTS: no acute events overnight     MEDICATIONS  (STANDING):  aspirin enteric coated 81 milliGRAM(s) Oral daily  atorvastatin 80 milliGRAM(s) Oral at bedtime  cilostazol 50 milliGRAM(s) Oral two times a day  dextrose 5%. 1000 milliLiter(s) (50 mL/Hr) IV Continuous <Continuous>  dextrose 5%. 1000 milliLiter(s) (100 mL/Hr) IV Continuous <Continuous>  dextrose 5%. 1000 milliLiter(s) (30 mL/Hr) IV Continuous <Continuous>  dextrose 50% Injectable 25 Gram(s) IV Push once  dextrose 50% Injectable 12.5 Gram(s) IV Push once  dextrose 50% Injectable 25 Gram(s) IV Push once  dextrose Oral Gel 15 Gram(s) Oral once  enoxaparin Injectable 40 milliGRAM(s) SubCutaneous every 24 hours  glucagon  Injectable 1 milliGRAM(s) IntraMuscular once  insulin glargine Injectable (LANTUS) 35 Unit(s) SubCutaneous at bedtime  insulin lispro (ADMELOG) corrective regimen sliding scale   SubCutaneous three times a day before meals  insulin lispro (ADMELOG) corrective regimen sliding scale   SubCutaneous at bedtime  insulin lispro Injectable (ADMELOG) 12 Unit(s) SubCutaneous three times a day before meals  metoprolol tartrate 50 milliGRAM(s) Oral two times a day  piperacillin/tazobactam IVPB.. 3.375 Gram(s) IV Intermittent every 8 hours  sacubitril 24 mG/valsartan 26 mG 1 Tablet(s) Oral two times a day    MEDICATIONS  (PRN):  acetaminophen     Tablet .. 650 milliGRAM(s) Oral every 6 hours PRN Temp greater or equal to 38C (100.4F), Mild Pain (1 - 3)  oxyCODONE    IR 5 milliGRAM(s) Oral every 6 hours PRN Severe Pain (7 - 10)        CAPILLARY BLOOD GLUCOSE      POCT Blood Glucose.: 83 mg/dL (07 Mar 2025 08:38)  POCT Blood Glucose.: 161 mg/dL (06 Mar 2025 21:42)  POCT Blood Glucose.: 134 mg/dL (06 Mar 2025 17:04)    I&O's Summary    06 Mar 2025 07:01  -  07 Mar 2025 07:00  --------------------------------------------------------  IN: 480 mL / OUT: 1525 mL / NET: -1045 mL        PHYSICAL EXAM:    CONSTITUTIONAL: NAD, well-developed  HEAD: Normocephalic, atraumatic  EYES: EOMI, PERRL  ENT: no rhinorrhea, no pharyngeal erythema  RESPIRATORY: No increased work of breathing, CTAB, no wheezes or crackles appreciated  CARDIOVASCULAR: RRR, S1 and S2 present, no m/r/g  ABDOMEN: soft, NT, ND, bowel sounds present  EXTREMITIES: No LE edema, s/p R foot metatarsal head resection. R foot submet 3 wound to bone, fibrotic wound bed, mild malodor, scant purulent drainage  MUSCULOSKELETAL: no joint swelling, no tenderness to palpation  NEURO: A&Ox3, moving all extremities    LABS:                        11.1   9.81  )-----------( 255      ( 07 Mar 2025 07:04 )             33.2     03-07    134[L]  |  99  |  7   ----------------------------<  78  3.3[L]   |  24  |  0.89    Ca    8.6      07 Mar 2025 08:49      PT/INR - ( 07 Mar 2025 07:04 )   PT: 12.3 sec;   INR: 1.08 ratio         PTT - ( 07 Mar 2025 07:04 )  PTT:30.4 sec      Urinalysis Basic - ( 07 Mar 2025 08:49 )    Color: x / Appearance: x / SG: x / pH: x  Gluc: 78 mg/dL / Ketone: x  / Bili: x / Urobili: x   Blood: x / Protein: x / Nitrite: x   Leuk Esterase: x / RBC: x / WBC x   Sq Epi: x / Non Sq Epi: x / Bacteria: x        RADIOLOGY & ADDITIONAL TESTS:    Imaging Personally Reviewed:    Consultant(s) Notes Reviewed:      Care Discussed with Consultants/Other Providers:

## 2025-03-07 NOTE — DIETITIAN INITIAL EVALUATION ADULT - PHYSCIAL ASSESSMENT
Pt declined Nutrition focused physical exam during visit secondary to lethargy. Patient reports 20lbs (10.8%) weight loss from 185-165lbs x 2 months despite patient was eating fine PTA,  clinically significant.     Per Elham MIRANDA weight history: (11/2024)165lbs; (7/2024)170lbs; (6/2022)170lbs;    IBW: 130lbs  IBW%: 123%

## 2025-03-07 NOTE — DISCHARGE NOTE PROVIDER - PROVIDER TOKENS
PROVIDER:[TOKEN:[[id number]],FOLLOWUP:[1 week]],PROVIDER:[TOKEN:[[id number]],FOLLOWUP:[1 week],ESTABLISHEDPATIENT:[T]] PROVIDER:[TOKEN:[[id number]],FOLLOWUP:[1 week]],PROVIDER:[TOKEN:[[id number]],FOLLOWUP:[1 week],ESTABLISHEDPATIENT:[T]],PROVIDER:[TOKEN:[[id number]],FOLLOWUP:[1 week]]

## 2025-03-07 NOTE — PROGRESS NOTE ADULT - ASSESSMENT
Plan:   To OR today for Right foot partial 2nd and 3rd ray amputation with Dr. Patterson at 1pm..   CXR on sunrise.  EKG on sunrise.  Medical/Cardiac clearance PENDING  Consent signed and in chart.  Procedure was explained to patient in detail. All alternatives, risks and complications were discussed. All questions answered.

## 2025-03-08 DIAGNOSIS — I10 ESSENTIAL (PRIMARY) HYPERTENSION: ICD-10-CM

## 2025-03-08 LAB
-  AMOXICILLIN/CLAVULANIC ACID: SIGNIFICANT CHANGE UP
-  AMPICILLIN/SULBACTAM: SIGNIFICANT CHANGE UP
-  AMPICILLIN: SIGNIFICANT CHANGE UP
-  AZTREONAM: SIGNIFICANT CHANGE UP
-  CEFAZOLIN: SIGNIFICANT CHANGE UP
-  CEFEPIME: SIGNIFICANT CHANGE UP
-  CEFOXITIN: SIGNIFICANT CHANGE UP
-  CEFTRIAXONE: SIGNIFICANT CHANGE UP
-  CIPROFLOXACIN: SIGNIFICANT CHANGE UP
-  ERTAPENEM: SIGNIFICANT CHANGE UP
-  GENTAMICIN: SIGNIFICANT CHANGE UP
-  IMIPENEM: SIGNIFICANT CHANGE UP
-  LEVOFLOXACIN: SIGNIFICANT CHANGE UP
-  MEROPENEM: SIGNIFICANT CHANGE UP
-  PIPERACILLIN/TAZOBACTAM: SIGNIFICANT CHANGE UP
-  TOBRAMYCIN: SIGNIFICANT CHANGE UP
-  TRIMETHOPRIM/SULFAMETHOXAZOLE: SIGNIFICANT CHANGE UP
APPEARANCE UR: CLEAR — SIGNIFICANT CHANGE UP
BACTERIA # UR AUTO: NEGATIVE /HPF — SIGNIFICANT CHANGE UP
BILIRUB UR-MCNC: NEGATIVE — SIGNIFICANT CHANGE UP
CAST: 2 /LPF — SIGNIFICANT CHANGE UP (ref 0–4)
COLOR SPEC: YELLOW — SIGNIFICANT CHANGE UP
CULTURE RESULTS: ABNORMAL
DIFF PNL FLD: NEGATIVE — SIGNIFICANT CHANGE UP
GLUCOSE BLDC GLUCOMTR-MCNC: 176 MG/DL — HIGH (ref 70–99)
GLUCOSE BLDC GLUCOMTR-MCNC: 196 MG/DL — HIGH (ref 70–99)
GLUCOSE BLDC GLUCOMTR-MCNC: 300 MG/DL — HIGH (ref 70–99)
GLUCOSE BLDC GLUCOMTR-MCNC: 335 MG/DL — HIGH (ref 70–99)
GLUCOSE BLDC GLUCOMTR-MCNC: 348 MG/DL — HIGH (ref 70–99)
GLUCOSE UR QL: >=1000 MG/DL
GRAM STN FLD: ABNORMAL
GRAM STN FLD: SIGNIFICANT CHANGE UP
GRAM STN FLD: SIGNIFICANT CHANGE UP
KETONES UR-MCNC: ABNORMAL MG/DL
LEUKOCYTE ESTERASE UR-ACNC: NEGATIVE — SIGNIFICANT CHANGE UP
METHOD TYPE: SIGNIFICANT CHANGE UP
NITRITE UR-MCNC: NEGATIVE — SIGNIFICANT CHANGE UP
ORGANISM # SPEC MICROSCOPIC CNT: ABNORMAL
PH UR: 5.5 — SIGNIFICANT CHANGE UP (ref 5–8)
PROT UR-MCNC: 100 MG/DL
RBC CASTS # UR COMP ASSIST: 1 /HPF — SIGNIFICANT CHANGE UP (ref 0–4)
SP GR SPEC: >1.03 — HIGH (ref 1–1.03)
SPECIMEN SOURCE: SIGNIFICANT CHANGE UP
SQUAMOUS # UR AUTO: 1 /HPF — SIGNIFICANT CHANGE UP (ref 0–5)
UROBILINOGEN FLD QL: 0.2 MG/DL — SIGNIFICANT CHANGE UP (ref 0.2–1)
WBC UR QL: 1 /HPF — SIGNIFICANT CHANGE UP (ref 0–5)

## 2025-03-08 PROCEDURE — 99232 SBSQ HOSP IP/OBS MODERATE 35: CPT

## 2025-03-08 PROCEDURE — 99233 SBSQ HOSP IP/OBS HIGH 50: CPT

## 2025-03-08 RX ORDER — INSULIN GLARGINE-YFGN 100 [IU]/ML
26 INJECTION, SOLUTION SUBCUTANEOUS AT BEDTIME
Refills: 0 | Status: DISCONTINUED | OUTPATIENT
Start: 2025-03-08 | End: 2025-03-10

## 2025-03-08 RX ORDER — PIPERACILLIN-TAZO-DEXTROSE,ISO 3.375G/5
3.38 IV SOLUTION, PIGGYBACK PREMIX FROZEN(ML) INTRAVENOUS EVERY 8 HOURS
Refills: 0 | Status: DISCONTINUED | OUTPATIENT
Start: 2025-03-08 | End: 2025-03-09

## 2025-03-08 RX ORDER — INSULIN LISPRO 100 U/ML
11 INJECTION, SOLUTION INTRAVENOUS; SUBCUTANEOUS
Refills: 0 | Status: DISCONTINUED | OUTPATIENT
Start: 2025-03-08 | End: 2025-03-08

## 2025-03-08 RX ORDER — INSULIN LISPRO 100 U/ML
12 INJECTION, SOLUTION INTRAVENOUS; SUBCUTANEOUS
Refills: 0 | Status: DISCONTINUED | OUTPATIENT
Start: 2025-03-08 | End: 2025-03-10

## 2025-03-08 RX ORDER — ACETAMINOPHEN 500 MG/5ML
1000 LIQUID (ML) ORAL ONCE
Refills: 0 | Status: DISCONTINUED | OUTPATIENT
Start: 2025-03-08 | End: 2025-03-11

## 2025-03-08 RX ADMIN — INSULIN LISPRO 8 UNIT(S): 100 INJECTION, SOLUTION INTRAVENOUS; SUBCUTANEOUS at 08:48

## 2025-03-08 RX ADMIN — INSULIN GLARGINE-YFGN 26 UNIT(S): 100 INJECTION, SOLUTION SUBCUTANEOUS at 22:17

## 2025-03-08 RX ADMIN — Medication 25 GRAM(S): at 13:47

## 2025-03-08 RX ADMIN — METOPROLOL SUCCINATE 50 MILLIGRAM(S): 50 TABLET, EXTENDED RELEASE ORAL at 05:56

## 2025-03-08 RX ADMIN — Medication 25 GRAM(S): at 05:57

## 2025-03-08 RX ADMIN — INSULIN LISPRO 1: 100 INJECTION, SOLUTION INTRAVENOUS; SUBCUTANEOUS at 01:47

## 2025-03-08 RX ADMIN — METOPROLOL SUCCINATE 50 MILLIGRAM(S): 50 TABLET, EXTENDED RELEASE ORAL at 18:16

## 2025-03-08 RX ADMIN — SACUBITRIL AND VALSARTAN 1 TABLET(S): 49; 51 TABLET, FILM COATED ORAL at 18:15

## 2025-03-08 RX ADMIN — Medication 81 MILLIGRAM(S): at 13:47

## 2025-03-08 RX ADMIN — SACUBITRIL AND VALSARTAN 1 TABLET(S): 49; 51 TABLET, FILM COATED ORAL at 05:56

## 2025-03-08 RX ADMIN — INSULIN LISPRO 11 UNIT(S): 100 INJECTION, SOLUTION INTRAVENOUS; SUBCUTANEOUS at 13:49

## 2025-03-08 RX ADMIN — CILOSTAZOL 50 MILLIGRAM(S): 50 TABLET ORAL at 05:56

## 2025-03-08 RX ADMIN — INSULIN LISPRO 4: 100 INJECTION, SOLUTION INTRAVENOUS; SUBCUTANEOUS at 14:07

## 2025-03-08 RX ADMIN — INSULIN LISPRO 4: 100 INJECTION, SOLUTION INTRAVENOUS; SUBCUTANEOUS at 08:47

## 2025-03-08 RX ADMIN — ATORVASTATIN CALCIUM 80 MILLIGRAM(S): 80 TABLET, FILM COATED ORAL at 22:17

## 2025-03-08 RX ADMIN — Medication 25 GRAM(S): at 22:32

## 2025-03-08 RX ADMIN — ENOXAPARIN SODIUM 40 MILLIGRAM(S): 100 INJECTION SUBCUTANEOUS at 05:55

## 2025-03-08 RX ADMIN — CILOSTAZOL 50 MILLIGRAM(S): 50 TABLET ORAL at 18:15

## 2025-03-08 NOTE — PROGRESS NOTE ADULT - ASSESSMENT
54M with PMHx of T2DM, CAD (post-CABG), mild HFrEF (EF 45%), and PAD (with recent angiogram Aug. 2024) presenting for several week history of right foot pain and hyperglycemia. Patient states he's been having right foot pain that has been worsening. He denies fever or chills. He states sometimes he forgets to take his insulin which likely explains why he's been hyperglycemic. Recently here in Aug 2024 and he had a right 3rd digit partial digit resection with submetatarsal debridement and graft. Present with right foot pain found to have hyperglycemia. Endocrine consulted for management of uncontrolled DM2. Patient reports feeling good, has been eating mostly full meals and tolerating POs. FBG with severe hyperglycemia this am, noted recent decrease in Lantus dose given recent NPO status, will adjust Lantus to 26u QHS for tighter FBG control. No hypoglycemia overnight. Will keep mealtime dose as is for now given no recent postprandial BG trend noted. Discussed with primary team to keep all IV abx in NS solution instead of D5% to help with hyperglycemia. Endocrine will closely monitor BG and adjust insulin as needed for BG goal 100-180mg/dL inpatient.     #Uncontrolled T2DM with hyperglycemia  - Last A1c 14%  - Home meds: Lantus 52 units qhs, Humalog 20 units tidac (nonadherent for last 4-6 weeks)  - Follows with Dr. Mcdaniel? in [place]  - Started on Lantus 50 units qhs + Admelog 15 units tidac         54M with PMHx of T2DM, CAD (post-CABG), mild HFrEF (EF 45%), and PAD (with recent angiogram Aug. 2024) presenting for several week history of right foot pain and hyperglycemia. Patient states he's been having right foot pain that has been worsening. He denies fever or chills. He states sometimes he forgets to take his insulin which likely explains why he's been hyperglycemic. Recently here in Aug 2024 and he had a right 3rd digit partial digit resection with submetatarsal debridement and graft. Present with right foot pain found to have hyperglycemia. Endocrine consulted for management of uncontrolled DM2. Patient reports feeling good, has been eating mostly full meals and tolerating POs. FBG with severe hyperglycemia this am, noted recent decrease in Lantus dose given recent NPO status, will adjust Lantus to 26u QHS for tighter FBG control. No hypoglycemia overnight. Severe postprandial hyperglycemia at noon, will increase mealtime insulin. Patient was not eating well before but now increased PO intake. Discussed with primary team to keep all IV abx in NS solution instead of D5% to help with hyperglycemia. Endocrine will closely monitor BG and adjust insulin as needed for BG goal 100-180mg/dL inpatient.     #Uncontrolled T2DM with hyperglycemia  - Last A1c 14%  - Home meds: Lantus 52 units qhs, Humalog 20 units tidac (nonadherent for last 4-6 weeks)  - Follows with Dr. Mcdaniel? in [place]  - Started on Lantus 50 units qhs + Admelog 15 units tidac

## 2025-03-08 NOTE — PROGRESS NOTE ADULT - PROBLEM SELECTOR PLAN 1
Inpatient Plan:  - Check BG TID AC, HS, and 2AM  - Adjust Lantus to 26u QHS  - C/w Admelog 8u TID AC (HOLD if NPO)  - C/w low dose Admelog correctional scales TID AC and HS     Discharge Recommendations:  - Basal-bolus (dose TBD)  - Please send:  -- Freestyle Libre3 Plus sensor and reader  -- Glucometer (ACCU_CHECK kahlil Connect, [employer]ensia Contour Next EZ or One, Freestyle [place] LITE or OneTouch Verio IQ)  -- Glucometer test strips and lancets  (make sure compatible with glucometer), dispense #100 (or #200) use as directed, alcohol pads  -- BD dawn 4mm pen needles  -- Glucose tabs, Baqsimi nasal spray or glucagon emergency kit for hypoglycemia risk   - If patient is not covered for CGM, patient should check FSBG premeals and bedtime. Patient should call their doctor when FSBG <70 or above >400 and or consistently above 200s as changes in the regimen will have to be made.   - Patient will need outpatient Endocrine follow-up - will try to find provider in [facility], or can follow up at [facility] ([phone]) at [address] [place] [address] [address] [place] [id number].  - Routine follow-up with Ophthalmology and Podiatry recommended. Inpatient Plan:  - Check BG TID AC, HS, and 2AM  - Adjust Lantus to 26u QHS  - Adjust Admelog to 12u TID AC (HOLD if NPO)  - C/w low dose Admelog correctional scales TID AC and HS     Discharge Recommendations:  - Basal-bolus (dose TBD)  - Please send:  -- Freestyle Libre3 Plus sensor and reader  -- Glucometer (ACCU_CHECK kahlil Connect, Ascensia Contour Next EZ or One, Freestyle [place] LITE or OneTouch Verio IQ)  -- Glucometer test strips and lancets  (make sure compatible with glucometer), dispense #100 (or #200) use as directed, alcohol pads  -- BD dawn 4mm pen needles  -- Glucose tabs, Baqsimi nasal spray or glucagon emergency kit for hypoglycemia risk   - If patient is not covered for CGM, patient should check FSBG premeals and bedtime. Patient should call their doctor when FSBG <70 or above >400 and or consistently above 200s as changes in the regimen will have to be made.   - Patient will need outpatient Endocrine follow-up - will try to find provider in [facility], or can follow up at the [facility] ([phone]) at [address] [place] [address] [address] [place] [phone].  - Routine follow-up with Ophthalmology and Podiatry recommended.

## 2025-03-08 NOTE — PROGRESS NOTE ADULT - SUBJECTIVE AND OBJECTIVE BOX
Patient seen today for follow up inpatient Diabetes Mellitus management.    Chief Complaint: Type 2 Diabetes Mellitus     INTERVAL HX:  Patient seen in [facility] 365 W1. Hospital staff at bedside with secondary language of Sinhala assisted in translation to speak with patient at bedside this morning. Patient is alert and oriented, resting in bed. Patient is feeling well, has been eating >50% of his meals and tolerating POs. Noted severe hyperglycemia this am on FBG to 348mg/dL and also overnight to 300mg/dL. Noted hyperglycemia last evening to 259mg/dL. Patient denies eating any OSH food or snacks overnight. Noted patient on IV Zoysn in D5% solution. No hypoglycemia. Blood glucose levels in the last 24hrs have been 77-348mg/dL.     Review of Systems:  General: As above.  Respiratory: Denies any SOB, CAGE, or cough.  Gastrointestinal: Denies any n/v/d or abdominal pain.   Endocrine: Denies any polyuria, polydipsia, polyphagia, visual changes, or numbness in feet.     Allergies  No Known Allergies      Intolerances  None.       MEDICATIONS  (STANDING):  acetaminophen     Tablet .. 650 milliGRAM(s) Oral every 6 hours PRN  acetaminophen   IVPB .. 1000 milliGRAM(s) IV Intermittent once  aspirin enteric coated 81 milliGRAM(s) Oral daily  atorvastatin 80 milliGRAM(s) Oral at bedtime  cilostazol 50 milliGRAM(s) Oral two times a day  dextrose 5%. 1000 milliLiter(s) IV Continuous <Continuous>  dextrose 5%. 1000 milliLiter(s) IV Continuous <Continuous>  dextrose 50% Injectable 12.5 Gram(s) IV Push once  dextrose 50% Injectable 25 Gram(s) IV Push once  enoxaparin Injectable 40 milliGRAM(s) SubCutaneous every 24 hours  glucagon  Injectable 1 milliGRAM(s) IntraMuscular once  insulin glargine Injectable (LANTUS) 26 Unit(s) SubCutaneous at bedtime  insulin lispro (ADMELOG) corrective regimen sliding scale   SubCutaneous <User Schedule>  insulin lispro (ADMELOG) corrective regimen sliding scale   SubCutaneous three times a day before meals  insulin lispro Injectable (ADMELOG) 8 Unit(s) SubCutaneous three times a day with meals  metoprolol tartrate 50 milliGRAM(s) Oral two times a day  morphine  - Injectable 2 milliGRAM(s) IV Push every 6 hours PRN  oxycodone    5 mG/acetaminophen 325 mG 1 Tablet(s) Oral every 4 hours PRN  piperacillin/tazobactam IVPB.. 3.375 Gram(s) IV Intermittent every 8 hours  sacubitril 24 mG/valsartan 26 mG 1 Tablet(s) Oral two times a day      atorvastatin 80 milliGRAM(s) Oral at bedtime  dextrose 50% Injectable 12.5 Gram(s) IV Push once  dextrose 50% Injectable 25 Gram(s) IV Push once  glucagon  Injectable 1 milliGRAM(s) IntraMuscular once  insulin glargine Injectable (LANTUS) 26 Unit(s) SubCutaneous at bedtime  insulin lispro (ADMELOG) corrective regimen sliding scale   SubCutaneous <User Schedule>  insulin lispro (ADMELOG) corrective regimen sliding scale   SubCutaneous three times a day before meals  insulin lispro Injectable (ADMELOG) 8 Unit(s) SubCutaneous three times a day with meals      insulin lispro (ADMELOG) corrective regimen sliding scale   SubCutaneous <User Schedule>  insulin lispro (ADMELOG) corrective regimen sliding scale   SubCutaneous three times a day before meals  insulin lispro Injectable (ADMELOG) 8 Unit(s) SubCutaneous three times a day with meals      PHYSICAL EXAM:  VITALS:   T(C): 36.6 (03-08-25 @ 08:06), Max: 38.4 (03-08-25 @ 05:02)  HR: 86 (03-08-25 @ 08:06) (74 - 98)  BP: 113/66 (03-08-25 @ 08:06) (91/84 - 128/72)  RR: 18 (03-08-25 @ 08:06) (16 - 18)  SpO2: 96% (03-08-25 @ 08:06) (93% - 100%)    GENERAL: In no acute distress  Respiratory: Respirations unlabored  Extremities: Warm and dry, no edema  NEURO: Alert and oriented, appropriate     LABS:  POCT Blood Glucose.: 348 mg/dL (03-08-25 @ 08:33)  POCT Blood Glucose.: 300 mg/dL (03-08-25 @ 01:44)  POCT Blood Glucose.: 259 mg/dL (03-07-25 @ 21:46)  POCT Blood Glucose.: 180 mg/dL (03-07-25 @ 18:08)  POCT Blood Glucose.: 77 mg/dL (03-07-25 @ 15:40)  POCT Blood Glucose.: 86 mg/dL (03-07-25 @ 13:05)  POCT Blood Glucose.: 83 mg/dL (03-07-25 @ 08:38)  POCT Blood Glucose.: 161 mg/dL (03-06-25 @ 21:42)  POCT Blood Glucose.: 134 mg/dL (03-06-25 @ 17:04)  POCT Blood Glucose.: 107 mg/dL (03-06-25 @ 12:04)  POCT Blood Glucose.: 336 mg/dL (03-06-25 @ 08:07)  POCT Blood Glucose.: 273 mg/dL (03-05-25 @ 21:48)  POCT Blood Glucose.: 127 mg/dL (03-05-25 @ 13:13)                          11.1   9.81  )-----------( 255      ( 07 Mar 2025 07:04 )             33.2     03-07    134[L]  |  99  |  7   ----------------------------<  78  3.3[L]   |  24  |  0.89    Ca    8.6      07 Mar 2025 08:49        PT/INR - ( 07 Mar 2025 07:04 )   PT: 12.3 sec;   INR: 1.08 ratio         PTT - ( 07 Mar 2025 07:04 )  PTT:30.4 sec      Urinalysis Basic - ( 08 Mar 2025 06:40 )    Color: Yellow / Appearance: Clear / SG: >1.030 / pH: x  Gluc: x / Ketone: Trace mg/dL  / Bili: Negative / Urobili: 0.2 mg/dL   Blood: x / Protein: 100 mg/dL / Nitrite: Negative   Leuk Esterase: Negative / RBC: 1 /HPF / WBC 1 /HPF   Sq Epi: x / Non Sq Epi: 1 /HPF / Bacteria: Negative /HPF        Culture - Tissue with Gram Stain (collected 07 Mar 2025 18:38)  Source: Tissue Bone Biopsy 3rd Metatebal Right Foot  Gram Stain (08 Mar 2025 04:50):    No polymorphonuclear leukocytes seen per low power field    No organisms seen per oil power field    Culture - Tissue with Gram Stain (collected 07 Mar 2025 18:23)  Source: Tissue Clean bone margin 2nd metatarsal right foot  Gram Stain (08 Mar 2025 02:17):    No polymorphonuclear leukocytes seen per low power field    No organisms seen per oil power field    Culture - Urine (collected 05 Mar 2025 21:18)  Source: Clean Catch Clean Catch (Midstream)  Final Report (06 Mar 2025 22:56):    <10,000 CFU/mL Normal Urogenital Marianne    Culture - Blood (collected 05 Mar 2025 21:17)  Source: Blood Blood-Peripheral  Preliminary Report (08 Mar 2025 01:02):    No growth at 48 Hours    Culture - Blood (collected 05 Mar 2025 21:17)  Source: Blood Blood-Peripheral  Preliminary Report (08 Mar 2025 01:02):    No growth at 48 Hours        A1C with Estimated Average Glucose Result: A1C with Estimated Average Glucose Result: >15.5 % (03-04-25 @ 23:12)

## 2025-03-08 NOTE — PROGRESS NOTE ADULT - SUBJECTIVE AND OBJECTIVE BOX
Patient is a 54y old  Male who presents with a chief complaint of Right Foot Pain (08 Mar 2025 10:56)      SUBJECTIVE / OVERNIGHT EVENTS: translating in Mongolian by self no cp, sob, chills     MEDICATIONS  (STANDING):  acetaminophen   IVPB .. 1000 milliGRAM(s) IV Intermittent once  aspirin enteric coated 81 milliGRAM(s) Oral daily  atorvastatin 80 milliGRAM(s) Oral at bedtime  cilostazol 50 milliGRAM(s) Oral two times a day  dextrose 5%. 1000 milliLiter(s) (50 mL/Hr) IV Continuous <Continuous>  dextrose 5%. 1000 milliLiter(s) (100 mL/Hr) IV Continuous <Continuous>  dextrose 50% Injectable 12.5 Gram(s) IV Push once  dextrose 50% Injectable 25 Gram(s) IV Push once  enoxaparin Injectable 40 milliGRAM(s) SubCutaneous every 24 hours  glucagon  Injectable 1 milliGRAM(s) IntraMuscular once  insulin glargine Injectable (LANTUS) 26 Unit(s) SubCutaneous at bedtime  insulin lispro (ADMELOG) corrective regimen sliding scale   SubCutaneous <User Schedule>  insulin lispro (ADMELOG) corrective regimen sliding scale   SubCutaneous three times a day before meals  insulin lispro Injectable (ADMELOG) 12 Unit(s) SubCutaneous three times a day with meals  metoprolol tartrate 50 milliGRAM(s) Oral two times a day  piperacillin/tazobactam IVPB.. 3.375 Gram(s) IV Intermittent every 8 hours  sacubitril 24 mG/valsartan 26 mG 1 Tablet(s) Oral two times a day    MEDICATIONS  (PRN):  acetaminophen     Tablet .. 650 milliGRAM(s) Oral every 6 hours PRN Mild Pain (1 - 3)  morphine  - Injectable 2 milliGRAM(s) IV Push every 6 hours PRN Severe Pain (7 - 10)  oxycodone    5 mG/acetaminophen 325 mG 1 Tablet(s) Oral every 4 hours PRN Moderate Pain (4 - 6)        CAPILLARY BLOOD GLUCOSE      POCT Blood Glucose.: 335 mg/dL (08 Mar 2025 12:27)  POCT Blood Glucose.: 348 mg/dL (08 Mar 2025 08:33)  POCT Blood Glucose.: 300 mg/dL (08 Mar 2025 01:44)  POCT Blood Glucose.: 259 mg/dL (07 Mar 2025 21:46)  POCT Blood Glucose.: 180 mg/dL (07 Mar 2025 18:08)  POCT Blood Glucose.: 77 mg/dL (07 Mar 2025 15:40)    I&O's Summary    08 Mar 2025 06:01  -  08 Mar 2025 14:26  --------------------------------------------------------  IN: 600 mL / OUT: 250 mL / NET: 350 mL        PHYSICAL EXAM:  GENERAL: NAD, well-developed  HEAD:  Atraumatic, Normocephalic  EYES: conjunctiva and sclera clear  NECK: Supple, No JVD  CHEST/LUNG: Clear to auscultation bilaterally; No wheeze  HEART: Regular rate and rhythm; No murmurs, rubs, or gallops  ABDOMEN: Soft, Nontender, Nondistended; Bowel sounds present  EXTREMITIES:  2+ Peripheral Pulses, right foot dressing   PSYCH: AAOx3      LABS:                        11.1   9.81  )-----------( 255      ( 07 Mar 2025 07:04 )             33.2     03-07    134[L]  |  99  |  7   ----------------------------<  78  3.3[L]   |  24  |  0.89    Ca    8.6      07 Mar 2025 08:49      PT/INR - ( 07 Mar 2025 07:04 )   PT: 12.3 sec;   INR: 1.08 ratio         PTT - ( 07 Mar 2025 07:04 )  PTT:30.4 sec      Urinalysis Basic - ( 08 Mar 2025 06:40 )    Color: Yellow / Appearance: Clear / SG: >1.030 / pH: x  Gluc: x / Ketone: Trace mg/dL  / Bili: Negative / Urobili: 0.2 mg/dL   Blood: x / Protein: 100 mg/dL / Nitrite: Negative   Leuk Esterase: Negative / RBC: 1 /HPF / WBC 1 /HPF   Sq Epi: x / Non Sq Epi: 1 /HPF / Bacteria: Negative /HPF        RADIOLOGY & ADDITIONAL TESTS:    Imaging Personally Reviewed:    Consultant(s) Notes Reviewed:      Care Discussed with Consultants/Other Providers:

## 2025-03-08 NOTE — PROGRESS NOTE ADULT - ASSESSMENT
54M R foot Partial Third Ray Resection, partially closed (DOS 3/6)  - Patient seen and evaluated  - Afebrile, labs pending   - 3/6 s/p R foot Partial Third Ray Resection partially closed, intact sutures, warm flap, no hematoma, no signs of infection, mild plantar skin edges maceration. Left foot no open wounds, no acute signs of infection  - Intraop findings low concern for viability, high concern for residual infection   - OR data pending  - ID recs, appreciated  - Vasc recs, appreciated  - OBDULIO/PVR: RABI 1.16, LABI 1.44, without PVR waveform abnormality  - Pod plan: local wound care with delaye primary closure vs return to OR for further washout pending appearance/ labs   - Please document cardiac and medical clearance for possible podiatric intervention under general anesthesia   - Discussed with Attending 54M R foot Partial second Ray Resection and 3rd digit proximal phalanx biopsy, partially closed (DOS 3/6)  - Patient seen and evaluated  - Afebrile, labs pending   - 3/6 s/p R foot Partial Third Ray Resection partially closed, intact sutures, warm flap, no hematoma, no signs of infection, mild plantar skin edges maceration. Left foot no open wounds, no acute signs of infection  - Intraop findings low concern for viability, high concern for residual infection   - OR data pending  - ID recs, appreciated  - Vasc recs, appreciated  - OBDULIO/PVR: RABI 1.16, LABI 1.44, without PVR waveform abnormality  - Pod plan: local wound care with delayed primary closure vs return to OR for further washout pending appearance/ labs   - Wound care instructions and follow up are listed on the provider discharge note   - Discussed with Attending

## 2025-03-08 NOTE — PROGRESS NOTE ADULT - SUBJECTIVE AND OBJECTIVE BOX
[facility] Attending Follow-up Note     Patient seen and examined at bedside.    Overnight Events:     events noted   no cardiac sxs     REVIEW OF SYSTEMS:  Constitutional:     [x ] negative [ ] fevers [ ] chills [ ] weight loss [ ] weight gain  HEENT:                  [x ] negative [ ] dry eyes [ ] eye irritation [ ] postnasal drip [ ] nasal congestion  CV:                         [ x] negative  [ ] chest pain [ ] orthopnea [ ] palpitations [ ] murmur  Resp:                     [ x] negative [ ] cough [ ] shortness of breath [ ] dyspnea [ ] wheezing [ ] sputum [ ]hemoptysis  GI:                          [ x] negative [ ] nausea [ ] vomiting [ ] diarrhea [ ] constipation [ ] abd pain [ ] dysphagia   :                        [ x] negative [ ] dysuria [ ] nocturia [ ] hematuria [ ] increased urinary frequency  Musculoskeletal: [ x] negative [ ] back pain [ ] myalgias [ ] arthralgias [ ] fracture  Skin:                       [ x] negative [ ] rash [ ] itch  Neurological:        [x ] negative [ ] headache [ ] dizziness [ ] syncope [ ] weakness [ ] numbness  Psychiatric:           [ x] negative [ ] anxiety [ ] depression  Endocrine:            [ x] negative [ ] diabetes [ ] thyroid problem  Heme/Lymph:      [ x] negative [ ] anemia [ ] bleeding problem  Allergic/Immune: [ x] negative [ ] itchy eyes [ ] nasal discharge [ ] hives [ ] angioedema    [ x] All other systems negative  [ ] Unable to assess ROS due to    Current Meds:  acetaminophen     Tablet .. 650 milliGRAM(s) Oral every 6 hours PRN  acetaminophen   IVPB .. 1000 milliGRAM(s) IV Intermittent once  ampicillin/sulbactam  IVPB 3 Gram(s) IV Intermittent every 6 hours  aspirin enteric coated 81 milliGRAM(s) Oral daily  atorvastatin 80 milliGRAM(s) Oral at bedtime  cefTRIAXone   IVPB 2000 milliGRAM(s) IV Intermittent every 24 hours  cilostazol 50 milliGRAM(s) Oral two times a day  dextrose 5%. 1000 milliLiter(s) IV Continuous <Continuous>  dextrose 5%. 1000 milliLiter(s) IV Continuous <Continuous>  dextrose 50% Injectable 12.5 Gram(s) IV Push once  dextrose 50% Injectable 25 Gram(s) IV Push once  enoxaparin Injectable 40 milliGRAM(s) SubCutaneous every 24 hours  glucagon  Injectable 1 milliGRAM(s) IntraMuscular once  insulin glargine Injectable (LANTUS) 26 Unit(s) SubCutaneous at bedtime  insulin lispro (ADMELOG) corrective regimen sliding scale   SubCutaneous <User Schedule>  insulin lispro (ADMELOG) corrective regimen sliding scale   SubCutaneous three times a day before meals  insulin lispro Injectable (ADMELOG) 12 Unit(s) SubCutaneous three times a day with meals  lidocaine 1% Injectable 20 milliLiter(s) Local Injection once  metoprolol tartrate 50 milliGRAM(s) Oral two times a day  morphine  - Injectable 2 milliGRAM(s) IV Push every 6 hours PRN  oxycodone    5 mG/acetaminophen 325 mG 1 Tablet(s) Oral every 4 hours PRN  sacubitril 24 mG/valsartan 26 mG 1 Tablet(s) Oral two times a day      PAST MEDICAL & SURGICAL HISTORY:  Hypertension, unspecified type      Hyperlipidemia, unspecified hyperlipidemia type      Type 2 diabetes mellitus without complication, with long-term current use of insulin      Coronary artery disease of native artery of native heart with stable angina pectoris      History of percutaneous coronary intervention          Vitals:  T(F): 98.1 (03-10), Max: 99.2 (03-09)  HR: 79 (03-10) (72 - 89)  BP: 117/64 (03-10) (104/68 - 125/86)  RR: 18 (03-10)  SpO2: 98% (03-10)  I&O's Summary    08 Mar 2025 06:01  -  09 Mar 2025 07:00  --------------------------------------------------------  IN: 600 mL / OUT: 850 mL / NET: -250 mL    09 Mar 2025 07:01  -  10 Mar 2025 01:56  --------------------------------------------------------  IN: 880 mL / OUT: 900 mL / NET: -20 mL        Physical Exam:  Appearance: No acute distress  HENT: No JVD   Cardiovascular: RRR, S1/S2, no murmurs  Respiratory: CTABL  Gastrointestinal: soft, NT ND, +BS  Musculoskeletal: No clubbing, no edema   Neurologic: Non-focal  Skin: No rashes, ecchymoses, or cyanosis                          13.9   5.74  )-----------( 88       ( 09 Mar 2025 06:57 )             41.3     03-09    137  |  102  |  4[L]  ----------------------------<  120[H]  3.4[L]   |  24  |  0.77    Ca    8.7      09 Mar 2025 08:47                    Cardiovascular Testings:

## 2025-03-08 NOTE — PROGRESS NOTE ADULT - SUBJECTIVE AND OBJECTIVE BOX
Patient is a 54y old  Male who presents with a chief complaint of Right Foot Pain (09 Mar 2025 06:50)       INTERVAL HPI/OVERNIGHT EVENTS:  Patient seen and evaluated at bedside.  Pt is resting comfortable in NAD. Denies N/V/F/C.    Allergies    No Known Allergies    Intolerances        Vital Signs Last 24 Hrs  T(C): 37.3 (09 Mar 2025 08:00), Max: 37.4 (09 Mar 2025 00:16)  T(F): 99.2 (09 Mar 2025 08:00), Max: 99.3 (09 Mar 2025 00:16)  HR: 87 (09 Mar 2025 08:00) (81 - 97)  BP: 106/61 (09 Mar 2025 08:00) (99/66 - 150/77)  BP(mean): --  RR: 18 (09 Mar 2025 08:00) (18 - 18)  SpO2: 94% (09 Mar 2025 08:00) (94% - 96%)    Parameters below as of 09 Mar 2025 08:00  Patient On (Oxygen Delivery Method): room air        LABS:                        13.9   5.74  )-----------( 88       ( 09 Mar 2025 06:57 )             41.3     03-09    137  |  102  |  4[L]  ----------------------------<  120[H]  3.4[L]   |  24  |  0.77    Ca    8.7      09 Mar 2025 08:47        Urinalysis Basic - ( 09 Mar 2025 08:47 )    Color: x / Appearance: x / SG: x / pH: x  Gluc: 120 mg/dL / Ketone: x  / Bili: x / Urobili: x   Blood: x / Protein: x / Nitrite: x   Leuk Esterase: x / RBC: x / WBC x   Sq Epi: x / Non Sq Epi: x / Bacteria: x      CAPILLARY BLOOD GLUCOSE      POCT Blood Glucose.: 118 mg/dL (09 Mar 2025 08:34)  POCT Blood Glucose.: 164 mg/dL (09 Mar 2025 01:06)  POCT Blood Glucose.: 196 mg/dL (08 Mar 2025 21:36)  POCT Blood Glucose.: 176 mg/dL (08 Mar 2025 16:55)  POCT Blood Glucose.: 335 mg/dL (08 Mar 2025 12:27)      Lower Extremity Physical Exam:  Vascular: DP/PT 0/4, B/L, CFT < 3 seconds B/L, Temperature gradient warm to cool, B/L.   Neuro: Epicritic sensation absent to the level of digits, B/L.  Musculoskeletal/Ortho: s/p Right foot metatarsal head resection  Skin: 3/6 s/p R foot Partial Third Ray Resection partially closed, intact sutures, warm flap, no hematoma, no signs of infection, mild plantar skin edges maceration. Left foot no open wounds, no acute signs of infection    RADIOLOGY & ADDITIONAL TESTS:

## 2025-03-08 NOTE — PROGRESS NOTE ADULT - SUBJECTIVE AND OBJECTIVE BOX
[facility]  DU Garcia Y. Patel, S. Shah, G. Casimir  [phone]  ([phone] - weekdays after 5pm and weekends)    Name: ELSI MERINO  Age/Gender: 54y Male  MRN: [id number]    Interval History:  Patient seen and examined this morning.   Febrile this morning, denies chills/sweats.   Complains of foot pain.   Denies cough, chest pain, abd pain, n/v/d, dysuria  Notes reviewed.   Allergies: No Known Allergies      Objective:  Vitals:   T(F): 101.1 (03-08-25 @ 05:02), Max: 101.1 (03-08-25 @ 05:02)  HR: 98 (03-08-25 @ 05:02) (74 - 98)  BP: 109/61 (03-08-25 @ 05:02) (91/84 - 128/72)  RR: 18 (03-08-25 @ 05:02) (16 - 18)  SpO2: 93% (03-08-25 @ 05:02) (93% - 100%)  Physical Examination:  General: no acute distress, nontoxic appearing, RA  HEENT: normocephalic, atraumatic, anicteric  Respiratory: clear to auscultation bilaterally   Cardiovascular: S1 and S2 present, normal rate   Gastrointestinal: soft, nontender, nondistended  Extremities: R foot dressing, no edema, no cyanosis  Skin: no visible rash    Laboratory Studies:  CBC:                       11.1   9.81  )-----------( 255      ( 07 Mar 2025 07:04 )             33.2     WBC Trend:  9.81 03-07-25 @ 07:04  9.93 03-06-25 @ 07:01  9.71 03-05-25 @ 21:18  13.65 03-04-25 @ 23:12    CMP: 03-07    134[L]  |  99  |  7   ----------------------------<  78  3.3[L]   |  24  |  0.89    Ca    8.6      07 Mar 2025 08:49      Creatinine: 0.89 mg/dL (03-07-25 @ 08:49)  Creatinine: 0.74 mg/dL (03-06-25 @ 06:58)  Creatinine: 0.86 mg/dL (03-05-25 @ 21:18)  Creatinine: 0.72 mg/dL (03-05-25 @ 04:48)  Creatinine: 0.84 mg/dL (03-04-25 @ 23:12)    Urinalysis Basic - ( 08 Mar 2025 06:40 )  Color: Yellow / Appearance: Clear / SG: >1.030 / pH: x  Gluc: x / Ketone: Trace mg/dL  / Bili: Negative / Urobili: 0.2 mg/dL   Blood: x / Protein: 100 mg/dL / Nitrite: Negative   Leuk Esterase: Negative / RBC: 1 /HPF / WBC 1 /HPF   Sq Epi: x / Non Sq Epi: 1 /HPF / Bacteria: Negative /HPF    Microbiology: reviewed   Culture - Tissue with Gram Stain (collected 03-07-25 @ 18:38)  Source: Tissue Bone Biopsy 3rd Metatebal Right Foot  Gram Stain (03-08-25 @ 04:50):    No polymorphonuclear leukocytes seen per low power field    No organisms seen per oil power field    Culture - Tissue with Gram Stain (collected 03-07-25 @ 18:23)  Source: Tissue Clean bone margin 2nd metatarsal right foot  Gram Stain (03-08-25 @ 02:17):    No polymorphonuclear leukocytes seen per low power field    No organisms seen per oil power field    Culture - Urine (collected 03-05-25 @ 21:18)  Source: Clean Catch Clean Catch (Midstream)  Final Report (03-06-25 @ 22:56):    <10,000 CFU/mL Normal Urogenital Marianne    Culture - Blood (collected 03-05-25 @ 21:17)  Source: Blood Blood-Peripheral  Preliminary Report (03-08-25 @ 01:02):    No growth at 48 Hours    Culture - Blood (collected 03-05-25 @ 21:17)  Source: Blood Blood-Peripheral  Preliminary Report (03-08-25 @ 01:02):    No growth at 48 Hours    Culture - Abscess with Gram Stain (collected 03-05-25 @ 02:55)  Source: Abscess right foot wound  Gram Stain (03-05-25 @ 14:52):    No polymorphonuclear cells seen    per low power field    Few Gram positive cocci in pairs    Moderate Gram Negative Rods    per oil power field  Preliminary Report (03-07-25 @ 09:30):    Culture yields >4 types of aerobic and/or anaerobic bacteria    Call client services within 7 days if further workup is clinically    indicated.    Culture includes    Moderate Morganella morganii    Moderate Enterococcus faecalis    Moderate Staphylococcus epidermidis    Moderate Corynebacterium striatum group "Susceptibilities not performed"    Rare Klebsiella oxytoca/Raoultella ornithinolytica Susceptibility to    follow.    Numerous Prevotella melaninogenica "Susceptibilities not performed"    Moderate Bacteroides fragilis "Susceptibilities not performed"  Organism: Morganella morganii  Enterococcus faecalis  Staphylococcus epidermidis (03-07-25 @ 07:41)  Organism: Morganella morganii (03-07-25 @ 07:41)      Method Type: MOJGAN      -  Amoxicillin/Clavulanic Acid: R >16/8      -  Ampicillin: R >16 These ampicillin results predict results for amoxicillin      -  Ampicillin/Sulbactam: R >16/8      -  Aztreonam: S <=4      -  Cefazolin: R >16      -  Cefepime: S <=2      -  Cefoxitin: S <=8      -  Ceftriaxone: S <=1      -  Ciprofloxacin: S <=0.25      -  Ertapenem: S <=0.5      -  Gentamicin: S <=2      -  Levofloxacin: S <=0.5      -  Meropenem: S <=1      -  Piperacillin/Tazobactam: S <=8      -  Tobramycin: S <=2      -  Trimethoprim/Sulfamethoxazole: S <=0.5/9.5  Organism: Staphylococcus epidermidis (03-07-25 @ 07:40)      Method Type: MOJGAN      -  Clindamycin: R >4      -  Erythromycin: R >4      -  Gentamicin: S <=4 Should not be used as monotherapy      -  Oxacillin: R >2      -  Penicillin: R >2      -  Rifampin: S <=1 Should not be used as monotherapy      -  Tetracycline: S <=4      -  Trimethoprim/Sulfamethoxazole: S <=0.5/9.5      -  Vancomycin: S 1  Organism: Enterococcus faecalis (03-07-25 @ 07:39)      Method Type: MOJGAN      -  Ampicillin: S <=2 Predicts results to ampicillin/sulbactam, amoxacillin-clavulanate and  piperacillin-tazobactam.      -  Vancomycin: S 1    03-05-25 @ 21:18 SARS-CoV-2 NotDetec/Influenza A NotDetec/Influenza B NotDetec/RSV NotDetec    Radiology: reviewed     Medications:  acetaminophen     Tablet .. 650 milliGRAM(s) Oral every 6 hours PRN  acetaminophen   IVPB .. 1000 milliGRAM(s) IV Intermittent once  aspirin enteric coated 81 milliGRAM(s) Oral daily  atorvastatin 80 milliGRAM(s) Oral at bedtime  cilostazol 50 milliGRAM(s) Oral two times a day  dextrose 5%. 1000 milliLiter(s) IV Continuous <Continuous>  dextrose 5%. 1000 milliLiter(s) IV Continuous <Continuous>  dextrose 50% Injectable 12.5 Gram(s) IV Push once  dextrose 50% Injectable 25 Gram(s) IV Push once  enoxaparin Injectable 40 milliGRAM(s) SubCutaneous every 24 hours  glucagon  Injectable 1 milliGRAM(s) IntraMuscular once  insulin glargine Injectable (LANTUS) 22 Unit(s) SubCutaneous at bedtime  insulin lispro (ADMELOG) corrective regimen sliding scale   SubCutaneous <User Schedule>  insulin lispro (ADMELOG) corrective regimen sliding scale   SubCutaneous three times a day before meals  insulin lispro Injectable (ADMELOG) 8 Unit(s) SubCutaneous three times a day with meals  metoprolol tartrate 50 milliGRAM(s) Oral two times a day  morphine  - Injectable 2 milliGRAM(s) IV Push every 6 hours PRN  oxycodone    5 mG/acetaminophen 325 mG 1 Tablet(s) Oral every 4 hours PRN  piperacillin/tazobactam IVPB.. 3.375 Gram(s) IV Intermittent every 8 hours  sacubitril 24 mG/valsartan 26 mG 1 Tablet(s) Oral two times a day    Current Antimicrobials:  piperacillin/tazobactam IVPB.. 3.375 Gram(s) IV Intermittent every 8 hours    Prior/Completed Antimicrobials:  piperacillin/tazobactam IVPB.  piperacillin/tazobactam IVPB.  piperacillin/tazobactam IVPB.-  piperacillin/tazobactam IVPB.-  piperacillin/tazobactam IVPB.-  piperacillin/tazobactam IVPB...  vancomycin  IVPB  vancomycin  IVPB.

## 2025-03-08 NOTE — PROGRESS NOTE ADULT - PROBLEM SELECTOR PLAN 4
- Continue with medications as above  - Continue with Pletal  - Cleared by vascular for podiatric intervention

## 2025-03-08 NOTE — PROGRESS NOTE ADULT - PROBLEM SELECTOR PLAN 1
- Per podiatry: "Right foot submet 3 wound to periosteum fibrotic, no malodor, no purulent drainage, no acute signs of infection"  - Podiatric plan: Right foot wound debridement with Right partial 2nd and 3rd ray resection 3/7 with Dr. Patterson   - MRI foot noted   - Continue with Zosyn per ID

## 2025-03-08 NOTE — PROGRESS NOTE ADULT - ASSESSMENT
54M with PMHx of T2DM, CAD (post-CABG), mild HFrEF (EF 45%), and PAD (with recent angiogram Aug. 2024) presenting for several week history of right foot pain and hyperglycemia. Vascular surgery consulted for evaluation of Right foot wound.     Plan:  -- Local wound care and d/c planning as per podiatry   - C/w aspirin/pletal/Statin  will follow    Vascular Surgery   [id number]/ [id number]

## 2025-03-08 NOTE — PROGRESS NOTE ADULT - ASSESSMENT
Patient is a 54 year old male with PMH of DM2, CAD s/p CABG, HFrEF, PAD s/p RLE angioplasty/stent and s/p R 3rd metatarsal head resection, submetatarsal debridement, and kerecis graft who presents for right foot pain for few weeks and hyperglycemia.    Diabetic foot ulcer, with c/f early OM  - Per Podiatry, R foot submet 3 wound to periosteum fibrotic, no malodor, no purulent drainage, no acute signs of infection   - R foot xray with no signs to suggest advanced OM  - R foot MRI with findings c/f possible reactive vs early OM, plantar soft tissue wound with gas extending along 2nd flexor tendons from mid metatarsal to proximal phalanx base, no abscess noted    - R foot wound culture polymicrobial - Morganella morganii, E.faecalis, Staph epi, Corynebacterium, Kleb oxytoca/Raoultella ornithinolytica, Prevotella, B. fragilis   - CXR with questionable L basilar opacity in conjunction with an elevated L hemidiaphragm may be intra-abdominal viscera; underlying effusion/atelectasis not completely excluded  - COVID/Flu/RSV negative 3/5   - s/p vancomycin and zosyn in the ER  - MRSA PCR screen negative, no MRSA isolated to date   - 3/5 Bcx NGTD x2   - leukocytosis on admission, now resolved as of 3/5pm  - 3/7 s/p OR for R foot partial 2nd and 3rd ray amputation   - brief op noted reviewed - high concern for residual bone infection, plan for rtor for debridement and delayed primary closure pending appearance   - temps noted, febrile to 101.1F this morning ?inflammatory post op   - no new sx or focal findings on exam shortly after pt had fever, R foot dressing clean    Recommendations:   Follow 3/8 Bcx - in process   Follow OR cultures - gram stain noted  Follow surg path report   Follow R foot wcx for Kleb sensitivities   Continue on zosyn   Local wound care   Monitor temps/WBC  Continue rest of care per primary team       Jason Vidal M.D.  [facility]  Available on [employer] TEAMS - *PREFERRED*  [phone]  After 5pm on weekdays and all day on weekends - please call [phone]     Thank you for consulting us and involving us in the management of this patients case. In addition to reviewing history, imaging, documents, labs, microbiology, took into account antibiotic stewardship, local antibiogram and infection control strategies and potential transmission issues at time of treatment decision making process.

## 2025-03-08 NOTE — PROGRESS NOTE ADULT - SUBJECTIVE AND OBJECTIVE BOX
Patient is a 54y old  Male who presents with a chief complaint of Right Foot Pain (08 Mar 2025 14:25)      Vascular Surgery Attending Progress Note    Interval HPI: pt w/o new c/o     Medications:  acetaminophen     Tablet .. 650 milliGRAM(s) Oral every 6 hours PRN  acetaminophen   IVPB .. 1000 milliGRAM(s) IV Intermittent once  aspirin enteric coated 81 milliGRAM(s) Oral daily  atorvastatin 80 milliGRAM(s) Oral at bedtime  cilostazol 50 milliGRAM(s) Oral two times a day  dextrose 5%. 1000 milliLiter(s) IV Continuous <Continuous>  dextrose 5%. 1000 milliLiter(s) IV Continuous <Continuous>  dextrose 50% Injectable 12.5 Gram(s) IV Push once  dextrose 50% Injectable 25 Gram(s) IV Push once  enoxaparin Injectable 40 milliGRAM(s) SubCutaneous every 24 hours  glucagon  Injectable 1 milliGRAM(s) IntraMuscular once  insulin glargine Injectable (LANTUS) 26 Unit(s) SubCutaneous at bedtime  insulin lispro (ADMELOG) corrective regimen sliding scale   SubCutaneous <User Schedule>  insulin lispro (ADMELOG) corrective regimen sliding scale   SubCutaneous three times a day before meals  insulin lispro Injectable (ADMELOG) 12 Unit(s) SubCutaneous three times a day with meals  metoprolol tartrate 50 milliGRAM(s) Oral two times a day  morphine  - Injectable 2 milliGRAM(s) IV Push every 6 hours PRN  oxycodone    5 mG/acetaminophen 325 mG 1 Tablet(s) Oral every 4 hours PRN  piperacillin/tazobactam IVPB.. 3.375 Gram(s) IV Intermittent every 8 hours  sacubitril 24 mG/valsartan 26 mG 1 Tablet(s) Oral two times a day      Vital Signs Last 24 Hrs  T(C): 36.6 (08 Mar 2025 12:07), Max: 38.4 (08 Mar 2025 05:02)  T(F): 97.8 (08 Mar 2025 12:07), Max: 101.1 (08 Mar 2025 05:02)  HR: 81 (08 Mar 2025 12:07) (80 - 98)  BP: 99/66 (08 Mar 2025 12:07) (96/57 - 113/66)  BP(mean): --  RR: 18 (08 Mar 2025 12:07) (16 - 18)  SpO2: 96% (08 Mar 2025 12:07) (93% - 98%)    Parameters below as of 08 Mar 2025 12:07  Patient On (Oxygen Delivery Method): room air      I&O's Summary    08 Mar 2025 06:01  -  08 Mar 2025 18:57  --------------------------------------------------------  IN: 600 mL / OUT: 850 mL / NET: -250 mL        Physical Exam:  Neuro  A&Ox3 VSS  Vascular:  rt foot dressing c/d/i    LABS:                        11.1   9.81  )-----------( 255      ( 07 Mar 2025 07:04 )             33.2     03-07    134[L]  |  99  |  7   ----------------------------<  78  3.3[L]   |  24  |  0.89    Ca    8.6      07 Mar 2025 08:49      PT/INR - ( 07 Mar 2025 07:04 )   PT: 12.3 sec;   INR: 1.08 ratio         PTT - ( 07 Mar 2025 07:04 )  PTT:30.4 sec    MIKE FLYNN MD  [phone] Cell [phone]

## 2025-03-08 NOTE — PROGRESS NOTE ADULT - PROBLEM SELECTOR PLAN 1
EPIFANIO Youssef MD have participated in the daily care and management of this patient and have personally  seen and examined the patient today and have noted the findings and formulated the plan of care.  I Estevan Youssef MD have personally seen and examined the patient at bedside today at  9am

## 2025-03-08 NOTE — PROGRESS NOTE ADULT - ASSESSMENT
54M w/ T2DM, CAD s/p CABG, HFrEF EF 45%, and PAD here for diabetic foot ulcer plan for RLE wound debridement and ray resection with podiatry. Cardiology consulted for preop cardiac eval.     1. CAD s/p CABG   2. PAD     -s/p CABG in 2019, cont asa and statin. off brilinta   -hx of HFrEF due to ICM, appear to be compensated   -cont metoprolol 50 mg BID  -cont atorva 80 mg qhs   -cont entresto 24/26 mg bid for HFrEF GDMT   -pt has drop in EF from TTE ECHO in 2023 and 2024  -repeat TTE with interval improvement in EF from 2024 and similar WMAs distribution   -3/7 tolerated the pod procedure well   -had resting image done for NST- noted to have defects, no CP or angina equivalents     Alyce Snow MD FAC  Attending Interventional Cardiologist, [facility].   [employer]val[employer] on [employer] Team

## 2025-03-09 LAB
-  AMPICILLIN: SIGNIFICANT CHANGE UP
-  AMPICILLIN: SIGNIFICANT CHANGE UP
-  CLINDAMYCIN: SIGNIFICANT CHANGE UP
-  ERYTHROMYCIN: SIGNIFICANT CHANGE UP
-  GENTAMICIN: SIGNIFICANT CHANGE UP
-  OXACILLIN: SIGNIFICANT CHANGE UP
-  PENICILLIN: SIGNIFICANT CHANGE UP
-  RIFAMPIN: SIGNIFICANT CHANGE UP
-  TETRACYCLINE: SIGNIFICANT CHANGE UP
-  TRIMETHOPRIM/SULFAMETHOXAZOLE: SIGNIFICANT CHANGE UP
-  VANCOMYCIN: SIGNIFICANT CHANGE UP
ANION GAP SERPL CALC-SCNC: 11 MMOL/L — SIGNIFICANT CHANGE UP (ref 5–17)
ANION GAP SERPL CALC-SCNC: 24 MMOL/L — HIGH (ref 5–17)
BUN SERPL-MCNC: 23 MG/DL — SIGNIFICANT CHANGE UP (ref 7–23)
BUN SERPL-MCNC: 4 MG/DL — LOW (ref 7–23)
CALCIUM SERPL-MCNC: 8.7 MG/DL — SIGNIFICANT CHANGE UP (ref 8.4–10.5)
CALCIUM SERPL-MCNC: <3 MG/DL — CRITICAL LOW (ref 8.4–10.5)
CHLORIDE SERPL-SCNC: 102 MMOL/L — SIGNIFICANT CHANGE UP (ref 96–108)
CHLORIDE SERPL-SCNC: 96 MMOL/L — SIGNIFICANT CHANGE UP (ref 96–108)
CO2 SERPL-SCNC: 17 MMOL/L — LOW (ref 22–31)
CO2 SERPL-SCNC: 24 MMOL/L — SIGNIFICANT CHANGE UP (ref 22–31)
CREAT SERPL-MCNC: 0.77 MG/DL — SIGNIFICANT CHANGE UP (ref 0.5–1.3)
CREAT SERPL-MCNC: 1.27 MG/DL — SIGNIFICANT CHANGE UP (ref 0.5–1.3)
CULTURE RESULTS: ABNORMAL
EGFR: 106 ML/MIN/1.73M2 — SIGNIFICANT CHANGE UP
EGFR: 106 ML/MIN/1.73M2 — SIGNIFICANT CHANGE UP
EGFR: 67 ML/MIN/1.73M2 — SIGNIFICANT CHANGE UP
EGFR: 67 ML/MIN/1.73M2 — SIGNIFICANT CHANGE UP
GLUCOSE BLDC GLUCOMTR-MCNC: 118 MG/DL — HIGH (ref 70–99)
GLUCOSE BLDC GLUCOMTR-MCNC: 164 MG/DL — HIGH (ref 70–99)
GLUCOSE BLDC GLUCOMTR-MCNC: 270 MG/DL — HIGH (ref 70–99)
GLUCOSE BLDC GLUCOMTR-MCNC: 302 MG/DL — HIGH (ref 70–99)
GLUCOSE BLDC GLUCOMTR-MCNC: 346 MG/DL — HIGH (ref 70–99)
GLUCOSE SERPL-MCNC: 120 MG/DL — HIGH (ref 70–99)
GLUCOSE SERPL-MCNC: 92 MG/DL — SIGNIFICANT CHANGE UP (ref 70–99)
GRAM STN FLD: ABNORMAL
HCT VFR BLD CALC: 41.3 % — SIGNIFICANT CHANGE UP (ref 39–50)
HGB BLD-MCNC: 13.9 G/DL — SIGNIFICANT CHANGE UP (ref 13–17)
MCHC RBC-ENTMCNC: 33.3 PG — SIGNIFICANT CHANGE UP (ref 27–34)
MCHC RBC-ENTMCNC: 33.7 G/DL — SIGNIFICANT CHANGE UP (ref 32–36)
MCV RBC AUTO: 99 FL — SIGNIFICANT CHANGE UP (ref 80–100)
METHOD TYPE: SIGNIFICANT CHANGE UP
NRBC BLD AUTO-RTO: 0 /100 WBCS — SIGNIFICANT CHANGE UP (ref 0–0)
ORGANISM # SPEC MICROSCOPIC CNT: ABNORMAL
ORGANISM # SPEC MICROSCOPIC CNT: ABNORMAL
PLATELET # BLD AUTO: 88 K/UL — LOW (ref 150–400)
POTASSIUM SERPL-MCNC: 3.4 MMOL/L — LOW (ref 3.5–5.3)
POTASSIUM SERPL-MCNC: >9 MMOL/L — CRITICAL HIGH (ref 3.5–5.3)
POTASSIUM SERPL-SCNC: 3.4 MMOL/L — LOW (ref 3.5–5.3)
POTASSIUM SERPL-SCNC: >9 MMOL/L — CRITICAL HIGH (ref 3.5–5.3)
RBC # BLD: 4.17 M/UL — LOW (ref 4.2–5.8)
RBC # FLD: 13.9 % — SIGNIFICANT CHANGE UP (ref 10.3–14.5)
SODIUM SERPL-SCNC: 137 MMOL/L — SIGNIFICANT CHANGE UP (ref 135–145)
SODIUM SERPL-SCNC: 137 MMOL/L — SIGNIFICANT CHANGE UP (ref 135–145)
SPECIMEN SOURCE: SIGNIFICANT CHANGE UP
WBC # BLD: 5.74 K/UL — SIGNIFICANT CHANGE UP (ref 3.8–10.5)
WBC # FLD AUTO: 5.74 K/UL — SIGNIFICANT CHANGE UP (ref 3.8–10.5)

## 2025-03-09 PROCEDURE — 99233 SBSQ HOSP IP/OBS HIGH 50: CPT

## 2025-03-09 PROCEDURE — 99232 SBSQ HOSP IP/OBS MODERATE 35: CPT

## 2025-03-09 RX ORDER — AMPICILLIN SODIUM AND SULBACTAM SODIUM 1; .5 G/1; G/1
3 INJECTION, POWDER, FOR SOLUTION INTRAMUSCULAR; INTRAVENOUS EVERY 6 HOURS
Refills: 0 | Status: DISCONTINUED | OUTPATIENT
Start: 2025-03-09 | End: 2025-03-10

## 2025-03-09 RX ORDER — LIDOCAINE HCL/PF 10 MG/ML
20 VIAL (ML) INJECTION ONCE
Refills: 0 | Status: DISCONTINUED | OUTPATIENT
Start: 2025-03-09 | End: 2025-03-11

## 2025-03-09 RX ORDER — CEFTRIAXONE 500 MG/1
2000 INJECTION, POWDER, FOR SOLUTION INTRAMUSCULAR; INTRAVENOUS EVERY 24 HOURS
Refills: 0 | Status: DISCONTINUED | OUTPATIENT
Start: 2025-03-09 | End: 2025-03-10

## 2025-03-09 RX ADMIN — AMPICILLIN SODIUM AND SULBACTAM SODIUM 200 GRAM(S): 1; .5 INJECTION, POWDER, FOR SOLUTION INTRAMUSCULAR; INTRAVENOUS at 11:34

## 2025-03-09 RX ADMIN — INSULIN LISPRO 3: 100 INJECTION, SOLUTION INTRAVENOUS; SUBCUTANEOUS at 13:05

## 2025-03-09 RX ADMIN — INSULIN LISPRO 12 UNIT(S): 100 INJECTION, SOLUTION INTRAVENOUS; SUBCUTANEOUS at 18:38

## 2025-03-09 RX ADMIN — INSULIN LISPRO 4: 100 INJECTION, SOLUTION INTRAVENOUS; SUBCUTANEOUS at 18:37

## 2025-03-09 RX ADMIN — SACUBITRIL AND VALSARTAN 1 TABLET(S): 49; 51 TABLET, FILM COATED ORAL at 05:53

## 2025-03-09 RX ADMIN — CILOSTAZOL 50 MILLIGRAM(S): 50 TABLET ORAL at 18:13

## 2025-03-09 RX ADMIN — ENOXAPARIN SODIUM 40 MILLIGRAM(S): 100 INJECTION SUBCUTANEOUS at 05:54

## 2025-03-09 RX ADMIN — SACUBITRIL AND VALSARTAN 1 TABLET(S): 49; 51 TABLET, FILM COATED ORAL at 18:13

## 2025-03-09 RX ADMIN — CILOSTAZOL 50 MILLIGRAM(S): 50 TABLET ORAL at 05:53

## 2025-03-09 RX ADMIN — Medication 81 MILLIGRAM(S): at 11:35

## 2025-03-09 RX ADMIN — AMPICILLIN SODIUM AND SULBACTAM SODIUM 200 GRAM(S): 1; .5 INJECTION, POWDER, FOR SOLUTION INTRAMUSCULAR; INTRAVENOUS at 23:11

## 2025-03-09 RX ADMIN — CEFTRIAXONE 100 MILLIGRAM(S): 500 INJECTION, POWDER, FOR SOLUTION INTRAMUSCULAR; INTRAVENOUS at 12:18

## 2025-03-09 RX ADMIN — INSULIN LISPRO 2: 100 INJECTION, SOLUTION INTRAVENOUS; SUBCUTANEOUS at 21:45

## 2025-03-09 RX ADMIN — INSULIN LISPRO 12 UNIT(S): 100 INJECTION, SOLUTION INTRAVENOUS; SUBCUTANEOUS at 13:05

## 2025-03-09 RX ADMIN — ATORVASTATIN CALCIUM 80 MILLIGRAM(S): 80 TABLET, FILM COATED ORAL at 21:44

## 2025-03-09 RX ADMIN — INSULIN GLARGINE-YFGN 26 UNIT(S): 100 INJECTION, SOLUTION SUBCUTANEOUS at 21:44

## 2025-03-09 RX ADMIN — METOPROLOL SUCCINATE 50 MILLIGRAM(S): 50 TABLET, EXTENDED RELEASE ORAL at 05:53

## 2025-03-09 RX ADMIN — METOPROLOL SUCCINATE 50 MILLIGRAM(S): 50 TABLET, EXTENDED RELEASE ORAL at 18:13

## 2025-03-09 RX ADMIN — AMPICILLIN SODIUM AND SULBACTAM SODIUM 200 GRAM(S): 1; .5 INJECTION, POWDER, FOR SOLUTION INTRAMUSCULAR; INTRAVENOUS at 18:16

## 2025-03-09 RX ADMIN — Medication 25 GRAM(S): at 05:53

## 2025-03-09 NOTE — PHYSICAL THERAPY INITIAL EVALUATION ADULT - NSPTDMEREC_GEN_A_CORE
rolling walker/wheelchair Patient will require a rolling walker at home due to their Dx of impaired balance to help complete MRADL's. (Mobility related Activity for daily living)./rolling walker/wheelchair

## 2025-03-09 NOTE — PROGRESS NOTE ADULT - SUBJECTIVE AND OBJECTIVE BOX
Patient is a 54y old  Male who presents with a chief complaint of Right Foot Pain (09 Mar 2025 06:50)       INTERVAL HPI/OVERNIGHT EVENTS:  Patient seen and evaluated at bedside.  Pt is resting comfortable in NAD. Denies N/V/F/C.    Allergies    No Known Allergies    Intolerances        Vital Signs Last 24 Hrs  T(C): 37.3 (09 Mar 2025 08:00), Max: 37.4 (09 Mar 2025 00:16)  T(F): 99.2 (09 Mar 2025 08:00), Max: 99.3 (09 Mar 2025 00:16)  HR: 87 (09 Mar 2025 08:00) (81 - 97)  BP: 106/61 (09 Mar 2025 08:00) (99/66 - 150/77)  BP(mean): --  RR: 18 (09 Mar 2025 08:00) (18 - 18)  SpO2: 94% (09 Mar 2025 08:00) (94% - 96%)    Parameters below as of 09 Mar 2025 08:00  Patient On (Oxygen Delivery Method): room air        LABS:                        13.9   5.74  )-----------( 88       ( 09 Mar 2025 06:57 )             41.3     03-09    137  |  102  |  4[L]  ----------------------------<  120[H]  3.4[L]   |  24  |  0.77    Ca    8.7      09 Mar 2025 08:47        Urinalysis Basic - ( 09 Mar 2025 08:47 )    Color: x / Appearance: x / SG: x / pH: x  Gluc: 120 mg/dL / Ketone: x  / Bili: x / Urobili: x   Blood: x / Protein: x / Nitrite: x   Leuk Esterase: x / RBC: x / WBC x   Sq Epi: x / Non Sq Epi: x / Bacteria: x      CAPILLARY BLOOD GLUCOSE      POCT Blood Glucose.: 118 mg/dL (09 Mar 2025 08:34)  POCT Blood Glucose.: 164 mg/dL (09 Mar 2025 01:06)  POCT Blood Glucose.: 196 mg/dL (08 Mar 2025 21:36)  POCT Blood Glucose.: 176 mg/dL (08 Mar 2025 16:55)  POCT Blood Glucose.: 335 mg/dL (08 Mar 2025 12:27)      Lower Extremity Physical Exam:    Vascular: DP/PT 0/4, B/L, CFT < 3 seconds B/L, Temperature gradient warm to cool, B/L.   Neuro: Epicritic sensation absent to the level of digits, B/L.  Musculoskeletal/Ortho: s/p Right foot metatarsal head resection  Skin: 3/6 s/p R foot Partial 2nd Ray Resection, and 3rd digit proximal phalanx biopsy,  partially closed, intact sutures, warm flap, no hematoma, no signs of infection, mild plantar skin edges maceration. Left foot no open wounds, no acute signs of infection  RADIOLOGY & ADDITIONAL TESTS:

## 2025-03-09 NOTE — PHYSICAL THERAPY INITIAL EVALUATION ADULT - MANUAL MUSCLE TESTING RESULTS, REHAB EVAL
At least 3/5 B/L UE and LE; except R ankle not assess 2./2 being in ace bandage/grossly assessed due to

## 2025-03-09 NOTE — PHYSICAL THERAPY INITIAL EVALUATION ADULT - TRANSFER TRAINING, PT EVAL
GOAL: Patient will perform sit to stand transfers independently at rolling walker with proper hand placement 2 weeks

## 2025-03-09 NOTE — PHYSICAL THERAPY INITIAL EVALUATION ADULT - GAIT DEVIATIONS NOTED, PT EVAL
decreased nayeli/decreased velocity of limb motion/decreased step length/decreased swing-to-stance ratio/decreased weight-shifting ability

## 2025-03-09 NOTE — PROGRESS NOTE ADULT - ASSESSMENT
Patient is a 54 year old male with PMH of DM2, CAD s/p CABG, HFrEF, PAD s/p RLE angioplasty/stent and s/p R 3rd metatarsal head resection, submetatarsal debridement, and kerecis graft who presents for right foot pain for few weeks and hyperglycemia.    Diabetic foot ulcer, with c/f early OM  - Per Podiatry, R foot submet 3 wound to periosteum fibrotic, no malodor, no purulent drainage, no acute signs of infection   - R foot MRI with findings c/f possible reactive vs early OM, plantar soft tissue wound with gas extending along 2nd flexor tendons from mid metatarsal to proximal phalanx base, no abscess noted    - R foot wound culture polymicrobial - Morganella morganii, E.faecalis, Staph epi, Corynebacterium, Kleb oxytoca/Raoultella ornithinolytica, Prevotella, B. fragilis   - CXR with questionable L basilar opacity in conjunction with an elevated L hemidiaphragm may be intra-abdominal viscera; underlying effusion/atelectasis not completely excluded  - COVID/Flu/RSV negative 3/5   - s/p vancomycin and zosyn in the ER  - MRSA PCR screen negative, no MRSA isolated to date   - 3/5 Bcx NGTD x2   - 3/7 s/p OR for R foot partial 2nd and 3rd ray amputation   - brief op noted reviewed - high concern for residual bone infection, plan for rtor for debridement and delayed primary closure pending appearance    - OR cultures - deep wound and 2nd metatarsal clean margin - with E.faecalis so far    - OR culture 3rd metatarsal with no NGTD  - 3/8 febrile early am to 101.1F ?inflammatory post op   - no new sx or focal findings on exam shortly after pt had fever, R foot dressing clean  - 3/9 now afebrile >24h, feels improved    Recommendations:   Follow 3/8 Bcx - in process   Follow OR cultures for E.faecalis sensitivities  Follow surg path report   Discontinued zosyn   Started on unasyn 3g IV Q6h  Started on ceftriaxone 2g IV Q24h   Local wound care   Monitor temps/WBC  Continue rest of care per primary team       Jason Viadl M.D.  [facility]  Available on [employer] TEAMS - *PREFERRED*  [phone]  After 5pm on weekdays and all day on weekends - please call [phone]     Thank you for consulting us and involving us in the management of this patients case. In addition to reviewing history, imaging, documents, labs, microbiology, took into account antibiotic stewardship, local antibiogram and infection control strategies and potential transmission issues at time of treatment decision making process.

## 2025-03-09 NOTE — PROGRESS NOTE ADULT - PROBLEM SELECTOR PLAN 1
EPIFANIO Youssef MD have participated in the daily care and management of this patient and have personally  seen and examined the patient today and have noted the findings and formulated the plan of care.  I Estevan Youssef MD have personally seen and examined the patient at bedside today at  1 pm

## 2025-03-09 NOTE — PROGRESS NOTE ADULT - SUBJECTIVE AND OBJECTIVE BOX
Patient is a 54y old  Male who presents with a chief complaint of Right Foot Pain (09 Mar 2025 13:25)      Vascular Surgery Attending Progress Note    Interval HPI: pt w/o new c/o     Medications:  acetaminophen     Tablet .. 650 milliGRAM(s) Oral every 6 hours PRN  acetaminophen   IVPB .. 1000 milliGRAM(s) IV Intermittent once  ampicillin/sulbactam  IVPB 3 Gram(s) IV Intermittent every 6 hours  aspirin enteric coated 81 milliGRAM(s) Oral daily  atorvastatin 80 milliGRAM(s) Oral at bedtime  cefTRIAXone   IVPB 2000 milliGRAM(s) IV Intermittent every 24 hours  cilostazol 50 milliGRAM(s) Oral two times a day  dextrose 5%. 1000 milliLiter(s) IV Continuous <Continuous>  dextrose 5%. 1000 milliLiter(s) IV Continuous <Continuous>  dextrose 50% Injectable 12.5 Gram(s) IV Push once  dextrose 50% Injectable 25 Gram(s) IV Push once  enoxaparin Injectable 40 milliGRAM(s) SubCutaneous every 24 hours  glucagon  Injectable 1 milliGRAM(s) IntraMuscular once  insulin glargine Injectable (LANTUS) 26 Unit(s) SubCutaneous at bedtime  insulin lispro (ADMELOG) corrective regimen sliding scale   SubCutaneous <User Schedule>  insulin lispro (ADMELOG) corrective regimen sliding scale   SubCutaneous three times a day before meals  insulin lispro Injectable (ADMELOG) 12 Unit(s) SubCutaneous three times a day with meals  lidocaine 1% Injectable 20 milliLiter(s) Local Injection once  metoprolol tartrate 50 milliGRAM(s) Oral two times a day  morphine  - Injectable 2 milliGRAM(s) IV Push every 6 hours PRN  oxycodone    5 mG/acetaminophen 325 mG 1 Tablet(s) Oral every 4 hours PRN  sacubitril 24 mG/valsartan 26 mG 1 Tablet(s) Oral two times a day      Vital Signs Last 24 Hrs  T(C): 36.8 (09 Mar 2025 12:06), Max: 37.4 (09 Mar 2025 00:16)  T(F): 98.2 (09 Mar 2025 12:06), Max: 99.3 (09 Mar 2025 00:16)  HR: 84 (09 Mar 2025 12:06) (84 - 97)  BP: 108/65 (09 Mar 2025 12:06) (104/68 - 150/77)  BP(mean): --  RR: 18 (09 Mar 2025 12:06) (18 - 18)  SpO2: 95% (09 Mar 2025 12:06) (94% - 95%)    Parameters below as of 09 Mar 2025 12:06  Patient On (Oxygen Delivery Method): room air      I&O's Summary    08 Mar 2025 06:01  -  09 Mar 2025 07:00  --------------------------------------------------------  IN: 600 mL / OUT: 850 mL / NET: -250 mL    09 Mar 2025 07:01  -  09 Mar 2025 14:48  --------------------------------------------------------  IN: 540 mL / OUT: 350 mL / NET: 190 mL        Physical Exam:  Neuro  A&Ox3 VSS  Vascular:  rt foot  dressing c/d/i    LABS:                        13.9   5.74  )-----------( 88       ( 09 Mar 2025 06:57 )             41.3     03-09    137  |  102  |  4[L]  ----------------------------<  120[H]  3.4[L]   |  24  |  0.77    Ca    8.7      09 Mar 2025 08:47          MIKE FLYNN MD  [phone] Cell [phone]

## 2025-03-09 NOTE — PROGRESS NOTE ADULT - ASSESSMENT
54M with PMHx of T2DM, CAD (post-CABG), mild HFrEF (EF 45%), and PAD (with recent angiogram Aug. 2024) presenting for several week history of right foot pain and hyperglycemia. Vascular surgery consulted for evaluation of Right foot wound.     Plan:  - podiatric f/u and woundcare   - Local wound care  - C/w aspirin/pletal/Statin  will follow    Vascular Surgery   [id number]/ [id number]

## 2025-03-09 NOTE — PROGRESS NOTE ADULT - ASSESSMENT
54M w/ T2DM, CAD s/p CABG, HFrEF EF 45%, and PAD here for diabetic foot ulcer plan for RLE wound debridement and ray resection with podiatry. Cardiology consulted for preop cardiac eval.     1. CAD s/p CABG   2. PAD     -s/p CABG in 2019, cont asa and statin. off brilinta   -hx of HFrEF due to ICM, appear to be compensated   -cont metoprolol 50 mg BID  -cont atorva 80 mg qhs   -cont entresto 24/26 mg bid for HFrEF GDMT   -pt has drop in EF from TTE ECHO in 2023 and 2024  -repeat TTE with interval improvement in EF from 2024 and similar WMAs distribution   -3/7 tolerated the pod procedure well   -had resting image done for NST- noted to have defects, no CP or angina equivalents     Alyce Snow MD FAC  Attending Interventional Cardiologist, [facility].   [employer] on [employer] Team

## 2025-03-09 NOTE — PHYSICAL THERAPY INITIAL EVALUATION ADULT - PLANNED THERAPY INTERVENTIONS, PT EVAL
GOAL: Patient will be able to negotiate 3 steps in 2 weeks/bed mobility training/gait training/transfer training

## 2025-03-09 NOTE — PROGRESS NOTE ADULT - SUBJECTIVE AND OBJECTIVE BOX
[facility]  [facility] Garcia Y. Patel, S. Shah, G. Casimir  [phone]  ([phone] - weekdays after 5pm and weekends)    Name: ELSI MERINO  Age/Gender: 54y Male  MRN: [id number]    Interval History:  Patient seen and examined this morning.   States he feels better, no fever, chills or pain.   No new complaints noted.  Notes reviewed  No concerning overnight events  Afebrile   Allergies: No Known Allergies      Objective:  Vitals:   T(F): 98.5 (03-09-25 @ 05:13), Max: 99.3 (03-09-25 @ 00:16)  HR: 89 (03-09-25 @ 05:13) (81 - 97)  BP: 104/68 (03-09-25 @ 05:13) (99/66 - 150/77)  RR: 18 (03-09-25 @ 05:13) (18 - 18)  SpO2: 95% (03-09-25 @ 05:13) (94% - 96%)  Physical Examination:  General: no acute distress, nontoxic appearing   HEENT: normocephalic, atraumatic, anicteric  Respiratory: no acc muscle use, breathing comfortably  Cardiovascular: S1 and S2 present  Gastrointestinal: normal appearing, nondistended  Extremities: R foot dressing   Skin: no visible rash    Laboratory Studies:  CBC:                       11.1   9.81  )-----------( 255      ( 07 Mar 2025 07:04 )             33.2     WBC Trend:  9.81 03-07-25 @ 07:04  9.93 03-06-25 @ 07:01  9.71 03-05-25 @ 21:18  13.65 03-04-25 @ 23:12    CMP: 03-07    134[L]  |  99  |  7   ----------------------------<  78  3.3[L]   |  24  |  0.89    Ca    8.6      07 Mar 2025 08:49      Creatinine: 0.89 mg/dL (03-07-25 @ 08:49)  Creatinine: 0.74 mg/dL (03-06-25 @ 06:58)  Creatinine: 0.86 mg/dL (03-05-25 @ 21:18)  Creatinine: 0.72 mg/dL (03-05-25 @ 04:48)  Creatinine: 0.84 mg/dL (03-04-25 @ 23:12)    Urinalysis Basic - ( 08 Mar 2025 06:40 )  Color: Yellow / Appearance: Clear / SG: >1.030 / pH: x  Gluc: x / Ketone: Trace mg/dL  / Bili: Negative / Urobili: 0.2 mg/dL   Blood: x / Protein: 100 mg/dL / Nitrite: Negative   Leuk Esterase: Negative / RBC: 1 /HPF / WBC 1 /HPF   Sq Epi: x / Non Sq Epi: 1 /HPF / Bacteria: Negative /HPF    Microbiology: reviewed   Culture - Tissue with Gram Stain (collected 03-07-25 @ 18:38)  Source: Tissue Bone Biopsy 3rd Metatebal Right Foot  Gram Stain (03-08-25 @ 04:50):    No polymorphonuclear leukocytes seen per low power field    No organisms seen per oil power field    Culture - Tissue with Gram Stain (collected 03-07-25 @ 18:23)  Source: Tissue Clean bone margin 2nd metatarsal right foot  Gram Stain (03-08-25 @ 22:35):    No polymorphonuclear leukocytes seen per low power field    No organisms seen per oil power field  Preliminary Report (03-08-25 @ 22:35):    Rare Enterococcus faecalis    Culture - Wound Aerobic/Anaerobic (collected 03-07-25 @ 18:21)  Source: Surgical Swab Deep wound culture right foot  Preliminary Report (03-08-25 @ 21:55):    Few Enterococcus faecalis    Culture - Urine (collected 03-05-25 @ 21:18)  Source: Clean Catch Clean Catch (Midstream)  Final Report (03-06-25 @ 22:56):    <10,000 CFU/mL Normal Urogenital Marianne    Culture - Blood (collected 03-05-25 @ 21:17)  Source: Blood Blood-Peripheral  Preliminary Report (03-09-25 @ 01:02):    No growth at 72 Hours    Culture - Blood (collected 03-05-25 @ 21:17)  Source: Blood Blood-Peripheral  Preliminary Report (03-09-25 @ 01:02):    No growth at 72 Hours    Culture - Abscess with Gram Stain (collected 03-05-25 @ 02:55)  Source: Abscess right foot wound  Gram Stain (03-05-25 @ 14:52):    No polymorphonuclear cells seen    per low power field    Few Gram positive cocci in pairs    Moderate Gram Negative Rods    per oil power field  Final Report (03-08-25 @ 11:05):    Culture yields >4 types of aerobic and/or anaerobic bacteria    Call client services within 7 days if further workup is clinically    indicated.    Culture includes    Moderate Morganella morganii    Moderate Enterococcus faecalis    Moderate Staphylococcus epidermidis    Moderate Corynebacterium striatum group "Susceptibilities not performed"    Rare Klebsiella oxytoca/Raoultella ornithinolytica    Numerous Prevotella melaninogenica "Susceptibilities not performed"    Moderate Bacteroides fragilis "Susceptibilities not performed"  Organism: Morganella morganii  Enterococcus faecalis  Staphylococcus epidermidis  Klebsiella oxytoca /Raoutella ornithinolytica (03-08-25 @ 11:05)  Organism: Klebsiella oxytoca /Raoutella ornithinolytica (03-08-25 @ 11:05)      Method Type: MOJGAN      -  Amoxicillin/Clavulanic Acid: S <=8/4      -  Ampicillin: R >16 These ampicillin results predict results for amoxicillin      -  Ampicillin/Sulbactam: S <=4/2      -  Aztreonam: S <=4      -  Cefazolin: S <=2      -  Cefepime: S <=2      -  Cefoxitin: S <=8      -  Ceftriaxone: S <=1      -  Ciprofloxacin: S <=0.25      -  Ertapenem: S <=0.5      -  Gentamicin: S <=2      -  Imipenem: S <=1      -  Levofloxacin: S <=0.5      -  Meropenem: S <=1      -  Piperacillin/Tazobactam: S <=8      -  Tobramycin: S <=2      -  Trimethoprim/Sulfamethoxazole: S <=0.5/9.5  Organism: Staphylococcus epidermidis (03-08-25 @ 11:05)      Method Type: MOJGAN      -  Clindamycin: R >4      -  Erythromycin: R >4      -  Gentamicin: S <=4 Should not be used as monotherapy      -  Oxacillin: R >2      -  Penicillin: R >2      -  Rifampin: S <=1 Should not be used as monotherapy      -  Tetracycline: S <=4      -  Trimethoprim/Sulfamethoxazole: S <=0.5/9.5      -  Vancomycin: S 1  Organism: Enterococcus faecalis (03-08-25 @ 11:05)      Method Type: MOJGAN      -  Ampicillin: S <=2 Predicts results to ampicillin/sulbactam, amoxacillin-clavulanate and  piperacillin-tazobactam.      -  Vancomycin: S 1  Organism: Morganella morganii (03-08-25 @ 11:05)      Method Type: MOJGAN      -  Amoxicillin/Clavulanic Acid: R >16/8      -  Ampicillin: R >16 These ampicillin results predict results for amoxicillin      -  Ampicillin/Sulbactam: R >16/8      -  Aztreonam: S <=4      -  Cefazolin: R >16      -  Cefepime: S <=2      -  Cefoxitin: S <=8      -  Ceftriaxone: S <=1      -  Ciprofloxacin: S <=0.25      -  Ertapenem: S <=0.5      -  Gentamicin: S <=2      -  Levofloxacin: S <=0.5      -  Meropenem: S <=1      -  Piperacillin/Tazobactam: S <=8      -  Tobramycin: S <=2      -  Trimethoprim/Sulfamethoxazole: S <=0.5/9.5    03-05-25 @ 21:18 SARS-CoV-2 NotDetec/Influenza A NotDetec/Influenza B NotDetec/RSV NotDetec    Radiology: reviewed     Medications:  acetaminophen     Tablet .. 650 milliGRAM(s) Oral every 6 hours PRN  acetaminophen   IVPB .. 1000 milliGRAM(s) IV Intermittent once  aspirin enteric coated 81 milliGRAM(s) Oral daily  atorvastatin 80 milliGRAM(s) Oral at bedtime  cilostazol 50 milliGRAM(s) Oral two times a day  dextrose 5%. 1000 milliLiter(s) IV Continuous <Continuous>  dextrose 5%. 1000 milliLiter(s) IV Continuous <Continuous>  dextrose 50% Injectable 12.5 Gram(s) IV Push once  dextrose 50% Injectable 25 Gram(s) IV Push once  enoxaparin Injectable 40 milliGRAM(s) SubCutaneous every 24 hours  glucagon  Injectable 1 milliGRAM(s) IntraMuscular once  insulin glargine Injectable (LANTUS) 26 Unit(s) SubCutaneous at bedtime  insulin lispro (ADMELOG) corrective regimen sliding scale   SubCutaneous <User Schedule>  insulin lispro (ADMELOG) corrective regimen sliding scale   SubCutaneous three times a day before meals  insulin lispro Injectable (ADMELOG) 12 Unit(s) SubCutaneous three times a day with meals  metoprolol tartrate 50 milliGRAM(s) Oral two times a day  morphine  - Injectable 2 milliGRAM(s) IV Push every 6 hours PRN  oxycodone    5 mG/acetaminophen 325 mG 1 Tablet(s) Oral every 4 hours PRN  piperacillin/tazobactam IVPB.. 3.375 Gram(s) IV Intermittent every 8 hours  sacubitril 24 mG/valsartan 26 mG 1 Tablet(s) Oral two times a day    Current Antimicrobials:  piperacillin/tazobactam IVPB.. 3.375 Gram(s) IV Intermittent every 8 hours    Prior/Completed Antimicrobials:  piperacillin/tazobactam IVPB.  piperacillin/tazobactam IVPB.  piperacillin/tazobactam IVPB.-  piperacillin/tazobactam IVPB.-  piperacillin/tazobactam IVPB.-  piperacillin/tazobactam IVPB...  vancomycin  IVPB  vancomycin  IVPB.

## 2025-03-09 NOTE — PROGRESS NOTE ADULT - ASSESSMENT
54M R foot Partial 2nd Ray Resection,  3rd digit proximal phalanx biopsy,partially closed (DOS 3/6)  - Patient seen and evaluated  - Afebrile, labs pending   - 3/6 s/p R foot Partial Third Ray Resection partially closed, intact sutures, warm flap, no hematoma, no signs of infection, mild plantar skin edges maceration. Left foot no open wounds, no acute signs of infection  - Intraop findings low concern for viability, high concern for residual infection   - OR R foot deep wound culture: E faecalis  - R foot 2nd metatarsal clean margin: E faecalis  - ID recs, appreciated  - Vasc recs, appreciated  - recommended PICC line 2/2 positive clean bone margin culture  - Pod plan: local wound care with delayed primary closure vs return to OR for further washout pending appearance/ labs  - Wound care instructions and follow up info are listed on the provider discharge note  - Discussed with Attending 54M R foot Partial 2nd Ray Resection,  3rd digit proximal phalanx biopsy,partially closed (DOS 3/6)  - Patient seen and evaluated  - Afebrile, labs pending   - 3/6 s/p R foot Partial Third Ray Resection partially closed, intact sutures, warm flap, no hematoma, no signs of infection, mild plantar skin edges maceration. Left foot no open wounds, no acute signs of infection  - Intraop findings low concern for viability, high concern for residual infection   - OR R foot deep wound culture: E faecalis  - R foot 2nd metatarsal clean margin: E faecalis  - ID recs, appreciated  - Vasc recs, appreciated  - recommended PICC line 2/2 positive clean bone margin culture  - Pod plan: bedside delayed primary closure on 3/10 pending appearance/ labs/vitals  - Wound care instructions and follow up info are listed on the provider discharge note  - Discussed with Attending

## 2025-03-09 NOTE — PHYSICAL THERAPY INITIAL EVALUATION ADULT - PERTINENT HX OF CURRENT PROBLEM, REHAB EVAL
54M with PMHx of T2DM, CAD (post-CABG), mild HFrEF (EF 45%), and PAD (with recent angiogram Aug. 2024) presenting for several week history of right foot pain and hyperglycemia. Patient states he's been having right foot pain that has been worsening. He denies fever or chills. He states sometimes he forgets to take his insulin which likely explains why he's been hyperglycemic. Recently here in Aug 2024 and he had a right 3rd digit partial digit resection with submetatarsal debridement and graft. Since then seems to have poor compliance with follow ups and medications. Prior to discharge was started on Pletal. In the ED seen by podiatry and given vancomycin and Zosyn. Right foot with wound on the dorsum approximately 1.5 cm x 1.5 cm. Podiatry provided local wound care and sent off a culture. XR showing focal lucency of right foot. Hospital course: (3/5) MR R Foot: Plantar soft tissue wound at the level of the 2nd metatarsophalangeal joint with mild marrow changes of the 2nd metatarsal head and 2nd proximal phalanx that may be reactive or reflect early osteomyelitis. Similar changes involve the adjacent 3rd metatarsal stump and 3rd proximal phalanx. (3/7) s/p R foot Partial Third Ray Resection partially closed

## 2025-03-09 NOTE — PROGRESS NOTE ADULT - SUBJECTIVE AND OBJECTIVE BOX
[facility] Attending Follow-up Note     Patient seen and examined at bedside.    Overnight Events:     events noted     REVIEW OF SYSTEMS:  Constitutional:     [x ] negative [ ] fevers [ ] chills [ ] weight loss [ ] weight gain  HEENT:                  [x ] negative [ ] dry eyes [ ] eye irritation [ ] postnasal drip [ ] nasal congestion  CV:                         [ x] negative  [ ] chest pain [ ] orthopnea [ ] palpitations [ ] murmur  Resp:                     [ x] negative [ ] cough [ ] shortness of breath [ ] dyspnea [ ] wheezing [ ] sputum [ ]hemoptysis  GI:                          [ x] negative [ ] nausea [ ] vomiting [ ] diarrhea [ ] constipation [ ] abd pain [ ] dysphagia   :                        [ x] negative [ ] dysuria [ ] nocturia [ ] hematuria [ ] increased urinary frequency  Musculoskeletal: [ x] negative [ ] back pain [ ] myalgias [ ] arthralgias [ ] fracture  Skin:                       [ x] negative [ ] rash [ ] itch  Neurological:        [x ] negative [ ] headache [ ] dizziness [ ] syncope [ ] weakness [ ] numbness  Psychiatric:           [ x] negative [ ] anxiety [ ] depression  Endocrine:            [ x] negative [ ] diabetes [ ] thyroid problem  Heme/Lymph:      [ x] negative [ ] anemia [ ] bleeding problem  Allergic/Immune: [ x] negative [ ] itchy eyes [ ] nasal discharge [ ] hives [ ] angioedema    [ x] All other systems negative  [ ] Unable to assess ROS due to    Current Meds:  acetaminophen     Tablet .. 650 milliGRAM(s) Oral every 6 hours PRN  acetaminophen   IVPB .. 1000 milliGRAM(s) IV Intermittent once  ampicillin/sulbactam  IVPB 3 Gram(s) IV Intermittent every 6 hours  aspirin enteric coated 81 milliGRAM(s) Oral daily  atorvastatin 80 milliGRAM(s) Oral at bedtime  cefTRIAXone   IVPB 2000 milliGRAM(s) IV Intermittent every 24 hours  cilostazol 50 milliGRAM(s) Oral two times a day  dextrose 5%. 1000 milliLiter(s) IV Continuous <Continuous>  dextrose 5%. 1000 milliLiter(s) IV Continuous <Continuous>  dextrose 50% Injectable 12.5 Gram(s) IV Push once  dextrose 50% Injectable 25 Gram(s) IV Push once  enoxaparin Injectable 40 milliGRAM(s) SubCutaneous every 24 hours  glucagon  Injectable 1 milliGRAM(s) IntraMuscular once  insulin glargine Injectable (LANTUS) 26 Unit(s) SubCutaneous at bedtime  insulin lispro (ADMELOG) corrective regimen sliding scale   SubCutaneous <User Schedule>  insulin lispro (ADMELOG) corrective regimen sliding scale   SubCutaneous three times a day before meals  insulin lispro Injectable (ADMELOG) 12 Unit(s) SubCutaneous three times a day with meals  lidocaine 1% Injectable 20 milliLiter(s) Local Injection once  metoprolol tartrate 50 milliGRAM(s) Oral two times a day  morphine  - Injectable 2 milliGRAM(s) IV Push every 6 hours PRN  oxycodone    5 mG/acetaminophen 325 mG 1 Tablet(s) Oral every 4 hours PRN  sacubitril 24 mG/valsartan 26 mG 1 Tablet(s) Oral two times a day      PAST MEDICAL & SURGICAL HISTORY:  Hypertension, unspecified type      Hyperlipidemia, unspecified hyperlipidemia type      Type 2 diabetes mellitus without complication, with long-term current use of insulin      Coronary artery disease of native artery of native heart with stable angina pectoris      History of percutaneous coronary intervention          Vitals:  T(F): 98.1 (03-10), Max: 99.2 (03-09)  HR: 79 (03-10) (72 - 89)  BP: 117/64 (03-10) (104/68 - 125/86)  RR: 18 (03-10)  SpO2: 98% (03-10)  I&O's Summary    08 Mar 2025 06:01  -  09 Mar 2025 07:00  --------------------------------------------------------  IN: 600 mL / OUT: 850 mL / NET: -250 mL    09 Mar 2025 07:01  -  10 Mar 2025 01:57  --------------------------------------------------------  IN: 880 mL / OUT: 900 mL / NET: -20 mL        Physical Exam:  Appearance: No acute distress  HENT: No JVD   Cardiovascular: RRR, S1/S2, no murmurs  Respiratory: CTABL  Gastrointestinal: soft, NT ND, +BS  Musculoskeletal: No clubbing, no edema   Neurologic: Non-focal  Skin: No rashes, ecchymoses, or cyanosis                          13.9   5.74  )-----------( 88       ( 09 Mar 2025 06:57 )             41.3     03-09    137  |  102  |  4[L]  ----------------------------<  120[H]  3.4[L]   |  24  |  0.77    Ca    8.7      09 Mar 2025 08:47                    Cardiovascular Testings:

## 2025-03-09 NOTE — PROGRESS NOTE ADULT - PROBLEM SELECTOR PLAN 1
- Per podiatry: "Right foot submet 3 wound to periosteum fibrotic, no malodor, no purulent drainage, no acute signs of infection"  - Podiatric plan: Right foot wound debridement with Right partial 2nd and 3rd ray resection 3/7 with Dr. Patterson   - MRI foot noted   - Continue with Zosyn per ID  - picc line pending

## 2025-03-09 NOTE — PHYSICAL THERAPY INITIAL EVALUATION ADULT - ADDITIONAL COMMENTS
Patient lives with family in a private house with 3STE w/o handrails, Pt. reported he has no difficulty in negotiating stairs. Pt. has first floor set up; PTA, pt. was independent in ADLs & mobility w/SC; denied any hx of fall in the past 6 months; Reports his family will be able to assist him upon d/c at home.   **Pt. also reported that he sometimes lives with his friend in [place] and he will also be able to assist him.

## 2025-03-09 NOTE — PROGRESS NOTE ADULT - SUBJECTIVE AND OBJECTIVE BOX
Patient is a 54y old  Male who presents with a chief complaint of Right Foot Pain (09 Mar 2025 10:52)      SUBJECTIVE / OVERNIGHT EVENTS: translating in Lao by self no cp, sob, foot pain better    MEDICATIONS  (STANDING):  acetaminophen   IVPB .. 1000 milliGRAM(s) IV Intermittent once  ampicillin/sulbactam  IVPB 3 Gram(s) IV Intermittent every 6 hours  aspirin enteric coated 81 milliGRAM(s) Oral daily  atorvastatin 80 milliGRAM(s) Oral at bedtime  cefTRIAXone   IVPB 2000 milliGRAM(s) IV Intermittent every 24 hours  cilostazol 50 milliGRAM(s) Oral two times a day  dextrose 5%. 1000 milliLiter(s) (50 mL/Hr) IV Continuous <Continuous>  dextrose 5%. 1000 milliLiter(s) (100 mL/Hr) IV Continuous <Continuous>  dextrose 50% Injectable 12.5 Gram(s) IV Push once  dextrose 50% Injectable 25 Gram(s) IV Push once  enoxaparin Injectable 40 milliGRAM(s) SubCutaneous every 24 hours  glucagon  Injectable 1 milliGRAM(s) IntraMuscular once  insulin glargine Injectable (LANTUS) 26 Unit(s) SubCutaneous at bedtime  insulin lispro (ADMELOG) corrective regimen sliding scale   SubCutaneous <User Schedule>  insulin lispro (ADMELOG) corrective regimen sliding scale   SubCutaneous three times a day before meals  insulin lispro Injectable (ADMELOG) 12 Unit(s) SubCutaneous three times a day with meals  lidocaine 1% Injectable 20 milliLiter(s) Local Injection once  metoprolol tartrate 50 milliGRAM(s) Oral two times a day  sacubitril 24 mG/valsartan 26 mG 1 Tablet(s) Oral two times a day    MEDICATIONS  (PRN):  acetaminophen     Tablet .. 650 milliGRAM(s) Oral every 6 hours PRN Mild Pain (1 - 3)  morphine  - Injectable 2 milliGRAM(s) IV Push every 6 hours PRN Severe Pain (7 - 10)  oxycodone    5 mG/acetaminophen 325 mG 1 Tablet(s) Oral every 4 hours PRN Moderate Pain (4 - 6)        CAPILLARY BLOOD GLUCOSE      POCT Blood Glucose.: 270 mg/dL (09 Mar 2025 12:25)  POCT Blood Glucose.: 118 mg/dL (09 Mar 2025 08:34)  POCT Blood Glucose.: 164 mg/dL (09 Mar 2025 01:06)  POCT Blood Glucose.: 196 mg/dL (08 Mar 2025 21:36)  POCT Blood Glucose.: 176 mg/dL (08 Mar 2025 16:55)    I&O's Summary    08 Mar 2025 06:01  -  09 Mar 2025 07:00  --------------------------------------------------------  IN: 600 mL / OUT: 850 mL / NET: -250 mL    09 Mar 2025 07:01  -  09 Mar 2025 13:26  --------------------------------------------------------  IN: 300 mL / OUT: 250 mL / NET: 50 mL        PHYSICAL EXAM:  GENERAL: NAD, well-developed  HEAD:  Atraumatic, Normocephalic  EYES: conjunctiva and sclera clear  NECK: Supple, No JVD  CHEST/LUNG: Clear to auscultation bilaterally; No wheeze  HEART: Regular rate and rhythm; No murmurs, rubs, or gallops  ABDOMEN: Soft, Nontender, Nondistended; Bowel sounds present  EXTREMITIES:  2+ Peripheral Pulses, right foot dressing   PSYCH: AAOx3    LABS:                        13.9   5.74  )-----------( 88       ( 09 Mar 2025 06:57 )             41.3     03-09    137  |  102  |  4[L]  ----------------------------<  120[H]  3.4[L]   |  24  |  0.77    Ca    8.7      09 Mar 2025 08:47            Urinalysis Basic - ( 09 Mar 2025 08:47 )    Color: x / Appearance: x / SG: x / pH: x  Gluc: 120 mg/dL / Ketone: x  / Bili: x / Urobili: x   Blood: x / Protein: x / Nitrite: x   Leuk Esterase: x / RBC: x / WBC x   Sq Epi: x / Non Sq Epi: x / Bacteria: x        RADIOLOGY & ADDITIONAL TESTS:    Imaging Personally Reviewed:    Consultant(s) Notes Reviewed:      Care Discussed with Consultants/Other Providers:

## 2025-03-10 LAB
-  AMPICILLIN: SIGNIFICANT CHANGE UP
-  CLINDAMYCIN: SIGNIFICANT CHANGE UP
-  ERYTHROMYCIN: SIGNIFICANT CHANGE UP
-  GENTAMICIN: SIGNIFICANT CHANGE UP
-  OXACILLIN: SIGNIFICANT CHANGE UP
-  PENICILLIN: SIGNIFICANT CHANGE UP
-  RIFAMPIN: SIGNIFICANT CHANGE UP
-  TETRACYCLINE: SIGNIFICANT CHANGE UP
-  TRIMETHOPRIM/SULFAMETHOXAZOLE: SIGNIFICANT CHANGE UP
-  VANCOMYCIN: SIGNIFICANT CHANGE UP
-  VANCOMYCIN: SIGNIFICANT CHANGE UP
ANION GAP SERPL CALC-SCNC: 12 MMOL/L — SIGNIFICANT CHANGE UP (ref 5–17)
BUN SERPL-MCNC: 9 MG/DL — SIGNIFICANT CHANGE UP (ref 7–23)
CALCIUM SERPL-MCNC: 8.6 MG/DL — SIGNIFICANT CHANGE UP (ref 8.4–10.5)
CHLORIDE SERPL-SCNC: 97 MMOL/L — SIGNIFICANT CHANGE UP (ref 96–108)
CO2 SERPL-SCNC: 25 MMOL/L — SIGNIFICANT CHANGE UP (ref 22–31)
CREAT SERPL-MCNC: 0.8 MG/DL — SIGNIFICANT CHANGE UP (ref 0.5–1.3)
CULTURE RESULTS: SIGNIFICANT CHANGE UP
EGFR: 105 ML/MIN/1.73M2 — SIGNIFICANT CHANGE UP
EGFR: 105 ML/MIN/1.73M2 — SIGNIFICANT CHANGE UP
GLUCOSE BLDC GLUCOMTR-MCNC: 167 MG/DL — HIGH (ref 70–99)
GLUCOSE BLDC GLUCOMTR-MCNC: 218 MG/DL — HIGH (ref 70–99)
GLUCOSE BLDC GLUCOMTR-MCNC: 275 MG/DL — HIGH (ref 70–99)
GLUCOSE BLDC GLUCOMTR-MCNC: 320 MG/DL — HIGH (ref 70–99)
GLUCOSE BLDC GLUCOMTR-MCNC: 354 MG/DL — HIGH (ref 70–99)
GLUCOSE SERPL-MCNC: 276 MG/DL — HIGH (ref 70–99)
HCT VFR BLD CALC: 28.3 % — LOW (ref 39–50)
HCT VFR BLD CALC: 29.2 % — LOW (ref 39–50)
HGB BLD-MCNC: 9.3 G/DL — LOW (ref 13–17)
HGB BLD-MCNC: 9.4 G/DL — LOW (ref 13–17)
MCHC RBC-ENTMCNC: 25.9 PG — LOW (ref 27–34)
MCHC RBC-ENTMCNC: 25.9 PG — LOW (ref 27–34)
MCHC RBC-ENTMCNC: 32.2 G/DL — SIGNIFICANT CHANGE UP (ref 32–36)
MCHC RBC-ENTMCNC: 32.9 G/DL — SIGNIFICANT CHANGE UP (ref 32–36)
MCV RBC AUTO: 78.8 FL — LOW (ref 80–100)
MCV RBC AUTO: 80.4 FL — SIGNIFICANT CHANGE UP (ref 80–100)
METHOD TYPE: SIGNIFICANT CHANGE UP
METHOD TYPE: SIGNIFICANT CHANGE UP
NRBC BLD AUTO-RTO: 0 /100 WBCS — SIGNIFICANT CHANGE UP (ref 0–0)
NRBC BLD AUTO-RTO: 0 /100 WBCS — SIGNIFICANT CHANGE UP (ref 0–0)
PLATELET # BLD AUTO: 350 K/UL — SIGNIFICANT CHANGE UP (ref 150–400)
PLATELET # BLD AUTO: 353 K/UL — SIGNIFICANT CHANGE UP (ref 150–400)
POTASSIUM SERPL-MCNC: 3.9 MMOL/L — SIGNIFICANT CHANGE UP (ref 3.5–5.3)
POTASSIUM SERPL-SCNC: 3.9 MMOL/L — SIGNIFICANT CHANGE UP (ref 3.5–5.3)
RBC # BLD: 3.59 M/UL — LOW (ref 4.2–5.8)
RBC # BLD: 3.63 M/UL — LOW (ref 4.2–5.8)
RBC # FLD: 12.8 % — SIGNIFICANT CHANGE UP (ref 10.3–14.5)
RBC # FLD: 12.8 % — SIGNIFICANT CHANGE UP (ref 10.3–14.5)
SODIUM SERPL-SCNC: 134 MMOL/L — LOW (ref 135–145)
SPECIMEN SOURCE: SIGNIFICANT CHANGE UP
VANCOMYCIN TROUGH SERPL-MCNC: 8.3 UG/ML — LOW (ref 10–20)
WBC # BLD: 8.44 K/UL — SIGNIFICANT CHANGE UP (ref 3.8–10.5)
WBC # BLD: 9.03 K/UL — SIGNIFICANT CHANGE UP (ref 3.8–10.5)
WBC # FLD AUTO: 8.44 K/UL — SIGNIFICANT CHANGE UP (ref 3.8–10.5)
WBC # FLD AUTO: 9.03 K/UL — SIGNIFICANT CHANGE UP (ref 3.8–10.5)

## 2025-03-10 PROCEDURE — 99232 SBSQ HOSP IP/OBS MODERATE 35: CPT

## 2025-03-10 PROCEDURE — 71045 X-RAY EXAM CHEST 1 VIEW: CPT | Mod: 26

## 2025-03-10 PROCEDURE — 99233 SBSQ HOSP IP/OBS HIGH 50: CPT

## 2025-03-10 RX ORDER — VANCOMYCIN HCL IN 5 % DEXTROSE 1.5G/250ML
1250 PLASTIC BAG, INJECTION (ML) INTRAVENOUS EVERY 12 HOURS
Refills: 0 | Status: DISCONTINUED | OUTPATIENT
Start: 2025-03-10 | End: 2025-03-11

## 2025-03-10 RX ORDER — VANCOMYCIN HCL IN 5 % DEXTROSE 1.5G/250ML
1250 PLASTIC BAG, INJECTION (ML) INTRAVENOUS EVERY 12 HOURS
Refills: 0 | Status: DISCONTINUED | OUTPATIENT
Start: 2025-03-10 | End: 2025-03-10

## 2025-03-10 RX ORDER — VANCOMYCIN HCL IN 5 % DEXTROSE 1.5G/250ML
1.25 PLASTIC BAG, INJECTION (ML) INTRAVENOUS
Qty: 84 | Refills: 0
Start: 2025-03-10 | End: 2025-04-20

## 2025-03-10 RX ORDER — INSULIN LISPRO 100 U/ML
14 INJECTION, SOLUTION INTRAVENOUS; SUBCUTANEOUS
Refills: 0 | Status: DISCONTINUED | OUTPATIENT
Start: 2025-03-11 | End: 2025-03-11

## 2025-03-10 RX ORDER — INSULIN GLARGINE-YFGN 100 [IU]/ML
28 INJECTION, SOLUTION SUBCUTANEOUS AT BEDTIME
Refills: 0 | Status: DISCONTINUED | OUTPATIENT
Start: 2025-03-10 | End: 2025-03-11

## 2025-03-10 RX ADMIN — INSULIN LISPRO 5: 100 INJECTION, SOLUTION INTRAVENOUS; SUBCUTANEOUS at 08:27

## 2025-03-10 RX ADMIN — INSULIN LISPRO 2: 100 INJECTION, SOLUTION INTRAVENOUS; SUBCUTANEOUS at 02:55

## 2025-03-10 RX ADMIN — SACUBITRIL AND VALSARTAN 1 TABLET(S): 49; 51 TABLET, FILM COATED ORAL at 17:04

## 2025-03-10 RX ADMIN — Medication 81 MILLIGRAM(S): at 11:48

## 2025-03-10 RX ADMIN — Medication 166.67 MILLIGRAM(S): at 11:48

## 2025-03-10 RX ADMIN — ATORVASTATIN CALCIUM 80 MILLIGRAM(S): 80 TABLET, FILM COATED ORAL at 22:04

## 2025-03-10 RX ADMIN — INSULIN GLARGINE-YFGN 28 UNIT(S): 100 INJECTION, SOLUTION SUBCUTANEOUS at 22:03

## 2025-03-10 RX ADMIN — INSULIN LISPRO 3: 100 INJECTION, SOLUTION INTRAVENOUS; SUBCUTANEOUS at 12:03

## 2025-03-10 RX ADMIN — INSULIN LISPRO 2: 100 INJECTION, SOLUTION INTRAVENOUS; SUBCUTANEOUS at 17:01

## 2025-03-10 RX ADMIN — SACUBITRIL AND VALSARTAN 1 TABLET(S): 49; 51 TABLET, FILM COATED ORAL at 05:32

## 2025-03-10 RX ADMIN — Medication 166.67 MILLIGRAM(S): at 23:46

## 2025-03-10 RX ADMIN — CILOSTAZOL 50 MILLIGRAM(S): 50 TABLET ORAL at 17:03

## 2025-03-10 RX ADMIN — METOPROLOL SUCCINATE 50 MILLIGRAM(S): 50 TABLET, EXTENDED RELEASE ORAL at 17:04

## 2025-03-10 RX ADMIN — CILOSTAZOL 50 MILLIGRAM(S): 50 TABLET ORAL at 05:32

## 2025-03-10 RX ADMIN — INSULIN LISPRO 12 UNIT(S): 100 INJECTION, SOLUTION INTRAVENOUS; SUBCUTANEOUS at 17:03

## 2025-03-10 RX ADMIN — INSULIN LISPRO 12 UNIT(S): 100 INJECTION, SOLUTION INTRAVENOUS; SUBCUTANEOUS at 12:05

## 2025-03-10 RX ADMIN — INSULIN LISPRO 12 UNIT(S): 100 INJECTION, SOLUTION INTRAVENOUS; SUBCUTANEOUS at 08:28

## 2025-03-10 RX ADMIN — METOPROLOL SUCCINATE 50 MILLIGRAM(S): 50 TABLET, EXTENDED RELEASE ORAL at 05:32

## 2025-03-10 RX ADMIN — ENOXAPARIN SODIUM 40 MILLIGRAM(S): 100 INJECTION SUBCUTANEOUS at 05:32

## 2025-03-10 RX ADMIN — AMPICILLIN SODIUM AND SULBACTAM SODIUM 200 GRAM(S): 1; .5 INJECTION, POWDER, FOR SOLUTION INTRAMUSCULAR; INTRAVENOUS at 05:31

## 2025-03-10 NOTE — PROGRESS NOTE ADULT - SUBJECTIVE AND OBJECTIVE BOX
Patient is a 54y old  Male who presents with a chief complaint of Right Foot Pain (10 Mar 2025 09:37)       INTERVAL HPI/OVERNIGHT EVENTS:  Patient seen and evaluated at bedside.  Pt is resting comfortable in NAD. Denies N/V/F/C.    Allergies    No Known Allergies    Intolerances        Vital Signs Last 24 Hrs  T(C): 36.8 (10 Mar 2025 09:15), Max: 37.1 (09 Mar 2025 15:52)  T(F): 98.3 (10 Mar 2025 09:15), Max: 98.7 (09 Mar 2025 15:52)  HR: 84 (10 Mar 2025 09:15) (72 - 89)  BP: 104/64 (10 Mar 2025 09:15) (104/64 - 125/86)  BP(mean): --  RR: 18 (10 Mar 2025 09:15) (18 - 18)  SpO2: 96% (10 Mar 2025 09:15) (95% - 98%)    Parameters below as of 10 Mar 2025 09:15  Patient On (Oxygen Delivery Method): room air        LABS:                        9.4    9.03  )-----------( 350      ( 10 Mar 2025 07:26 )             29.2     03-10    134[L]  |  97  |  9   ----------------------------<  276[H]  3.9   |  25  |  0.80    Ca    8.6      10 Mar 2025 07:26        Urinalysis Basic - ( 10 Mar 2025 07:26 )    Color: x / Appearance: x / SG: x / pH: x  Gluc: 276 mg/dL / Ketone: x  / Bili: x / Urobili: x   Blood: x / Protein: x / Nitrite: x   Leuk Esterase: x / RBC: x / WBC x   Sq Epi: x / Non Sq Epi: x / Bacteria: x      CAPILLARY BLOOD GLUCOSE      POCT Blood Glucose.: 354 mg/dL (10 Mar 2025 08:20)  POCT Blood Glucose.: 320 mg/dL (10 Mar 2025 02:53)  POCT Blood Glucose.: 346 mg/dL (09 Mar 2025 21:39)  POCT Blood Glucose.: 302 mg/dL (09 Mar 2025 17:05)  POCT Blood Glucose.: 270 mg/dL (09 Mar 2025 12:25)      Lower Extremity Physical Exam:  Vascular: DP/PT 0/4, B/L, CFT < 3 seconds B/L, Temperature gradient warm to cool, B/L.   Neuro: Epicritic sensation absent to the level of digits, B/L.  Musculoskeletal/Ortho: s/p Right foot metatarsal head resection  Skin: 3/6 s/p R foot Partial 2nd Ray Resection, and 3rd digit proximal phalanx biopsy,  partially closed, intact sutures, warm flap, no hematoma, no signs of infection, mild plantar skin edges maceration. Left foot no open wounds, no acute signs of infection    RADIOLOGY & ADDITIONAL TESTS:

## 2025-03-10 NOTE — PROGRESS NOTE ADULT - PROBLEM SELECTOR PLAN 1
- Per podiatry: "Right foot submet 3 wound to periosteum fibrotic, no malodor, no purulent drainage, no acute signs of infection"  - S/p right foot partial third ray resection partially closed 3/6 with delayed primary closure of the right foot plantar wound 3/10.   - Deep wound culture from bone with E. Faecalis and Staph Epi, now s/p PICC with plan for prolonged course with Vancomycin (tentative plan for 6 week course, final duration pending pathology)

## 2025-03-10 NOTE — ADVANCED PRACTICE NURSE CONSULT - REASON FOR CONSULT
Vascular Access Team    Evaluation for: Bedside SL PICC placement  Requested by name: Deidre Nelson  Date/Time: 3/9 @11:56    Indication: RT foot OM  Allergy to CHG or Heparin or Lidocaine: no    Platelets(>20): 350  INR(<3): 1.08  eGFR(>40): 105  Blood cultures sent: 3/8  Blood culture negative in 48hrs: yes  Anticoagulants: ASA, Pletal  Arms DVT: no  Mastectomy: no  Fistula: no  PPM/Defib: no  IR or Nephrology or ID clearance needed: no    Consent obtained: yes     Plan: Bedside picc order evaluated. Please obtain PICC placement consent and then call [phone] for the VAT RN to place the bedside picc.  
Bedside picc order placed.   Indication: SL picc placement for long term antibiotics

## 2025-03-10 NOTE — PROGRESS NOTE ADULT - ASSESSMENT
54M R foot Partial 2nd Ray Resection,  3rd digit proximal phalanx biopsy,partially closed (DOS 3/6)  - Patient seen and evaluated  - Afebrile, no leukocytosis   - 3/6 s/p R foot Partial Third Ray Resection partially closed, intact sutures, warm flap, no hematoma, no signs of infection, mild plantar skin edges maceration. Left foot no open wounds, no acute signs of infection  - Intraop findings low concern for viability, high concern for residual infection  - After verbal consent, using sterile fine suturing set and 3-0 nylon, bedside delayed primary closure of the right foot plantar wound was performed. Wound dressed with aquacell, 4x4 gauze, and eugenio. Pt tolerated procedure well.   - OR R foot deep wound culture: E faecalis  - R foot 2nd metatarsal clean margin: E faecalis  - ID recs, appreciated  - Vasc recs, appreciated  - recommended PICC line 2/2 positive clean bone margin culture  - Pt is stable for discharge from podiatry standpoint  - Wound care instructions and follow up info are listed on the provider discharge note  - Discussed with Attending 54M R foot Partial 2nd Ray Resection,  3rd digit proximal phalanx biopsy,partially closed (DOS 3/6)  - Patient seen and evaluated  - Afebrile, no leukocytosis   - 3/6 s/p R foot Partial Third Ray Resection partially closed, intact sutures, warm flap, no hematoma, no signs of infection, mild plantar skin edges maceration. Left foot no open wounds, no acute signs of infection  - Intraop findings low concern for viability, high concern for residual infection  - After verbal consent, using sterile fine suturing set and 3-0 nylon, bedside delayed primary closure of the right foot plantar wound was performed. Wound dressed with aquacell, 4x4 gauze, and eugenio. Pt tolerated procedure well.   - OR R foot deep wound culture: E faecalis  - R foot 2nd metatarsal clean margin: E faecalis  - ID recs, appreciated  - Vasc recs, appreciated  - recommended PICC line 2/2 positive clean bone margin culture  - Pt is stable for discharge from podiatry standpoint pending final ID recs/ PICC  - Wound care instructions and follow up info are listed on the provider discharge note  - Discussed with Attending 54M R foot Partial 2nd Ray Resection,  3rd digit proximal phalanx biopsy,partially closed (DOS 3/6)  - Patient seen and evaluated  - Afebrile, no leukocytosis   - 3/6 s/p R foot Partial Third Ray Resection partially closed, intact sutures, warm flap, no hematoma, no signs of infection, mild plantar skin edges maceration. Left foot no open wounds, no acute signs of infection  - Intraop findings low concern for viability, high concern for residual infection  - After verbal consent, using sterile fine suturing set and 3-0 nylon, bedside delayed primary closure of the right foot plantar wound was performed. Wound dressed with aquacell, 4x4 gauze, and eugenio. Pt tolerated procedure well.   - OR R foot deep wound culture: E faecalis  - R foot 2nd metatarsal clean margin: E faecalis  - ID recs, appreciated  - Vasc recs, appreciated  - recommended PICC line 2/2 positive clean bone margin culture  - recommended discharge to [facility]  - Pt is stable for discharge from podiatry standpoint pending final ID recs/ PICC  - Wound care instructions and follow up info are listed on the provider discharge note  - Discussed with Attending

## 2025-03-10 NOTE — PROGRESS NOTE ADULT - SUBJECTIVE AND OBJECTIVE BOX
DATE OF SERVICE: 03-10-25 @ 10:59    Patient is a 54y old  Male who presents with a chief complaint of Right Foot Pain (10 Mar 2025 10:26)      INTERVAL HISTORY: no acute events noted    REVIEW OF SYSTEMS:  CONSTITUTIONAL: No weakness  EYES/ENT: No visual changes;  No throat pain   NECK: No pain or stiffness  RESPIRATORY: No cough, wheezing; No shortness of breath  CARDIOVASCULAR: No chest pain or palpitations  GASTROINTESTINAL: No abdominal  pain. No nausea, vomiting, or hematemesis  GENITOURINARY: No dysuria, frequency or hematuria  NEUROLOGICAL: No stroke like symptoms  SKIN: No rashes    	  MEDICATIONS:  metoprolol tartrate 50 milliGRAM(s) Oral two times a day  sacubitril 24 mG/valsartan 26 mG 1 Tablet(s) Oral two times a day        PHYSICAL EXAM:  T(C): 36.8 (03-10-25 @ 09:15), Max: 37.1 (03-09-25 @ 15:52)  HR: 84 (03-10-25 @ 09:15) (72 - 89)  BP: 104/64 (03-10-25 @ 09:15) (104/64 - 125/86)  RR: 18 (03-10-25 @ 09:15) (18 - 18)  SpO2: 96% (03-10-25 @ 09:15) (95% - 98%)  Wt(kg): --  I&O's Summary    09 Mar 2025 07:01  -  10 Mar 2025 07:00  --------------------------------------------------------  IN: 1600 mL / OUT: 1550 mL / NET: 50 mL    10 Mar 2025 07:01  -  10 Mar 2025 10:59  --------------------------------------------------------  IN: 8 mL / OUT: 600 mL / NET: -592 mL          Appearance: In no distress	  HEENT:    PERRL, EOMI	  Cardiovascular:  S1 S2, No JVD  Respiratory: Lungs clear to auscultation	  Gastrointestinal:  Soft, Non-tender, + BS	  Vascularature:  No edema of LE  Psychiatric: Appropriate affect   Neuro: no acute focal deficits                               9.4    9.03  )-----------( 350      ( 10 Mar 2025 07:26 )             29.2     03-10    134[L]  |  97  |  9   ----------------------------<  276[H]  3.9   |  25  |  0.80    Ca    8.6      10 Mar 2025 07:26          Labs personally reviewed      ASSESSMENT/PLAN: 	    54M w/ T2DM, CAD s/p CABG, HFrEF EF 45%, and PAD here for diabetic foot ulcer plan for RLE wound debridement and ray resection with podiatry. Cardiology consulted for preop cardiac eval.     1. CAD s/p CABG   2. PAD     -s/p CABG in 2019, cont asa and statin. off brilinta   -hx of HFrEF due to ICM, appear to be compensated   -cont metoprolol 50 mg BID  -cont atorva 80 mg qhs   -cont entresto 24/26 mg bid for HFrEF GDMT   -pt has drop in EF from TTE ECHO in 2023 and 2024  -repeat TTE with interval improvement in EF from 2024 and similar WMAs distribution   -3/7 tolerated the pod procedure well   -had resting image done for NST- noted to have defects, no CP or angina equivalents         Iolani Behrbom, AG-NP   Edwin Dwyer DO [facility]  Cardiovascular Medicine  [address], [address]  Available through call or text on [employer] TEAM[employer]  Office: [phone]   DATE OF SERVICE: 03-10-25 @ 10:59    Patient is a 54y old  Male who presents with a chief complaint of Right Foot Pain (10 Mar 2025 10:26)      INTERVAL HISTORY: no acute events noted    REVIEW OF SYSTEMS:  CONSTITUTIONAL: No weakness  EYES/ENT: No visual changes;  No throat pain   NECK: No pain or stiffness  RESPIRATORY: No cough, wheezing; No shortness of breath  CARDIOVASCULAR: No chest pain or palpitations  GASTROINTESTINAL: No abdominal  pain. No nausea, vomiting, or hematemesis  GENITOURINARY: No dysuria, frequency or hematuria  NEUROLOGICAL: No stroke like symptoms  SKIN: No rashes    	  MEDICATIONS:  metoprolol tartrate 50 milliGRAM(s) Oral two times a day  sacubitril 24 mG/valsartan 26 mG 1 Tablet(s) Oral two times a day        PHYSICAL EXAM:  T(C): 36.8 (03-10-25 @ 09:15), Max: 37.1 (03-09-25 @ 15:52)  HR: 84 (03-10-25 @ 09:15) (72 - 89)  BP: 104/64 (03-10-25 @ 09:15) (104/64 - 125/86)  RR: 18 (03-10-25 @ 09:15) (18 - 18)  SpO2: 96% (03-10-25 @ 09:15) (95% - 98%)  Wt(kg): --  I&O's Summary    09 Mar 2025 07:01  -  10 Mar 2025 07:00  --------------------------------------------------------  IN: 1600 mL / OUT: 1550 mL / NET: 50 mL    10 Mar 2025 07:01  -  10 Mar 2025 10:59  --------------------------------------------------------  IN: 8 mL / OUT: 600 mL / NET: -592 mL          Appearance: In no distress	  HEENT:    PERRL, EOMI	  Cardiovascular:  S1 S2, No JVD  Respiratory: Lungs clear to auscultation	  Gastrointestinal:  Soft, Non-tender, + BS	  Vascularature:  No edema of LE  Psychiatric: Appropriate affect   Neuro: no acute focal deficits                               9.4    9.03  )-----------( 350      ( 10 Mar 2025 07:26 )             29.2     03-10    134[L]  |  97  |  9   ----------------------------<  276[H]  3.9   |  25  |  0.80    Ca    8.6      10 Mar 2025 07:26          Labs personally reviewed      ASSESSMENT/PLAN: 	    54M w/ T2DM, CAD s/p CABG, HFrEF EF 45%, and PAD here for diabetic foot ulcer plan for RLE wound debridement and ray resection with podiatry. Cardiology consulted for preop cardiac eval.     1. CAD s/p CABG   2. PAD     -s/p CABG in 2019, cont asa and statin. off brilinta   -hx of HFrEF due to ICM, appear to be compensated   -cont metoprolol 50 mg BID  -cont atorva 80 mg qhs   -cont entresto 24/26 mg bid for HFrEF GDMT   -pt has drop in EF from TTE ECHO in 2023 and 2024  -repeat TTE with interval improvement in EF from 2024 and similar WMAs distribution   -3/7 tolerated the pod procedure well   - Had resting image done for NST- noted to have defects, no CP or angina equivalents         Iolani Behrbom, AG-NP   Edwin Dwyer DO [facility]  Cardiovascular Medicine  [address] [facility] [address] [facility], [address]  Available through call or text on [employer] TEAM[employer]  Office: [phone]

## 2025-03-10 NOTE — PROGRESS NOTE ADULT - SUBJECTIVE AND OBJECTIVE BOX
[facility]  DU Garcia Y. Patel, S. Shah, G. Casimir  [phone]  ([phone] - weekdays after 5pm and weekends)    Name: ELSI MERINO  Age/Gender: 54y Male  MRN: [id number]    Interval History:  Patient seen and examined this morning.   Feels better, no fevers, pain or new complaints.   Notes reviewed  No concerning overnight events  Afebrile   Allergies: No Known Allergies      Objective:  Vitals:   T(F): 98.3 (03-10-25 @ 09:15), Max: 98.7 (03-09-25 @ 15:52)  HR: 84 (03-10-25 @ 09:15) (72 - 89)  BP: 104/64 (03-10-25 @ 09:15) (104/64 - 125/86)  RR: 18 (03-10-25 @ 09:15) (18 - 18)  SpO2: 96% (03-10-25 @ 09:15) (95% - 98%)  Physical Examination:  General: no acute distress  HEENT: normocephalic, atraumatic, anicteric  Respiratory: no acc muscle use, breathing comfortably  Cardiovascular: S1 and S2 present  Gastrointestinal: normal appearing, nondistended  Extremities: R foot dressing, no cyanosis  Skin: no visible rash    Laboratory Studies:  CBC:                       9.4    9.03  )-----------( 350      ( 10 Mar 2025 07:26 )             29.2     WBC Trend:  9.03 03-10-25 @ 07:26  5.74 03-09-25 @ 06:57  9.81 03-07-25 @ 07:04  9.93 03-06-25 @ 07:01  9.71 03-05-25 @ 21:18  13.65 03-04-25 @ 23:12    CMP: 03-10    134[L]  |  97  |  9   ----------------------------<  276[H]  3.9   |  25  |  0.80    Ca    8.6      10 Mar 2025 07:26      Creatinine: 0.80 mg/dL (03-10-25 @ 07:26)  Creatinine: 0.77 mg/dL (03-09-25 @ 08:47)  Creatinine: 1.27 mg/dL (03-09-25 @ 06:57)  Creatinine: 0.89 mg/dL (03-07-25 @ 08:49)  Creatinine: 0.74 mg/dL (03-06-25 @ 06:58)  Creatinine: 0.86 mg/dL (03-05-25 @ 21:18)  Creatinine: 0.72 mg/dL (03-05-25 @ 04:48)  Creatinine: 0.84 mg/dL (03-04-25 @ 23:12)    Microbiology: reviewed   Culture - Blood (collected 03-08-25 @ 06:39)  Source: Blood Blood-Peripheral  Preliminary Report (03-10-25 @ 09:01):    No growth at 48 Hours    Culture - Blood (collected 03-08-25 @ 06:39)  Source: Blood Blood-Peripheral  Preliminary Report (03-10-25 @ 09:01):    No growth at 48 Hours    Culture - Tissue with Gram Stain (collected 03-07-25 @ 18:38)  Source: Tissue Bone Biopsy 3rd Metatebal Right Foot  Gram Stain (03-09-25 @ 11:41):    No polymorphonuclear leukocytes seen per low power field    No organisms seen per oil power field  Preliminary Report (03-09-25 @ 11:41):    Rare Staphylococcus epidermidis    Rare Enterococcus faecalis  Organism: Staphylococcus epidermidis  Enterococcus faecalis (03-10-25 @ 07:57)  Organism: Enterococcus faecalis (03-10-25 @ 07:57)      Method Type: MOJGAN      -  Ampicillin: S <=2 Predicts results to ampicillin/sulbactam, amoxacillin-clavulanate and  piperacillin-tazobactam.      -  Vancomycin: S 1  Organism: Staphylococcus epidermidis (03-10-25 @ 07:57)      Method Type: MOGJAN      -  Clindamycin: R >4      -  Erythromycin: R >4      -  Gentamicin: S <=4 Should not be used as monotherapy      -  Oxacillin: R >2      -  Penicillin: R >2      -  Rifampin: S <=1 Should not be used as monotherapy      -  Tetracycline: S <=4      -  Trimethoprim/Sulfamethoxazole: S <=0.5/9.5      -  Vancomycin: S 1    Culture - Tissue with Gram Stain (collected 03-07-25 @ 18:23)  Source: Tissue Clean bone margin 2nd metatarsal right foot  Gram Stain (03-08-25 @ 22:35):    No polymorphonuclear leukocytes seen per low power field    No organisms seen per oil power field  Preliminary Report (03-08-25 @ 22:35):    Rare Enterococcus faecalis  Organism: Enterococcus faecalis  Staphylococcus epidermidis (03-09-25 @ 21:14)  Organism: Staphylococcus epidermidis (03-09-25 @ 21:14)      Method Type: MOJGAN      -  Clindamycin: R >4      -  Erythromycin: R >4      -  Gentamicin: S <=4 Should not be used as monotherapy      -  Oxacillin: R >2      -  Penicillin: R >2      -  Rifampin: S <=1 Should not be used as monotherapy      -  Tetracycline: S <=4      -  Trimethoprim/Sulfamethoxazole: S <=0.5/9.5      -  Vancomycin: S 1  Organism: Enterococcus faecalis (03-09-25 @ 18:10)      Method Type: MOJGAN      -  Ampicillin: S <=2 Predicts results to ampicillin/sulbactam, amoxacillin-clavulanate and  piperacillin-tazobactam.      -  Vancomycin: S 1    Culture - Wound Aerobic/Anaerobic (collected 03-07-25 @ 18:21)  Source: Surgical Swab Deep wound culture right foot  Final Report (03-09-25 @ 18:50):    Few Enterococcus faecalis    Rare Bacteroides fragilis "Susceptibilities not performed"    Rare Bifidobacterium breve "Susceptibilities not performed"    Commensal timoteo consistent with body site  Organism: Enterococcus faecalis (03-09-25 @ 18:50)  Organism: Enterococcus faecalis (03-09-25 @ 18:50)      Method Type: MOJGAN      -  Ampicillin: S <=2 Predicts results to ampicillin/sulbactam, amoxacillin-clavulanate and  piperacillin-tazobactam.      -  Vancomycin: S 1    Culture - Urine (collected 03-05-25 @ 21:18)  Source: Clean Catch Clean Catch (Midstream)  Final Report (03-06-25 @ 22:56):    <10,000 CFU/mL Normal Urogenital Timoteo    Culture - Blood (collected 03-05-25 @ 21:17)  Source: Blood Blood-Peripheral  Preliminary Report (03-10-25 @ 01:01):    No growth at 4 days    Culture - Blood (collected 03-05-25 @ 21:17)  Source: Blood Blood-Peripheral  Preliminary Report (03-10-25 @ 01:01):    No growth at 4 days    Culture - Abscess with Gram Stain (collected 03-05-25 @ 02:55)  Source: Abscess right foot wound  Gram Stain (03-05-25 @ 14:52):    No polymorphonuclear cells seen    per low power field    Few Gram positive cocci in pairs    Moderate Gram Negative Rods    per oil power field  Final Report (03-08-25 @ 11:05):    Culture yields >4 types of aerobic and/or anaerobic bacteria    Call client services within 7 days if further workup is clinically    indicated.    Culture includes    Moderate Morganella morganii    Moderate Enterococcus faecalis    Moderate Staphylococcus epidermidis    Moderate Corynebacterium striatum group "Susceptibilities not performed"    Rare Klebsiella oxytoca/Raoultella ornithinolytica    Numerous Prevotella melaninogenica "Susceptibilities not performed"    Moderate Bacteroides fragilis "Susceptibilities not performed"  Organism: Morganella morganii  Enterococcus faecalis  Staphylococcus epidermidis  Klebsiella oxytoca /Raoutella ornithinolytica (03-08-25 @ 11:05)  Organism: Klebsiella oxytoca /Raoutella ornithinolytica (03-08-25 @ 11:05)      Method Type: MOJGAN      -  Amoxicillin/Clavulanic Acid: S <=8/4      -  Ampicillin: R >16 These ampicillin results predict results for amoxicillin      -  Ampicillin/Sulbactam: S <=4/2      -  Aztreonam: S <=4      -  Cefazolin: S <=2      -  Cefepime: S <=2      -  Cefoxitin: S <=8      -  Ceftriaxone: S <=1      -  Ciprofloxacin: S <=0.25      -  Ertapenem: S <=0.5      -  Gentamicin: S <=2      -  Imipenem: S <=1      -  Levofloxacin: S <=0.5      -  Meropenem: S <=1      -  Piperacillin/Tazobactam: S <=8      -  Tobramycin: S <=2      -  Trimethoprim/Sulfamethoxazole: S <=0.5/9.5  Organism: Staphylococcus epidermidis (03-08-25 @ 11:05)      Method Type: MOJGAN      -  Clindamycin: R >4      -  Erythromycin: R >4      -  Gentamicin: S <=4 Should not be used as monotherapy      -  Oxacillin: R >2      -  Penicillin: R >2      -  Rifampin: S <=1 Should not be used as monotherapy      -  Tetracycline: S <=4      -  Trimethoprim/Sulfamethoxazole: S <=0.5/9.5      -  Vancomycin: S 1  Organism: Enterococcus faecalis (03-08-25 @ 11:05)      Method Type: MOJGAN      -  Ampicillin: S <=2 Predicts results to ampicillin/sulbactam, amoxacillin-clavulanate and  piperacillin-tazobactam.      -  Vancomycin: S 1  Organism: Morganella morganii (03-08-25 @ 11:05)      Method Type: MOJGAN      -  Amoxicillin/Clavulanic Acid: R >16/8      -  Ampicillin: R >16 These ampicillin results predict results for amoxicillin      -  Ampicillin/Sulbactam: R >16/8      -  Aztreonam: S <=4      -  Cefazolin: R >16      -  Cefepime: S <=2      -  Cefoxitin: S <=8      -  Ceftriaxone: S <=1      -  Ciprofloxacin: S <=0.25      -  Ertapenem: S <=0.5      -  Gentamicin: S <=2      -  Levofloxacin: S <=0.5      -  Meropenem: S <=1      -  Piperacillin/Tazobactam: S <=8      -  Tobramycin: S <=2      -  Trimethoprim/Sulfamethoxazole: S <=0.5/9.5    03-05-25 @ 21:18 SARS-CoV-2 NotDetec/Influenza A NotDetec/Influenza B NotDetec/RSV NotDetec    Radiology: reviewed     Medications:  acetaminophen     Tablet .. 650 milliGRAM(s) Oral every 6 hours PRN  acetaminophen   IVPB .. 1000 milliGRAM(s) IV Intermittent once  ampicillin/sulbactam  IVPB 3 Gram(s) IV Intermittent every 6 hours  aspirin enteric coated 81 milliGRAM(s) Oral daily  atorvastatin 80 milliGRAM(s) Oral at bedtime  cefTRIAXone   IVPB 2000 milliGRAM(s) IV Intermittent every 24 hours  cilostazol 50 milliGRAM(s) Oral two times a day  dextrose 5%. 1000 milliLiter(s) IV Continuous <Continuous>  dextrose 5%. 1000 milliLiter(s) IV Continuous <Continuous>  dextrose 50% Injectable 12.5 Gram(s) IV Push once  dextrose 50% Injectable 25 Gram(s) IV Push once  enoxaparin Injectable 40 milliGRAM(s) SubCutaneous every 24 hours  glucagon  Injectable 1 milliGRAM(s) IntraMuscular once  insulin glargine Injectable (LANTUS) 26 Unit(s) SubCutaneous at bedtime  insulin lispro (ADMELOG) corrective regimen sliding scale   SubCutaneous <User Schedule>  insulin lispro (ADMELOG) corrective regimen sliding scale   SubCutaneous three times a day before meals  insulin lispro Injectable (ADMELOG) 12 Unit(s) SubCutaneous three times a day with meals  lidocaine 1% Injectable 20 milliLiter(s) Local Injection once  metoprolol tartrate 50 milliGRAM(s) Oral two times a day  morphine  - Injectable 2 milliGRAM(s) IV Push every 6 hours PRN  oxycodone    5 mG/acetaminophen 325 mG 1 Tablet(s) Oral every 4 hours PRN  sacubitril 24 mG/valsartan 26 mG 1 Tablet(s) Oral two times a day    Current Antimicrobials:  ampicillin/sulbactam  IVPB 3 Gram(s) IV Intermittent every 6 hours  cefTRIAXone   IVPB 2000 milliGRAM(s) IV Intermittent every 24 hours    Prior/Completed Antimicrobials:  piperacillin/tazobactam IVPB.  piperacillin/tazobactam IVPB.  piperacillin/tazobactam IVPB.-  piperacillin/tazobactam IVPB.-  piperacillin/tazobactam IVPB.-  piperacillin/tazobactam IVPB...  vancomycin  IVPB  vancomycin  IVPB.

## 2025-03-10 NOTE — PROGRESS NOTE ADULT - PROBLEM SELECTOR PLAN 1
EPIFANIO Youssef MD have participated in the daily care and management of this patient and have personally  seen and examined the patient today and have noted the findings and formulated the plan of care.  I Estevan Youssef MD have personally seen and examined the patient at bedside today at  8am

## 2025-03-10 NOTE — ADVANCED PRACTICE NURSE CONSULT - ASSESSMENT
Picc Insertion Note  Patient or patient representative educated about central line associated blood stream infection prevention practices.  Catheter type: 4F,  SL Picc  : Bard  Power injectable: Yes  Lot# [id number]    Informed consent obtained by covering floor team.  Procedure assisted by: ADRIANNA Clements RN  Time out was preformed, confirming the patient's first and last name, date of birth, MR#, procedure, and correct site prior to start of procedure.    Patient was placed with HOB 30 degrees. Patient placement site was prepped with chlorhexidine solution, then draped using maximum sterile barrier protection. The area was injected with 2 ml of 1% lidocaine. Using the Bard Site Rite 8, the catheter was placed using the Modified Seldinger Technique. Strict adherence to outline aseptic technique including handwashing, glove and gown, utilizing mask and cap, plus draping the patient with a sterile drape was observed, patient wore mask. Upon completion of line placement, the insertion site was covered with a sterile occlusive CHG dressing. Pt tolerated procedure well.    VS: WDL           All materials used for catheter insertion, including the intact guide wires, were accounted for at the end of the procedure.  Number of attempts: 1  Complications/Comments: None    Emergency Placement:  No  Site: New   Anatomical Site of insertion:  Right Brachial  Catheter size/length:  4F,  40cm  US guided Bard single lumen power picc placed    Post procedure verification with chest Xray as per orders.

## 2025-03-10 NOTE — PROGRESS NOTE ADULT - ATTENDING COMMENTS
EPIFANIO Youssef MD have participated in the daily care and management of this patient and have personally  seen and examined the patient today and have noted the findings and formulated the plan of care.  I Estevan Youssef MD have personally seen and examined the patient at bedside today at  1 pm
I Estevan Youssef MD have participated in the daily care of this patient  and agree with  the  evaluation, assessment and plan of the surgical house officer
EPIFANIO Youssef MD have participated in the daily care and management of this patient and have personally  seen and examined the patient today and have noted the findings and formulated the plan of care.  I Estevan Youssef MD have personally seen and examined the patient at bedside today at  8am
EPIFANIO Youssef MD have participated in the daily care of this patient  and have seen and examined the patient today and agree with  the  evaluation, assessment and plan of the surgical house officer  EPIFANIO Youssef MD have personally seen and examined the patient at bedside today at  8am
DC planning   Pending home infusion set up
-Pending echo, NST prior to OR  -Rest per above

## 2025-03-10 NOTE — PROGRESS NOTE ADULT - ASSESSMENT
54M with PMHx of T2DM, CAD (post-CABG), mild HFrEF (EF 45%), and PAD (with recent angiogram Aug. 2024) presenting for several week history of right foot pain and hyperglycemia. Vascular surgery consulted for evaluation of Right foot wound.     Plan:  - podiatric f/u and woundcare   - Local wound care  - C/w aspirin/pletal/Statin  will follow    Vascular Surgery   [id number]/ [id number]

## 2025-03-10 NOTE — PROGRESS NOTE ADULT - SUBJECTIVE AND OBJECTIVE BOX
Vascular Surgery Attending Progress Note    Interval HPI: pt w/o new c/o     Medications:  acetaminophen     Tablet .. 650 milliGRAM(s) Oral every 6 hours PRN  acetaminophen   IVPB .. 1000 milliGRAM(s) IV Intermittent once  ampicillin/sulbactam  IVPB 3 Gram(s) IV Intermittent every 6 hours  aspirin enteric coated 81 milliGRAM(s) Oral daily  atorvastatin 80 milliGRAM(s) Oral at bedtime  cefTRIAXone   IVPB 2000 milliGRAM(s) IV Intermittent every 24 hours  cilostazol 50 milliGRAM(s) Oral two times a day  dextrose 5%. 1000 milliLiter(s) IV Continuous <Continuous>  dextrose 5%. 1000 milliLiter(s) IV Continuous <Continuous>  dextrose 50% Injectable 12.5 Gram(s) IV Push once  dextrose 50% Injectable 25 Gram(s) IV Push once  enoxaparin Injectable 40 milliGRAM(s) SubCutaneous every 24 hours  glucagon  Injectable 1 milliGRAM(s) IntraMuscular once  insulin glargine Injectable (LANTUS) 26 Unit(s) SubCutaneous at bedtime  insulin lispro (ADMELOG) corrective regimen sliding scale   SubCutaneous <User Schedule>  insulin lispro (ADMELOG) corrective regimen sliding scale   SubCutaneous three times a day before meals  insulin lispro Injectable (ADMELOG) 12 Unit(s) SubCutaneous three times a day with meals  lidocaine 1% Injectable 20 milliLiter(s) Local Injection once  metoprolol tartrate 50 milliGRAM(s) Oral two times a day  morphine  - Injectable 2 milliGRAM(s) IV Push every 6 hours PRN  oxycodone    5 mG/acetaminophen 325 mG 1 Tablet(s) Oral every 4 hours PRN  sacubitril 24 mG/valsartan 26 mG 1 Tablet(s) Oral two times a day      Vital Signs Last 24 Hrs  T(C): 36.8 (10 Mar 2025 09:15), Max: 37.1 (09 Mar 2025 15:52)  T(F): 98.3 (10 Mar 2025 09:15), Max: 98.7 (09 Mar 2025 15:52)  HR: 84 (10 Mar 2025 09:15) (72 - 89)  BP: 104/64 (10 Mar 2025 09:15) (104/64 - 125/86)  BP(mean): --  RR: 18 (10 Mar 2025 09:15) (18 - 18)  SpO2: 96% (10 Mar 2025 09:15) (95% - 98%)    Parameters below as of 10 Mar 2025 09:15  Patient On (Oxygen Delivery Method): room air      I&O's Summary    09 Mar 2025 07:01  -  10 Mar 2025 07:00  --------------------------------------------------------  IN: 1600 mL / OUT: 1550 mL / NET: 50 mL    10 Mar 2025 07:01  -  10 Mar 2025 09:38  --------------------------------------------------------  IN: 8 mL / OUT: 600 mL / NET: -592 mL        Physical Exam:  Neuro  A&Ox3 VSS  Vascular:  rt foot dressing c/d/i    LABS:                        9.4    9.03  )-----------( 350      ( 10 Mar 2025 07:26 )             29.2     03-10    134[L]  |  97  |  9   ----------------------------<  276[H]  3.9   |  25  |  0.80    Ca    8.6      10 Mar 2025 07:26          MIKE FLYNN MD  [phone] Cell [phone]

## 2025-03-10 NOTE — PROGRESS NOTE ADULT - ASSESSMENT
Patient is a 54 year old male with PMH of DM2, CAD s/p CABG, HFrEF, PAD s/p RLE angioplasty/stent and s/p R 3rd metatarsal head resection, submetatarsal debridement, and kerecis graft who presents for right foot pain for few weeks and hyperglycemia.    Diabetic foot ulcer, with c/f early OM  - Per Podiatry, R foot submet 3 wound to periosteum fibrotic, no malodor, no purulent drainage, no acute signs of infection   - R foot MRI with findings c/f possible reactive vs early OM, plantar soft tissue wound with gas extending along 2nd flexor tendons from mid metatarsal to proximal phalanx base, no abscess noted    - R foot wound culture polymicrobial - Morganella morganii, E.faecalis, Staph epi, Corynebacterium, Kleb oxytoca/Raoultella ornithinolytica, Prevotella, B. fragilis   - CXR with questionable L basilar opacity in conjunction with an elevated L hemidiaphragm may be intra-abdominal viscera; underlying effusion/atelectasis not completely excluded  - COVID/Flu/RSV negative 3/5   - s/p vancomycin and zosyn in the ER  - MRSA PCR screen negative, no MRSA isolated to date   - 3/5 Bcx NGTD x2   - 3/7 s/p OR for R foot partial 2nd and 3rd ray amputation   - brief op noted reviewed - high concern for residual bone infection, plan for rtor for debridement and delayed primary closure pending appearance    - OR cultures - 2nd and 3rd metatarsal clean margin - with E.faecalis and Staph epi, sensitivities reviewed    - OR culture deep wound culture with E.faecalis, Bacteroides fragilis, Bifidobacterium breve  - 3/8 febrile early am to 101.1F ?inflammatory post op -- now resolved   - 3/8 Bcx NGTD x2     s/p zosyn 3/4-3/8     Recommendations:   Follow surg path report   Discontinued unasyn and ceftriaxone   Started on vancomycin 1.25g IV Q12h to cover for Staph and E.faecalis isolated from bone   - - monitor vancomycin trough, goal -600 -- will need therapeutic trough prior to dc  PICC line placement pending  Will need prolonged 6 week course until 4/17/25 for now given positive cultures, final duration pending path  Will need weekly labs CBC/CMP/ESR/CRP/vancomycin trough while on IV antibiotics - fax  results to [phone].  Patient will follow up with Dr. Jada Kelley in ID office within 1 week of discharge   Local wound care   Continue rest of care per primary team       Jason Vidal M.D.  [facility]  Available on [employer] TEAMS - *PREFERRED*  [phone]  After 5pm on weekdays and all day on weekends - please call [phone]     Thank you for consulting us and involving us in the management of this patients case. In addition to reviewing history, imaging, documents, labs, microbiology, took into account antibiotic stewardship, local antibiogram and infection control strategies and potential transmission issues at time of treatment decision making process.

## 2025-03-10 NOTE — CHART NOTE - NSCHARTNOTEFT_GEN_A_CORE
POCT Blood Glucose:  218 mg/dL (03-10-25 @ 16:59)  275 mg/dL (03-10-25 @ 11:53)  354 mg/dL (03-10-25 @ 08:20)  320 mg/dL (03-10-25 @ 02:53)  346 mg/dL (03-09-25 @ 21:39)      eMAR:atorvastatin   80 milliGRAM(s) Oral (03-09-25 @ 21:44)    insulin glargine Injectable (LANTUS)   26 Unit(s) SubCutaneous (03-09-25 @ 21:44)    insulin lispro (ADMELOG) corrective regimen sliding scale   2 Unit(s) SubCutaneous (03-10-25 @ 02:55)   2 Unit(s) SubCutaneous (03-09-25 @ 21:45)    insulin lispro (ADMELOG) corrective regimen sliding scale   2 Unit(s) SubCutaneous (03-10-25 @ 17:01)   3 Unit(s) SubCutaneous (03-10-25 @ 12:03)   5 Unit(s) SubCutaneous (03-10-25 @ 08:27)    insulin lispro Injectable (ADMELOG)   12 Unit(s) SubCutaneous (03-10-25 @ 17:03)   12 Unit(s) SubCutaneous (03-10-25 @ 12:05)   12 Unit(s) SubCutaneous (03-10-25 @ 08:28)    Diet, Consistent Carbohydrate/No Snacks:   Ethan (03-07-25 @ 19:52) [Active]      Tried to speak with patient on  phone. However patient refused to speak with this provider at the time of visit saying " Leave me alone! Eating" in English.   He had his dinner tray at the time of visit. BGs 200s-300s with fasting . As per RN, pt was eating between meals   Will adjust insulin for BG Goal 100-180mg/dl without hypoglycemia     - Increase Lantus to 28 units at HS  - Increase admelog to 14 units with meals ( Hold if NPO)       Contact via [employer] Teams during business hours  To reach covering provider access AMION via sunrise tools  For Urgent matters/after-hours/weekends/holidays please page endocrine fellow on call   For nonurgent matters please email [email or web address]    Please note that this patient may be followed by different provider tomorrow.  Notify endocrine 24 hours prior to discharge for final recommendations

## 2025-03-10 NOTE — PROGRESS NOTE ADULT - SUBJECTIVE AND OBJECTIVE BOX
[facility] Department of Hospital Medicine  Nguyễn Barnes DO  Available on MS Teams    Patient is a 54y old  Male who presents with a chief complaint of Right Foot Pain (10 Mar 2025 10:59)      OVERNIGHT EVENTS: No acute events overnight    SUBJECTIVE: Pt seen and examined at bedside this morning. Feeling well this morning. S/p bedside closure with podiatry, reportedly tolerated well per patient. Denies fevers, chills, nausea, vomiting, chest pain, shortness of breath.     ADDITIONAL REVIEW OF SYSTEMS:  Negative except as listed per HPI    MEDICATIONS  (STANDING):  acetaminophen   IVPB .. 1000 milliGRAM(s) IV Intermittent once  aspirin enteric coated 81 milliGRAM(s) Oral daily  atorvastatin 80 milliGRAM(s) Oral at bedtime  cilostazol 50 milliGRAM(s) Oral two times a day  dextrose 5%. 1000 milliLiter(s) (50 mL/Hr) IV Continuous <Continuous>  dextrose 5%. 1000 milliLiter(s) (100 mL/Hr) IV Continuous <Continuous>  dextrose 50% Injectable 12.5 Gram(s) IV Push once  dextrose 50% Injectable 25 Gram(s) IV Push once  enoxaparin Injectable 40 milliGRAM(s) SubCutaneous every 24 hours  glucagon  Injectable 1 milliGRAM(s) IntraMuscular once  insulin glargine Injectable (LANTUS) 26 Unit(s) SubCutaneous at bedtime  insulin lispro (ADMELOG) corrective regimen sliding scale   SubCutaneous <User Schedule>  insulin lispro (ADMELOG) corrective regimen sliding scale   SubCutaneous three times a day before meals  insulin lispro Injectable (ADMELOG) 12 Unit(s) SubCutaneous three times a day with meals  lidocaine 1% Injectable 20 milliLiter(s) Local Injection once  metoprolol tartrate 50 milliGRAM(s) Oral two times a day  sacubitril 24 mG/valsartan 26 mG 1 Tablet(s) Oral two times a day  vancomycin  IVPB 1250 milliGRAM(s) IV Intermittent every 12 hours    MEDICATIONS  (PRN):  acetaminophen     Tablet .. 650 milliGRAM(s) Oral every 6 hours PRN Mild Pain (1 - 3)  morphine  - Injectable 2 milliGRAM(s) IV Push every 6 hours PRN Severe Pain (7 - 10)  oxycodone    5 mG/acetaminophen 325 mG 1 Tablet(s) Oral every 4 hours PRN Moderate Pain (4 - 6)      CAPILLARY BLOOD GLUCOSE      POCT Blood Glucose.: 275 mg/dL (10 Mar 2025 11:53)  POCT Blood Glucose.: 354 mg/dL (10 Mar 2025 08:20)  POCT Blood Glucose.: 320 mg/dL (10 Mar 2025 02:53)  POCT Blood Glucose.: 346 mg/dL (09 Mar 2025 21:39)  POCT Blood Glucose.: 302 mg/dL (09 Mar 2025 17:05)  POCT Blood Glucose.: 270 mg/dL (09 Mar 2025 12:25)    I&O's Summary    09 Mar 2025 07:01  -  10 Mar 2025 07:00  --------------------------------------------------------  IN: 1600 mL / OUT: 1550 mL / NET: 50 mL    10 Mar 2025 07:01  -  10 Mar 2025 12:06  --------------------------------------------------------  IN: 8 mL / OUT: 600 mL / NET: -592 mL        PHYSICAL EXAM:  Vital Signs Last 24 Hrs  T(C): 36.8 (10 Mar 2025 09:15), Max: 37.1 (09 Mar 2025 15:52)  T(F): 98.3 (10 Mar 2025 09:15), Max: 98.7 (09 Mar 2025 15:52)  HR: 84 (10 Mar 2025 09:15) (72 - 89)  BP: 104/64 (10 Mar 2025 09:15) (104/64 - 125/86)  BP(mean): --  RR: 18 (10 Mar 2025 09:15) (18 - 18)  SpO2: 96% (10 Mar 2025 09:15) (95% - 98%)    Parameters below as of 10 Mar 2025 09:15  Patient On (Oxygen Delivery Method): room air      GENERAL: NAD, well-developed  HEAD:  Atraumatic, Normocephalic  EYES: conjunctiva and sclera clear  NECK: Supple, No JVD  CHEST/LUNG: Clear to auscultation bilaterally; No wheeze  HEART: Regular rate and rhythm; No murmurs, rubs, or gallops  ABDOMEN: Soft, Nontender, Nondistended; Bowel sounds present  EXTREMITIES:  2+ Peripheral Pulses, right foot dressing   PSYCH: AAOx3    LABS:                        9.4    9.03  )-----------( 350      ( 10 Mar 2025 07:26 )             29.2     03-10    134[L]  |  97  |  9   ----------------------------<  276[H]  3.9   |  25  |  0.80    Ca    8.6      10 Mar 2025 07:26            Urinalysis Basic - ( 10 Mar 2025 07:26 )    Color: x / Appearance: x / SG: x / pH: x  Gluc: 276 mg/dL / Ketone: x  / Bili: x / Urobili: x   Blood: x / Protein: x / Nitrite: x   Leuk Esterase: x / RBC: x / WBC x   Sq Epi: x / Non Sq Epi: x / Bacteria: x        Culture - Blood (collected 08 Mar 2025 06:39)  Source: Blood Blood-Peripheral  Preliminary Report (10 Mar 2025 09:01):    No growth at 48 Hours    Culture - Blood (collected 08 Mar 2025 06:39)  Source: Blood Blood-Peripheral  Preliminary Report (10 Mar 2025 09:01):    No growth at 48 Hours    Culture - Tissue with Gram Stain (collected 07 Mar 2025 18:38)  Source: Tissue Bone Biopsy 3rd Metatebal Right Foot  Gram Stain (09 Mar 2025 11:41):    No polymorphonuclear leukocytes seen per low power field    No organisms seen per oil power field  Preliminary Report (09 Mar 2025 11:41):    Rare Staphylococcus epidermidis    Rare Enterococcus faecalis  Organism: Staphylococcus epidermidis  Enterococcus faecalis (10 Mar 2025 07:57)  Organism: Enterococcus faecalis (10 Mar 2025 07:57)  Organism: Staphylococcus epidermidis (10 Mar 2025 07:57)    Culture - Tissue with Gram Stain (collected 07 Mar 2025 18:23)  Source: Tissue Clean bone margin 2nd metatarsal right foot  Gram Stain (08 Mar 2025 22:35):    No polymorphonuclear leukocytes seen per low power field    No organisms seen per oil power field  Preliminary Report (08 Mar 2025 22:35):    Rare Enterococcus faecalis  Organism: Enterococcus faecalis  Staphylococcus epidermidis (09 Mar 2025 21:14)  Organism: Staphylococcus epidermidis (09 Mar 2025 21:14)  Organism: Enterococcus faecalis (09 Mar 2025 18:10)    Culture - Wound Aerobic/Anaerobic (collected 07 Mar 2025 18:21)  Source: Surgical Swab Deep wound culture right foot  Final Report (09 Mar 2025 18:50):    Few Enterococcus faecalis    Rare Bacteroides fragilis "Susceptibilities not performed"    Rare Bifidobacterium breve "Susceptibilities not performed"    Commensal timoteo consistent with body site  Organism: Enterococcus faecalis (09 Mar 2025 18:50)  Organism: Enterococcus faecalis (09 Mar 2025 18:50)        RADIOLOGY & ADDITIONAL TESTS:    Results Reviewed:   Imaging Personally Reviewed:  Electrocardiogram Personally Reviewed:    COORDINATION OF CARE:  Care Discussed with Consultants/Other Providers [Y/N]:  Prior or Outpatient Records Reviewed [Y/N]:

## 2025-03-10 NOTE — CHART NOTE - NSCHARTNOTEFT_GEN_A_CORE
Polyfly Wheelchair     Patient will require a polyfly wheelchair due to hie diagnoses of Heart Failure and Right Foot Resection. The beneficiary has a mobility limitation that significantly impairs his ability to participate in one or more MRADLs such as toileting, feeding, dressing, grooming, and bathing in customary locations in the home. The patient’s mobility limitation cannot  be sufficiently resolved by the use of an appropriately fitted cane or walker. The patient is unable to ambulated with a walker. Use of a manual wheelchair will significantly improve the beneficiary’s ability to participate in MRADLs and the beneficiary will use it on a regular basis in the home. The beneficiary is able and willing to use the wheelchair in the home. The beneficiary cannot self-propel in a standard wheelchair. The patient can self-propel in a polyfly wheelchair. The beneficiary has sufficient upper extremity function and other physical and mental capabilities needed to safely self propel  the manual lightweight wheelchair that is provided in the home during a typical day.

## 2025-03-10 NOTE — PROGRESS NOTE ADULT - ASSESSMENT
54M with PMHx of T2DM, CAD (post-CABG), mild HFrEF (EF 45%), and PAD (with recent angiogram Aug. 2024) presenting for several week history of right foot pain and hyperglycemia. Patient admitted for further management of his diabetic foot ulcer s/p podiatric surgical intervention, OR cultures positive for E.Faecalis and Staph Epi requiring prolonged abx course s/p PICC, now stable for discharged.

## 2025-03-11 ENCOUNTER — TRANSCRIPTION ENCOUNTER (OUTPATIENT)
Age: 55
End: 2025-03-11

## 2025-03-11 VITALS
HEART RATE: 87 BPM | SYSTOLIC BLOOD PRESSURE: 125 MMHG | RESPIRATION RATE: 18 BRPM | OXYGEN SATURATION: 97 % | DIASTOLIC BLOOD PRESSURE: 67 MMHG | TEMPERATURE: 98 F

## 2025-03-11 LAB
CULTURE RESULTS: SIGNIFICANT CHANGE UP
CULTURE RESULTS: SIGNIFICANT CHANGE UP
GLUCOSE BLDC GLUCOMTR-MCNC: 123 MG/DL — HIGH (ref 70–99)
GLUCOSE BLDC GLUCOMTR-MCNC: 138 MG/DL — HIGH (ref 70–99)
GLUCOSE BLDC GLUCOMTR-MCNC: 196 MG/DL — HIGH (ref 70–99)
GLUCOSE BLDC GLUCOMTR-MCNC: 215 MG/DL — HIGH (ref 70–99)
SPECIMEN SOURCE: SIGNIFICANT CHANGE UP
SPECIMEN SOURCE: SIGNIFICANT CHANGE UP

## 2025-03-11 PROCEDURE — 82947 ASSAY GLUCOSE BLOOD QUANT: CPT

## 2025-03-11 PROCEDURE — 80053 COMPREHEN METABOLIC PANEL: CPT

## 2025-03-11 PROCEDURE — 82010 KETONE BODYS QUAN: CPT

## 2025-03-11 PROCEDURE — 84132 ASSAY OF SERUM POTASSIUM: CPT

## 2025-03-11 PROCEDURE — 87077 CULTURE AEROBIC IDENTIFY: CPT

## 2025-03-11 PROCEDURE — 85730 THROMBOPLASTIN TIME PARTIAL: CPT

## 2025-03-11 PROCEDURE — C8929: CPT

## 2025-03-11 PROCEDURE — 83735 ASSAY OF MAGNESIUM: CPT

## 2025-03-11 PROCEDURE — 88311 DECALCIFY TISSUE: CPT

## 2025-03-11 PROCEDURE — A9585: CPT

## 2025-03-11 PROCEDURE — 86140 C-REACTIVE PROTEIN: CPT

## 2025-03-11 PROCEDURE — 73630 X-RAY EXAM OF FOOT: CPT

## 2025-03-11 PROCEDURE — 80061 LIPID PANEL: CPT

## 2025-03-11 PROCEDURE — 87205 SMEAR GRAM STAIN: CPT

## 2025-03-11 PROCEDURE — 87641 MR-STAPH DNA AMP PROBE: CPT

## 2025-03-11 PROCEDURE — 83036 HEMOGLOBIN GLYCOSYLATED A1C: CPT

## 2025-03-11 PROCEDURE — 88305 TISSUE EXAM BY PATHOLOGIST: CPT

## 2025-03-11 PROCEDURE — 93923 UPR/LXTR ART STDY 3+ LVLS: CPT

## 2025-03-11 PROCEDURE — 82803 BLOOD GASES ANY COMBINATION: CPT

## 2025-03-11 PROCEDURE — 36415 COLL VENOUS BLD VENIPUNCTURE: CPT

## 2025-03-11 PROCEDURE — 78451 HT MUSCLE IMAGE SPECT SING: CPT | Mod: MC

## 2025-03-11 PROCEDURE — 87070 CULTURE OTHR SPECIMN AEROBIC: CPT

## 2025-03-11 PROCEDURE — 82330 ASSAY OF CALCIUM: CPT

## 2025-03-11 PROCEDURE — 87637 SARSCOV2&INF A&B&RSV AMP PRB: CPT

## 2025-03-11 PROCEDURE — 86901 BLOOD TYPING SEROLOGIC RH(D): CPT

## 2025-03-11 PROCEDURE — 80202 ASSAY OF VANCOMYCIN: CPT

## 2025-03-11 PROCEDURE — 87086 URINE CULTURE/COLONY COUNT: CPT

## 2025-03-11 PROCEDURE — 87186 SC STD MICRODIL/AGAR DIL: CPT

## 2025-03-11 PROCEDURE — 81001 URINALYSIS AUTO W/SCOPE: CPT

## 2025-03-11 PROCEDURE — 83605 ASSAY OF LACTIC ACID: CPT

## 2025-03-11 PROCEDURE — 84295 ASSAY OF SERUM SODIUM: CPT

## 2025-03-11 PROCEDURE — 96374 THER/PROPH/DIAG INJ IV PUSH: CPT

## 2025-03-11 PROCEDURE — 71045 X-RAY EXAM CHEST 1 VIEW: CPT

## 2025-03-11 PROCEDURE — 85025 COMPLETE CBC W/AUTO DIFF WBC: CPT

## 2025-03-11 PROCEDURE — 85014 HEMATOCRIT: CPT

## 2025-03-11 PROCEDURE — C1751: CPT

## 2025-03-11 PROCEDURE — A9500: CPT

## 2025-03-11 PROCEDURE — 99285 EMERGENCY DEPT VISIT HI MDM: CPT

## 2025-03-11 PROCEDURE — 87040 BLOOD CULTURE FOR BACTERIA: CPT

## 2025-03-11 PROCEDURE — 82435 ASSAY OF BLOOD CHLORIDE: CPT

## 2025-03-11 PROCEDURE — 73720 MRI LWR EXTREMITY W/O&W/DYE: CPT | Mod: MC

## 2025-03-11 PROCEDURE — 80048 BASIC METABOLIC PNL TOTAL CA: CPT

## 2025-03-11 PROCEDURE — 85027 COMPLETE CBC AUTOMATED: CPT

## 2025-03-11 PROCEDURE — 99232 SBSQ HOSP IP/OBS MODERATE 35: CPT

## 2025-03-11 PROCEDURE — 85610 PROTHROMBIN TIME: CPT

## 2025-03-11 PROCEDURE — 0225U NFCT DS DNA&RNA 21 SARSCOV2: CPT

## 2025-03-11 PROCEDURE — 85652 RBC SED RATE AUTOMATED: CPT

## 2025-03-11 PROCEDURE — 97161 PT EVAL LOW COMPLEX 20 MIN: CPT

## 2025-03-11 PROCEDURE — 93971 EXTREMITY STUDY: CPT

## 2025-03-11 PROCEDURE — 84100 ASSAY OF PHOSPHORUS: CPT

## 2025-03-11 PROCEDURE — 86900 BLOOD TYPING SEROLOGIC ABO: CPT

## 2025-03-11 PROCEDURE — 36569 INSJ PICC 5 YR+ W/O IMAGING: CPT

## 2025-03-11 PROCEDURE — 87075 CULTR BACTERIA EXCEPT BLOOD: CPT

## 2025-03-11 PROCEDURE — 88307 TISSUE EXAM BY PATHOLOGIST: CPT

## 2025-03-11 PROCEDURE — 86850 RBC ANTIBODY SCREEN: CPT

## 2025-03-11 PROCEDURE — 85018 HEMOGLOBIN: CPT

## 2025-03-11 PROCEDURE — 96375 TX/PRO/DX INJ NEW DRUG ADDON: CPT

## 2025-03-11 PROCEDURE — 87640 STAPH A DNA AMP PROBE: CPT

## 2025-03-11 PROCEDURE — 99239 HOSP IP/OBS DSCHRG MGMT >30: CPT

## 2025-03-11 PROCEDURE — 96376 TX/PRO/DX INJ SAME DRUG ADON: CPT

## 2025-03-11 PROCEDURE — 82962 GLUCOSE BLOOD TEST: CPT

## 2025-03-11 RX ORDER — SACUBITRIL AND VALSARTAN 49; 51 MG/1; MG/1
1 TABLET, FILM COATED ORAL
Qty: 0 | Refills: 0 | DISCHARGE
Start: 2025-03-11

## 2025-03-11 RX ORDER — INSULIN GLARGINE-YFGN 100 [IU]/ML
30 INJECTION, SOLUTION SUBCUTANEOUS AT BEDTIME
Refills: 0 | Status: DISCONTINUED | OUTPATIENT
Start: 2025-03-11 | End: 2025-03-11

## 2025-03-11 RX ORDER — ACETAMINOPHEN 500 MG/5ML
2 LIQUID (ML) ORAL
Qty: 0 | Refills: 0 | DISCHARGE
Start: 2025-03-11

## 2025-03-11 RX ORDER — ATORVASTATIN CALCIUM 80 MG/1
1 TABLET, FILM COATED ORAL
Qty: 0 | Refills: 0 | DISCHARGE
Start: 2025-03-11

## 2025-03-11 RX ADMIN — SACUBITRIL AND VALSARTAN 1 TABLET(S): 49; 51 TABLET, FILM COATED ORAL at 17:13

## 2025-03-11 RX ADMIN — CILOSTAZOL 50 MILLIGRAM(S): 50 TABLET ORAL at 17:14

## 2025-03-11 RX ADMIN — INSULIN LISPRO 14 UNIT(S): 100 INJECTION, SOLUTION INTRAVENOUS; SUBCUTANEOUS at 12:45

## 2025-03-11 RX ADMIN — CILOSTAZOL 50 MILLIGRAM(S): 50 TABLET ORAL at 06:02

## 2025-03-11 RX ADMIN — METOPROLOL SUCCINATE 50 MILLIGRAM(S): 50 TABLET, EXTENDED RELEASE ORAL at 06:02

## 2025-03-11 RX ADMIN — Medication 166.67 MILLIGRAM(S): at 11:22

## 2025-03-11 RX ADMIN — Medication 81 MILLIGRAM(S): at 11:24

## 2025-03-11 RX ADMIN — SACUBITRIL AND VALSARTAN 1 TABLET(S): 49; 51 TABLET, FILM COATED ORAL at 06:02

## 2025-03-11 RX ADMIN — ENOXAPARIN SODIUM 40 MILLIGRAM(S): 100 INJECTION SUBCUTANEOUS at 06:02

## 2025-03-11 RX ADMIN — INSULIN LISPRO 1: 100 INJECTION, SOLUTION INTRAVENOUS; SUBCUTANEOUS at 08:51

## 2025-03-11 RX ADMIN — Medication 1 APPLICATION(S): at 11:25

## 2025-03-11 RX ADMIN — METOPROLOL SUCCINATE 50 MILLIGRAM(S): 50 TABLET, EXTENDED RELEASE ORAL at 17:13

## 2025-03-11 RX ADMIN — INSULIN LISPRO 14 UNIT(S): 100 INJECTION, SOLUTION INTRAVENOUS; SUBCUTANEOUS at 17:13

## 2025-03-11 RX ADMIN — INSULIN LISPRO 14 UNIT(S): 100 INJECTION, SOLUTION INTRAVENOUS; SUBCUTANEOUS at 08:51

## 2025-03-11 NOTE — PROGRESS NOTE ADULT - ASSESSMENT
Patient is a 54 year old male with PMH of DM2, CAD s/p CABG, HFrEF, PAD s/p RLE angioplasty/stent and s/p R 3rd metatarsal head resection, submetatarsal debridement, and kerecis graft who presents for right foot pain for few weeks and hyperglycemia.    Diabetic foot ulcer, with c/f early OM  - Per Podiatry, R foot submet 3 wound to periosteum fibrotic, no malodor, no purulent drainage, no acute signs of infection   - R foot MRI with findings c/f possible reactive vs early OM, plantar soft tissue wound with gas extending along 2nd flexor tendons from mid metatarsal to proximal phalanx base, no abscess noted    - R foot wound culture polymicrobial - Morganella morganii, E.faecalis, Staph epi, Corynebacterium, Kleb oxytoca/Raoultella ornithinolytica, Prevotella, B. fragilis   - CXR with questionable L basilar opacity in conjunction with an elevated L hemidiaphragm may be intra-abdominal viscera; underlying effusion/atelectasis not completely excluded  - COVID/Flu/RSV negative 3/5   - s/p vancomycin and zosyn in the ER  - MRSA PCR screen negative, no MRSA isolated to date   - 3/5 Bcx NGTD x2   - 3/7 s/p OR for R foot partial 2nd and 3rd ray amputation   - brief op noted reviewed - high concern for residual bone infection, plan for rtor for debridement and delayed primary closure pending appearance    - OR cultures - 2nd and 3rd metatarsal clean margin - with E.faecalis and Staph epi, sensitivities reviewed    - OR culture deep wound culture with E.faecalis, Bacteroides fragilis, Bifidobacterium breve  - 3/8 febrile early am to 101.1F ?inflammatory post op -- now resolved   - 3/8 Bcx NGTD x2  - 3/10 s/p RUE PICC Line      s/p zosyn 3/4-3/8  s/p unasyn  s/p ceftriaxone      Recommendations:   Follow surg path report   Continue vancomycin 1.25g IV Q12h to cover for Staph and E.faecalis isolated from bone   - monitor vancomycin trough -- check prior to 4th dose today (ordered), goal -600   - will need therapeutic trough prior to dc  Will need prolonged 6 week course until 4/17/25 for now given positive cultures, final duration pending path  Will need weekly labs CBC/CMP/ESR/CRP/vancomycin trough while on IV antibiotics - fax  results to [phone].  Patient will follow up with Dr. Jada Kelley in ID office within 1 week of discharge   Local wound care   Continue rest of care per primary team       Jason Vidal M.D.  [facility]  Available on [employer] TEAMS - *PREFERRED*  [phone]  After 5pm on weekdays and all day on weekends - please call [phone]     Thank you for consulting us and involving us in the management of this patients case. In addition to reviewing history, imaging, documents, labs, microbiology, took into account antibiotic stewardship, local antibiogram and infection control strategies and potential transmission issues at time of treatment decision making process.    Patient is a 54 year old male with PMH of DM2, CAD s/p CABG, HFrEF, PAD s/p RLE angioplasty/stent and s/p R 3rd metatarsal head resection, submetatarsal debridement, and kerecis graft who presents for right foot pain for few weeks and hyperglycemia.    Diabetic foot ulcer, with c/f early OM  - Per Podiatry, R foot submet 3 wound to periosteum fibrotic, no malodor, no purulent drainage, no acute signs of infection   - R foot MRI with findings c/f possible reactive vs early OM, plantar soft tissue wound with gas extending along 2nd flexor tendons from mid metatarsal to proximal phalanx base, no abscess noted    - R foot wound culture polymicrobial - Morganella morganii, E.faecalis, Staph epi, Corynebacterium, Kleb oxytoca/Raoultella ornithinolytica, Prevotella, B. fragilis   - CXR with questionable L basilar opacity in conjunction with an elevated L hemidiaphragm may be intra-abdominal viscera; underlying effusion/atelectasis not completely excluded  - COVID/Flu/RSV negative 3/5   - s/p vancomycin and zosyn in the ER  - MRSA PCR screen negative, no MRSA isolated to date   - 3/5 Bcx NGTD x2   - 3/7 s/p OR for R foot partial 2nd and 3rd ray amputation   - brief op noted reviewed - high concern for residual bone infection, plan for rtor for debridement and delayed primary closure pending appearance    - OR cultures - 2nd and 3rd metatarsal clean margin - with E.faecalis and Staph epi, sensitivities reviewed    - OR culture deep wound culture with E.faecalis, Bacteroides fragilis, Bifidobacterium breve  - 3/8 febrile early am to 101.1F ?inflammatory post op -- now resolved   - 3/8 Bcx NGTD x2  - 3/10 s/p RUE PICC Line      s/p zosyn 3/4-3/8  s/p unasyn 3/9-3/10  s/p ceftriaxone 3/9-3/10     Recommendations:   Follow surg path report   Continue vancomycin 1.25g IV Q12h to cover for Staph and E.faecalis isolated from bone   - monitor vancomycin trough -- check prior to 4th dose today (ordered), goal -600   - will need therapeutic trough prior to dc  Will need prolonged 6 week course until 4/17/25 for now given positive cultures, final duration pending path  Will need weekly labs CBC/CMP/ESR/CRP/vancomycin trough while on IV antibiotics - fax  results to [phone].  Patient will follow up with Dr. Jada Kelley in ID office within 1 week of discharge   Local wound care   Continue rest of care per primary team       Jason Vidal M.D.  [facility]  Available on [employer] TEAMS - *PREFERRED*  [phone]  After 5pm on weekdays and all day on weekends - please call [phone]     Thank you for consulting us and involving us in the management of this patients case. In addition to reviewing history, imaging, documents, labs, microbiology, took into account antibiotic stewardship, local antibiogram and infection control strategies and potential transmission issues at time of treatment decision making process.

## 2025-03-11 NOTE — PROGRESS NOTE ADULT - PROBLEM SELECTOR PLAN 5
DVT ppx - lovenox  Pending PT eval - rec'd for home PT
DVT ppx - lovenox
DVT ppx - lovenox  Penidng PT eval
DVT ppx - lovenox  Pending PT eval - rec'd for home PT    D/w wife   DC home
- Continue with medications as above  - Continue with Pletal  - Cleared by vascular for podiatric intervention
- Continue with medications as above  - Continue with Pletal  - Cleared by vascular for podiatric intervention

## 2025-03-11 NOTE — PROGRESS NOTE ADULT - ASSESSMENT
54M with PMHx of T2DM, CAD (post-CABG), mild HFrEF (EF 45%), and PAD (with recent angiogram Aug. 2024) presenting for several week history of right foot pain and hyperglycemia. Patient states he's been having right foot pain that has been worsening. He denies fever or chills. He states sometimes he forgets to take his insulin which likely explains why he's been hyperglycemic. Recently here in Aug 2024 and he had a right 3rd digit partial digit resection with submetatarsal debridement and graft. Present with right foot pain found to have hyperglycemia. Endocrine consulted for management of uncontrolled DM2. Patient reports feeling good, has been eating mostly full meals and tolerating POs. FBG slightly elevated, will slightly increase Lantus to 30u QHS for tighter BG control. Will keep mealtime dose as is for now given better postprandial BG control last evening. No hypoglycemia. Patient reports his wife helps with his medications at home. Endocrine will closely monitor BG and adjust insulin as needed for BG goal 100-180mg/dL inpatient.     #Uncontrolled T2DM with hyperglycemia  - Last A1c 14%  - Home meds: Lantus 52 units qhs, Humalog 20 units tidac (nonadherent for last 4-6 weeks)  - Follows with Dr. Mcdaniel? in [place]  - Started on Lantus 50 units qhs + Admelog 15 units tidac         54M with PMHx of T2DM, CAD (post-CABG), mild HFrEF (EF 45%), and PAD (with recent angiogram Aug. 2024) presenting for several week history of right foot pain and hyperglycemia. Patient states he's been having right foot pain that has been worsening. He denies fever or chills. He states sometimes he forgets to take his insulin which likely explains why he's been hyperglycemic. Recently here in Aug 2024 and he had a right 3rd digit partial digit resection with submetatarsal debridement and graft. Present with right foot pain found to have hyperglycemia. Endocrine consulted for management of uncontrolled DM2. Patient reports feeling good, has been eating mostly full meals and tolerating POs. FBG slightly elevated, will slightly increase Lantus to 30u QHS for tighter BG control. Will keep mealtime dose as is for now given better postprandial BG control last evening. No hypoglycemia. Patient reports his wife helps with his medications at home. Endocrine will closely monitor BG and adjust insulin as needed for BG goal 100-180mg/dL inpatient.       Addendum 1630: Patient is pending d/c home today now.   #Uncontrolled T2DM with hyperglycemia  - Last A1c 14%  - Home meds: Lantus 52 units qhs, Humalog 20 units tidac (nonadherent for last 4-6 weeks)  - Follows with Dr. Mcdaniel? in [place]  - Started on Lantus 50 units qhs + Admelog 15 units tidac

## 2025-03-11 NOTE — DISCHARGE NOTE NURSING/CASE MANAGEMENT/SOCIAL WORK - PATIENT PORTAL LINK FT
You can access the FollowMyHealth Patient Portal offered by [facility] by registering at the following website: [email or web address]. By joining [facility]’s FollowMyHealth portal, you will also be able to view your health information using other applications (apps) compatible with our system.

## 2025-03-11 NOTE — PROGRESS NOTE ADULT - SUBJECTIVE AND OBJECTIVE BOX
Patient is a 54y old  Male who presents with a chief complaint of Right Foot Pain (11 Mar 2025 11:12)      SUBJECTIVE / OVERNIGHT EVENTS: tranlating in [place] is fine, pain in foot better, no cp,     MEDICATIONS  (STANDING):  acetaminophen   IVPB .. 1000 milliGRAM(s) IV Intermittent once  aspirin enteric coated 81 milliGRAM(s) Oral daily  atorvastatin 80 milliGRAM(s) Oral at bedtime  chlorhexidine 2% Cloths 1 Application(s) Topical daily  cilostazol 50 milliGRAM(s) Oral two times a day  dextrose 5%. 1000 milliLiter(s) (50 mL/Hr) IV Continuous <Continuous>  dextrose 5%. 1000 milliLiter(s) (100 mL/Hr) IV Continuous <Continuous>  dextrose 50% Injectable 12.5 Gram(s) IV Push once  dextrose 50% Injectable 25 Gram(s) IV Push once  enoxaparin Injectable 40 milliGRAM(s) SubCutaneous every 24 hours  glucagon  Injectable 1 milliGRAM(s) IntraMuscular once  insulin glargine Injectable (LANTUS) 30 Unit(s) SubCutaneous at bedtime  insulin lispro (ADMELOG) corrective regimen sliding scale   SubCutaneous <User Schedule>  insulin lispro (ADMELOG) corrective regimen sliding scale   SubCutaneous three times a day before meals  insulin lispro Injectable (ADMELOG) 14 Unit(s) SubCutaneous three times a day with meals  lidocaine 1% Injectable 20 milliLiter(s) Local Injection once  metoprolol tartrate 50 milliGRAM(s) Oral two times a day  sacubitril 24 mG/valsartan 26 mG 1 Tablet(s) Oral two times a day  vancomycin  IVPB 1250 milliGRAM(s) IV Intermittent every 12 hours    MEDICATIONS  (PRN):  acetaminophen     Tablet .. 650 milliGRAM(s) Oral every 6 hours PRN Mild Pain (1 - 3)  morphine  - Injectable 2 milliGRAM(s) IV Push every 6 hours PRN Severe Pain (7 - 10)  oxycodone    5 mG/acetaminophen 325 mG 1 Tablet(s) Oral every 4 hours PRN Moderate Pain (4 - 6)        CAPILLARY BLOOD GLUCOSE      POCT Blood Glucose.: 138 mg/dL (11 Mar 2025 12:04)  POCT Blood Glucose.: 196 mg/dL (11 Mar 2025 08:41)  POCT Blood Glucose.: 215 mg/dL (11 Mar 2025 01:51)  POCT Blood Glucose.: 167 mg/dL (10 Mar 2025 21:05)  POCT Blood Glucose.: 218 mg/dL (10 Mar 2025 16:59)    I&O's Summary    10 Mar 2025 07:01  -  11 Mar 2025 07:00  --------------------------------------------------------  IN: 720 mL / OUT: 1200 mL / NET: -480 mL    11 Mar 2025 07:01  -  11 Mar 2025 15:06  --------------------------------------------------------  IN: 720 mL / OUT: 1000 mL / NET: -280 mL        PHYSICAL EXAM:  GENERAL: NAD, well-developed  HEAD:  Atraumatic, Normocephalic  EYES: EOMI, PERRLA, conjunctiva and sclera clear  NECK: Supple, No JVD  CHEST/LUNG: Clear to auscultation bilaterally; No wheeze  HEART: Regular rate and rhythm; No murmurs, rubs, or gallops  ABDOMEN: Soft, Nontender, Nondistended; Bowel sounds present  EXTREMITIES:  2+ Peripheral Pulses, right foot dressing  PSYCH: AAOx3      LABS:                        9.3    8.44  )-----------( 353      ( 10 Mar 2025 14:15 )             28.3     03-10    134[L]  |  97  |  9   ----------------------------<  276[H]  3.9   |  25  |  0.80    Ca    8.6      10 Mar 2025 07:26            Urinalysis Basic - ( 10 Mar 2025 07:26 )    Color: x / Appearance: x / SG: x / pH: x  Gluc: 276 mg/dL / Ketone: x  / Bili: x / Urobili: x   Blood: x / Protein: x / Nitrite: x   Leuk Esterase: x / RBC: x / WBC x   Sq Epi: x / Non Sq Epi: x / Bacteria: x        RADIOLOGY & ADDITIONAL TESTS:    Imaging Personally Reviewed:    Consultant(s) Notes Reviewed:      Care Discussed with Consultants/Other Providers:

## 2025-03-11 NOTE — PROGRESS NOTE ADULT - NSPROGADDITIONALINFOA_GEN_ALL_CORE
Contact via Microsoft Teams during business hours  To reach covering provider access AMION via sunrise tools  For Urgent matters/after-hours/weekends/holidays please page endocrine fellow on call   For nonurgent matters please email [email or web address]    Please note that this patient may be followed by different provider tomorrow.  Notify endocrine 24 hours prior to discharge for final recommendations
Contact via Microsoft Teams during business hours  To reach covering provider access AMION via sunrise tools  For Urgent matters/after-hours/weekends/holidays please page endocrine fellow on call   For nonurgent matters please email [email or web address]    Please note that this patient may be followed by different provider tomorrow.  Notify endocrine 24 hours prior to discharge for final recommendations
D/w ACP Zeta
D/w CAMERON Jiang
D/w CAMERON Real
D/w CAMERON Jiang
D/w CAMERON Real

## 2025-03-11 NOTE — PHARMACOTHERAPY INTERVENTION NOTE - COMMENTS
Counseled patient/patient’s caregiver  on the following inpatient/discharge medications names (brand/generic), indication, and possible side effects:  vancomycin  IVPB 1250 milliGRAM(s) IV Intermittent every 12 hours. Estimated last day is 4/17/25 (pt to f/u with ID MD within 1 week -  Dr. Jada Kelley      Patient was provided with a medication card for their new medication. Patient questions and concerns were answered and addressed. Patient demonstrated understanding.    Lucila Benedict PharmD  Transition of Care Pharmacist  Available on Microsoft Teams (preferred)

## 2025-03-11 NOTE — PROGRESS NOTE ADULT - REASON FOR ADMISSION
Right Foot Pain

## 2025-03-11 NOTE — PROGRESS NOTE ADULT - PROBLEM SELECTOR PROBLEM 1
Diabetic foot ulcer
Arterial insufficiency with ischemic ulcer
Uncontrolled type 2 diabetes mellitus with hyperglycemia
Diabetic foot ulcer
Arterial insufficiency with ischemic ulcer
Uncontrolled type 2 diabetes mellitus with hyperglycemia
Arterial insufficiency with ischemic ulcer
Diabetic foot ulcer
Uncontrolled type 2 diabetes mellitus with hyperglycemia
Diabetic foot ulcer

## 2025-03-11 NOTE — PROGRESS NOTE ADULT - NUTRITIONAL ASSESSMENT
Diet, Consistent Carbohydrate/No Snacks:   Ethan (03-07-25 @ 19:52) [Active]
Diet, Consistent Carbohydrate/No Snacks:   Ethan (03-07-25 @ 19:52) [Active]

## 2025-03-11 NOTE — DISCHARGE NOTE NURSING/CASE MANAGEMENT/SOCIAL WORK - NSDCFUADDAPPT_GEN_ALL_CORE_FT
Podiatry Discharge Instructions:  Follow up: Please follow up with Dr. De Leon within 1 week of discharge from the hospital, please call [phone] for appointment and discuss that you recently were seen in the hospital.  Wound Care: Please leave your dressing clean dry intact until your follow up appointment  Weight bearing: Please weight bear as tolerated to the right heel in a surgical shoe.  Antibiotics: Please continue as instructed.

## 2025-03-11 NOTE — PROGRESS NOTE ADULT - PROVIDER SPECIALTY LIST ADULT
Cardiology
Endocrinology
Infectious Disease
Infectious Disease
Podiatry
Vascular Surgery
Cardiology
Infectious Disease
Infectious Disease
Podiatry
Hospitalist
Infectious Disease
Podiatry
Vascular Surgery
Cardiology
Cardiology
Endocrinology
Infectious Disease
Vascular Surgery
Hospitalist
Vascular Surgery
Hospitalist
Vascular Surgery
Hospitalist
Endocrinology

## 2025-03-11 NOTE — PROGRESS NOTE ADULT - PROBLEM SELECTOR PROBLEM 5
Prophylactic measure
PAD (peripheral artery disease)
PAD (peripheral artery disease)

## 2025-03-11 NOTE — DISCHARGE NOTE NURSING/CASE MANAGEMENT/SOCIAL WORK - FINANCIAL ASSISTANCE
[facility] provides services at a reduced cost to those who are determined to be eligible through [facility]’s financial assistance program. Information regarding [facility]’s financial assistance program can be found by going to [email or web address] or by calling [phone].

## 2025-03-11 NOTE — PROGRESS NOTE ADULT - TIME BILLING
- Ordering, reviewing, and interpreting labs, testing, and imaging.  - Independently obtaining a review of systems and performing a physical exam  - Reviewing consultant documentation/recommendations in addition to discussing plan of care with consultants.  - Counselling and educating patient and family regarding interpretation of aforementioned items and plan of care.
DC time 50 min
- Ordering, reviewing, and interpreting labs, testing, and imaging.  - Independently obtaining a review of systems and performing a physical exam  - Reviewing consultant documentation/recommendations in addition to discussing plan of care with consultants.  - Counselling and educating patient and family regarding interpretation of aforementioned items and plan of care.

## 2025-03-11 NOTE — PROGRESS NOTE ADULT - PROBLEM SELECTOR PLAN 1
- Per podiatry: "Right foot submet 3 wound to periosteum fibrotic, no malodor, no purulent drainage, no acute signs of infection"  - S/p right foot partial third ray resection partially closed 3/6 with delayed primary closure of the right foot plantar wound 3/10.   - Deep wound culture from bone with E. Faecalis and Staph Epi, now s/p PICC with plan for prolonged course with Vancomycin 6 weeks

## 2025-03-11 NOTE — PROGRESS NOTE ADULT - SUBJECTIVE AND OBJECTIVE BOX
DATE OF SERVICE: 03-11-25 @ 15:09    Patient is a 54y old  Male who presents with a chief complaint of Right Foot Pain (11 Mar 2025 11:12)      INTERVAL HISTORY: Feels ok.     REVIEW OF SYSTEMS:  CONSTITUTIONAL: No weakness  EYES/ENT: No visual changes;  No throat pain   NECK: No pain or stiffness  RESPIRATORY: No cough, wheezing; No shortness of breath  CARDIOVASCULAR: No chest pain or palpitations  GASTROINTESTINAL: No abdominal  pain. No nausea, vomiting, or hematemesis  GENITOURINARY: No dysuria, frequency or hematuria  NEUROLOGICAL: No stroke like symptoms  SKIN: No rashes    	  MEDICATIONS:  metoprolol tartrate 50 milliGRAM(s) Oral two times a day  sacubitril 24 mG/valsartan 26 mG 1 Tablet(s) Oral two times a day        PHYSICAL EXAM:  T(C): 37.6 (03-11-25 @ 12:40), Max: 37.6 (03-11-25 @ 12:40)  HR: 78 (03-11-25 @ 12:40) (78 - 95)  BP: 103/62 (03-11-25 @ 12:40) (103/62 - 132/70)  RR: 18 (03-11-25 @ 12:40) (18 - 18)  SpO2: 95% (03-11-25 @ 12:40) (94% - 97%)  Wt(kg): --  I&O's Summary    10 Mar 2025 07:01  -  11 Mar 2025 07:00  --------------------------------------------------------  IN: 720 mL / OUT: 1200 mL / NET: -480 mL    11 Mar 2025 07:01  -  11 Mar 2025 15:09  --------------------------------------------------------  IN: 720 mL / OUT: 1000 mL / NET: -280 mL          Appearance: In no distress	  HEENT:    PERRL, EOMI	  Cardiovascular:  S1 S2, No JVD  Respiratory: Lungs clear to auscultation	  Gastrointestinal:  Soft, Non-tender, + BS	  Vascularature:  No edema of LE  Psychiatric: Appropriate affect   Neuro: no acute focal deficits                               9.3    8.44  )-----------( 353      ( 10 Mar 2025 14:15 )             28.3     03-10    134[L]  |  97  |  9   ----------------------------<  276[H]  3.9   |  25  |  0.80    Ca    8.6      10 Mar 2025 07:26          Labs personally reviewed      ASSESSMENT/PLAN: 	    54M w/ T2DM, CAD s/p CABG, HFrEF EF 45%, and PAD here for diabetic foot ulcer plan for RLE wound debridement and ray resection with podiatry. Cardiology consulted for preop cardiac eval.     1. CAD s/p CABG   2. PAD     -s/p CABG in 2019, cont asa and statin. off brilinta   -hx of HFrEF due to ICM, appear to be compensated   -cont metoprolol 50 mg BID  -cont atorva 80 mg qhs   -cont entresto 24/26 mg bid for HFrEF GDMT   -pt has drop in EF from TTE ECHO in 2023 and 2024  -repeat TTE with interval improvement in EF from 2024 and similar WMAs distribution   -3/7 tolerated the pod procedure well   - Had resting image done for NST- noted to have defects, no CP or angina equivalents         Shana Piznao, AG-NP   Edwin Dwyer DO [facility]  Cardiovascular Medicine  [address] [facility], [address]  Available through call or text on [employer] TEAMs  Office: [phone]

## 2025-03-11 NOTE — PROGRESS NOTE ADULT - SUBJECTIVE AND OBJECTIVE BOX
[facility]  DU Garcia Y. Patel, S. Shah, G. Casimir  [phone]  ([phone] - weekdays after 5pm and weekends)    Name: ELSI MERINO  Age/Gender: 54y Male  MRN: [id number]    Interval History:  Patient seen and examined this morning.   No new complaints noted.  Notes reviewed  No concerning overnight events  Afebrile   Allergies: No Known Allergies      Objective:  Vitals:   T(F): 98.6 (03-11-25 @ 04:34), Max: 98.6 (03-11-25 @ 04:34)  HR: 95 (03-11-25 @ 04:34) (79 - 95)  BP: 123/70 (03-11-25 @ 04:34) (113/63 - 132/70)  RR: 18 (03-11-25 @ 04:34) (18 - 18)  SpO2: 95% (03-11-25 @ 04:34) (95% - 97%)  Physical Examination:  General: no acute distress, nontoxic appearing   HEENT: normocephalic, atraumatic, anicteric  Respiratory: no acc muscle use, breathing comfortably  Cardiovascular: S1 and S2 present  Gastrointestinal: normal appearing, nondistended  Extremities: foot dressing     Laboratory Studies:  CBC:                       9.3    8.44  )-----------( 353      ( 10 Mar 2025 14:15 )             28.3     WBC Trend:  8.44 03-10-25 @ 14:15  9.03 03-10-25 @ 07:26  5.74 03-09-25 @ 06:57  9.81 03-07-25 @ 07:04  9.93 03-06-25 @ 07:01  9.71 03-05-25 @ 21:18  13.65 03-04-25 @ 23:12    CMP: 03-10    134[L]  |  97  |  9   ----------------------------<  276[H]  3.9   |  25  |  0.80    Ca    8.6      10 Mar 2025 07:26      Creatinine: 0.80 mg/dL (03-10-25 @ 07:26)  Creatinine: 0.77 mg/dL (03-09-25 @ 08:47)  Creatinine: 1.27 mg/dL (03-09-25 @ 06:57)  Creatinine: 0.89 mg/dL (03-07-25 @ 08:49)  Creatinine: 0.74 mg/dL (03-06-25 @ 06:58)  Creatinine: 0.86 mg/dL (03-05-25 @ 21:18)  Creatinine: 0.72 mg/dL (03-05-25 @ 04:48)  Creatinine: 0.84 mg/dL (03-04-25 @ 23:12)    Vancomycin Level, Trough: 8.3 ug/mL (03-10-25 @ 22:39)    Microbiology: reviewed   Culture - Urine (collected 03-09-25 @ 20:07)  Source: Clean Catch Clean Catch (Midstream)  Final Report (03-10-25 @ 17:20):    <10,000 CFU/mL Normal Urogenital Timoteo    Culture - Blood (collected 03-08-25 @ 06:39)  Source: Blood Blood-Peripheral  Preliminary Report (03-11-25 @ 09:01):    No growth at 72 Hours    Culture - Blood (collected 03-08-25 @ 06:39)  Source: Blood Blood-Peripheral  Preliminary Report (03-11-25 @ 09:01):    No growth at 72 Hours    Culture - Tissue with Gram Stain (collected 03-07-25 @ 18:38)  Source: Tissue Bone Biopsy 3rd Metatebal Right Foot  Gram Stain (03-09-25 @ 11:41):    No polymorphonuclear leukocytes seen per low power field    No organisms seen per oil power field  Preliminary Report (03-09-25 @ 11:41):    Rare Staphylococcus epidermidis    Rare Enterococcus faecalis  Organism: Staphylococcus epidermidis  Enterococcus faecalis (03-10-25 @ 07:57)  Organism: Enterococcus faecalis (03-10-25 @ 07:57)      Method Type: MOJGAN      -  Ampicillin: S <=2 Predicts results to ampicillin/sulbactam, amoxacillin-clavulanate and  piperacillin-tazobactam.      -  Vancomycin: S 1  Organism: Staphylococcus epidermidis (03-10-25 @ 07:57)      Method Type: MOJGAN      -  Clindamycin: R >4      -  Erythromycin: R >4      -  Gentamicin: S <=4 Should not be used as monotherapy      -  Oxacillin: R >2      -  Penicillin: R >2      -  Rifampin: S <=1 Should not be used as monotherapy      -  Tetracycline: S <=4      -  Trimethoprim/Sulfamethoxazole: S <=0.5/9.5      -  Vancomycin: S 1    Culture - Tissue with Gram Stain (collected 03-07-25 @ 18:23)  Source: Tissue Clean bone margin 2nd metatarsal right foot  Gram Stain (03-08-25 @ 22:35):    No polymorphonuclear leukocytes seen per low power field    No organisms seen per oil power field  Preliminary Report (03-10-25 @ 18:56):    Rare Enterococcus faecalis    Rare Staphylococcus epidermidis  Organism: Enterococcus faecalis  Staphylococcus epidermidis (03-09-25 @ 21:14)  Organism: Staphylococcus epidermidis (03-09-25 @ 21:14)      Method Type: MOJGAN      -  Clindamycin: R >4      -  Erythromycin: R >4      -  Gentamicin: S <=4 Should not be used as monotherapy      -  Oxacillin: R >2      -  Penicillin: R >2      -  Rifampin: S <=1 Should not be used as monotherapy      -  Tetracycline: S <=4      -  Trimethoprim/Sulfamethoxazole: S <=0.5/9.5      -  Vancomycin: S 1  Organism: Enterococcus faecalis (03-09-25 @ 18:10)      Method Type: MOJGAN      -  Ampicillin: S <=2 Predicts results to ampicillin/sulbactam, amoxacillin-clavulanate and  piperacillin-tazobactam.      -  Vancomycin: S 1    Culture - Wound Aerobic/Anaerobic (collected 03-07-25 @ 18:21)  Source: Surgical Swab Deep wound culture right foot  Final Report (03-09-25 @ 18:50):    Few Enterococcus faecalis    Rare Bacteroides fragilis "Susceptibilities not performed"    Rare Bifidobacterium breve "Susceptibilities not performed"    Commensal timoteo consistent with body site  Organism: Enterococcus faecalis (03-09-25 @ 18:50)  Organism: Enterococcus faecalis (03-09-25 @ 18:50)      Method Type: MOJGAN      -  Ampicillin: S <=2 Predicts results to ampicillin/sulbactam, amoxacillin-clavulanate and  piperacillin-tazobactam.      -  Vancomycin: S 1    Culture - Urine (collected 03-05-25 @ 21:18)  Source: Clean Catch Clean Catch (Midstream)  Final Report (03-06-25 @ 22:56):    <10,000 CFU/mL Normal Urogenital Timoteo    Culture - Blood (collected 03-05-25 @ 21:17)  Source: Blood Blood-Peripheral  Final Report (03-11-25 @ 01:01):    No growth at 5 days    Culture - Blood (collected 03-05-25 @ 21:17)  Source: Blood Blood-Peripheral  Final Report (03-11-25 @ 01:01):    No growth at 5 days    Culture - Abscess with Gram Stain (collected 03-05-25 @ 02:55)  Source: Abscess right foot wound  Gram Stain (03-05-25 @ 14:52):    No polymorphonuclear cells seen    per low power field    Few Gram positive cocci in pairs    Moderate Gram Negative Rods    per oil power field  Final Report (03-08-25 @ 11:05):    Culture yields >4 types of aerobic and/or anaerobic bacteria    Call client services within 7 days if further workup is clinically    indicated.    Culture includes    Moderate Morganella morganii    Moderate Enterococcus faecalis    Moderate Staphylococcus epidermidis    Moderate Corynebacterium striatum group "Susceptibilities not performed"    Rare Klebsiella oxytoca/Raoultella ornithinolytica    Numerous Prevotella melaninogenica "Susceptibilities not performed"    Moderate Bacteroides fragilis "Susceptibilities not performed"  Organism: Morganella morganii  Enterococcus faecalis  Staphylococcus epidermidis  Klebsiella oxytoca /Raoutella ornithinolytica (03-08-25 @ 11:05)  Organism: Klebsiella oxytoca /Raoutella ornithinolytica (03-08-25 @ 11:05)      Method Type: MOJGAN      -  Amoxicillin/Clavulanic Acid: S <=8/4      -  Ampicillin: R >16 These ampicillin results predict results for amoxicillin      -  Ampicillin/Sulbactam: S <=4/2      -  Aztreonam: S <=4      -  Cefazolin: S <=2      -  Cefepime: S <=2      -  Cefoxitin: S <=8      -  Ceftriaxone: S <=1      -  Ciprofloxacin: S <=0.25      -  Ertapenem: S <=0.5      -  Gentamicin: S <=2      -  Imipenem: S <=1      -  Levofloxacin: S <=0.5      -  Meropenem: S <=1      -  Piperacillin/Tazobactam: S <=8      -  Tobramycin: S <=2      -  Trimethoprim/Sulfamethoxazole: S <=0.5/9.5  Organism: Staphylococcus epidermidis (03-08-25 @ 11:05)      Method Type: MOJGAN      -  Clindamycin: R >4      -  Erythromycin: R >4      -  Gentamicin: S <=4 Should not be used as monotherapy      -  Oxacillin: R >2      -  Penicillin: R >2      -  Rifampin: S <=1 Should not be used as monotherapy      -  Tetracycline: S <=4      -  Trimethoprim/Sulfamethoxazole: S <=0.5/9.5      -  Vancomycin: S 1  Organism: Enterococcus faecalis (03-08-25 @ 11:05)      Method Type: MOJGAN      -  Ampicillin: S <=2 Predicts results to ampicillin/sulbactam, amoxacillin-clavulanate and  piperacillin-tazobactam.      -  Vancomycin: S 1  Organism: Morganella morganii (03-08-25 @ 11:05)      Method Type: MOJGAN      -  Amoxicillin/Clavulanic Acid: R >16/8      -  Ampicillin: R >16 These ampicillin results predict results for amoxicillin      -  Ampicillin/Sulbactam: R >16/8      -  Aztreonam: S <=4      -  Cefazolin: R >16      -  Cefepime: S <=2      -  Cefoxitin: S <=8      -  Ceftriaxone: S <=1      -  Ciprofloxacin: S <=0.25      -  Ertapenem: S <=0.5      -  Gentamicin: S <=2      -  Levofloxacin: S <=0.5      -  Meropenem: S <=1      -  Piperacillin/Tazobactam: S <=8      -  Tobramycin: S <=2      -  Trimethoprim/Sulfamethoxazole: S <=0.5/9.5    Radiology: reviewed     Medications:  acetaminophen     Tablet .. 650 milliGRAM(s) Oral every 6 hours PRN  acetaminophen   IVPB .. 1000 milliGRAM(s) IV Intermittent once  aspirin enteric coated 81 milliGRAM(s) Oral daily  atorvastatin 80 milliGRAM(s) Oral at bedtime  chlorhexidine 2% Cloths 1 Application(s) Topical daily  cilostazol 50 milliGRAM(s) Oral two times a day  dextrose 5%. 1000 milliLiter(s) IV Continuous <Continuous>  dextrose 5%. 1000 milliLiter(s) IV Continuous <Continuous>  dextrose 50% Injectable 12.5 Gram(s) IV Push once  dextrose 50% Injectable 25 Gram(s) IV Push once  enoxaparin Injectable 40 milliGRAM(s) SubCutaneous every 24 hours  glucagon  Injectable 1 milliGRAM(s) IntraMuscular once  insulin glargine Injectable (LANTUS) 28 Unit(s) SubCutaneous at bedtime  insulin lispro (ADMELOG) corrective regimen sliding scale   SubCutaneous <User Schedule>  insulin lispro (ADMELOG) corrective regimen sliding scale   SubCutaneous three times a day before meals  insulin lispro Injectable (ADMELOG) 14 Unit(s) SubCutaneous three times a day with meals  lidocaine 1% Injectable 20 milliLiter(s) Local Injection once  metoprolol tartrate 50 milliGRAM(s) Oral two times a day  morphine  - Injectable 2 milliGRAM(s) IV Push every 6 hours PRN  oxycodone    5 mG/acetaminophen 325 mG 1 Tablet(s) Oral every 4 hours PRN  sacubitril 24 mG/valsartan 26 mG 1 Tablet(s) Oral two times a day  vancomycin  IVPB 1250 milliGRAM(s) IV Intermittent every 12 hours    Current Antimicrobials:  vancomycin  IVPB 1250 milliGRAM(s) IV Intermittent every 12 hours    Prior/Completed Antimicrobials:  piperacillin/tazobactam IVPB.  piperacillin/tazobactam IVPB.  piperacillin/tazobactam IVPB.-  piperacillin/tazobactam IVPB.-  piperacillin/tazobactam IVPB.-  piperacillin/tazobactam IVPB...  vancomycin  IVPB  vancomycin  IVPB.

## 2025-03-11 NOTE — PROGRESS NOTE ADULT - PROBLEM SELECTOR PLAN 1
Inpatient Plan:  - Check BG TID AC, HS, and 2AM  - Adjust Lantus to 30u QHS  - C/w Admelog 14u TID AC (HOLD if NPO)  - C/w low dose Admelog correctional scales TID AC and HS   - Please keep all IV abx in NS if possible    Discharge Recommendations:  - Basal-bolus (dose TBD)  - Please send:  -- Freestyle Libre3 Plus sensor and reader  -- Glucometer (ACCU_CHECK kahlil Connect, Ascensia Contour Next EZ or One, Freestyle [place] LITE or OneTouch Verio IQ)  -- Glucometer test strips and lancets  (make sure compatible with glucometer), dispense #100 (or #200) use as directed, alcohol pads  -- BD dawn 4mm pen needles  -- Glucose tabs, Baqsimi nasal spray or glucagon emergency kit for hypoglycemia risk   - If patient is not covered for CGM, patient should check FSBG premeals and bedtime. Patient should call their doctor when FSBG <70 or above >400 and or consistently above 200s as changes in the regimen will have to be made.   - Patient will need outpatient Endocrine follow-up - will try to find provider in [facility], or can follow up at [facility] ([phone]) at [address] [place] [address] [address] [place] [id number].  - Routine follow-up with Ophthalmology and Podiatry recommended. Inpatient Plan:  - Check BG TID AC, HS, and 2AM  - Adjust Lantus to 30u QHS  - C/w Admelog 14u TID AC (HOLD if NPO)  - C/w low dose Admelog correctional scales TID AC and HS   - Please keep all IV abx in NS if possible    Discharge Recommendations:  - Discharge on Lantus 30u QHS and Admelog 14u TID AC (HOLD admelog if skipping meal or pre-meal BG <90mg/dL). Patient may need to increase insulin doses at home, he needs close monitor of BGs.   - Please send: Glucose tabs, Baqsimi nasal spray or glucagon emergency kit for hypoglycemia risk   - Patient should check BG TID AC and HS at home. Please tell patient to contact Endocrinologist if BG <70mg/dL x1 or >200mg/dL consistently or >400mg/dL x1.  - Outpatient Endocrine follow-up - can f/u with Dr. Mcdaniel in [place] (patient may have an appt) or can establish care at [facility] [address] [place] [address], [address], [address] [place] [address]  Phone: [phone], please provide clinic info in d/c paperwork.   - Routine follow-up with Ophthalmology and Podiatry recommended.    Pt will need RX for basal insulin pen (ie. Basaglar, Lantus, Tresiba, Toujeo) and bolus insulin pen (ie. humalog/novolog/admelog) depending on insurance coverage; please send test scripts to see which is covered. Please make sure patient has all diabetes supplies (glucometer, test strips, lancets, alcohol swabs, insulin pen needles).

## 2025-03-11 NOTE — PROGRESS NOTE ADULT - SUBJECTIVE AND OBJECTIVE BOX
Patient seen today for follow up inpatient Diabetes Mellitus management.    Chief Complaint: Type 2 Diabetes Mellitus    Icelandic  #[id number] used to communicate with patient.     INTERVAL HX:  Patient seen in [facility] 365 W1. Patient is alert and oriented, resting in bed. Patient reports eating mostly full meals and tolerating POs. FBG elevated this am to 196mg/dL, elevated BG overnight to 215mg/dL. Patient denies eating any snacks overnight or OSH food/snacks between meals. No hypoglycemia. Noted improved postprandial BG last evening. Blood glucose levels in the last 24hrs have been 167-275mg/dL.     Review of Systems:  General: As above.  Respiratory: Denies any SOB, CAGE, or cough.  Gastrointestinal: Denies any n/v/d or abdominal pain.   Endocrine: Denies any polyuria, polydipsia, polyphagia, visual changes, or numbness in feet.     Allergies  No Known Allergies      Intolerances  None.       MEDICATIONS  (STANDING):  acetaminophen     Tablet .. 650 milliGRAM(s) Oral every 6 hours PRN  acetaminophen   IVPB .. 1000 milliGRAM(s) IV Intermittent once  aspirin enteric coated 81 milliGRAM(s) Oral daily  atorvastatin 80 milliGRAM(s) Oral at bedtime  chlorhexidine 2% Cloths 1 Application(s) Topical daily  cilostazol 50 milliGRAM(s) Oral two times a day  dextrose 5%. 1000 milliLiter(s) IV Continuous <Continuous>  dextrose 5%. 1000 milliLiter(s) IV Continuous <Continuous>  dextrose 50% Injectable 12.5 Gram(s) IV Push once  dextrose 50% Injectable 25 Gram(s) IV Push once  enoxaparin Injectable 40 milliGRAM(s) SubCutaneous every 24 hours  glucagon  Injectable 1 milliGRAM(s) IntraMuscular once  insulin glargine Injectable (LANTUS) 30 Unit(s) SubCutaneous at bedtime  insulin lispro (ADMELOG) corrective regimen sliding scale   SubCutaneous <User Schedule>  insulin lispro (ADMELOG) corrective regimen sliding scale   SubCutaneous three times a day before meals  insulin lispro Injectable (ADMELOG) 14 Unit(s) SubCutaneous three times a day with meals  lidocaine 1% Injectable 20 milliLiter(s) Local Injection once  metoprolol tartrate 50 milliGRAM(s) Oral two times a day  morphine  - Injectable 2 milliGRAM(s) IV Push every 6 hours PRN  oxycodone    5 mG/acetaminophen 325 mG 1 Tablet(s) Oral every 4 hours PRN  sacubitril 24 mG/valsartan 26 mG 1 Tablet(s) Oral two times a day  vancomycin  IVPB 1250 milliGRAM(s) IV Intermittent every 12 hours      atorvastatin 80 milliGRAM(s) Oral at bedtime  dextrose 50% Injectable 12.5 Gram(s) IV Push once  dextrose 50% Injectable 25 Gram(s) IV Push once  glucagon  Injectable 1 milliGRAM(s) IntraMuscular once  insulin glargine Injectable (LANTUS) 30 Unit(s) SubCutaneous at bedtime  insulin lispro (ADMELOG) corrective regimen sliding scale   SubCutaneous <User Schedule>  insulin lispro (ADMELOG) corrective regimen sliding scale   SubCutaneous three times a day before meals  insulin lispro Injectable (ADMELOG) 14 Unit(s) SubCutaneous three times a day with meals      insulin lispro (ADMELOG) corrective regimen sliding scale   SubCutaneous <User Schedule>  insulin lispro (ADMELOG) corrective regimen sliding scale   SubCutaneous three times a day before meals  insulin lispro Injectable (ADMELOG) 14 Unit(s) SubCutaneous three times a day with meals      PHYSICAL EXAM:  VITALS:   T(C): 37 (03-11-25 @ 04:34), Max: 37 (03-11-25 @ 04:34)  HR: 95 (03-11-25 @ 04:34) (79 - 95)  BP: 123/70 (03-11-25 @ 04:34) (113/63 - 132/70)  RR: 18 (03-11-25 @ 04:34) (18 - 18)  SpO2: 95% (03-11-25 @ 04:34) (95% - 97%)    GENERAL: In no acute distress  Respiratory: Respirations unlabored  Extremities: Warm and dry, no edema  NEURO: Alert and oriented, appropriate     LABS:  POCT Blood Glucose.: 196 mg/dL (03-11-25 @ 08:41)  POCT Blood Glucose.: 215 mg/dL (03-11-25 @ 01:51)  POCT Blood Glucose.: 167 mg/dL (03-10-25 @ 21:05)  POCT Blood Glucose.: 218 mg/dL (03-10-25 @ 16:59)  POCT Blood Glucose.: 275 mg/dL (03-10-25 @ 11:53)  POCT Blood Glucose.: 354 mg/dL (03-10-25 @ 08:20)  POCT Blood Glucose.: 320 mg/dL (03-10-25 @ 02:53)  POCT Blood Glucose.: 346 mg/dL (03-09-25 @ 21:39)  POCT Blood Glucose.: 302 mg/dL (03-09-25 @ 17:05)  POCT Blood Glucose.: 270 mg/dL (03-09-25 @ 12:25)  POCT Blood Glucose.: 118 mg/dL (03-09-25 @ 08:34)  POCT Blood Glucose.: 164 mg/dL (03-09-25 @ 01:06)  POCT Blood Glucose.: 196 mg/dL (03-08-25 @ 21:36)  POCT Blood Glucose.: 176 mg/dL (03-08-25 @ 16:55)  POCT Blood Glucose.: 335 mg/dL (03-08-25 @ 12:27)                          9.3    8.44  )-----------( 353      ( 10 Mar 2025 14:15 )             28.3     03-10    134[L]  |  97  |  9   ----------------------------<  276[H]  3.9   |  25  |  0.80    Ca    8.6      10 Mar 2025 07:26          Urinalysis Basic - ( 10 Mar 2025 07:26 )    Color: x / Appearance: x / SG: x / pH: x  Gluc: 276 mg/dL / Ketone: x  / Bili: x / Urobili: x   Blood: x / Protein: x / Nitrite: x   Leuk Esterase: x / RBC: x / WBC x   Sq Epi: x / Non Sq Epi: x / Bacteria: x        Culture - Urine (collected 09 Mar 2025 20:07)  Source: Clean Catch Clean Catch (Midstream)  Final Report (10 Mar 2025 17:20):    <10,000 CFU/mL Normal Urogenital Marianne          A1C with Estimated Average Glucose Result: A1C with Estimated Average Glucose Result: >15.5 % (03-04-25 @ 23:12)

## 2025-03-12 LAB
CULTURE RESULTS: ABNORMAL
ORGANISM # SPEC MICROSCOPIC CNT: ABNORMAL
SPECIMEN SOURCE: SIGNIFICANT CHANGE UP

## 2025-03-12 RX ORDER — SACUBITRIL AND VALSARTAN 49; 51 MG/1; MG/1
1 TABLET, FILM COATED ORAL
Qty: 30 | Refills: 0
Start: 2025-03-12

## 2025-03-12 RX ORDER — METOPROLOL SUCCINATE 50 MG/1
1 TABLET, EXTENDED RELEASE ORAL
Qty: 30 | Refills: 0
Start: 2025-03-12

## 2025-03-13 LAB
CULTURE RESULTS: ABNORMAL
CULTURE RESULTS: SIGNIFICANT CHANGE UP
CULTURE RESULTS: SIGNIFICANT CHANGE UP
ORGANISM # SPEC MICROSCOPIC CNT: ABNORMAL
SPECIMEN SOURCE: SIGNIFICANT CHANGE UP

## 2025-04-01 NOTE — DISCHARGE NOTE PROVIDER - NSDCQMAMI_CARD_ALL_CORE
No
acetaminophen 325 mg oral tablet: 2 tab(s) orally every 6 hours As needed Mild Pain (1 - 3), Moderate Pain (4 - 6)  amLODIPine 10 mg oral tablet: 1 tab(s) orally once a day  atorvastatin 10 mg oral tablet: 1 tab(s) orally once a day (at bedtime)  insulin glargine 100 units/mL subcutaneous solution: 26 unit(s) subcutaneous once a day (at bedtime)  insulin lispro 100 units/mL injectable solution: 10 unit(s) injectable 3 times a day  Janumet 50 mg-1000 mg oral tablet: 1 tab(s) orally 2 times a day  levothyroxine 100 mcg (0.1 mg) oral tablet: 1 tab(s) orally once a day  losartan 25 mg oral tablet: 1 tab(s) orally once a day  nystatin 100,000 units/g topical cream: 1 Apply topically to affected area 2 times a day

## 2025-04-04 ENCOUNTER — EMERGENCY (EMERGENCY)
Facility: HOSPITAL | Age: 55
LOS: 0 days | Discharge: HOME | End: 2025-04-04
Attending: STUDENT IN AN ORGANIZED HEALTH CARE EDUCATION/TRAINING PROGRAM
Payer: MEDICARE

## 2025-04-04 VITALS
RESPIRATION RATE: 16 BRPM | SYSTOLIC BLOOD PRESSURE: 145 MMHG | TEMPERATURE: 98 F | DIASTOLIC BLOOD PRESSURE: 82 MMHG | HEART RATE: 97 BPM | OXYGEN SATURATION: 99 %

## 2025-04-04 VITALS
WEIGHT: 149.91 LBS | SYSTOLIC BLOOD PRESSURE: 147 MMHG | RESPIRATION RATE: 19 BRPM | DIASTOLIC BLOOD PRESSURE: 82 MMHG | TEMPERATURE: 98 F | OXYGEN SATURATION: 99 % | HEIGHT: 64 IN | HEART RATE: 95 BPM

## 2025-04-04 DIAGNOSIS — E11.51 TYPE 2 DIABETES MELLITUS WITH DIABETIC PERIPHERAL ANGIOPATHY WITHOUT GANGRENE: ICD-10-CM

## 2025-04-04 DIAGNOSIS — E16.2 HYPOGLYCEMIA, UNSPECIFIED: ICD-10-CM

## 2025-04-04 DIAGNOSIS — L97.519 NON-PRESSURE CHRONIC ULCER OF OTHER PART OF RIGHT FOOT WITH UNSPECIFIED SEVERITY: ICD-10-CM

## 2025-04-04 DIAGNOSIS — E11.621 TYPE 2 DIABETES MELLITUS WITH FOOT ULCER: ICD-10-CM

## 2025-04-04 DIAGNOSIS — R00.0 TACHYCARDIA, UNSPECIFIED: ICD-10-CM

## 2025-04-04 DIAGNOSIS — E11.649 TYPE 2 DIABETES MELLITUS WITH HYPOGLYCEMIA WITHOUT COMA: ICD-10-CM

## 2025-04-04 DIAGNOSIS — Z95.5 PRESENCE OF CORONARY ANGIOPLASTY IMPLANT AND GRAFT: ICD-10-CM

## 2025-04-04 DIAGNOSIS — I25.10 ATHEROSCLEROTIC HEART DISEASE OF NATIVE CORONARY ARTERY WITHOUT ANGINA PECTORIS: ICD-10-CM

## 2025-04-04 DIAGNOSIS — Z95.1 PRESENCE OF AORTOCORONARY BYPASS GRAFT: ICD-10-CM

## 2025-04-04 DIAGNOSIS — I50.20 UNSPECIFIED SYSTOLIC (CONGESTIVE) HEART FAILURE: ICD-10-CM

## 2025-04-04 DIAGNOSIS — Z98.61 CORONARY ANGIOPLASTY STATUS: Chronic | ICD-10-CM

## 2025-04-04 LAB
ALBUMIN SERPL ELPH-MCNC: 2.5 G/DL — LOW (ref 3.3–5)
ALP SERPL-CCNC: 76 U/L — SIGNIFICANT CHANGE UP (ref 40–120)
ALT FLD-CCNC: 17 U/L — SIGNIFICANT CHANGE UP (ref 12–78)
ANION GAP SERPL CALC-SCNC: 5 MMOL/L — SIGNIFICANT CHANGE UP (ref 5–17)
AST SERPL-CCNC: 32 U/L — SIGNIFICANT CHANGE UP (ref 15–37)
BASOPHILS # BLD AUTO: 0.03 K/UL — SIGNIFICANT CHANGE UP (ref 0–0.2)
BASOPHILS NFR BLD AUTO: 0.6 % — SIGNIFICANT CHANGE UP (ref 0–2)
BILIRUB SERPL-MCNC: 0.3 MG/DL — SIGNIFICANT CHANGE UP (ref 0.2–1.2)
BUN SERPL-MCNC: 14 MG/DL — SIGNIFICANT CHANGE UP (ref 7–23)
CALCIUM SERPL-MCNC: 8.7 MG/DL — SIGNIFICANT CHANGE UP (ref 8.5–10.1)
CHLORIDE SERPL-SCNC: 107 MMOL/L — SIGNIFICANT CHANGE UP (ref 96–108)
CREAT SERPL-MCNC: 1.55 MG/DL — HIGH (ref 0.5–1.3)
EGFR: 53 ML/MIN/1.73M2 — LOW
EGFR: 53 ML/MIN/1.73M2 — LOW
EOSINOPHIL # BLD AUTO: 0.35 K/UL — SIGNIFICANT CHANGE UP (ref 0–0.5)
EOSINOPHIL NFR BLD AUTO: 7.5 % — HIGH (ref 0–6)
GLUCOSE SERPL-MCNC: 95 MG/DL — SIGNIFICANT CHANGE UP (ref 70–99)
HCT VFR BLD CALC: 31.8 % — LOW (ref 39–50)
HGB BLD-MCNC: 10.1 G/DL — LOW (ref 13–17)
IMM GRANULOCYTES NFR BLD AUTO: 0.2 % — SIGNIFICANT CHANGE UP (ref 0–0.9)
LYMPHOCYTES # BLD AUTO: 1.73 K/UL — SIGNIFICANT CHANGE UP (ref 1–3.3)
LYMPHOCYTES # BLD AUTO: 37 % — SIGNIFICANT CHANGE UP (ref 13–44)
MCHC RBC-ENTMCNC: 24.8 PG — LOW (ref 27–34)
MCHC RBC-ENTMCNC: 31.8 G/DL — LOW (ref 32–36)
MCV RBC AUTO: 77.9 FL — LOW (ref 80–100)
MONOCYTES # BLD AUTO: 0.46 K/UL — SIGNIFICANT CHANGE UP (ref 0–0.9)
MONOCYTES NFR BLD AUTO: 9.9 % — SIGNIFICANT CHANGE UP (ref 2–14)
NEUTROPHILS # BLD AUTO: 2.09 K/UL — SIGNIFICANT CHANGE UP (ref 1.8–7.4)
NEUTROPHILS NFR BLD AUTO: 44.8 % — SIGNIFICANT CHANGE UP (ref 43–77)
NRBC BLD AUTO-RTO: 0 /100 WBCS — SIGNIFICANT CHANGE UP (ref 0–0)
PLATELET # BLD AUTO: 268 K/UL — SIGNIFICANT CHANGE UP (ref 150–400)
POTASSIUM SERPL-MCNC: 4.9 MMOL/L — SIGNIFICANT CHANGE UP (ref 3.5–5.3)
POTASSIUM SERPL-SCNC: 4.9 MMOL/L — SIGNIFICANT CHANGE UP (ref 3.5–5.3)
PROT SERPL-MCNC: 7 GM/DL — SIGNIFICANT CHANGE UP (ref 6–8.3)
RBC # BLD: 4.08 M/UL — LOW (ref 4.2–5.8)
RBC # FLD: 13.7 % — SIGNIFICANT CHANGE UP (ref 10.3–14.5)
SODIUM SERPL-SCNC: 138 MMOL/L — SIGNIFICANT CHANGE UP (ref 135–145)
WBC # BLD: 4.67 K/UL — SIGNIFICANT CHANGE UP (ref 3.8–10.5)
WBC # FLD AUTO: 4.67 K/UL — SIGNIFICANT CHANGE UP (ref 3.8–10.5)

## 2025-04-04 PROCEDURE — 93010 ELECTROCARDIOGRAM REPORT: CPT

## 2025-04-04 PROCEDURE — 99284 EMERGENCY DEPT VISIT MOD MDM: CPT

## 2025-04-04 NOTE — ED PROVIDER NOTE - PATIENT PORTAL LINK FT
You can access the FollowMyHealth Patient Portal offered by [facility] by registering at the following website: [email or web address]. By joining [employer]’s FollowMyHealth portal, you will also be able to view your health information using other applications (apps) compatible with our system.

## 2025-04-04 NOTE — ED PROVIDER NOTE - NSFOLLOWUPINSTRUCTIONS_ED_ALL_ED_FT
You were seen today for episode of low blood sugar    Your kidney function was decreased with creatinine 1.55 - while this is improved from your prior labs outpatient, this may contribute to low blood sugar    We recommend lowering  your insulin long acting dose by at least 10 units to 50units if it is currently at 60 units and lower your short acting insulin form 20u to 15u and monitor your blood sugars. If your blood sugar is around 150-160 then you can continue this dose until follow up with your endocrinologist. If your blood sugar remains high then it is possible that you simply did not eat enough today .     Return for any dizziness, near fainting or low blood sugars.    Call your primary care doctor for follow up as soon as possible.    Follow up with endocrinologist as soon as possible and monitor blood sugars 3 times a day especially before bedtime to ensure blood sugar not low at night.

## 2025-04-04 NOTE — ED ADULT NURSE NOTE - OBJECTIVE STATEMENT
54 year old male with PMH of DM, HTN, High cholesterol, bypass with 3 stents and stroke. Pt  BIBA after a episode of dizziness and low blood sugar at home fs was 47 orange juice given by family. PT. noted with pic line in right arm. 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

## 2025-04-04 NOTE — ED ADULT NURSE NOTE - NSFALLHARMRISKINTERV_ED_ALL_ED

## 2025-04-04 NOTE — ED PROVIDER NOTE - CLINICAL SUMMARY MEDICAL DECISION MAKING FREE TEXT BOX
54M with PMHx of T2DM, CAD (post-CABG), mild HFrEF (EF 45%), and PAD (with recent angiogram Aug. 2024) presenting for w/ recent admission 3/5/25-3/11/25 for hyperglycemia and diabetic foot ulcer w/ infcn w/ PICC line on outpt IV abx , discharged on lantus 52u and admelog 20u premeal, per daughter at bedside pt with hypoglycemic episode to 47 - pt states he felt light headed and knew his FS was low. Pt was given oral orange juice prior to arrival with improvement. Daughter states pt on 50u insulin at night and 20u premeal - she states pts FS are usually around 90  with max 195 in the last 2 weeks, sometimes 70. Pt sometimes has variable meals. Pts daughter is translating Thai where needed with pts permission but pt understands and presents hx in english. Pt denies fever, chest pain, sob or new pain/ symptoms  Physical exam as above  plan - eval renal fcn, recommended to pt and family to lower insulin as too tight glycemic control may increase episodes of hypoglycemia, pt provided food, will recheck FS, if no episodes of hypoglycemia after period of observation, will dc home with return precautions and instructions for endocrine follow up 54M with PMHx of T2DM, CAD (post-CABG), mild HFrEF (EF 45%), and PAD (with recent angiogram Aug. 2024) presenting for w/ recent admission 3/5/25-3/11/25 for hyperglycemia and diabetic foot ulcer w/ infcn w/ PICC line on outpt IV abx , discharged on lantus 52u and admelog 20u premeal, per daughter at bedside pt with hypoglycemic episode to 47 - pt states he felt light headed and knew his FS was low. Pt was given oral orange juice prior to arrival with improvement. Daughter states pt on 50u insulin at night and 20u premeal - she states pts FS are usually around 90  with max 195 in the last 2 weeks, sometimes 70. Pt sometimes has variable meals. Pts daughter is translating Yakut where needed with pts permission but pt understands and presents hx in english. Pt denies fever, chest pain, sob or new pain/ symptoms  Physical exam as above  plan - eval renal fcn, recommended to pt and family to lower insulin as too tight glycemic control may increase episodes of hypoglycemia, pt provided food, will recheck FS, if no episodes of hypoglycemia after period of observation, will dc home with return precautions and instructions for endocrine follow up    Seo DO: 9PM - pt has not had any recurrent hypoglycemia while in ED, not on any oral hypoglycemic agents, pt accompanied by daughter and will dc with instructions to lower lantus dose/ day time short acting insulin and monitor FS with instructions on return precautions

## 2025-04-04 NOTE — ED ADULT TRIAGE NOTE - CHIEF COMPLAINT QUOTE
BIBA after a episode of dizziness and low blood sugar at home fs was 47 orange juice given by family. history of dm, htn and high cholesterol, bypass and stroke. noted with pic line in right arm. states he is on iv antibiotic for bone infection in right foot wound.

## 2025-04-04 NOTE — ED PROVIDER NOTE - MDM ORDERS SUBMITTED SELECTION
Facility/Department: [facility] ICU   CLINICAL BEDSIDE SWALLOW EVALUATION    NAME: Waleska Fernández  : 1965  MRN: [id number]    ADMISSION DATE: 3/25/2025  ADMITTING DIAGNOSIS: has TOS (thoracic outlet syndrome); Coronary artery disease; Major depressive disorder, recurrent episode with anxious distress; Diabetes mellitus (HCC); Hyperlipidemia; Essential hypertension; Tobacco dependence; Overweight (BMI 25.0-29.9); Chronic midline low back pain with right-sided sciatica; Acute left-sided low back pain with left-sided sciatica; Unstable angina pectoris due to coronary arteriosclerosis ([facility]); Change in mole; Eczema of hand; Arterial occlusion; PVD (peripheral vascular disease); Femoral artery occlusion; COPD (chronic obstructive pulmonary disease) ([facility]); CAD (coronary artery disease); Acid reflux; Depression; Hx of migraines; Hypertension; Kidney stones; Chest pain; NSTEMI (non-ST elevated myocardial infarction) ([facility]); Idiopathic hypotension; Acute cystitis with hematuria; Renal cyst; Renal mass, left; Atypical chest pain; and Subarachnoid hemorrhage ([facility]) on their problem list.    IMPRESSIONS: Waleska Fernández was seen for a bedside swallow evaluation following admission to [facility] for Subarachnoid hemorrhage. Pt denies history dysphagia and reports baseline diet of regular. Speech/language appear WFL following informal assessment. Pt denies any changes to speech or cognition with onset of setup. Pertinent medical history includes  CAD, COPD, DM, HTN.    Pt was positioned upright in bed for evalation with clear vocal quality and strong volitional cough. Oral mechanism exam revealed no focal orofacial deficits - natural teeth present. PO trials of thin liquids via cup/straw, puree, and regular solids were given. Oropharyngeal phase of swallow appears WFL with adequate mastication, formation, and transit. No overt s/s of aspiration with any consistencies trialed.    Recommend regular/thin liquid - straws ok. Medications as  Medications

## 2025-04-04 NOTE — ED PROVIDER NOTE - CARE PROVIDER_API CALL
Codey Driver  Endocrinology/Metab/Diabetes  [address], [address]  [place] [address]  Phone: [phone]  Fax: [phone]  Follow Up Time:

## 2025-04-04 NOTE — ED PROVIDER NOTE - PHYSICAL EXAMINATION
Gen: AOx3, NAD   Head: NCAT  ENT: Airway patent, moist mucous membranes, nasal passageways clear   Cardiac: Normal rate, normal rhythm   Respiratory: Lungs CTA B/L  Gastrointestinal: Abdomen soft, nontender, nondistended, no rebound, no guarding  MSK: No gross abnormalities, FROM of all four extremities, no edema  HEME: Extremities warm, pulses intact and symmetrical in all four extremities, + well healing wound R forefoot with dressing c/d/i , no significant LE swelling   Skin: No rashes, no lesions  Neuro: No gross neurologic deficits, ambulatory with cane

## 2025-04-27 NOTE — PROGRESS NOTE ADULT - PROBLEM/PLAN-3
DISPLAY PLAN FREE TEXT
No
DISPLAY PLAN FREE TEXT

## 2025-05-04 ENCOUNTER — INPATIENT (INPATIENT)
Facility: HOSPITAL | Age: 55
LOS: 10 days | Discharge: HOME | DRG: 603 | End: 2025-05-15
Attending: SURGERY | Admitting: HOSPITALIST
Payer: COMMERCIAL

## 2025-05-04 VITALS
SYSTOLIC BLOOD PRESSURE: 131 MMHG | RESPIRATION RATE: 20 BRPM | DIASTOLIC BLOOD PRESSURE: 74 MMHG | OXYGEN SATURATION: 99 % | TEMPERATURE: 98 F | HEART RATE: 87 BPM | HEIGHT: 64 IN | WEIGHT: 160.06 LBS

## 2025-05-04 DIAGNOSIS — S91.301A UNSPECIFIED OPEN WOUND, RIGHT FOOT, INITIAL ENCOUNTER: ICD-10-CM

## 2025-05-04 DIAGNOSIS — I10 ESSENTIAL (PRIMARY) HYPERTENSION: ICD-10-CM

## 2025-05-04 DIAGNOSIS — E11.9 TYPE 2 DIABETES MELLITUS WITHOUT COMPLICATIONS: ICD-10-CM

## 2025-05-04 DIAGNOSIS — N17.9 ACUTE KIDNEY FAILURE, UNSPECIFIED: ICD-10-CM

## 2025-05-04 DIAGNOSIS — Z98.61 CORONARY ANGIOPLASTY STATUS: Chronic | ICD-10-CM

## 2025-05-04 DIAGNOSIS — I73.9 PERIPHERAL VASCULAR DISEASE, UNSPECIFIED: ICD-10-CM

## 2025-05-04 DIAGNOSIS — I25.10 ATHEROSCLEROTIC HEART DISEASE OF NATIVE CORONARY ARTERY WITHOUT ANGINA PECTORIS: ICD-10-CM

## 2025-05-04 DIAGNOSIS — L08.9 LOCAL INFECTION OF THE SKIN AND SUBCUTANEOUS TISSUE, UNSPECIFIED: ICD-10-CM

## 2025-05-04 LAB
ALBUMIN SERPL ELPH-MCNC: 3.4 G/DL — SIGNIFICANT CHANGE UP (ref 3.3–5)
ALP SERPL-CCNC: 87 U/L — SIGNIFICANT CHANGE UP (ref 40–120)
ALT FLD-CCNC: 11 U/L — SIGNIFICANT CHANGE UP (ref 10–45)
ANION GAP SERPL CALC-SCNC: 10 MMOL/L — SIGNIFICANT CHANGE UP (ref 5–17)
APTT BLD: 38.3 SEC — HIGH (ref 26.1–36.8)
AST SERPL-CCNC: 10 U/L — SIGNIFICANT CHANGE UP (ref 10–40)
BASOPHILS # BLD AUTO: 0.02 K/UL — SIGNIFICANT CHANGE UP (ref 0–0.2)
BASOPHILS NFR BLD AUTO: 0.2 % — SIGNIFICANT CHANGE UP (ref 0–2)
BILIRUB SERPL-MCNC: 0.1 MG/DL — LOW (ref 0.2–1.2)
BLD GP AB SCN SERPL QL: NEGATIVE — SIGNIFICANT CHANGE UP
BUN SERPL-MCNC: 19 MG/DL — SIGNIFICANT CHANGE UP (ref 7–23)
CALCIUM SERPL-MCNC: 9.1 MG/DL — SIGNIFICANT CHANGE UP (ref 8.4–10.5)
CHLORIDE SERPL-SCNC: 99 MMOL/L — SIGNIFICANT CHANGE UP (ref 96–108)
CO2 SERPL-SCNC: 26 MMOL/L — SIGNIFICANT CHANGE UP (ref 22–31)
CREAT SERPL-MCNC: 1.44 MG/DL — HIGH (ref 0.5–1.3)
EGFR: 57 ML/MIN/1.73M2 — LOW
EGFR: 57 ML/MIN/1.73M2 — LOW
EOSINOPHIL # BLD AUTO: 0.22 K/UL — SIGNIFICANT CHANGE UP (ref 0–0.5)
EOSINOPHIL NFR BLD AUTO: 2.3 % — SIGNIFICANT CHANGE UP (ref 0–6)
ERYTHROCYTE [SEDIMENTATION RATE] IN BLOOD: 103 MM/HR — HIGH (ref 0–20)
GAS PNL BLDV: SIGNIFICANT CHANGE UP
GLUCOSE BLDC GLUCOMTR-MCNC: 160 MG/DL — HIGH (ref 70–99)
GLUCOSE BLDC GLUCOMTR-MCNC: 171 MG/DL — HIGH (ref 70–99)
GLUCOSE BLDC GLUCOMTR-MCNC: 179 MG/DL — HIGH (ref 70–99)
GLUCOSE BLDC GLUCOMTR-MCNC: 187 MG/DL — HIGH (ref 70–99)
GLUCOSE BLDC GLUCOMTR-MCNC: 76 MG/DL — SIGNIFICANT CHANGE UP (ref 70–99)
GLUCOSE SERPL-MCNC: 385 MG/DL — HIGH (ref 70–99)
GRAM STN FLD: SIGNIFICANT CHANGE UP
HCT VFR BLD CALC: 29.8 % — LOW (ref 39–50)
HGB BLD-MCNC: 9.2 G/DL — LOW (ref 13–17)
IMM GRANULOCYTES NFR BLD AUTO: 1 % — HIGH (ref 0–0.9)
INR BLD: 1.04 RATIO — SIGNIFICANT CHANGE UP (ref 0.85–1.16)
LYMPHOCYTES # BLD AUTO: 1.54 K/UL — SIGNIFICANT CHANGE UP (ref 1–3.3)
LYMPHOCYTES # BLD AUTO: 16 % — SIGNIFICANT CHANGE UP (ref 13–44)
MAGNESIUM SERPL-MCNC: 2.3 MG/DL — SIGNIFICANT CHANGE UP (ref 1.6–2.6)
MCHC RBC-ENTMCNC: 23.9 PG — LOW (ref 27–34)
MCHC RBC-ENTMCNC: 30.9 G/DL — LOW (ref 32–36)
MCV RBC AUTO: 77.4 FL — LOW (ref 80–100)
MONOCYTES # BLD AUTO: 0.67 K/UL — SIGNIFICANT CHANGE UP (ref 0–0.9)
MONOCYTES NFR BLD AUTO: 7 % — SIGNIFICANT CHANGE UP (ref 2–14)
NEUTROPHILS # BLD AUTO: 7.07 K/UL — SIGNIFICANT CHANGE UP (ref 1.8–7.4)
NEUTROPHILS NFR BLD AUTO: 73.5 % — SIGNIFICANT CHANGE UP (ref 43–77)
NRBC BLD AUTO-RTO: 0 /100 WBCS — SIGNIFICANT CHANGE UP (ref 0–0)
PHOSPHATE SERPL-MCNC: 2.8 MG/DL — SIGNIFICANT CHANGE UP (ref 2.5–4.5)
PLATELET # BLD AUTO: 404 K/UL — HIGH (ref 150–400)
POTASSIUM SERPL-MCNC: 4.6 MMOL/L — SIGNIFICANT CHANGE UP (ref 3.5–5.3)
POTASSIUM SERPL-SCNC: 4.6 MMOL/L — SIGNIFICANT CHANGE UP (ref 3.5–5.3)
PROT SERPL-MCNC: 7.6 G/DL — SIGNIFICANT CHANGE UP (ref 6–8.3)
PROTHROM AB SERPL-ACNC: 12 SEC — SIGNIFICANT CHANGE UP (ref 9.9–13.4)
RBC # BLD: 3.85 M/UL — LOW (ref 4.2–5.8)
RBC # FLD: 14.3 % — SIGNIFICANT CHANGE UP (ref 10.3–14.5)
RH IG SCN BLD-IMP: POSITIVE — SIGNIFICANT CHANGE UP
SODIUM SERPL-SCNC: 135 MMOL/L — SIGNIFICANT CHANGE UP (ref 135–145)
SPECIMEN SOURCE: SIGNIFICANT CHANGE UP
WBC # BLD: 9.62 K/UL — SIGNIFICANT CHANGE UP (ref 3.8–10.5)
WBC # FLD AUTO: 9.62 K/UL — SIGNIFICANT CHANGE UP (ref 3.8–10.5)

## 2025-05-04 PROCEDURE — 99053 MED SERV 10PM-8AM 24 HR FAC: CPT

## 2025-05-04 PROCEDURE — 99285 EMERGENCY DEPT VISIT HI MDM: CPT

## 2025-05-04 PROCEDURE — 73720 MRI LWR EXTREMITY W/O&W/DYE: CPT | Mod: 26,RT

## 2025-05-04 PROCEDURE — 99223 1ST HOSP IP/OBS HIGH 75: CPT

## 2025-05-04 PROCEDURE — 73630 X-RAY EXAM OF FOOT: CPT | Mod: 26,RT

## 2025-05-04 PROCEDURE — 93923 UPR/LXTR ART STDY 3+ LVLS: CPT | Mod: 26

## 2025-05-04 RX ORDER — SODIUM CHLORIDE 9 G/1000ML
1000 INJECTION, SOLUTION INTRAVENOUS
Refills: 0 | Status: DISCONTINUED | OUTPATIENT
Start: 2025-05-04 | End: 2025-05-09

## 2025-05-04 RX ORDER — INSULIN LISPRO 100 U/ML
14 INJECTION, SOLUTION INTRAVENOUS; SUBCUTANEOUS
Refills: 0 | Status: DISCONTINUED | OUTPATIENT
Start: 2025-05-04 | End: 2025-05-05

## 2025-05-04 RX ORDER — ASPIRIN 325 MG
81 TABLET ORAL DAILY
Refills: 0 | Status: DISCONTINUED | OUTPATIENT
Start: 2025-05-04 | End: 2025-05-09

## 2025-05-04 RX ORDER — CADEXOMER IODINE 0.9 %
1 PADS, MEDICATED (EA) TOPICAL DAILY
Refills: 0 | Status: DISCONTINUED | OUTPATIENT
Start: 2025-05-04 | End: 2025-05-09

## 2025-05-04 RX ORDER — DEXTROSE 50 % IN WATER 50 %
12.5 SYRINGE (ML) INTRAVENOUS ONCE
Refills: 0 | Status: DISCONTINUED | OUTPATIENT
Start: 2025-05-04 | End: 2025-05-05

## 2025-05-04 RX ORDER — SODIUM CHLORIDE 9 G/1000ML
1000 INJECTION, SOLUTION INTRAVENOUS
Refills: 0 | Status: DISCONTINUED | OUTPATIENT
Start: 2025-05-04 | End: 2025-05-05

## 2025-05-04 RX ORDER — INSULIN GLARGINE-YFGN 100 [IU]/ML
52 INJECTION, SOLUTION SUBCUTANEOUS AT BEDTIME
Refills: 0 | Status: DISCONTINUED | OUTPATIENT
Start: 2025-05-04 | End: 2025-05-04

## 2025-05-04 RX ORDER — INSULIN GLARGINE-YFGN 100 [IU]/ML
30 INJECTION, SOLUTION SUBCUTANEOUS AT BEDTIME
Refills: 0 | Status: DISCONTINUED | OUTPATIENT
Start: 2025-05-04 | End: 2025-05-05

## 2025-05-04 RX ORDER — PIPERACILLIN-TAZO-DEXTROSE,ISO 2.25G/50ML
3.38 IV SOLUTION, PIGGYBACK PREMIX FROZEN(ML) INTRAVENOUS EVERY 8 HOURS
Refills: 0 | Status: DISCONTINUED | OUTPATIENT
Start: 2025-05-04 | End: 2025-05-09

## 2025-05-04 RX ORDER — INSULIN LISPRO 100 U/ML
INJECTION, SOLUTION INTRAVENOUS; SUBCUTANEOUS
Refills: 0 | Status: DISCONTINUED | OUTPATIENT
Start: 2025-05-04 | End: 2025-05-08

## 2025-05-04 RX ORDER — DEXTROSE 50 % IN WATER 50 %
12.5 SYRINGE (ML) INTRAVENOUS ONCE
Refills: 0 | Status: DISCONTINUED | OUTPATIENT
Start: 2025-05-04 | End: 2025-05-09

## 2025-05-04 RX ORDER — ACETAMINOPHEN 500 MG/5ML
1000 LIQUID (ML) ORAL ONCE
Refills: 0 | Status: COMPLETED | OUTPATIENT
Start: 2025-05-04 | End: 2025-05-04

## 2025-05-04 RX ORDER — METOPROLOL SUCCINATE 50 MG/1
50 TABLET, EXTENDED RELEASE ORAL
Refills: 0 | Status: DISCONTINUED | OUTPATIENT
Start: 2025-05-04 | End: 2025-05-09

## 2025-05-04 RX ORDER — TICAGRELOR 90 MG/1
90 TABLET ORAL EVERY 12 HOURS
Refills: 0 | Status: DISCONTINUED | OUTPATIENT
Start: 2025-05-04 | End: 2025-05-09

## 2025-05-04 RX ORDER — LIDOCAINE HCL/PF 10 MG/ML
20 VIAL (ML) INJECTION ONCE
Refills: 0 | Status: COMPLETED | OUTPATIENT
Start: 2025-05-04 | End: 2025-05-04

## 2025-05-04 RX ORDER — DEXTROSE 50 % IN WATER 50 %
25 SYRINGE (ML) INTRAVENOUS ONCE
Refills: 0 | Status: DISCONTINUED | OUTPATIENT
Start: 2025-05-04 | End: 2025-05-09

## 2025-05-04 RX ORDER — PENTOXIFYLLINE 100 %
400 POWDER (GRAM) MISCELLANEOUS THREE TIMES A DAY
Refills: 0 | Status: DISCONTINUED | OUTPATIENT
Start: 2025-05-04 | End: 2025-05-09

## 2025-05-04 RX ORDER — FENOFIBRATE 160 MG/1
145 TABLET ORAL AT BEDTIME
Refills: 0 | Status: DISCONTINUED | OUTPATIENT
Start: 2025-05-04 | End: 2025-05-09

## 2025-05-04 RX ORDER — PIPERACILLIN-TAZO-DEXTROSE,ISO 2.25G/50ML
3.38 IV SOLUTION, PIGGYBACK PREMIX FROZEN(ML) INTRAVENOUS ONCE
Refills: 0 | Status: COMPLETED | OUTPATIENT
Start: 2025-05-04 | End: 2025-05-04

## 2025-05-04 RX ORDER — GLUCAGON 3 MG/1
1 POWDER NASAL ONCE
Refills: 0 | Status: DISCONTINUED | OUTPATIENT
Start: 2025-05-04 | End: 2025-05-09

## 2025-05-04 RX ORDER — VANCOMYCIN HCL IN 5 % DEXTROSE 1.5G/250ML
1000 PLASTIC BAG, INJECTION (ML) INTRAVENOUS ONCE
Refills: 0 | Status: COMPLETED | OUTPATIENT
Start: 2025-05-04 | End: 2025-05-04

## 2025-05-04 RX ORDER — INSULIN GLARGINE-YFGN 100 [IU]/ML
50 INJECTION, SOLUTION SUBCUTANEOUS AT BEDTIME
Refills: 0 | Status: DISCONTINUED | OUTPATIENT
Start: 2025-05-04 | End: 2025-05-04

## 2025-05-04 RX ORDER — INSULIN LISPRO 100 U/ML
20 INJECTION, SOLUTION INTRAVENOUS; SUBCUTANEOUS
Refills: 0 | Status: DISCONTINUED | OUTPATIENT
Start: 2025-05-04 | End: 2025-05-04

## 2025-05-04 RX ORDER — DEXTROSE 50 % IN WATER 50 %
15 SYRINGE (ML) INTRAVENOUS ONCE
Refills: 0 | Status: DISCONTINUED | OUTPATIENT
Start: 2025-05-04 | End: 2025-05-09

## 2025-05-04 RX ORDER — INSULIN LISPRO 100 U/ML
INJECTION, SOLUTION INTRAVENOUS; SUBCUTANEOUS EVERY 6 HOURS
Refills: 0 | Status: DISCONTINUED | OUTPATIENT
Start: 2025-05-04 | End: 2025-05-05

## 2025-05-04 RX ORDER — OXYCODONE HYDROCHLORIDE 30 MG/1
2.5 TABLET ORAL ONCE
Refills: 0 | Status: DISCONTINUED | OUTPATIENT
Start: 2025-05-04 | End: 2025-05-04

## 2025-05-04 RX ORDER — ACETAMINOPHEN 500 MG/5ML
1000 LIQUID (ML) ORAL EVERY 6 HOURS
Refills: 0 | Status: DISCONTINUED | OUTPATIENT
Start: 2025-05-04 | End: 2025-05-05

## 2025-05-04 RX ORDER — ATORVASTATIN CALCIUM 80 MG/1
80 TABLET, FILM COATED ORAL AT BEDTIME
Refills: 0 | Status: DISCONTINUED | OUTPATIENT
Start: 2025-05-04 | End: 2025-05-09

## 2025-05-04 RX ORDER — GLUCAGON 3 MG/1
1 POWDER NASAL ONCE
Refills: 0 | Status: DISCONTINUED | OUTPATIENT
Start: 2025-05-04 | End: 2025-05-05

## 2025-05-04 RX ORDER — DEXTROSE 50 % IN WATER 50 %
15 SYRINGE (ML) INTRAVENOUS ONCE
Refills: 0 | Status: DISCONTINUED | OUTPATIENT
Start: 2025-05-04 | End: 2025-05-05

## 2025-05-04 RX ORDER — CILOSTAZOL 50 MG/1
50 TABLET ORAL
Refills: 0 | Status: DISCONTINUED | OUTPATIENT
Start: 2025-05-04 | End: 2025-05-04

## 2025-05-04 RX ORDER — DEXTROSE 50 % IN WATER 50 %
25 SYRINGE (ML) INTRAVENOUS ONCE
Refills: 0 | Status: DISCONTINUED | OUTPATIENT
Start: 2025-05-04 | End: 2025-05-05

## 2025-05-04 RX ADMIN — Medication 81 MILLIGRAM(S): at 13:02

## 2025-05-04 RX ADMIN — Medication 200 GRAM(S): at 02:26

## 2025-05-04 RX ADMIN — Medication 400 MILLIGRAM(S): at 12:56

## 2025-05-04 RX ADMIN — Medication 1000 MILLIGRAM(S): at 04:14

## 2025-05-04 RX ADMIN — Medication 400 MILLIGRAM(S): at 02:26

## 2025-05-04 RX ADMIN — Medication 25 GRAM(S): at 05:18

## 2025-05-04 RX ADMIN — INSULIN LISPRO 1: 100 INJECTION, SOLUTION INTRAVENOUS; SUBCUTANEOUS at 17:41

## 2025-05-04 RX ADMIN — Medication 400 MILLIGRAM(S): at 23:58

## 2025-05-04 RX ADMIN — Medication 20 MILLILITER(S): at 04:14

## 2025-05-04 RX ADMIN — INSULIN LISPRO 1: 100 INJECTION, SOLUTION INTRAVENOUS; SUBCUTANEOUS at 23:58

## 2025-05-04 RX ADMIN — Medication 25 GRAM(S): at 21:45

## 2025-05-04 RX ADMIN — INSULIN LISPRO 1: 100 INJECTION, SOLUTION INTRAVENOUS; SUBCUTANEOUS at 08:27

## 2025-05-04 RX ADMIN — Medication 400 MILLIGRAM(S): at 21:44

## 2025-05-04 RX ADMIN — Medication 250 MILLIGRAM(S): at 03:53

## 2025-05-04 RX ADMIN — Medication 25 GRAM(S): at 13:06

## 2025-05-04 RX ADMIN — INSULIN LISPRO 20 UNIT(S): 100 INJECTION, SOLUTION INTRAVENOUS; SUBCUTANEOUS at 08:27

## 2025-05-04 RX ADMIN — INSULIN LISPRO 14 UNIT(S): 100 INJECTION, SOLUTION INTRAVENOUS; SUBCUTANEOUS at 17:40

## 2025-05-04 RX ADMIN — ATORVASTATIN CALCIUM 80 MILLIGRAM(S): 80 TABLET, FILM COATED ORAL at 21:46

## 2025-05-04 RX ADMIN — INSULIN GLARGINE-YFGN 30 UNIT(S): 100 INJECTION, SOLUTION SUBCUTANEOUS at 21:45

## 2025-05-04 RX ADMIN — FENOFIBRATE 145 MILLIGRAM(S): 160 TABLET ORAL at 21:44

## 2025-05-04 NOTE — CONSULT NOTE ADULT - SUBJECTIVE AND OBJECTIVE BOX
Patient is a 54y old  Male who presents with a chief complaint of     HPI: 54M with PMHx of T2DM, CAD (post-CABG), mild HFrEF (EF 45%), and PAD (with recent angiogram Aug. 2024) presenting w/ foot wound, known prior diabetic foot ulcer w/ infcn w/ PICC line on outpt IV abx. MR 3/25 showing e/o early OM medically managed. Patient is presenting after 1 week of subjective fevers, chills, hypothermia and R foot lateral toe oozing and erythema c/f soft tissue infection. FS elevated to 400s on arrival patient reports compliance w/ diabetes medications.           PAST MEDICAL & SURGICAL HISTORY:  Hypertension, unspecified type      Hyperlipidemia, unspecified hyperlipidemia type      Type 2 diabetes mellitus without complication, with long-term current use of insulin      Coronary artery disease of native artery of native heart with stable angina pectoris      History of percutaneous coronary intervention          MEDICATIONS  (STANDING):  dextrose 5%. 1000 milliLiter(s) (50 mL/Hr) IV Continuous <Continuous>  dextrose 5%. 1000 milliLiter(s) (100 mL/Hr) IV Continuous <Continuous>  dextrose 50% Injectable 25 Gram(s) IV Push once  dextrose 50% Injectable 12.5 Gram(s) IV Push once  dextrose 50% Injectable 25 Gram(s) IV Push once  glucagon  Injectable 1 milliGRAM(s) IntraMuscular once  insulin lispro (ADMELOG) corrective regimen sliding scale   SubCutaneous every 6 hours  piperacillin/tazobactam IVPB.. 3.375 Gram(s) IV Intermittent every 8 hours    MEDICATIONS  (PRN):  dextrose Oral Gel 15 Gram(s) Oral once PRN Blood Glucose LESS THAN 70 milliGRAM(s)/deciliter      Allergies    No Known Allergies    Intolerances        VITALS:    Vital Signs Last 24 Hrs  T(C): 36.7 (04 May 2025 02:28), Max: 36.7 (04 May 2025 02:28)  T(F): 98 (04 May 2025 02:28), Max: 98 (04 May 2025 02:28)  HR: 88 (04 May 2025 02:28) (87 - 88)  BP: 129/79 (04 May 2025 02:28) (129/79 - 131/74)  BP(mean): --  RR: 17 (04 May 2025 02:28) (17 - 20)  SpO2: 98% (04 May 2025 02:28) (98% - 99%)    Parameters below as of 04 May 2025 02:28  Patient On (Oxygen Delivery Method): room air        LABS:                          9.2    9.62  )-----------( 404      ( 04 May 2025 02:26 )             29.8       05-04    135  |  99  |  19  ----------------------------<  385[H]  4.6   |  26  |  1.44[H]    Ca    9.1      04 May 2025 02:25  Phos  2.8     05-04  Mg     2.3     05-04    TPro  7.6  /  Alb  3.4  /  TBili  0.1[L]  /  DBili  x   /  AST  10  /  ALT  11  /  AlkPhos  87  05-04      CAPILLARY BLOOD GLUCOSE      POCT Blood Glucose.: 394 mg/dL (04 May 2025 01:22)      PT/INR - ( 04 May 2025 02:26 )   PT: 12.0 sec;   INR: 1.04 ratio         PTT - ( 04 May 2025 02:26 )  PTT:38.3 sec    LOWER EXTREMITY PHYSICAL EXAM:    Vascular: DP/PT 0/4, B/L, CFT < 3 seconds B/L, Temperature gradient warm to cool, B/L.   Neuro: Epicritic sensation absent to the level of digits, B/L.  Musculoskeletal/Ortho: s/p Right foot 3rd metatarsal head resection, s/p Right foot Partial 2nd Ray Resection (DOS 3/6)  Skin: Right foot 2nd metatarsal wound to bone, purulent drainage, fluctuance, no erythema. Right foot medial 1st MPJ wound to subQ, purulent drainage, fluctuance, no erythema. Left foot no open wounds, no acute signs of infection      RADIOLOGY & ADDITIONAL STUDIES:

## 2025-05-04 NOTE — PATIENT PROFILE ADULT - NSPROPOAURINARYCATHETER_GEN_A_NUR
Caller: Veda Peña    Relationship: Self    Best call back number: [phone]    What medication are you requesting: ANTIBIOTICS     What are your current symptoms: PAINFUL URINATION     How long have you been experiencing symptoms: 2 DAYS     Have you had these symptoms before:    [x] Yes  [] No    Have you been treated for these symptoms before:   [x] Yes  [] No    If a prescription is needed, what is your preferred pharmacy and phone number:    [facility] ([place]) - [address] - [phone] [facility] [phone]     Additional notes:PATIENT IS WONDERING IF ONE OF HER MEDICATIONS IS CAUSING UTI.           
no

## 2025-05-04 NOTE — H&P ADULT - HISTORY OF PRESENT ILLNESS
54M hx DM, CAD (s/p CABG), mild HFrEF (EF 45%), and PAD (with recent angiogram Aug. 2024), and recent admission to [facility] w/ right foot wound s/p right foot Partial 2nd ray resection and 3rd proximal phalanx bone biopsy on 3/7/25 (Cx + for E. Faecalis and Staph Epi requiring prolonged abx course s/p PICC), now presenting with right foot wound. Patient reports 1w of subjective fevers, chills, hypothermia, and R foot lateral toe oozing and erythema c/f soft tissue infection. FS elevated to 400s on arrival patient reports compliance w/ diabetes medications.     VSS. WBC 9.62. CRP 56. Glucose 285. Foot xray pending.

## 2025-05-04 NOTE — CONSULT NOTE ADULT - SUBJECTIVE AND OBJECTIVE BOX
Vascular Surgery Consult  Consulting attending:    HPI:  54M hx DM, CAD (s/p CABG), mild HFrEF (EF 45%), and PAD (with recent angiogram Aug. 2024), and recent admission to [facility] w/ right foot wound s/p right foot Partial 2nd ray resection and 3rd proximal phalanx bone biopsy on 3/7/25 (Cx + for E. Faecalis and Staph Epi requiring prolonged abx course s/p PICC), now presenting with right foot wound. Patient reports 1w of subjective fevers, chills, hypothermia, and R foot lateral toe oozing and erythema c/f soft tissue infection. FS elevated to 400s on arrival patient reports compliance w/ diabetes medications.     VSS. WBC 9.62. CRP 56. Glucose 285.     PAST MEDICAL HISTORY:  Hypertension, unspecified type    Hyperlipidemia, unspecified hyperlipidemia type    Type 2 diabetes mellitus without complication, with long-term current use of insulin    Coronary artery disease of native artery of native heart with stable angina pectoris        PAST SURGICAL HISTORY:  History of percutaneous coronary intervention        MEDICATIONS:  piperacillin/tazobactam IVPB.- 3.375 Gram(s) IV Intermittent once  piperacillin/tazobactam IVPB.. 3.375 Gram(s) IV Intermittent every 8 hours  vancomycin  IVPB 1000 milliGRAM(s) IV Intermittent Once      ALLERGIES:  No Known Allergies      VITALS & I/Os:  Vital Signs Last 24 Hrs  T(C): 36.7 (04 May 2025 02:28), Max: 36.7 (04 May 2025 02:28)  T(F): 98 (04 May 2025 02:28), Max: 98 (04 May 2025 02:28)  HR: 88 (04 May 2025 02:28) (87 - 88)  BP: 129/79 (04 May 2025 02:28) (129/79 - 131/74)  BP(mean): --  RR: 17 (04 May 2025 02:28) (17 - 20)  SpO2: 98% (04 May 2025 02:28) (98% - 99%)    Parameters below as of 04 May 2025 02:28  Patient On (Oxygen Delivery Method): room air        I&O's Summary      PHYSICAL EXAM:  General: Not acutely distressed  Respiratory: Nonlabored respirations  Abdominal: No palpable thrill  Extremities: Warm, R toe 2nd and fourth amputations, L lateral toe exposed granulation tissue and erythema. Nontender on exam without sensation of extremities, preserved ROM.   Vascular:       LABS:                        9.2    9.62  )-----------( 404      ( 04 May 2025 02:26 )             29.8     05-04    135  |  99  |  19  ----------------------------<  385[H]  4.6   |  26  |  1.44[H]    Ca    9.1      04 May 2025 02:25  Phos  2.8     05-04  Mg     2.3     05-04    TPro  7.6  /  Alb  3.4  /  TBili  0.1[L]  /  DBili  x   /  AST  10  /  ALT  11  /  AlkPhos  87  05-04    Lactate:  05-04 @ 02:11  1.5    PT/INR - ( 04 May 2025 02:26 )   PT: 12.0 sec;   INR: 1.04 ratio         PTT - ( 04 May 2025 02:26 )  PTT:38.3 sec          Urinalysis Basic - ( 04 May 2025 02:25 )    Color: x / Appearance: x / SG: x / pH: x  Gluc: 385 mg/dL / Ketone: x  / Bili: x / Urobili: x   Blood: x / Protein: x / Nitrite: x   Leuk Esterase: x / RBC: x / WBC x   Sq Epi: x / Non Sq Epi: x / Bacteria: x        IMAGING:                                                                                               Vascular Surgery Consult  Consulting attending: Dr. Youssef    HPI:  54M hx DM, CAD (s/p CABG), mild HFrEF (EF 45%), and PAD (with recent angiogram Aug. 2024), and recent admission to [facility] w/ right foot wound s/p right foot Partial 2nd ray resection and 3rd proximal phalanx bone biopsy on 3/7/25 (Cx + for E. Faecalis and Staph Epi requiring prolonged abx course s/p PICC), now presenting with right foot wound. Patient reports 1w of subjective fevers, chills, hypothermia, and R foot lateral toe oozing and erythema c/f soft tissue infection. FS elevated to 400s on arrival patient reports compliance w/ diabetes medications.     VSS. WBC 9.62. CRP 56. Glucose 285. Foot xray pending.     PAST MEDICAL HISTORY:  Hypertension, unspecified type    Hyperlipidemia, unspecified hyperlipidemia type    Type 2 diabetes mellitus without complication, with long-term current use of insulin    Coronary artery disease of native artery of native heart with stable angina pectoris        PAST SURGICAL HISTORY:  History of percutaneous coronary intervention        MEDICATIONS:  piperacillin/tazobactam IVPB.- 3.375 Gram(s) IV Intermittent once  piperacillin/tazobactam IVPB.. 3.375 Gram(s) IV Intermittent every 8 hours  vancomycin  IVPB 1000 milliGRAM(s) IV Intermittent Once      ALLERGIES:  No Known Allergies      VITALS & I/Os:  Vital Signs Last 24 Hrs  T(C): 36.7 (04 May 2025 02:28), Max: 36.7 (04 May 2025 02:28)  T(F): 98 (04 May 2025 02:28), Max: 98 (04 May 2025 02:28)  HR: 88 (04 May 2025 02:28) (87 - 88)  BP: 129/79 (04 May 2025 02:28) (129/79 - 131/74)  BP(mean): --  RR: 17 (04 May 2025 02:28) (17 - 20)  SpO2: 98% (04 May 2025 02:28) (98% - 99%)    Parameters below as of 04 May 2025 02:28  Patient On (Oxygen Delivery Method): room air        I&O's Summary      PHYSICAL EXAM:  General: Not acutely distressed  Respiratory: Nonlabored respirations  Abdominal: No palpable thrill  Extremities: Warm, R toe 2nd and fourth amputations, L lateral toe exposed granulation tissue and erythema. Nontender on exam without sensation of extremities, preserved ROM.   Vascular: DP/PT/popliteal signals bl, palpable femoral pulses bilaterally      LABS:                        9.2    9.62  )-----------( 404      ( 04 May 2025 02:26 )             29.8     05-04    135  |  99  |  19  ----------------------------<  385[H]  4.6   |  26  |  1.44[H]    Ca    9.1      04 May 2025 02:25  Phos  2.8     05-04  Mg     2.3     05-04    TPro  7.6  /  Alb  3.4  /  TBili  0.1[L]  /  DBili  x   /  AST  10  /  ALT  11  /  AlkPhos  87  05-04    Lactate:  05-04 @ 02:11  1.5    PT/INR - ( 04 May 2025 02:26 )   PT: 12.0 sec;   INR: 1.04 ratio         PTT - ( 04 May 2025 02:26 )  PTT:38.3 sec          Urinalysis Basic - ( 04 May 2025 02:25 )    Color: x / Appearance: x / SG: x / pH: x  Gluc: 385 mg/dL / Ketone: x  / Bili: x / Urobili: x   Blood: x / Protein: x / Nitrite: x   Leuk Esterase: x / RBC: x / WBC x   Sq Epi: x / Non Sq Epi: x / Bacteria: x        IMAGING:                                                                                               Vascular Surgery Consult  Consulting attending: Dr. Youssef    HPI:  54M hx DM, CAD (s/p CABG), mild HFrEF (EF 45%), and PAD (with recent angiogram Aug. 2024), and recent admission to [facility] w/ right foot wound s/p right foot Partial 2nd ray resection and 3rd proximal phalanx bone biopsy on 3/7/25 (Cx + for E. Faecalis and Staph Epi requiring prolonged abx course s/p PICC), now presenting with right foot wound. Patient reports 1w of subjective fevers, chills, hypothermia, and R foot lateral toe oozing and erythema c/f soft tissue infection. FS elevated to 400s on arrival patient reports compliance w/ diabetes medications.     VSS. WBC 9.62. CRP 56. Glucose 285. Foot xray pending.     PAST MEDICAL HISTORY:  Hypertension, unspecified type    Hyperlipidemia, unspecified hyperlipidemia type    Type 2 diabetes mellitus without complication, with long-term current use of insulin    Coronary artery disease of native artery of native heart with stable angina pectoris        PAST SURGICAL HISTORY:  History of percutaneous coronary intervention        MEDICATIONS:  piperacillin/tazobactam IVPB.- 3.375 Gram(s) IV Intermittent once  piperacillin/tazobactam IVPB.. 3.375 Gram(s) IV Intermittent every 8 hours  vancomycin  IVPB 1000 milliGRAM(s) IV Intermittent Once      ALLERGIES:  No Known Allergies      VITALS & I/Os:  Vital Signs Last 24 Hrs  T(C): 36.7 (04 May 2025 02:28), Max: 36.7 (04 May 2025 02:28)  T(F): 98 (04 May 2025 02:28), Max: 98 (04 May 2025 02:28)  HR: 88 (04 May 2025 02:28) (87 - 88)  BP: 129/79 (04 May 2025 02:28) (129/79 - 131/74)  BP(mean): --  RR: 17 (04 May 2025 02:28) (17 - 20)  SpO2: 98% (04 May 2025 02:28) (98% - 99%)    Parameters below as of 04 May 2025 02:28  Patient On (Oxygen Delivery Method): room air        I&O's Summary      PHYSICAL EXAM:  General: Not acutely distressed  Respiratory: Nonlabored respirations  Abdominal: No palpable thrill  Extremities: Warm, R toe 2nd and fourth amputations with granulation tissue by 2nd toe stump. Right foot with dark skin coloration. No palpable crepitus   Vascular: DP/PT/popliteal signals bl, palpable femoral pulses bilaterally      LABS:                        9.2    9.62  )-----------( 404      ( 04 May 2025 02:26 )             29.8     05-04    135  |  99  |  19  ----------------------------<  385[H]  4.6   |  26  |  1.44[H]    Ca    9.1      04 May 2025 02:25  Phos  2.8     05-04  Mg     2.3     05-04    TPro  7.6  /  Alb  3.4  /  TBili  0.1[L]  /  DBili  x   /  AST  10  /  ALT  11  /  AlkPhos  87  05-04    Lactate:  05-04 @ 02:11  1.5    PT/INR - ( 04 May 2025 02:26 )   PT: 12.0 sec;   INR: 1.04 ratio         PTT - ( 04 May 2025 02:26 )  PTT:38.3 sec          Urinalysis Basic - ( 04 May 2025 02:25 )    Color: x / Appearance: x / SG: x / pH: x  Gluc: 385 mg/dL / Ketone: x  / Bili: x / Urobili: x   Blood: x / Protein: x / Nitrite: x   Leuk Esterase: x / RBC: x / WBC x   Sq Epi: x / Non Sq Epi: x / Bacteria: x        IMAGING:                                                                                               Vascular Surgery Consult  Consulting attending: Dr. Youssef    HPI:  54M hx DM, CAD (s/p CABG), mild HFrEF (EF 45%), and PAD (with recent angiogram Aug. 2024), and recent admission to [facility] w/ right foot wound s/p right foot Partial 2nd ray resection and 3rd proximal phalanx bone biopsy on 3/7/25 (Cx + for E. Faecalis and Staph Epi requiring prolonged abx course s/p PICC), now presenting with right foot wound. Patient reports 1w of subjective fevers, chills, hypothermia, and R foot lateral toe oozing and erythema c/f soft tissue infection. FS elevated to 400s on arrival patient reports compliance w/ diabetes medications.     VSS. WBC 9.62. CRP 56. Glucose 285. Foot xray pending.     PAST MEDICAL HISTORY:  Hypertension, unspecified type    Hyperlipidemia, unspecified hyperlipidemia type    Type 2 diabetes mellitus without complication, with long-term current use of insulin    Coronary artery disease of native artery of native heart with stable angina pectoris        PAST SURGICAL HISTORY:  History of percutaneous coronary intervention        MEDICATIONS:  piperacillin/tazobactam IVPB.- 3.375 Gram(s) IV Intermittent once  piperacillin/tazobactam IVPB.. 3.375 Gram(s) IV Intermittent every 8 hours  vancomycin  IVPB 1000 milliGRAM(s) IV Intermittent Once      ALLERGIES:  No Known Allergies      VITALS & I/Os:  Vital Signs Last 24 Hrs  T(C): 36.7 (04 May 2025 02:28), Max: 36.7 (04 May 2025 02:28)  T(F): 98 (04 May 2025 02:28), Max: 98 (04 May 2025 02:28)  HR: 88 (04 May 2025 02:28) (87 - 88)  BP: 129/79 (04 May 2025 02:28) (129/79 - 131/74)  BP(mean): --  RR: 17 (04 May 2025 02:28) (17 - 20)  SpO2: 98% (04 May 2025 02:28) (98% - 99%)    Parameters below as of 04 May 2025 02:28  Patient On (Oxygen Delivery Method): room air        I&O's Summary      PHYSICAL EXAM:  General: Not acutely distressed  Respiratory: Nonlabored respirations  Abdominal: No palpable thrill  Extremities: Warm, R toe 2nd and fourth amputations with granulation tissue by 2nd toe stump. Right foot with dark skin coloration. No palpable crepitus   Vascular: DP/PT/popliteal signals bl, palpable femoral pulses bilaterally      LABS:                        9.2    9.62  )-----------( 404      ( 04 May 2025 02:26 )             29.8     05-04    135  |  99  |  19  ----------------------------<  385[H]  4.6   |  26  |  1.44[H]    Ca    9.1      04 May 2025 02:25  Phos  2.8     05-04  Mg     2.3     05-04    TPro  7.6  /  Alb  3.4  /  TBili  0.1[L]  /  DBili  x   /  AST  10  /  ALT  11  /  AlkPhos  87  05-04    Lactate:  05-04 @ 02:11  1.5    PT/INR - ( 04 May 2025 02:26 )   PT: 12.0 sec;   INR: 1.04 ratio         PTT - ( 04 May 2025 02:26 )  PTT:38.3 sec          Urinalysis Basic - ( 04 May 2025 02:25 )    Color: x / Appearance: x / SG: x / pH: x  Gluc: 385 mg/dL / Ketone: x  / Bili: x / Urobili: x   Blood: x / Protein: x / Nitrite: x   Leuk Esterase: x / RBC: x / WBC x   Sq Epi: x / Non Sq Epi: x / Bacteria: x

## 2025-05-04 NOTE — DISCHARGE NOTE PROVIDER - NSDCCPCAREPLAN_GEN_ALL_CORE_FT
PRINCIPAL DISCHARGE DIAGNOSIS  Diagnosis: Soft tissue infection  Assessment and Plan of Treatment: You were admitted to the [facility] and started on antibiotics for osteomyelitis. You will be discharged on *****     PRINCIPAL DISCHARGE DIAGNOSIS  Diagnosis: Soft tissue infection  Assessment and Plan of Treatment: You were admitted to the [facility] and started on antibiotics for osteomyelitis. You will be discharged on doxycycline for 7 days ending 5/20/2025

## 2025-05-04 NOTE — DISCHARGE NOTE PROVIDER - CARE PROVIDERS DIRECT ADDRESSES
[email or web address]

## 2025-05-04 NOTE — ED PROVIDER NOTE - PROGRESS NOTE DETAILS
Murphy Hogan MD (Attending Physician): Podiatry evaluated pt, performed bedside I&D of right foot wound. Recommending admission for IV abx and vascular surgery evaluation. Pt is agreeable with plan.

## 2025-05-04 NOTE — ED ADULT NURSE NOTE - NSFALLHARMRISKINTERV_ED_ALL_ED

## 2025-05-04 NOTE — DISCHARGE NOTE PROVIDER - CARE PROVIDER_API CALL
Estevan Youssef  Vascular Surgery  [address], [address]  [place] [address]  Phone: [phone]  Fax: [phone]  Follow Up Time: 2 weeks    Toña Patterson  Foot Surgery  [address]  [place] [address]  Phone: [phone]  Fax: [phone]  Follow Up Time: 2 weeks    Jorge Tan  Nephrology  [address] [place] [address]  Phone: [phone]  Fax: [phone])-  Follow Up Time: 2 weeks    Edwin Dwyer  Cardiovascular Disease  [address], [address]  [place] [address]  Phone: [phone]  Fax: [phone]  Follow Up Time: Routine

## 2025-05-04 NOTE — DISCHARGE NOTE PROVIDER - NSFOLLOWUPCLINICS_GEN_ALL_ED_FT
[facility] - Ophthalmology  Ophthalmology  [address]  [place] [address]  Phone: [phone]  Fax:   Follow Up Time: Routine

## 2025-05-04 NOTE — PROGRESS NOTE ADULT - SUBJECTIVE AND OBJECTIVE BOX
Patient is a 54y old  Male who presents with a chief complaint of Angiogram (04 May 2025 06:13)       INTERVAL HPI/OVERNIGHT EVENTS:  Patient seen and evaluated at bedside.  Pt is resting comfortable in NAD. Denies N/V/F/C.    Allergies    No Known Allergies    Intolerances        Vital Signs Last 24 Hrs  T(C): 36.4 (04 May 2025 08:27), Max: 36.7 (04 May 2025 02:28)  T(F): 97.5 (04 May 2025 08:27), Max: 98 (04 May 2025 02:28)  HR: 81 (04 May 2025 08:27) (73 - 88)  BP: 118/74 (04 May 2025 08:27) (106/62 - 131/74)  BP(mean): --  RR: 18 (04 May 2025 08:27) (17 - 20)  SpO2: 98% (04 May 2025 08:27) (98% - 100%)    Parameters below as of 04 May 2025 08:27  Patient On (Oxygen Delivery Method): room air        LABS:                        9.2    9.62  )-----------( 404      ( 04 May 2025 02:26 )             29.8     05-04    135  |  99  |  19  ----------------------------<  385[H]  4.6   |  26  |  1.44[H]    Ca    9.1      04 May 2025 02:25  Phos  2.8     05-04  Mg     2.3     05-04    TPro  7.6  /  Alb  3.4  /  TBili  0.1[L]  /  DBili  x   /  AST  10  /  ALT  11  /  AlkPhos  87  05-04    PT/INR - ( 04 May 2025 02:26 )   PT: 12.0 sec;   INR: 1.04 ratio         PTT - ( 04 May 2025 02:26 )  PTT:38.3 sec  Urinalysis Basic - ( 04 May 2025 02:25 )    Color: x / Appearance: x / SG: x / pH: x  Gluc: 385 mg/dL / Ketone: x  / Bili: x / Urobili: x   Blood: x / Protein: x / Nitrite: x   Leuk Esterase: x / RBC: x / WBC x   Sq Epi: x / Non Sq Epi: x / Bacteria: x      CAPILLARY BLOOD GLUCOSE      POCT Blood Glucose.: 179 mg/dL (04 May 2025 08:08)  POCT Blood Glucose.: 394 mg/dL (04 May 2025 01:22)      Lower Extremity Physical Exam:  Vascular: DP/PT 0/4, B/L, CFT < 3 seconds B/L, Temperature gradient warm to cool, B/L.   Neuro: Epicritic sensation absent to the level of digits, B/L.  Musculoskeletal/Ortho: s/p Right foot 3rd metatarsal head resection, s/p Right foot Partial 2nd Ray Resection (DOS 3/6)  Skin: 5/4 s/p bedside R foot I&D to level of subQ, Scant, purulent drainage, no erythema, no malodor, Left foot no open wounds, no acute signs of infection    RADIOLOGY & ADDITIONAL TESTS:

## 2025-05-04 NOTE — CONSULT NOTE ADULT - ASSESSMENT
54M presents with right foot wounds  - Patient seen and evaluated  - Afebrile, WBC 9.62, , CRP 5.6  - Right foot 2nd metatarsal wound to bone, purulent drainage, fluctuance, localized erythema. Right foot medial 1st MPJ wound to subQ, purulent drainage, fluctuance, localized erythema. Left foot no open wounds, no acute signs of infection  - After obtaining verbal consent, 10cc of 1% lidocaine plain was administered to the right foot. Using a sterile #15 blade, an incision to the level of and not beyond the subQ was made at the right foot 2nd metatarsal over the previous partial 2nd ray resection healed incision site, 5cc of purulence noted. Using a sterile #15 blade, an incision to the level of and beyond the subQ was made right foot medial 1st MPJ, 0.5cc of purulence noted. Wounds were flushed with saline and wound culture was obtained. Wounds were then packed with iodoform packing, surgicell, 4x4 gauze, ABD, eugenio.  - Right foot xray: no gas, no OM (prelim)  - Right foot MRI ordered to r/o OM and abscess  - Right foot cultured: pending  - Recommend admit to medicine   - Recommend IV vancomycin/cefepime  - Vascular recs, appreciated  - Recommend ID consult  - Pod plan local wound care vs right foot revisional 2nd ray resection vs incision/drainage pending MRI  - Please document medical and cardiac clearance for possible podiatric surgical intervention under anesthesia  - Discussed with attending   54M presents with right foot wounds  - Patient seen and evaluated  - Afebrile, WBC 9.62, , CRP 5.6  - Right foot 2nd metatarsal wound to bone, purulent drainage, fluctuance, localized erythema. Right foot medial 1st MPJ wound to subQ, purulent drainage, fluctuance, localized erythema. Left foot no open wounds, no acute signs of infection  - After obtaining verbal consent, 10cc of 1% lidocaine plain was administered to the right foot. Using a sterile #15 blade, an incision to the level of and not beyond the subQ was made at the right foot 2nd metatarsal over the previous partial 2nd ray resection healed incision site, 5cc of purulence noted. Using a sterile #15 blade, an incision to the level of and beyond the subQ was made right foot medial 1st MPJ, 0.5cc of purulence noted. Wounds were flushed with saline and wound culture was obtained. Wounds were then packed with iodoform packing, surgicell, 4x4 gauze, ABD, eugenio.  - Right foot xray: no gas, no OM (prelim)  - Right foot MRI ordered to r/o OM and abscess  - Right foot cultured: pending  - Recommend admit to medicine   - Recommend IV vancomycin/cefepime  - Vascular recs, appreciated  - Recommend ID consult  - Pod plan local wound care vs right foot revisional 2nd ray resection vs incision/drainage pending MRI/vasc recs  - Please document medical and cardiac clearance for possible podiatric surgical intervention under anesthesia  - Discussed with attending

## 2025-05-04 NOTE — DISCHARGE NOTE PROVIDER - NSDCMRMEDTOKEN_GEN_ALL_CORE_FT
acetaminophen 325 mg oral tablet: 2 tab(s) orally every 6 hours As needed Mild Pain (1 - 3)  aspirin 81 mg oral delayed release tablet: 1 tab(s) orally once a day  atorvastatin 80 mg oral tablet: 1 tab(s) orally once a day (at bedtime)  cilostazol 50 mg oral tablet: 1 tab(s) orally 2 times a day  fenofibrate 145 mg oral tablet: 1 tab(s) orally once a day (at bedtime)  HumaLOG KwikPen 100 units/mL injectable solution: 20 unit(s) injectable 3 times a day  insulin glargine 100 units/mL subcutaneous solution: 52 unit(s) subcutaneous once a day (at bedtime)  Lopressor 50 mg oral tablet: 1 tab(s) orally 2 times a day  sacubitril-valsartan 24 mg-26 mg oral tablet: 1 tab(s) orally 2 times a day  ticagrelor 90 mg oral tablet: 1 tab(s) orally every 12 hours  vancomycin 1.25 g/250 mL intravenous solution: 1.25 gram(s) intravenously every 12 hours MUST BE GIVEN IN NORMAL SALINE. PT DIABETIC WITH HIGH BLOOD GLUCOSE.   aspirin 81 mg oral delayed release tablet: 1 tab(s) orally once a day  atorvastatin 80 mg oral tablet: 1 tab(s) orally once a day (at bedtime)  fenofibrate 145 mg oral tablet: 1 tab(s) orally once a day (at bedtime)  HumaLOG KwikPen 100 units/mL injectable solution: 20 unit(s) injectable 3 times a day  insulin glargine 100 units/mL subcutaneous solution: 52 unit(s) subcutaneous once a day (at bedtime)  Lopressor 50 mg oral tablet: 1 tab(s) orally 2 times a day  sacubitril-valsartan 24 mg-26 mg oral tablet: 1 tab(s) orally 2 times a day  ticagrelor 90 mg oral tablet: 1 tab(s) orally every 12 hours   acetaminophen 500 mg oral tablet: 2 tab(s) orally every 6 hours  aspirin 81 mg oral delayed release tablet: 1 tab(s) orally once a day  atorvastatin 80 mg oral tablet: 1 tab(s) orally once a day (at bedtime)  cadexomer iodine 0.9% topical gel: 1 Apply topically to affected area once a day  doxycycline monohydrate 50 mg oral capsule: 2 cap(s) orally every 12 hours Continue for 7 days until 5/20/2025  famotidine 20 mg oral tablet: 1 tab(s) orally once As needed Heartburn  fenofibrate 145 mg oral tablet: 1 tab(s) orally once a day (at bedtime)  HumaLOG KwikPen 100 units/mL injectable solution: 20 unit(s) injectable 3 times a day  insulin glargine 100 units/mL subcutaneous solution: 52 unit(s) subcutaneous once a day (at bedtime)  Lopressor 50 mg oral tablet: 1 tab(s) orally 2 times a day  oxyCODONE: 1 dose(s) orally every 4 hours as needed for  moderate pain  oxyCODONE 5 mg oral tablet: 1 tab(s) orally every 4 hours As needed Severe Pain (7 - 10)  pentoxifylline 400 mg oral tablet, extended release: 1 tab(s) orally 3 times a day  sacubitril-valsartan 24 mg-26 mg oral tablet: 1 tab(s) orally 2 times a day  ticagrelor 90 mg oral tablet: 1 tab(s) orally every 12 hours   acetaminophen 500 mg oral tablet: 2 tab(s) orally every 6 hours  aspirin 81 mg oral delayed release tablet: 1 tab(s) orally once a day  atorvastatin 80 mg oral tablet: 1 tab(s) orally once a day (at bedtime)  cadexomer iodine 0.9% topical gel: 1 Apply topically to affected area once a day  doxycycline monohydrate 50 mg oral capsule: 2 cap(s) orally every 12 hours Continue for 7 days until 5/20/2025  famotidine 20 mg oral tablet: 1 tab(s) orally once As needed Heartburn  fenofibrate 145 mg oral tablet: 1 tab(s) orally once a day (at bedtime)  HumaLOG KwikPen 100 units/mL injectable solution: 20 unit(s) injectable 3 times a day  insulin glargine 100 units/mL subcutaneous solution: 52 unit(s) subcutaneous once a day (at bedtime)  Lopressor 50 mg oral tablet: 1 tab(s) orally 2 times a day  Narcan 4 mg/0.1 mL nasal spray: 1 spray(s) intranasally prn  oxyCODONE: 1 dose(s) orally every 4 hours as needed for  moderate pain  oxyCODONE 5 mg oral tablet: 1 tab(s) orally every 4 hours As needed Severe Pain (7 - 10)  pentoxifylline 400 mg oral tablet, extended release: 1 tab(s) orally 3 times a day  ticagrelor 90 mg oral tablet: 1 tab(s) orally every 12 hours   acetaminophen 500 mg oral tablet: 2 tab(s) orally every 6 hours  aspirin 81 mg oral delayed release tablet: 1 tab(s) orally once a day  atorvastatin 80 mg oral tablet: 1 tab(s) orally once a day (at bedtime)  cadexomer iodine 0.9% topical gel: 1 Apply topically to affected area once a day  cilostazol 50 mg oral tablet: 1 tab(s) orally 2 times a day  doxycycline monohydrate 50 mg oral capsule: 2 cap(s) orally every 12 hours Continue for 7 days until 5/20/2025  famotidine 20 mg oral tablet: 1 tab(s) orally once As needed Heartburn  fenofibrate 145 mg oral tablet: 1 tab(s) orally once a day (at bedtime)  HumaLOG KwikPen 100 units/mL injectable solution: 20 unit(s) injectable 3 times a day  insulin glargine 100 units/mL subcutaneous solution: 52 unit(s) subcutaneous once a day (at bedtime)  Lopressor 50 mg oral tablet: 1 tab(s) orally 2 times a day  Narcan 4 mg/0.1 mL nasal spray: 1 spray(s) intranasally prn  oxyCODONE: 1 dose(s) orally every 4 hours as needed for  moderate pain  oxyCODONE 5 mg oral tablet: 1 tab(s) orally every 4 hours As needed Severe Pain (7 - 10)  ticagrelor 90 mg oral tablet: 1 tab(s) orally every 12 hours

## 2025-05-04 NOTE — H&P ADULT - NSHPPHYSICALEXAM_GEN_ALL_CORE
General: Not acutely distressed  Respiratory: Nonlabored respirations  Abdominal: No palpable thrill  Extremities: Warm, R toe 2nd and fourth amputations with granulation tissue by 2nd toe stump. Right foot with dark skin coloration. No palpable crepitus   Vascular: DP/PT/popliteal signals bl, palpable femoral pulses bilaterally

## 2025-05-04 NOTE — H&P ADULT - ASSESSMENT
54M hx DM, CAD (s/p CABG), mild HFrEF (EF 45%), and PAD (with recent angiogram Aug. 2024), and recent admission to [facility] w/ right foot wound s/p right foot Partial 2nd ray resection and 3rd proximal phalanx bone biopsy on 3/7/25 (Cx + for E. Faecalis and Staph Epi requiring prolonged abx course s/p PICC), now presenting with right foot wound.     Recommendations:  -Admit to vascular surgery, Dr. Youssef.  -OBDULIO/PVR w/ toe pressures.  -Medicine/cardiology clearances for RLE angiogram.  -Podiatry consult.  -Local wound care.  -Abx.  -Continue ASA/Pletal/statin.    Discussed with vascular surgery fellow on behalf of Dr. Youssef.    Vascular Surgery  Please page [phone] for all questions.

## 2025-05-04 NOTE — DISCHARGE NOTE PROVIDER - NSDCFUSCHEDAPPT_GEN_ALL_CORE_FT
[facility]  VASCULAR [address]  Scheduled Appointment: 07/29/2025    Estevan Youssef  [facility]  VASCULAR [address]  Scheduled Appointment: 07/29/2025

## 2025-05-04 NOTE — CONSULT NOTE ADULT - PROBLEM SELECTOR RECOMMENDATION 4
uncontrolled, last a1c 15.5 >  check a1c  agree with basal bolus. 52u lantus qhs, lispro 20u TID AC  carb consistent diet

## 2025-05-04 NOTE — DISCHARGE NOTE PROVIDER - NSDCFUADDAPPT_GEN_ALL_CORE_FT
Podiatry Discharge Instructions:  Follow up: Please follow up with Dr. Patterson within 1 week of discharge from the hospital, please call [phone] for appointment and discuss that you recently were seen in the hospital.  Wound Care: Please apply pack wounds with iodoform packing, 4x4 gauze, ABD, uegenio  Weight bearing: Please weight bear as tolerated to right heel in a surgical shoe.  Antibiotics: Please continue as instructed. Podiatry Discharge Instructions:  Follow up: Please follow up with Dr. Patterson within 1 week of discharge from the hospital, please call [phone] for appointment and discuss that you recently were seen in the hospital.  Wound Care: Please apply pack right foot wound with 1/2 inch iodoform packing, and apply 4x4 gauze, ABD, eugenio, and ace bandage daily  Weight bearing: Please remain nonweightbearing as tolerated to right foot  Antibiotics: Please continue as instructed. Podiatry Discharge Instructions:  Follow up: Please follow up with Dr. Patterson within 1 week of discharge from the hospital, please call [phone] for appointment and discuss that you recently were seen in the hospital.  Wound Care: Please leave dressing clean dry and intact  Weight bearing: Please remain nonweightbearing as tolerated to right foot  Antibiotics: Please continue as instructed. Podiatry Discharge Instructions:  Follow up: Please follow up with Dr. Patterson within 1 week of discharge from the hospital, please call [phone] for appointment and discuss that you recently were seen in the hospital.  Wound Care: Please leave dressing clean dry and intact  Weight bearing: Please remain nonweightbearing as tolerated to right foot  Antibiotics: Please continue as instructed.    Please follow up with [facility] [address] [place] [phone]

## 2025-05-04 NOTE — CONSULT NOTE ADULT - ASSESSMENT
54M hx DM, CAD (s/p CABG), mild HFrEF (EF 45%), and PAD (with recent angiogram Aug. 2024), and recent admission to [facility] w/ right foot wound s/p right foot Partial 2nd ray resection and 3rd proximal phalanx bone biopsy on 3/7/25 (Cx + for E. Faecalis and Staph Epi requiring prolonged abx course s/p PICC), now presenting with right foot wound.

## 2025-05-04 NOTE — ED PROVIDER NOTE - OBJECTIVE STATEMENT
54M with PMHx of T2DM, CAD (post-CABG), mild HFrEF (EF 45%), and PAD (with recent angiogram Aug. 2024) presenting w/ foot wound, known prior diabetic foot ulcer w/ infcn w/ PICC line on outpt IV abx. MR 3/25   presenting for w/ recent admission 3/5/25-3/11/25 for hyperglycemia and discharged on lantus 52u and admelog 20u premeal, per daughter at bedside pt with hypoglycemic episode to 47 - pt states he felt light headed and knew his FS was low. Pt was given oral orange juice prior to arrival with improvement. Daughter states pt on 50u insulin at night and 20u premeal - she states pts FS are usually around 90  with max 195 in the last 2 weeks, sometimes 70. Pt sometimes has variable meals. Pts daughter is translating Kiswahili where needed with pts permission but pt understands and presents hx in english. Pt denies fever, chest pain, sob or new pain/ symptoms See mdm

## 2025-05-04 NOTE — ED PROVIDER NOTE - IV ALTEPLASE ADMIN OUTSIDE HIDDEN
Assessment/Plan


Problem List:  


(1) Elevated troponin


ICD Codes:  R74.8 - Abnormal levels of other serum enzymes


SNOMED:  845696215, 321650708, 205915722


(2) SOB (shortness of breath)


ICD Codes:  R06.02 - Shortness of breath


SNOMED:  026091492


(3) Pneumonia


ICD Codes:  J18.9 - Pneumonia, unspecified organism


SNOMED:  323914197


(4) Abdominal pain


ICD Codes:  R10.9 - Unspecified abdominal pain


SNOMED:  40896122


Status:  stable, progressing


Assessment/Plan:


o2 pulm tx gi id cardio f/u cbc bmp am dc w hh if clear





Subjective


Constitutional:  Reports: weakness


Allergies:  


Coded Allergies:  


     No Known Allergies (Unverified , 7/19/19)


All Systems:  reviewed and negative except above


Subjective


calm in bed





Objective





Last 24 Hour Vital Signs








  Date Time  Temp Pulse Resp B/P (MAP) Pulse Ox O2 Delivery O2 Flow Rate FiO2


 


7/24/19 08:31  89 20  100 Room Air  21


 


7/24/19 08:27  87 22  98 Room Air  21


 


7/24/19 08:26  87 22  98 Room Air  21


 


7/24/19 08:00 97.9 92 20 109/76 (87) 95   


 


7/24/19 04:00 97.7 101 20 119/90 (100) 95   


 


7/24/19 03:24  87      


 


7/24/19 00:00 97.7 90 20 127/78 (94) 95   


 


7/23/19 23:29  92      


 


7/23/19 21:00      Room Air  


 


7/23/19 20:02  86      


 


7/23/19 20:00 97.7 91 20 106/79 (88) 92   


 


7/23/19 16:00  84      


 


7/23/19 16:00 98.1 99 18 117/62 (80) 99   


 


7/23/19 12:00 98.5 96 18 105/66 (79) 98   


 


7/23/19 12:00  86      

















Intake and Output  


 


 7/23/19 7/24/19





 19:00 07:00


 


Intake Total 540 ml 


 


Output Total  400 ml


 


Balance 540 ml -400 ml


 


  


 


Intake Oral 540 ml 


 


Output Urine Total  400 ml


 


# Voids 4 3


 


# Bowel Movements 1 








Laboratory Tests


7/24/19 06:08: 


White Blood Count 4.7L, Red Blood Count 4.75, Hemoglobin 14.3, Hematocrit 42.4, 

Mean Corpuscular Volume 89, Mean Corpuscular Hemoglobin 30.0, Mean Corpuscular 

Hemoglobin Concent 33.6, Red Cell Distribution Width 10.6L, Platelet Count 240, 

Mean Platelet Volume 7.3, Neutrophils (%) (Auto) 65.4, Lymphocytes (%) (Auto) 

14.1L, Monocytes (%) (Auto) 17.5H, Eosinophils (%) (Auto) 1.8, Basophils (%) (

Auto) 1.1, Sodium Level 135L, Potassium Level 3.5, Chloride Level 103, Carbon 

Dioxide Level 22, Anion Gap 11, Blood Urea Nitrogen 13, Creatinine 0.5L, 

Estimat Glomerular Filtration Rate > 60, Glucose Level 100, Calcium Level 8.0L, 

Total Bilirubin 0.7, Aspartate Amino Transf (AST/SGOT) 601H, Alanine 

Aminotransferase (ALT/SGPT) 543H, Alkaline Phosphatase 323H, Total Protein 7.0, 

Albumin 2.7L, Globulin 4.3, Albumin/Globulin Ratio 0.6L


Height (Feet):  5


Height (Inches):  0.00


Weight (Pounds):  141


General Appearance:  lethargic


EENT:  normal ENT inspection


Neck:  normal alignment


Cardiovascular:  normal peripheral pulses, normal rate, regular rhythm


Respiratory/Chest:  chest wall non-tender, lungs clear, normal breath sounds


Abdomen:  normal bowel sounds, non tender, soft


Extremities:  normal inspection


Edema:  no edema noted Arm (L), no edema noted Arm (R), no edema noted Leg (L), 

no edema noted Leg (R), no edema noted Pedal (L), no edema noted Pedal (R), no 

edema noted Generalized


Neurologic:  motor weakness


Skin:  normal pigmentation, warm/dry











Edwin Collins DO Jul 24, 2019 09:26 show

## 2025-05-04 NOTE — ED ADULT NURSE REASSESSMENT NOTE - NS ED NURSE REASSESS COMMENT FT1
Pharmacy called for Vancomycin in normal saline Pharmacy called for Vancomycin and Zosyn in normal saline

## 2025-05-04 NOTE — DISCHARGE NOTE PROVIDER - PROVIDER TOKENS
PROVIDER:[TOKEN:[[id number]],FOLLOWUP:[2 weeks]],PROVIDER:[TOKEN:[[id number]],FOLLOWUP:[2 weeks]],PROVIDER:[TOKEN:[[id number]],FOLLOWUP:[2 weeks]],PROVIDER:[TOKEN:[[id number]],FOLLOWUP:[Routine]]

## 2025-05-04 NOTE — DISCHARGE NOTE PROVIDER - HOSPITAL COURSE
54M hx DM, CAD (s/p CABG), mild HFrEF (EF 45%), and PAD (with recent angiogram Aug. 2024), and recent admission to [facility] w/ right foot wound s/p right foot Partial 2nd ray resection and 3rd proximal phalanx bone biopsy on 3/7/25 (Cx + for E. Faecalis and Staph Epi requiring prolonged abx course s/p PICC), now presenting with right foot wound. Patient reports 1w of subjective fevers, chills, hypothermia, and R foot lateral toe oozing and erythema c/f soft tissue infection. FS elevated to 400s on arrival patient reports compliance w/ diabetes medications.     Pt was admitted under Vascular Surgery for further evaluation and management. OBDULIO/PVRs were obtained with toe pressures and clearances were obtained for RLE angiogram. Pods was consulted and performed bedside I&D of R foot to the level of subQ with scant purulent drainage. Empiric antibiotics started. MRI of R foot was ordered and showed 1st mpj fluid collection, 1ST metatarsal and proximal phalanx, 2nD metatarsal OM. Pt was taken to the OR on 5/8/25, and is s/p right lower extremity angiogram via left femoral access. The patient tolerated the procedure well (see operative report for full details). Pt was transferred to the PACU in stable condition. In the PACU, the patient's pain was controlled and vitals stable. Pt then taken to the OR with podiatry on 5/9/25 and is s/p right partial 1st ray resection and incision and drainage. Post operatively pt Hgb was 6.9 and given 1u pRBC, Hgb responded appropriately. Pt RTOR with podiatry on 5/12 for right TMA and tendo-achilles lengthening. Post op course complicated by FS over 400, endo notified, insulin given, and meds adjusted as need. Clean bone margin grew *** with ID recs***     Endo consulted and managed DM medications.   ID consulted for R foot wound infection and OM and managed antibiotics.   Nephro consulted and managed [facility].     Physical therapy evaluated the patient and recommended RAIZA.   On the day of discharge, the patient's vitals are stable, pain is controlled, voiding urine, tolerating a CC diet, and ambulating well. Pt will f/u with   in 1-2 weeks. Pt will f/u with PCP in 1-2 weeks. 54M hx DM, CAD (s/p CABG), mild HFrEF (EF 45%), and PAD (with recent angiogram Aug. 2024), and recent admission to [facility] w/ right foot wound s/p right foot Partial 2nd ray resection and 3rd proximal phalanx bone biopsy on 3/7/25 (Cx + for E. Faecalis and Staph Epi requiring prolonged abx course s/p PICC), now presenting with right foot wound. Patient reports 1w of subjective fevers, chills, hypothermia, and R foot lateral toe oozing and erythema c/f soft tissue infection. FS elevated to 400s on arrival patient reports compliance w/ diabetes medications.     Pt was admitted under Vascular Surgery for further evaluation and management. OBDULIO/PVRs were obtained with toe pressures and clearances were obtained for RLE angiogram. Pods was consulted and performed bedside I&D of R foot to the level of subQ with scant purulent drainage. Empiric antibiotics started. MRI of R foot was ordered and showed 1st mpj fluid collection, 1ST metatarsal and proximal phalanx, 2nD metatarsal OM. Pt was taken to the OR on 5/8/25, and is s/p right lower extremity angiogram via left femoral access. The patient tolerated the procedure well (see operative report for full details). Pt was transferred to the PACU in stable condition. In the PACU, the patient's pain was controlled and vitals stable. Pt then taken to the OR with podiatry on 5/9/25 and is s/p right partial 1st ray resection and incision and drainage. Post operatively pt Hgb was 6.9 and given 1u pRBC, Hgb responded appropriately. Pt RTOR with podiatry on 5/12 for right TMA and tendo-achilles lengthening. Post op course complicated by FS over 400, endo notified, insulin given, and meds adjusted as need. Clean bone margin had no growth, ID recs PO doxycycline.     Endo consulted and managed DM medications.   ID consulted for R foot wound infection and OM and managed antibiotics.   Nephro consulted and managed [facility].     Physical therapy evaluated the patient and recommended RAIZA.   On the day of discharge, the patient's vitals are stable, pain is controlled, voiding urine, tolerating a CC diet, and ambulating well. Pt will f/u with  in 1-2 weeks. Pt will f/u with PCP in 1-2 weeks.    54M hx DM, CAD (s/p CABG), mild HFrEF (EF 45%), and PAD (with recent angiogram Aug. 2024), and recent admission to [facility] w/ right foot wound s/p right foot Partial 2nd ray resection and 3rd proximal phalanx bone biopsy on 3/7/25 (Cx + for E. Faecalis and Staph Epi requiring prolonged abx course s/p PICC), now presenting with right foot wound. Patient reports 1w of subjective fevers, chills, hypothermia, and R foot lateral toe oozing and erythema c/f soft tissue infection. FS elevated to 400s on arrival patient reports compliance w/ diabetes medications.     Pt was admitted under Vascular Surgery for further evaluation and management. OBDULIO/PVRs were obtained with toe pressures and clearances were obtained for RLE angiogram. Pods was consulted and performed bedside I&D of R foot to the level of subQ with scant purulent drainage. Empiric antibiotics started. MRI of R foot was ordered and showed 1st mpj fluid collection, 1ST metatarsal and proximal phalanx, 2nD metatarsal OM. Pt was taken to the OR on 5/8/25, and is s/p right lower extremity angiogram via left femoral access. The patient tolerated the procedure well (see operative report for full details). Pt was transferred to the PACU in stable condition. In the PACU, the patient's pain was controlled and vitals stable. Pt then taken to the OR with podiatry on 5/9/25 and is s/p right partial 1st ray resection and incision and drainage. Post operatively pt Hgb was 6.9 and given 1u pRBC, Hgb responded appropriately. Pt RTOR with podiatry on 5/12 for right TMA and tendo-achilles lengthening. Post op course complicated by FS over 400, endo notified, insulin given, and meds adjusted as need. Clean bone margin had no growth, ID recs PO doxycycline which pt will be discharged on.     Endo consulted and managed DM medications.   ID consulted for R foot wound infection and OM and managed antibiotics.   Nephro consulted and managed [facility].     Physical therapy evaluated the patient and recommended RAIZA.   On the day of discharge, the patient's vitals are stable, pain is controlled, voiding urine, tolerating a CC diet, and ambulating well. Pt will f/u with Dr. Rosca in 1-2 weeks. Pt will f/u with PCP in 1-2 weeks.

## 2025-05-04 NOTE — ED PROVIDER NOTE - ATTENDING CONTRIBUTION TO CARE
Murphy Hogan MD (Attending Physician):    I performed a history and physical exam of the patient and discussed their management with the resident/fellow/ACP/student. I have reviewed the resident/fellow/ACP/student note and agree with the documented findings and plan of care, except as noted. I have personally performed a substantive portion of the visit including all aspects of the medical decision making. My medical decision making and observations are found below. Please refer to any progress notes for updates on clinical course.    HPI:  55yo M with pmhx of DM2, CAD (s/p CABG), mild HFrEF (EF 45%), and PAD (with recent angiogram Aug. 2024), and recent admission to [facility] w/ right foot wound s/p right foot Partial 2nd ray resection and 3rd proximal phalanx bone biopsy on 3/7/25 (Cx + for E. Faecalis and Staph Epi requiring prolonged abx course s/p PICC), now presenting with right foot wound. Patient reports 1 week of subjective fevers, chills, hypothermia, and R foot lateral toe oozing and erythema c/f soft tissue infection. FS elevated to 400s on arrival, patient reports compliance w/ diabetes medications.    PE:  Vitals noted  GEN - NAD, well appearing, A&Ox3  HEAD - NC/AT  EYES - PERRL, EOMI  ENT - Airway patent, mucous membranes moist  PULMONARY - Normal wob, CTA b/l, symmetric breath sounds, no W/R/R, satting 98% on RA  CARDIAC - +S1S2, RRR, no M/G/R, no JVD  ABDOMEN - +BS, ND, NT, soft, no guarding, no rebound, no masses, no rigidity   - No CVA TTP b/l  EXTREMITIES - FROM. 1+ DP pulse in R foot. +S/p right foot 3rd metatarsal head resection, s/p right foot partial 2nd ray resection. +Right foot 2nd metatarsal wound, fluctuance, no erythema. +Right foot medial 1st MPJ wound, fluctuance, no erythema.  NEUROLOGIC - Alert, speech clear, moving all extremities spontaneously, no focal deficits  PSYCH - Normal mood/affect, normal insight    MDM:  DDx includes, but not limited to: right foot cellulitis vs. abscess vs. osteomyelitis. ekg, x-ray right foot, pre-op labs, tylenol, IV abx, podiatry and vascular surgery consults. Will most likely require admission.

## 2025-05-04 NOTE — PROGRESS NOTE ADULT - ASSESSMENT
54M 5/4 s/p bedside R foot I&D to level of subQ  - Patient seen and evaluated  - Afebrile, WBC 9.62, , CRP 5.6  - 5/4 s/p bedside R foot I&D to level of subQ, Scant, purulent drainage, no erythema, no malodor, Left foot no open wounds, no acute signs of infection  - Right foot xray: no gas, no OM (prelim)  - Right foot MRI: Pending  - Right foot cultured: pending   - Recommend continue IV vancomycin/cefepime  - Vascular recs, appreciated  - Recommend ID consult  - Pod plan local wound care vs right foot revisional 2nd ray resection vs Transmetatarsal Amputation pending MRI/vasc recs  - Please document medical and cardiac clearance for possible podiatric surgical intervention under anesthesia  - Discussed with attending

## 2025-05-04 NOTE — CONSULT NOTE ADULT - SUBJECTIVE AND OBJECTIVE BOX
DATE OF SERVICE: 05-04-25 @ 22:21    CHIEF COMPLAINT:Patient is a 54y old  Male who presents with a chief complaint of Angiogram (04 May 2025 13:28)      HISTORY OF PRESENT ILLNESS:HPI:  54M hx DM, CAD (s/p CABG), mild HFrEF (EF 45%), and PAD (with recent angiogram Aug. 2024), and recent admission to [facility] w/ right foot wound s/p right foot Partial 2nd ray resection and 3rd proximal phalanx bone biopsy on 3/7/25 (Cx + for E. Faecalis and Staph Epi requiring prolonged abx course s/p PICC), now presenting with right foot wound. Patient reports 1w of subjective fevers, chills, hypothermia, and R foot lateral toe oozing and erythema c/f soft tissue infection. FS elevated to 400s on arrival patient reports compliance w/ diabetes medications.     VSS. WBC 9.62. CRP 56. Glucose 285. Foot xray pending. (04 May 2025 06:13)      PAST MEDICAL & SURGICAL HISTORY:  Hypertension, unspecified type      Hyperlipidemia, unspecified hyperlipidemia type      Type 2 diabetes mellitus without complication, with long-term current use of insulin      Coronary artery disease of native artery of native heart with stable angina pectoris      History of percutaneous coronary intervention              MEDICATIONS:  aspirin enteric coated 81 milliGRAM(s) Oral daily  metoprolol tartrate 50 milliGRAM(s) Oral two times a day  pentoxifylline 400 milliGRAM(s) Oral three times a day  ticagrelor 90 milliGRAM(s) Oral every 12 hours    piperacillin/tazobactam IVPB.. 3.375 Gram(s) IV Intermittent every 8 hours      acetaminophen   IVPB .. 1000 milliGRAM(s) IV Intermittent every 6 hours      atorvastatin 80 milliGRAM(s) Oral at bedtime  dextrose 50% Injectable 25 Gram(s) IV Push once  dextrose 50% Injectable 12.5 Gram(s) IV Push once  dextrose 50% Injectable 25 Gram(s) IV Push once  dextrose 50% Injectable 25 Gram(s) IV Push once  dextrose 50% Injectable 12.5 Gram(s) IV Push once  dextrose 50% Injectable 25 Gram(s) IV Push once  dextrose Oral Gel 15 Gram(s) Oral once PRN  dextrose Oral Gel 15 Gram(s) Oral once PRN  fenofibrate Tablet 145 milliGRAM(s) Oral at bedtime  glucagon  Injectable 1 milliGRAM(s) IntraMuscular once  glucagon  Injectable 1 milliGRAM(s) IntraMuscular once  insulin glargine Injectable (LANTUS) 30 Unit(s) SubCutaneous at bedtime  insulin lispro (ADMELOG) corrective regimen sliding scale   SubCutaneous every 6 hours  insulin lispro (ADMELOG) corrective regimen sliding scale   SubCutaneous three times a day before meals  insulin lispro Injectable (ADMELOG) 14 Unit(s) SubCutaneous three times a day before meals    cadexomer iodine 0.9% Gel 1 Application(s) Topical daily  dextrose 5%. 1000 milliLiter(s) IV Continuous <Continuous>  dextrose 5%. 1000 milliLiter(s) IV Continuous <Continuous>  dextrose 5%. 1000 milliLiter(s) IV Continuous <Continuous>  dextrose 5%. 1000 milliLiter(s) IV Continuous <Continuous>      FAMILY HISTORY:  Family history of heart disease (Father)        Non-contributory    SOCIAL HISTORY:    [ ] Tobacco  [ ] Drugs  [ ] Alcohol    Allergies    No Known Allergies    Intolerances    	    REVIEW OF SYSTEMS:  CONSTITUTIONAL: No fever  EYES: No eye pain, visual disturbances, or discharge  ENMT:  No difficulty hearing, tinnitus  NECK: No pain or stiffness  RESPIRATORY: No cough, wheezing,  CARDIOVASCULAR: No chest pain, palpitations, passing out, dizziness, or leg swelling  GASTROINTESTINAL:  No nausea, vomiting, diarrhea or constipation. No melena.  GENITOURINARY: No dysuria, hematuria  NEUROLOGICAL: No stroke like symptoms  SKIN: No burning or lesions   ENDOCRINE: No heat or cold intolerance  MUSCULOSKELETAL: No joint pain or swelling  PSYCHIATRIC: No  anxiety, mood swings  HEME/LYMPH: No bleeding gums  ALLERGY AND IMMUNOLOGIC: No hives or eczema	    All other ROS negative    PHYSICAL EXAM:  T(C): 36.7 (05-04-25 @ 21:10), Max: 37.1 (05-04-25 @ 11:45)  HR: 94 (05-04-25 @ 21:10) (73 - 96)  BP: 118/68 (05-04-25 @ 21:10) (106/62 - 131/74)  RR: 18 (05-04-25 @ 21:10) (17 - 20)  SpO2: 97% (05-04-25 @ 21:10) (93% - 100%)  Wt(kg): --  I&O's Summary      Appearance: Normal	  HEENT:   Normal oral mucosa, EOMI	  Cardiovascular:  S1 S2, No JVD,    Respiratory: Lungs clear to auscultation	  Psychiatry: Alert  Gastrointestinal:  Soft, Non-tender, + BS	  Skin: No rashes   Neurologic: Non-focal  Extremities:  No edema  Vascular: Peripheral pulses palpable    	    	  	  CARDIAC MARKERS:  Labs personally reviewed by me                                  9.2    9.62  )-----------( 404      ( 04 May 2025 02:26 )             29.8     05-04    135  |  99  |  19  ----------------------------<  385[H]  4.6   |  26  |  1.44[H]    Ca    9.1      04 May 2025 02:25  Phos  2.8     05-04  Mg     2.3     05-04    TPro  7.6  /  Alb  3.4  /  TBili  0.1[L]  /  DBili  x   /  AST  10  /  ALT  11  /  AlkPhos  87  05-04          EKG: Personally reviewed by me -   Radiology: Personally reviewed by me -       Assessment /Plan:       Differential diagnosis and plan of care discussed with patient after the evaluation. Counseling on diet, nutritional counseling, weight management, exercise and medication compliance was done.   Advanced care planning/advanced directives discussed with patient/family. DNR status including forceful chest compressions to attempt to restart the heart, ventilator support/artificial breathing, electric shock, artificial nutrition, health care proxy, Molst form all discussed with pt. Pt wishes to consider. Sixteen minutes spent on discussing advanced directives.            Edwin Dwyer DO [facility]  Cardiovascular Medicine  [address] [place] [address], [address]  Office [phone]  Available via call/text on [employer] Teams DATE OF SERVICE: 05-04-25 @ 22:21    CHIEF COMPLAINT:Patient is a 54y old  Male who presents with a chief complaint of Angiogram (04 May 2025 13:28)      HISTORY OF PRESENT ILLNESS:HPI:  54M hx DM, CAD (s/p CABG), mild HFrEF (EF 45%), and PAD (with recent angiogram Aug. 2024), and recent admission to [facility] w/ right foot wound s/p right foot Partial 2nd ray resection and 3rd proximal phalanx bone biopsy on 3/7/25 (Cx + for E. Faecalis and Staph Epi requiring prolonged abx course s/p PICC), now presenting with right foot wound. Patient reports 1w of subjective fevers, chills, hypothermia, and R foot lateral toe oozing and erythema c/f soft tissue infection. FS elevated to 400s on arrival patient reports compliance w/ diabetes medications.     VSS. WBC 9.62. CRP 56. Glucose 285. Foot xray pending. (04 May 2025 06:13)      PAST MEDICAL & SURGICAL HISTORY:  Hypertension, unspecified type      Hyperlipidemia, unspecified hyperlipidemia type      Type 2 diabetes mellitus without complication, with long-term current use of insulin      Coronary artery disease of native artery of native heart with stable angina pectoris      History of percutaneous coronary intervention              MEDICATIONS:  aspirin enteric coated 81 milliGRAM(s) Oral daily  metoprolol tartrate 50 milliGRAM(s) Oral two times a day  pentoxifylline 400 milliGRAM(s) Oral three times a day  ticagrelor 90 milliGRAM(s) Oral every 12 hours    piperacillin/tazobactam IVPB.. 3.375 Gram(s) IV Intermittent every 8 hours      acetaminophen   IVPB .. 1000 milliGRAM(s) IV Intermittent every 6 hours      atorvastatin 80 milliGRAM(s) Oral at bedtime  dextrose 50% Injectable 25 Gram(s) IV Push once  dextrose 50% Injectable 12.5 Gram(s) IV Push once  dextrose 50% Injectable 25 Gram(s) IV Push once  dextrose 50% Injectable 25 Gram(s) IV Push once  dextrose 50% Injectable 12.5 Gram(s) IV Push once  dextrose 50% Injectable 25 Gram(s) IV Push once  dextrose Oral Gel 15 Gram(s) Oral once PRN  dextrose Oral Gel 15 Gram(s) Oral once PRN  fenofibrate Tablet 145 milliGRAM(s) Oral at bedtime  glucagon  Injectable 1 milliGRAM(s) IntraMuscular once  glucagon  Injectable 1 milliGRAM(s) IntraMuscular once  insulin glargine Injectable (LANTUS) 30 Unit(s) SubCutaneous at bedtime  insulin lispro (ADMELOG) corrective regimen sliding scale   SubCutaneous every 6 hours  insulin lispro (ADMELOG) corrective regimen sliding scale   SubCutaneous three times a day before meals  insulin lispro Injectable (ADMELOG) 14 Unit(s) SubCutaneous three times a day before meals    cadexomer iodine 0.9% Gel 1 Application(s) Topical daily  dextrose 5%. 1000 milliLiter(s) IV Continuous <Continuous>  dextrose 5%. 1000 milliLiter(s) IV Continuous <Continuous>  dextrose 5%. 1000 milliLiter(s) IV Continuous <Continuous>  dextrose 5%. 1000 milliLiter(s) IV Continuous <Continuous>      FAMILY HISTORY:  Family history of heart disease (Father)        Non-contributory    SOCIAL HISTORY:    [ ] not current smoker    Allergies    No Known Allergies    Intolerances    	    REVIEW OF SYSTEMS:  CONSTITUTIONAL: No fever  EYES: No eye pain, visual disturbances, or discharge  ENMT:  No difficulty hearing, tinnitus  NECK: No pain or stiffness  RESPIRATORY: No cough, wheezing,  CARDIOVASCULAR: No chest pain, palpitations, passing out, dizziness, or leg swelling  GASTROINTESTINAL:  No nausea, vomiting, diarrhea or constipation. No melena.  GENITOURINARY: No dysuria, hematuria  NEUROLOGICAL: No stroke like symptoms  SKIN: No burning or lesions   ENDOCRINE: No heat or cold intolerance  MUSCULOSKELETAL: See HPI  PSYCHIATRIC: No  anxiety, mood swings  HEME/LYMPH: No bleeding gums  ALLERGY AND IMMUNOLOGIC: No hives or eczema	    All other ROS negative    PHYSICAL EXAM:  T(C): 36.7 (05-04-25 @ 21:10), Max: 37.1 (05-04-25 @ 11:45)  HR: 94 (05-04-25 @ 21:10) (73 - 96)  BP: 118/68 (05-04-25 @ 21:10) (106/62 - 131/74)  RR: 18 (05-04-25 @ 21:10) (17 - 20)  SpO2: 97% (05-04-25 @ 21:10) (93% - 100%)  Wt(kg): --  I&O's Summary      Appearance: Normal	  HEENT:   Normal oral mucosa, EOMI	  Cardiovascular:  S1 S2, No JVD,    Respiratory: Lungs clear to auscultation	  Psychiatry: Alert  Gastrointestinal:  Soft, Non-tender, + BS	  Skin: No rashes   Neurologic: Non-focal  Extremities:  No edema  Vascular: Peripheral pulses palpable    	    	  	  CARDIAC MARKERS:  Labs personally reviewed by me                                  9.2    9.62  )-----------( 404      ( 04 May 2025 02:26 )             29.8     05-04    135  |  99  |  19  ----------------------------<  385[H]  4.6   |  26  |  1.44[H]    Ca    9.1      04 May 2025 02:25  Phos  2.8     05-04  Mg     2.3     05-04    TPro  7.6  /  Alb  3.4  /  TBili  0.1[L]  /  DBili  x   /  AST  10  /  ALT  11  /  AlkPhos  87  05-04          EKG: Personally reviewed by me - 4/4 sinus inferior wall q waves  Radiology: Personally reviewed by me - CXR 3/2025 S/P interval insertion of right upper extremity PICC line.    TTE CONCLUSIONS:      1. Left ventricular cavity is normal in size. Left ventricular wall thickness is normal. Left ventricular systolic function is preserved with an ejection fraction visually estimated at 50 to 55 %. Regional wall motion abnormalities present.   2. Mid anteroseptal segment, basal inferoseptal segment, and basal inferior segment are abnormal.   3. Normal right ventricular cavity size, with normal wall thickness, and normal right ventricular systolic function.   4. No pericardial effusion seen.   5. There is no evidence of a left ventricular thrombus.    NM stress - medium-sized, severe defect(s) in the lateral and inferolateral walls.      Assessment /Plan:      54M hx DM, CAD (s/p CABG), mild HFrEF (EF 45%), and PAD (with recent angiogram Aug. 2024), and recent admission to [facility] w/ right foot wound s/p right foot Partial 2nd ray resection and 3rd proximal phalanx bone biopsy on 3/7/25 (Cx + for E. Faecalis and Staph Epi requiring prolonged abx course s/p PICC), now presenting with right foot wound.      Problem/Plan - 1:  ·  Problem: Hypertension.   ·  Plan: hold entresto 2/2 to [facility]  c/w home lopressor.     Problem/Plan - 2:  ·  Problem: CAD (coronary artery disease).   ·  Plan: c/w aspirin, brilinta statin  TTE EF 55%.  Rest NM stress images March 2025 with medium-sized, severe defect(s) in the lateral and inferolateral walls. Stress portion not performed      Problem/Plan - 3:  ·  Problem: [facility] (acute kidney injury).   ·  Plan:  hold entresto for now  baseline 1s, now rising 1.6, c/w IVF              Differential diagnosis and plan of care discussed with patient after the evaluation. Counseling on diet, nutritional counseling, weight management, exercise and medication compliance was done.   Advanced care planning/advanced directives discussed with patient/family. DNR status including forceful chest compressions to attempt to restart the heart, ventilator support/artificial breathing, electric shock, artificial nutrition, health care proxy, Molst form all discussed with pt. Pt wishes to consider. Sixteen minutes spent on discussing advanced directives.            Edwin Dwyer DO [facility]  Cardiovascular Medicine  [address] [place] [address], [address]  Office [phone]  Available via call/text on [employer] Teams DATE OF SERVICE: 05-04-25 @ 22:21    CHIEF COMPLAINT:Patient is a 54y old  Male who presents with a chief complaint of Angiogram (04 May 2025 13:28)      HISTORY OF PRESENT ILLNESS:HPI:  54M hx DM, CAD (s/p CABG), mild HFrEF (EF 45%), and PAD (with recent angiogram Aug. 2024), and recent admission to [facility] w/ right foot wound s/p right foot Partial 2nd ray resection and 3rd proximal phalanx bone biopsy on 3/7/25 (Cx + for E. Faecalis and Staph Epi requiring prolonged abx course s/p PICC), now presenting with right foot wound. Patient reports 1w of subjective fevers, chills, hypothermia, and R foot lateral toe oozing and erythema c/f soft tissue infection. FS elevated to 400s on arrival patient reports compliance w/ diabetes medications.     VSS. WBC 9.62. CRP 56. Glucose 285. Foot xray pending. (04 May 2025 06:13)      PAST MEDICAL & SURGICAL HISTORY:  Hypertension, unspecified type      Hyperlipidemia, unspecified hyperlipidemia type      Type 2 diabetes mellitus without complication, with long-term current use of insulin      Coronary artery disease of native artery of native heart with stable angina pectoris      History of percutaneous coronary intervention              MEDICATIONS:  aspirin enteric coated 81 milliGRAM(s) Oral daily  metoprolol tartrate 50 milliGRAM(s) Oral two times a day  pentoxifylline 400 milliGRAM(s) Oral three times a day  ticagrelor 90 milliGRAM(s) Oral every 12 hours    piperacillin/tazobactam IVPB.. 3.375 Gram(s) IV Intermittent every 8 hours      acetaminophen   IVPB .. 1000 milliGRAM(s) IV Intermittent every 6 hours      atorvastatin 80 milliGRAM(s) Oral at bedtime  dextrose 50% Injectable 25 Gram(s) IV Push once  dextrose 50% Injectable 12.5 Gram(s) IV Push once  dextrose 50% Injectable 25 Gram(s) IV Push once  dextrose 50% Injectable 25 Gram(s) IV Push once  dextrose 50% Injectable 12.5 Gram(s) IV Push once  dextrose 50% Injectable 25 Gram(s) IV Push once  dextrose Oral Gel 15 Gram(s) Oral once PRN  dextrose Oral Gel 15 Gram(s) Oral once PRN  fenofibrate Tablet 145 milliGRAM(s) Oral at bedtime  glucagon  Injectable 1 milliGRAM(s) IntraMuscular once  glucagon  Injectable 1 milliGRAM(s) IntraMuscular once  insulin glargine Injectable (LANTUS) 30 Unit(s) SubCutaneous at bedtime  insulin lispro (ADMELOG) corrective regimen sliding scale   SubCutaneous every 6 hours  insulin lispro (ADMELOG) corrective regimen sliding scale   SubCutaneous three times a day before meals  insulin lispro Injectable (ADMELOG) 14 Unit(s) SubCutaneous three times a day before meals    cadexomer iodine 0.9% Gel 1 Application(s) Topical daily  dextrose 5%. 1000 milliLiter(s) IV Continuous <Continuous>  dextrose 5%. 1000 milliLiter(s) IV Continuous <Continuous>  dextrose 5%. 1000 milliLiter(s) IV Continuous <Continuous>  dextrose 5%. 1000 milliLiter(s) IV Continuous <Continuous>      FAMILY HISTORY:  Family history of heart disease (Father)        Non-contributory    SOCIAL HISTORY:    [ ] not current smoker    Allergies    No Known Allergies    Intolerances    	    REVIEW OF SYSTEMS:  CONSTITUTIONAL: No fever  EYES: No eye pain, visual disturbances, or discharge  ENMT:  No difficulty hearing, tinnitus  NECK: No pain or stiffness  RESPIRATORY: No cough, wheezing,  CARDIOVASCULAR: No chest pain, palpitations, passing out, dizziness, or leg swelling  GASTROINTESTINAL:  No nausea, vomiting, diarrhea or constipation. No melena.  GENITOURINARY: No dysuria, hematuria  NEUROLOGICAL: No stroke like symptoms  SKIN: No burning or lesions   ENDOCRINE: No heat or cold intolerance  MUSCULOSKELETAL: See HPI  PSYCHIATRIC: No  anxiety, mood swings  HEME/LYMPH: No bleeding gums  ALLERGY AND IMMUNOLOGIC: No hives or eczema	    All other ROS negative    PHYSICAL EXAM:  T(C): 36.7 (05-04-25 @ 21:10), Max: 37.1 (05-04-25 @ 11:45)  HR: 94 (05-04-25 @ 21:10) (73 - 96)  BP: 118/68 (05-04-25 @ 21:10) (106/62 - 131/74)  RR: 18 (05-04-25 @ 21:10) (17 - 20)  SpO2: 97% (05-04-25 @ 21:10) (93% - 100%)  Wt(kg): --  I&O's Summary      Appearance: Normal	  HEENT:   Normal oral mucosa, EOMI	  Cardiovascular:  S1 S2, No JVD,    Respiratory: Lungs clear to auscultation	  Psychiatry: Alert  Gastrointestinal:  Soft, Non-tender, + BS	  Skin: No rashes   Neurologic: Non-focal  Extremities:  No edema  Vascular: Peripheral pulses palpable    	    	  	  CARDIAC MARKERS:  Labs personally reviewed by me                                  9.2    9.62  )-----------( 404      ( 04 May 2025 02:26 )             29.8     05-04    135  |  99  |  19  ----------------------------<  385[H]  4.6   |  26  |  1.44[H]    Ca    9.1      04 May 2025 02:25  Phos  2.8     05-04  Mg     2.3     05-04    TPro  7.6  /  Alb  3.4  /  TBili  0.1[L]  /  DBili  x   /  AST  10  /  ALT  11  /  AlkPhos  87  05-04          EKG: Personally reviewed by me - 4/4 sinus inferior wall q waves  Radiology: Personally reviewed by me - CXR 3/2025 S/P interval insertion of right upper extremity PICC line.    TTE CONCLUSIONS:      1. Left ventricular cavity is normal in size. Left ventricular wall thickness is normal. Left ventricular systolic function is preserved with an ejection fraction visually estimated at 50 to 55 %. Regional wall motion abnormalities present.   2. Mid anteroseptal segment, basal inferoseptal segment, and basal inferior segment are abnormal.   3. Normal right ventricular cavity size, with normal wall thickness, and normal right ventricular systolic function.   4. No pericardial effusion seen.   5. There is no evidence of a left ventricular thrombus.    NM stress - medium-sized, severe defect(s) in the lateral and inferolateral walls.      Assessment /Plan:      54M hx DM, CAD (s/p CABG), mild HFrEF (EF 45%), and PAD (with recent angiogram Aug. 2024), and recent admission to [facility] w/ right foot wound s/p right foot Partial 2nd ray resection and 3rd proximal phalanx bone biopsy on 3/7/25 (Cx + for E. Faecalis and Staph Epi requiring prolonged abx course s/p PICC), now presenting with right foot wound.      Problem/Plan - 1:  ·  Problem: Hypertension.   ·  Plan: hold entresto 2/2 to [facility]  c/w home lopressor.     Problem/Plan - 2:  ·  Problem: CAD (coronary artery disease).   ·  Plan: c/w aspirin, brilinta statin  TTE EF 55%.  Rest NM stress images March 2025 with medium-sized, severe defect(s) in the lateral and inferolateral walls. Stress portion not performed      Problem/Plan - 3:  ·  Problem: [facility] (acute kidney injury).   ·  Plan:  hold entresto for now  baseline 1s, now rising 1.6, c/w IVF       Problem/Plan - 4:  ·  Problem: Preop risk stratification   ·  Plan: Elevated risk for low risk peripheral angio +/- angioplasty        Differential diagnosis and plan of care discussed with patient after the evaluation. Counseling on diet, nutritional counseling, weight management, exercise and medication compliance was done.   Advanced care planning/advanced directives discussed with patient/family. DNR status including forceful chest compressions to attempt to restart the heart, ventilator support/artificial breathing, electric shock, artificial nutrition, health care proxy, Molst form all discussed with pt. Pt wishes to consider. Sixteen minutes spent on discussing advanced directives.            Edwin Dwyer DO [facility]  Cardiovascular Medicine  [address] [place] [address], [address]  Office [phone]  Available via call/text on [employer] Teams

## 2025-05-04 NOTE — CONSULT NOTE ADULT - PROBLEM SELECTOR RECOMMENDATION 2
hold entresto 2/2 to [facility]  c/w home lopressor
I Estevan Youssef MD have participated in the daily care of this patient and discussed  the findings and plan with the house officer. I reviewed the resident note and agree with the findings and plan

## 2025-05-04 NOTE — ED PROVIDER NOTE - CLINICAL SUMMARY MEDICAL DECISION MAKING FREE TEXT BOX
54M with PMHx of T2DM, CAD (post-CABG), mild HFrEF (EF 45%), and PAD (with recent angiogram Aug. 2024) presenting w/ foot wound, known prior diabetic foot ulcer w/ infcn w/ PICC line on outpt IV abx. MR 3/25 showing e/o early OM medically managed. Patient is presenting after 1 week of subjective fevers, chills, hypothermia and R foot lateral toe oozing and erythema c/f soft tissue infection. FS elevated to 400s on arrival patient reports compliance w/ diabetes medications.   Exam: R toe 2nd and fourth amputations, L lateral toe exposed granulation tissue and erythema. Nontender on exam without sensation of extremities, preserved ROM.   Will call podiatry, vascular, admit to medicine. XR R foot, sepsis eval, empiric Vanc/ Zosyn, labs will reassess. 54M with PMHx of T2DM, CAD (post-CABG), mild HFrEF (EF 45%), and PAD (with recent angiogram Aug. 2024) presenting w/ foot wound, known prior diabetic foot ulcer w/ infcn w/ PICC line on outpt IV abx. MR 3/25 showing e/o early OM medically managed. Patient is presenting after 1 week of subjective fevers, chills, hypothermia and R foot lateral toe oozing and erythema c/f soft tissue infection. FS elevated to 400s on arrival patient reports compliance w/ diabetes medications.   Exam: R toe 2nd and fourth amputations, L lateral toe exposed granulation tissue and erythema. Nontender on exam without sensation of extremities, preserved ROM.   Will call podiatry, vascular, admit to medicine. XR R foot, sepsis eval, empiric Vanc/ Zosyn, labs will reassess. Podiatry and vascular consulted. Pod bedside debridement will f/u re if plan for resection inpt, recc med admit

## 2025-05-04 NOTE — CHART NOTE - NSCHARTNOTEFT_GEN_A_CORE
ELSI MERINO  [id number]  [facility] [id number]    The patient is a 54yMale with PMH including T2DM, PAD, CAD with CABG who presented to the hospital and is pending angiogram with vascular surgery  Endocrinology consulted for uncontrolled T2DM    #Uncontrolled Type 2 Diabetes Mellitus  - - Home regimen reported to be Lantus 50 qhs, Humalog 20 TIDAC  - Patient was recently admitted in March 2025, at that time had glucose controlled with Lantus 30, Admelog 14  - eGFR: 57 mL/min/1.73m2 (05-04-25)  - Weight (kg): 67.4 (05-04-25)  - glucose elevated, no evidence of DKA on labs    PLAN: Recommend to restart regimen from last admission:  - Recommend Lantus 30 units QHS   - Recommend Admelog 14 units TID before meals (HOLD if NPO or not eating)  - Recommend low dose Admelog correction scale TID before meals and separate low dose scale QHS  - Please check FSG before meals and QHS, or q6h while NPO  - Inpatient glucose goals: 100-180  - consistent carb diet when eating    Full consult to follow in the AM.    Kevin Amador, DO PGY-4  Endocrine Fellow  For nonurgent matters, please email [email or web address] or [email or web address]. For urgent matters only, please call answering service at [phone] (weekdays), [phone] (nights/weekends).

## 2025-05-04 NOTE — CONSULT NOTE ADULT - CRANIAL NERVE
moderate arterial insuff w moderate trophic skin changes  Pulse exam                         right         left   femoral        +2            +2  dpa                +1            +1  pta                 +1            +1  RLE Wound  w dressing  sig for serous drainage

## 2025-05-04 NOTE — DISCHARGE NOTE PROVIDER - NSDCCPTREATMENT_GEN_ALL_CORE_FT
PRINCIPAL PROCEDURE  Procedure: Angiogram, lower extremity, right  Findings and Treatment:       SECONDARY PROCEDURE  Procedure: Transmetatarsal amputation, right foot  Findings and Treatment: WOUND CARE: Please leave dressing clean, dry, and intact.   BATHING: You may shower and/or sponge bathe 24 hours after surgery. Let soap and water run over incision; do NOT scrub incision. Do no submerge the incision underwater for the next 2 weeks.   ACTIVITY: No heavy lifting anything more than 10-15lbs or straining. Otherwise, you may return to your usual level of physical activity. If you are taking narcotic pain medication do NOT drive a car, operate machinery or make important decisions. You can remain weight bearing as tolerated on your right foot.   DIET: Maintain CC Diet.   PAIN: A prescription for oxycodone has been sent to the pharmacy. You should only take these for severe pain. For mild or moderate pain, you may take 975mg of tylenol every 6 hours. Do not exceed more than 4G per day.   NOTIFY YOUR SURGEON IF: You have any bleeding that does not stop, any pus draining from your wound, any fever (over 100.4 F) or chills, persistent nausea/vomiting, persistent diarrhea, or if severe pain is not controlled on your discharge pain medications.  FOLLOW-UP:  1. Please call to make a follow-up appointment within one to two weeks of discharge with Dr. Youssef  2. Please follow up with your primary care physician in one week regarding your hospitalization.      Procedure: Detachment at right foot, partial 1st ray, open approach  Findings and Treatment:      PRINCIPAL PROCEDURE  Procedure: Angiogram, lower extremity, right  Findings and Treatment:       SECONDARY PROCEDURE  Procedure: Transmetatarsal amputation, right foot  Findings and Treatment: WOUND CARE: Please leave dressing clean, dry, and intact.   BATHING: You may shower and/or sponge bathe 24 hours after surgery. Do no submerge the incision underwater for the next 2 weeks.   ACTIVITY: No heavy lifting anything more than 10-15lbs or straining. Otherwise, you may return to your usual level of physical activity. If you are taking narcotic pain medication do NOT drive a car, operate machinery or make important decisions. You can remain weight bearing as tolerated on your right foot.   DIET: Maintain CC Diet.   PAIN: A prescription for oxycodone has been sent to the pharmacy. You should only take these for severe pain. For mild or moderate pain, you may take 975mg of tylenol every 6 hours. Do not exceed more than 4G per day.   NOTIFY YOUR SURGEON IF: You have any bleeding that does not stop, any pus draining from your wound, any fever (over 100.4 F) or chills, persistent nausea/vomiting, persistent diarrhea, or if severe pain is not controlled on your discharge pain medications.  FOLLOW-UP:  1. Please call to make a follow-up appointment within one to two weeks of discharge with Dr. Youssef  2. Please follow up with your primary care physician in one week regarding your hospitalization.      Procedure: Detachment at right foot, partial 1st ray, open approach  Findings and Treatment:

## 2025-05-04 NOTE — CONSULT NOTE ADULT - SUBJECTIVE AND OBJECTIVE BOX
54M hx DM, CAD (s/p CABG), mild HFrEF (EF 45%), and PAD (with recent angiogram Aug. 2024), and recent admission to [facility] w/ right foot wound s/p right foot Partial 2nd ray resection and 3rd proximal phalanx bone biopsy on 3/7/25 (Cx + for E. Faecalis and Staph Epi requiring prolonged abx course s/p PICC), now presenting with right foot wound. Patient reports 1 week  of subjective fevers, chills,  and R foot lateral toe oozing and erythema c/f soft tissue infection. He is not sure if there was any trauma. States takes his medications regularly. ambulates with a walker. denies any chest pain, sob, palpitations, abdominal pain, diarrhea or constipation     PAST MEDICAL & SURGICAL HISTORY:  Hypertension, unspecified type      Hyperlipidemia, unspecified hyperlipidemia type      Type 2 diabetes mellitus without complication, with long-term current use of insulin      Coronary artery disease of native artery of native heart with stable angina pectoris      History of percutaneous coronary intervention      Home Medications:  aspirin 81 mg oral delayed release tablet: 1 tab(s) orally once a day (05 Mar 2025 07:07)  atorvastatin 80 mg oral tablet: 1 tab(s) orally once a day (at bedtime) (11 Mar 2025 15:11)  insulin glargine 100 units/mL subcutaneous solution: 52 unit(s) subcutaneous once a day (at bedtime) (05 Mar 2025 07:07)  ticagrelor 90 mg oral tablet: 1 tab(s) orally every 12 hours (05 Mar 2025 07:07)    Social hx: denies tobacco, alcohol use, lives with wife and daughter who help him    Vital Signs Last 24 Hrs  T(C): 36.9 (04 May 2025 12:48), Max: 37.1 (04 May 2025 11:45)  T(F): 98.4 (04 May 2025 12:48), Max: 98.8 (04 May 2025 11:45)  HR: 96 (04 May 2025 12:48) (73 - 96)  BP: 129/84 (04 May 2025 12:48) (106/62 - 131/74)  BP(mean): --  RR: 18 (04 May 2025 12:48) (17 - 20)  SpO2: 93% (04 May 2025 12:48) (93% - 100%)    Parameters below as of 04 May 2025 12:48  Patient On (Oxygen Delivery Method): room air    Vital Signs Last 24 Hrs  T(C): 36.9 (04 May 2025 12:48), Max: 37.1 (04 May 2025 11:45)  T(F): 98.4 (04 May 2025 12:48), Max: 98.8 (04 May 2025 11:45)  HR: 96 (04 May 2025 12:48) (73 - 96)  BP: 129/84 (04 May 2025 12:48) (106/62 - 131/74)  BP(mean): --  RR: 18 (04 May 2025 12:48) (17 - 20)  SpO2: 93% (04 May 2025 12:48) (93% - 100%)    Parameters below as of 04 May 2025 12:48  Patient On (Oxygen Delivery Method): room air        CONSTITUTIONAL: NAD older than stated age  EYES: PERRL; conjunctiva and sclera clear  ENMT: Moist oral mucosa  NECK: Supple, no palpable constantin  RESPIRATORY: Normal respiratory effort; lungs are clear to auscultation bilaterally  CARDIOVASCULAR: Regular rate and rhythm, normal S1 and S2, no murmur/rub/gallop; No lower extremity edema; Peripheral pulses are 2+ bilaterally  ABDOMEN: N normoactive bowel sounds, no rebound/guarding; No hepatosplenomegaly  MUSCULOSKELETAL:  R foot wrapped in badange L foot 2+ DP   PSYCH: A+O to person, place, and time; affect appropriate                           9.2    9.62  )-----------( 404      ( 04 May 2025 02:26 )             29.8     05-04    135  |  99  |  19  ----------------------------<  385[H]  4.6   |  26  |  1.44[H]    Ca    9.1      04 May 2025 02:25  Phos  2.8     05-04  Mg     2.3     05-04    TPro  7.6  /  Alb  3.4  /  TBili  0.1[L]  /  DBili  x   /  AST  10  /  ALT  11  /  AlkPhos  87  05-04        < from: Xray Foot AP + Lateral + Oblique, Right (05.04.25 @ 02:48) >    IMPRESSION:  No convincing evidence for acute osteomyelitis. MRI would provide a more   sensitive evaluation.    < end of copied text >    < from: TTE W or WO Ultrasound Enhancing Agent (03.06.25 @ 14:00) >   1. Left ventricular cavity is normal in size. Left ventricular wall thickness is normal. Left ventricular systolic function is preserved with an ejection fraction visually estimated at 50 to 55 %. Regional wall motion abnormalities present.   2. Mid anteroseptal segment, basal inferoseptal segment, and basal inferior segment are abnormal.   3. Normal right ventricular cavity size, with normal wall thickness, and normal right ventricular systolic function.   4. No pericardial effusion seen.   5. There is no evidence of a left ventricular thrombus.    < end of copied text >      < from: Nuclear Stress Test-Pharmacologic.. (03.07.25 @ 09:39) >  Conclusions:   1. Qualitative Perfusion:      - medium-sized, severe defect(s) in the lateral and inferolateral walls.    < end of copied text >

## 2025-05-04 NOTE — ED PROVIDER NOTE - CARE PLAN
1 Principal Discharge DX:	Soft tissue infection  Assessment and plan of treatment:	see MDM   Principal Discharge DX:	Soft tissue infection  Secondary Diagnosis:	Wound of right foot

## 2025-05-04 NOTE — CONSULT NOTE ADULT - ASSESSMENT
Recommendations:  -Medicine consult.  -Podiatry consult.  -Local wound care.  -Continue ASA/Pletal/statin.  -Will follow.    Discussed with vascular surgery fellow on behalf of Dr. Youssef.    Vascular Surgery  Please page [phone] for all questions.     54M hx DM, CAD (s/p CABG), mild HFrEF (EF 45%), and PAD (with recent angiogram Aug. 2024), and recent admission to [facility] w/ right foot wound s/p right foot Partial 2nd ray resection and 3rd proximal phalanx bone biopsy on 3/7/25 (Cx + for E. Faecalis and Staph Epi requiring prolonged abx course s/p PICC), now presenting with right foot wound.     Recommendations:  -Medicine consult.  -Podiatry consult.  -Local wound care.  -Abx.  -Continue ASA/Pletal/statin.  -Will follow.    Discussed with vascular surgery fellow on behalf of Dr. Youssef.    Vascular Surgery  Please page [phone] for all questions.     54M hx DM, CAD (s/p CABG), mild HFrEF (EF 45%), and PAD (with recent angiogram Aug. 2024), and recent admission to [facility] w/ right foot wound s/p right foot Partial 2nd ray resection and 3rd proximal phalanx bone biopsy on 3/7/25 (Cx + for E. Faecalis and Staph Epi requiring prolonged abx course s/p PICC), now presenting with right foot wound.     Recommendations:  -OBDULIO/PVR w/ toe pressures.  -Medicine consult.  -Podiatry consult.  -Local wound care.  -Abx.  -Continue ASA/Pletal/statin.  -Will follow.    Discussed with vascular surgery fellow on behalf of Dr. Youssef.    Vascular Surgery  Please page [phone] for all questions.

## 2025-05-05 DIAGNOSIS — I77.1 STRICTURE OF ARTERY: ICD-10-CM

## 2025-05-05 DIAGNOSIS — N18.2 CHRONIC KIDNEY DISEASE, STAGE 2 (MILD): ICD-10-CM

## 2025-05-05 LAB
A1C WITH ESTIMATED AVERAGE GLUCOSE RESULT: 10.1 % — HIGH (ref 4–5.6)
ANION GAP SERPL CALC-SCNC: 11 MMOL/L — SIGNIFICANT CHANGE UP (ref 5–17)
APPEARANCE UR: ABNORMAL
BACTERIA # UR AUTO: NEGATIVE /HPF — SIGNIFICANT CHANGE UP
BILIRUB UR-MCNC: NEGATIVE — SIGNIFICANT CHANGE UP
BLD GP AB SCN SERPL QL: NEGATIVE — SIGNIFICANT CHANGE UP
BUN SERPL-MCNC: 16 MG/DL — SIGNIFICANT CHANGE UP (ref 7–23)
CALCIUM SERPL-MCNC: 8.5 MG/DL — SIGNIFICANT CHANGE UP (ref 8.4–10.5)
CAST: 0 /LPF — SIGNIFICANT CHANGE UP (ref 0–4)
CHLORIDE SERPL-SCNC: 103 MMOL/L — SIGNIFICANT CHANGE UP (ref 96–108)
CO2 SERPL-SCNC: 25 MMOL/L — SIGNIFICANT CHANGE UP (ref 22–31)
COLOR SPEC: YELLOW — SIGNIFICANT CHANGE UP
CREAT ?TM UR-MCNC: 53 MG/DL — SIGNIFICANT CHANGE UP
CREAT SERPL-MCNC: 1.61 MG/DL — HIGH (ref 0.5–1.3)
DIFF PNL FLD: NEGATIVE — SIGNIFICANT CHANGE UP
EGFR: 50 ML/MIN/1.73M2 — LOW
EGFR: 50 ML/MIN/1.73M2 — LOW
ESTIMATED AVERAGE GLUCOSE: 243 MG/DL — HIGH (ref 68–114)
GLUCOSE BLDC GLUCOMTR-MCNC: 133 MG/DL — HIGH (ref 70–99)
GLUCOSE BLDC GLUCOMTR-MCNC: 141 MG/DL — HIGH (ref 70–99)
GLUCOSE BLDC GLUCOMTR-MCNC: 265 MG/DL — HIGH (ref 70–99)
GLUCOSE BLDC GLUCOMTR-MCNC: 267 MG/DL — HIGH (ref 70–99)
GLUCOSE BLDC GLUCOMTR-MCNC: 284 MG/DL — HIGH (ref 70–99)
GLUCOSE BLDC GLUCOMTR-MCNC: 287 MG/DL — HIGH (ref 70–99)
GLUCOSE BLDC GLUCOMTR-MCNC: 79 MG/DL — SIGNIFICANT CHANGE UP (ref 70–99)
GLUCOSE BLDC GLUCOMTR-MCNC: 80 MG/DL — SIGNIFICANT CHANGE UP (ref 70–99)
GLUCOSE SERPL-MCNC: 76 MG/DL — SIGNIFICANT CHANGE UP (ref 70–99)
GLUCOSE UR QL: NEGATIVE MG/DL — SIGNIFICANT CHANGE UP
HCT VFR BLD CALC: 26.2 % — LOW (ref 39–50)
HGB BLD-MCNC: 8.1 G/DL — LOW (ref 13–17)
KETONES UR-MCNC: NEGATIVE MG/DL — SIGNIFICANT CHANGE UP
LEUKOCYTE ESTERASE UR-ACNC: NEGATIVE — SIGNIFICANT CHANGE UP
MAGNESIUM SERPL-MCNC: 2.2 MG/DL — SIGNIFICANT CHANGE UP (ref 1.6–2.6)
MCHC RBC-ENTMCNC: 23.9 PG — LOW (ref 27–34)
MCHC RBC-ENTMCNC: 30.9 G/DL — LOW (ref 32–36)
MCV RBC AUTO: 77.3 FL — LOW (ref 80–100)
NITRITE UR-MCNC: NEGATIVE — SIGNIFICANT CHANGE UP
NRBC BLD AUTO-RTO: 0 /100 WBCS — SIGNIFICANT CHANGE UP (ref 0–0)
OSMOLALITY UR: 438 MOS/KG — SIGNIFICANT CHANGE UP (ref 300–900)
PH UR: 7 — SIGNIFICANT CHANGE UP (ref 5–8)
PHOSPHATE SERPL-MCNC: 3.7 MG/DL — SIGNIFICANT CHANGE UP (ref 2.5–4.5)
PLATELET # BLD AUTO: 408 K/UL — HIGH (ref 150–400)
POTASSIUM SERPL-MCNC: 4.4 MMOL/L — SIGNIFICANT CHANGE UP (ref 3.5–5.3)
POTASSIUM SERPL-SCNC: 4.4 MMOL/L — SIGNIFICANT CHANGE UP (ref 3.5–5.3)
POTASSIUM UR-SCNC: 46 MMOL/L — SIGNIFICANT CHANGE UP
PROT ?TM UR-MCNC: 17 MG/DL — HIGH (ref 0–12)
PROT UR-MCNC: NEGATIVE MG/DL — SIGNIFICANT CHANGE UP
PROT/CREAT UR-RTO: 0.3 RATIO — HIGH (ref 0–0.2)
RBC # BLD: 3.39 M/UL — LOW (ref 4.2–5.8)
RBC # FLD: 14.6 % — HIGH (ref 10.3–14.5)
RBC CASTS # UR COMP ASSIST: 0 /HPF — SIGNIFICANT CHANGE UP (ref 0–4)
RH IG SCN BLD-IMP: POSITIVE — SIGNIFICANT CHANGE UP
SODIUM SERPL-SCNC: 139 MMOL/L — SIGNIFICANT CHANGE UP (ref 135–145)
SODIUM UR-SCNC: 111 MMOL/L — SIGNIFICANT CHANGE UP
SP GR SPEC: 1.01 — SIGNIFICANT CHANGE UP (ref 1–1.03)
SQUAMOUS # UR AUTO: 0 /HPF — SIGNIFICANT CHANGE UP (ref 0–5)
UROBILINOGEN FLD QL: 0.2 MG/DL — SIGNIFICANT CHANGE UP (ref 0.2–1)
WBC # BLD: 12.05 K/UL — HIGH (ref 3.8–10.5)
WBC # FLD AUTO: 12.05 K/UL — HIGH (ref 3.8–10.5)
WBC UR QL: 0 /HPF — SIGNIFICANT CHANGE UP (ref 0–5)

## 2025-05-05 PROCEDURE — 99232 SBSQ HOSP IP/OBS MODERATE 35: CPT

## 2025-05-05 PROCEDURE — 76770 US EXAM ABDO BACK WALL COMP: CPT | Mod: 26

## 2025-05-05 PROCEDURE — 99233 SBSQ HOSP IP/OBS HIGH 50: CPT

## 2025-05-05 PROCEDURE — 99223 1ST HOSP IP/OBS HIGH 75: CPT

## 2025-05-05 PROCEDURE — 99222 1ST HOSP IP/OBS MODERATE 55: CPT

## 2025-05-05 RX ORDER — ACETAMINOPHEN 500 MG/5ML
975 LIQUID (ML) ORAL EVERY 6 HOURS
Refills: 0 | Status: DISCONTINUED | OUTPATIENT
Start: 2025-05-05 | End: 2025-05-09

## 2025-05-05 RX ORDER — HEPARIN SODIUM 1000 [USP'U]/ML
5000 INJECTION INTRAVENOUS; SUBCUTANEOUS EVERY 8 HOURS
Refills: 0 | Status: DISCONTINUED | OUTPATIENT
Start: 2025-05-05 | End: 2025-05-09

## 2025-05-05 RX ORDER — INSULIN LISPRO 100 U/ML
INJECTION, SOLUTION INTRAVENOUS; SUBCUTANEOUS AT BEDTIME
Refills: 0 | Status: DISCONTINUED | OUTPATIENT
Start: 2025-05-05 | End: 2025-05-06

## 2025-05-05 RX ORDER — INSULIN GLARGINE-YFGN 100 [IU]/ML
25 INJECTION, SOLUTION SUBCUTANEOUS AT BEDTIME
Refills: 0 | Status: DISCONTINUED | OUTPATIENT
Start: 2025-05-05 | End: 2025-05-06

## 2025-05-05 RX ORDER — INSULIN LISPRO 100 U/ML
10 INJECTION, SOLUTION INTRAVENOUS; SUBCUTANEOUS
Refills: 0 | Status: DISCONTINUED | OUTPATIENT
Start: 2025-05-05 | End: 2025-05-09

## 2025-05-05 RX ADMIN — INSULIN LISPRO 10 UNIT(S): 100 INJECTION, SOLUTION INTRAVENOUS; SUBCUTANEOUS at 19:50

## 2025-05-05 RX ADMIN — ATORVASTATIN CALCIUM 80 MILLIGRAM(S): 80 TABLET, FILM COATED ORAL at 22:13

## 2025-05-05 RX ADMIN — Medication 400 MILLIGRAM(S): at 15:05

## 2025-05-05 RX ADMIN — INSULIN LISPRO 10 UNIT(S): 100 INJECTION, SOLUTION INTRAVENOUS; SUBCUTANEOUS at 15:03

## 2025-05-05 RX ADMIN — INSULIN LISPRO 1: 100 INJECTION, SOLUTION INTRAVENOUS; SUBCUTANEOUS at 23:02

## 2025-05-05 RX ADMIN — Medication 100 MILLILITER(S): at 15:14

## 2025-05-05 RX ADMIN — Medication 81 MILLIGRAM(S): at 11:05

## 2025-05-05 RX ADMIN — Medication 25 GRAM(S): at 22:14

## 2025-05-05 RX ADMIN — INSULIN GLARGINE-YFGN 25 UNIT(S): 100 INJECTION, SOLUTION SUBCUTANEOUS at 22:14

## 2025-05-05 RX ADMIN — FENOFIBRATE 145 MILLIGRAM(S): 160 TABLET ORAL at 22:13

## 2025-05-05 RX ADMIN — Medication 975 MILLIGRAM(S): at 18:46

## 2025-05-05 RX ADMIN — INSULIN LISPRO 3: 100 INJECTION, SOLUTION INTRAVENOUS; SUBCUTANEOUS at 19:49

## 2025-05-05 RX ADMIN — TICAGRELOR 90 MILLIGRAM(S): 90 TABLET ORAL at 18:19

## 2025-05-05 RX ADMIN — Medication 1 APPLICATION(S): at 11:06

## 2025-05-05 RX ADMIN — HEPARIN SODIUM 5000 UNIT(S): 1000 INJECTION INTRAVENOUS; SUBCUTANEOUS at 22:14

## 2025-05-05 RX ADMIN — Medication 25 GRAM(S): at 05:07

## 2025-05-05 RX ADMIN — Medication 1000 MILLIGRAM(S): at 02:43

## 2025-05-05 RX ADMIN — Medication 400 MILLIGRAM(S): at 22:13

## 2025-05-05 RX ADMIN — HEPARIN SODIUM 5000 UNIT(S): 1000 INJECTION INTRAVENOUS; SUBCUTANEOUS at 15:05

## 2025-05-05 RX ADMIN — Medication 100 MILLILITER(S): at 00:15

## 2025-05-05 RX ADMIN — Medication 100 MILLILITER(S): at 11:07

## 2025-05-05 RX ADMIN — Medication 25 GRAM(S): at 15:04

## 2025-05-05 RX ADMIN — Medication 975 MILLIGRAM(S): at 11:07

## 2025-05-05 RX ADMIN — Medication 975 MILLIGRAM(S): at 18:20

## 2025-05-05 RX ADMIN — METOPROLOL SUCCINATE 50 MILLIGRAM(S): 50 TABLET, EXTENDED RELEASE ORAL at 18:19

## 2025-05-05 NOTE — PROGRESS NOTE ADULT - PROBLEM SELECTOR PLAN 1
foot wound, known prior diabetic foot ulcer w/ infcn w/ PICC line on outpt IV abx. MR 3/25 showing e/o early OM   - OR cultures - 2nd and 3rd metatarsal clean margin - with E.faecalis and Staph epi, sensitivities reviewed    - OR culture deep wound culture with E.faecalis, Bacteroides fragilis, Bifidobacterium breve s/p vancomycin 1.25g IV Q12h 6 week course  - 5/4 s/p bedside R foot I&D to level of subQ, Scant, purulent drainage, no erythema, no malodor, Left foot no open wounds, no acute signs of infection  - R foot MRI pending  - f/u OBDULIO/PVR  - agree with Zosyn, would add Vancomycin, check MRSA swab  -f/u BCX  - ID consult.

## 2025-05-05 NOTE — CONSULT NOTE ADULT - SUBJECTIVE AND OBJECTIVE BOX
[facility] DIVISION OF KIDNEY DISEASES AND HYPERTENSION -- [phone]  -- INITIAL CONSULT NOTE  --------------------------------------------------------------------------------  HPI: 54M with PMH of HTN, HLD, DM2, CAD s/p CABG, HF, and recent admission to [facility] for toe amputation and found to be bacteremic, now presents to the ED with right foot wound. Nephrology consulted for [facility].     On review of labs on [facility], Scr was approximately ~0.8. During recent admission at [facility] (3/5/25 to 3/11/25) Scr was 0.8 on discharge (3/10/25). Patient was started on Entresto during that admission. Last outpatient Scr was elevated at 1.55 on 4/4/25. On this admission, Scr was elevated at 1.44 and remains elevated/stable at 1.61 today.     Patient was seen and evaluated this afternoon. Reports some L sided abdominal pain over the past week and half. Denies HA, fevers, CP, SOB, dysuria, or LE swelling.     PAST HISTORY  --------------------------------------------------------------------------------  PAST MEDICAL & SURGICAL HISTORY:  Hypertension, unspecified type  Hyperlipidemia, unspecified hyperlipidemia type  Type 2 diabetes mellitus without complication, with long-term current use of insulin  Coronary artery disease of native artery of native heart with stable angina pectoris  History of percutaneous coronary intervention    FAMILY HISTORY:  Family history of heart disease (Father)    PAST SOCIAL HISTORY:  No history of smoking    ALLERGIES & MEDICATIONS  --------------------------------------------------------------------------------  Allergies  No Known Allergies    Intolerances    Standing Inpatient Medicationsacetaminophen     Tablet .. 975 milliGRAM(s) Oral every 6 hours  aspirin enteric coated 81 milliGRAM(s) Oral daily  atorvastatin 80 milliGRAM(s) Oral at bedtime  cadexomer iodine 0.9% Gel 1 Application(s) Topical daily  dextrose 5%. 1000 milliLiter(s) IV Continuous <Continuous>  dextrose 5%. 1000 milliLiter(s) IV Continuous <Continuous>  dextrose 50% Injectable 25 Gram(s) IV Push once  dextrose 50% Injectable 12.5 Gram(s) IV Push once  dextrose 50% Injectable 25 Gram(s) IV Push once  fenofibrate Tablet 145 milliGRAM(s) Oral at bedtime  glucagon  Injectable 1 milliGRAM(s) IntraMuscular once  heparin   Injectable 5000 Unit(s) SubCutaneous every 8 hours  insulin glargine Injectable (LANTUS) 25 Unit(s) SubCutaneous at bedtime  insulin lispro (ADMELOG) corrective regimen sliding scale   SubCutaneous three times a day before meals  insulin lispro Injectable (ADMELOG) 10 Unit(s) SubCutaneous three times a day before meals  metoprolol tartrate 50 milliGRAM(s) Oral two times a day  pentoxifylline 400 milliGRAM(s) Oral three times a day  piperacillin/tazobactam IVPB.. 3.375 Gram(s) IV Intermittent every 8 hours  sodium chloride 0.9%. 1000 milliLiter(s) IV Continuous <Continuous>  ticagrelor 90 milliGRAM(s) Oral every 12 hours    PRN Inpatient Medicationsdextrose Oral Gel 15 Gram(s) Oral once PRN    REVIEW OF SYSTEMS  --------------------------------------------------------------------------------  Gen: No fevers/chills  Skin: No rashes  Head/Eyes/Ears: No HA  Respiratory: No dyspnea, cough  CV: No chest pain  GI: Left flank abdominal pain/discomfort  : No dysuria, hematuria  MSK: No edema  Heme: No easy bruising or bleeding      VITALS/PHYSICAL EXAM  --------------------------------------------------------------------------------  T(C): 36.7 (05-05-25 @ 12:53), Max: 36.7 (05-04-25 @ 21:10)  HR: 87 (05-05-25 @ 12:53) (82 - 94)  BP: 130/75 (05-05-25 @ 12:53) (113/73 - 134/75)  RR: 18 (05-05-25 @ 12:53) (18 - 18)  SpO2: 99% (05-05-25 @ 12:53) (97% - 100%)  Wt(kg): --  Height (cm): 162.6 (05-04-25 @ 05:58)  Weight (kg): 67.4 (05-04-25 @ 05:58)  BMI (kg/m2): 25.5 (05-04-25 @ 05:58)  BSA (m2): 1.72 (05-04-25 @ 05:58)    05-05-25 @ 07:01  -  05-05-25 @ 14:38  --------------------------------------------------------  IN: 0 mL / OUT: 200 mL / NET: -200 mL    Physical Exam:  Gen: NAD  HEENT: MMM  Pulm: CTA B/L  CV: S1S2  Abd: Soft, +BS  Ext: RLE toe amputation.   Neuro: Awake  Skin: Warm and dry    LABS/STUDIES  --------------------------------------------------------------------------------              8.1    12.05 >-----------<  408      [05-05-25 @ 06:48]              26.2     139  |  103  |  16  ----------------------------<  76      [05-05-25 @ 06:52]  4.4   |  25  |  1.61        Ca     8.5     [05-05-25 @ 06:52]      Mg     2.2     [05-05-25 @ 06:52]      Phos  3.7     [05-05-25 @ 06:52]    TPro  7.6  /  Alb  3.4  /  TBili  0.1  /  DBili  x   /  AST  10  /  ALT  11  /  AlkPhos  87  [05-04-25 @ 02:25]    PT/INR: PT 12.0 , INR 1.04       [05-04-25 @ 02:26]  PTT: 38.3       [05-04-25 @ 02:26]    Creatinine Trend:  SCr 1.61 [05-05 @ 06:52]  SCr 1.44 [05-04 @ 02:25]    Lipid: chol 118, , HDL 29, LDL --      [03-06-25 @ 06:59]

## 2025-05-05 NOTE — PROGRESS NOTE ADULT - ASSESSMENT
54M 5/4 s/p bedside R foot I&D to level of subQ  - Patient seen and evaluated  - Afebrile, WBC 12.05  - 5/4 s/p bedside R foot I&D to level of subQ, Scant, purulent drainage, no erythema, no malodor, Left foot no open wounds, no acute signs of infection  - Right foot xray: no gas, no OM (prelim)  - Right foot MRI: 1st mpj fluid collection, 1ST metatarsal and proximal phalanx, 2nD metatarsal OM  - Right foot cultured: NO growth prelim  - Recommend continue IV vancomycin/cefepime  - Vascular recs, angio for today appreciated  - Recommend ID consult  - Pod plan booked for right foot revisional 2nd ray resection + Partial First Ray Resection on Wed 5/7 7:30 am with Dr Patterson  pending [facility] recs  - Please document medical and cardiac clearance for possible podiatric surgical intervention under anesthesia  - Discussed with attending

## 2025-05-05 NOTE — PROGRESS NOTE ADULT - PROBLEM SELECTOR PLAN 6
check bladder scan  check u/a  likely 2/2 to infection  hold entresto for now  baseline 1s, now rising 1.6, c/w IVF for now  monitor closely. check bladder scan, check urine electrolytes   check u/a  likely 2/2 to infection  hold entresto for now  baseline 1s, now rising 1.6, c/w IVF for now  monitor closely.

## 2025-05-05 NOTE — CONSULT NOTE ADULT - CONSULT REQUESTED BY NAME
Please place fasting lab orders through [facility] form upcoming physical  Future Appointments   Date Time Provider Shanda Cavazos   7/15/2019  2:20 PM [facility] SEBLE Diaz   9/19/2019 11:45 AM Linda Kelley MD [facility]     Thank you
Dr. Youssef
ED
Dr Estevan Youssef
Dr. Youssef
ED
Primary team
Dr. Estevan Youssef

## 2025-05-05 NOTE — PROGRESS NOTE ADULT - ASSESSMENT
54M hx DM, CAD (s/p CABG), mild HFrEF (EF 45%), and PAD (with recent angiogram Aug. 2024), and recent admission to [facility] w/ right foot wound s/p right foot Partial 2nd ray resection and 3rd proximal phalanx bone biopsy on 3/7/25 (Cx + for E. Faecalis and Staph Epi requiring prolonged abx course s/p PICC), now presenting with right foot wound.

## 2025-05-05 NOTE — PROGRESS NOTE ADULT - SUBJECTIVE AND OBJECTIVE BOX
SURGERY DAILY PROGRESS NOTE    24 Hour/Overnight Events:   - MRI concerning for 1st and 2nd metatarsal and 1st phalanx osteomyelitis\  - Cr 1.4 --> 1.6, on NS 100cc/hr    SUBJECTIVE: Patient seen and evaluated on AM rounds.    ------------------------------------------------------------------------------------------------------------  OBJECTIVE:  Vital Signs Last 24 Hrs  T(C): 36.4 (05 May 2025 08:54), Max: 37.1 (04 May 2025 11:45)  T(F): 97.5 (05 May 2025 08:54), Max: 98.8 (04 May 2025 11:45)  HR: 84 (05 May 2025 08:54) (82 - 96)  BP: 124/66 (05 May 2025 08:54) (113/73 - 129/84)  BP(mean): --  RR: 18 (05 May 2025 08:54) (18 - 18)  SpO2: 100% (05 May 2025 08:54) (93% - 100%)    Parameters below as of 05 May 2025 08:54  Patient On (Oxygen Delivery Method): room air      I&O's Detail      PHYSICAL EXAM:  Constitutional: Well developed, well-nourished, appropriately interactive in no acute distress  Chest: Symmetric chest rise bilaterally, normal work of breathing on room air  Abdomen: Soft, nondistended  Extremities: non-palpable right pedal pulses, 1-2th interdigital webspace with seropurulent output is packed with strips. Bilateral lower extremities are soft and warm. Equal range of motion in bilateral lower extremities    LABS:                        8.1    12.05 )-----------( 408      ( 05 May 2025 06:48 )             26.2     05-05    139  |  103  |  16  ----------------------------<  76  4.4   |  25  |  1.61[H]    Ca    8.5      05 May 2025 06:52  Phos  3.7     05-05  Mg     2.2     05-05    TPro  7.6  /  Alb  3.4  /  TBili  0.1[L]  /  DBili  x   /  AST  10  /  ALT  11  /  AlkPhos  87  05-04    LIVER FUNCTIONS - ( 04 May 2025 02:25 )  Alb: 3.4 g/dL / Pro: 7.6 g/dL / ALK PHOS: 87 U/L / ALT: 11 U/L / AST: 10 U/L / GGT: x           PT/INR - ( 04 May 2025 02:26 )   PT: 12.0 sec;   INR: 1.04 ratio         PTT - ( 04 May 2025 02:26 )  PTT:38.3 sec     SURGERY DAILY PROGRESS NOTE    24 Hour/Overnight Events:   - MRI concerning for 1st and 2nd metatarsal and 1st phalanx osteomyelitis  - Cr 1.4 --> 1.6, on NS 100cc/hr    SUBJECTIVE: Patient seen and evaluated on AM rounds. He has mild right foot pain, well-controlled. Otherwise no acute compliants    ------------------------------------------------------------------------------------------------------------  OBJECTIVE:  Vital Signs Last 24 Hrs  T(C): 36.4 (05 May 2025 08:54), Max: 37.1 (04 May 2025 11:45)  T(F): 97.5 (05 May 2025 08:54), Max: 98.8 (04 May 2025 11:45)  HR: 84 (05 May 2025 08:54) (82 - 96)  BP: 124/66 (05 May 2025 08:54) (113/73 - 129/84)  BP(mean): --  RR: 18 (05 May 2025 08:54) (18 - 18)  SpO2: 100% (05 May 2025 08:54) (93% - 100%)    Parameters below as of 05 May 2025 08:54  Patient On (Oxygen Delivery Method): room air      I&O's Detail      PHYSICAL EXAM:  Constitutional: Well developed, well-nourished, appropriately interactive in no acute distress  Chest: Symmetric chest rise bilaterally, normal work of breathing on room air  Abdomen: Soft, nondistended  Extremities: non-palpable right pedal pulses, 1-2th interdigital webspace with seropurulent output is packed with strips. Bilateral lower extremities are soft and warm. Equal range of motion in bilateral lower extremities    LABS:                        8.1    12.05 )-----------( 408      ( 05 May 2025 06:48 )             26.2     05-05    139  |  103  |  16  ----------------------------<  76  4.4   |  25  |  1.61[H]    Ca    8.5      05 May 2025 06:52  Phos  3.7     05-05  Mg     2.2     05-05    TPro  7.6  /  Alb  3.4  /  TBili  0.1[L]  /  DBili  x   /  AST  10  /  ALT  11  /  AlkPhos  87  05-04    LIVER FUNCTIONS - ( 04 May 2025 02:25 )  Alb: 3.4 g/dL / Pro: 7.6 g/dL / ALK PHOS: 87 U/L / ALT: 11 U/L / AST: 10 U/L / GGT: x           PT/INR - ( 04 May 2025 02:26 )   PT: 12.0 sec;   INR: 1.04 ratio         PTT - ( 04 May 2025 02:26 )  PTT:38.3 sec     SURGERY DAILY PROGRESS NOTE    24 Hour/Overnight Events:   - MRI concerning for 1st and 2nd metatarsal and 1st phalanx osteomyelitis  - Cr 1.4 --> 1.6, on NS 100cc/hr    SUBJECTIVE: Patient seen and evaluated on AM rounds. He has mild right foot pain, well-controlled. Otherwise no acute compliants    ------------------------------------------------------------------------------------------------------------  OBJECTIVE:  Vital Signs Last 24 Hrs  T(C): 36.4 (05 May 2025 08:54), Max: 37.1 (04 May 2025 11:45)  T(F): 97.5 (05 May 2025 08:54), Max: 98.8 (04 May 2025 11:45)  HR: 84 (05 May 2025 08:54) (82 - 96)  BP: 124/66 (05 May 2025 08:54) (113/73 - 129/84)  BP(mean): --  RR: 18 (05 May 2025 08:54) (18 - 18)  SpO2: 100% (05 May 2025 08:54) (93% - 100%)    Parameters below as of 05 May 2025 08:54  Patient On (Oxygen Delivery Method): room air      I&O's Detail      PHYSICAL EXAM:  Constitutional: Well developed, well-nourished, appropriately interactive in no acute distress  Chest: Symmetric chest rise bilaterally, normal work of breathing on room air  Abdomen: Soft, nondistended  Extremities: non-palpable right pedal pulses, 1-2th interdigital webspace with seropurulent output is packed with strips. Bilateral lower extremities are soft and warm. Equal range of motion in bilateral lower extremities    LABS:                        8.1    12.05 )-----------( 408      ( 05 May 2025 06:48 )             26.2     05-05    139  |  103  |  16  ----------------------------<  76  4.4   |  25  |  1.61[H]    Ca    8.5      05 May 2025 06:52  Phos  3.7     05-05  Mg     2.2     05-05    TPro  7.6  /  Alb  3.4  /  TBili  0.1[L]  /  DBili  x   /  AST  10  /  ALT  11  /  AlkPhos  87  05-04    LIVER FUNCTIONS - ( 04 May 2025 02:25 )  Alb: 3.4 g/dL / Pro: 7.6 g/dL / ALK PHOS: 87 U/L / ALT: 11 U/L / AST: 10 U/L / GGT: x           PT/INR - ( 04 May 2025 02:26 )   PT: 12.0 sec;   INR: 1.04 ratio         PTT - ( 04 May 2025 02:26 )  PTT:38.3 sec    xavier/pvr reviewed     mri foot reviewed

## 2025-05-05 NOTE — PROGRESS NOTE ADULT - SUBJECTIVE AND OBJECTIVE BOX
Patient is a 54y old  Male who presents with a chief complaint of Angiogram (05 May 2025 09:37)      SUBJECTIVE / OVERNIGHT EVENTS:    ROS:  14 point ROS negative in detail except stated as above    MEDICATIONS  (STANDING):  acetaminophen     Tablet .. 975 milliGRAM(s) Oral every 6 hours  aspirin enteric coated 81 milliGRAM(s) Oral daily  atorvastatin 80 milliGRAM(s) Oral at bedtime  cadexomer iodine 0.9% Gel 1 Application(s) Topical daily  dextrose 5%. 1000 milliLiter(s) (100 mL/Hr) IV Continuous <Continuous>  dextrose 5%. 1000 milliLiter(s) (50 mL/Hr) IV Continuous <Continuous>  dextrose 50% Injectable 25 Gram(s) IV Push once  dextrose 50% Injectable 12.5 Gram(s) IV Push once  dextrose 50% Injectable 25 Gram(s) IV Push once  fenofibrate Tablet 145 milliGRAM(s) Oral at bedtime  glucagon  Injectable 1 milliGRAM(s) IntraMuscular once  heparin   Injectable 5000 Unit(s) SubCutaneous every 8 hours  insulin glargine Injectable (LANTUS) 30 Unit(s) SubCutaneous at bedtime  insulin lispro (ADMELOG) corrective regimen sliding scale   SubCutaneous three times a day before meals  insulin lispro Injectable (ADMELOG) 14 Unit(s) SubCutaneous three times a day before meals  metoprolol tartrate 50 milliGRAM(s) Oral two times a day  pentoxifylline 400 milliGRAM(s) Oral three times a day  piperacillin/tazobactam IVPB.. 3.375 Gram(s) IV Intermittent every 8 hours  sodium chloride 0.9%. 1000 milliLiter(s) (100 mL/Hr) IV Continuous <Continuous>  ticagrelor 90 milliGRAM(s) Oral every 12 hours    MEDICATIONS  (PRN):  dextrose Oral Gel 15 Gram(s) Oral once PRN Blood Glucose LESS THAN 70 milliGRAM(s)/deciliter      CAPILLARY BLOOD GLUCOSE      POCT Blood Glucose.: 80 mg/dL (05 May 2025 08:46)  POCT Blood Glucose.: 79 mg/dL (05 May 2025 06:05)  POCT Blood Glucose.: 160 mg/dL (04 May 2025 23:56)  POCT Blood Glucose.: 187 mg/dL (04 May 2025 21:32)  POCT Blood Glucose.: 171 mg/dL (04 May 2025 17:02)  POCT Blood Glucose.: 76 mg/dL (04 May 2025 12:11)    I&O's Summary      PHYSICAL EXAM:  Vital Signs Last 24 Hrs  T(C): 36.4 (05 May 2025 08:54), Max: 37.1 (04 May 2025 11:45)  T(F): 97.5 (05 May 2025 08:54), Max: 98.8 (04 May 2025 11:45)  HR: 84 (05 May 2025 08:54) (82 - 96)  BP: 124/66 (05 May 2025 08:54) (113/73 - 129/84)  BP(mean): --  RR: 18 (05 May 2025 08:54) (18 - 18)  SpO2: 100% (05 May 2025 08:54) (93% - 100%)    Parameters below as of 05 May 2025 08:54  Patient On (Oxygen Delivery Method): room air      CONSTITUTIONAL: NAD older than stated age  EYES: PERRL; conjunctiva and sclera clear  ENMT: Moist oral mucosa  NECK: Supple, no palpable constantin  RESPIRATORY: Normal respiratory effort; lungs are clear to auscultation bilaterally  CARDIOVASCULAR: Regular rate and rhythm, normal S1 and S2, no murmur/rub/gallop; No lower extremity edema; Peripheral pulses are 2+ bilaterally  ABDOMEN: N normoactive bowel sounds, no rebound/guarding;  MUSCULOSKELETAL:  R foot wrapped in badange L foot 2+ DP   PSYCH: A+O to person, place, and time; affect appropriate     LABS:                        8.1    12.05 )-----------( 408      ( 05 May 2025 06:48 )             26.2     05-05    139  |  103  |  16  ----------------------------<  76  4.4   |  25  |  1.61[H]    Ca    8.5      05 May 2025 06:52  Phos  3.7     05-05  Mg     2.2     05-05    TPro  7.6  /  Alb  3.4  /  TBili  0.1[L]  /  DBili  x   /  AST  10  /  ALT  11  /  AlkPhos  87  05-04    PT/INR - ( 04 May 2025 02:26 )   PT: 12.0 sec;   INR: 1.04 ratio         PTT - ( 04 May 2025 02:26 )  PTT:38.3 sec      Urinalysis Basic - ( 05 May 2025 06:52 )    Color: x / Appearance: x / SG: x / pH: x  Gluc: 76 mg/dL / Ketone: x  / Bili: x / Urobili: x   Blood: x / Protein: x / Nitrite: x   Leuk Esterase: x / RBC: x / WBC x   Sq Epi: x / Non Sq Epi: x / Bacteria: x        RADIOLOGY & ADDITIONAL TESTS:    Imaging Personally Reviewed:    Consultant(s) Notes Reviewed:      Care Discussed with Consultants/Other Providers:   Patient is a 54y old  Male who presents with a chief complaint of Angiogram (05 May 2025 09:37)      SUBJECTIVE / OVERNIGHT EVENTS:    feels ok. denies abdominal pain, dysuria- does not wanna talk any further and asking to sleep/    ROS:  14 point ROS negative in detail except stated as above    MEDICATIONS  (STANDING):  acetaminophen     Tablet .. 975 milliGRAM(s) Oral every 6 hours  aspirin enteric coated 81 milliGRAM(s) Oral daily  atorvastatin 80 milliGRAM(s) Oral at bedtime  cadexomer iodine 0.9% Gel 1 Application(s) Topical daily  dextrose 5%. 1000 milliLiter(s) (100 mL/Hr) IV Continuous <Continuous>  dextrose 5%. 1000 milliLiter(s) (50 mL/Hr) IV Continuous <Continuous>  dextrose 50% Injectable 25 Gram(s) IV Push once  dextrose 50% Injectable 12.5 Gram(s) IV Push once  dextrose 50% Injectable 25 Gram(s) IV Push once  fenofibrate Tablet 145 milliGRAM(s) Oral at bedtime  glucagon  Injectable 1 milliGRAM(s) IntraMuscular once  heparin   Injectable 5000 Unit(s) SubCutaneous every 8 hours  insulin glargine Injectable (LANTUS) 30 Unit(s) SubCutaneous at bedtime  insulin lispro (ADMELOG) corrective regimen sliding scale   SubCutaneous three times a day before meals  insulin lispro Injectable (ADMELOG) 14 Unit(s) SubCutaneous three times a day before meals  metoprolol tartrate 50 milliGRAM(s) Oral two times a day  pentoxifylline 400 milliGRAM(s) Oral three times a day  piperacillin/tazobactam IVPB.. 3.375 Gram(s) IV Intermittent every 8 hours  sodium chloride 0.9%. 1000 milliLiter(s) (100 mL/Hr) IV Continuous <Continuous>  ticagrelor 90 milliGRAM(s) Oral every 12 hours    MEDICATIONS  (PRN):  dextrose Oral Gel 15 Gram(s) Oral once PRN Blood Glucose LESS THAN 70 milliGRAM(s)/deciliter      CAPILLARY BLOOD GLUCOSE      POCT Blood Glucose.: 80 mg/dL (05 May 2025 08:46)  POCT Blood Glucose.: 79 mg/dL (05 May 2025 06:05)  POCT Blood Glucose.: 160 mg/dL (04 May 2025 23:56)  POCT Blood Glucose.: 187 mg/dL (04 May 2025 21:32)  POCT Blood Glucose.: 171 mg/dL (04 May 2025 17:02)  POCT Blood Glucose.: 76 mg/dL (04 May 2025 12:11)    I&O's Summary      PHYSICAL EXAM:  Vital Signs Last 24 Hrs  T(C): 36.4 (05 May 2025 08:54), Max: 37.1 (04 May 2025 11:45)  T(F): 97.5 (05 May 2025 08:54), Max: 98.8 (04 May 2025 11:45)  HR: 84 (05 May 2025 08:54) (82 - 96)  BP: 124/66 (05 May 2025 08:54) (113/73 - 129/84)  BP(mean): --  RR: 18 (05 May 2025 08:54) (18 - 18)  SpO2: 100% (05 May 2025 08:54) (93% - 100%)    Parameters below as of 05 May 2025 08:54  Patient On (Oxygen Delivery Method): room air      CONSTITUTIONAL: NAD older than stated age  EYES: PERRL; conjunctiva and sclera clear  ENMT: Moist oral mucosa  NECK: Supple, no palpable constantin  RESPIRATORY: Normal respiratory effort; lungs are clear to auscultation bilaterally  CARDIOVASCULAR: Regular rate and rhythm, normal S1 and S2, no murmur/rub/gallop; No lower extremity edema; Peripheral pulses are 2+ bilaterally  ABDOMEN: N normoactive bowel sounds, no rebound/guarding;  MUSCULOSKELETAL:  R foot wrapped in badange L foot 2+ DP   PSYCH: A+O to person, place, and time; affect appropriate     LABS:                        8.1    12.05 )-----------( 408      ( 05 May 2025 06:48 )             26.2     05-05    139  |  103  |  16  ----------------------------<  76  4.4   |  25  |  1.61[H]    Ca    8.5      05 May 2025 06:52  Phos  3.7     05-05  Mg     2.2     05-05    TPro  7.6  /  Alb  3.4  /  TBili  0.1[L]  /  DBili  x   /  AST  10  /  ALT  11  /  AlkPhos  87  05-04    PT/INR - ( 04 May 2025 02:26 )   PT: 12.0 sec;   INR: 1.04 ratio         PTT - ( 04 May 2025 02:26 )  PTT:38.3 sec      Urinalysis Basic - ( 05 May 2025 06:52 )    Color: x / Appearance: x / SG: x / pH: x  Gluc: 76 mg/dL / Ketone: x  / Bili: x / Urobili: x   Blood: x / Protein: x / Nitrite: x   Leuk Esterase: x / RBC: x / WBC x   Sq Epi: x / Non Sq Epi: x / Bacteria: x        RADIOLOGY & ADDITIONAL TESTS:    Imaging Personally Reviewed:    Consultant(s) Notes Reviewed:      Care Discussed with Consultants/Other Providers:

## 2025-05-05 NOTE — PROGRESS NOTE ADULT - SUBJECTIVE AND OBJECTIVE BOX
Patient is a 54y old  Male who presents with a chief complaint of Angiogram (04 May 2025 18:21)       INTERVAL HPI/OVERNIGHT EVENTS:  Patient seen and evaluated at bedside.  Pt is resting comfortable in NAD. Denies N/V/F/C.    Allergies    No Known Allergies    Intolerances        Vital Signs Last 24 Hrs  T(C): 36.4 (05 May 2025 08:54), Max: 37.1 (04 May 2025 11:45)  T(F): 97.5 (05 May 2025 08:54), Max: 98.8 (04 May 2025 11:45)  HR: 84 (05 May 2025 08:54) (82 - 96)  BP: 124/66 (05 May 2025 08:54) (113/73 - 129/84)  BP(mean): --  RR: 18 (05 May 2025 08:54) (18 - 18)  SpO2: 100% (05 May 2025 08:54) (93% - 100%)    Parameters below as of 05 May 2025 08:54  Patient On (Oxygen Delivery Method): room air        LABS:                        8.1    12.05 )-----------( 408      ( 05 May 2025 06:48 )             26.2     05-05    139  |  103  |  16  ----------------------------<  76  4.4   |  25  |  1.61[H]    Ca    8.5      05 May 2025 06:52  Phos  3.7     05-05  Mg     2.2     05-05    TPro  7.6  /  Alb  3.4  /  TBili  0.1[L]  /  DBili  x   /  AST  10  /  ALT  11  /  AlkPhos  87  05-04    PT/INR - ( 04 May 2025 02:26 )   PT: 12.0 sec;   INR: 1.04 ratio         PTT - ( 04 May 2025 02:26 )  PTT:38.3 sec  Urinalysis Basic - ( 05 May 2025 06:52 )    Color: x / Appearance: x / SG: x / pH: x  Gluc: 76 mg/dL / Ketone: x  / Bili: x / Urobili: x   Blood: x / Protein: x / Nitrite: x   Leuk Esterase: x / RBC: x / WBC x   Sq Epi: x / Non Sq Epi: x / Bacteria: x      CAPILLARY BLOOD GLUCOSE      POCT Blood Glucose.: 80 mg/dL (05 May 2025 08:46)  POCT Blood Glucose.: 79 mg/dL (05 May 2025 06:05)  POCT Blood Glucose.: 160 mg/dL (04 May 2025 23:56)  POCT Blood Glucose.: 187 mg/dL (04 May 2025 21:32)  POCT Blood Glucose.: 171 mg/dL (04 May 2025 17:02)  POCT Blood Glucose.: 76 mg/dL (04 May 2025 12:11)      Lower Extremity Physical Exam:  ascular: DP/PT 0/4, B/L, CFT < 3 seconds B/L, Temperature gradient warm to cool, B/L.   Neuro: Epicritic sensation absent to the level of digits, B/L.  Musculoskeletal/Ortho: s/p Right foot 3rd metatarsal head resection, s/p Right foot Partial 2nd Ray Resection (DOS 3/6)  Skin: 5/4 s/p bedside R foot I&D to level of subQ, Scant, purulent drainage, no erythema, no malodor, Left foot no open wounds, no acute signs of infection    RADIOLOGY & ADDITIONAL TESTS:

## 2025-05-05 NOTE — CONSULT NOTE ADULT - CONSULT REQUESTED DATE/TIME
04-May-2025
04-May-2025 05:22
05-May-2025 10:47
04-May-2025 03:01
05-May-2025 10:40
05-May-2025 14:38
04-May-2025 13:28

## 2025-05-05 NOTE — CONSULT NOTE ADULT - SUBJECTIVE AND OBJECTIVE BOX
[facility]  DU Garcia S. Shah, Y. Patel, G. Casimir  [phone]  ([phone] - weekdays after 5pm and weekends)    ELSI MERINO  54y, Male  [id number]    HPI:  Patient is a 54 year old male with PMH of DM, CAD (s/p CABG), mild HFrEF (EF 45%), and PAD (with recent angiogram Aug. 2024), and recent admission to [facility] with R foot wound s/p R foot Partial 2nd ray resection and 3rd proximal phalanx bone biopsy on 3/7/25 (Cx + for E. Faecalis and Staph Epi requiring prolonged abx course s/p PICC), now presenting with right foot wound. Patient reports 1 week of subjective fevers, chills, hypothermia, and R foot lateral toe oozing and erythema c/f soft tissue infection. FS elevated to 400s on arrival patient reports compliance with diabetes medications.   ROS: 14 point review of systems completed, pertinent positives and negatives as per HPI.    Allergies: No Known Allergies    PMH -- Hypertension, unspecified type  Hyperlipidemia, unspecified hyperlipidemia type  Type 2 diabetes mellitus without complication, with long-term current use of insulin  Coronary artery disease of native artery of native heart with stable angina pectoris    PSH -- History of percutaneous coronary intervention  FH -- Family history of heart disease (Father)  Social History -- denies tobacco, alcohol or illicit drug use    Physical Exam--  Vital Signs Last 24 Hrs  T(F): 97.5 (05 May 2025 08:54), Max: 98.8 (04 May 2025 11:45)  HR: 84 (05 May 2025 08:54) (82 - 96)  BP: 124/66 (05 May 2025 08:54) (113/73 - 129/84)  RR: 18 (05 May 2025 08:54) (18 - 18)  SpO2: 100% (05 May 2025 08:54) (93% - 100%)  General: no acute distress  HEENT: NC/AT, EOMI, anicteric  Lungs: clear to auscultation bilaterally  Heart: S1, S2 present, normal rate/rhythm  Abdomen: Soft. ND. NT. BS present.   Neuro: AAOx3, no obvious focal deficits   Extremities: R foot dressing, no edema.   Skin: Warm. Dry. No visible rash.   Lines: PIV     Laboratory & Imaging Data--  CBC:                       8.1    12.05 )-----------( 408      ( 05 May 2025 06:48 )             26.2     WBC Count: 12.05 K/uL (05-05-25 @ 06:48)  WBC Count: 9.62 K/uL (05-04-25 @ 02:26)    CMP: 05-05    139  |  103  |  16  ----------------------------<  76  4.4   |  25  |  1.61[H]    Ca    8.5      05 May 2025 06:52  Phos  3.7     05-05  Mg     2.2     05-05    TPro  7.6  /  Alb  3.4  /  TBili  0.1[L]  /  DBili  x   /  AST  10  /  ALT  11  /  AlkPhos  87  05-04    LIVER FUNCTIONS - ( 04 May 2025 02:25 )  Alb: 3.4 g/dL / Pro: 7.6 g/dL / ALK PHOS: 87 U/L / ALT: 11 U/L / AST: 10 U/L / GGT: x           Microbiology: reviewed  Culture - Abscess with Gram Stain (collected 05-04-25 @ 04:40)  Source: Abscess right foot  Gram Stain (05-04-25 @ 15:03):    No polymorphonuclear leukocytes seen per low power field    No organisms seen per oil power field  Preliminary Report (05-05-25 @ 08:40):    No growth to date    Culture - Blood (collected 05-04-25 @ 02:10)  Source: Blood Blood-Peripheral  Preliminary Report (05-05-25 @ 05:01):    No growth at 24 hours    Culture - Blood (collected 05-04-25 @ 02:08)  Source: Blood Blood-Peripheral  Preliminary Report (05-05-25 @ 05:01):    No growth at 24 hours    Radiology--reviewed  < from: MR Foot w/wo IV Cont, Right (05.04.25 @ 19:21) >  IMPRESSION:  Soft tissue wounds about the distal forefoot with small volume of   ill-defined peripheral enhancing fluid preferentially surrounding the   first MTP joint as described.    Low T1 signal with high STIR signal and enhancement involving the first   metatarsal, residual second metatarsal and the first proximal phalanx   concerning for osteomyelitis.    High STIR signal and enhancement at the base of the third metatarsal is   felt to be reactivewith early osteomyelitis considered less likely.    These aforementioned findings are new as compared to prior MRI study from   3/5/2025.    --- End of Report ---    < end of copied text >    < from: Xray Foot AP + Lateral + Oblique, Right (05.04.25 @ 02:48) >  IMPRESSION:  No convincing evidence for acute osteomyelitis. MRI would provide a more   sensitive evaluation.    --- End of Report ---    < end of copied text >      Active Medications--  acetaminophen     Tablet .. 975 milliGRAM(s) Oral every 6 hours  aspirin enteric coated 81 milliGRAM(s) Oral daily  atorvastatin 80 milliGRAM(s) Oral at bedtime  cadexomer iodine 0.9% Gel 1 Application(s) Topical daily  dextrose 5%. 1000 milliLiter(s) IV Continuous <Continuous>  dextrose 5%. 1000 milliLiter(s) IV Continuous <Continuous>  dextrose 50% Injectable 25 Gram(s) IV Push once  dextrose 50% Injectable 12.5 Gram(s) IV Push once  dextrose 50% Injectable 25 Gram(s) IV Push once  dextrose Oral Gel 15 Gram(s) Oral once PRN  fenofibrate Tablet 145 milliGRAM(s) Oral at bedtime  glucagon  Injectable 1 milliGRAM(s) IntraMuscular once  heparin   Injectable 5000 Unit(s) SubCutaneous every 8 hours  insulin glargine Injectable (LANTUS) 25 Unit(s) SubCutaneous at bedtime  insulin lispro (ADMELOG) corrective regimen sliding scale   SubCutaneous three times a day before meals  insulin lispro Injectable (ADMELOG) 10 Unit(s) SubCutaneous three times a day before meals  metoprolol tartrate 50 milliGRAM(s) Oral two times a day  pentoxifylline 400 milliGRAM(s) Oral three times a day  piperacillin/tazobactam IVPB.. 3.375 Gram(s) IV Intermittent every 8 hours  sodium chloride 0.9%. 1000 milliLiter(s) IV Continuous <Continuous>  ticagrelor 90 milliGRAM(s) Oral every 12 hours    Current Antimicrobials:   piperacillin/tazobactam IVPB.. 3.375 Gram(s) IV Intermittent every 8 hours    Prior/Completed Antimicrobials:  piperacillin/tazobactam IVPB.  piperacillin/tazobactam IVPB.-  vancomycin  IVPB    Immunologic:

## 2025-05-05 NOTE — CONSULT NOTE ADULT - SUBJECTIVE AND OBJECTIVE BOX
Reason For Consult: DM    HPI:  54M hx DM, CAD (s/p CABG), mild HFrEF (EF 45%), and PAD (with recent angiogram Aug. 2024), and recent admission to [facility] w/ right foot wound s/p right foot Partial 2nd ray resection and 3rd proximal phalanx bone biopsy on 3/7/25 (Cx + for E. Faecalis and Staph Epi requiring prolonged abx course s/p PICC), now presenting with right foot wound. Patient reports 1w of subjective fevers, chills, hypothermia, and R foot lateral toe oozing and erythema c/f soft tissue infection. FS elevated to 400s on arrival patient reports compliance w/ diabetes medications.   endocrine called for DM   pt with a1c >15.5 in march 2025    please note at time of exam pt states: "I do not want to talk to you". I have not slept in 2 days. I dont want to talk and proceeds to put the blanket over his face.  When asked pt that i would like to talk about his diabetes, he again states I do not want to talk"    chart reviewed  pt recently admitted march 2025  - Home meds: Lantus 52 units qhs, Humalog 20 units tidac (nonadherent for last 4-6 weeks)  - Follows with Dr. Mcdaniel? in [facility]'ed on  - Discharge on Lantus 30u QHS and Admelog 14u TID AC     per chart now on:  - - Home regimen reported to be Lantus 50 qhs, Humalog 20 TIDAC    this am, glucose tightly controlled 76 in setting of dilcia as well and increasing cr                 PAST MEDICAL & SURGICAL HISTORY:  Hypertension, unspecified type      Hyperlipidemia, unspecified hyperlipidemia type      Type 2 diabetes mellitus without complication, with long-term current use of insulin      Coronary artery disease of native artery of native heart with stable angina pectoris      History of percutaneous coronary intervention          FAMILY HISTORY:  Family history of heart disease (Father)        Social History:  lives with family     Outpatient Medications:    Home Medications:   * Patient Currently Takes Medications as of 12-Mar-2025 14:58 documented in Structured Notes  · 	sacubitril-valsartan 24 mg-26 mg oral tablet: 1 tab(s) orally 2 times a day  · 	Lopressor 50 mg oral tablet: 1 tab(s) orally 2 times a day  · 	atorvastatin 80 mg oral tablet: 1 tab(s) orally once a day (at bedtime)  · 	acetaminophen 325 mg oral tablet: 2 tab(s) orally every 6 hours As needed Mild Pain (1 - 3)  · 	vancomycin 1.25 g/250 mL intravenous solution: 1.25 gram(s) intravenously every 12 hours MUST BE GIVEN IN NORMAL SALINE. PT DIABETIC WITH HIGH BLOOD GLUCOSE.  · 	HumaLOG KwikPen 100 units/mL injectable solution: 20 unit(s) injectable 3 times a day  · 	insulin glargine 100 units/mL subcutaneous solution: 52 unit(s) subcutaneous once a day (at bedtime)  · 	cilostazol 50 mg oral tablet: 1 tab(s) orally 2 times a day  · 	aspirin 81 mg oral delayed release tablet: 1 tab(s) orally once a day  · 	ticagrelor 90 mg oral tablet: 1 tab(s) orally every 12 hours    MEDICATIONS  (STANDING):  acetaminophen     Tablet .. 975 milliGRAM(s) Oral every 6 hours  aspirin enteric coated 81 milliGRAM(s) Oral daily  atorvastatin 80 milliGRAM(s) Oral at bedtime  cadexomer iodine 0.9% Gel 1 Application(s) Topical daily  dextrose 5%. 1000 milliLiter(s) (100 mL/Hr) IV Continuous <Continuous>  dextrose 5%. 1000 milliLiter(s) (50 mL/Hr) IV Continuous <Continuous>  dextrose 50% Injectable 25 Gram(s) IV Push once  dextrose 50% Injectable 12.5 Gram(s) IV Push once  dextrose 50% Injectable 25 Gram(s) IV Push once  fenofibrate Tablet 145 milliGRAM(s) Oral at bedtime  glucagon  Injectable 1 milliGRAM(s) IntraMuscular once  heparin   Injectable 5000 Unit(s) SubCutaneous every 8 hours  insulin glargine Injectable (LANTUS) 25 Unit(s) SubCutaneous at bedtime  insulin lispro (ADMELOG) corrective regimen sliding scale   SubCutaneous three times a day before meals  insulin lispro Injectable (ADMELOG) 10 Unit(s) SubCutaneous three times a day before meals  metoprolol tartrate 50 milliGRAM(s) Oral two times a day  pentoxifylline 400 milliGRAM(s) Oral three times a day  piperacillin/tazobactam IVPB.. 3.375 Gram(s) IV Intermittent every 8 hours  sodium chloride 0.9%. 1000 milliLiter(s) (100 mL/Hr) IV Continuous <Continuous>  ticagrelor 90 milliGRAM(s) Oral every 12 hours    MEDICATIONS  (PRN):  dextrose Oral Gel 15 Gram(s) Oral once PRN Blood Glucose LESS THAN 70 milliGRAM(s)/deciliter      Allergies    No Known Allergies    Intolerances      Review of Systems:  Constitutional: No fever  Eyes: No blurry vision  Neuro: No tremors  HEENT: No pain  Cardiovascular: No chest pain, palpitations  Respiratory: No SOB, no cough  GI: No nausea, vomiting, abdominal pain  : No dysuria  Skin: no rash  Psych: no depression  Endocrine: no polyuria, polydipsia  Hem/lymph: no swelling  Osteoporosis: no fractures    ALL OTHER SYSTEMS REVIEWED AND NEGATIVE        PHYSICAL EXAM:  VITALS: T(C): 36.4 (05-05-25 @ 08:54)  T(F): 97.5 (05-05-25 @ 08:54), Max: 98.8 (05-04-25 @ 11:45)  HR: 84 (05-05-25 @ 08:54) (82 - 96)  BP: 124/66 (05-05-25 @ 08:54) (113/73 - 129/84)  RR:  (18 - 18)  SpO2:  (93% - 100%)  Wt(kg): --   full exam deferred as per pt   GENERAL: NAD, thin elderly appearing male   EYES: No proptosis, no lid lag, anicteric  HEENT:  Atraumatic, Normocephalic, moist mucous membranes  RESPIRATORY: on room air, in no resp distress, speaking full senatances   CARDIOVASCULAR:; no peripheral edema  SKIN: Dry, intact, No rashes or lesions  MUSCULOSKELETAL: Full range of motion,  NEURO: extraocular movements intact, no tremor,   PSYCH: Alert and oriented x 3, normal affect, normal mood      POCT Blood Glucose.: 80 mg/dL (05-05-25 @ 08:46)  POCT Blood Glucose.: 79 mg/dL (05-05-25 @ 06:05)  POCT Blood Glucose.: 160 mg/dL (05-04-25 @ 23:56)  POCT Blood Glucose.: 187 mg/dL (05-04-25 @ 21:32)  POCT Blood Glucose.: 171 mg/dL (05-04-25 @ 17:02)  POCT Blood Glucose.: 76 mg/dL (05-04-25 @ 12:11)  POCT Blood Glucose.: 179 mg/dL (05-04-25 @ 08:08)  POCT Blood Glucose.: 394 mg/dL (05-04-25 @ 01:22)                            8.1    12.05 )-----------( 408      ( 05 May 2025 06:48 )             26.2       05-05    139  |  103  |  16  ----------------------------<  76  4.4   |  25  |  1.61[H]    eGFR: 50[L]    Ca    8.5      05-05  Mg     2.2     05-05  Phos  3.7     05-05    TPro  7.6  /  Alb  3.4  /  TBili  0.1[L]  /  DBili  x   /  AST  10  /  ALT  11  /  AlkPhos  87  05-04      Thyroid Function Tests:          03-06 Chol 118 Direct LDL -- LDL calculated 65 HDL 29[L] Trig 143    Radiology:

## 2025-05-05 NOTE — CONSULT NOTE ADULT - ASSESSMENT
54 year old male with PMH of DM, CAD (s/p CABG), mild HFrEF (EF 45%), and PAD, and recent admission to [facility] with R foot wound s/p R foot Partial 2nd ray resection and 3rd proximal phalanx bone biopsy on 3/7/25 (Cx + for E. Faecalis and Staph Epi requiring prolonged abx course s/p PICC, clean margin with no residual OM), now presenting with right foot wound. Patient reports 1 week of subjective fevers, chills, hypothermia, and R foot lateral toe oozing and erythema c/f soft tissue infection.  endocrine called for DM  pt with a1c >15.5 in march 2025         Problem/Recommendation - 1:  ·  Problem: Uncontrolled type 2 diabetes mellitus with hyperglycemia, with long-term current use of insulin.     chart reviewed  pt recently admitted march 2025  - Home meds: Lantus 52 units qhs, Humalog 20 units tidac (nonadherent for last 4-6 weeks)  - Follows with Dr. Mcdaniel? in [facility]'ed on  - Discharge on Lantus 30u QHS and Admelog 14u TID AC   per chart now on:  - - Home regimen reported to be Lantus 50 qhs, Humalog 20 TIDAC    this am, glucose tightly controlled 76 in setting of aurora as well and increasing cr       While inpatient  BG target 100-180 mg/dl,   - Please monitor blood glucose values TID AC & QHS while eating regular meals and Q6H while NPO  - Blood glucose goals pre-meal less than 140 mg/dL and random blood glucose less than 180 mg/dL    PLAN:    5/5  tightly controlled am glucose   has AURORA as well which can decrease insulin clearance   for now lower insulin as follows  - Lantus  25 from 30  units qhs  - Admelog to 10 from 14  units SC Premeal/TIDAC   - Admelog  Low dose Correction Scale Premeal & seperate low dose  Correction Scale Bedtime   - Hypoglycemia protocol in place   - Carb Consistent Diet   - Nutrition consult   - Provider to RN for diabetes/insulin pen teaching     inform endocrine of hypoglycemia or persistent hyperglycemia episodes as changes in pts insulin regimen will need to be made.   notify endocrine if any plans to be NPO/diet changes as this will also affect insulin regimen.    if plan for NPO --> please inform endocrine. potential plan for wed will need adjustments in lantus and correction scales     given BHOB of 3 and a1c >15.5 last admission please rule out type 1 DM and SUMIT  labs to check   cpeptide with am labs   anti ISLET  anti JESSE   zn transporter 8   IA -2 antibody         Discharge planning:   he is on far more basal bolus insulin than his weight at home  after last admission, it appears both basal bolus insulin increased post dc   inpt can be seen with Lantus 30 he has tightly controlled glucose  potenital dietary component outpt leading to high insulin doses vs ?compliance   this will need to be discussed with pt and family closer to dc         Discharge planning:  -Discuss with patient the importance of Carb consistent diet and exercise as tolerated.    -Recommend nutritional consultation  inpt prior to dc   -Discuss  glycemic goal of a1c <7% to prevent microvascular complications of diabetes mellitus and reduce the risk of macrovascular complications.  - At home, Patient should check FSBG premeals and bedtime. Pt should call their doctor when FSBG <70 or above >400 and or consistently above 200s as changes in the regimen will have to be made.  - Make sure patient knows how to inject insulin and check fingersticks with glucometer (ask bedside RN for teaching)  - pt on kinetics and action of basal and prandial insulin. Discussed that pt should check FSBG before meals and ALSO take the prandial insulin BEFORE meals   - Discharge on premeal insulin and Lantus. do not dc on correction scale or bedtime scale.  - BG goals for outpatient 80-130mg/dl, premeal < 140mg/dL, 2 hours postprandial < 180mg/dL   - Hypoglycemia < 70mg/dL; review symptoms & management of hypoglycemia   - discussed importance of follow up care    *an appt should be made with his endocrine MD prior to dc and plan of care at dc needs to be discussed with his endocrine*  - Recommend routine outpatient ophthalmology, podiatry and endocrinology f/u, PCP       Problem/Recommendation - 2:  ·  Problem: Essential hypertension.   ·  Recommendation: Goal BP < 130/80   outpt mc/cr ratio     Problem/Recommendation - 3:  ·  Problem: Hyperlipidemia.   ·  Recommendation:   LDL goal <70 in DM   pt is on lipitor       plan d/w resident       Arianna Topete MD  Attending Physician   Department of Endocrinology, Diabetes and Metabolism     weekdays: 9am to 5pm: email [facility] or [facility] and or TEAMS     Nights and weekends: [phone]  Please note that this patient may be followed by a different provider tomorrow.   If no answer or after hours, please contact [phone].  For final dc reccomendations, please call [phone] or page the endocrine fellow on call.

## 2025-05-05 NOTE — CONSULT NOTE ADULT - ASSESSMENT
Patient is a 54 year old male with PMH of DM, CAD (s/p CABG), mild HFrEF (EF 45%), and PAD (with recent angiogram Aug. 2024), and recent admission to [facility] with R foot wound s/p R foot Partial 2nd ray resection and 3rd proximal phalanx bone biopsy on 3/7/25 (Cx + for E. Faecalis and Staph Epi requiring prolonged abx course s/p PICC, clean margin with no residual OM), now presenting with right foot wound. Patient reports 1 week of subjective fevers, chills, hypothermia, and R foot lateral toe oozing and erythema c/f soft tissue infection.    R foot wound infection with 1st MPJ fluid collection, 1st and 2nd toe OM  - 5/4 s/p R foot I&D to level of subQ - scant, purulent drainage, no erythema, no malodor  - R foot xray with no gas or OM  - R foot MRI with soft tissue wounds about distal forefoot with small volume of ill defined peripheral enhancing fluid preferentially surrounding the 1st MPJ fluid collection, residual second metatarsal and 1st proximal phalanx OM, possible reactive vs early OM of 3rd metatarsal  - s/p vancomycin, started on empiric zosyn   - prior cultures reviewed     Recommendations:   Podiatry following--plan for OR for R foot revisional 2nd ray resection + Partial First Ray Resection on Wed 5/7 pending vascular recommendations; please send cultures/biopsy   Follow R foot wcx -- NGTD  Follow Bcx - NGTD x2   Continue zosyn  Monitor temps/WBC  Continue rest of care per primary team       Jason Vidal M.D.  [facility]  Available on [employer] TEAMS - *PREFERRED*  [phone]  After 5pm on weekdays and all day on weekends - please call [phone]     Thank you for consulting us and involving us in the management of this patients case. In addition to reviewing history, imaging, documents, labs, microbiology, took into account antibiotic stewardship, local antibiogram and infection control strategies and potential transmission issues at time of treatment decision making process.

## 2025-05-05 NOTE — PROGRESS NOTE ADULT - ASSESSMENT
54M hx DM, CAD (s/p CABG), mild HFrEF (EF 45%), and PAD (with recent dx angiogram Aug. 2024), and recent admission to [facility] w/ right foot wound s/p right foot partial 2nd ray resection and 3rd proximal phalanx bone biopsy on 3/7/25 (Cx + for E. Faecalis and Staph Epi requiring prolonged abx course s/p PICC), now presenting with multiple non-healing right foot wounds. His admission is complicated by an AURORA.    PLAN  - Will proceed with RLE angiogram with Dr. Youssef on Wednesday, 5/7/25 pending improved AURORA. Patient is consented  - Patient is not cleared for podiatry surgery. Continue local wound care for right foot wounds  - Consult infectious disease given recent history of polymicrobial right foot osteomyelitis  - Continue IVF resuscitation for AURORA, monitor creatinine  - Pain: Tylenol  - Diet: CC  - Lantus, pre-meal insulin, and SSI per endocrine, appreciate recs  - Trental 400mg TID for PAD  - Continue home ASA/Brilinta, statin/fibrate, metoprolol. Appreciate medicine recs  - DVT ppx: [facility]    Vascular Surgery  [id number] 54M hx DM, CAD (s/p CABG), mild HFrEF (EF 45%), and PAD (with recent dx angiogram Aug. 2024), and recent admission to [facility] w/ right foot wound s/p right foot partial 2nd ray resection and 3rd proximal phalanx bone biopsy on 3/7/25 (Cx + for E. Faecalis and Staph Epi requiring prolonged abx course s/p PICC), now presenting with multiple non-healing right foot wounds. His admission is complicated by an AURORA.    PLAN  - Will proceed with RLE angiogram with Dr. Youssef on Wednesday, 5/7/25 pending improved AURORA. Patient is consented  - Patient is not cleared for podiatry surgery. Continue local wound care for right foot wounds  - Consult infectious disease given recent history of polymicrobial right foot osteomyelitis. Continue empiric Zosyn  - Continue IVF resuscitation for AURORA, monitor creatinine  - Pain: Tylenol  - Diet: CC  - Lantus, pre-meal insulin, and SSI per endocrine, appreciate recs  - Trental 400mg TID for PAD  - Continue home ASA/Brilinta, statin/fibrate, metoprolol. Appreciate medicine recs  - DVT ppx: [facility]    Vascular Surgery  [id number]

## 2025-05-05 NOTE — CONSULT NOTE ADULT - ASSESSMENT
54M with PMH of HTN, HLD, DM2, CAD s/p CABG, HF, and recent admission to [facility] for toe amputation and found to be bacteremic, now presents to the ED with right foot wound. Nephrology consulted for AURORA.

## 2025-05-05 NOTE — CONSULT NOTE ADULT - CONSULT REASON
DM
Foot wound
Right foot wounds
Preop risk stratification
R foot infection
AURORA
medical pre optimization

## 2025-05-05 NOTE — PROGRESS NOTE ADULT - PROBLEM SELECTOR PLAN 3
EPIFANIO Youssef MD have participated in the daily care of this patient  and have seen and examined the patient today and agree with  the  evaluation, assessment and plan of the surgical house officer  EPIFANIO Youssef MD have personally seen and examined the patient at bedside today at  9  pm

## 2025-05-05 NOTE — CONSULT NOTE ADULT - ATTENDING COMMENTS
Patient seen and evaluated reviewed course of last hospitalization and outpatient labs.  patient's creatinine changed after initiation of entresto which is an appropriate hemodynamic response.  was 1.55 in early april so the trend of 1.4 to 1.6 this admission is ultimately stable.  would hold off fluids for today and will reassess need for fluids pre-angio based upon volume status at the time.
I Estevan Youssef MD have participated in the daily care of this patient and discussed  the findings and plan with the house officer. I reviewed the resident note and agree with the findings and plan

## 2025-05-05 NOTE — CONSULT NOTE ADULT - PROBLEM SELECTOR RECOMMENDATION 9
AURORA likely in the setting of recent Entresto initiation. On review of labs on [facility] HIE, Scr was approximately ~0.8. During recent admission at [facility] (3/5/25 to 3/11/25) Scr was 0.8 on discharge (3/10/25). Patient was started on Entresto during that admission. Last outpatient Scr was elevated at 1.55 on 4/4/25. On this admission, Scr was elevated at 1.44 and remains elevated/stable at 1.61 today. No UA or kidney imaging available for review. Likely new acceptable baseline Scr given use of Entresto. Recommend to obtain UA, urine lytes, UPCR, and kidney US. Agree with holding Entresto at this time. Monitor labs and urine output. Avoid nephrotoxins. Dose medications as per eGFR.      If you have any questions, please feel free to contact me.  Mega Walker MD  Nephrology Fellow  [id number] / [employer] Teams (Preferred)  (Please check the on-call schedule to reach the appropriate Nephrology Fellow)
I Estevan Youssef MD have participated in the daily care of this patient and discussed  the findings and plan with the house officer. I reviewed the resident note and agree with the findings and plan
foot wound, known prior diabetic foot ulcer w/ infcn w/ PICC line on outpt IV abx. MR 3/25 showing e/o early OM   - OR cultures - 2nd and 3rd metatarsal clean margin - with E.faecalis and Staph epi, sensitivities reviewed    - OR culture deep wound culture with E.faecalis, Bacteroides fragilis, Bifidobacterium breve s/p vancomycin 1.25g IV Q12h 6 week course  - 5/4 s/p bedside R foot I&D to level of subQ, Scant, purulent drainage, no erythema, no malodor, Left foot no open wounds, no acute signs of infection  - R foot MRI pending  - f/u OBDULIO/PVR  - agree with Zosyn, would add Vancomycin, check MRSA swab  -f/u BCX  - ID consult

## 2025-05-05 NOTE — CHART NOTE - NSCHARTNOTEFT_GEN_A_CORE
- Podiatry surgery is rescheduled to Thursday 5/8 at 10: 30 am as Pt angio got rescheduled for This Wednesday 5/7 with the vascular team  - Discussed with attending

## 2025-05-06 DIAGNOSIS — E78.5 HYPERLIPIDEMIA, UNSPECIFIED: ICD-10-CM

## 2025-05-06 DIAGNOSIS — E11.65 TYPE 2 DIABETES MELLITUS WITH HYPERGLYCEMIA: ICD-10-CM

## 2025-05-06 LAB
ANION GAP SERPL CALC-SCNC: 11 MMOL/L — SIGNIFICANT CHANGE UP (ref 5–17)
BUN SERPL-MCNC: 11 MG/DL — SIGNIFICANT CHANGE UP (ref 7–23)
C PEPTIDE SERPL-MCNC: 0.3 NG/ML — LOW (ref 1.1–4.4)
CALCIUM SERPL-MCNC: 8.7 MG/DL — SIGNIFICANT CHANGE UP (ref 8.4–10.5)
CHLORIDE SERPL-SCNC: 106 MMOL/L — SIGNIFICANT CHANGE UP (ref 96–108)
CO2 SERPL-SCNC: 25 MMOL/L — SIGNIFICANT CHANGE UP (ref 22–31)
CREAT SERPL-MCNC: 1.4 MG/DL — HIGH (ref 0.5–1.3)
CULTURE RESULTS: ABNORMAL
EGFR: 59 ML/MIN/1.73M2 — LOW
EGFR: 59 ML/MIN/1.73M2 — LOW
GLUCOSE BLDC GLUCOMTR-MCNC: 125 MG/DL — HIGH (ref 70–99)
GLUCOSE BLDC GLUCOMTR-MCNC: 128 MG/DL — HIGH (ref 70–99)
GLUCOSE BLDC GLUCOMTR-MCNC: 230 MG/DL — HIGH (ref 70–99)
GLUCOSE BLDC GLUCOMTR-MCNC: 81 MG/DL — SIGNIFICANT CHANGE UP (ref 70–99)
GLUCOSE SERPL-MCNC: 75 MG/DL — SIGNIFICANT CHANGE UP (ref 70–99)
GRAM STN FLD: ABNORMAL
HCT VFR BLD CALC: 27.4 % — LOW (ref 39–50)
HGB BLD-MCNC: 8.6 G/DL — LOW (ref 13–17)
MAGNESIUM SERPL-MCNC: 2.1 MG/DL — SIGNIFICANT CHANGE UP (ref 1.6–2.6)
MCHC RBC-ENTMCNC: 24.2 PG — LOW (ref 27–34)
MCHC RBC-ENTMCNC: 31.4 G/DL — LOW (ref 32–36)
MCV RBC AUTO: 77 FL — LOW (ref 80–100)
NRBC BLD AUTO-RTO: 0 /100 WBCS — SIGNIFICANT CHANGE UP (ref 0–0)
PHOSPHATE SERPL-MCNC: 2.9 MG/DL — SIGNIFICANT CHANGE UP (ref 2.5–4.5)
PLATELET # BLD AUTO: 450 K/UL — HIGH (ref 150–400)
POTASSIUM SERPL-MCNC: 4.2 MMOL/L — SIGNIFICANT CHANGE UP (ref 3.5–5.3)
POTASSIUM SERPL-SCNC: 4.2 MMOL/L — SIGNIFICANT CHANGE UP (ref 3.5–5.3)
RBC # BLD: 3.56 M/UL — LOW (ref 4.2–5.8)
RBC # FLD: 14.5 % — SIGNIFICANT CHANGE UP (ref 10.3–14.5)
SODIUM SERPL-SCNC: 142 MMOL/L — SIGNIFICANT CHANGE UP (ref 135–145)
SPECIMEN SOURCE: SIGNIFICANT CHANGE UP
UUN UR-MCNC: 269 MG/DL — SIGNIFICANT CHANGE UP
WBC # BLD: 10.24 K/UL — SIGNIFICANT CHANGE UP (ref 3.8–10.5)
WBC # FLD AUTO: 10.24 K/UL — SIGNIFICANT CHANGE UP (ref 3.8–10.5)

## 2025-05-06 PROCEDURE — 99232 SBSQ HOSP IP/OBS MODERATE 35: CPT

## 2025-05-06 RX ORDER — INSULIN GLARGINE-YFGN 100 [IU]/ML
10 INJECTION, SOLUTION SUBCUTANEOUS AT BEDTIME
Refills: 0 | Status: COMPLETED | OUTPATIENT
Start: 2025-05-06 | End: 2025-05-06

## 2025-05-06 RX ORDER — INSULIN GLARGINE-YFGN 100 [IU]/ML
21 INJECTION, SOLUTION SUBCUTANEOUS AT BEDTIME
Refills: 0 | Status: DISCONTINUED | OUTPATIENT
Start: 2025-05-06 | End: 2025-05-06

## 2025-05-06 RX ORDER — INSULIN GLARGINE-YFGN 100 [IU]/ML
21 INJECTION, SOLUTION SUBCUTANEOUS AT BEDTIME
Refills: 0 | Status: DISCONTINUED | OUTPATIENT
Start: 2025-05-07 | End: 2025-05-07

## 2025-05-06 RX ORDER — INSULIN LISPRO 100 U/ML
INJECTION, SOLUTION INTRAVENOUS; SUBCUTANEOUS
Refills: 0 | Status: DISCONTINUED | OUTPATIENT
Start: 2025-05-06 | End: 2025-05-08

## 2025-05-06 RX ADMIN — Medication 975 MILLIGRAM(S): at 06:22

## 2025-05-06 RX ADMIN — Medication 975 MILLIGRAM(S): at 23:35

## 2025-05-06 RX ADMIN — INSULIN LISPRO 10 UNIT(S): 100 INJECTION, SOLUTION INTRAVENOUS; SUBCUTANEOUS at 09:01

## 2025-05-06 RX ADMIN — Medication 25 GRAM(S): at 13:53

## 2025-05-06 RX ADMIN — INSULIN LISPRO 10 UNIT(S): 100 INJECTION, SOLUTION INTRAVENOUS; SUBCUTANEOUS at 17:43

## 2025-05-06 RX ADMIN — Medication 25 GRAM(S): at 05:53

## 2025-05-06 RX ADMIN — Medication 400 MILLIGRAM(S): at 21:56

## 2025-05-06 RX ADMIN — Medication 81 MILLIGRAM(S): at 12:35

## 2025-05-06 RX ADMIN — INSULIN LISPRO 10 UNIT(S): 100 INJECTION, SOLUTION INTRAVENOUS; SUBCUTANEOUS at 12:36

## 2025-05-06 RX ADMIN — INSULIN GLARGINE-YFGN 10 UNIT(S): 100 INJECTION, SOLUTION SUBCUTANEOUS at 21:56

## 2025-05-06 RX ADMIN — METOPROLOL SUCCINATE 50 MILLIGRAM(S): 50 TABLET, EXTENDED RELEASE ORAL at 17:42

## 2025-05-06 RX ADMIN — FENOFIBRATE 145 MILLIGRAM(S): 160 TABLET ORAL at 21:56

## 2025-05-06 RX ADMIN — Medication 400 MILLIGRAM(S): at 13:53

## 2025-05-06 RX ADMIN — Medication 400 MILLIGRAM(S): at 05:51

## 2025-05-06 RX ADMIN — Medication 25 GRAM(S): at 21:56

## 2025-05-06 RX ADMIN — HEPARIN SODIUM 5000 UNIT(S): 1000 INJECTION INTRAVENOUS; SUBCUTANEOUS at 21:56

## 2025-05-06 RX ADMIN — Medication 975 MILLIGRAM(S): at 05:52

## 2025-05-06 RX ADMIN — ATORVASTATIN CALCIUM 80 MILLIGRAM(S): 80 TABLET, FILM COATED ORAL at 21:56

## 2025-05-06 RX ADMIN — TICAGRELOR 90 MILLIGRAM(S): 90 TABLET ORAL at 05:51

## 2025-05-06 RX ADMIN — Medication 975 MILLIGRAM(S): at 18:33

## 2025-05-06 RX ADMIN — HEPARIN SODIUM 5000 UNIT(S): 1000 INJECTION INTRAVENOUS; SUBCUTANEOUS at 05:53

## 2025-05-06 RX ADMIN — METOPROLOL SUCCINATE 50 MILLIGRAM(S): 50 TABLET, EXTENDED RELEASE ORAL at 05:52

## 2025-05-06 RX ADMIN — HEPARIN SODIUM 5000 UNIT(S): 1000 INJECTION INTRAVENOUS; SUBCUTANEOUS at 13:53

## 2025-05-06 RX ADMIN — Medication 975 MILLIGRAM(S): at 12:36

## 2025-05-06 RX ADMIN — Medication 975 MILLIGRAM(S): at 17:42

## 2025-05-06 RX ADMIN — TICAGRELOR 90 MILLIGRAM(S): 90 TABLET ORAL at 17:42

## 2025-05-06 RX ADMIN — Medication 975 MILLIGRAM(S): at 13:36

## 2025-05-06 NOTE — PROGRESS NOTE ADULT - SUBJECTIVE AND OBJECTIVE BOX
Patient seen today for follow up inpatient Diabetes Mellitus management.    Chief Complaint: Type 2 Diabetes Mellitus     INTERVAL HX:  Patient seen in [facility] 9MON 923 D1. Patient is alert and oriented, resting in bed. Patient reports feeling good, eating full meals and tolerating POs. FBG low this am to 81mg/dL, 75mg/dL -> patient denies any low BG symptoms no hypoglycemia. Noted postprandial hyperglycemia last evening to 287mg/dL. Patient to be NPO at midnights for RLE angio tmrw 5/7. Blood glucose levels in the last 24hrs have been 81-287mg/dL.     Review of Systems:  General: As above.  Respiratory: Denies any SOB, CAGE, or cough.  Gastrointestinal: Denies any n/v/d or abdominal pain.   Endocrine: Denies any polyuria, polydipsia, polyphagia, visual changes, or numbness in feet.     Allergies  No Known Allergies      Intolerances  None.       MEDICATIONS  (STANDING):  acetaminophen     Tablet .. 975 milliGRAM(s) Oral every 6 hours  aspirin enteric coated 81 milliGRAM(s) Oral daily  atorvastatin 80 milliGRAM(s) Oral at bedtime  cadexomer iodine 0.9% Gel 1 Application(s) Topical daily  dextrose 5%. 1000 milliLiter(s) IV Continuous <Continuous>  dextrose 5%. 1000 milliLiter(s) IV Continuous <Continuous>  dextrose 50% Injectable 25 Gram(s) IV Push once  dextrose 50% Injectable 12.5 Gram(s) IV Push once  dextrose 50% Injectable 25 Gram(s) IV Push once  dextrose Oral Gel 15 Gram(s) Oral once PRN  fenofibrate Tablet 145 milliGRAM(s) Oral at bedtime  glucagon  Injectable 1 milliGRAM(s) IntraMuscular once  heparin   Injectable 5000 Unit(s) SubCutaneous every 8 hours  insulin glargine Injectable (LANTUS) 21 Unit(s) SubCutaneous at bedtime  insulin lispro (ADMELOG) corrective regimen sliding scale   SubCutaneous three times a day before meals  insulin lispro (ADMELOG) corrective regimen sliding scale   SubCutaneous at bedtime  insulin lispro Injectable (ADMELOG) 10 Unit(s) SubCutaneous three times a day before meals  metoprolol tartrate 50 milliGRAM(s) Oral two times a day  pentoxifylline 400 milliGRAM(s) Oral three times a day  piperacillin/tazobactam IVPB.. 3.375 Gram(s) IV Intermittent every 8 hours  ticagrelor 90 milliGRAM(s) Oral every 12 hours      atorvastatin 80 milliGRAM(s) Oral at bedtime  dextrose 50% Injectable 25 Gram(s) IV Push once  dextrose 50% Injectable 12.5 Gram(s) IV Push once  dextrose 50% Injectable 25 Gram(s) IV Push once  dextrose Oral Gel 15 Gram(s) Oral once PRN  fenofibrate Tablet 145 milliGRAM(s) Oral at bedtime  glucagon  Injectable 1 milliGRAM(s) IntraMuscular once  insulin glargine Injectable (LANTUS) 21 Unit(s) SubCutaneous at bedtime  insulin lispro (ADMELOG) corrective regimen sliding scale   SubCutaneous three times a day before meals  insulin lispro (ADMELOG) corrective regimen sliding scale   SubCutaneous at bedtime  insulin lispro Injectable (ADMELOG) 10 Unit(s) SubCutaneous three times a day before meals      insulin lispro (ADMELOG) corrective regimen sliding scale   SubCutaneous three times a day before meals  insulin lispro (ADMELOG) corrective regimen sliding scale   SubCutaneous at bedtime  insulin lispro Injectable (ADMELOG) 10 Unit(s) SubCutaneous three times a day before meals      PHYSICAL EXAM:  VITALS:   T(C): 36.4 (05-06-25 @ 09:05), Max: 36.8 (05-05-25 @ 21:07)  HR: 68 (05-06-25 @ 09:05) (68 - 94)  BP: 108/70 (05-06-25 @ 09:05) (102/61 - 149/75)  RR: 18 (05-06-25 @ 09:05) (18 - 18)  SpO2: 98% (05-06-25 @ 09:05) (97% - 100%)    GENERAL: In no acute distress  Respiratory: Respirations unlabored  Extremities: Warm and dry, no edema  NEURO: Alert and oriented, appropriate     LABS:  POCT Blood Glucose.: 81 mg/dL (05-06-25 @ 08:36)  POCT Blood Glucose.: 287 mg/dL (05-05-25 @ 22:10)  POCT Blood Glucose.: 267 mg/dL (05-05-25 @ 21:05)  POCT Blood Glucose.: 284 mg/dL (05-05-25 @ 19:46)  POCT Blood Glucose.: 265 mg/dL (05-05-25 @ 17:15)  POCT Blood Glucose.: 133 mg/dL (05-05-25 @ 14:08)  POCT Blood Glucose.: 141 mg/dL (05-05-25 @ 12:15)  POCT Blood Glucose.: 80 mg/dL (05-05-25 @ 08:46)  POCT Blood Glucose.: 79 mg/dL (05-05-25 @ 06:05)  POCT Blood Glucose.: 160 mg/dL (05-04-25 @ 23:56)  POCT Blood Glucose.: 187 mg/dL (05-04-25 @ 21:32)  POCT Blood Glucose.: 171 mg/dL (05-04-25 @ 17:02)  POCT Blood Glucose.: 76 mg/dL (05-04-25 @ 12:11)  POCT Blood Glucose.: 179 mg/dL (05-04-25 @ 08:08)  POCT Blood Glucose.: 394 mg/dL (05-04-25 @ 01:22)                          8.6    10.24 )-----------( 450      ( 06 May 2025 07:09 )             27.4     05-06    142  |  106  |  11  ----------------------------<  75  4.2   |  25  |  1.40[H]    Ca    8.7      06 May 2025 07:17  Phos  2.9     05-06  Mg     2.1     05-06          Urinalysis Basic - ( 06 May 2025 07:17 )    Color: x / Appearance: x / SG: x / pH: x  Gluc: 75 mg/dL / Ketone: x  / Bili: x / Urobili: x   Blood: x / Protein: x / Nitrite: x   Leuk Esterase: x / RBC: x / WBC x   Sq Epi: x / Non Sq Epi: x / Bacteria: x        Culture - Abscess with Gram Stain (collected 04 May 2025 04:40)  Source: Abscess right foot  Gram Stain (04 May 2025 15:03):    No polymorphonuclear leukocytes seen per low power field    No organisms seen per oil power field  Preliminary Report (05 May 2025 08:40):    No growth to date    Culture - Blood (collected 04 May 2025 02:10)  Source: Blood Blood-Peripheral  Preliminary Report (06 May 2025 05:01):    No growth at 48 Hours    Culture - Blood (collected 04 May 2025 02:08)  Source: Blood Blood-Peripheral  Preliminary Report (06 May 2025 05:01):    No growth at 48 Hours        A1C with Estimated Average Glucose Result: A1C with Estimated Average Glucose Result: 10.1 % (05-05-25 @ 06:47)  A1C with Estimated Average Glucose Result: >15.5 % (03-04-25 @ 23:12)

## 2025-05-06 NOTE — PROGRESS NOTE ADULT - ASSESSMENT
54 year old male with PMH of DM, CAD (s/p CABG), mild HFrEF (EF 45%), and PAD, and recent admission to [facility] with R foot wound s/p R foot Partial 2nd ray resection and 3rd proximal phalanx bone biopsy on 3/7/25 (Cx + for E. Faecalis and Staph Epi requiring prolonged abx course s/p PICC, clean margin with no residual OM), now presenting with right foot wound. Patient reports 1 week of subjective fevers, chills, hypothermia, and R foot lateral toe oozing and erythema c/f soft tissue infection. Endocrine consulted for uncontrolled T2DM and hyperglycemia (High risk patient with severely uncontrolled diabetes with A1c of 11.2% at high risk of CAD and CVA with high medical complexity and high level decision-making). Patient reports eating full meals and tolerating POs. FBG low this am, will lower Lantus to 21u QHS and also add 2AM ISS -> will decrease Lantus to 10u for tonight given NPO at midnight for RLE angio tmrw 5/7. No hypoglycemia. Noted higher insulin needs with meals, will increase mealtime insulin for tighter BG control. Noted patient takes much more insulin at home, he states he eats more at home -> recommend RD consult to review DM diet given uncontrolled DM. Endocrine will closely monitor BG and adjust insulin as needed for BG goal 100-180mg/dL inpatient.       #Uncontrolled T2DM  A1C with Estimated Average Glucose Result: 10.1 % (05-05-25 @ 06:47)  A1C with Estimated Average Glucose Result: >15.5 % (03-04-25 @ 23:12)  Home reg: Lantus 52u QHS, Humalog 20u TID AC -> discharged in march on Lantus 30u QHS and Admelog 14u TID AC (not adherent)   Endocrinologist: Dr. Mcdaniel in [place]?  *an appt should be made with his endocrine MD prior to dc and plan of care at [place] needs to be discussed with his endocrine*           54 year old male with PMH of DM, CAD (s/p CABG), mild HFrEF (EF 45%), and PAD, and recent admission to [facility] with R foot wound s/p R foot Partial 2nd ray resection and 3rd proximal phalanx bone biopsy on 3/7/25 (Cx + for E. Faecalis and Staph Epi requiring prolonged abx course s/p PICC, clean margin with no residual OM), now presenting with right foot wound. Patient reports 1 week of subjective fevers, chills, hypothermia, and R foot lateral toe oozing and erythema c/f soft tissue infection. Endocrine consulted for uncontrolled T2DM and hyperglycemia (High risk patient with severely uncontrolled diabetes with A1c of 11.2% at high risk of CAD and CVA with high medical complexity and high level decision-making). Patient reports eating full meals and tolerating POs. FBG low this am, will lower Lantus to 21u QHS and also add 2AM ISS -> will decrease Lantus to 10u for tonight given NPO at midnight for RLE angio tmrw 5/7. No hypoglycemia. Noted higher insulin needs with meals, will increase mealtime insulin for tighter BG control. Noted patient takes much more insulin at home, he states he eats more at home -> recommend RD consult to review DM diet given uncontrolled DM. Endocrine will closely monitor BG and adjust insulin as needed for BG goal 100-180mg/dL inpatient.       #Uncontrolled T2DM  A1C with Estimated Average Glucose Result: 10.1 % (05-05-25 @ 06:47)  A1C with Estimated Average Glucose Result: >15.5 % (03-04-25 @ 23:12)  Home reg: Lantus 52u QHS, Humalog 20u TID AC -> discharged in march on Lantus 30u QHS and Admelog 14u TID AC (not adherent)   Endocrinologist: none -> follows with internist/PCP Dr. Mcdaniel in [place]?  *an appt should be made with his endocrine MD prior to OR and plan of care at [place] needs to be discussed with his endocrine*    *only has Medicare and Elder plan via Medicare          54 year old male with PMH of DM, CAD (s/p CABG), mild HFrEF (EF 45%), and PAD, and recent admission to [facility] with R foot wound s/p R foot Partial 2nd ray resection and 3rd proximal phalanx bone biopsy on 3/7/25 (Cx + for E. Faecalis and Staph Epi requiring prolonged abx course s/p PICC, clean margin with no residual OM), now presenting with right foot wound. Patient reports 1 week of subjective fevers, chills, hypothermia, and R foot lateral toe oozing and erythema c/f soft tissue infection. Endocrine consulted for uncontrolled T2DM and hyperglycemia (High risk patient with severely uncontrolled diabetes with A1c of 11.2% at high risk of CAD and CVA with high medical complexity and high level decision-making). Patient reports eating full meals and tolerating POs. FBG low this am, will lower Lantus to 21u QHS and also add 2AM ISS -> will decrease Lantus to 10u for tonight given NPO at midnight for RLE angio tmrw 5/7. No hypoglycemia. Noted patient did get mealtime insulin with breakfast, postprandial BG stable so will keep mealtime insulin dose as is for now. Noted patient takes much more insulin at home, he states he eats more at home -> recommend RD consult to review DM diet given uncontrolled DM. Endocrine will closely monitor BG and adjust insulin as needed for BG goal 100-180mg/dL inpatient.       #Uncontrolled T2DM  A1C with Estimated Average Glucose Result: 10.1 % (05-05-25 @ 06:47)  A1C with Estimated Average Glucose Result: >15.5 % (03-04-25 @ 23:12)  Home reg: Lantus 52u QHS, Humalog 20u TID AC -> discharged in march on Lantus 30u QHS and Admelog 14u TID AC (not adherent)   Endocrinologist: none -> follows with internist/PCP Dr. Mcdaniel in [place]?  *an appt should be made with his endocrine MD prior to [place] and plan of care at [place] needs to be discussed with his endocrine*    *only has Medicare and Elder plan via Medicare

## 2025-05-06 NOTE — PROGRESS NOTE ADULT - SUBJECTIVE AND OBJECTIVE BOX
SURGERY DAILY PROGRESS NOTE    24 Hour/Overnight Events:   - held fluids per nephro recs     SUBJECTIVE: Patient seen and evaluated on AM rounds. He has mild right foot pain, it's usually numb with intermittnet sharp jabs, well-controlled. Pt stating hands are cold, otherwise no acute complaints  ------------------------------------------------------------------------------------------------------------  OBJECTIVE:  Vital Signs Last 24 Hrs  T(C): 36.3 (06 May 2025 05:50), Max: 36.8 (05 May 2025 21:07)  T(F): 97.4 (06 May 2025 05:50), Max: 98.2 (05 May 2025 21:07)  HR: 75 (06 May 2025 05:50) (75 - 94)  BP: 102/61 (06 May 2025 05:50) (102/61 - 149/75)  BP(mean): --  RR: 18 (06 May 2025 05:50) (18 - 18)  SpO2: 99% (06 May 2025 05:50) (97% - 100%)    Parameters below as of 06 May 2025 05:50  Patient On (Oxygen Delivery Method): room air    I&O's Detail    05 May 2025 07:01  -  06 May 2025 07:00  --------------------------------------------------------  IN:    IV PiggyBack: 100 mL    Oral Fluid: 240 mL    sodium chloride 0.9%: 1500 mL  Total IN: 1840 mL    OUT:    Voided (mL): 2850 mL  Total OUT: 2850 mL    Total NET: -1010 mL    Daily     Daily     LABS:                        8.6    10.24 )-----------( 450      ( 06 May 2025 07:09 )             27.4     05-06    142  |  106  |  11  ----------------------------<  75  4.2   |  25  |  1.40[H]    Ca    8.7      06 May 2025 07:17  Phos  2.9     05-06  Mg     2.1     05-06    Urinalysis Basic - ( 06 May 2025 07:17 )    Color: x / Appearance: x / SG: x / pH: x  Gluc: 75 mg/dL / Ketone: x  / Bili: x / Urobili: x   Blood: x / Protein: x / Nitrite: x   Leuk Esterase: x / RBC: x / WBC x   Sq Epi: x / Non Sq Epi: x / Bacteria: x    PHYSICAL EXAM:  Constitutional: appropriately interactive in no acute distress  Chest: Symmetric chest rise bilaterally, normal work of breathing on room air  Abdomen: Soft, nondistended  Extremities: non-palpable right pedal pulses, 1-2th interdigital webspace with seropurulent output is packed with strips. Bilateral lower extremities are soft and warm. Equal range of motion in bilateral lower extremities SURGERY DAILY PROGRESS NOTE    24 Hour/Overnight Events:   - held fluids per nephro recs     SUBJECTIVE: Patient seen and evaluated on AM rounds. He has mild right foot pain, it's usually numb with intermittnet sharp jabs, well-controlled. Pt stating hands are cold, otherwise no acute complaints  ------------------------------------------------------------------------------------------------------------  OBJECTIVE:  Vital Signs Last 24 Hrs  T(C): 36.3 (06 May 2025 05:50), Max: 36.8 (05 May 2025 21:07)  T(F): 97.4 (06 May 2025 05:50), Max: 98.2 (05 May 2025 21:07)  HR: 75 (06 May 2025 05:50) (75 - 94)  BP: 102/61 (06 May 2025 05:50) (102/61 - 149/75)  BP(mean): --  RR: 18 (06 May 2025 05:50) (18 - 18)  SpO2: 99% (06 May 2025 05:50) (97% - 100%)    Parameters below as of 06 May 2025 05:50  Patient On (Oxygen Delivery Method): room air    I&O's Detail    05 May 2025 07:01  -  06 May 2025 07:00  --------------------------------------------------------  IN:    IV PiggyBack: 100 mL    Oral Fluid: 240 mL    sodium chloride 0.9%: 1500 mL  Total IN: 1840 mL    OUT:    Voided (mL): 2850 mL  Total OUT: 2850 mL    Total NET: -1010 mL    Daily      LABS:                        8.6    10.24 )-----------( 450      ( 06 May 2025 07:09 )             27.4     05-06    142  |  106  |  11  ----------------------------<  75  4.2   |  25  |  1.40[H]    Ca    8.7      06 May 2025 07:17  Phos  2.9     05-06  Mg     2.1     05-06    Urinalysis Basic - ( 06 May 2025 07:17 )    Color: x / Appearance: x / SG: x / pH: x  Gluc: 75 mg/dL / Ketone: x  / Bili: x / Urobili: x   Blood: x / Protein: x / Nitrite: x   Leuk Esterase: x / RBC: x / WBC x   Sq Epi: x / Non Sq Epi: x / Bacteria: x    PHYSICAL EXAM:  Constitutional: appropriately interactive in no acute distress  Chest: Symmetric chest rise bilaterally, normal work of breathing on room air  Abdomen: Soft, nondistended  Extremities: non-palpable right pedal pulses, 1-2th interdigital webspace with seropurulent output is packed with strips. Bilateral lower extremities are soft and warm. Equal range of motion in bilateral lower extremities

## 2025-05-06 NOTE — PROGRESS NOTE ADULT - ASSESSMENT
54M 5/4 s/p bedside R foot I&D to level of subQ  - Patient seen and evaluated  - Afebrile, no leukocytosis  - 5/4 s/p bedside R foot I&D to level of subQ, no purulent drainage from 1st interspace wound, scant purulent drainage from medial 1st metatarsophalangeal joint wound, no erythema, no malodor, Left foot no open wounds, no acute signs of infection  - Right foot XR: no gas, no OM   - Right foot MRI: 1st MPJ fluid collection, 1ST metatarsal and proximal phalanx, 2nd metatarsal OM  - Right foot cultured: No growth prelim  - Vascular recs, RLE angio scheduled for tomorrow, 5/7   - ID recs, appreciated  - Pod plan booked for right foot revisional 2nd ray resection + Partial First Ray Resection on Wed 5/7 7:30 am with Dr Patterson pending [facility] recs  - Please document medical and cardiac clearance for possible podiatric surgical intervention under anesthesia  - Discussed with attending

## 2025-05-06 NOTE — PROGRESS NOTE ADULT - SUBJECTIVE AND OBJECTIVE BOX
[facility]  [facility] Garcia Y. Patel, S. Shah, G. Casimir  [phone]  ([phone] - weekdays after 5pm and weekends)    Name: ELSI MERINO  Age/Gender: 54y Male  MRN: [id number]    Interval History:  Patient seen and examined this morning.   No new complaints noted.  Notes reviewed  No concerning overnight events  Afebrile   Allergies: No Known Allergies      Objective:  Vitals:   T(F): 97.5 (05-06-25 @ 09:05), Max: 98.2 (05-05-25 @ 21:07)  HR: 68 (05-06-25 @ 09:05) (68 - 94)  BP: 108/70 (05-06-25 @ 09:05) (102/61 - 149/75)  RR: 18 (05-06-25 @ 09:05) (18 - 18)  SpO2: 98% (05-06-25 @ 09:05) (97% - 100%)  Physical Examination:  General: no acute distress, nontoxic appearing   HEENT: normocephalic, atraumatic, anicteric  Respiratory: no acc muscle use, breathing comfortably  Cardiovascular: S1 and S2 present  Gastrointestinal: normal appearing, nondistended  Extremities: R foot dressing    Laboratory Studies:  CBC:                       8.6    10.24 )-----------( 450      ( 06 May 2025 07:09 )             27.4     WBC Trend:  10.24 05-06-25 @ 07:09  12.05 05-05-25 @ 06:48  9.62 05-04-25 @ 02:26    CMP: 05-06    142  |  106  |  11  ----------------------------<  75  4.2   |  25  |  1.40[H]    Ca    8.7      06 May 2025 07:17  Phos  2.9     05-06  Mg     2.1     05-06      Creatinine: 1.40 mg/dL (05-06-25 @ 07:17)  Creatinine: 1.61 mg/dL (05-05-25 @ 06:52)  Creatinine: 1.44 mg/dL (05-04-25 @ 02:25)    Microbiology: reviewed   Culture - Abscess with Gram Stain (collected 05-04-25 @ 04:40)  Source: Abscess right foot  Gram Stain (05-04-25 @ 15:03):    No polymorphonuclear leukocytes seen per low power field    No organisms seen per oil power field  Preliminary Report (05-05-25 @ 08:40):    No growth to date    Culture - Blood (collected 05-04-25 @ 02:10)  Source: Blood Blood-Peripheral  Preliminary Report (05-06-25 @ 05:01):    No growth at 48 Hours    Culture - Blood (collected 05-04-25 @ 02:08)  Source: Blood Blood-Peripheral  Preliminary Report (05-06-25 @ 05:01):    No growth at 48 Hours    Radiology: reviewed     Medications:  acetaminophen     Tablet .. 975 milliGRAM(s) Oral every 6 hours  aspirin enteric coated 81 milliGRAM(s) Oral daily  atorvastatin 80 milliGRAM(s) Oral at bedtime  cadexomer iodine 0.9% Gel 1 Application(s) Topical daily  dextrose 5%. 1000 milliLiter(s) IV Continuous <Continuous>  dextrose 5%. 1000 milliLiter(s) IV Continuous <Continuous>  dextrose 50% Injectable 25 Gram(s) IV Push once  dextrose 50% Injectable 12.5 Gram(s) IV Push once  dextrose 50% Injectable 25 Gram(s) IV Push once  dextrose Oral Gel 15 Gram(s) Oral once PRN  fenofibrate Tablet 145 milliGRAM(s) Oral at bedtime  glucagon  Injectable 1 milliGRAM(s) IntraMuscular once  heparin   Injectable 5000 Unit(s) SubCutaneous every 8 hours  insulin glargine Injectable (LANTUS) 21 Unit(s) SubCutaneous at bedtime  insulin lispro (ADMELOG) corrective regimen sliding scale   SubCutaneous three times a day before meals  insulin lispro (ADMELOG) corrective regimen sliding scale   SubCutaneous at bedtime  insulin lispro Injectable (ADMELOG) 10 Unit(s) SubCutaneous three times a day before meals  metoprolol tartrate 50 milliGRAM(s) Oral two times a day  pentoxifylline 400 milliGRAM(s) Oral three times a day  piperacillin/tazobactam IVPB.. 3.375 Gram(s) IV Intermittent every 8 hours  ticagrelor 90 milliGRAM(s) Oral every 12 hours    Current Antimicrobials:  piperacillin/tazobactam IVPB.. 3.375 Gram(s) IV Intermittent every 8 hours    Prior/Completed Antimicrobials:  piperacillin/tazobactam IVPB.  piperacillin/tazobactam IVPB.-  vancomycin  IVPB

## 2025-05-06 NOTE — PROGRESS NOTE ADULT - SUBJECTIVE AND OBJECTIVE BOX
DATE OF SERVICE: 05-06-25 @ 11:22    Patient is a 54y old  Male who presents with a chief complaint of Angiogram (06 May 2025 10:07)      INTERVAL HISTORY: Feels ok.     REVIEW OF SYSTEMS:  CONSTITUTIONAL: No weakness  EYES/ENT: No visual changes;  No throat pain   NECK: No pain or stiffness  RESPIRATORY: No cough, wheezing; No shortness of breath  CARDIOVASCULAR: No chest pain or palpitations  GASTROINTESTINAL: No abdominal  pain. No nausea, vomiting, or hematemesis  GENITOURINARY: No dysuria, frequency or hematuria  NEUROLOGICAL: No stroke like symptoms  SKIN: No rashes    	  MEDICATIONS:  metoprolol tartrate 50 milliGRAM(s) Oral two times a day        PHYSICAL EXAM:  T(C): 36.4 (05-06-25 @ 09:05), Max: 36.8 (05-05-25 @ 21:07)  HR: 68 (05-06-25 @ 09:05) (68 - 94)  BP: 108/70 (05-06-25 @ 09:05) (102/61 - 149/75)  RR: 18 (05-06-25 @ 09:05) (18 - 18)  SpO2: 98% (05-06-25 @ 09:05) (97% - 100%)  Wt(kg): --  I&O's Summary    05 May 2025 07:01  -  06 May 2025 07:00  --------------------------------------------------------  IN: 1840 mL / OUT: 2850 mL / NET: -1010 mL    06 May 2025 07:01  -  06 May 2025 11:22  --------------------------------------------------------  IN: 360 mL / OUT: 0 mL / NET: 360 mL          Appearance: In no distress	  HEENT:    PERRL, EOMI	  Cardiovascular:  S1 S2, No JVD  Respiratory: Lungs clear to auscultation	  Gastrointestinal:  Soft, Non-tender, + BS	  Vascularature:  No edema of LE  Psychiatric: Appropriate affect   Neuro: no acute focal deficits                               8.6    10.24 )-----------( 450      ( 06 May 2025 07:09 )             27.4     05-06    142  |  106  |  11  ----------------------------<  75  4.2   |  25  |  1.40[H]    Ca    8.7      06 May 2025 07:17  Phos  2.9     05-06  Mg     2.1     05-06          Labs personally reviewed      ASSESSMENT/PLAN: 	    54M hx DM, CAD (s/p CABG), mild HFrEF (EF 45%), and PAD (with recent angiogram Aug. 2024), and recent admission to [facility] w/ right foot wound s/p right foot Partial 2nd ray resection and 3rd proximal phalanx bone biopsy on 3/7/25 (Cx + for E. Faecalis and Staph Epi requiring prolonged abx course s/p PICC), now presenting with right foot wound.      Problem/Plan - 1:  ·  Problem: Hypertension.   ·  Plan: hold entresto 2/2 to AURORA  c/w home lopressor.     Problem/Plan - 2:  ·  Problem: CAD (coronary artery disease).   ·  Plan: c/w aspirin, brilinta statin  TTE EF 55%.  Rest NM stress images March 2025 with medium-sized, severe defect(s) in the lateral and inferolateral walls. Stress portion not performed      Problem/Plan - 3:  ·  Problem: AURORA (acute kidney injury).   ·  Plan:  hold entresto for now  baseline 1s, now rising 1.4, c/w IVF       Problem/Plan - 4:  ·  Problem: Preop risk stratification   ·  Plan: Elevated risk for low risk peripheral angio +/- angioplasty        Shana Pizano, JAROCHO-NP   Edwin Dwyer DO [facility]  Cardiovascular Medicine  [address], [address]  Available through call or text on [employer]  Office: [phone]

## 2025-05-06 NOTE — PROGRESS NOTE ADULT - PROBLEM SELECTOR PLAN 3
EPIFANIO Youssef MD have participated in the daily care of this patient  and have seen and examined the patient today and agree with  the  evaluation, assessment and plan of the surgical house officer  EPIFANIO oYussef MD have personally seen and examined the patient at bedside today at  9  pm I Estevan Youssef MD have participated in the daily care of this patient  and agree with  the  evaluation, assessment and plan of the surgical house officer

## 2025-05-06 NOTE — PROGRESS NOTE ADULT - PROBLEM SELECTOR PLAN 5
check bladder scan  likely 2/2 to infection  hold entresto for now  baseline 1s  monitor closely.
c/w trental

## 2025-05-06 NOTE — PROGRESS NOTE ADULT - ASSESSMENT
54M hx DM, CAD (s/p CABG), mild HFrEF (EF 45%), and PAD (with recent dx angiogram Aug. 2024), and recent admission to [facility] w/ right foot wound s/p right foot partial 2nd ray resection and 3rd proximal phalanx bone biopsy on 3/7/25 (Cx + for E. Faecalis and Staph Epi requiring prolonged abx course s/p PICC), now presenting with multiple non-healing right foot wounds. His admission is complicated by an AURORA.    PLAN  - Will proceed with RLE angiogram with Dr. Youssef on Wednesday, 5/7/25 pending improved AURORA. Patient is consented  - Booked for R 1st and 2nd ray resection thurs 5/8. Continue local wound care for right foot wounds  - Infectious disease recs appreciated > Continue empiric Zosyn  - Continue IVF resuscitation for AURORA, monitor creatinine  - Pain: Tylenol  - Diet: CC  - Lantus, pre-meal insulin, and SSI per endocrine, appreciate recs  - Trental 400mg TID for PAD  - Continue home ASA/Brilinta, statin/fibrate, metoprolol. Appreciate medicine recs  - DVT ppx: [facility]    Vascular Surgery  [id number] 54M hx DM, CAD (s/p CABG), mild HFrEF (EF 45%), and PAD (with recent dx angiogram Aug. 2024), and recent admission to [facility] w/ right foot wound s/p right foot partial 2nd ray resection and 3rd proximal phalanx bone biopsy on 3/7/25 (Cx + for E. Faecalis and Staph Epi requiring prolonged abx course s/p PICC), now presenting with multiple non-healing right foot wounds. His admission is complicated by an AURORA.    PLAN  - Will proceed with RLE angiogram with Dr. Youssef on Thurs, 5/8/25 pending improved AURORA. Patient is consented  - Booked for R 1st and 2nd ray resection thurs 5/8. Continue local wound care for right foot wounds  - Infectious disease recs appreciated > Continue empiric Zosyn  - Continue IVF resuscitation for AURORA, monitor creatinine  - Pain: Tylenol  - Diet: CC  - Lantus, pre-meal insulin, and SSI per endocrine, appreciate recs  - Trental 400mg TID for PAD  - Continue home ASA/Brilinta, statin/fibrate, metoprolol. Appreciate medicine recs  - DVT ppx: [facility]    Vascular Surgery  [id number]

## 2025-05-06 NOTE — PROGRESS NOTE ADULT - SUBJECTIVE AND OBJECTIVE BOX
Patient is a 54y old  Male who presents with a chief complaint of Angiogram (06 May 2025 09:45)      SUBJECTIVE / OVERNIGHT EVENTS:    feels well no complaints. no fevers, chills.     ROS:  14 point ROS negative in detail except stated as above    MEDICATIONS  (STANDING):  acetaminophen     Tablet .. 975 milliGRAM(s) Oral every 6 hours  aspirin enteric coated 81 milliGRAM(s) Oral daily  atorvastatin 80 milliGRAM(s) Oral at bedtime  cadexomer iodine 0.9% Gel 1 Application(s) Topical daily  dextrose 5%. 1000 milliLiter(s) (100 mL/Hr) IV Continuous <Continuous>  dextrose 5%. 1000 milliLiter(s) (50 mL/Hr) IV Continuous <Continuous>  dextrose 50% Injectable 25 Gram(s) IV Push once  dextrose 50% Injectable 12.5 Gram(s) IV Push once  dextrose 50% Injectable 25 Gram(s) IV Push once  fenofibrate Tablet 145 milliGRAM(s) Oral at bedtime  glucagon  Injectable 1 milliGRAM(s) IntraMuscular once  heparin   Injectable 5000 Unit(s) SubCutaneous every 8 hours  insulin glargine Injectable (LANTUS) 21 Unit(s) SubCutaneous at bedtime  insulin lispro (ADMELOG) corrective regimen sliding scale   SubCutaneous three times a day before meals  insulin lispro (ADMELOG) corrective regimen sliding scale   SubCutaneous at bedtime  insulin lispro Injectable (ADMELOG) 10 Unit(s) SubCutaneous three times a day before meals  metoprolol tartrate 50 milliGRAM(s) Oral two times a day  pentoxifylline 400 milliGRAM(s) Oral three times a day  piperacillin/tazobactam IVPB.. 3.375 Gram(s) IV Intermittent every 8 hours  ticagrelor 90 milliGRAM(s) Oral every 12 hours    MEDICATIONS  (PRN):  dextrose Oral Gel 15 Gram(s) Oral once PRN Blood Glucose LESS THAN 70 milliGRAM(s)/deciliter      CAPILLARY BLOOD GLUCOSE      POCT Blood Glucose.: 81 mg/dL (06 May 2025 08:36)  POCT Blood Glucose.: 287 mg/dL (05 May 2025 22:10)  POCT Blood Glucose.: 267 mg/dL (05 May 2025 21:05)  POCT Blood Glucose.: 284 mg/dL (05 May 2025 19:46)  POCT Blood Glucose.: 265 mg/dL (05 May 2025 17:15)  POCT Blood Glucose.: 133 mg/dL (05 May 2025 14:08)  POCT Blood Glucose.: 141 mg/dL (05 May 2025 12:15)    I&O's Summary    05 May 2025 07:01  -  06 May 2025 07:00  --------------------------------------------------------  IN: 1840 mL / OUT: 2850 mL / NET: -1010 mL    06 May 2025 07:01  -  06 May 2025 10:07  --------------------------------------------------------  IN: 360 mL / OUT: 0 mL / NET: 360 mL        PHYSICAL EXAM:  Vital Signs Last 24 Hrs  T(C): 36.4 (06 May 2025 09:05), Max: 36.8 (05 May 2025 21:07)  T(F): 97.5 (06 May 2025 09:05), Max: 98.2 (05 May 2025 21:07)  HR: 68 (06 May 2025 09:05) (68 - 94)  BP: 108/70 (06 May 2025 09:05) (102/61 - 149/75)  BP(mean): --  RR: 18 (06 May 2025 09:05) (18 - 18)  SpO2: 98% (06 May 2025 09:05) (97% - 100%)    Parameters below as of 06 May 2025 09:05  Patient On (Oxygen Delivery Method): room air      CONSTITUTIONAL: NAD older than stated age  EYES: PERRL; conjunctiva and sclera clear  ENMT: Moist oral mucosa  NECK: Supple, no palpable constantin  RESPIRATORY: Normal respiratory effort; lungs are clear to auscultation bilaterally  CARDIOVASCULAR: Regular rate and rhythm, normal S1 and S2, no murmur/rub/gallop; No lower extremity edema; Peripheral pulses are 2+ bilaterally  ABDOMEN: N normoactive bowel sounds, no rebound/guarding;  MUSCULOSKELETAL:  R foot wrapped in badange L foot 2+ DP   PSYCH: A+O to person, place, and time; affect appropriate    LABS:                        8.6    10.24 )-----------( 450      ( 06 May 2025 07:09 )             27.4     05-06    142  |  106  |  11  ----------------------------<  75  4.2   |  25  |  1.40[H]    Ca    8.7      06 May 2025 07:17  Phos  2.9     05-06  Mg     2.1     05-06            Urinalysis Basic - ( 06 May 2025 07:17 )    Color: x / Appearance: x / SG: x / pH: x  Gluc: 75 mg/dL / Ketone: x  / Bili: x / Urobili: x   Blood: x / Protein: x / Nitrite: x   Leuk Esterase: x / RBC: x / WBC x   Sq Epi: x / Non Sq Epi: x / Bacteria: x        RADIOLOGY & ADDITIONAL TESTS:    Imaging Personally Reviewed:    Consultant(s) Notes Reviewed:      Care Discussed with Consultants/Other Providers:

## 2025-05-06 NOTE — PROGRESS NOTE ADULT - PROBLEM SELECTOR PLAN 1
Inpatient Plan:  - Check BG TID AC, HS, and 2AM   - Adjust Lantus to 10u for NPO tonight -> adjust back to Lantus 21u QHS after procedure   - Adjust Admelog to 12u TID AC (HOLD if NPO)  - C/w low dose Admelog correctional scales TID AC, HS, and 2AM  - Nutrition consult     Discharge Plan:  - Bsal/bolus reg, TBD closer to d/c.   - Patient would benefit from CGM- please place order for CGM (Dexcom G7 or Freestyle Tawny 3 whichever is covered by patient insurance). If patient cannot get michel on phone, please send for rx [place] x1 and Sensor x3.   - Patient should check BG TID AC and HS at home. Can use CGM to monitor BGs but if BG <100mg/dL or >250mg/dL, patient should confirm with fingerstick BG. Please tell patient to contact Endocrinologist if BG <70mg/dL x1 or >200mg/dL consistently or >400mg/dL x1.  - Endocrine follow up: can f/u with Dr. Mcdaniel or can establish care at [address] [place] [address] ([phone])- please provide in d/c paperwork for patient to make appt.  - Given elevated BHOB of 3 w/ A1c >15.5% on last admission, patient to should have r/o T1DM/SUMIT labs checked.   - Recommend routine outpatient ophthalmology and podiatry follow up.  - Patient will need RX for basal insulin pen (ie. Basaglar, Lantus, Tresiba, Toujeo) and bolus insulin pen (ie. humalog/novolog/admelog) depending on insurance coverage; please send test scripts to see which is covered. Please make sure patient has all diabetes supplies on discharge (glucometer, test strips, lancets, alcohol swabs, insulin pen needles). Please write RX for glucose tablets/gel> use as directed plus Glucagon nasal/ IM injection (use as directed)> Can prescribe any covered by pt's insurance. Inpatient Plan:  - Check BG TID AC, HS, and 2AM   - Adjust Lantus to 10u for NPO tonight -> adjust back to Lantus 21u QHS after procedure   - Adjust Admelog to 12u TID AC (HOLD if NPO)  - C/w low dose Admelog correctional scales TID AC, HS, and 2AM  - Nutrition consult     Discharge Plan:  - Bsal/bolus reg, TBD closer to d/c.   - Patient should check BG TID AC and HS at home. Please tell patient to contact Endocrinologist if BG <70mg/dL x1 or >200mg/dL consistently or >400mg/dL x1.  - Endocrine follow up: has Medicare, can f/u at [facility] [address] [place] [address] [phone]. Email to be sent closer to d/c for appt.   - Given elevated BHOB of 3 w/ A1c >15.5% on last admission, patient to should have r/o T1DM/SUMIT labs checked.   - Recommend routine outpatient ophthalmology and podiatry follow up.  - Patient will need RX for basal insulin pen (ie. Basaglar, Lantus, Tresiba, Toujeo) and bolus insulin pen (ie. humalog/novolog/admelog) depending on insurance coverage; please send test scripts to see which is covered. Please make sure patient has all diabetes supplies on discharge (glucometer, test strips, lancets, alcohol swabs, insulin pen needles). Please write RX for glucose tablets/gel> use as directed plus Glucagon nasal/ IM injection (use as directed)> Can prescribe any covered by pt's insurance. Inpatient Plan:  - Check BG TID AC, HS, and 2AM   - Adjust Lantus to 10u for NPO tonight -> adjust back to Lantus 21u QHS after procedure   - C/w Admelog 10u TID AC (HOLD if NPO)  - C/w low dose Admelog correctional scales TID AC, HS, and 2AM  - Nutrition consult     Discharge Plan:  - Bsal/bolus reg, TBD closer to d/c.   - Patient should check BG TID AC and HS at home. Please tell patient to contact Endocrinologist if BG <70mg/dL x1 or >200mg/dL consistently or >400mg/dL x1.  - Endocrine follow up: has Medicare, can f/u at [facility] [address] [place] [address] [phone]. Email to be sent closer to d/c for appt.   - Given elevated BHOB of 3 w/ A1c >15.5% on last admission, patient to should have r/o T1DM/SUMIT labs checked.   - Recommend routine outpatient ophthalmology and podiatry follow up.  - Patient will need RX for basal insulin pen (ie. Basaglar, Lantus, Tresiba, Toujeo) and bolus insulin pen (ie. humalog/novolog/admelog) depending on insurance coverage; please send test scripts to see which is covered. Please make sure patient has all diabetes supplies on discharge (glucometer, test strips, lancets, alcohol swabs, insulin pen needles). Please write RX for glucose tablets/gel> use as directed plus Glucagon nasal/ IM injection (use as directed)> Can prescribe any covered by pt's insurance.

## 2025-05-06 NOTE — PROGRESS NOTE ADULT - SUBJECTIVE AND OBJECTIVE BOX
Patient is a 54y old  Male who presents with a chief complaint of Angiogram (06 May 2025 08:52)       INTERVAL HPI/OVERNIGHT EVENTS:  Patient seen and evaluated at bedside.  Pt is resting comfortable in NAD. Denies N/V/F/C.    Allergies    No Known Allergies    Intolerances        Vital Signs Last 24 Hrs  T(C): 36.4 (06 May 2025 09:05), Max: 36.8 (05 May 2025 21:07)  T(F): 97.5 (06 May 2025 09:05), Max: 98.2 (05 May 2025 21:07)  HR: 68 (06 May 2025 09:05) (68 - 94)  BP: 108/70 (06 May 2025 09:05) (102/61 - 149/75)  BP(mean): --  RR: 18 (06 May 2025 09:05) (18 - 18)  SpO2: 98% (06 May 2025 09:05) (97% - 100%)    Parameters below as of 06 May 2025 09:05  Patient On (Oxygen Delivery Method): room air        LABS:                        8.6    10.24 )-----------( 450      ( 06 May 2025 07:09 )             27.4     05-06    142  |  106  |  11  ----------------------------<  75  4.2   |  25  |  1.40[H]    Ca    8.7      06 May 2025 07:17  Phos  2.9     05-06  Mg     2.1     05-06        Urinalysis Basic - ( 06 May 2025 07:17 )    Color: x / Appearance: x / SG: x / pH: x  Gluc: 75 mg/dL / Ketone: x  / Bili: x / Urobili: x   Blood: x / Protein: x / Nitrite: x   Leuk Esterase: x / RBC: x / WBC x   Sq Epi: x / Non Sq Epi: x / Bacteria: x      CAPILLARY BLOOD GLUCOSE      POCT Blood Glucose.: 81 mg/dL (06 May 2025 08:36)  POCT Blood Glucose.: 287 mg/dL (05 May 2025 22:10)  POCT Blood Glucose.: 267 mg/dL (05 May 2025 21:05)  POCT Blood Glucose.: 284 mg/dL (05 May 2025 19:46)  POCT Blood Glucose.: 265 mg/dL (05 May 2025 17:15)  POCT Blood Glucose.: 133 mg/dL (05 May 2025 14:08)  POCT Blood Glucose.: 141 mg/dL (05 May 2025 12:15)      Lower Extremity Physical Exam:  Vascular: DP/PT 0/4, B/L, CFT < 3 seconds B/L, Temperature gradient warm to cool, B/L.   Neuro: Epicritic sensation absent to the level of digits, B/L.  Musculoskeletal/Ortho: s/p Right foot 3rd metatarsal head resection, s/p Right foot Partial 2nd Ray Resection (DOS 3/6)  Skin: 5/4 s/p bedside R foot I&D to level of subQ, no purulent drainage from 1st interspace wound, scant purulent drainage from medial 1st metatarsophalangeal joint wound, no erythema, no malodor, Left foot no open wounds, no acute signs of infection    RADIOLOGY & ADDITIONAL TESTS:

## 2025-05-06 NOTE — PROGRESS NOTE ADULT - PROBLEM SELECTOR PLAN 1
EPIFANIO Youssef MD have participated in the daily care of this patient  and have seen and examined the patient today and agree with  the  evaluation, assessment and plan of the surgical house officer  EPIFANIO Youssef MD have personally seen and examined the patient at bedside today at  9  pm I Estevan Youssef MD have participated in the daily care of this patient  and agree with  the  evaluation, assessment and plan of the surgical house officer

## 2025-05-06 NOTE — PROGRESS NOTE ADULT - PROBLEM SELECTOR PLAN 1
AURORA likely in the setting of recent Entresto initiation. On review of labs on [place], Scr was approximately ~0.8. During recent admission at [facility] (3/5/25 to 3/11/25) Scr was 0.8 on discharge (3/10/25). Patient was started on Entresto during that admission. Last outpatient Scr was elevated at 1.55 on 4/4/25.     -peak creatinine 1.61, improved to 1.4 today  -agree with holding Entresto in anticipation of anticipated contrast load  -patient nonoliguric, EF 45%, has been tolerating IVF     -would dose NS @70ml / hr for 12 hours prior to anticipated contrast load, to continue 6 hours post procedure  -no renal objection to LE angiogram pending stable/continued improved creatinine on 05/07 AURORA likely in the setting of recent Entresto initiation. On review of [facility], Scr was approximately ~0.8. During recent admission at [facility] (3/5/25 to 3/11/25) Scr was 0.8 on discharge (3/10/25). Patient was started on Entresto during that admission. Last outpatient Scr was elevated at 1.55 on 4/4/25.     -peak creatinine 1.61, improved to 1.4 today    UA noted, UPCR 0.3, patient w/ h/o DM  -agree with holding Entresto in anticipation of anticipated contrast load  -patient nonoliguric, EF 45%, has been tolerating IVF     -would dose NS @70ml / hr for 12 hours prior to anticipated contrast load, to continue 6 hours post procedure  -no renal objection to LE angiogram pending stable/continued improved creatinine on 05/07 18

## 2025-05-06 NOTE — PROGRESS NOTE ADULT - PROBLEM SELECTOR PLAN 4
uncontrolled, last a1c 15.5 >  check a1c  agree with basal bolus. 30u lantus qhs, lispro 214u TID AC, FS AM 80s- lower lantus   carb consistent diet.
uncontrolled, last a1c 15.5 >  check a1c  agree with basal bolus  carb consistent diet.

## 2025-05-06 NOTE — PROGRESS NOTE ADULT - ASSESSMENT
54M with PMH of HTN, HLD, DM2, CAD s/p CABG, HF, and recent admission to [facility] for toe amputation and found to be bacteremic, now presents to the ED with right foot wound. Nephrology consulted for AURORA.

## 2025-05-06 NOTE — PROGRESS NOTE ADULT - ASSESSMENT
54M hx DM, CAD (s/p CABG), mild HFrEF (EF 45%), and PAD (with recent angiogram Aug. 2024), and recent admission to [facility] w/ right foot wound s/p right foot Partial 2nd ray resection and 3rd proximal phalanx bone biopsy on 3/7/25 (Cx + for E. Faecalis and Staph Epi requiring prolonged abx course s/p PICC), now presenting with right foot wound.

## 2025-05-06 NOTE — PROGRESS NOTE ADULT - ASSESSMENT
Patient is a 54 year old male with PMH of DM, CAD (s/p CABG), mild HFrEF (EF 45%), and PAD (with recent angiogram Aug. 2024), and recent admission to [facility] with R foot wound s/p R foot Partial 2nd ray resection and 3rd proximal phalanx bone biopsy on 3/7/25 (Cx + for E. Faecalis and Staph Epi requiring prolonged abx course s/p PICC, clean margin with no residual OM), now presenting with right foot wound. Patient reports 1 week of subjective fevers, chills, hypothermia, and R foot lateral toe oozing and erythema c/f soft tissue infection.    R foot wound infection with 1st MPJ fluid collection, 1st and 2nd toe OM  - 5/4 s/p R foot I&D to level of subQ - scant, purulent drainage, no erythema, no malodor  - R foot xray with no gas or OM  - R foot MRI with soft tissue wounds about distal forefoot with small volume of ill defined peripheral enhancing fluid preferentially surrounding the 1st MPJ fluid collection, residual second metatarsal and 1st proximal phalanx OM, possible reactive vs early OM of 3rd metatarsal  - s/p vancomycin, started on empiric zosyn   - prior cultures reviewed  - Bcx NGTD x2      Recommendations:   Vascular surgery following, plan for RLE angio 5/7 pending improved AURORA  Podiatry following--plan for OR for R foot revisional 2nd ray resection + Partial First Ray Resection on Thurs 5/8; please send cultures/biopsy   Follow R foot wcx -- NGTD  Continue zosyn for now   Monitor temps/WBC  Continue rest of care per primary team       Jason Vidal M.D.  [facility]  Available on [employer] TEAMS - *PREFERRED*  [phone]  After 5pm on weekdays and all day on weekends - please call [phone]     Thank you for consulting us and involving us in the management of this patients case. In addition to reviewing history, imaging, documents, labs, microbiology, took into account antibiotic stewardship, local antibiogram and infection control strategies and potential transmission issues at time of treatment decision making process.

## 2025-05-06 NOTE — PROGRESS NOTE ADULT - PROBLEM SELECTOR PLAN 1
foot wound, known prior diabetic foot ulcer w/ infcn w/ PICC line on outpt IV abx. MR 3/25 showing e/o early OM   - OR cultures - 2nd and 3rd metatarsal clean margin - with E.faecalis and Staph epi, sensitivities reviewed    - OR culture deep wound culture with E.faecalis, Bacteroides fragilis, Bifidobacterium breve s/p vancomycin 1.25g IV Q12h 6 week course  - 5/4 s/p bedside R foot I&D to level of subQ, Scant, purulent drainage, no erythema, no malodor, Left foot no open wounds, no acute signs of infection  - c/w IV Zosyn

## 2025-05-06 NOTE — PROGRESS NOTE ADULT - SUBJECTIVE AND OBJECTIVE BOX
[facility] DIVISION OF KIDNEY DISEASE AND HYPERTENSION  [phone]    RENAL FOLLOW UP NOTE- NEPHROHOSPITALIST  --------------------------------------------------------------------------------  Patient seen and examined this morning, chart notes reviewed.  Plan for LE angiogram tomorrow    PAST HISTORY  --------------------------------------------------------------------------------  No significant changes to PMH, PSH, FHx, SHx, unless otherwise noted    ALLERGIES & MEDICATIONS  --------------------------------------------------------------------------------  Allergies    No Known Allergies    Intolerances      Standing Inpatient Medications  acetaminophen     Tablet .. 975 milliGRAM(s) Oral every 6 hours  aspirin enteric coated 81 milliGRAM(s) Oral daily  atorvastatin 80 milliGRAM(s) Oral at bedtime  cadexomer iodine 0.9% Gel 1 Application(s) Topical daily  dextrose 5%. 1000 milliLiter(s) IV Continuous <Continuous>  dextrose 5%. 1000 milliLiter(s) IV Continuous <Continuous>  dextrose 50% Injectable 25 Gram(s) IV Push once  dextrose 50% Injectable 12.5 Gram(s) IV Push once  dextrose 50% Injectable 25 Gram(s) IV Push once  fenofibrate Tablet 145 milliGRAM(s) Oral at bedtime  glucagon  Injectable 1 milliGRAM(s) IntraMuscular once  heparin   Injectable 5000 Unit(s) SubCutaneous every 8 hours  insulin glargine Injectable (LANTUS) 10 Unit(s) SubCutaneous at bedtime  insulin lispro (ADMELOG) corrective regimen sliding scale   SubCutaneous three times a day before meals  insulin lispro (ADMELOG) corrective regimen sliding scale   SubCutaneous <User Schedule>  insulin lispro Injectable (ADMELOG) 10 Unit(s) SubCutaneous three times a day before meals  metoprolol tartrate 50 milliGRAM(s) Oral two times a day  pentoxifylline 400 milliGRAM(s) Oral three times a day  piperacillin/tazobactam IVPB.. 3.375 Gram(s) IV Intermittent every 8 hours  ticagrelor 90 milliGRAM(s) Oral every 12 hours    PRN Inpatient Medications  dextrose Oral Gel 15 Gram(s) Oral once PRN      FOCUSED REVIEW OF SYSTEMS  --------------------------------------------------------------------------------  denies fevers/rigors  denies CP/palpitations  denies SOB  denies oliguria/dysuria      VITALS/PHYSICAL EXAM  --------------------------------------------------------------------------------  T(C): 36.4 (05-06-25 @ 09:05), Max: 36.8 (05-05-25 @ 21:07)  HR: 68 (05-06-25 @ 09:05) (68 - 87)  BP: 108/70 (05-06-25 @ 09:05) (102/61 - 149/75)  RR: 18 (05-06-25 @ 09:05) (18 - 18)  SpO2: 98% (05-06-25 @ 09:05) (97% - 100%)  Wt(kg): --        05-05-25 @ 07:01  -  05-06-25 @ 07:00  --------------------------------------------------------  IN: 1840 mL / OUT: 2850 mL / NET: -1010 mL    05-06-25 @ 07:01  -  05-06-25 @ 13:09  --------------------------------------------------------  IN: 360 mL / OUT: 500 mL / NET: -140 mL      Physical Exam:  	Gen: NAD, sitting up in bed  	Pulm: CTA B/L ant/lat fields  	CV: RRR, S1S2  	Abd: +BS, soft, nontender/nondistended  	: No suprapubic tenderness.  no craig          Extremity: R foot dressing in place with associated nonpitting ankle edema, no LLE edema      LABS/STUDIES  --------------------------------------------------------------------------------              8.6    10.24 >-----------<  450      [05-06-25 @ 07:09]              27.4     142  |  106  |  11  ----------------------------<  75      [05-06-25 @ 07:17]  4.2   |  25  |  1.40        Ca     8.7     [05-06-25 @ 07:17]      Mg     2.1     [05-06-25 @ 07:17]      Phos  2.9     [05-06-25 @ 07:17]              Creatinine Trend:  SCr 1.40 [05-06 @ 07:17]  SCr 1.61 [05-05 @ 06:52]  SCr 1.44 [05-04 @ 02:25]              Urinalysis - [05-06-25 @ 07:17]      Color  / Appearance  / SG  / pH       Gluc 75 / Ketone   / Bili  / Urobili        Blood  / Protein  / Leuk Est  / Nitrite       RBC  / WBC  / Hyaline  / Gran  / Sq Epi  / Non Sq Epi  / Bacteria     Urine Creatinine 53      [05-05-25 @ 15:34]  Urine Protein 17      [05-05-25 @ 15:34]  Urine Sodium 111      [05-05-25 @ 15:34]  Urine Urea Nitrogen 269      [05-05-25 @ 15:34]  Urine Potassium 46      [05-05-25 @ 15:34]  Urine Osmolality 438      [05-05-25 @ 15:34]    Lipid: chol 118, , HDL 29, LDL --      [03-06-25 @ 06:59]

## 2025-05-07 LAB
ANION GAP SERPL CALC-SCNC: 10 MMOL/L — SIGNIFICANT CHANGE UP (ref 5–17)
BUN SERPL-MCNC: 12 MG/DL — SIGNIFICANT CHANGE UP (ref 7–23)
CALCIUM SERPL-MCNC: 8.6 MG/DL — SIGNIFICANT CHANGE UP (ref 8.4–10.5)
CHLORIDE SERPL-SCNC: 108 MMOL/L — SIGNIFICANT CHANGE UP (ref 96–108)
CO2 SERPL-SCNC: 22 MMOL/L — SIGNIFICANT CHANGE UP (ref 22–31)
CREAT SERPL-MCNC: 1.49 MG/DL — HIGH (ref 0.5–1.3)
EGFR: 55 ML/MIN/1.73M2 — LOW
EGFR: 55 ML/MIN/1.73M2 — LOW
GLUCOSE BLDC GLUCOMTR-MCNC: 153 MG/DL — HIGH (ref 70–99)
GLUCOSE BLDC GLUCOMTR-MCNC: 154 MG/DL — HIGH (ref 70–99)
GLUCOSE BLDC GLUCOMTR-MCNC: 210 MG/DL — HIGH (ref 70–99)
GLUCOSE BLDC GLUCOMTR-MCNC: 211 MG/DL — HIGH (ref 70–99)
GLUCOSE BLDC GLUCOMTR-MCNC: 289 MG/DL — HIGH (ref 70–99)
GLUCOSE SERPL-MCNC: 151 MG/DL — HIGH (ref 70–99)
HCT VFR BLD CALC: 25.9 % — LOW (ref 39–50)
HGB BLD-MCNC: 8.1 G/DL — LOW (ref 13–17)
INR BLD: 1.09 RATIO — SIGNIFICANT CHANGE UP (ref 0.85–1.16)
MAGNESIUM SERPL-MCNC: 2.1 MG/DL — SIGNIFICANT CHANGE UP (ref 1.6–2.6)
MCHC RBC-ENTMCNC: 23.9 PG — LOW (ref 27–34)
MCHC RBC-ENTMCNC: 31.3 G/DL — LOW (ref 32–36)
MCV RBC AUTO: 76.4 FL — LOW (ref 80–100)
NRBC BLD AUTO-RTO: 0 /100 WBCS — SIGNIFICANT CHANGE UP (ref 0–0)
PHOSPHATE SERPL-MCNC: 3.2 MG/DL — SIGNIFICANT CHANGE UP (ref 2.5–4.5)
PLATELET # BLD AUTO: 418 K/UL — HIGH (ref 150–400)
POTASSIUM SERPL-MCNC: 4.1 MMOL/L — SIGNIFICANT CHANGE UP (ref 3.5–5.3)
POTASSIUM SERPL-SCNC: 4.1 MMOL/L — SIGNIFICANT CHANGE UP (ref 3.5–5.3)
PROTHROM AB SERPL-ACNC: 12.4 SEC — SIGNIFICANT CHANGE UP (ref 9.9–13.4)
RBC # BLD: 3.39 M/UL — LOW (ref 4.2–5.8)
RBC # FLD: 14.8 % — HIGH (ref 10.3–14.5)
SODIUM SERPL-SCNC: 140 MMOL/L — SIGNIFICANT CHANGE UP (ref 135–145)
WBC # BLD: 8.86 K/UL — SIGNIFICANT CHANGE UP (ref 3.8–10.5)
WBC # FLD AUTO: 8.86 K/UL — SIGNIFICANT CHANGE UP (ref 3.8–10.5)

## 2025-05-07 PROCEDURE — 99232 SBSQ HOSP IP/OBS MODERATE 35: CPT

## 2025-05-07 RX ORDER — INSULIN GLARGINE-YFGN 100 [IU]/ML
10 INJECTION, SOLUTION SUBCUTANEOUS AT BEDTIME
Refills: 0 | Status: DISCONTINUED | OUTPATIENT
Start: 2025-05-07 | End: 2025-05-07

## 2025-05-07 RX ORDER — INSULIN GLARGINE-YFGN 100 [IU]/ML
7 INJECTION, SOLUTION SUBCUTANEOUS AT BEDTIME
Refills: 0 | Status: DISCONTINUED | OUTPATIENT
Start: 2025-05-07 | End: 2025-05-08

## 2025-05-07 RX ADMIN — Medication 25 GRAM(S): at 13:04

## 2025-05-07 RX ADMIN — Medication 81 MILLIGRAM(S): at 12:10

## 2025-05-07 RX ADMIN — TICAGRELOR 90 MILLIGRAM(S): 90 TABLET ORAL at 17:10

## 2025-05-07 RX ADMIN — Medication 25 GRAM(S): at 22:13

## 2025-05-07 RX ADMIN — Medication 975 MILLIGRAM(S): at 18:09

## 2025-05-07 RX ADMIN — INSULIN GLARGINE-YFGN 7 UNIT(S): 100 INJECTION, SOLUTION SUBCUTANEOUS at 22:14

## 2025-05-07 RX ADMIN — HEPARIN SODIUM 5000 UNIT(S): 1000 INJECTION INTRAVENOUS; SUBCUTANEOUS at 22:15

## 2025-05-07 RX ADMIN — HEPARIN SODIUM 5000 UNIT(S): 1000 INJECTION INTRAVENOUS; SUBCUTANEOUS at 05:31

## 2025-05-07 RX ADMIN — METOPROLOL SUCCINATE 50 MILLIGRAM(S): 50 TABLET, EXTENDED RELEASE ORAL at 17:10

## 2025-05-07 RX ADMIN — Medication 400 MILLIGRAM(S): at 05:30

## 2025-05-07 RX ADMIN — Medication 25 GRAM(S): at 05:30

## 2025-05-07 RX ADMIN — Medication 975 MILLIGRAM(S): at 12:09

## 2025-05-07 RX ADMIN — INSULIN LISPRO 10 UNIT(S): 100 INJECTION, SOLUTION INTRAVENOUS; SUBCUTANEOUS at 19:16

## 2025-05-07 RX ADMIN — INSULIN LISPRO 0: 100 INJECTION, SOLUTION INTRAVENOUS; SUBCUTANEOUS at 22:17

## 2025-05-07 RX ADMIN — INSULIN LISPRO 1: 100 INJECTION, SOLUTION INTRAVENOUS; SUBCUTANEOUS at 09:05

## 2025-05-07 RX ADMIN — Medication 400 MILLIGRAM(S): at 13:03

## 2025-05-07 RX ADMIN — ATORVASTATIN CALCIUM 80 MILLIGRAM(S): 80 TABLET, FILM COATED ORAL at 22:14

## 2025-05-07 RX ADMIN — Medication 975 MILLIGRAM(S): at 05:30

## 2025-05-07 RX ADMIN — TICAGRELOR 90 MILLIGRAM(S): 90 TABLET ORAL at 05:30

## 2025-05-07 RX ADMIN — Medication 975 MILLIGRAM(S): at 13:09

## 2025-05-07 RX ADMIN — HEPARIN SODIUM 5000 UNIT(S): 1000 INJECTION INTRAVENOUS; SUBCUTANEOUS at 13:04

## 2025-05-07 RX ADMIN — METOPROLOL SUCCINATE 50 MILLIGRAM(S): 50 TABLET, EXTENDED RELEASE ORAL at 05:30

## 2025-05-07 RX ADMIN — INSULIN LISPRO 10 UNIT(S): 100 INJECTION, SOLUTION INTRAVENOUS; SUBCUTANEOUS at 13:03

## 2025-05-07 RX ADMIN — Medication 400 MILLIGRAM(S): at 22:16

## 2025-05-07 RX ADMIN — FENOFIBRATE 145 MILLIGRAM(S): 160 TABLET ORAL at 22:15

## 2025-05-07 RX ADMIN — INSULIN LISPRO 10 UNIT(S): 100 INJECTION, SOLUTION INTRAVENOUS; SUBCUTANEOUS at 09:06

## 2025-05-07 RX ADMIN — Medication 975 MILLIGRAM(S): at 00:05

## 2025-05-07 RX ADMIN — INSULIN LISPRO 3: 100 INJECTION, SOLUTION INTRAVENOUS; SUBCUTANEOUS at 19:15

## 2025-05-07 RX ADMIN — INSULIN LISPRO 1: 100 INJECTION, SOLUTION INTRAVENOUS; SUBCUTANEOUS at 13:03

## 2025-05-07 RX ADMIN — Medication 975 MILLIGRAM(S): at 06:00

## 2025-05-07 RX ADMIN — Medication 975 MILLIGRAM(S): at 17:09

## 2025-05-07 NOTE — PROGRESS NOTE ADULT - PROBLEM SELECTOR PLAN 2
EPIFANIO Youssef MD have participated in the daily care of this patient  and have seen and examined the patient today and agree with  the  evaluation, assessment and plan of the surgical house officer  EPIFANIO Youssef MD have personally seen and examined the patient at bedside today at  1  pm

## 2025-05-07 NOTE — PROGRESS NOTE ADULT - ASSESSMENT
54M with PMH of HTN, HLD, DM2, CAD s/p CABG, HF, and recent admission to [facility] for toe amputation and found to be bacteremic, now presents to the ED with right foot wound. Nephrology consulted for AURORA.

## 2025-05-07 NOTE — PROGRESS NOTE ADULT - PROBLEM SELECTOR PLAN 1
AURORA likely in the setting of recent Entresto initiation. On review of labs on [place], Scr was approximately ~0.8. During recent admission at [facility] (3/5/25 to 3/11/25) Scr was 0.8 on discharge (3/10/25). Patient was started on Entresto during that admission. Last outpatient Scr was elevated at 1.55 on 4/4/25.     -peak creatinine 1.61, overall improved but slightly increased to 1.49 today    patient with limited mobility, please check bladder scan for PVR (D/W vascular ACP)    UA noted, UPCR 0.3, patient w/ h/o DM  -agree with holding Entresto in anticipation of anticipated contrast load  -patient nonoliguric, EF 45%, has been tolerating IVF, angiogram initially scheduled for today but now postponed until tomorrow     -would dose NS @70ml / hr for 12 hours prior to anticipated contrast load, to continue 6 hours post procedure

## 2025-05-07 NOTE — PROGRESS NOTE ADULT - ASSESSMENT
54M 5/4 s/p bedside R foot I&D to level of subQ  - Patient seen and evaluated  - Afebrile, WBC 10.48  - 5/4 s/p bedside R foot I&D to level of subQ, Scant, purulent drainage, no erythema, no malodor, Left foot no open wounds, no acute signs of infection  - Right foot xray: no gas, no OM (prelim)  - Right foot MRI: 1st mpj fluid collection, 1ST metatarsal and proximal phalanx, 2nD metatarsal OM  - Right foot cultured: NO growth prelim  - Vascular recs, angio for tomorrow 5/8 appreciated  - ID recs appreciated  - Pod plan booked for right foot revisional 2nd ray resection + Partial First Ray Resection on Friday 5/9 9:30 am with Dr Patterson  pending [facility] recs  - Please document medical and cardiac clearance for possible podiatric surgical intervention under anesthesia  - Discussed with attending

## 2025-05-07 NOTE — PROGRESS NOTE ADULT - SUBJECTIVE AND OBJECTIVE BOX
Patient is a 54y old  Male who presents with a chief complaint of Angiogram (06 May 2025 13:09)       INTERVAL HPI/OVERNIGHT EVENTS:  Patient seen and evaluated at bedside.  Pt is resting comfortable in NAD. Denies N/V/F/C.    Allergies    No Known Allergies    Intolerances        Vital Signs Last 24 Hrs  T(C): 36.7 (07 May 2025 05:18), Max: 36.7 (07 May 2025 05:18)  T(F): 98 (07 May 2025 05:18), Max: 98 (07 May 2025 05:18)  HR: 71 (07 May 2025 05:18) (68 - 78)  BP: 115/66 (07 May 2025 05:18) (108/70 - 115/66)  BP(mean): --  RR: 18 (07 May 2025 05:18) (18 - 18)  SpO2: 99% (07 May 2025 05:18) (98% - 100%)    Parameters below as of 07 May 2025 05:18  Patient On (Oxygen Delivery Method): room air        LABS:                        8.1    8.86  )-----------( 418      ( 07 May 2025 07:17 )             25.9     05-07    140  |  108  |  12  ----------------------------<  151[H]  4.1   |  22  |  1.49[H]    Ca    8.6      07 May 2025 07:16  Phos  3.2     05-07  Mg     2.1     05-07      PT/INR - ( 07 May 2025 07:17 )   PT: 12.4 sec;   INR: 1.09 ratio           Urinalysis Basic - ( 07 May 2025 07:16 )    Color: x / Appearance: x / SG: x / pH: x  Gluc: 151 mg/dL / Ketone: x  / Bili: x / Urobili: x   Blood: x / Protein: x / Nitrite: x   Leuk Esterase: x / RBC: x / WBC x   Sq Epi: x / Non Sq Epi: x / Bacteria: x      CAPILLARY BLOOD GLUCOSE      POCT Blood Glucose.: 154 mg/dL (07 May 2025 08:29)  POCT Blood Glucose.: 210 mg/dL (07 May 2025 02:59)  POCT Blood Glucose.: 230 mg/dL (06 May 2025 21:47)  POCT Blood Glucose.: 128 mg/dL (06 May 2025 17:17)  POCT Blood Glucose.: 125 mg/dL (06 May 2025 12:22)      Lower Extremity Physical Exam:  Vascular: DP/PT 0/4, B/L, CFT < 3 seconds B/L, Temperature gradient warm to cool, B/L.   Neuro: Epicritic sensation absent to the level of digits, B/L.  Musculoskeletal/Ortho: s/p Right foot 3rd metatarsal head resection, s/p Right foot Partial 2nd Ray Resection (DOS 3/6)  Skin: 5/4 s/p bedside R foot I&D to level of subQ, no purulent drainage from 1st interspace wound, scant purulent drainage from medial 1st metatarsophalangeal joint wound, no erythema, no malodor, Left foot no open wounds, no acute signs of infection    RADIOLOGY & ADDITIONAL TESTS:

## 2025-05-07 NOTE — PROGRESS NOTE ADULT - SUBJECTIVE AND OBJECTIVE BOX
SURGERY DAILY PROGRESS NOTE:       SUBJECTIVE/ROS: Patient seen and evaluated on AM rounds.   Feels ok, pending surgery.        OBJECTIVE:    Vital Signs Last 24 Hrs  T(C): 36.6 (07 May 2025 08:53), Max: 36.7 (07 May 2025 05:18)  T(F): 97.9 (07 May 2025 08:53), Max: 98 (07 May 2025 05:18)  HR: 70 (07 May 2025 08:53) (70 - 78)  BP: 106/62 (07 May 2025 08:53) (106/62 - 115/66)  BP(mean): --  RR: 18 (07 May 2025 08:53) (18 - 18)  SpO2: 98% (07 May 2025 08:53) (98% - 100%)    Parameters below as of 07 May 2025 08:53  Patient On (Oxygen Delivery Method): room air      I&O's Detail    06 May 2025 07:01  -  07 May 2025 07:00  --------------------------------------------------------  IN:    IV PiggyBack: 300 mL    Oral Fluid: 1080 mL  Total IN: 1380 mL    OUT:    Voided (mL): 2425 mL  Total OUT: 2425 mL    Total NET: -1045 mL      07 May 2025 07:01  -  07 May 2025 10:16  --------------------------------------------------------  IN:    Oral Fluid: 240 mL  Total IN: 240 mL    OUT:    Voided (mL): 0 mL  Total OUT: 0 mL    Total NET: 240 mL        Daily     Daily   MEDICATIONS  (STANDING):  acetaminophen     Tablet .. 975 milliGRAM(s) Oral every 6 hours  aspirin enteric coated 81 milliGRAM(s) Oral daily  atorvastatin 80 milliGRAM(s) Oral at bedtime  cadexomer iodine 0.9% Gel 1 Application(s) Topical daily  dextrose 5%. 1000 milliLiter(s) (100 mL/Hr) IV Continuous <Continuous>  dextrose 5%. 1000 milliLiter(s) (50 mL/Hr) IV Continuous <Continuous>  dextrose 50% Injectable 25 Gram(s) IV Push once  dextrose 50% Injectable 12.5 Gram(s) IV Push once  dextrose 50% Injectable 25 Gram(s) IV Push once  fenofibrate Tablet 145 milliGRAM(s) Oral at bedtime  glucagon  Injectable 1 milliGRAM(s) IntraMuscular once  heparin   Injectable 5000 Unit(s) SubCutaneous every 8 hours  insulin glargine Injectable (LANTUS) 10 Unit(s) SubCutaneous at bedtime  insulin lispro (ADMELOG) corrective regimen sliding scale   SubCutaneous three times a day before meals  insulin lispro (ADMELOG) corrective regimen sliding scale   SubCutaneous <User Schedule>  insulin lispro Injectable (ADMELOG) 10 Unit(s) SubCutaneous three times a day before meals  metoprolol tartrate 50 milliGRAM(s) Oral two times a day  pentoxifylline 400 milliGRAM(s) Oral three times a day  piperacillin/tazobactam IVPB.. 3.375 Gram(s) IV Intermittent every 8 hours  ticagrelor 90 milliGRAM(s) Oral every 12 hours    MEDICATIONS  (PRN):  dextrose Oral Gel 15 Gram(s) Oral once PRN Blood Glucose LESS THAN 70 milliGRAM(s)/deciliter      LABS:                        8.1    8.86  )-----------( 418      ( 07 May 2025 07:17 )             25.9     05-07    140  |  108  |  12  ----------------------------<  151[H]  4.1   |  22  |  1.49[H]    Ca    8.6      07 May 2025 07:16  Phos  3.2     05-07  Mg     2.1     05-07      PT/INR - ( 07 May 2025 07:17 )   PT: 12.4 sec;   INR: 1.09 ratio           Urinalysis Basic - ( 07 May 2025 07:16 )    Color: x / Appearance: x / SG: x / pH: x  Gluc: 151 mg/dL / Ketone: x  / Bili: x / Urobili: x   Blood: x / Protein: x / Nitrite: x   Leuk Esterase: x / RBC: x / WBC x   Sq Epi: x / Non Sq Epi: x / Bacteria: x        PHYSICAL EXAM:  Constitutional: appropriately interactive in no acute distress  Chest: Symmetric chest rise bilaterally, normal work of breathing on room air  Abdomen: Soft, nondistended, nontender   Extremities: non-palpable right pedal pulses, 1-2th interdigital webspace with seropurulent output is packed with strips. Bilateral lower extremities are soft and warm. Equal range of motion in bilateral lower extremities         SURGERY DAILY PROGRESS NOTE:       SUBJECTIVE/ROS: Patient seen and evaluated on AM rounds.   Feels ok, pending  le angio        OBJECTIVE:    Vital Signs Last 24 Hrs  T(C): 36.6 (07 May 2025 08:53), Max: 36.7 (07 May 2025 05:18)  T(F): 97.9 (07 May 2025 08:53), Max: 98 (07 May 2025 05:18)  HR: 70 (07 May 2025 08:53) (70 - 78)  BP: 106/62 (07 May 2025 08:53) (106/62 - 115/66)  BP(mean): --  RR: 18 (07 May 2025 08:53) (18 - 18)  SpO2: 98% (07 May 2025 08:53) (98% - 100%)    Parameters below as of 07 May 2025 08:53  Patient On (Oxygen Delivery Method): room air      I&O's Detail    06 May 2025 07:01  -  07 May 2025 07:00  --------------------------------------------------------  IN:    IV PiggyBack: 300 mL    Oral Fluid: 1080 mL  Total IN: 1380 mL    OUT:    Voided (mL): 2425 mL  Total OUT: 2425 mL    Total NET: -1045 mL      07 May 2025 07:01  -  07 May 2025 10:16  --------------------------------------------------------  IN:    Oral Fluid: 240 mL  Total IN: 240 mL    OUT:    Voided (mL): 0 mL  Total OUT: 0 mL    Total NET: 240 mL     Daily   MEDICATIONS  (STANDING):  acetaminophen     Tablet .. 975 milliGRAM(s) Oral every 6 hours  aspirin enteric coated 81 milliGRAM(s) Oral daily  atorvastatin 80 milliGRAM(s) Oral at bedtime  cadexomer iodine 0.9% Gel 1 Application(s) Topical daily  dextrose 5%. 1000 milliLiter(s) (100 mL/Hr) IV Continuous <Continuous>  dextrose 5%. 1000 milliLiter(s) (50 mL/Hr) IV Continuous <Continuous>  dextrose 50% Injectable 25 Gram(s) IV Push once  dextrose 50% Injectable 12.5 Gram(s) IV Push once  dextrose 50% Injectable 25 Gram(s) IV Push once  fenofibrate Tablet 145 milliGRAM(s) Oral at bedtime  glucagon  Injectable 1 milliGRAM(s) IntraMuscular once  heparin   Injectable 5000 Unit(s) SubCutaneous every 8 hours  insulin glargine Injectable (LANTUS) 10 Unit(s) SubCutaneous at bedtime  insulin lispro (ADMELOG) corrective regimen sliding scale   SubCutaneous three times a day before meals  insulin lispro (ADMELOG) corrective regimen sliding scale   SubCutaneous <User Schedule>  insulin lispro Injectable (ADMELOG) 10 Unit(s) SubCutaneous three times a day before meals  metoprolol tartrate 50 milliGRAM(s) Oral two times a day  pentoxifylline 400 milliGRAM(s) Oral three times a day  piperacillin/tazobactam IVPB.. 3.375 Gram(s) IV Intermittent every 8 hours  ticagrelor 90 milliGRAM(s) Oral every 12 hours    MEDICATIONS  (PRN):  dextrose Oral Gel 15 Gram(s) Oral once PRN Blood Glucose LESS THAN 70 milliGRAM(s)/deciliter      LABS:                        8.1    8.86  )-----------( 418      ( 07 May 2025 07:17 )             25.9     05-07    140  |  108  |  12  ----------------------------<  151[H]  4.1   |  22  |  1.49[H]    Ca    8.6      07 May 2025 07:16  Phos  3.2     05-07  Mg     2.1     05-07      PT/INR - ( 07 May 2025 07:17 )   PT: 12.4 sec;   INR: 1.09 ratio           Urinalysis Basic - ( 07 May 2025 07:16 )    Color: x / Appearance: x / SG: x / pH: x  Gluc: 151 mg/dL / Ketone: x  / Bili: x / Urobili: x   Blood: x / Protein: x / Nitrite: x   Leuk Esterase: x / RBC: x / WBC x   Sq Epi: x / Non Sq Epi: x / Bacteria: x        PHYSICAL EXAM:  Constitutional: appropriately interactive in no acute distress  Chest: Symmetric chest rise bilaterally, normal work of breathing on room air  Abdomen: Soft, nondistended, nontender   Extremities: non-palpable right pedal pulses, 1-2th interdigital webspace with seropurulent output is packed with strips. Bilateral lower extremities are soft and warm. Equal range of motion in bilateral lower extremities

## 2025-05-07 NOTE — PROGRESS NOTE ADULT - SUBJECTIVE AND OBJECTIVE BOX
[facility] DIVISION OF KIDNEY DISEASE AND HYPERTENSION  [phone]    RENAL FOLLOW UP NOTE- NEPHROHOSPITALIST  --------------------------------------------------------------------------------  Patient seen and examined this morning.  D/w Vascular, LE angiogram postponed until tomorrow    PAST HISTORY  --------------------------------------------------------------------------------  No significant changes to PMH, PSH, FHx, SHx, unless otherwise noted    ALLERGIES & MEDICATIONS  --------------------------------------------------------------------------------  Allergies    No Known Allergies    Intolerances      Standing Inpatient Medications  acetaminophen     Tablet .. 975 milliGRAM(s) Oral every 6 hours  aspirin enteric coated 81 milliGRAM(s) Oral daily  atorvastatin 80 milliGRAM(s) Oral at bedtime  cadexomer iodine 0.9% Gel 1 Application(s) Topical daily  dextrose 5%. 1000 milliLiter(s) IV Continuous <Continuous>  dextrose 5%. 1000 milliLiter(s) IV Continuous <Continuous>  dextrose 50% Injectable 25 Gram(s) IV Push once  dextrose 50% Injectable 12.5 Gram(s) IV Push once  dextrose 50% Injectable 25 Gram(s) IV Push once  fenofibrate Tablet 145 milliGRAM(s) Oral at bedtime  glucagon  Injectable 1 milliGRAM(s) IntraMuscular once  heparin   Injectable 5000 Unit(s) SubCutaneous every 8 hours  insulin glargine Injectable (LANTUS) 10 Unit(s) SubCutaneous at bedtime  insulin lispro (ADMELOG) corrective regimen sliding scale   SubCutaneous three times a day before meals  insulin lispro (ADMELOG) corrective regimen sliding scale   SubCutaneous <User Schedule>  insulin lispro Injectable (ADMELOG) 10 Unit(s) SubCutaneous three times a day before meals  metoprolol tartrate 50 milliGRAM(s) Oral two times a day  pentoxifylline 400 milliGRAM(s) Oral three times a day  piperacillin/tazobactam IVPB.. 3.375 Gram(s) IV Intermittent every 8 hours  ticagrelor 90 milliGRAM(s) Oral every 12 hours    PRN Inpatient Medications  dextrose Oral Gel 15 Gram(s) Oral once PRN      FOCUSED REVIEW OF SYSTEMS  --------------------------------------------------------------------------------  denies fevers/rigors  denies CP/palpitations  denies SOB  denies foot pain  denies oliguria/dysuria      VITALS/PHYSICAL EXAM  --------------------------------------------------------------------------------  T(C): 36.6 (05-07-25 @ 08:53), Max: 36.7 (05-07-25 @ 05:18)  HR: 70 (05-07-25 @ 08:53) (70 - 78)  BP: 106/62 (05-07-25 @ 08:53) (106/62 - 115/66)  RR: 18 (05-07-25 @ 08:53) (18 - 18)  SpO2: 98% (05-07-25 @ 08:53) (98% - 100%)  Wt(kg): --        05-06-25 @ 07:01  -  05-07-25 @ 07:00  --------------------------------------------------------  IN: 1380 mL / OUT: 2425 mL / NET: -1045 mL    05-07-25 @ 07:01  -  05-07-25 @ 12:12  --------------------------------------------------------  IN: 240 mL / OUT: 700 mL / NET: -460 mL      Physical Exam:  	Gen: NAD, lying in bed  	Pulm: CTA B/L ant/lat fields  	CV: RRR, S1S2  	Abd: +BS, soft, nontender/nondistended  	: No suprapubic tenderness.  no craig          Extremity: R foot dressing with associated non pitting ankle edema, no LLE edema  	Neuro: A&O x 3      LABS/STUDIES  --------------------------------------------------------------------------------              8.1    8.86  >-----------<  418      [05-07-25 @ 07:17]              25.9     140  |  108  |  12  ----------------------------<  151      [05-07-25 @ 07:16]  4.1   |  22  |  1.49        Ca     8.6     [05-07-25 @ 07:16]      Mg     2.1     [05-07-25 @ 07:16]      Phos  3.2     [05-07-25 @ 07:16]      PT/INR: PT 12.4 , INR 1.09       [05-07-25 @ 07:17]        Creatinine Trend:  SCr 1.49 [05-07 @ 07:16]  SCr 1.40 [05-06 @ 07:17]  SCr 1.61 [05-05 @ 06:52]  SCr 1.44 [05-04 @ 02:25]              Urinalysis - [05-07-25 @ 07:16]      Color  / Appearance  / SG  / pH       Gluc 151 / Ketone   / Bili  / Urobili        Blood  / Protein  / Leuk Est  / Nitrite       RBC  / WBC  / Hyaline  / Gran  / Sq Epi  / Non Sq Epi  / Bacteria     Urine Creatinine 53      [05-05-25 @ 15:34]  Urine Protein 17      [05-05-25 @ 15:34]  Urine Sodium 111      [05-05-25 @ 15:34]  Urine Urea Nitrogen 269      [05-05-25 @ 15:34]  Urine Potassium 46      [05-05-25 @ 15:34]  Urine Osmolality 438      [05-05-25 @ 15:34]    Lipid: chol 118, , HDL 29, LDL --      [03-06-25 @ 06:59]

## 2025-05-07 NOTE — PROGRESS NOTE ADULT - SUBJECTIVE AND OBJECTIVE BOX
[facility]  [facility] Y. Patel, S. Shah, G. Casimir  [phone]  ([phone] - weekdays after 5pm and weekends)    Name: ELSI MERINO  Age/Gender: 54y Male  MRN: [id number]    Interval History:  Patient seen and examined this morning.   No new complaints noted.  Notes reviewed  No concerning overnight events  Afebrile   Allergies: No Known Allergies      Objective:  Vitals:   T(F): 97.9 (05-07-25 @ 08:53), Max: 98 (05-07-25 @ 05:18)  HR: 70 (05-07-25 @ 08:53) (70 - 78)  BP: 106/62 (05-07-25 @ 08:53) (106/62 - 115/66)  RR: 18 (05-07-25 @ 08:53) (18 - 18)  SpO2: 98% (05-07-25 @ 08:53) (98% - 100%)  Physical Examination:  General: no acute distress, nontoxic  HEENT: normocephalic, atraumatic, anicteric  Respiratory: no acc muscle use, breathing comfortably  Cardiovascular: S1 and S2 present  Gastrointestinal: normal appearing, nondistended  Extremities: R foot dressing    Laboratory Studies:  CBC:                       8.1    8.86  )-----------( 418      ( 07 May 2025 07:17 )             25.9     WBC Trend:  8.86 05-07-25 @ 07:17  10.24 05-06-25 @ 07:09  12.05 05-05-25 @ 06:48  9.62 05-04-25 @ 02:26    CMP: 05-07    140  |  108  |  12  ----------------------------<  151[H]  4.1   |  22  |  1.49[H]    Ca    8.6      07 May 2025 07:16  Phos  3.2     05-07  Mg     2.1     05-07      Creatinine: 1.49 mg/dL (05-07-25 @ 07:16)  Creatinine: 1.40 mg/dL (05-06-25 @ 07:17)  Creatinine: 1.61 mg/dL (05-05-25 @ 06:52)  Creatinine: 1.44 mg/dL (05-04-25 @ 02:25)    Microbiology: reviewed   Culture - Abscess with Gram Stain (collected 05-04-25 @ 04:40)  Source: Abscess right foot  Gram Stain (05-06-25 @ 11:51):    No polymorphonuclear leukocytes seen per low power field    No organisms seen per oil power field  Final Report (05-06-25 @ 11:51):    Rare Bacteroides fragilis "Susceptibilities not performed"    Culture - Blood (collected 05-04-25 @ 02:10)  Source: Blood Blood-Peripheral  Preliminary Report (05-07-25 @ 05:00):    No growth at 72 Hours    Culture - Blood (collected 05-04-25 @ 02:08)  Source: Blood Blood-Peripheral  Preliminary Report (05-07-25 @ 05:00):    No growth at 72 Hours    Radiology: reviewed     Medications:  acetaminophen     Tablet .. 975 milliGRAM(s) Oral every 6 hours  aspirin enteric coated 81 milliGRAM(s) Oral daily  atorvastatin 80 milliGRAM(s) Oral at bedtime  cadexomer iodine 0.9% Gel 1 Application(s) Topical daily  dextrose 5%. 1000 milliLiter(s) IV Continuous <Continuous>  dextrose 5%. 1000 milliLiter(s) IV Continuous <Continuous>  dextrose 50% Injectable 25 Gram(s) IV Push once  dextrose 50% Injectable 12.5 Gram(s) IV Push once  dextrose 50% Injectable 25 Gram(s) IV Push once  dextrose Oral Gel 15 Gram(s) Oral once PRN  fenofibrate Tablet 145 milliGRAM(s) Oral at bedtime  glucagon  Injectable 1 milliGRAM(s) IntraMuscular once  heparin   Injectable 5000 Unit(s) SubCutaneous every 8 hours  insulin glargine Injectable (LANTUS) 21 Unit(s) SubCutaneous at bedtime  insulin lispro (ADMELOG) corrective regimen sliding scale   SubCutaneous three times a day before meals  insulin lispro (ADMELOG) corrective regimen sliding scale   SubCutaneous <User Schedule>  insulin lispro Injectable (ADMELOG) 10 Unit(s) SubCutaneous three times a day before meals  metoprolol tartrate 50 milliGRAM(s) Oral two times a day  pentoxifylline 400 milliGRAM(s) Oral three times a day  piperacillin/tazobactam IVPB.. 3.375 Gram(s) IV Intermittent every 8 hours  ticagrelor 90 milliGRAM(s) Oral every 12 hours    Current Antimicrobials:  piperacillin/tazobactam IVPB.. 3.375 Gram(s) IV Intermittent every 8 hours    Prior/Completed Antimicrobials:  piperacillin/tazobactam IVPB.  piperacillin/tazobactam IVPB.-  vancomycin  IVPB

## 2025-05-07 NOTE — PROGRESS NOTE ADULT - ASSESSMENT
54M hx DM, CAD (s/p CABG), mild HFrEF (EF 45%), and PAD (with recent dx angiogram Aug. 2024), and recent admission to [facility] w/ right foot wound s/p right foot partial 2nd ray resection and 3rd proximal phalanx bone biopsy on 3/7/25 (Cx + for E. Faecalis and Staph Epi requiring prolonged abx course s/p PICC), now presenting with multiple non-healing right foot wounds. His admission is complicated by an AURORA.    PLAN  - Will proceed with RLE angiogram with Dr. Youssef on Thurs, 5/8/25 pending improved AURORA. Patient is consented  - Booked for R 1st and 2nd ray resection thurs 5/8. Continue local wound care for right foot wounds  - Infectious disease recs appreciated > Continue empiric Zosyn  - Continue IVF resuscitation for AURORA, monitor creatinine  - Pain: Tylenol  - Diet: CC  - Lantus, pre-meal insulin, and SSI per endocrine, appreciate recs  - Trental 400mg TID for PAD  - Continue home ASA/Brilinta, statin/fibrate, metoprolol. Appreciate medicine recs  - DVT ppx: [facility]    Vascular Surgery  [phone] (2) 7 to less than 13 years old

## 2025-05-07 NOTE — CHART NOTE - NSCHARTNOTEFT_GEN_A_CORE
ELSI MERINO  Gender: Male  54y  [facility] 9MON 923 D1    Patient not seen today, chart reviewed.     POCT Blood Glucose:  153 mg/dL (05-07-25 @ 12:07)  154 mg/dL (05-07-25 @ 08:29)  210 mg/dL (05-07-25 @ 02:59)  230 mg/dL (05-06-25 @ 21:47)  128 mg/dL (05-06-25 @ 17:17)      eMAR:atorvastatin   80 milliGRAM(s) Oral (05-06-25 @ 21:56)    fenofibrate Tablet   145 milliGRAM(s) Oral (05-06-25 @ 21:56)    insulin glargine Injectable (LANTUS)   10 Unit(s) SubCutaneous (05-06-25 @ 21:56)    insulin lispro (ADMELOG) corrective regimen sliding scale   1 Unit(s) SubCutaneous (05-07-25 @ 13:03)   1 Unit(s) SubCutaneous (05-07-25 @ 09:05)    insulin lispro Injectable (ADMELOG)   10 Unit(s) SubCutaneous (05-07-25 @ 13:03)   10 Unit(s) SubCutaneous (05-07-25 @ 09:06)   10 Unit(s) SubCutaneous (05-06-25 @ 17:43)    Endocrinology following patient for T2DM management. In the last 24hrs BG levels have been 128-230mg/dL. FBG stable this am to 154mg/dL, 151mg/dL on blood serum with reduced Lantus dose of 10u given last night for NPO status. Noted patient was pending RLE angio for today however was canceled -> now per Vascular note, pending for tomorrow 5/8. Will keep Lantus at 10u for now and recommend Lantus 7u if NPO at midnight, let primary team aware of recommendation to prevent hypoglycemia. No hypoglycemia. BG goal 100-180mg/dL. Endocrine will closely monitor BG levels.

## 2025-05-07 NOTE — PROGRESS NOTE ADULT - ASSESSMENT
Patient is a 54 year old male with PMH of DM, CAD (s/p CABG), mild HFrEF (EF 45%), and PAD (with recent angiogram Aug. 2024), and recent admission to [facility] with R foot wound s/p R foot Partial 2nd ray resection and 3rd proximal phalanx bone biopsy on 3/7/25 (Cx + for E. Faecalis and Staph Epi requiring prolonged abx course s/p PICC, clean margin with no residual OM), now presenting with right foot wound. Patient reports 1 week of subjective fevers, chills, hypothermia, and R foot lateral toe oozing and erythema c/f soft tissue infection.    R foot wound infection with 1st MPJ fluid collection, 1st and 2nd toe OM  - 5/4 s/p R foot I&D to level of subQ - scant, purulent drainage, no erythema, no malodor   - R foot abscess culture with Bacteroides fragilis   - R foot xray with no gas or OM  - R foot MRI with soft tissue wounds about distal forefoot with small volume of ill defined peripheral enhancing fluid preferentially surrounding the 1st MPJ fluid collection, residual second metatarsal and 1st proximal phalanx OM, possible reactive vs early OM of 3rd metatarsal  - s/p vancomycin, started on empiric zosyn   - prior cultures reviewed  - Bcx NGTD x2      Recommendations:   Vascular surgery following, plan for RLE angio 5/8  Podiatry following--plan for OR for R foot revisional 2nd ray resection + Partial First Ray Resection on Thurs 5/8; please send cultures/biopsy   Continue zosyn for now   Monitor temps/WBC  Continue rest of care per primary team       Jason Vidal M.D.  [facility]  Available on [employer] TEAMS - *PREFERRED*  [phone]  After 5pm on weekdays and all day on weekends - please call [phone]     Thank you for consulting us and involving us in the management of this patients case. In addition to reviewing history, imaging, documents, labs, microbiology, took into account antibiotic stewardship, local antibiogram and infection control strategies and potential transmission issues at time of treatment decision making process.

## 2025-05-07 NOTE — PROGRESS NOTE ADULT - SUBJECTIVE AND OBJECTIVE BOX
DATE OF SERVICE: 05-07-25 @ 13:08    Patient is a 54y old  Male who presents with a chief complaint of Angiogram (07 May 2025 12:11)      INTERVAL HISTORY: in no acute distress    REVIEW OF SYSTEMS:  CONSTITUTIONAL: No weakness  EYES/ENT: No visual changes;  No throat pain   NECK: No pain or stiffness  RESPIRATORY: No cough, wheezing; No shortness of breath  CARDIOVASCULAR: No chest pain or palpitations  GASTROINTESTINAL: No abdominal  pain. No nausea, vomiting, or hematemesis  GENITOURINARY: No dysuria, frequency or hematuria  NEUROLOGICAL: No stroke like symptoms  SKIN: No rashes    	  MEDICATIONS:  metoprolol tartrate 50 milliGRAM(s) Oral two times a day        PHYSICAL EXAM:  T(C): 36.6 (05-07-25 @ 08:53), Max: 36.7 (05-07-25 @ 05:18)  HR: 70 (05-07-25 @ 08:53) (70 - 78)  BP: 106/62 (05-07-25 @ 08:53) (106/62 - 115/66)  RR: 18 (05-07-25 @ 08:53) (18 - 18)  SpO2: 98% (05-07-25 @ 08:53) (98% - 100%)  Wt(kg): --  I&O's Summary    06 May 2025 07:01  -  07 May 2025 07:00  --------------------------------------------------------  IN: 1380 mL / OUT: 2425 mL / NET: -1045 mL    07 May 2025 07:01  -  07 May 2025 13:08  --------------------------------------------------------  IN: 240 mL / OUT: 700 mL / NET: -460 mL          Appearance: In no distress	  HEENT:    PERRL, EOMI	  Cardiovascular:  S1 S2, No JVD  Respiratory: Lungs clear to auscultation	  Gastrointestinal:  Soft, Non-tender, + BS	  Vascularature:  No edema of LE  Psychiatric: Appropriate affect   Neuro: no acute focal deficits                               8.1    8.86  )-----------( 418      ( 07 May 2025 07:17 )             25.9     05-07    140  |  108  |  12  ----------------------------<  151[H]  4.1   |  22  |  1.49[H]    Ca    8.6      07 May 2025 07:16  Phos  3.2     05-07  Mg     2.1     05-07          Labs personally reviewed    < from: TTE W or WO Ultrasound Enhancing Agent (03.06.25 @ 14:00) >       CONCLUSIONS:      1. Left ventricular cavity is normal in size. Left ventricular wall thickness is normal. Left ventricular systolic function is preserved with an ejection fraction visually estimated at 50 to 55 %. Regional wall motion abnormalities present.   2. Mid anteroseptal segment, basal inferoseptal segment, and basal inferior segment are abnormal.   3. Normal right ventricular cavity size, with normal wall thickness, and normal right ventricular systolic function.   4. No pericardial effusion seen.   5. There is no evidence of a left ventricular thrombus.      ASSESSMENT/PLAN: 	    54M hx DM, CAD (s/p CABG), mild HFrEF (EF 45%), and PAD (with recent angiogram Aug. 2024), and recent admission to [facility] w/ right foot wound s/p right foot Partial 2nd ray resection and 3rd proximal phalanx bone biopsy on 3/7/25 (Cx + for E. Faecalis and Staph Epi requiring prolonged abx course s/p PICC), now presenting with right foot wound.      Problem/Plan - 1:  ·  Problem: Hypertension.   ·  Plan: hold entresto 2/2 to AURORA  c/w lopressor 50mg BID     Problem/Plan - 2:  ·  Problem: CAD (coronary artery disease).   ·  Plan: c/w aspirin, brilinta statin  TTE 3/6/25 with EF 55%.  Rest NM stress images March 2025 with medium-sized, severe defect(s) in the lateral and inferolateral walls. Stress portion not performed      Problem/Plan - 3:  ·  Problem: AURORA (acute kidney injury).   ·  Plan:  hold entresto for now  baseline 1s, now rising 1.4, c/w IVF   - 5/7 creat 1.49, appreciate nephro recs      Problem/Plan - 4:  ·  Problem: Preop risk stratification   - RLE angiogram with Dr. Youssef on Thurs, 5/8/25 pending improved AURORA. c/w Trental 400mg TID for PAD  - R 1st and 2nd ray resection thurs 5/8. Continue local wound care for right foot wounds, podiatry following  ·  Plan: Elevated risk for low risk peripheral angio +/- angioplasty      LATRICIA Kellogg, [facility]  Cardiovascular Medicine  [address] [place] [address], [address]  Available through call or text on [employer] TEAMs  Office: [phone]

## 2025-05-08 ENCOUNTER — APPOINTMENT (OUTPATIENT)
Dept: VASCULAR SURGERY | Facility: HOSPITAL | Age: 55
End: 2025-05-08

## 2025-05-08 LAB
ANION GAP SERPL CALC-SCNC: 12 MMOL/L — SIGNIFICANT CHANGE UP (ref 5–17)
APTT BLD: 36.4 SEC — SIGNIFICANT CHANGE UP (ref 26.1–36.8)
BLD GP AB SCN SERPL QL: NEGATIVE — SIGNIFICANT CHANGE UP
BUN SERPL-MCNC: 12 MG/DL — SIGNIFICANT CHANGE UP (ref 7–23)
CALCIUM SERPL-MCNC: 9.1 MG/DL — SIGNIFICANT CHANGE UP (ref 8.4–10.5)
CHLORIDE SERPL-SCNC: 108 MMOL/L — SIGNIFICANT CHANGE UP (ref 96–108)
CO2 SERPL-SCNC: 24 MMOL/L — SIGNIFICANT CHANGE UP (ref 22–31)
CREAT SERPL-MCNC: 1.51 MG/DL — HIGH (ref 0.5–1.3)
EGFR: 54 ML/MIN/1.73M2 — LOW
EGFR: 54 ML/MIN/1.73M2 — LOW
GLUCOSE BLDC GLUCOMTR-MCNC: 109 MG/DL — HIGH (ref 70–99)
GLUCOSE BLDC GLUCOMTR-MCNC: 132 MG/DL — HIGH (ref 70–99)
GLUCOSE BLDC GLUCOMTR-MCNC: 180 MG/DL — HIGH (ref 70–99)
GLUCOSE BLDC GLUCOMTR-MCNC: 235 MG/DL — HIGH (ref 70–99)
GLUCOSE BLDC GLUCOMTR-MCNC: 66 MG/DL — LOW (ref 70–99)
GLUCOSE BLDC GLUCOMTR-MCNC: 76 MG/DL — SIGNIFICANT CHANGE UP (ref 70–99)
GLUCOSE BLDC GLUCOMTR-MCNC: 85 MG/DL — SIGNIFICANT CHANGE UP (ref 70–99)
GLUCOSE BLDC GLUCOMTR-MCNC: 87 MG/DL — SIGNIFICANT CHANGE UP (ref 70–99)
GLUCOSE BLDC GLUCOMTR-MCNC: 94 MG/DL — SIGNIFICANT CHANGE UP (ref 70–99)
GLUCOSE SERPL-MCNC: 111 MG/DL — HIGH (ref 70–99)
HCT VFR BLD CALC: 29.8 % — LOW (ref 39–50)
HGB BLD-MCNC: 9.1 G/DL — LOW (ref 13–17)
INR BLD: 1.04 RATIO — SIGNIFICANT CHANGE UP (ref 0.85–1.16)
ISLET CELL512 AB SER-SCNC: 0 NMOL/L — SIGNIFICANT CHANGE UP
MAGNESIUM SERPL-MCNC: 2.2 MG/DL — SIGNIFICANT CHANGE UP (ref 1.6–2.6)
MCHC RBC-ENTMCNC: 23.6 PG — LOW (ref 27–34)
MCHC RBC-ENTMCNC: 30.5 G/DL — LOW (ref 32–36)
MCV RBC AUTO: 77.2 FL — LOW (ref 80–100)
NRBC BLD AUTO-RTO: 0 /100 WBCS — SIGNIFICANT CHANGE UP (ref 0–0)
PHOSPHATE SERPL-MCNC: 3.3 MG/DL — SIGNIFICANT CHANGE UP (ref 2.5–4.5)
PLATELET # BLD AUTO: 503 K/UL — HIGH (ref 150–400)
POTASSIUM SERPL-MCNC: 4.6 MMOL/L — SIGNIFICANT CHANGE UP (ref 3.5–5.3)
POTASSIUM SERPL-SCNC: 4.6 MMOL/L — SIGNIFICANT CHANGE UP (ref 3.5–5.3)
PROTHROM AB SERPL-ACNC: 11.9 SEC — SIGNIFICANT CHANGE UP (ref 9.9–13.4)
RBC # BLD: 3.86 M/UL — LOW (ref 4.2–5.8)
RBC # FLD: 14.8 % — HIGH (ref 10.3–14.5)
RH IG SCN BLD-IMP: POSITIVE — SIGNIFICANT CHANGE UP
SODIUM SERPL-SCNC: 144 MMOL/L — SIGNIFICANT CHANGE UP (ref 135–145)
WBC # BLD: 10.48 K/UL — SIGNIFICANT CHANGE UP (ref 3.8–10.5)
WBC # FLD AUTO: 10.48 K/UL — SIGNIFICANT CHANGE UP (ref 3.8–10.5)

## 2025-05-08 PROCEDURE — 75710 ARTERY X-RAYS ARM/LEG: CPT | Mod: 26,RT

## 2025-05-08 PROCEDURE — 36245 INS CATH ABD/L-EXT ART 1ST: CPT | Mod: RT

## 2025-05-08 PROCEDURE — 99232 SBSQ HOSP IP/OBS MODERATE 35: CPT

## 2025-05-08 PROCEDURE — 93010 ELECTROCARDIOGRAM REPORT: CPT

## 2025-05-08 PROCEDURE — 71045 X-RAY EXAM CHEST 1 VIEW: CPT | Mod: 26

## 2025-05-08 PROCEDURE — 75625 CONTRAST EXAM ABDOMINL AORTA: CPT | Mod: 26

## 2025-05-08 RX ORDER — INSULIN GLARGINE-YFGN 100 [IU]/ML
5 INJECTION, SOLUTION SUBCUTANEOUS AT BEDTIME
Refills: 0 | Status: DISCONTINUED | OUTPATIENT
Start: 2025-05-08 | End: 2025-05-09

## 2025-05-08 RX ORDER — SODIUM CHLORIDE 9 G/1000ML
1000 INJECTION, SOLUTION INTRAVENOUS
Refills: 0 | Status: DISCONTINUED | OUTPATIENT
Start: 2025-05-08 | End: 2025-05-09

## 2025-05-08 RX ORDER — SODIUM CHLORIDE 9 G/1000ML
1000 INJECTION, SOLUTION INTRAVENOUS
Refills: 0 | Status: DISCONTINUED | OUTPATIENT
Start: 2025-05-08 | End: 2025-05-08

## 2025-05-08 RX ORDER — INSULIN LISPRO 100 U/ML
INJECTION, SOLUTION INTRAVENOUS; SUBCUTANEOUS EVERY 6 HOURS
Refills: 0 | Status: DISCONTINUED | OUTPATIENT
Start: 2025-05-08 | End: 2025-05-09

## 2025-05-08 RX ADMIN — INSULIN LISPRO 1: 100 INJECTION, SOLUTION INTRAVENOUS; SUBCUTANEOUS at 18:23

## 2025-05-08 RX ADMIN — METOPROLOL SUCCINATE 50 MILLIGRAM(S): 50 TABLET, EXTENDED RELEASE ORAL at 06:44

## 2025-05-08 RX ADMIN — SODIUM CHLORIDE 75 MILLILITER(S): 9 INJECTION, SOLUTION INTRAVENOUS at 18:26

## 2025-05-08 RX ADMIN — Medication 25 GRAM(S): at 18:21

## 2025-05-08 RX ADMIN — Medication 400 MILLIGRAM(S): at 06:45

## 2025-05-08 RX ADMIN — METOPROLOL SUCCINATE 50 MILLIGRAM(S): 50 TABLET, EXTENDED RELEASE ORAL at 18:22

## 2025-05-08 RX ADMIN — HEPARIN SODIUM 5000 UNIT(S): 1000 INJECTION INTRAVENOUS; SUBCUTANEOUS at 06:43

## 2025-05-08 RX ADMIN — TICAGRELOR 90 MILLIGRAM(S): 90 TABLET ORAL at 06:44

## 2025-05-08 RX ADMIN — Medication 25 GRAM(S): at 06:43

## 2025-05-08 RX ADMIN — Medication 81 MILLIGRAM(S): at 11:45

## 2025-05-08 RX ADMIN — INSULIN LISPRO 10 UNIT(S): 100 INJECTION, SOLUTION INTRAVENOUS; SUBCUTANEOUS at 18:23

## 2025-05-08 RX ADMIN — ATORVASTATIN CALCIUM 80 MILLIGRAM(S): 80 TABLET, FILM COATED ORAL at 21:13

## 2025-05-08 RX ADMIN — INSULIN GLARGINE-YFGN 5 UNIT(S): 100 INJECTION, SOLUTION SUBCUTANEOUS at 21:12

## 2025-05-08 RX ADMIN — FENOFIBRATE 145 MILLIGRAM(S): 160 TABLET ORAL at 21:13

## 2025-05-08 RX ADMIN — Medication 70 MILLILITER(S): at 06:43

## 2025-05-08 RX ADMIN — Medication 400 MILLIGRAM(S): at 13:16

## 2025-05-08 RX ADMIN — Medication 975 MILLIGRAM(S): at 06:43

## 2025-05-08 RX ADMIN — HEPARIN SODIUM 5000 UNIT(S): 1000 INJECTION INTRAVENOUS; SUBCUTANEOUS at 21:13

## 2025-05-08 RX ADMIN — Medication 975 MILLIGRAM(S): at 07:13

## 2025-05-08 RX ADMIN — TICAGRELOR 90 MILLIGRAM(S): 90 TABLET ORAL at 18:22

## 2025-05-08 RX ADMIN — Medication 400 MILLIGRAM(S): at 21:13

## 2025-05-08 NOTE — PROGRESS NOTE ADULT - PROBLEM SELECTOR PLAN 1
AURORA likely in the setting of recent Entresto initiation. On review of labs on [place], Scr was approximately ~0.8. During recent admission at [facility] (3/5/25 to 3/11/25) Scr was 0.8 on discharge (3/10/25). Patient was started on Entresto during that admission. Last outpatient Scr was elevated at 1.55 on 4/4/25.     -peak creatinine 1.61, overall improved but slightly increased to 1.51 today    patient with limited mobility, PVR performed yday without significant retention    UA noted, UPCR 0.3, patient w/ h/o DM  -agree with holding Entresto in anticipation of anticipated contrast load  -patient nonoliguric, EF 45%, has been tolerating IVF, angiogram initially scheduled for yesterday but rescheduled for today     IVF prophylaxis recs: NS @70ml / hr for 12 hours prior to anticipated contrast load, to continue 6 hours post procedure

## 2025-05-08 NOTE — PROGRESS NOTE ADULT - SUBJECTIVE AND OBJECTIVE BOX
Patient is a 54y old  Male who presents with a chief complaint of Angiogram (08 May 2025 10:50)       INTERVAL HPI/OVERNIGHT EVENTS:  Patient seen and evaluated at bedside.  Pt is resting comfortable in NAD. Denies N/V/F/C.    Allergies    No Known Allergies    Intolerances        Vital Signs Last 24 Hrs  T(C): 36.3 (08 May 2025 09:18), Max: 37 (07 May 2025 16:33)  T(F): 97.4 (08 May 2025 09:18), Max: 98.6 (07 May 2025 16:33)  HR: 73 (08 May 2025 09:18) (70 - 81)  BP: 117/70 (08 May 2025 09:18) (117/70 - 133/76)  BP(mean): --  RR: 18 (08 May 2025 09:18) (18 - 18)  SpO2: 98% (08 May 2025 09:18) (98% - 100%)    Parameters below as of 08 May 2025 09:18  Patient On (Oxygen Delivery Method): room air        LABS:                        9.1    10.48 )-----------( 503      ( 08 May 2025 05:30 )             29.8     05-08    144  |  108  |  12  ----------------------------<  111[H]  4.6   |  24  |  1.51[H]    Ca    9.1      08 May 2025 05:30  Phos  3.3     05-08  Mg     2.2     05-08      PT/INR - ( 08 May 2025 05:29 )   PT: 11.9 sec;   INR: 1.04 ratio         PTT - ( 08 May 2025 05:29 )  PTT:36.4 sec  Urinalysis Basic - ( 08 May 2025 05:30 )    Color: x / Appearance: x / SG: x / pH: x  Gluc: 111 mg/dL / Ketone: x  / Bili: x / Urobili: x   Blood: x / Protein: x / Nitrite: x   Leuk Esterase: x / RBC: x / WBC x   Sq Epi: x / Non Sq Epi: x / Bacteria: x      CAPILLARY BLOOD GLUCOSE      POCT Blood Glucose.: 85 mg/dL (08 May 2025 10:52)  POCT Blood Glucose.: 94 mg/dL (08 May 2025 06:51)  POCT Blood Glucose.: 132 mg/dL (08 May 2025 02:35)  POCT Blood Glucose.: 211 mg/dL (07 May 2025 21:26)  POCT Blood Glucose.: 289 mg/dL (07 May 2025 19:13)      Lower Extremity Physical Exam:  Vascular: DP/PT 0/4, B/L, CFT < 3 seconds B/L, Temperature gradient warm to cool, B/L.   Neuro: Epicritic sensation absent to the level of digits, B/L.  Musculoskeletal/Ortho: s/p Right foot 3rd metatarsal head resection, s/p Right foot Partial 2nd Ray Resection (DOS 3/6)  Skin: 5/4 s/p bedside R foot I&D to level of subQ, no purulent drainage from 1st interspace wound, scant purulent drainage from medial 1st metatarsophalangeal joint wound, no erythema, no malodor, Left foot no open wounds, no acute signs of infection    RADIOLOGY & ADDITIONAL TESTS:

## 2025-05-08 NOTE — CHART NOTE - NSCHARTNOTEFT_GEN_A_CORE
SURGERY POST OP CHECK    STATUS POST PROCEDURE: s/p RLE angiogram with 2 vessel runoff via peroneal/PT arteries with moderate stenosis of the AT     SUBJECTIVE: Pt seen and examined at bedside. Patient reports appropriate surgical incisional pain; pain is controlled. Denies fevers/chills, chest pain, dyspnea, nausea, vomiting     --------------------------------------------------------------------------------------------------------------------------------------------------------------------------------------------------------------  OBJECTIVE:  Vital Signs Last 24 Hrs  T(C): 36.3 (08 May 2025 21:20), Max: 36.6 (08 May 2025 16:20)  T(F): 97.4 (08 May 2025 21:20), Max: 97.9 (08 May 2025 16:20)  HR: 82 (08 May 2025 21:20) (68 - 89)  BP: 109/68 (08 May 2025 21:20) (104/62 - 150/83)  BP(mean): --  RR: 18 (08 May 2025 21:20) (15 - 18)  SpO2: 98% (08 May 2025 21:20) (98% - 100%)    Parameters below as of 08 May 2025 21:20  Patient On (Oxygen Delivery Method): room air      I&O's Summary    07 May 2025 07:01  -  08 May 2025 07:00  --------------------------------------------------------  IN: 2170 mL / OUT: 3850 mL / NET: -1680 mL    08 May 2025 07:01  -  08 May 2025 22:35  --------------------------------------------------------  IN: 590 mL / OUT: 1525 mL / NET: -935 mL        PHYSICAL EXAM:  Constitutional: Well developed, well nourished, NAD  Chest: Symmetric chest rise bilaterally  Abdomen: Soft, nontender, nondistended,   Groin: L groin access site covered with tegaderm and gauze c/d/i.  Soft, nontender, no ecchymosis/hematoma, no erythema, no edema.  Extremities: Warm to touch, soft, normal strength, sensory and motor intact  ----------------------------------------------------------------------------------------------------------------------------------------------------------------------------------------------------------------  ASSESSMENT: HPI:  54M hx DM, CAD (s/p CABG), mild HFrEF (EF 45%), and PAD (with recent dx angiogram Aug. 2024), and recent admission to [facility] w/ right foot wound s/p right foot partial 2nd ray resection and 3rd proximal phalanx bone biopsy on 3/7/25 (Cx + for E. Faecalis and Staph Epi requiring prolonged abx course s/p PICC), now presenting with multiple non-healing right foot wounds. His admission is complicated by an AURORA now s/p RLE angiogram  with 2 vessel runoff via peroneal/PT arteries with moderate stenosis of the AT on 5/8/25.      PLAN  - Booked for R 1st and 2nd ray resection follow-up podiatry tmr 5/9 @ 930 am   - Infectious disease recs appreciated > Continue empiric Zosyn  - Continue IVF resuscitation for AURORA, monitor creatinine  - Pain: Tylenol  - Diet: NPO for procedure -> Resume diet post-procedure   - Lantus, pre-meal insulin, and SSI per endocrine, appreciate recs  - Trental 400mg TID for PAD  - Continue home ASA/Brilinta, statin/fibrate, metoprolol. Appreciate medicine recs  - DVT ppx: [facility]    Vascular Surgery  [id number]

## 2025-05-08 NOTE — PROGRESS NOTE ADULT - SUBJECTIVE AND OBJECTIVE BOX
[facility]  DU Garcia Y. Patel, S. Shah, G. Casimir  [phone]  ([phone] - weekdays after 5pm and weekends)    Name: ELSI MERINO  Age/Gender: 54y Male  MRN: [id number]    Interval History:  Patient seen and examined this morning.   Resting comfortably, no new complaints.   Notes reviewed  No concerning overnight events  Afebrile   Allergies: No Known Allergies      Objective:  Vitals:   T(F): 97.4 (05-08-25 @ 09:18), Max: 98.6 (05-07-25 @ 16:33)  HR: 73 (05-08-25 @ 09:18) (70 - 81)  BP: 117/70 (05-08-25 @ 09:18) (117/70 - 133/76)  RR: 18 (05-08-25 @ 09:18) (18 - 18)  SpO2: 98% (05-08-25 @ 09:18) (98% - 100%)  Physical Examination:  General: no acute distress, nontoxic appearing   HEENT: normocephalic, atraumatic, anicteric  Respiratory: no acc muscle use, breathing comfortably  Cardiovascular: S1 and S2 present  Gastrointestinal: normal appearing, nondistended  Extremities: R foot dressing     Laboratory Studies:  CBC:                       9.1    10.48 )-----------( 503      ( 08 May 2025 05:30 )             29.8     WBC Trend:  10.48 05-08-25 @ 05:30  8.86 05-07-25 @ 07:17  10.24 05-06-25 @ 07:09  12.05 05-05-25 @ 06:48  9.62 05-04-25 @ 02:26    CMP: 05-08    144  |  108  |  12  ----------------------------<  111[H]  4.6   |  24  |  1.51[H]    Ca    9.1      08 May 2025 05:30  Phos  3.3     05-08  Mg     2.2     05-08    Creatinine: 1.51 mg/dL (05-08-25 @ 05:30)  Creatinine: 1.49 mg/dL (05-07-25 @ 07:16)  Creatinine: 1.40 mg/dL (05-06-25 @ 07:17)  Creatinine: 1.61 mg/dL (05-05-25 @ 06:52)  Creatinine: 1.44 mg/dL (05-04-25 @ 02:25)    Microbiology: reviewed   Culture - Abscess with Gram Stain (collected 05-04-25 @ 04:40)  Source: Abscess right foot  Gram Stain (05-06-25 @ 11:51):    No polymorphonuclear leukocytes seen per low power field    No organisms seen per oil power field  Final Report (05-06-25 @ 11:51):    Rare Bacteroides fragilis "Susceptibilities not performed"    Culture - Blood (collected 05-04-25 @ 02:10)  Source: Blood Blood-Peripheral  Preliminary Report (05-08-25 @ 05:01):    No growth at 4 days    Culture - Blood (collected 05-04-25 @ 02:08)  Source: Blood Blood-Peripheral  Preliminary Report (05-08-25 @ 05:01):    No growth at 4 days    Radiology: reviewed     Medications:  acetaminophen     Tablet .. 975 milliGRAM(s) Oral every 6 hours  aspirin enteric coated 81 milliGRAM(s) Oral daily  atorvastatin 80 milliGRAM(s) Oral at bedtime  cadexomer iodine 0.9% Gel 1 Application(s) Topical daily  dextrose 5%. 1000 milliLiter(s) IV Continuous <Continuous>  dextrose 5%. 1000 milliLiter(s) IV Continuous <Continuous>  dextrose 50% Injectable 25 Gram(s) IV Push once  dextrose 50% Injectable 12.5 Gram(s) IV Push once  dextrose 50% Injectable 25 Gram(s) IV Push once  dextrose Oral Gel 15 Gram(s) Oral once PRN  fenofibrate Tablet 145 milliGRAM(s) Oral at bedtime  glucagon  Injectable 1 milliGRAM(s) IntraMuscular once  heparin   Injectable 5000 Unit(s) SubCutaneous every 8 hours  insulin glargine Injectable (LANTUS) 7 Unit(s) SubCutaneous at bedtime  insulin lispro (ADMELOG) corrective regimen sliding scale   SubCutaneous every 6 hours  insulin lispro Injectable (ADMELOG) 10 Unit(s) SubCutaneous three times a day before meals  metoprolol tartrate 50 milliGRAM(s) Oral two times a day  pentoxifylline 400 milliGRAM(s) Oral three times a day  piperacillin/tazobactam IVPB.. 3.375 Gram(s) IV Intermittent every 8 hours  sodium chloride 0.9%. 1000 milliLiter(s) IV Continuous <Continuous>  ticagrelor 90 milliGRAM(s) Oral every 12 hours    Current Antimicrobials:  piperacillin/tazobactam IVPB.. 3.375 Gram(s) IV Intermittent every 8 hours    Prior/Completed Antimicrobials:  piperacillin/tazobactam IVPB.  piperacillin/tazobactam IVPB.-  vancomycin  IVPB

## 2025-05-08 NOTE — CHART NOTE - NSCHARTNOTEFT_GEN_A_CORE
54 year old male with PMH of DM, CAD (s/p CABG), mild HFrEF (EF 45%), and PAD, and recent admission to [facility] with R foot wound s/p R foot Partial 2nd ray resection and 3rd proximal phalanx bone biopsy on 3/7/25 (Cx + for E. Faecalis and Staph Epi requiring prolonged abx course s/p PICC, clean margin with no residual OM), now presenting with right foot wound. Patient reports 1 week of subjective fevers, chills, hypothermia, and R foot lateral toe oozing and erythema c/f soft tissue infection. Endocrine consulted for uncontrolled T2DM and hyperglycemia. BG Goal 100-180mg/dl   Patient was not seen today. Chart reviewed. NPO after MN for RLE angiogram this afternoon. Bgs tightly controlled BGs 76-low 100s while NPO and hypoglycemia 66 at 16:37. patient received Lantus 7 units last night and Cr 1.51 today         POCT Blood Glucose:  66 mg/dL (05-08-25 @ 16:37)  76 mg/dL (05-08-25 @ 13:18)  85 mg/dL (05-08-25 @ 10:52)  94 mg/dL (05-08-25 @ 06:51)  132 mg/dL (05-08-25 @ 02:35)  211 mg/dL (05-07-25 @ 21:26)  289 mg/dL (05-07-25 @ 19:13)      eMAR:atorvastatin   80 milliGRAM(s) Oral (05-07-25 @ 22:14)    fenofibrate Tablet   145 milliGRAM(s) Oral (05-07-25 @ 22:15)    insulin glargine Injectable (LANTUS)   7 Unit(s) SubCutaneous (05-07-25 @ 22:14)    insulin lispro (ADMELOG) corrective regimen sliding scale   3 Unit(s) SubCutaneous (05-07-25 @ 19:15)    insulin lispro (ADMELOG) corrective regimen sliding scale   0 Unit(s) SubCutaneous (05-07-25 @ 22:17)    insulin lispro Injectable (ADMELOG)   10 Unit(s) SubCutaneous (05-07-25 @ 19:16)    Diet, NPO after Midnight:      NPO Start Date: 08-May-2025,   NPO Start Time: 23:59  Except Medications  With Ice Chips/Sips of Water (05-08-25 @ 12:19) [Active]  Diet, Consistent Carbohydrate w/Evening Snack:   Halal (05-05-25 @ 18:23) [Active]      #Uncontrolled T2DM  A1C with Estimated Average Glucose Result: 10.1 % (05-05-25 @ 06:47)  A1C with Estimated Average Glucose Result: >15.5 % (03-04-25 @ 23:12)  Home reg: Lantus 52u QHS, Humalog 20u TID AC -> discharged in march on Lantus 30u QHS and Admelog 14u TID AC (not adherent)   Endocrinologist: none -> follows with internist/PCP Dr. Mcdaniel in [place]?  *an appt should be made with his endocrine MD prior to [place] and plan of care at [place] needs to be discussed with his endocrine*    *only has Medicare and Elder plan via Medicare         # Uncontrolled type 2 diabetes mellitus with hyperglycemia.    Inpatient Plan:  - For tightly controlled BG in am > Check BG before going off floor for OR, start D5W if BG < 80 ( spoke with RN and team provider )  - please treat hypoglycemia per protocol   - Test BGs q 6 hours while NPO and test BG TID AC, HS, and 2AM once diet started   - Continue Lantus 7 units tonight once back on diet   - Restart Admelog 10u TID AC (HOLD if NPO) once back on diet and tolerating diet   - Change to low dose Admelog correctional scales TID AC, HS, and 2AM once on diet   - please keep hypoglycemia protocol in place       Contact via Microsoft Teams during business hours  To reach covering provider access AMION via sunrise tools  For Urgent matters/after-hours/weekends/holidays please page endocrine fellow on call   For nonurgent matters please email [email or web address]    Please note that this patient may be followed by different provider tomorrow.  Notify endocrine 24 hours prior to discharge for final recommendations 54 year old male with PMH of DM, CAD (s/p CABG), mild HFrEF (EF 45%), and PAD, and recent admission to [facility] with R foot wound s/p R foot Partial 2nd ray resection and 3rd proximal phalanx bone biopsy on 3/7/25 (Cx + for E. Faecalis and Staph Epi requiring prolonged abx course s/p PICC, clean margin with no residual OM), now presenting with right foot wound. Patient reports 1 week of subjective fevers, chills, hypothermia, and R foot lateral toe oozing and erythema c/f soft tissue infection. Endocrine consulted for uncontrolled T2DM and hyperglycemia. BG Goal 100-180mg/dl   Patient was not seen today. Chart reviewed. NPO after MN for RLE angiogram this afternoon. Bgs tightly controlled BGs 76-low 100s while NPO and hypoglycemia 66 at 16:37. patient received Lantus 7 units last night and Cr 1.51 today         POCT Blood Glucose:  66 mg/dL (05-08-25 @ 16:37)  76 mg/dL (05-08-25 @ 13:18)  85 mg/dL (05-08-25 @ 10:52)  94 mg/dL (05-08-25 @ 06:51)  132 mg/dL (05-08-25 @ 02:35)  211 mg/dL (05-07-25 @ 21:26)  289 mg/dL (05-07-25 @ 19:13)      eMAR:atorvastatin   80 milliGRAM(s) Oral (05-07-25 @ 22:14)    fenofibrate Tablet   145 milliGRAM(s) Oral (05-07-25 @ 22:15)    insulin glargine Injectable (LANTUS)   7 Unit(s) SubCutaneous (05-07-25 @ 22:14)    insulin lispro (ADMELOG) corrective regimen sliding scale   3 Unit(s) SubCutaneous (05-07-25 @ 19:15)    insulin lispro (ADMELOG) corrective regimen sliding scale   0 Unit(s) SubCutaneous (05-07-25 @ 22:17)    insulin lispro Injectable (ADMELOG)   10 Unit(s) SubCutaneous (05-07-25 @ 19:16)    Diet, NPO after Midnight:      NPO Start Date: 08-May-2025,   NPO Start Time: 23:59  Except Medications  With Ice Chips/Sips of Water (05-08-25 @ 12:19) [Active]  Diet, Consistent Carbohydrate w/Evening Snack:   Halal (05-05-25 @ 18:23) [Active]      #Uncontrolled T2DM  A1C with Estimated Average Glucose Result: 10.1 % (05-05-25 @ 06:47)  A1C with Estimated Average Glucose Result: >15.5 % (03-04-25 @ 23:12)  Home reg: Lantus 52u QHS, Humalog 20u TID AC -> discharged in march on Lantus 30u QHS and Admelog 14u TID AC (not adherent)   Endocrinologist: none -> follows with internist/PCP Dr. Mcdaniel in [place]?  *an appt should be made with his endocrine MD prior to [place] and plan of care at [place] needs to be discussed with his endocrine*    *only has Medicare and Elder plan via Medicare         # Uncontrolled type 2 diabetes mellitus with hyperglycemia.    Inpatient Plan:  - For tightly controlled BG in am > Check BG before going off floor for OR, start D5W if BG < 80 ( spoke with RN and team provider )  - please treat hypoglycemia per protocol   - Test BGs q 6 hours while NPO and test BG TID AC, HS, and 2AM once diet started   - Continue Lantus 7 units tonight once back on diet   - Restart Admelog 10u TID AC (HOLD if NPO) once back on diet and tolerating diet   - Change to low dose Admelog correctional scales TID AC, HS, and 2AM once on diet   - please keep hypoglycemia protocol in place       Contact via Microsoft Teams during business hours  To reach covering provider access AMION via sunrise tools  For Urgent matters/after-hours/weekends/holidays please page endocrine fellow on call   For nonurgent matters please email [email or web address]    Please note that this patient may be followed by different provider tomorrow.  Notify endocrine 24 hours prior to discharge for final recommendations    Addendum at 17:30   Spoke with RN, reports hypoglycemia treated to 109 with 2 apple juice and ginger ale and D5W started.   Noted patient will be NPO after MN podiatry surgery >right foot revisional 2nd ray resection + Partial First Ray Resection on Friday 5/9 9:30 am  Check BGs ACTID and at HS, Stop D5w once BGs > 100 X2   Recommend to decrease Lantus to 5 units tonight and increase to 8 units at HS once he is on diet after surgery tomorrow       Contact via Microsoft Teams during business hours  To reach covering provider access AMION via sunrise tools  For Urgent matters/after-hours/weekends/holidays please page endocrine fellow on call   For nonurgent matters please email [email or web address]    Please note that this patient may be followed by different provider tomorrow.  Notify endocrine 24 hours prior to discharge for final recommendations

## 2025-05-08 NOTE — PROGRESS NOTE ADULT - SUBJECTIVE AND OBJECTIVE BOX
DATE OF SERVICE: 05-08-25 @ 13:24    Patient is a 54y old  Male who presents with a chief complaint of Angiogram (08 May 2025 12:07)      INTERVAL HISTORY: Feels ok.     REVIEW OF SYSTEMS:  CONSTITUTIONAL: No weakness  EYES/ENT: No visual changes;  No throat pain   NECK: No pain or stiffness  RESPIRATORY: No cough, wheezing; No shortness of breath  CARDIOVASCULAR: No chest pain or palpitations  GASTROINTESTINAL: No abdominal  pain. No nausea, vomiting, or hematemesis  GENITOURINARY: No dysuria, frequency or hematuria  NEUROLOGICAL: No stroke like symptoms  SKIN: No rashes    	  MEDICATIONS:  metoprolol tartrate 50 milliGRAM(s) Oral two times a day        PHYSICAL EXAM:  T(C): 36.4 (05-08-25 @ 13:02), Max: 37 (05-07-25 @ 16:33)  HR: 73 (05-08-25 @ 13:02) (70 - 81)  BP: 104/62 (05-08-25 @ 13:02) (104/62 - 133/76)  RR: 18 (05-08-25 @ 13:02) (18 - 18)  SpO2: 99% (05-08-25 @ 13:02) (98% - 100%)  Wt(kg): --  I&O's Summary    07 May 2025 07:01  -  08 May 2025 07:00  --------------------------------------------------------  IN: 2170 mL / OUT: 3850 mL / NET: -1680 mL    08 May 2025 07:01  -  08 May 2025 13:24  --------------------------------------------------------  IN: 0 mL / OUT: 325 mL / NET: -325 mL          Appearance: In no distress	  HEENT:    PERRL, EOMI	  Cardiovascular:  S1 S2, No JVD  Respiratory: Lungs clear to auscultation	  Gastrointestinal:  Soft, Non-tender, + BS	  Vascularature:  No edema of LE  Psychiatric: Appropriate affect   Neuro: no acute focal deficits                               9.1    10.48 )-----------( 503      ( 08 May 2025 05:30 )             29.8     05-08    144  |  108  |  12  ----------------------------<  111[H]  4.6   |  24  |  1.51[H]    Ca    9.1      08 May 2025 05:30  Phos  3.3     05-08  Mg     2.2     05-08          Labs personally reviewed      ASSESSMENT/PLAN: 	    54M hx DM, CAD (s/p CABG), mild HFrEF (EF 45%), and PAD (with recent angiogram Aug. 2024), and recent admission to [facility] w/ right foot wound s/p right foot Partial 2nd ray resection and 3rd proximal phalanx bone biopsy on 3/7/25 (Cx + for E. Faecalis and Staph Epi requiring prolonged abx course s/p PICC), now presenting with right foot wound.      Problem/Plan - 1:  ·  Problem: Hypertension.   ·  Plan: hold entresto 2/2 to AURORA  c/w lopressor 50mg BID     Problem/Plan - 2:  ·  Problem: CAD (coronary artery disease).   ·  Plan: c/w aspirin, brilinta statin  TTE 3/6/25 with EF 55%.  Rest NM stress images March 2025 with medium-sized, severe defect(s) in the lateral and inferolateral walls. Stress portion not performed      Problem/Plan - 3:  ·  Problem: AURORA (acute kidney injury).   ·  Plan:  hold entresto for now  baseline 1s, now rising 1.4, c/w IVF   - 5/7 creat 1.49, appreciate nephro recs      Problem/Plan - 4:  ·  Problem: Preop risk stratification   - RLE angiogram with Dr. Youssef on Thurs, 5/8/25 pending improved AURORA. c/w Trental 400mg TID for PAD  - R 1st and 2nd ray resection thurs 5/8. Continue local wound care for right foot wounds, podiatry following  ·  Plan: Elevated risk for low risk peripheral angio +/- angioplasty          Shana Pizano, AG-NP   Edwin Dwyer, DO [facility]  Cardiovascular Medicine  [address], [address]  Available through call or text on [employer] TEAM[employer]  Office: [phone]

## 2025-05-08 NOTE — PROGRESS NOTE ADULT - ASSESSMENT
54M hx DM, CAD (s/p CABG), mild HFrEF (EF 45%), and PAD (with recent dx angiogram Aug. 2024), and recent admission to [facility] w/ right foot wound s/p right foot partial 2nd ray resection and 3rd proximal phalanx bone biopsy on 3/7/25 (Cx + for E. Faecalis and Staph Epi requiring prolonged abx course s/p PICC), now presenting with multiple non-healing right foot wounds. His admission is complicated by an AURORA.     PLAN  - Will proceed with RLE angiogram with Dr. Youssef today, patient consented.  - Booked for R 1st and 2nd ray resection follow-up podiatry date pending angiogram.   - Infectious disease recs appreciated > Continue empiric Zosyn  - Continue IVF resuscitation for AURORA, monitor creatinine  - Pain: Tylenol  - Diet: NPO for procedure -> Resume diet post-procedure   - Lantus, pre-meal insulin, and SSI per endocrine, appreciate recs  - Trental 400mg TID for PAD  - Continue home ASA/Brilinta, statin/fibrate, metoprolol. Appreciate medicine recs  - DVT ppx: [facility]    Vascular Surgery  [id number]

## 2025-05-08 NOTE — PROGRESS NOTE ADULT - ASSESSMENT
54M with PMH of HTN, HLD, DM2, CAD s/p CABG, HF, and recent admission to [facility] for toe amputation and found to be bacteremic, now presents to the ED with right foot wound. Nephrology consulted for AURORA.

## 2025-05-08 NOTE — PROGRESS NOTE ADULT - ASSESSMENT
Patient is a 54 year old male with PMH of DM, CAD (s/p CABG), mild HFrEF (EF 45%), and PAD (with recent angiogram Aug. 2024), and recent admission to [facility] with R foot wound s/p R foot Partial 2nd ray resection and 3rd proximal phalanx bone biopsy on 3/7/25 (Cx + for E. Faecalis and Staph Epi requiring prolonged abx course s/p PICC, clean margin with no residual OM), now presenting with right foot wound. Patient reports 1 week of subjective fevers, chills, hypothermia, and R foot lateral toe oozing and erythema c/f soft tissue infection.    R foot wound infection with 1st MPJ fluid collection, 1st and 2nd toe OM  - 5/4 s/p R foot I&D to level of subQ - scant, purulent drainage, no erythema, no malodor   - R foot abscess culture with Bacteroides fragilis   - R foot xray with no gas or OM  - R foot MRI with soft tissue wounds about distal forefoot with small volume of ill defined peripheral enhancing fluid preferentially surrounding the 1st MPJ fluid collection, residual second metatarsal and 1st proximal phalanx OM, possible reactive vs early OM of 3rd metatarsal  - s/p vancomycin, started on empiric zosyn   - prior cultures reviewed  - Bcx NGTD x2      Recommendations:   Vascular surgery following, plan for RLE angiogram today   Podiatry following--plan for OR for R foot revisional 2nd ray resection + Partial First Ray Resection pending above  Continue zosyn for now   Monitor temps/WBC  Continue rest of care per primary team       Jason Vidal M.D.  [place] [facility]  Available on [employer] TEAMS - *PREFERRED*  [phone]  After 5pm on weekdays and all day on weekends - please call [phone]     Thank you for consulting us and involving us in the management of this patients case. In addition to reviewing history, imaging, documents, labs, microbiology, took into account antibiotic stewardship, local antibiogram and infection control strategies and potential transmission issues at time of treatment decision making process.

## 2025-05-08 NOTE — PROGRESS NOTE ADULT - SUBJECTIVE AND OBJECTIVE BOX
Morning Surgical Progress Note  Patient is a 54y old  Male who presents with a chief complaint of Angiogram (07 May 2025 13:08)    SUBJECTIVE: Patient seen and examined at bedside with surgical team. Patient reports pain is well controlled. Denies any fevers, chills, or night sweats.     Vital Signs Last 24 Hrs  T(C): 36.4 (08 May 2025 06:39), Max: 37 (07 May 2025 16:33)  T(F): 97.5 (08 May 2025 06:39), Max: 98.6 (07 May 2025 16:33)  HR: 81 (08 May 2025 06:39) (70 - 81)  BP: 131/78 (08 May 2025 06:39) (106/62 - 133/76)  BP(mean): --  RR: 18 (08 May 2025 06:39) (18 - 18)  SpO2: 99% (08 May 2025 06:39) (98% - 100%)    Parameters below as of 08 May 2025 06:39  Patient On (Oxygen Delivery Method): room air    I&O's Detail    07 May 2025 07:01  -  08 May 2025 07:00  --------------------------------------------------------  IN:    Oral Fluid: 1400 mL    sodium chloride 0.9%: 770 mL  Total IN: 2170 mL    OUT:    Blood Loss (mL): 0 mL    Voided (mL): 3850 mL  Total OUT: 3850 mL    Total NET: -1680 mL        Medications  MEDICATIONS  (STANDING):  acetaminophen     Tablet .. 975 milliGRAM(s) Oral every 6 hours  aspirin enteric coated 81 milliGRAM(s) Oral daily  atorvastatin 80 milliGRAM(s) Oral at bedtime  cadexomer iodine 0.9% Gel 1 Application(s) Topical daily  dextrose 5%. 1000 milliLiter(s) (100 mL/Hr) IV Continuous <Continuous>  dextrose 5%. 1000 milliLiter(s) (50 mL/Hr) IV Continuous <Continuous>  dextrose 50% Injectable 25 Gram(s) IV Push once  dextrose 50% Injectable 12.5 Gram(s) IV Push once  dextrose 50% Injectable 25 Gram(s) IV Push once  fenofibrate Tablet 145 milliGRAM(s) Oral at bedtime  glucagon  Injectable 1 milliGRAM(s) IntraMuscular once  heparin   Injectable 5000 Unit(s) SubCutaneous every 8 hours  insulin glargine Injectable (LANTUS) 7 Unit(s) SubCutaneous at bedtime  insulin lispro (ADMELOG) corrective regimen sliding scale   SubCutaneous every 6 hours  insulin lispro Injectable (ADMELOG) 10 Unit(s) SubCutaneous three times a day before meals  metoprolol tartrate 50 milliGRAM(s) Oral two times a day  pentoxifylline 400 milliGRAM(s) Oral three times a day  piperacillin/tazobactam IVPB.. 3.375 Gram(s) IV Intermittent every 8 hours  sodium chloride 0.9%. 1000 milliLiter(s) (70 mL/Hr) IV Continuous <Continuous>  ticagrelor 90 milliGRAM(s) Oral every 12 hours    MEDICATIONS  (PRN):  dextrose Oral Gel 15 Gram(s) Oral once PRN Blood Glucose LESS THAN 70 milliGRAM(s)/deciliter    PHYSICAL EXAM:  Constitutional: appropriately interactive in no acute distress  Chest: Symmetric chest rise bilaterally, normal work of breathing on room air  Abdomen: Soft, nondistended, nontender   Extremities: non-palpable right pedal pulses, 1-2th interdigital webspace with seropurulent output is packed with strips. Bilateral lower extremities are soft and warm. Equal range of motion in bilateral lower extremities    LABS:                        9.1    10.48 )-----------( 503      ( 08 May 2025 05:30 )             29.8     05-08    144  |  108  |  12  ----------------------------<  111[H]  4.6   |  24  |  1.51[H]    Ca    9.1      08 May 2025 05:30  Phos  3.3     05-08  Mg     2.2     05-08      PT/INR - ( 08 May 2025 05:29 )   PT: 11.9 sec;   INR: 1.04 ratio         PTT - ( 08 May 2025 05:29 )  PTT:36.4 sec    Urinalysis Basic - ( 08 May 2025 05:30 )    Color: x / Appearance: x / SG: x / pH: x  Gluc: 111 mg/dL / Ketone: x  / Bili: x / Urobili: x   Blood: x / Protein: x / Nitrite: x   Leuk Esterase: x / RBC: x / WBC x   Sq Epi: x / Non Sq Epi: x / Bacteria: x      ABO Interpretation: B (05-08-25 @ 05:37)

## 2025-05-08 NOTE — BRIEF OPERATIVE NOTE - OPERATION/FINDINGS
Right lower extremity angiogram via left femoral access demonstrated patent iliac vessels, right femoral, popliteal including an above-knee popliteal stent, and peroneal/PT arteries. AT with moderate to severe stenosis. Two-vessel runoff to the foot.  Right lower extremity angiogram via left femoral access demonstrated patent iliac vessels, right femoral, popliteal including an above-knee popliteal stent, and peroneal/PT arteries. AT with moderate to severe stenosis. PT-dominant runoff to the foot with reconstitution of the dorsal arch via collateral circulation. Mynx closure

## 2025-05-08 NOTE — PROGRESS NOTE ADULT - ASSESSMENT
54M 5/4 s/p bedside R foot I&D to level of subQ  - Patient seen and evaluated  - Afebrile, WBC 10.48  - 5/4 s/p bedside R foot I&D to level of subQ, Scant, purulent drainage, no erythema, no malodor, Left foot no open wounds, no acute signs of infection  - Right foot xray: no gas, no OM (prelim)  - Right foot MRI: 1st mpj fluid collection, 1ST metatarsal and proximal phalanx, 2nD metatarsal OM  - Right foot cultured: NO growth prelim  - Vascular recs, angio for today appreciated  - ID recs appreciated  - NPO after midnight, PO meds with sip of water  - CBC, BMP, PT/PTT/INR in AM  - Type & Screen, ABO   - Pod plan booked for right foot revisional 2nd ray resection + Partial First Ray Resection on Friday 5/9 9:30 am with Dr Patterson  pending [facility] recs  - Please document medical and cardiac clearance for possible podiatric surgical intervention under anesthesia  - Discussed with attending

## 2025-05-09 ENCOUNTER — RESULT REVIEW (OUTPATIENT)
Age: 55
End: 2025-05-09

## 2025-05-09 LAB
ANION GAP SERPL CALC-SCNC: 10 MMOL/L — SIGNIFICANT CHANGE UP (ref 5–17)
APTT BLD: 35.8 SEC — SIGNIFICANT CHANGE UP (ref 26.1–36.8)
BUN SERPL-MCNC: 13 MG/DL — SIGNIFICANT CHANGE UP (ref 7–23)
CALCIUM SERPL-MCNC: 8.4 MG/DL — SIGNIFICANT CHANGE UP (ref 8.4–10.5)
CHLORIDE SERPL-SCNC: 107 MMOL/L — SIGNIFICANT CHANGE UP (ref 96–108)
CO2 SERPL-SCNC: 23 MMOL/L — SIGNIFICANT CHANGE UP (ref 22–31)
CREAT SERPL-MCNC: 1.51 MG/DL — HIGH (ref 0.5–1.3)
CULTURE RESULTS: SIGNIFICANT CHANGE UP
CULTURE RESULTS: SIGNIFICANT CHANGE UP
EGFR: 54 ML/MIN/1.73M2 — LOW
EGFR: 54 ML/MIN/1.73M2 — LOW
GLUCOSE BLDC GLUCOMTR-MCNC: 160 MG/DL — HIGH (ref 70–99)
GLUCOSE BLDC GLUCOMTR-MCNC: 164 MG/DL — HIGH (ref 70–99)
GLUCOSE BLDC GLUCOMTR-MCNC: 202 MG/DL — HIGH (ref 70–99)
GLUCOSE BLDC GLUCOMTR-MCNC: 240 MG/DL — HIGH (ref 70–99)
GLUCOSE BLDC GLUCOMTR-MCNC: 248 MG/DL — HIGH (ref 70–99)
GLUCOSE BLDC GLUCOMTR-MCNC: 253 MG/DL — HIGH (ref 70–99)
GLUCOSE BLDC GLUCOMTR-MCNC: 259 MG/DL — HIGH (ref 70–99)
GLUCOSE BLDC GLUCOMTR-MCNC: 306 MG/DL — HIGH (ref 70–99)
GLUCOSE SERPL-MCNC: 242 MG/DL — HIGH (ref 70–99)
HCT VFR BLD CALC: 22.1 % — LOW (ref 39–50)
HCT VFR BLD CALC: 25.2 % — LOW (ref 39–50)
HGB BLD-MCNC: 6.9 G/DL — CRITICAL LOW (ref 13–17)
HGB BLD-MCNC: 7.9 G/DL — LOW (ref 13–17)
INR BLD: 1.12 RATIO — SIGNIFICANT CHANGE UP (ref 0.85–1.16)
ISLET CELL512 AB SER-ACNC: SIGNIFICANT CHANGE UP
MAGNESIUM SERPL-MCNC: 2 MG/DL — SIGNIFICANT CHANGE UP (ref 1.6–2.6)
MCHC RBC-ENTMCNC: 23.5 PG — LOW (ref 27–34)
MCHC RBC-ENTMCNC: 23.9 PG — LOW (ref 27–34)
MCHC RBC-ENTMCNC: 31.2 G/DL — LOW (ref 32–36)
MCHC RBC-ENTMCNC: 31.3 G/DL — LOW (ref 32–36)
MCV RBC AUTO: 75.4 FL — LOW (ref 80–100)
MCV RBC AUTO: 76.4 FL — LOW (ref 80–100)
NRBC BLD AUTO-RTO: 0 /100 WBCS — SIGNIFICANT CHANGE UP (ref 0–0)
NRBC BLD AUTO-RTO: 0 /100 WBCS — SIGNIFICANT CHANGE UP (ref 0–0)
PHOSPHATE SERPL-MCNC: 3.3 MG/DL — SIGNIFICANT CHANGE UP (ref 2.5–4.5)
PLATELET # BLD AUTO: 389 K/UL — SIGNIFICANT CHANGE UP (ref 150–400)
PLATELET # BLD AUTO: 411 K/UL — HIGH (ref 150–400)
POTASSIUM SERPL-MCNC: 4.2 MMOL/L — SIGNIFICANT CHANGE UP (ref 3.5–5.3)
POTASSIUM SERPL-SCNC: 4.2 MMOL/L — SIGNIFICANT CHANGE UP (ref 3.5–5.3)
PROTHROM AB SERPL-ACNC: 12.8 SEC — SIGNIFICANT CHANGE UP (ref 9.9–13.4)
RBC # BLD: 2.93 M/UL — LOW (ref 4.2–5.8)
RBC # BLD: 3.3 M/UL — LOW (ref 4.2–5.8)
RBC # FLD: 14.8 % — HIGH (ref 10.3–14.5)
RBC # FLD: 14.8 % — HIGH (ref 10.3–14.5)
SODIUM SERPL-SCNC: 140 MMOL/L — SIGNIFICANT CHANGE UP (ref 135–145)
SPECIMEN SOURCE: SIGNIFICANT CHANGE UP
SPECIMEN SOURCE: SIGNIFICANT CHANGE UP
WBC # BLD: 8.67 K/UL — SIGNIFICANT CHANGE UP (ref 3.8–10.5)
WBC # BLD: 9.09 K/UL — SIGNIFICANT CHANGE UP (ref 3.8–10.5)
WBC # FLD AUTO: 8.67 K/UL — SIGNIFICANT CHANGE UP (ref 3.8–10.5)
WBC # FLD AUTO: 9.09 K/UL — SIGNIFICANT CHANGE UP (ref 3.8–10.5)

## 2025-05-09 PROCEDURE — 88311 DECALCIFY TISSUE: CPT | Mod: 26

## 2025-05-09 PROCEDURE — 88305 TISSUE EXAM BY PATHOLOGIST: CPT | Mod: 26

## 2025-05-09 PROCEDURE — 73630 X-RAY EXAM OF FOOT: CPT | Mod: 26,RT

## 2025-05-09 PROCEDURE — 99232 SBSQ HOSP IP/OBS MODERATE 35: CPT

## 2025-05-09 DEVICE — SURGIFLO MATRIX WITH THROMBIN KIT: Type: IMPLANTABLE DEVICE | Site: RIGHT | Status: FUNCTIONAL

## 2025-05-09 RX ORDER — HEPARIN SODIUM 1000 [USP'U]/ML
5000 INJECTION INTRAVENOUS; SUBCUTANEOUS EVERY 8 HOURS
Refills: 0 | Status: DISCONTINUED | OUTPATIENT
Start: 2025-05-09 | End: 2025-05-12

## 2025-05-09 RX ORDER — INSULIN LISPRO 100 U/ML
10 INJECTION, SOLUTION INTRAVENOUS; SUBCUTANEOUS
Refills: 0 | Status: DISCONTINUED | OUTPATIENT
Start: 2025-05-09 | End: 2025-05-09

## 2025-05-09 RX ORDER — INSULIN LISPRO 100 U/ML
INJECTION, SOLUTION INTRAVENOUS; SUBCUTANEOUS AT BEDTIME
Refills: 0 | Status: DISCONTINUED | OUTPATIENT
Start: 2025-05-09 | End: 2025-05-12

## 2025-05-09 RX ORDER — ACETAMINOPHEN 500 MG/5ML
650 LIQUID (ML) ORAL EVERY 6 HOURS
Refills: 0 | Status: DISCONTINUED | OUTPATIENT
Start: 2025-05-09 | End: 2025-05-09

## 2025-05-09 RX ORDER — SODIUM CHLORIDE 9 G/1000ML
1000 INJECTION, SOLUTION INTRAVENOUS
Refills: 0 | Status: DISCONTINUED | OUTPATIENT
Start: 2025-05-09 | End: 2025-05-12

## 2025-05-09 RX ORDER — INSULIN GLARGINE-YFGN 100 [IU]/ML
10 INJECTION, SOLUTION SUBCUTANEOUS AT BEDTIME
Refills: 0 | Status: DISCONTINUED | OUTPATIENT
Start: 2025-05-09 | End: 2025-05-10

## 2025-05-09 RX ORDER — PIPERACILLIN-TAZO-DEXTROSE,ISO 2.25G/50ML
3.38 IV SOLUTION, PIGGYBACK PREMIX FROZEN(ML) INTRAVENOUS EVERY 8 HOURS
Refills: 0 | Status: DISCONTINUED | OUTPATIENT
Start: 2025-05-09 | End: 2025-05-12

## 2025-05-09 RX ORDER — CILOSTAZOL 50 MG/1
50 TABLET ORAL
Refills: 0 | Status: DISCONTINUED | OUTPATIENT
Start: 2025-05-09 | End: 2025-05-09

## 2025-05-09 RX ORDER — HYDROMORPHONE/SOD CHLOR,ISO/PF 2 MG/10 ML
0.5 SYRINGE (ML) INJECTION
Refills: 0 | Status: DISCONTINUED | OUTPATIENT
Start: 2025-05-09 | End: 2025-05-09

## 2025-05-09 RX ORDER — INSULIN GLARGINE-YFGN 100 [IU]/ML
8 INJECTION, SOLUTION SUBCUTANEOUS AT BEDTIME
Refills: 0 | Status: DISCONTINUED | OUTPATIENT
Start: 2025-05-09 | End: 2025-05-09

## 2025-05-09 RX ORDER — SODIUM CHLORIDE 9 G/1000ML
1000 INJECTION, SOLUTION INTRAVENOUS
Refills: 0 | Status: DISCONTINUED | OUTPATIENT
Start: 2025-05-09 | End: 2025-05-09

## 2025-05-09 RX ORDER — ATORVASTATIN CALCIUM 80 MG/1
80 TABLET, FILM COATED ORAL AT BEDTIME
Refills: 0 | Status: DISCONTINUED | OUTPATIENT
Start: 2025-05-09 | End: 2025-05-12

## 2025-05-09 RX ORDER — PENTOXIFYLLINE 100 %
400 POWDER (GRAM) MISCELLANEOUS THREE TIMES A DAY
Refills: 0 | Status: DISCONTINUED | OUTPATIENT
Start: 2025-05-09 | End: 2025-05-12

## 2025-05-09 RX ORDER — ONDANSETRON HCL/PF 4 MG/2 ML
4 VIAL (ML) INJECTION ONCE
Refills: 0 | Status: DISCONTINUED | OUTPATIENT
Start: 2025-05-09 | End: 2025-05-09

## 2025-05-09 RX ORDER — INSULIN LISPRO 100 U/ML
INJECTION, SOLUTION INTRAVENOUS; SUBCUTANEOUS
Refills: 0 | Status: DISCONTINUED | OUTPATIENT
Start: 2025-05-09 | End: 2025-05-12

## 2025-05-09 RX ORDER — INSULIN LISPRO 100 U/ML
10 INJECTION, SOLUTION INTRAVENOUS; SUBCUTANEOUS
Refills: 0 | Status: DISCONTINUED | OUTPATIENT
Start: 2025-05-09 | End: 2025-05-10

## 2025-05-09 RX ORDER — ACETAMINOPHEN 500 MG/5ML
1000 LIQUID (ML) ORAL EVERY 6 HOURS
Refills: 0 | Status: DISCONTINUED | OUTPATIENT
Start: 2025-05-09 | End: 2025-05-15

## 2025-05-09 RX ORDER — INSULIN LISPRO 100 U/ML
INJECTION, SOLUTION INTRAVENOUS; SUBCUTANEOUS EVERY 6 HOURS
Refills: 0 | Status: DISCONTINUED | OUTPATIENT
Start: 2025-05-09 | End: 2025-05-09

## 2025-05-09 RX ORDER — FENOFIBRATE 160 MG/1
145 TABLET ORAL AT BEDTIME
Refills: 0 | Status: DISCONTINUED | OUTPATIENT
Start: 2025-05-09 | End: 2025-05-12

## 2025-05-09 RX ORDER — OXYCODONE HYDROCHLORIDE AND ACETAMINOPHEN 10; 325 MG/1; MG/1
1 TABLET ORAL EVERY 4 HOURS
Refills: 0 | Status: DISCONTINUED | OUTPATIENT
Start: 2025-05-09 | End: 2025-05-09

## 2025-05-09 RX ORDER — ASPIRIN 325 MG
81 TABLET ORAL DAILY
Refills: 0 | Status: DISCONTINUED | OUTPATIENT
Start: 2025-05-09 | End: 2025-05-12

## 2025-05-09 RX ORDER — OXYCODONE HYDROCHLORIDE 30 MG/1
5 TABLET ORAL EVERY 4 HOURS
Refills: 0 | Status: DISCONTINUED | OUTPATIENT
Start: 2025-05-09 | End: 2025-05-15

## 2025-05-09 RX ORDER — OXYCODONE HYDROCHLORIDE 30 MG/1
2.5 TABLET ORAL EVERY 4 HOURS
Refills: 0 | Status: DISCONTINUED | OUTPATIENT
Start: 2025-05-09 | End: 2025-05-15

## 2025-05-09 RX ORDER — METOPROLOL SUCCINATE 50 MG/1
50 TABLET, EXTENDED RELEASE ORAL
Refills: 0 | Status: DISCONTINUED | OUTPATIENT
Start: 2025-05-09 | End: 2025-05-12

## 2025-05-09 RX ADMIN — HEPARIN SODIUM 5000 UNIT(S): 1000 INJECTION INTRAVENOUS; SUBCUTANEOUS at 05:41

## 2025-05-09 RX ADMIN — Medication 400 MILLIGRAM(S): at 05:41

## 2025-05-09 RX ADMIN — FENOFIBRATE 145 MILLIGRAM(S): 160 TABLET ORAL at 21:26

## 2025-05-09 RX ADMIN — METOPROLOL SUCCINATE 50 MILLIGRAM(S): 50 TABLET, EXTENDED RELEASE ORAL at 05:41

## 2025-05-09 RX ADMIN — Medication 25 GRAM(S): at 01:48

## 2025-05-09 RX ADMIN — INSULIN LISPRO 4: 100 INJECTION, SOLUTION INTRAVENOUS; SUBCUTANEOUS at 17:07

## 2025-05-09 RX ADMIN — INSULIN LISPRO 10 UNIT(S): 100 INJECTION, SOLUTION INTRAVENOUS; SUBCUTANEOUS at 17:08

## 2025-05-09 RX ADMIN — TICAGRELOR 90 MILLIGRAM(S): 90 TABLET ORAL at 05:41

## 2025-05-09 RX ADMIN — ATORVASTATIN CALCIUM 80 MILLIGRAM(S): 80 TABLET, FILM COATED ORAL at 21:26

## 2025-05-09 RX ADMIN — Medication 75 MILLILITER(S): at 06:10

## 2025-05-09 RX ADMIN — Medication 25 GRAM(S): at 21:24

## 2025-05-09 RX ADMIN — INSULIN LISPRO 2: 100 INJECTION, SOLUTION INTRAVENOUS; SUBCUTANEOUS at 01:47

## 2025-05-09 RX ADMIN — INSULIN LISPRO 1: 100 INJECTION, SOLUTION INTRAVENOUS; SUBCUTANEOUS at 22:11

## 2025-05-09 RX ADMIN — INSULIN LISPRO 3: 100 INJECTION, SOLUTION INTRAVENOUS; SUBCUTANEOUS at 05:39

## 2025-05-09 RX ADMIN — INSULIN GLARGINE-YFGN 10 UNIT(S): 100 INJECTION, SOLUTION SUBCUTANEOUS at 22:11

## 2025-05-09 RX ADMIN — INSULIN LISPRO 10 UNIT(S): 100 INJECTION, SOLUTION INTRAVENOUS; SUBCUTANEOUS at 14:19

## 2025-05-09 RX ADMIN — HEPARIN SODIUM 5000 UNIT(S): 1000 INJECTION INTRAVENOUS; SUBCUTANEOUS at 21:26

## 2025-05-09 RX ADMIN — METOPROLOL SUCCINATE 50 MILLIGRAM(S): 50 TABLET, EXTENDED RELEASE ORAL at 17:06

## 2025-05-09 NOTE — PROGRESS NOTE ADULT - SUBJECTIVE AND OBJECTIVE BOX
Podiatry Pager #: [phone]    Patient is a 54y old  Male who presents with a chief complaint of Angiogram (08 May 2025 13:23)      INTERVAL HPI/OVERNIGHT EVENTS:   Pt is scheduled for right foot Partial 1st ray resection and revisional Partial 2nd ray resection with Dr. Patterson at 9:30am. Patient is aware of procedure and is NPO since midnight.    MEDICATIONS  (STANDING):  acetaminophen     Tablet .. 975 milliGRAM(s) Oral every 6 hours  aspirin enteric coated 81 milliGRAM(s) Oral daily  atorvastatin 80 milliGRAM(s) Oral at bedtime  cadexomer iodine 0.9% Gel 1 Application(s) Topical daily  dextrose 5%. 1000 milliLiter(s) (100 mL/Hr) IV Continuous <Continuous>  dextrose 5%. 1000 milliLiter(s) (50 mL/Hr) IV Continuous <Continuous>  dextrose 50% Injectable 25 Gram(s) IV Push once  dextrose 50% Injectable 12.5 Gram(s) IV Push once  dextrose 50% Injectable 25 Gram(s) IV Push once  fenofibrate Tablet 145 milliGRAM(s) Oral at bedtime  glucagon  Injectable 1 milliGRAM(s) IntraMuscular once  heparin   Injectable 5000 Unit(s) SubCutaneous every 8 hours  insulin glargine Injectable (LANTUS) 5 Unit(s) SubCutaneous at bedtime  insulin lispro (ADMELOG) corrective regimen sliding scale   SubCutaneous every 6 hours  insulin lispro Injectable (ADMELOG) 10 Unit(s) SubCutaneous three times a day before meals  metoprolol tartrate 50 milliGRAM(s) Oral two times a day  pentoxifylline 400 milliGRAM(s) Oral three times a day  piperacillin/tazobactam IVPB.. 3.375 Gram(s) IV Intermittent every 8 hours  sodium chloride 0.9%. 1000 milliLiter(s) (75 mL/Hr) IV Continuous <Continuous>  ticagrelor 90 milliGRAM(s) Oral every 12 hours    MEDICATIONS  (PRN):  dextrose Oral Gel 15 Gram(s) Oral once PRN Blood Glucose LESS THAN 70 milliGRAM(s)/deciliter      Allergies    No Known Allergies    Intolerances        Vital Signs Last 24 Hrs  T(C): 36.8 (09 May 2025 05:05), Max: 36.8 (09 May 2025 05:05)  T(F): 98.2 (09 May 2025 05:05), Max: 98.2 (09 May 2025 05:05)  HR: 82 (09 May 2025 05:05) (68 - 89)  BP: 128/72 (09 May 2025 05:05) (104/62 - 150/83)  BP(mean): --  RR: 18 (09 May 2025 05:05) (15 - 18)  SpO2: 98% (09 May 2025 05:05) (98% - 100%)    Parameters below as of 09 May 2025 05:05  Patient On (Oxygen Delivery Method): room air        LABS:                        7.9    8.67  )-----------( 411      ( 09 May 2025 05:36 )             25.2     05-09    140  |  107  |  13  ----------------------------<  242[H]  4.2   |  23  |  1.51[H]    Ca    8.4      09 May 2025 05:35  Phos  3.3     05-09  Mg     2.0     05-09      PT/INR - ( 09 May 2025 05:36 )   PT: 12.8 sec;   INR: 1.12 ratio         PTT - ( 09 May 2025 05:36 )  PTT:35.8 sec  Urinalysis Basic - ( 09 May 2025 05:35 )    Color: x / Appearance: x / SG: x / pH: x  Gluc: 242 mg/dL / Ketone: x  / Bili: x / Urobili: x   Blood: x / Protein: x / Nitrite: x   Leuk Esterase: x / RBC: x / WBC x   Sq Epi: x / Non Sq Epi: x / Bacteria: x      CAPILLARY BLOOD GLUCOSE      POCT Blood Glucose.: 259 mg/dL (09 May 2025 05:28)  POCT Blood Glucose.: 248 mg/dL (09 May 2025 01:46)  POCT Blood Glucose.: 240 mg/dL (09 May 2025 00:30)  POCT Blood Glucose.: 235 mg/dL (08 May 2025 21:10)  POCT Blood Glucose.: 180 mg/dL (08 May 2025 18:17)  POCT Blood Glucose.: 109 mg/dL (08 May 2025 17:26)  POCT Blood Glucose.: 87 mg/dL (08 May 2025 16:58)  POCT Blood Glucose.: 66 mg/dL (08 May 2025 16:37)  POCT Blood Glucose.: 76 mg/dL (08 May 2025 13:18)  POCT Blood Glucose.: 85 mg/dL (08 May 2025 10:52)      RADIOLOGY & ADDITIONAL TESTS:

## 2025-05-09 NOTE — BRIEF OPERATIVE NOTE - OPERATION/FINDINGS
s/p right foot Partial 1st ray resection and incision and drainage  -Bone at proximal resection was hard and of good quality  -Adequate intraop bleeding  -No evidence of purulence or soft tissue infection  -Incision primarily closed with 3-0 nylon

## 2025-05-09 NOTE — CHART NOTE - NSCHARTNOTEFT_GEN_A_CORE
POST-OPERATIVE NOTE    Subjective:  Patient is s/p 1st ray resection with incision and drainage. Patient denies any dizziness, lightheadedness, or SOB. States that his pain is well controlled. He denies any chest pain, nausea or vomiting.     Vital Signs Last 24 Hrs  T(C): 36.7 (09 May 2025 15:30), Max: 36.8 (09 May 2025 05:05)  T(F): 98 (09 May 2025 15:30), Max: 98.2 (09 May 2025 05:05)  HR: 77 (09 May 2025 15:00) (68 - 89)  BP: 124/63 (09 May 2025 15:00) (95/54 - 150/83)  BP(mean): 86 (09 May 2025 15:00) (71 - 94)  RR: 18 (09 May 2025 15:30) (13 - 22)  SpO2: 97% (09 May 2025 15:30) (95% - 100%)    Parameters below as of 09 May 2025 15:00  Patient On (Oxygen Delivery Method): room air      I&O's Detail    08 May 2025 07:01  -  09 May 2025 07:00  --------------------------------------------------------  IN:    dextrose 5% + sodium chloride 0.9%: 900 mL    IV PiggyBack: 100 mL    Oral Fluid: 460 mL    sodium chloride 0.9%: 150 mL  Total IN: 1610 mL    OUT:    Voided (mL): 2425 mL  Total OUT: 2425 mL    Total NET: -815 mL      09 May 2025 07:01  -  09 May 2025 16:00  --------------------------------------------------------  IN:  Total IN: 0 mL    OUT:    Oral Fluid: 0 mL    Voided (mL): 550 mL  Total OUT: 550 mL    Total NET: -550 mL        MEDICATIONS  piperacillin/tazobactam IVPB.. 3.375  aspirin enteric coated 81  heparin   Injectable 5000  metoprolol tartrate 50  pentoxifylline 400  piperacillin/tazobactam IVPB.. 3.375    PAST MEDICAL & SURGICAL HISTORY:  Hypertension, unspecified type      Hyperlipidemia, unspecified hyperlipidemia type      Type 2 diabetes mellitus without complication, with long-term current use of insulin      Coronary artery disease of native artery of native heart with stable angina pectoris      History of percutaneous coronary intervention          PHYSICAL EXAM:  Constitutional: appropriately interactive in no acute distress  Chest: Symmetric chest rise bilaterally, normal work of breathing on room air  Abdomen: Soft, nondistended, nontender   Groin:  L groin access site clean and dry. Soft, nontender, no ecchymosis/hematoma, no erythema, no edema  Extremities: R leg covered in ACE with strikethrough over ace and strikethrough underneath on william.       LABS:                        6.9    9.09  )-----------( 389      ( 09 May 2025 12:46 )             22.1     05-09    140  |  107  |  13  ----------------------------<  242[H]  4.2   |  23  |  1.51[H]    Ca    8.4      09 May 2025 05:35  Phos  3.3     05-09  Mg     2.0     05-09      PT/INR - ( 09 May 2025 05:36 )   PT: 12.8 sec;   INR: 1.12 ratio         PTT - ( 09 May 2025 05:36 )  PTT:35.8 sec  CAPILLARY BLOOD GLUCOSE      POCT Blood Glucose.: 202 mg/dL (09 May 2025 14:12)  POCT Blood Glucose.: 160 mg/dL (09 May 2025 12:18)  POCT Blood Glucose.: 164 mg/dL (09 May 2025 09:46)  POCT Blood Glucose.: 259 mg/dL (09 May 2025 05:28)  POCT Blood Glucose.: 248 mg/dL (09 May 2025 01:46)  POCT Blood Glucose.: 240 mg/dL (09 May 2025 00:30)  POCT Blood Glucose.: 235 mg/dL (08 May 2025 21:10)  POCT Blood Glucose.: 180 mg/dL (08 May 2025 18:17)  POCT Blood Glucose.: 109 mg/dL (08 May 2025 17:26)  POCT Blood Glucose.: 87 mg/dL (08 May 2025 16:58)  POCT Blood Glucose.: 66 mg/dL (08 May 2025 16:37)      Radiology and Additional Studies:    Assessment:  54M hx DM, CAD (s/p CABG), mild HFrEF (EF 45%), and PAD (with recent dx angiogram Aug. 2024), and recent admission to [facility] w/ right foot wound s/p right foot partial 2nd ray resection and 3rd proximal phalanx bone biopsy on 3/7/25 (Cx + for E. Faecalis and Staph Epi requiring prolonged abx course s/p PICC), now presenting with multiple non-healing right foot wounds. His admission is complicated by an AURORA. S/p RLE angiogram 5/8 and now s/p R 1st ray resection and InD with podiatry with adequate bleeding and low concern for active infection.       PLAN  - Infectious disease recs appreciated > Continue empiric Zosyn, f/u bone margin to determine abx duration.   - 1u of pRBC in PACU, strikethrough on ACE, follow-up podiatry post-operative check.   - Pain: Tylenol, Oxy PRN  - Diet: NPO for procedure -> Resume diet post-procedure   - Lantus, pre-meal insulin, and SSI per endocrine, appreciate recs  - Trental 400mg TID for PAD  - Continue home ASA/Brilinta, statin/fibrate, metoprolol. Appreciate medicine recs  - F/u PT Consult for recommendations   - DVT ppx: [facility]    Vascular Surgery  [id number]

## 2025-05-09 NOTE — BRIEF OPERATIVE NOTE - COMMENTS
pt is s/p right foot Partial 1st ray resection and incision and drainage  High concern for residual bone infection, necrotic soft tissue and bone noted laterally    Low concern for viability, adequate intra-op bleeding  Podiatry plan: follow-up OR data, booked for right foot transmetatarsal amputation and tendoachilles lengthening on Monday, 5/12

## 2025-05-09 NOTE — CHART NOTE - NSCHARTNOTEFT_GEN_A_CORE
Pt evaluated at bedside by podiatry. He is s/p right foot Partial 1st ray resection today, 5/9. Strikethrough of dressings noted. Re-dressed using surgicel, 4x4 gauze, BAD pads, eugenio, and ACE bandage. Pt is to elevate RLE with pillows. Strict NWB precautions to RLE

## 2025-05-09 NOTE — PROGRESS NOTE ADULT - SUBJECTIVE AND OBJECTIVE BOX
[facility]  DU Garcia Y. Patel, S. Shah, G. Casimir  [phone]  ([phone] - weekdays after 5pm and weekends)    Name: ELSI MERINO  Age/Gender: 54y Male  MRN: [id number]    Interval History:  Patient seen and examined this morning.   No new complaints noted.  Notes reviewed  No concerning overnight events  Afebrile   Allergies: No Known Allergies      Objective:  Vitals:   T(F): 98.1 (05-09-25 @ 09:41), Max: 98.2 (05-09-25 @ 05:05)  HR: 74 (05-09-25 @ 09:41) (68 - 89)  BP: 130/77 (05-09-25 @ 09:41) (104/62 - 150/83)  RR: 16 (05-09-25 @ 09:41) (15 - 18)  SpO2: 98% (05-09-25 @ 09:41) (95% - 100%)  Physical Examination:  General: no acute distress, nontoxic appearing   HEENT: normocephalic, atraumatic, anicteric  Respiratory: no acc muscle use, breathing comfortably  Cardiovascular: S1 and S2 present  Gastrointestinal: normal appearing, nondistended  Extremities: R foot dressing     Laboratory Studies:  CBC:                       7.9    8.67  )-----------( 411      ( 09 May 2025 05:36 )             25.2     WBC Trend:  8.67 05-09-25 @ 05:36  10.48 05-08-25 @ 05:30  8.86 05-07-25 @ 07:17  10.24 05-06-25 @ 07:09  12.05 05-05-25 @ 06:48  9.62 05-04-25 @ 02:26    CMP: 05-09    140  |  107  |  13  ----------------------------<  242[H]  4.2   |  23  |  1.51[H]    Ca    8.4      09 May 2025 05:35  Phos  3.3     05-09  Mg     2.0     05-09      Creatinine: 1.51 mg/dL (05-09-25 @ 05:35)  Creatinine: 1.51 mg/dL (05-08-25 @ 05:30)  Creatinine: 1.49 mg/dL (05-07-25 @ 07:16)  Creatinine: 1.40 mg/dL (05-06-25 @ 07:17)  Creatinine: 1.61 mg/dL (05-05-25 @ 06:52)  Creatinine: 1.44 mg/dL (05-04-25 @ 02:25)    Microbiology: reviewed   Culture - Abscess with Gram Stain (collected 05-04-25 @ 04:40)  Source: Abscess right foot  Gram Stain (05-06-25 @ 11:51):    No polymorphonuclear leukocytes seen per low power field    No organisms seen per oil power field  Final Report (05-06-25 @ 11:51):    Rare Bacteroides fragilis "Susceptibilities not performed"    Culture - Blood (collected 05-04-25 @ 02:10)  Source: Blood Blood-Peripheral  Final Report (05-09-25 @ 05:00):    No growth at 5 days    Culture - Blood (collected 05-04-25 @ 02:08)  Source: Blood Blood-Peripheral  Final Report (05-09-25 @ 05:00):    No growth at 5 days    Radiology: reviewed     Medications:  acetaminophen     Tablet .. 975 milliGRAM(s) Oral every 6 hours  aspirin enteric coated 81 milliGRAM(s) Oral daily  atorvastatin 80 milliGRAM(s) Oral at bedtime  cadexomer iodine 0.9% Gel 1 Application(s) Topical daily  dextrose 5%. 1000 milliLiter(s) IV Continuous <Continuous>  dextrose 5%. 1000 milliLiter(s) IV Continuous <Continuous>  dextrose 50% Injectable 25 Gram(s) IV Push once  dextrose 50% Injectable 12.5 Gram(s) IV Push once  dextrose 50% Injectable 25 Gram(s) IV Push once  dextrose Oral Gel 15 Gram(s) Oral once PRN  fenofibrate Tablet 145 milliGRAM(s) Oral at bedtime  glucagon  Injectable 1 milliGRAM(s) IntraMuscular once  heparin   Injectable 5000 Unit(s) SubCutaneous every 8 hours  insulin glargine Injectable (LANTUS) 8 Unit(s) SubCutaneous at bedtime  insulin lispro (ADMELOG) corrective regimen sliding scale   SubCutaneous every 6 hours  insulin lispro Injectable (ADMELOG) 10 Unit(s) SubCutaneous three times a day before meals  metoprolol tartrate 50 milliGRAM(s) Oral two times a day  pentoxifylline 400 milliGRAM(s) Oral three times a day  piperacillin/tazobactam IVPB.. 3.375 Gram(s) IV Intermittent every 8 hours  sodium chloride 0.9%. 1000 milliLiter(s) IV Continuous <Continuous>  ticagrelor 90 milliGRAM(s) Oral every 12 hours    Current Antimicrobials:  piperacillin/tazobactam IVPB.. 3.375 Gram(s) IV Intermittent every 8 hours    Prior/Completed Antimicrobials:  piperacillin/tazobactam IVPB.  piperacillin/tazobactam IVPB.-  vancomycin  IVPB

## 2025-05-09 NOTE — PROGRESS NOTE ADULT - SUBJECTIVE AND OBJECTIVE BOX
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 She continues on trastuzumab: will complete therapy over end of May. We reviewed Echo results: done every 3 months to monitor EF which has remained WNL. We reviewed on Echo: severe aortic valve regurgitation which we reviewed signs and symptoms of worsening functioning. We reviewed cardiology evaluation and follow up. Will continue to monitor Echo: next due 6/2022. We reviewed signs and symptoms of uterine cancer recurrence: will plan on interval CT imaging in May but earlier if any new symptoms. Questions answered to their satisfaction. Next follow up in 6 weeks but earlier if any new symptoms.\par \par Anemia: has been off chemotherapy since 10/2021. She remains asymptomatic; will monitor. Prior anemia from uterine bleeding and chemotherapy which is now resolved.  Morning Surgical Progress Note  Patient is a 54y old  Male who presents with a chief complaint of Angiogram (07 May 2025 13:08)    SUBJECTIVE: Patient seen and examined at bedside with surgical team. Patient reports pain is well controlled. Denies any fevers, chills, or night sweats.     OBJECTIVE:  Vital Signs Last 24 Hrs  T(C): 36.8 (09 May 2025 05:05), Max: 36.8 (09 May 2025 05:05)  T(F): 98.2 (09 May 2025 05:05), Max: 98.2 (09 May 2025 05:05)  HR: 82 (09 May 2025 05:05) (68 - 89)  BP: 128/72 (09 May 2025 05:05) (104/62 - 150/83)  BP(mean): --  RR: 18 (09 May 2025 05:05) (15 - 18)  SpO2: 98% (09 May 2025 05:05) (98% - 100%)    Parameters below as of 09 May 2025 05:05  Patient On (Oxygen Delivery Method): room air      I&O's Detail    08 May 2025 07:01  -  09 May 2025 07:00  --------------------------------------------------------  IN:    dextrose 5% + sodium chloride 0.9%: 900 mL    IV PiggyBack: 100 mL    Oral Fluid: 460 mL    sodium chloride 0.9%: 150 mL  Total IN: 1610 mL    OUT:    Voided (mL): 2425 mL  Total OUT: 2425 mL    Total NET: -815 mL        Daily     Daily     LABS:                        7.9    8.67  )-----------( 411      ( 09 May 2025 05:36 )             25.2     05-09    140  |  107  |  13  ----------------------------<  242[H]  4.2   |  23  |  1.51[H]    Ca    8.4      09 May 2025 05:35  Phos  3.3     05-09  Mg     2.0     05-09      PT/INR - ( 09 May 2025 05:36 )   PT: 12.8 sec;   INR: 1.12 ratio         PTT - ( 09 May 2025 05:36 )  PTT:35.8 sec  Urinalysis Basic - ( 09 May 2025 05:35 )    Color: x / Appearance: x / SG: x / pH: x  Gluc: 242 mg/dL / Ketone: x  / Bili: x / Urobili: x   Blood: x / Protein: x / Nitrite: x   Leuk Esterase: x / RBC: x / WBC x   Sq Epi: x / Non Sq Epi: x / Bacteria: x    PHYSICAL EXAM:  Constitutional: appropriately interactive in no acute distress  Chest: Symmetric chest rise bilaterally, normal work of breathing on room air  Abdomen: Soft, nondistended, nontender   Groin:  L groin access site covered with tegaderm and gauze c/d/i.  Soft, nontender, no ecchymosis/hematoma, no erythema, no edema  Extremities: non-palpable right pedal pulses, 1-2th interdigital webspace with seropurulent output is packed with strips. Bilateral lower extremities are soft and warm. Equal range of motion in bilateral lower extremities   Morning Surgical Progress Note  Patient is a 54y old  Male who presents with a chief complaint of Angiogram (07 May 2025 13:08)    SUBJECTIVE: Patient seen and examined at bedside with surgical team. Patient reports pain is well controlled. Denies any fevers, chills, or night sweats.     OBJECTIVE:  Vital Signs Last 24 Hrs  T(C): 36.8 (09 May 2025 05:05), Max: 36.8 (09 May 2025 05:05)  T(F): 98.2 (09 May 2025 05:05), Max: 98.2 (09 May 2025 05:05)  HR: 82 (09 May 2025 05:05) (68 - 89)  BP: 128/72 (09 May 2025 05:05) (104/62 - 150/83)  BP(mean): --  RR: 18 (09 May 2025 05:05) (15 - 18)  SpO2: 98% (09 May 2025 05:05) (98% - 100%)    Parameters below as of 09 May 2025 05:05  Patient On (Oxygen Delivery Method): room air      I&O's Detail    08 May 2025 07:01  -  09 May 2025 07:00  --------------------------------------------------------  IN:    dextrose 5% + sodium chloride 0.9%: 900 mL    IV PiggyBack: 100 mL    Oral Fluid: 460 mL    sodium chloride 0.9%: 150 mL  Total IN: 1610 mL    OUT:    Voided (mL): 2425 mL  Total OUT: 2425 mL    Total NET: -815 mL       Daily     LABS:                        7.9    8.67  )-----------( 411      ( 09 May 2025 05:36 )             25.2     05-09    140  |  107  |  13  ----------------------------<  242[H]  4.2   |  23  |  1.51[H]    Ca    8.4      09 May 2025 05:35  Phos  3.3     05-09  Mg     2.0     05-09      PT/INR - ( 09 May 2025 05:36 )   PT: 12.8 sec;   INR: 1.12 ratio         PTT - ( 09 May 2025 05:36 )  PTT:35.8 sec  Urinalysis Basic - ( 09 May 2025 05:35 )    Color: x / Appearance: x / SG: x / pH: x  Gluc: 242 mg/dL / Ketone: x  / Bili: x / Urobili: x   Blood: x / Protein: x / Nitrite: x   Leuk Esterase: x / RBC: x / WBC x   Sq Epi: x / Non Sq Epi: x / Bacteria: x    PHYSICAL EXAM:  Constitutional: appropriately interactive in no acute distress  Chest: Symmetric chest rise bilaterally, normal work of breathing on room air  Abdomen: Soft, nondistended, nontender   Groin:  L groin access site covered with tegaderm and gauze c/d/i.  Soft, nontender, no ecchymosis/hematoma, no erythema, no edema  Extremities: non-palpable right pedal pulses, 1-2th interdigital webspace with seropurulent output is packed with strips. Bilateral lower extremities are soft and warm. Equal range of motion in bilateral lower extremities

## 2025-05-09 NOTE — PROGRESS NOTE ADULT - ASSESSMENT
Patient is a 54 year old male with PMH of DM, CAD (s/p CABG), mild HFrEF (EF 45%), and PAD (with recent angiogram Aug. 2024), and recent admission to [facility] with R foot wound s/p R foot Partial 2nd ray resection and 3rd proximal phalanx bone biopsy on 3/7/25 (Cx + for E. Faecalis and Staph Epi requiring prolonged abx course s/p PICC, clean margin with no residual OM), now presenting with right foot wound. Patient reports 1 week of subjective fevers, chills, hypothermia, and R foot lateral toe oozing and erythema c/f soft tissue infection.    R foot wound infection with 1st MPJ fluid collection, 1st and 2nd toe OM  - 5/4 s/p R foot I&D to level of subQ - scant, purulent drainage, no erythema, no malodor   - R foot abscess culture with Bacteroides fragilis   - R foot xray with no gas or OM  - R foot MRI with soft tissue wounds about distal forefoot with small volume of ill defined peripheral enhancing fluid preferentially surrounding the 1st MPJ fluid collection, residual second metatarsal and 1st proximal phalanx OM, possible reactive vs early OM of 3rd metatarsal  - Bcx NGTD x2    - 5/8 s/p RLE angiogram with vascular     Recommendations:   Podiatry following-- for OR for R foot revisional 2nd ray resection + Partial First Ray Resection today   - please send cultures/path  Continue zosyn for now   Final antibiotics and duration pending above   Monitor temps/WBC  Continue rest of care per primary team     Over the weekend Dr. Maya Harrington will be covering for our group. If you have any questions, concerns or new micro data please reach out to them using TEAMS *PREFERRED* or by calling our service at [phone].    Jason Vidal M.D.  [facility]  Available on [employer] TEAMS - *PREFERRED*  [phone]  After 5pm on weekdays and all day on weekends - please call [phone]     Thank you for consulting us and involving us in the management of this patients case. In addition to reviewing history, imaging, documents, labs, microbiology, took into account antibiotic stewardship, local antibiogram and infection control strategies and potential transmission issues at time of treatment decision making process.

## 2025-05-09 NOTE — PROGRESS NOTE ADULT - ASSESSMENT
54M hx DM, CAD (s/p CABG), mild HFrEF (EF 45%), and PAD (with recent dx angiogram Aug. 2024), and recent admission to [facility] w/ right foot wound s/p right foot partial 2nd ray resection and 3rd proximal phalanx bone biopsy on 3/7/25 (Cx + for E. Faecalis and Staph Epi requiring prolonged abx course s/p PICC), now presenting with multiple non-healing right foot wounds. His admission is complicated by an AURORA. S/p RLE angiogram 5/8.      PLAN  - Booked for R 1st and 2nd ray resection with podiatry today   - Infectious disease recs appreciated > Continue empiric Zosyn  - Continue IVF resuscitation for AURORA, monitor creatinine  - Pain: Tylenol  - Diet: NPO for procedure -> Resume diet post-procedure   - Lantus, pre-meal insulin, and SSI per endocrine, appreciate recs  - Trental 400mg TID for PAD  - Continue home ASA/Brilinta, statin/fibrate, metoprolol. Appreciate medicine recs  - DVT ppx: [facility]    Vascular Surgery  [id number]

## 2025-05-10 LAB
ANION GAP SERPL CALC-SCNC: 9 MMOL/L — SIGNIFICANT CHANGE UP (ref 5–17)
BUN SERPL-MCNC: 14 MG/DL — SIGNIFICANT CHANGE UP (ref 7–23)
CALCIUM SERPL-MCNC: 8.3 MG/DL — LOW (ref 8.4–10.5)
CHLORIDE SERPL-SCNC: 105 MMOL/L — SIGNIFICANT CHANGE UP (ref 96–108)
CO2 SERPL-SCNC: 24 MMOL/L — SIGNIFICANT CHANGE UP (ref 22–31)
CREAT SERPL-MCNC: 1.6 MG/DL — HIGH (ref 0.5–1.3)
EGFR: 51 ML/MIN/1.73M2 — LOW
EGFR: 51 ML/MIN/1.73M2 — LOW
GLUCOSE BLDC GLUCOMTR-MCNC: 131 MG/DL — HIGH (ref 70–99)
GLUCOSE BLDC GLUCOMTR-MCNC: 185 MG/DL — HIGH (ref 70–99)
GLUCOSE BLDC GLUCOMTR-MCNC: 246 MG/DL — HIGH (ref 70–99)
GLUCOSE BLDC GLUCOMTR-MCNC: 288 MG/DL — HIGH (ref 70–99)
GLUCOSE BLDC GLUCOMTR-MCNC: 92 MG/DL — SIGNIFICANT CHANGE UP (ref 70–99)
GLUCOSE SERPL-MCNC: 238 MG/DL — HIGH (ref 70–99)
HCT VFR BLD CALC: 24.2 % — LOW (ref 39–50)
HGB BLD-MCNC: 7.6 G/DL — LOW (ref 13–17)
MAGNESIUM SERPL-MCNC: 2.1 MG/DL — SIGNIFICANT CHANGE UP (ref 1.6–2.6)
MCHC RBC-ENTMCNC: 24.1 PG — LOW (ref 27–34)
MCHC RBC-ENTMCNC: 31.4 G/DL — LOW (ref 32–36)
MCV RBC AUTO: 76.6 FL — LOW (ref 80–100)
NRBC BLD AUTO-RTO: 0 /100 WBCS — SIGNIFICANT CHANGE UP (ref 0–0)
PHOSPHATE SERPL-MCNC: 2.6 MG/DL — SIGNIFICANT CHANGE UP (ref 2.5–4.5)
PLATELET # BLD AUTO: 388 K/UL — SIGNIFICANT CHANGE UP (ref 150–400)
POTASSIUM SERPL-MCNC: 4.3 MMOL/L — SIGNIFICANT CHANGE UP (ref 3.5–5.3)
POTASSIUM SERPL-SCNC: 4.3 MMOL/L — SIGNIFICANT CHANGE UP (ref 3.5–5.3)
RBC # BLD: 3.16 M/UL — LOW (ref 4.2–5.8)
RBC # FLD: 14.8 % — HIGH (ref 10.3–14.5)
SODIUM SERPL-SCNC: 138 MMOL/L — SIGNIFICANT CHANGE UP (ref 135–145)
WBC # BLD: 10.67 K/UL — HIGH (ref 3.8–10.5)
WBC # FLD AUTO: 10.67 K/UL — HIGH (ref 3.8–10.5)

## 2025-05-10 PROCEDURE — 99232 SBSQ HOSP IP/OBS MODERATE 35: CPT

## 2025-05-10 RX ORDER — INSULIN LISPRO 100 U/ML
15 INJECTION, SOLUTION INTRAVENOUS; SUBCUTANEOUS
Refills: 0 | Status: DISCONTINUED | OUTPATIENT
Start: 2025-05-10 | End: 2025-05-12

## 2025-05-10 RX ORDER — TICAGRELOR 90 MG/1
90 TABLET ORAL EVERY 12 HOURS
Refills: 0 | Status: DISCONTINUED | OUTPATIENT
Start: 2025-05-10 | End: 2025-05-12

## 2025-05-10 RX ORDER — INSULIN GLARGINE-YFGN 100 [IU]/ML
13 INJECTION, SOLUTION SUBCUTANEOUS AT BEDTIME
Refills: 0 | Status: DISCONTINUED | OUTPATIENT
Start: 2025-05-10 | End: 2025-05-12

## 2025-05-10 RX ORDER — SOD PHOS DI, MONO/K PHOS MONO 250 MG
1 TABLET ORAL ONCE
Refills: 0 | Status: COMPLETED | OUTPATIENT
Start: 2025-05-10 | End: 2025-05-10

## 2025-05-10 RX ADMIN — HEPARIN SODIUM 5000 UNIT(S): 1000 INJECTION INTRAVENOUS; SUBCUTANEOUS at 05:49

## 2025-05-10 RX ADMIN — Medication 25 GRAM(S): at 21:22

## 2025-05-10 RX ADMIN — INSULIN LISPRO 15 UNIT(S): 100 INJECTION, SOLUTION INTRAVENOUS; SUBCUTANEOUS at 18:14

## 2025-05-10 RX ADMIN — INSULIN LISPRO 3: 100 INJECTION, SOLUTION INTRAVENOUS; SUBCUTANEOUS at 09:06

## 2025-05-10 RX ADMIN — Medication 400 MILLIGRAM(S): at 13:44

## 2025-05-10 RX ADMIN — Medication 1000 MILLIGRAM(S): at 18:27

## 2025-05-10 RX ADMIN — ATORVASTATIN CALCIUM 80 MILLIGRAM(S): 80 TABLET, FILM COATED ORAL at 21:23

## 2025-05-10 RX ADMIN — METOPROLOL SUCCINATE 50 MILLIGRAM(S): 50 TABLET, EXTENDED RELEASE ORAL at 05:48

## 2025-05-10 RX ADMIN — INSULIN LISPRO 15 UNIT(S): 100 INJECTION, SOLUTION INTRAVENOUS; SUBCUTANEOUS at 13:43

## 2025-05-10 RX ADMIN — INSULIN GLARGINE-YFGN 13 UNIT(S): 100 INJECTION, SOLUTION SUBCUTANEOUS at 21:22

## 2025-05-10 RX ADMIN — HEPARIN SODIUM 5000 UNIT(S): 1000 INJECTION INTRAVENOUS; SUBCUTANEOUS at 13:44

## 2025-05-10 RX ADMIN — TICAGRELOR 90 MILLIGRAM(S): 90 TABLET ORAL at 17:25

## 2025-05-10 RX ADMIN — Medication 1000 MILLIGRAM(S): at 17:27

## 2025-05-10 RX ADMIN — Medication 1 PACKET(S): at 09:08

## 2025-05-10 RX ADMIN — FENOFIBRATE 145 MILLIGRAM(S): 160 TABLET ORAL at 21:23

## 2025-05-10 RX ADMIN — Medication 1000 MILLIGRAM(S): at 23:54

## 2025-05-10 RX ADMIN — HEPARIN SODIUM 5000 UNIT(S): 1000 INJECTION INTRAVENOUS; SUBCUTANEOUS at 21:22

## 2025-05-10 RX ADMIN — INSULIN LISPRO 10 UNIT(S): 100 INJECTION, SOLUTION INTRAVENOUS; SUBCUTANEOUS at 09:06

## 2025-05-10 RX ADMIN — Medication 25 GRAM(S): at 13:43

## 2025-05-10 RX ADMIN — Medication 25 GRAM(S): at 05:48

## 2025-05-10 RX ADMIN — Medication 400 MILLIGRAM(S): at 21:23

## 2025-05-10 RX ADMIN — Medication 81 MILLIGRAM(S): at 11:37

## 2025-05-10 RX ADMIN — INSULIN LISPRO 2: 100 INJECTION, SOLUTION INTRAVENOUS; SUBCUTANEOUS at 13:43

## 2025-05-10 RX ADMIN — METOPROLOL SUCCINATE 50 MILLIGRAM(S): 50 TABLET, EXTENDED RELEASE ORAL at 17:24

## 2025-05-10 RX ADMIN — Medication 400 MILLIGRAM(S): at 05:48

## 2025-05-10 RX ADMIN — Medication 1000 MILLIGRAM(S): at 23:24

## 2025-05-10 NOTE — PROGRESS NOTE ADULT - SUBJECTIVE AND OBJECTIVE BOX
Patient seen today for follow up inpatient Diabetes Mellitus management.    Chief Complaint: Type 2 Diabetes Mellitus     INTERVAL HX:  Patient seen in [facility] 9MON 923 D1. Patient is alert and oriented, resting in bed. S/p angio done yesterday 5/9. Patient was NPO for most of yesterday morning. Patient now states he is feeling good, eating full meals again and tolerating POs. Noted patient requiring blood transfusion this am. FBG elevated to 288mg/dL, 238mg/dL on blood serum. No hypoglycemia. Noted postprandial hyperglycemia last evening to 253mg/dL -> patient denies eating any snacks last night after dinner. Blood glucose levels in the last 24hrs have been 160-306mg/dL.     Review of Systems:  General: As above.  Respiratory: Denies any SOB, CAGE, or cough.  Gastrointestinal: Denies any n/v/d or abdominal pain.   Endocrine: Denies any polyuria, polydipsia, polyphagia, visual changes, or numbness in feet.     Allergies  No Known Allergies      Intolerances  None.       MEDICATIONS  (STANDING):  acetaminophen     Tablet .. 1000 milliGRAM(s) Oral every 6 hours  aspirin enteric coated 81 milliGRAM(s) Oral daily  atorvastatin 80 milliGRAM(s) Oral at bedtime  dextrose 5%. 1000 milliLiter(s) IV Continuous <Continuous>  fenofibrate Tablet 145 milliGRAM(s) Oral at bedtime  heparin   Injectable 5000 Unit(s) SubCutaneous every 8 hours  insulin glargine Injectable (LANTUS) 13 Unit(s) SubCutaneous at bedtime  insulin lispro (ADMELOG) corrective regimen sliding scale   SubCutaneous three times a day before meals  insulin lispro (ADMELOG) corrective regimen sliding scale   SubCutaneous at bedtime  insulin lispro Injectable (ADMELOG) 15 Unit(s) SubCutaneous three times a day before meals  metoprolol tartrate 50 milliGRAM(s) Oral two times a day  oxyCODONE    IR 2.5 milliGRAM(s) Oral every 4 hours PRN  oxyCODONE    IR 5 milliGRAM(s) Oral every 4 hours PRN  pentoxifylline 400 milliGRAM(s) Oral three times a day  piperacillin/tazobactam IVPB.. 3.375 Gram(s) IV Intermittent every 8 hours  ticagrelor 90 milliGRAM(s) Oral every 12 hours      atorvastatin 80 milliGRAM(s) Oral at bedtime  fenofibrate Tablet 145 milliGRAM(s) Oral at bedtime  insulin glargine Injectable (LANTUS) 13 Unit(s) SubCutaneous at bedtime  insulin lispro (ADMELOG) corrective regimen sliding scale   SubCutaneous three times a day before meals  insulin lispro (ADMELOG) corrective regimen sliding scale   SubCutaneous at bedtime  insulin lispro Injectable (ADMELOG) 15 Unit(s) SubCutaneous three times a day before meals      insulin lispro (ADMELOG) corrective regimen sliding scale   SubCutaneous three times a day before meals  insulin lispro (ADMELOG) corrective regimen sliding scale   SubCutaneous at bedtime  insulin lispro Injectable (ADMELOG) 15 Unit(s) SubCutaneous three times a day before meals      PHYSICAL EXAM:  VITALS:   T(C): 36.6 (05-10-25 @ 09:06), Max: 36.9 (05-10-25 @ 05:42)  HR: 79 (05-10-25 @ 09:06) (71 - 81)  BP: 119/65 (05-10-25 @ 09:06) (95/54 - 135/75)  RR: 18 (05-10-25 @ 09:06) (13 - 22)  SpO2: 98% (05-10-25 @ 09:06) (97% - 100%)    GENERAL: In no acute distress  Respiratory: Respirations unlabored  Extremities: Warm and dry, no edema  NEURO: Alert and oriented, appropriate     LABS:  POCT Blood Glucose.: 288 mg/dL (05-10-25 @ 08:29)  POCT Blood Glucose.: 253 mg/dL (05-09-25 @ 22:06)  POCT Blood Glucose.: 306 mg/dL (05-09-25 @ 16:55)  POCT Blood Glucose.: 202 mg/dL (05-09-25 @ 14:12)  POCT Blood Glucose.: 160 mg/dL (05-09-25 @ 12:18)  POCT Blood Glucose.: 164 mg/dL (05-09-25 @ 09:46)  POCT Blood Glucose.: 259 mg/dL (05-09-25 @ 05:28)  POCT Blood Glucose.: 248 mg/dL (05-09-25 @ 01:46)  POCT Blood Glucose.: 240 mg/dL (05-09-25 @ 00:30)  POCT Blood Glucose.: 235 mg/dL (05-08-25 @ 21:10)  POCT Blood Glucose.: 180 mg/dL (05-08-25 @ 18:17)  POCT Blood Glucose.: 109 mg/dL (05-08-25 @ 17:26)  POCT Blood Glucose.: 87 mg/dL (05-08-25 @ 16:58)  POCT Blood Glucose.: 66 mg/dL (05-08-25 @ 16:37)  POCT Blood Glucose.: 76 mg/dL (05-08-25 @ 13:18)  POCT Blood Glucose.: 85 mg/dL (05-08-25 @ 10:52)  POCT Blood Glucose.: 94 mg/dL (05-08-25 @ 06:51)  POCT Blood Glucose.: 132 mg/dL (05-08-25 @ 02:35)  POCT Blood Glucose.: 211 mg/dL (05-07-25 @ 21:26)  POCT Blood Glucose.: 289 mg/dL (05-07-25 @ 19:13)  POCT Blood Glucose.: 153 mg/dL (05-07-25 @ 12:07)                          7.6    10.67 )-----------( 388      ( 10 May 2025 07:06 )             24.2     05-10    138  |  105  |  14  ----------------------------<  238[H]  4.3   |  24  |  1.60[H]    Ca    8.3[L]      10 May 2025 07:08  Phos  2.6     05-10  Mg     2.1     05-10        PT/INR - ( 09 May 2025 05:36 )   PT: 12.8 sec;   INR: 1.12 ratio         PTT - ( 09 May 2025 05:36 )  PTT:35.8 sec      Urinalysis Basic - ( 10 May 2025 07:08 )    Color: x / Appearance: x / SG: x / pH: x  Gluc: 238 mg/dL / Ketone: x  / Bili: x / Urobili: x   Blood: x / Protein: x / Nitrite: x   Leuk Esterase: x / RBC: x / WBC x   Sq Epi: x / Non Sq Epi: x / Bacteria: x        A1C with Estimated Average Glucose Result: A1C with Estimated Average Glucose Result: 10.1 % (05-05-25 @ 06:47)  A1C with Estimated Average Glucose Result: >15.5 % (03-04-25 @ 23:12)

## 2025-05-10 NOTE — PROGRESS NOTE ADULT - SUBJECTIVE AND OBJECTIVE BOX
[facility]  Infectious Disease  Dr Osman, Dr Harrington, Dr Pineda, MARCIE Carlton Lalit  [phone]  after hours and weekends [phone]    Name: ELSI MERINO  Age: 54y  Gender: Male  MRN: [id number]    Interval History--  Notes reviewed  no overnight events  offers no complaints    Allergies    No Known Allergies    Intolerances        Medications--  Antibiotics:  piperacillin/tazobactam IVPB.. 3.375 Gram(s) IV Intermittent every 8 hours    Immunologic:    Other:  acetaminophen     Tablet ..  aspirin enteric coated  atorvastatin  dextrose 5%.  fenofibrate Tablet  heparin   Injectable  insulin glargine Injectable (LANTUS)  insulin lispro (ADMELOG) corrective regimen sliding scale  insulin lispro (ADMELOG) corrective regimen sliding scale  insulin lispro Injectable (ADMELOG)  metoprolol tartrate  oxyCODONE    IR PRN  oxyCODONE    IR PRN  pentoxifylline  ticagrelor      Review of Systems--  A 10-point review of systems was obtained.     Pertinent positives and negatives--  Constitutional: No fevers. No Chills. No Rigors.   Cardiovascular: No chest pain. No palpitations.  Respiratory: No shortness of breath. No cough.  Gastrointestinal: No nausea or vomiting. No diarrhea or constipation.   Psychiatric: Pleasant. Appropriate affect.    Review of systems otherwise negative except as previously noted.    Physical Examination--  Vital Signs: T(F): 97.9 (05-10-25 @ 09:06), Max: 98.4 (05-10-25 @ 05:42)  HR: 79 (05-10-25 @ 09:06)  BP: 119/65 (05-10-25 @ 09:06)  RR: 18 (05-10-25 @ 09:06)  SpO2: 98% (05-10-25 @ 09:06)  Wt(kg): --  General: Nontoxic-appearing Male in no acute distress.  HEENT: AT/NC.   Neck: Not rigid. No sense of mass.  Nodes: None palpable.  Lungs: Clear bilaterally without rales, wheezing or rhonchi  Heart: Regular rate and rhythm.   Abdomen: Bowel sounds present and normoactive. Soft. Nondistended.  Back: No spinal tenderness. No costovertebral angle tenderness.   Extremities: No cyanosis or clubbing. right foot wrapped  Skin: Warm. Dry. Good turgor. No rash. No vasculitic stigmata.  Psychiatric: Appropriate affect and mood for situation.         Laboratory Studies--  CBC                        7.6    10.67 )-----------( 388      ( 10 May 2025 07:06 )             24.2       Chemistries  05-10    138  |  105  |  14  ----------------------------<  238[H]  4.3   |  24  |  1.60[H]    Ca    8.3[L]      10 May 2025 07:08  Phos  2.6     05-10  Mg     2.1     05-10        Culture Data    Culture - Abscess with Gram Stain (collected 04 May 2025 04:40)  Source: Abscess right foot  Gram Stain (06 May 2025 11:51):    No polymorphonuclear leukocytes seen per low power field    No organisms seen per oil power field  Final Report (06 May 2025 11:51):    Rare Bacteroides fragilis "Susceptibilities not performed"    Culture - Blood (collected 04 May 2025 02:10)  Source: Blood Blood-Peripheral  Final Report (09 May 2025 05:00):    No growth at 5 days    Culture - Blood (collected 04 May 2025 02:08)  Source: Blood Blood-Peripheral  Final Report (09 May 2025 05:00):    No growth at 5 days

## 2025-05-10 NOTE — PROGRESS NOTE ADULT - PROBLEM SELECTOR PLAN 1
Inpatient Plan:  - Check BG TID AC, HS, and 2AM   - Adjust Lantus to 13u QHS  - Adjust Admelog to 15u TID AC (HOLD if NPO)  - C/w low dose Admelog correctional scales TID AC, HS, and 2AM    Discharge Plan:  - Bsal/bolus reg, TBD closer to d/c.   - Patient should check BG TID AC and HS at home. Please tell patient to contact Endocrinologist if BG <70mg/dL x1 or >200mg/dL consistently or >400mg/dL x1.  - Endocrine follow up: has Medicare, can f/u at [facility] [address] [place] [phone]. Email to be sent closer to d/c for appt.   - Given elevated BHOB of 3 w/ A1c >15.5% on last admission, patient to should have r/o T1DM/SUMIT labs checked.   - Recommend routine outpatient ophthalmology and podiatry follow up.  - Patient will need RX for basal insulin pen (ie. Basaglar, Lantus, Tresiba, Toujeo) and bolus insulin pen (ie. humalog/novolog/admelog) depending on insurance coverage; please send test scripts to see which is covered. Please make sure patient has all diabetes supplies on discharge (glucometer, test strips, lancets, alcohol swabs, insulin pen needles). Please write RX for glucose tablets/gel> use as directed plus Glucagon nasal/ IM injection (use as directed)> Can prescribe any covered by pt's insurance.

## 2025-05-10 NOTE — PROGRESS NOTE ADULT - ASSESSMENT
54M  s/p right foot partial 1st ray resection, open (DOS 5/9)  - Patient seen and evaluated  - Afebrile, WBC 10.67  - 5/4 s/p right foot partial 1st ray resection, open: sutures intact, mild sanguinous drainage, no pus, no malodor, flap warm, Left foot no open wounds, no acute signs of infection  - Intraop finding high concern residual infection, low concern viability   - OR data pending   - Vasc recs, appreciated  - ID recs, appreciated   - Pod plan return to OR for right foot TMA on Monday 5/12 5pm with Dr Patterson  - Medical and cardiac clearance documented, appreciated  - Discussed with attending

## 2025-05-10 NOTE — PROGRESS NOTE ADULT - SUBJECTIVE AND OBJECTIVE BOX
Patient is a 54y old  Male who presents with a chief complaint of Angiogram (10 May 2025 10:39)       INTERVAL HPI/OVERNIGHT EVENTS:  Patient seen and evaluated at bedside.  Pt is resting comfortable in NAD. Denies N/V/F/C.    Allergies    No Known Allergies    Intolerances        Vital Signs Last 24 Hrs  T(C): 36.6 (10 May 2025 09:06), Max: 36.9 (10 May 2025 05:42)  T(F): 97.9 (10 May 2025 09:06), Max: 98.4 (10 May 2025 05:42)  HR: 79 (10 May 2025 09:06) (71 - 81)  BP: 119/65 (10 May 2025 09:06) (95/54 - 135/75)  BP(mean): 86 (09 May 2025 15:00) (71 - 94)  RR: 18 (10 May 2025 09:06) (13 - 22)  SpO2: 98% (10 May 2025 09:06) (97% - 100%)    Parameters below as of 10 May 2025 09:06  Patient On (Oxygen Delivery Method): room air        LABS:                        7.6    10.67 )-----------( 388      ( 10 May 2025 07:06 )             24.2     05-10    138  |  105  |  14  ----------------------------<  238[H]  4.3   |  24  |  1.60[H]    Ca    8.3[L]      10 May 2025 07:08  Phos  2.6     05-10  Mg     2.1     05-10      PT/INR - ( 09 May 2025 05:36 )   PT: 12.8 sec;   INR: 1.12 ratio         PTT - ( 09 May 2025 05:36 )  PTT:35.8 sec  Urinalysis Basic - ( 10 May 2025 07:08 )    Color: x / Appearance: x / SG: x / pH: x  Gluc: 238 mg/dL / Ketone: x  / Bili: x / Urobili: x   Blood: x / Protein: x / Nitrite: x   Leuk Esterase: x / RBC: x / WBC x   Sq Epi: x / Non Sq Epi: x / Bacteria: x      CAPILLARY BLOOD GLUCOSE      POCT Blood Glucose.: 288 mg/dL (10 May 2025 08:29)  POCT Blood Glucose.: 253 mg/dL (09 May 2025 22:06)  POCT Blood Glucose.: 306 mg/dL (09 May 2025 16:55)  POCT Blood Glucose.: 202 mg/dL (09 May 2025 14:12)  POCT Blood Glucose.: 160 mg/dL (09 May 2025 12:18)      Lower Extremity Physical Exam:  Vascular: DP/PT 0/4, B/L, CFT < 3 seconds B/L, Temperature gradient warm to cool, B/L.   Neuro: Epicritic sensation absent to the level of digits, B/L.  Musculoskeletal/Ortho: s/p Right foot 3rd metatarsal head resection, s/p Right foot Partial 2nd Ray Resection (DOS 3/6)  Skin: 5/4:  s/p right foot partial 1st ray resection, open sutures intact, mild sanguinous drainage, no pus, no malodor, flap warm, Left foot no open wounds, no acute signs of infection    RADIOLOGY & ADDITIONAL TESTS:

## 2025-05-10 NOTE — PHYSICAL THERAPY INITIAL EVALUATION ADULT - TRANSFER SAFETY CONCERNS NOTED: SIT/STAND, REHAB EVAL
decreased balance during turns/losing balance/decreased sequencing ability/inability to maintain weight-bearing restrictions w/o assist

## 2025-05-10 NOTE — PROGRESS NOTE ADULT - ASSESSMENT
Assessment:  54M hx DM, CAD (s/p CABG), mild HFrEF (EF 45%), and PAD (with recent dx angiogram Aug. 2024), and recent admission to [facility] w/ right foot wound s/p right foot partial 2nd ray resection and 3rd proximal phalanx bone biopsy on 3/7/25 (Cx + for E. Faecalis and Staph Epi requiring prolonged abx course s/p PICC), now presenting with multiple non-healing right foot wounds. His admission is complicated by an AURORA. S/p RLE angiogram 5/8 and now s/p R 1st ray resection and InD with podiatry with adequate bleeding and low concern for active infection.       PLAN  - Infectious disease recs appreciated > Continue empiric Zosyn, f/u bone margin to determine abx duration.   - Pain: Tylenol, Oxy PRN  - Diet: Regular Diet  - Lantus, pre-meal insulin, and SSI per endocrine, appreciate recs  - Trental 400mg TID for PAD  - Continue home ASA/Brilinta, statin/fibrate, metoprolol. Appreciate medicine recs  - F/u PT Consult for recommendations   - DVT ppx: [facility]    Vascular Surgery  [id number].

## 2025-05-10 NOTE — PROGRESS NOTE ADULT - SUBJECTIVE AND OBJECTIVE BOX
Morning Surgical Progress Note  Patient is a 54y old  Male who presents with a chief complaint of Angiogram (09 May 2025 08:53)    SUBJECTIVE: Patient seen and examined at bedside with surgical team.    Vital Signs Last 24 Hrs  T(C): 36.9 (10 May 2025 05:42), Max: 36.9 (10 May 2025 05:42)  T(F): 98.4 (10 May 2025 05:42), Max: 98.4 (10 May 2025 05:42)  HR: 81 (10 May 2025 05:42) (71 - 81)  BP: 110/63 (10 May 2025 05:42) (95/54 - 135/75)  BP(mean): 86 (09 May 2025 15:00) (71 - 94)  RR: 17 (10 May 2025 05:42) (13 - 22)  SpO2: 97% (10 May 2025 05:42) (95% - 100%)    Parameters below as of 10 May 2025 05:42  Patient On (Oxygen Delivery Method): room air    I&O's Detail    09 May 2025 07:01  -  10 May 2025 07:00  --------------------------------------------------------  IN:    Oral Fluid: 600 mL    sodium chloride 0.9%: 225 mL  Total IN: 825 mL    OUT:    Voided (mL): 1750 mL  Total OUT: 1750 mL    Total NET: -925 mL        Medications  MEDICATIONS  (STANDING):  acetaminophen     Tablet .. 1000 milliGRAM(s) Oral every 6 hours  aspirin enteric coated 81 milliGRAM(s) Oral daily  atorvastatin 80 milliGRAM(s) Oral at bedtime  dextrose 5%. 1000 milliLiter(s) (100 mL/Hr) IV Continuous <Continuous>  fenofibrate Tablet 145 milliGRAM(s) Oral at bedtime  heparin   Injectable 5000 Unit(s) SubCutaneous every 8 hours  insulin glargine Injectable (LANTUS) 10 Unit(s) SubCutaneous at bedtime  insulin lispro (ADMELOG) corrective regimen sliding scale   SubCutaneous three times a day before meals  insulin lispro (ADMELOG) corrective regimen sliding scale   SubCutaneous at bedtime  insulin lispro Injectable (ADMELOG) 10 Unit(s) SubCutaneous three times a day before meals  metoprolol tartrate 50 milliGRAM(s) Oral two times a day  pentoxifylline 400 milliGRAM(s) Oral three times a day  piperacillin/tazobactam IVPB.. 3.375 Gram(s) IV Intermittent every 8 hours    MEDICATIONS  (PRN):  oxyCODONE    IR 2.5 milliGRAM(s) Oral every 4 hours PRN Moderate Pain (4 - 6)  oxyCODONE    IR 5 milliGRAM(s) Oral every 4 hours PRN Severe Pain (7 - 10)    PHYSICAL EXAM:  Constitutional: appropriately interactive in no acute distress  Chest: Symmetric chest rise bilaterally, normal work of breathing on room air  Abdomen: Soft, nondistended, nontender   Groin:  L groin access site clean and dry. Soft, nontender, no ecchymosis/hematoma, no erythema, no edema  Extremities: R leg covered in ACE    LABS:                        6.9    9.09  )-----------( 389      ( 09 May 2025 12:46 )             22.1     05-09    140  |  107  |  13  ----------------------------<  242[H]  4.2   |  23  |  1.51[H]    Ca    8.4      09 May 2025 05:35  Phos  3.3     05-09  Mg     2.0     05-09      PT/INR - ( 09 May 2025 05:36 )   PT: 12.8 sec;   INR: 1.12 ratio         PTT - ( 09 May 2025 05:36 )  PTT:35.8 sec    Urinalysis Basic - ( 09 May 2025 05:35 )    Color: x / Appearance: x / SG: x / pH: x  Gluc: 242 mg/dL / Ketone: x  / Bili: x / Urobili: x   Blood: x / Protein: x / Nitrite: x   Leuk Esterase: x / RBC: x / WBC x   Sq Epi: x / Non Sq Epi: x / Bacteria: x       Morning Surgical Progress Note  Patient is a 54y old  Male who presents with a chief complaint of Angiogram (09 May 2025 08:53)    SUBJECTIVE: Patient seen and examined at bedside with surgical team. Pain is well controlled. No further strikethrough on foot.     Vital Signs Last 24 Hrs  T(C): 36.9 (10 May 2025 05:42), Max: 36.9 (10 May 2025 05:42)  T(F): 98.4 (10 May 2025 05:42), Max: 98.4 (10 May 2025 05:42)  HR: 81 (10 May 2025 05:42) (71 - 81)  BP: 110/63 (10 May 2025 05:42) (95/54 - 135/75)  BP(mean): 86 (09 May 2025 15:00) (71 - 94)  RR: 17 (10 May 2025 05:42) (13 - 22)  SpO2: 97% (10 May 2025 05:42) (95% - 100%)    Parameters below as of 10 May 2025 05:42  Patient On (Oxygen Delivery Method): room air    I&O's Detail    09 May 2025 07:01  -  10 May 2025 07:00  --------------------------------------------------------  IN:    Oral Fluid: 600 mL    sodium chloride 0.9%: 225 mL  Total IN: 825 mL    OUT:    Voided (mL): 1750 mL  Total OUT: 1750 mL    Total NET: -925 mL        Medications  MEDICATIONS  (STANDING):  acetaminophen     Tablet .. 1000 milliGRAM(s) Oral every 6 hours  aspirin enteric coated 81 milliGRAM(s) Oral daily  atorvastatin 80 milliGRAM(s) Oral at bedtime  dextrose 5%. 1000 milliLiter(s) (100 mL/Hr) IV Continuous <Continuous>  fenofibrate Tablet 145 milliGRAM(s) Oral at bedtime  heparin   Injectable 5000 Unit(s) SubCutaneous every 8 hours  insulin glargine Injectable (LANTUS) 10 Unit(s) SubCutaneous at bedtime  insulin lispro (ADMELOG) corrective regimen sliding scale   SubCutaneous three times a day before meals  insulin lispro (ADMELOG) corrective regimen sliding scale   SubCutaneous at bedtime  insulin lispro Injectable (ADMELOG) 10 Unit(s) SubCutaneous three times a day before meals  metoprolol tartrate 50 milliGRAM(s) Oral two times a day  pentoxifylline 400 milliGRAM(s) Oral three times a day  piperacillin/tazobactam IVPB.. 3.375 Gram(s) IV Intermittent every 8 hours    MEDICATIONS  (PRN):  oxyCODONE    IR 2.5 milliGRAM(s) Oral every 4 hours PRN Moderate Pain (4 - 6)  oxyCODONE    IR 5 milliGRAM(s) Oral every 4 hours PRN Severe Pain (7 - 10)    PHYSICAL EXAM:  Constitutional: appropriately interactive in no acute distress  Chest: Symmetric chest rise bilaterally, normal work of breathing on room air  Abdomen: Soft, nondistended, nontender   Groin:  L groin access site clean and dry. Soft, nontender, no ecchymosis/hematoma, no erythema, no edema  Extremities: R leg covered in ACE, no strikethrough     LABS:                        6.9    9.09  )-----------( 389      ( 09 May 2025 12:46 )             22.1     05-09    140  |  107  |  13  ----------------------------<  242[H]  4.2   |  23  |  1.51[H]    Ca    8.4      09 May 2025 05:35  Phos  3.3     05-09  Mg     2.0     05-09      PT/INR - ( 09 May 2025 05:36 )   PT: 12.8 sec;   INR: 1.12 ratio         PTT - ( 09 May 2025 05:36 )  PTT:35.8 sec    Urinalysis Basic - ( 09 May 2025 05:35 )    Color: x / Appearance: x / SG: x / pH: x  Gluc: 242 mg/dL / Ketone: x  / Bili: x / Urobili: x   Blood: x / Protein: x / Nitrite: x   Leuk Esterase: x / RBC: x / WBC x   Sq Epi: x / Non Sq Epi: x / Bacteria: x

## 2025-05-10 NOTE — PROGRESS NOTE ADULT - ASSESSMENT
54 year old male with PMH of DM, CAD (s/p CABG), mild HFrEF (EF 45%), and PAD, and recent admission to [facility] with R foot wound s/p R foot Partial 2nd ray resection and 3rd proximal phalanx bone biopsy on 3/7/25 (Cx + for E. Faecalis and Staph Epi requiring prolonged abx course s/p PICC, clean margin with no residual OM), now presenting with right foot wound. Patient reports 1 week of subjective fevers, chills, hypothermia, and R foot lateral toe oozing and erythema c/f soft tissue infection. Endocrine consulted for uncontrolled T2DM and hyperglycemia (High risk patient with severely uncontrolled diabetes with A1c of 11.2% at high risk of CAD and CVA with high medical complexity and high level decision-making). Patient reports eating full meals and tolerating POs, s/p angio done yesterday 5/9. Now with hyperglycemia FBG and postprandial, will increase insulin regimen for tighter BG control. Noted patient getting blood transfusion today. Noted patient takes much more insulin at home, he states he eats more at home. Endocrine will closely monitor BG and adjust insulin as needed for BG goal 100-180mg/dL inpatient.       #Uncontrolled T2DM  A1C with Estimated Average Glucose Result: 10.1 % (05-05-25 @ 06:47)  A1C with Estimated Average Glucose Result: >15.5 % (03-04-25 @ 23:12)  Home reg: Lantus 52u QHS, Humalog 20u TID AC -> discharged in march on Lantus 30u QHS and Admelog 14u TID AC (not adherent)   Endocrinologist: none -> follows with internist/PCP Dr. Mcdaniel in [place]?  *an appt should be made with his endocrine MD prior to [facility] and plan of care at [facility] needs to be discussed with his endocrine*    *only has Medicare and Elder plan via Medicare

## 2025-05-10 NOTE — PROGRESS NOTE ADULT - ASSESSMENT
Patient is a 54 year old male with PMH of DM, CAD (s/p CABG), mild HFrEF (EF 45%), and PAD (with recent angiogram Aug. 2024), and recent admission to [facility] with R foot wound s/p R foot Partial 2nd ray resection and 3rd proximal phalanx bone biopsy on 3/7/25 (Cx + for E. Faecalis and Staph Epi requiring prolonged abx course s/p PICC, clean margin with no residual OM), now presenting with right foot wound. Patient reports 1 week of subjective fevers, chills, hypothermia, and R foot lateral toe oozing and erythema c/f soft tissue infection.    R foot wound infection with 1st MPJ fluid collection, 1st and 2nd toe OM  - 5/4 s/p R foot I&D to level of subQ - scant, purulent drainage, no erythema, no malodor   - R foot abscess culture with Bacteroides fragilis   - R foot xray with no gas or OM  - R foot MRI with soft tissue wounds about distal forefoot with small volume of ill defined peripheral enhancing fluid preferentially surrounding the 1st MPJ fluid collection, residual second metatarsal and 1st proximal phalanx OM, possible reactive vs early OM of 3rd metatarsal  - Bcx NGTD x2    - 5/8 s/p RLE angiogram with vascular   5/9 s/p right foot Partial 1st ray resection and incision and drainage  High concern for residual bone infection, necrotic soft tissue and bone noted laterally    Low concern for viability,     Recommendations:   follow or cx  local care   Continue zosyn for now   for further surgery 5/12  Monitor temps/WBC  Continue rest of care per primary team      Maya Harrington M.D.  [facility]  Available on [employer] TEAMS - *PREFERRED*  [phone]  After 5pm on weekdays and all day on weekends - please call [phone]     Thank you for consulting us and involving us in the management of this patients case. In addition to reviewing history, imaging, documents, labs, microbiology, took into account antibiotic stewardship, local antibiogram and infection control strategies and potential transmission issues at time of treatment decision making process.

## 2025-05-10 NOTE — PROGRESS NOTE ADULT - SUBJECTIVE AND OBJECTIVE BOX
DATE OF SERVICE: 05-10-25 @ 17:27    Patient is a 54y old  Male who presents with a chief complaint of Angiogram (10 May 2025 11:38)      INTERVAL HISTORY: NAD     REVIEW OF SYSTEMS:  CONSTITUTIONAL: No weakness  EYES/ENT: No visual changes;  No throat pain   NECK: No pain or stiffness  RESPIRATORY: No cough, wheezing; No shortness of breath  CARDIOVASCULAR: No chest pain or palpitations  GASTROINTESTINAL: No abdominal  pain. No nausea, vomiting, or hematemesis  GENITOURINARY: No dysuria, frequency or hematuria  NEUROLOGICAL: No stroke like symptoms  SKIN: No rashes      MEDICATIONS:  metoprolol tartrate 50 milliGRAM(s) Oral two times a day        PHYSICAL EXAM:  T(C): 36.7 (05-10-25 @ 17:02), Max: 36.9 (05-10-25 @ 05:42)  HR: 87 (05-10-25 @ 17:02) (71 - 98)  BP: 106/62 (05-10-25 @ 17:02) (106/62 - 135/75)  RR: 18 (05-10-25 @ 17:02) (16 - 18)  SpO2: 98% (05-10-25 @ 17:02) (96% - 100%)  Wt(kg): --  I&O's Summary    09 May 2025 07:01  -  10 May 2025 07:00  --------------------------------------------------------  IN: 825 mL / OUT: 1750 mL / NET: -925 mL    10 May 2025 07:01  -  10 May 2025 17:27  --------------------------------------------------------  IN: 440 mL / OUT: 900 mL / NET: -460 mL          Appearance: In no distress	  HEENT:    PERRL, EOMI	  Cardiovascular:  S1 S2, No JVD  Respiratory: Lungs clear to auscultation	  Gastrointestinal:  Soft, Non-tender, + BS	  Vascularature:  No edema of LE  Psychiatric: Appropriate affect   Neuro: no acute focal deficits                               7.6    10.67 )-----------( 388      ( 10 May 2025 07:06 )             24.2     05-10    138  |  105  |  14  ----------------------------<  238[H]  4.3   |  24  |  1.60[H]    Ca    8.3[L]      10 May 2025 07:08  Phos  2.6     05-10  Mg     2.1     05-10          Labs personally reviewed      ASSESSMENT/PLAN: 	      54M hx DM, CAD (s/p CABG), mild HFrEF (EF 45%), and PAD (with recent angiogram Aug. 2024), and recent admission to [facility] w/ right foot wound s/p right foot Partial 2nd ray resection and 3rd proximal phalanx bone biopsy on 3/7/25 (Cx + for E. Faecalis and Staph Epi requiring prolonged abx course s/p PICC), now presenting with right foot wound.      Problem/Plan - 1:  ·  Problem: Hypertension.   ·  Plan: hold entresto 2/2 to AURORA  c/w lopressor 50mg BID     Problem/Plan - 2:  ·  Problem: CAD (coronary artery disease).   ·  Plan: c/w aspirin, brilinta statin  TTE 3/6/25 with EF 55%.  Rest NM stress images March 2025 with medium-sized, severe defect(s) in the lateral and inferolateral walls. Stress portion not performed      Problem/Plan - 3:  ·  Problem: AURORA (acute kidney injury).   ·  Plan:  hold entresto for now  baseline 1s, now rising 1.4, c/w IVF   - 5/7 creat 1.49, appreciate nephro recs      Problem/Plan - 4:  ·  Problem: Preop risk stratification   - RLE angiogram with Dr. Youssef on Thurs, 5/8/25 pending improved AURORA. c/w Trental 400mg TID for PAD  - R 1st and 2nd ray resection thurs 5/8. Continue local wound care for right foot wounds, podiatry following  ·  Plan: Elevated risk for low risk peripheral angio +/- angioplasty  - Tolerated well       LATRICIA Loo,  [facility]  Cardiovascular Medicine  [address], [address]  Office: [phone]  Available via call/text on [employer] Teams

## 2025-05-11 LAB
ANION GAP SERPL CALC-SCNC: 12 MMOL/L — SIGNIFICANT CHANGE UP (ref 5–17)
BUN SERPL-MCNC: 13 MG/DL — SIGNIFICANT CHANGE UP (ref 7–23)
CALCIUM SERPL-MCNC: 8.4 MG/DL — SIGNIFICANT CHANGE UP (ref 8.4–10.5)
CHLORIDE SERPL-SCNC: 107 MMOL/L — SIGNIFICANT CHANGE UP (ref 96–108)
CO2 SERPL-SCNC: 23 MMOL/L — SIGNIFICANT CHANGE UP (ref 22–31)
CREAT SERPL-MCNC: 1.59 MG/DL — HIGH (ref 0.5–1.3)
EGFR: 51 ML/MIN/1.73M2 — LOW
EGFR: 51 ML/MIN/1.73M2 — LOW
GLUCOSE BLDC GLUCOMTR-MCNC: 152 MG/DL — HIGH (ref 70–99)
GLUCOSE BLDC GLUCOMTR-MCNC: 170 MG/DL — HIGH (ref 70–99)
GLUCOSE BLDC GLUCOMTR-MCNC: 184 MG/DL — HIGH (ref 70–99)
GLUCOSE BLDC GLUCOMTR-MCNC: 239 MG/DL — HIGH (ref 70–99)
GLUCOSE BLDC GLUCOMTR-MCNC: 300 MG/DL — HIGH (ref 70–99)
GLUCOSE BLDC GLUCOMTR-MCNC: 318 MG/DL — HIGH (ref 70–99)
GLUCOSE BLDC GLUCOMTR-MCNC: 344 MG/DL — HIGH (ref 70–99)
GLUCOSE SERPL-MCNC: 152 MG/DL — HIGH (ref 70–99)
HCT VFR BLD CALC: 15.4 % — CRITICAL LOW (ref 39–50)
HCT VFR BLD CALC: 25.3 % — LOW (ref 39–50)
HGB BLD-MCNC: 4.8 G/DL — CRITICAL LOW (ref 13–17)
HGB BLD-MCNC: 8 G/DL — LOW (ref 13–17)
MAGNESIUM SERPL-MCNC: 2 MG/DL — SIGNIFICANT CHANGE UP (ref 1.6–2.6)
MCHC RBC-ENTMCNC: 24.4 PG — LOW (ref 27–34)
MCHC RBC-ENTMCNC: 24.6 PG — LOW (ref 27–34)
MCHC RBC-ENTMCNC: 31.2 G/DL — LOW (ref 32–36)
MCHC RBC-ENTMCNC: 31.6 G/DL — LOW (ref 32–36)
MCV RBC AUTO: 77.8 FL — LOW (ref 80–100)
MCV RBC AUTO: 78.2 FL — LOW (ref 80–100)
NRBC BLD AUTO-RTO: 0 /100 WBCS — SIGNIFICANT CHANGE UP (ref 0–0)
NRBC BLD AUTO-RTO: 0 /100 WBCS — SIGNIFICANT CHANGE UP (ref 0–0)
PHOSPHATE SERPL-MCNC: 2.6 MG/DL — SIGNIFICANT CHANGE UP (ref 2.5–4.5)
PLATELET # BLD AUTO: 332 K/UL — SIGNIFICANT CHANGE UP (ref 150–400)
PLATELET # BLD AUTO: 410 K/UL — HIGH (ref 150–400)
POTASSIUM SERPL-MCNC: 4.1 MMOL/L — SIGNIFICANT CHANGE UP (ref 3.5–5.3)
POTASSIUM SERPL-SCNC: 4.1 MMOL/L — SIGNIFICANT CHANGE UP (ref 3.5–5.3)
RBC # BLD: 1.97 M/UL — LOW (ref 4.2–5.8)
RBC # BLD: 3.25 M/UL — LOW (ref 4.2–5.8)
RBC # FLD: 15.1 % — HIGH (ref 10.3–14.5)
RBC # FLD: 15.4 % — HIGH (ref 10.3–14.5)
SODIUM SERPL-SCNC: 142 MMOL/L — SIGNIFICANT CHANGE UP (ref 135–145)
WBC # BLD: 11.35 K/UL — HIGH (ref 3.8–10.5)
WBC # BLD: 14.93 K/UL — HIGH (ref 3.8–10.5)
WBC # FLD AUTO: 11.35 K/UL — HIGH (ref 3.8–10.5)
WBC # FLD AUTO: 14.93 K/UL — HIGH (ref 3.8–10.5)

## 2025-05-11 RX ORDER — SOD PHOS DI, MONO/K PHOS MONO 250 MG
1 TABLET ORAL ONCE
Refills: 0 | Status: COMPLETED | OUTPATIENT
Start: 2025-05-11 | End: 2025-05-11

## 2025-05-11 RX ORDER — INSULIN LISPRO 100 U/ML
4 INJECTION, SOLUTION INTRAVENOUS; SUBCUTANEOUS ONCE
Refills: 0 | Status: COMPLETED | OUTPATIENT
Start: 2025-05-11 | End: 2025-05-11

## 2025-05-11 RX ORDER — SODIUM CHLORIDE 9 G/1000ML
1000 INJECTION, SOLUTION INTRAVENOUS
Refills: 0 | Status: DISCONTINUED | OUTPATIENT
Start: 2025-05-11 | End: 2025-05-12

## 2025-05-11 RX ADMIN — Medication 1000 MILLIGRAM(S): at 18:04

## 2025-05-11 RX ADMIN — INSULIN GLARGINE-YFGN 13 UNIT(S): 100 INJECTION, SOLUTION SUBCUTANEOUS at 21:38

## 2025-05-11 RX ADMIN — HEPARIN SODIUM 5000 UNIT(S): 1000 INJECTION INTRAVENOUS; SUBCUTANEOUS at 14:50

## 2025-05-11 RX ADMIN — INSULIN LISPRO 15 UNIT(S): 100 INJECTION, SOLUTION INTRAVENOUS; SUBCUTANEOUS at 18:04

## 2025-05-11 RX ADMIN — TICAGRELOR 90 MILLIGRAM(S): 90 TABLET ORAL at 05:22

## 2025-05-11 RX ADMIN — HEPARIN SODIUM 5000 UNIT(S): 1000 INJECTION INTRAVENOUS; SUBCUTANEOUS at 05:21

## 2025-05-11 RX ADMIN — Medication 400 MILLIGRAM(S): at 14:50

## 2025-05-11 RX ADMIN — Medication 81 MILLIGRAM(S): at 12:57

## 2025-05-11 RX ADMIN — Medication 25 GRAM(S): at 05:22

## 2025-05-11 RX ADMIN — INSULIN LISPRO 15 UNIT(S): 100 INJECTION, SOLUTION INTRAVENOUS; SUBCUTANEOUS at 13:02

## 2025-05-11 RX ADMIN — INSULIN LISPRO 4 UNIT(S): 100 INJECTION, SOLUTION INTRAVENOUS; SUBCUTANEOUS at 23:06

## 2025-05-11 RX ADMIN — Medication 1000 MILLIGRAM(S): at 19:00

## 2025-05-11 RX ADMIN — METOPROLOL SUCCINATE 50 MILLIGRAM(S): 50 TABLET, EXTENDED RELEASE ORAL at 05:22

## 2025-05-11 RX ADMIN — Medication 25 GRAM(S): at 21:38

## 2025-05-11 RX ADMIN — Medication 25 GRAM(S): at 14:50

## 2025-05-11 RX ADMIN — Medication 400 MILLIGRAM(S): at 21:39

## 2025-05-11 RX ADMIN — INSULIN LISPRO 1: 100 INJECTION, SOLUTION INTRAVENOUS; SUBCUTANEOUS at 09:33

## 2025-05-11 RX ADMIN — Medication 1 PACKET(S): at 12:58

## 2025-05-11 RX ADMIN — Medication 1000 MILLIGRAM(S): at 05:21

## 2025-05-11 RX ADMIN — Medication 1000 MILLIGRAM(S): at 13:50

## 2025-05-11 RX ADMIN — Medication 1000 MILLIGRAM(S): at 05:51

## 2025-05-11 RX ADMIN — Medication 400 MILLIGRAM(S): at 05:22

## 2025-05-11 RX ADMIN — TICAGRELOR 90 MILLIGRAM(S): 90 TABLET ORAL at 18:05

## 2025-05-11 RX ADMIN — METOPROLOL SUCCINATE 50 MILLIGRAM(S): 50 TABLET, EXTENDED RELEASE ORAL at 18:05

## 2025-05-11 RX ADMIN — INSULIN LISPRO 2: 100 INJECTION, SOLUTION INTRAVENOUS; SUBCUTANEOUS at 13:01

## 2025-05-11 RX ADMIN — Medication 1000 MILLIGRAM(S): at 12:57

## 2025-05-11 RX ADMIN — HEPARIN SODIUM 5000 UNIT(S): 1000 INJECTION INTRAVENOUS; SUBCUTANEOUS at 21:38

## 2025-05-11 RX ADMIN — INSULIN LISPRO 1: 100 INJECTION, SOLUTION INTRAVENOUS; SUBCUTANEOUS at 21:39

## 2025-05-11 RX ADMIN — INSULIN LISPRO 15 UNIT(S): 100 INJECTION, SOLUTION INTRAVENOUS; SUBCUTANEOUS at 09:34

## 2025-05-11 RX ADMIN — ATORVASTATIN CALCIUM 80 MILLIGRAM(S): 80 TABLET, FILM COATED ORAL at 21:39

## 2025-05-11 RX ADMIN — FENOFIBRATE 145 MILLIGRAM(S): 160 TABLET ORAL at 21:39

## 2025-05-11 RX ADMIN — INSULIN LISPRO 1: 100 INJECTION, SOLUTION INTRAVENOUS; SUBCUTANEOUS at 18:03

## 2025-05-11 NOTE — PROGRESS NOTE ADULT - SUBJECTIVE AND OBJECTIVE BOX
DATE OF SERVICE: 05-11-25 @ 16:24    Patient is a 54y old  Male who presents with a chief complaint of Angiogram (11 May 2025 09:35)      INTERVAL HISTORY: NAD    REVIEW OF SYSTEMS:  CONSTITUTIONAL: No weakness  EYES/ENT: No visual changes;  No throat pain   NECK: No pain or stiffness  RESPIRATORY: No cough, wheezing; No shortness of breath  CARDIOVASCULAR: No chest pain or palpitations  GASTROINTESTINAL: No abdominal  pain. No nausea, vomiting, or hematemesis  GENITOURINARY: No dysuria, frequency or hematuria  NEUROLOGICAL: No stroke like symptoms  SKIN: No rashes     	  MEDICATIONS:  metoprolol tartrate 50 milliGRAM(s) Oral two times a day        PHYSICAL EXAM:  T(C): 36.3 (05-11-25 @ 13:43), Max: 36.7 (05-10-25 @ 17:02)  HR: 74 (05-11-25 @ 13:43) (73 - 87)  BP: 142/78 (05-11-25 @ 13:43) (103/58 - 142/78)  RR: 18 (05-11-25 @ 13:43) (18 - 18)  SpO2: 98% (05-11-25 @ 13:43) (94% - 100%)  Wt(kg): --  I&O's Summary    10 May 2025 07:01  -  11 May 2025 07:00  --------------------------------------------------------  IN: 900 mL / OUT: 2330 mL / NET: -1430 mL    11 May 2025 07:01  -  11 May 2025 16:24  --------------------------------------------------------  IN: 380 mL / OUT: 650 mL / NET: -270 mL          Appearance: In no distress	  HEENT:    PERRL, EOMI	  Cardiovascular:  S1 S2, No JVD  Respiratory: Lungs clear to auscultation	  Gastrointestinal:  Soft, Non-tender, + BS	  Vascularature:  No edema of LE  Psychiatric: Appropriate affect   Neuro: no acute focal deficits                               8.0    11.35 )-----------( 332      ( 11 May 2025 08:52 )             25.3     05-11    142  |  107  |  13  ----------------------------<  152[H]  4.1   |  23  |  1.59[H]    Ca    8.4      11 May 2025 07:28  Phos  2.6     05-11  Mg     2.0     05-11          Labs personally reviewed      ASSESSMENT/PLAN: 	      54M hx DM, CAD (s/p CABG), mild HFrEF (EF 45%), and PAD (with recent angiogram Aug. 2024), and recent admission to [facility] w/ right foot wound s/p right foot Partial 2nd ray resection and 3rd proximal phalanx bone biopsy on 3/7/25 (Cx + for E. Faecalis and Staph Epi requiring prolonged abx course s/p PICC), now presenting with right foot wound.      Problem/Plan - 1:  ·  Problem: Hypertension.   ·  Plan: hold entresto 2/2 to AURORA  c/w lopressor 50mg BID     Problem/Plan - 2:  ·  Problem: CAD (coronary artery disease).   ·  Plan: c/w aspirin, brilinta statin  TTE 3/6/25 with EF 55%.  Rest NM stress images March 2025 with medium-sized, severe defect(s) in the lateral and inferolateral walls. Stress portion not performed      Problem/Plan - 3:  ·  Problem: AURORA (acute kidney injury).   ·  Plan:  hold entresto for now  baseline 1s, now rising 1.4, c/w IVF   - 5/7 creat 1.49, appreciate nephro recs      Problem/Plan - 4:  ·  Problem: Preop risk stratification   - RLE angiogram with Dr. Youssef on Thurs, 5/8/25 pending improved AURORA. c/w Trental 400mg TID for PAD  - R 1st and 2nd ray resection thurs 5/8. Continue local wound care for right foot wounds, podiatry following  ·  Plan: Elevated risk for low risk peripheral angio +/- angioplasty  - Tolerated well       LATRICIA Loo DO [facility]  Cardiovascular Medicine  [address], [address]  Office: [phone]  Available via call/text on [employer] Teams  DATE OF SERVICE: 05-11-25 @ 16:24    Patient is a 54y old  Male who presents with a chief complaint of Angiogram (11 May 2025 09:35)      INTERVAL HISTORY: NAD    REVIEW OF SYSTEMS:  CONSTITUTIONAL: No weakness  EYES/ENT: No visual changes;  No throat pain   NECK: No pain or stiffness  RESPIRATORY: No cough, wheezing; No shortness of breath  CARDIOVASCULAR: No chest pain or palpitations  GASTROINTESTINAL: No abdominal  pain. No nausea, vomiting, or hematemesis  GENITOURINARY: No dysuria, frequency or hematuria  NEUROLOGICAL: No stroke like symptoms  SKIN: No rashes     	  MEDICATIONS:  metoprolol tartrate 50 milliGRAM(s) Oral two times a day        PHYSICAL EXAM:  T(C): 36.3 (05-11-25 @ 13:43), Max: 36.7 (05-10-25 @ 17:02)  HR: 74 (05-11-25 @ 13:43) (73 - 87)  BP: 142/78 (05-11-25 @ 13:43) (103/58 - 142/78)  RR: 18 (05-11-25 @ 13:43) (18 - 18)  SpO2: 98% (05-11-25 @ 13:43) (94% - 100%)  Wt(kg): --  I&O's Summary    10 May 2025 07:01  -  11 May 2025 07:00  --------------------------------------------------------  IN: 900 mL / OUT: 2330 mL / NET: -1430 mL    11 May 2025 07:01  -  11 May 2025 16:24  --------------------------------------------------------  IN: 380 mL / OUT: 650 mL / NET: -270 mL          Appearance: In no distress	  HEENT:    PERRL, EOMI	  Cardiovascular:  S1 S2, No JVD  Respiratory: Lungs clear to auscultation	  Gastrointestinal:  Soft, Non-tender, + BS	  Vascularature:  No edema of LE  Psychiatric: Appropriate affect   Neuro: no acute focal deficits                               8.0    11.35 )-----------( 332      ( 11 May 2025 08:52 )             25.3     05-11    142  |  107  |  13  ----------------------------<  152[H]  4.1   |  23  |  1.59[H]    Ca    8.4      11 May 2025 07:28  Phos  2.6     05-11  Mg     2.0     05-11          Labs personally reviewed      ASSESSMENT/PLAN: 	    54M hx DM, CAD (s/p CABG), mild HFrEF (EF 45%), and PAD (with recent angiogram Aug. 2024), and recent admission to [facility] w/ right foot wound s/p right foot Partial 2nd ray resection and 3rd proximal phalanx bone biopsy on 3/7/25 (Cx + for E. Faecalis and Staph Epi requiring prolonged abx course s/p PICC), now presenting with right foot wound.      Problem/Plan - 1:  ·  Problem: Hypertension.   ·  Plan: hold entresto 2/2 to AURORA  c/w lopressor 50mg BID     Problem/Plan - 2:  ·  Problem: CAD (coronary artery disease).   ·  Plan: c/w aspirin, brilinta statin  TTE 3/6/25 with EF 55%.  Rest NM stress images March 2025 with medium-sized, severe defect(s) in the lateral and inferolateral walls. Stress portion not performed      Problem/Plan - 3:  ·  Problem: AURORA (acute kidney injury).   ·  Plan:  hold entresto for now  baseline 1s, now rising 1.4, c/w IVF   - 5/7 creat 1.49, appreciate nephro recs      Problem/Plan - 4:  ·  Problem: Preop risk stratification   - RLE angiogram with Dr. Youssef on Thurs, 5/8/25 pending improved AURORA. c/w Trental 400mg TID for PAD  - R 1st and 2nd ray resection thurs 5/8. Continue local wound care for right foot wounds, podiatry following  ·  Plan: Elevated risk for low risk peripheral angio +/- angioplasty  - Tolerated well       LATRICIA Loo DO [facility]  Cardiovascular Medicine  [address], [address]  Office: [phone]  Available via call/text on [employer] Teams

## 2025-05-11 NOTE — PROGRESS NOTE ADULT - SUBJECTIVE AND OBJECTIVE BOX
Morning Surgical Progress Note  Patient is a 54y old  Male who presents with a chief complaint of Angiogram (10 May 2025 17:26)    ***INCOMPLETE***    SUBJECTIVE: Patient seen and examined at bedside with surgical team.    Vital Signs Last 24 Hrs  T(C): 36.4 (11 May 2025 01:35), Max: 36.9 (10 May 2025 05:42)  T(F): 97.6 (11 May 2025 01:35), Max: 98.4 (10 May 2025 05:42)  HR: 81 (11 May 2025 01:35) (78 - 98)  BP: 103/58 (11 May 2025 01:35) (103/58 - 128/76)  BP(mean): --  RR: 18 (11 May 2025 01:35) (16 - 18)  SpO2: 97% (11 May 2025 01:35) (94% - 99%)    Parameters below as of 11 May 2025 01:35  Patient On (Oxygen Delivery Method): room air    I&O's Detail    09 May 2025 07:01  -  10 May 2025 07:00  --------------------------------------------------------  IN:    Oral Fluid: 600 mL    sodium chloride 0.9%: 225 mL  Total IN: 825 mL    OUT:    Voided (mL): 1750 mL  Total OUT: 1750 mL    Total NET: -925 mL      10 May 2025 07:01  -  11 May 2025 03:32  --------------------------------------------------------  IN:    Oral Fluid: 660 mL  Total IN: 660 mL    OUT:    Voided (mL): 1750 mL  Total OUT: 1750 mL    Total NET: -1090 mL        Medications  MEDICATIONS  (STANDING):  acetaminophen     Tablet .. 1000 milliGRAM(s) Oral every 6 hours  aspirin enteric coated 81 milliGRAM(s) Oral daily  atorvastatin 80 milliGRAM(s) Oral at bedtime  dextrose 5%. 1000 milliLiter(s) (100 mL/Hr) IV Continuous <Continuous>  fenofibrate Tablet 145 milliGRAM(s) Oral at bedtime  heparin   Injectable 5000 Unit(s) SubCutaneous every 8 hours  insulin glargine Injectable (LANTUS) 13 Unit(s) SubCutaneous at bedtime  insulin lispro (ADMELOG) corrective regimen sliding scale   SubCutaneous three times a day before meals  insulin lispro (ADMELOG) corrective regimen sliding scale   SubCutaneous at bedtime  insulin lispro Injectable (ADMELOG) 15 Unit(s) SubCutaneous three times a day before meals  metoprolol tartrate 50 milliGRAM(s) Oral two times a day  pentoxifylline 400 milliGRAM(s) Oral three times a day  piperacillin/tazobactam IVPB.. 3.375 Gram(s) IV Intermittent every 8 hours  ticagrelor 90 milliGRAM(s) Oral every 12 hours    MEDICATIONS  (PRN):  oxyCODONE    IR 2.5 milliGRAM(s) Oral every 4 hours PRN Moderate Pain (4 - 6)  oxyCODONE    IR 5 milliGRAM(s) Oral every 4 hours PRN Severe Pain (7 - 10)    PHYSICAL EXAM:  Constitutional: appropriately interactive in no acute distress  Chest: Symmetric chest rise bilaterally, normal work of breathing on room air  Abdomen: Soft, nondistended, nontender   Groin:  L groin access site clean and dry. Soft, nontender, no ecchymosis/hematoma, no erythema, no edema  Extremities: R leg covered in ACE, no strikethrough     LABS:                        7.6    10.67 )-----------( 388      ( 10 May 2025 07:06 )             24.2     05-10    138  |  105  |  14  ----------------------------<  238[H]  4.3   |  24  |  1.60[H]    Ca    8.3[L]      10 May 2025 07:08  Phos  2.6     05-10  Mg     2.1     05-10      PT/INR - ( 09 May 2025 05:36 )   PT: 12.8 sec;   INR: 1.12 ratio         PTT - ( 09 May 2025 05:36 )  PTT:35.8 sec    Urinalysis Basic - ( 10 May 2025 07:08 )    Color: x / Appearance: x / SG: x / pH: x  Gluc: 238 mg/dL / Ketone: x  / Bili: x / Urobili: x   Blood: x / Protein: x / Nitrite: x   Leuk Esterase: x / RBC: x / WBC x   Sq Epi: x / Non Sq Epi: x / Bacteria: x       Morning Surgical Progress Note  Patient is a 54y old  Male who presents with a chief complaint of Angiogram (10 May 2025 17:26)    SUBJECTIVE: Patient seen and examined at bedside with surgical team. Feels well, denies pain.    Vital Signs Last 24 Hrs  T(C): 36.4 (11 May 2025 01:35), Max: 36.9 (10 May 2025 05:42)  T(F): 97.6 (11 May 2025 01:35), Max: 98.4 (10 May 2025 05:42)  HR: 81 (11 May 2025 01:35) (78 - 98)  BP: 103/58 (11 May 2025 01:35) (103/58 - 128/76)  BP(mean): --  RR: 18 (11 May 2025 01:35) (16 - 18)  SpO2: 97% (11 May 2025 01:35) (94% - 99%)    Parameters below as of 11 May 2025 01:35  Patient On (Oxygen Delivery Method): room air    I&O's Detail    09 May 2025 07:01  -  10 May 2025 07:00  --------------------------------------------------------  IN:    Oral Fluid: 600 mL    sodium chloride 0.9%: 225 mL  Total IN: 825 mL    OUT:    Voided (mL): 1750 mL  Total OUT: 1750 mL    Total NET: -925 mL      10 May 2025 07:01  -  11 May 2025 03:32  --------------------------------------------------------  IN:    Oral Fluid: 660 mL  Total IN: 660 mL    OUT:    Voided (mL): 1750 mL  Total OUT: 1750 mL    Total NET: -1090 mL        Medications  MEDICATIONS  (STANDING):  acetaminophen     Tablet .. 1000 milliGRAM(s) Oral every 6 hours  aspirin enteric coated 81 milliGRAM(s) Oral daily  atorvastatin 80 milliGRAM(s) Oral at bedtime  dextrose 5%. 1000 milliLiter(s) (100 mL/Hr) IV Continuous <Continuous>  fenofibrate Tablet 145 milliGRAM(s) Oral at bedtime  heparin   Injectable 5000 Unit(s) SubCutaneous every 8 hours  insulin glargine Injectable (LANTUS) 13 Unit(s) SubCutaneous at bedtime  insulin lispro (ADMELOG) corrective regimen sliding scale   SubCutaneous three times a day before meals  insulin lispro (ADMELOG) corrective regimen sliding scale   SubCutaneous at bedtime  insulin lispro Injectable (ADMELOG) 15 Unit(s) SubCutaneous three times a day before meals  metoprolol tartrate 50 milliGRAM(s) Oral two times a day  pentoxifylline 400 milliGRAM(s) Oral three times a day  piperacillin/tazobactam IVPB.. 3.375 Gram(s) IV Intermittent every 8 hours  ticagrelor 90 milliGRAM(s) Oral every 12 hours    MEDICATIONS  (PRN):  oxyCODONE    IR 2.5 milliGRAM(s) Oral every 4 hours PRN Moderate Pain (4 - 6)  oxyCODONE    IR 5 milliGRAM(s) Oral every 4 hours PRN Severe Pain (7 - 10)    PHYSICAL EXAM:  Constitutional: appropriately interactive in no acute distress  Chest: Symmetric chest rise bilaterally, normal work of breathing on room air  Abdomen: Soft, nondistended, nontender   Groin:  L groin access site clean and dry. Soft, nontender, no ecchymosis/hematoma, no erythema, no edema  Extremities: R leg covered in ACE, no strikethrough     LABS:                        7.6    10.67 )-----------( 388      ( 10 May 2025 07:06 )             24.2     05-10    138  |  105  |  14  ----------------------------<  238[H]  4.3   |  24  |  1.60[H]    Ca    8.3[L]      10 May 2025 07:08  Phos  2.6     05-10  Mg     2.1     05-10      PT/INR - ( 09 May 2025 05:36 )   PT: 12.8 sec;   INR: 1.12 ratio         PTT - ( 09 May 2025 05:36 )  PTT:35.8 sec    Urinalysis Basic - ( 10 May 2025 07:08 )    Color: x / Appearance: x / SG: x / pH: x  Gluc: 238 mg/dL / Ketone: x  / Bili: x / Urobili: x   Blood: x / Protein: x / Nitrite: x   Leuk Esterase: x / RBC: x / WBC x   Sq Epi: x / Non Sq Epi: x / Bacteria: x

## 2025-05-11 NOTE — PROGRESS NOTE ADULT - ASSESSMENT
54M  s/p right foot partial 1st ray resection, open (DOS 5/9)  - Patient seen and evaluated  - Afebrile, WBC 11.35  - 5/4 s/p right foot partial 1st ray resection, open: sutures intact, mild sanguinous drainage, no pus, no malodor, flap warm, Left foot no open wounds, no acute signs of infection  - Intraop finding high concern residual infection, low concern viability   - OR wound culture: no growth   - Vasc recs, appreciated  - ID recs, appreciated   - Pod plan return to OR for right foot TMA on Monday 5/12 5pm with Dr Patterson  - Medical and cardiac clearance documented, appreciated  - NPO after 8am  - CBC, BMP, Coags, Type and Screen in AM   - Discussed with attending   54M  s/p right foot partial 1st ray resection, open (DOS 5/9)  - Patient seen and evaluated  - Afebrile, WBC 11.35  - 5/4 s/p right foot partial 1st ray resection, open: sutures intact, mild sanguinous drainage, no pus, no malodor, flap warm, Left foot no open wounds, no acute signs of infection  - Intraop finding high concern residual infection, low concern viability   - OR wound culture: no growth   - Vasc recs, appreciated  - ID recs, appreciated   - Pod plan return to OR for right foot TMA on Monday 5/12 5pm with Dr Patterson  - Medical and cardiac clearance documented, appreciated  - NPO after 5/10 8am  - CBC, BMP, Coags, Type and Screen in AM   - Discussed with attending

## 2025-05-11 NOTE — PROGRESS NOTE ADULT - ASSESSMENT
Assessment:  54M hx DM, CAD (s/p CABG), mild HFrEF (EF 45%), and PAD (with recent dx angiogram Aug. 2024), and recent admission to [facility] w/ right foot wound s/p right foot partial 2nd ray resection and 3rd proximal phalanx bone biopsy on 3/7/25 (Cx + for E. Faecalis and Staph Epi requiring prolonged abx course s/p PICC), now presenting with multiple non-healing right foot wounds. His admission is complicated by an AURORA. S/p RLE angiogram 5/8 and now s/p R 1st ray resection and InD with podiatry with adequate bleeding and low concern for active infection.       PLAN  - Infectious disease recs appreciated > Continue empiric Zosyn, f/u bone margin to determine abx duration.   - OR with Podiatry for RTOR on 5/12/25 -> f/u Podiatry plan  - Pain: Tylenol, Oxy PRN  - Diet: Regular Diet  - Lantus, pre-meal insulin, and SSI per endocrine, appreciate recs  - Trental 400mg TID for PAD  - Continue home ASA/Brilinta, statin/fibrate, metoprolol. Appreciate medicine recs  - F/u PT Consult for recommendations   - DVT ppx: [facility]    Vascular Surgery  [id number].   Assessment:  54M hx DM, CAD (s/p CABG), mild HFrEF (EF 45%), and PAD (with recent dx angiogram Aug. 2024), and recent admission to [facility] w/ right foot wound s/p right foot partial 2nd ray resection and 3rd proximal phalanx bone biopsy on 3/7/25 (Cx + for E. Faecalis and Staph Epi requiring prolonged abx course s/p PICC), now presenting with multiple non-healing right foot wounds. His admission is complicated by an AURORA. S/p RLE angiogram 5/8 and now s/p R 1st ray resection and InD with podiatry with adequate bleeding and low concern for active infection.       PLAN  - Infectious disease recs appreciated > Continue empiric Zosyn, f/u bone margin to determine abx duration.   - OR with Podiatry for RTOR on 5/12/25, NPO at [place], preop labs  - Pain: Tylenol, Oxy PRN  - Diet: Regular Diet  - Lantus, pre-meal insulin, and SSI per endocrine, appreciate recs  - Trental 400mg TID for PAD  - Continue home ASA/Brilinta, statin/fibrate, metoprolol. Appreciate medicine recs  - F/u PT Consult for recommendations   - DVT ppx: [facility]    Vascular Surgery  [id number].

## 2025-05-11 NOTE — PROGRESS NOTE ADULT - SUBJECTIVE AND OBJECTIVE BOX
Patient is a 54y old  Male who presents with a chief complaint of Angiogram (11 May 2025 03:29)       INTERVAL HPI/OVERNIGHT EVENTS:  Patient seen and evaluated at bedside.  Pt is resting comfortable in NAD. Denies N/V/F/C.    Allergies    No Known Allergies    Intolerances        Vital Signs Last 24 Hrs  T(C): 36.4 (11 May 2025 08:14), Max: 36.7 (10 May 2025 17:02)  T(F): 97.5 (11 May 2025 08:14), Max: 98 (10 May 2025 17:02)  HR: 77 (11 May 2025 08:14) (77 - 98)  BP: 119/67 (11 May 2025 08:14) (103/58 - 128/76)  BP(mean): --  RR: 18 (11 May 2025 08:14) (16 - 18)  SpO2: 97% (11 May 2025 08:14) (94% - 99%)    Parameters below as of 11 May 2025 08:14  Patient On (Oxygen Delivery Method): room air        LABS:                        8.0    11.35 )-----------( 332      ( 11 May 2025 08:52 )             25.3     05-11    142  |  107  |  13  ----------------------------<  152[H]  4.1   |  23  |  1.59[H]    Ca    8.4      11 May 2025 07:28  Phos  2.6     05-11  Mg     2.0     05-11        Urinalysis Basic - ( 11 May 2025 07:28 )    Color: x / Appearance: x / SG: x / pH: x  Gluc: 152 mg/dL / Ketone: x  / Bili: x / Urobili: x   Blood: x / Protein: x / Nitrite: x   Leuk Esterase: x / RBC: x / WBC x   Sq Epi: x / Non Sq Epi: x / Bacteria: x      CAPILLARY BLOOD GLUCOSE      POCT Blood Glucose.: 184 mg/dL (11 May 2025 08:44)  POCT Blood Glucose.: 152 mg/dL (11 May 2025 05:35)  POCT Blood Glucose.: 185 mg/dL (10 May 2025 21:14)  POCT Blood Glucose.: 131 mg/dL (10 May 2025 19:05)  POCT Blood Glucose.: 92 mg/dL (10 May 2025 17:21)  POCT Blood Glucose.: 246 mg/dL (10 May 2025 13:04)      Lower Extremity Physical Exam:  Vascular: DP/PT 0/4, B/L, CFT < 3 seconds B/L, Temperature gradient warm to cool, B/L.   Neuro: Epicritic sensation absent to the level of digits, B/L.  Musculoskeletal/Ortho: s/p Right foot 3rd metatarsal head resection, s/p Right foot Partial 2nd Ray Resection (DOS 3/6)  Skin: 5/4 s/p right foot partial 1st ray resection, open sutures intact, mild sanguinous drainage, no pus, no malodor, flap warm, Left foot no open wounds, no acute signs of infection    RADIOLOGY & ADDITIONAL TESTS:

## 2025-05-12 ENCOUNTER — RESULT REVIEW (OUTPATIENT)
Age: 55
End: 2025-05-12

## 2025-05-12 LAB
ANION GAP SERPL CALC-SCNC: 12 MMOL/L — SIGNIFICANT CHANGE UP (ref 5–17)
APTT BLD: 32.8 SEC — SIGNIFICANT CHANGE UP (ref 26.1–36.8)
BLD GP AB SCN SERPL QL: NEGATIVE — SIGNIFICANT CHANGE UP
BUN SERPL-MCNC: 15 MG/DL — SIGNIFICANT CHANGE UP (ref 7–23)
CALCIUM SERPL-MCNC: 8.7 MG/DL — SIGNIFICANT CHANGE UP (ref 8.4–10.5)
CHLORIDE SERPL-SCNC: 106 MMOL/L — SIGNIFICANT CHANGE UP (ref 96–108)
CO2 SERPL-SCNC: 23 MMOL/L — SIGNIFICANT CHANGE UP (ref 22–31)
CREAT SERPL-MCNC: 1.68 MG/DL — HIGH (ref 0.5–1.3)
EGFR: 48 ML/MIN/1.73M2 — LOW
EGFR: 48 ML/MIN/1.73M2 — LOW
GLUCOSE BLDC GLUCOMTR-MCNC: 113 MG/DL — HIGH (ref 70–99)
GLUCOSE BLDC GLUCOMTR-MCNC: 119 MG/DL — HIGH (ref 70–99)
GLUCOSE BLDC GLUCOMTR-MCNC: 124 MG/DL — HIGH (ref 70–99)
GLUCOSE BLDC GLUCOMTR-MCNC: 153 MG/DL — HIGH (ref 70–99)
GLUCOSE BLDC GLUCOMTR-MCNC: 192 MG/DL — HIGH (ref 70–99)
GLUCOSE BLDC GLUCOMTR-MCNC: 296 MG/DL — HIGH (ref 70–99)
GLUCOSE BLDC GLUCOMTR-MCNC: 320 MG/DL — HIGH (ref 70–99)
GLUCOSE SERPL-MCNC: 285 MG/DL — HIGH (ref 70–99)
HCT VFR BLD CALC: 23.6 % — LOW (ref 39–50)
HCT VFR BLD CALC: 25.6 % — LOW (ref 39–50)
HGB BLD-MCNC: 7.3 G/DL — LOW (ref 13–17)
HGB BLD-MCNC: 8.1 G/DL — LOW (ref 13–17)
INR BLD: 1.04 RATIO — SIGNIFICANT CHANGE UP (ref 0.85–1.16)
MAGNESIUM SERPL-MCNC: 2.1 MG/DL — SIGNIFICANT CHANGE UP (ref 1.6–2.6)
MCHC RBC-ENTMCNC: 24.7 PG — LOW (ref 27–34)
MCHC RBC-ENTMCNC: 25.2 PG — LOW (ref 27–34)
MCHC RBC-ENTMCNC: 30.9 G/DL — LOW (ref 32–36)
MCHC RBC-ENTMCNC: 31.6 G/DL — LOW (ref 32–36)
MCV RBC AUTO: 79.8 FL — LOW (ref 80–100)
MCV RBC AUTO: 80 FL — SIGNIFICANT CHANGE UP (ref 80–100)
NRBC BLD AUTO-RTO: 0 /100 WBCS — SIGNIFICANT CHANGE UP (ref 0–0)
NRBC BLD AUTO-RTO: 0 /100 WBCS — SIGNIFICANT CHANGE UP (ref 0–0)
PHOSPHATE SERPL-MCNC: 2.9 MG/DL — SIGNIFICANT CHANGE UP (ref 2.5–4.5)
PLATELET # BLD AUTO: 303 K/UL — SIGNIFICANT CHANGE UP (ref 150–400)
PLATELET # BLD AUTO: 340 K/UL — SIGNIFICANT CHANGE UP (ref 150–400)
POTASSIUM SERPL-MCNC: 4.5 MMOL/L — SIGNIFICANT CHANGE UP (ref 3.5–5.3)
POTASSIUM SERPL-SCNC: 4.5 MMOL/L — SIGNIFICANT CHANGE UP (ref 3.5–5.3)
PROTHROM AB SERPL-ACNC: 11.9 SEC — SIGNIFICANT CHANGE UP (ref 9.9–13.4)
RBC # BLD: 2.95 M/UL — LOW (ref 4.2–5.8)
RBC # BLD: 3.21 M/UL — LOW (ref 4.2–5.8)
RBC # FLD: 15.9 % — HIGH (ref 10.3–14.5)
RBC # FLD: 16.1 % — HIGH (ref 10.3–14.5)
RH IG SCN BLD-IMP: POSITIVE — SIGNIFICANT CHANGE UP
SODIUM SERPL-SCNC: 141 MMOL/L — SIGNIFICANT CHANGE UP (ref 135–145)
WBC # BLD: 8.09 K/UL — SIGNIFICANT CHANGE UP (ref 3.8–10.5)
WBC # BLD: 9.88 K/UL — SIGNIFICANT CHANGE UP (ref 3.8–10.5)
WBC # FLD AUTO: 8.09 K/UL — SIGNIFICANT CHANGE UP (ref 3.8–10.5)
WBC # FLD AUTO: 9.88 K/UL — SIGNIFICANT CHANGE UP (ref 3.8–10.5)

## 2025-05-12 PROCEDURE — 88307 TISSUE EXAM BY PATHOLOGIST: CPT | Mod: 26

## 2025-05-12 PROCEDURE — 99232 SBSQ HOSP IP/OBS MODERATE 35: CPT

## 2025-05-12 PROCEDURE — 99233 SBSQ HOSP IP/OBS HIGH 50: CPT

## 2025-05-12 PROCEDURE — 73630 X-RAY EXAM OF FOOT: CPT | Mod: 26,RT

## 2025-05-12 PROCEDURE — 88311 DECALCIFY TISSUE: CPT | Mod: 26

## 2025-05-12 PROCEDURE — 88305 TISSUE EXAM BY PATHOLOGIST: CPT | Mod: 26

## 2025-05-12 RX ORDER — INSULIN LISPRO 100 U/ML
15 INJECTION, SOLUTION INTRAVENOUS; SUBCUTANEOUS
Refills: 0 | Status: DISCONTINUED | OUTPATIENT
Start: 2025-05-12 | End: 2025-05-13

## 2025-05-12 RX ORDER — DEXTROSE 50 % IN WATER 50 %
15 SYRINGE (ML) INTRAVENOUS ONCE
Refills: 0 | Status: DISCONTINUED | OUTPATIENT
Start: 2025-05-12 | End: 2025-05-15

## 2025-05-12 RX ORDER — PENTOXIFYLLINE 100 %
400 POWDER (GRAM) MISCELLANEOUS THREE TIMES A DAY
Refills: 0 | Status: DISCONTINUED | OUTPATIENT
Start: 2025-05-12 | End: 2025-05-14

## 2025-05-12 RX ORDER — OXYCODONE HYDROCHLORIDE AND ACETAMINOPHEN 10; 325 MG/1; MG/1
2 TABLET ORAL EVERY 4 HOURS
Refills: 0 | Status: DISCONTINUED | OUTPATIENT
Start: 2025-05-12 | End: 2025-05-13

## 2025-05-12 RX ORDER — DEXTROSE 50 % IN WATER 50 %
25 SYRINGE (ML) INTRAVENOUS ONCE
Refills: 0 | Status: DISCONTINUED | OUTPATIENT
Start: 2025-05-12 | End: 2025-05-15

## 2025-05-12 RX ORDER — PIPERACILLIN-TAZO-DEXTROSE,ISO 2.25G/50ML
3.38 IV SOLUTION, PIGGYBACK PREMIX FROZEN(ML) INTRAVENOUS EVERY 8 HOURS
Refills: 0 | Status: DISCONTINUED | OUTPATIENT
Start: 2025-05-12 | End: 2025-05-13

## 2025-05-12 RX ORDER — SODIUM CHLORIDE 9 G/1000ML
1000 INJECTION, SOLUTION INTRAVENOUS
Refills: 0 | Status: DISCONTINUED | OUTPATIENT
Start: 2025-05-12 | End: 2025-05-15

## 2025-05-12 RX ORDER — INSULIN LISPRO 100 U/ML
INJECTION, SOLUTION INTRAVENOUS; SUBCUTANEOUS EVERY 4 HOURS
Refills: 0 | Status: DISCONTINUED | OUTPATIENT
Start: 2025-05-12 | End: 2025-05-12

## 2025-05-12 RX ORDER — FENOFIBRATE 160 MG/1
145 TABLET ORAL AT BEDTIME
Refills: 0 | Status: DISCONTINUED | OUTPATIENT
Start: 2025-05-12 | End: 2025-05-12

## 2025-05-12 RX ORDER — ASPIRIN 325 MG
81 TABLET ORAL DAILY
Refills: 0 | Status: DISCONTINUED | OUTPATIENT
Start: 2025-05-12 | End: 2025-05-15

## 2025-05-12 RX ORDER — INSULIN GLARGINE-YFGN 100 [IU]/ML
16 INJECTION, SOLUTION SUBCUTANEOUS AT BEDTIME
Refills: 0 | Status: DISCONTINUED | OUTPATIENT
Start: 2025-05-12 | End: 2025-05-13

## 2025-05-12 RX ORDER — METOPROLOL SUCCINATE 50 MG/1
50 TABLET, EXTENDED RELEASE ORAL
Refills: 0 | Status: DISCONTINUED | OUTPATIENT
Start: 2025-05-12 | End: 2025-05-15

## 2025-05-12 RX ORDER — ACETAMINOPHEN 500 MG/5ML
650 LIQUID (ML) ORAL EVERY 6 HOURS
Refills: 0 | Status: DISCONTINUED | OUTPATIENT
Start: 2025-05-12 | End: 2025-05-13

## 2025-05-12 RX ORDER — ATORVASTATIN CALCIUM 80 MG/1
80 TABLET, FILM COATED ORAL AT BEDTIME
Refills: 0 | Status: DISCONTINUED | OUTPATIENT
Start: 2025-05-12 | End: 2025-05-15

## 2025-05-12 RX ORDER — INSULIN LISPRO 100 U/ML
INJECTION, SOLUTION INTRAVENOUS; SUBCUTANEOUS
Refills: 0 | Status: DISCONTINUED | OUTPATIENT
Start: 2025-05-12 | End: 2025-05-15

## 2025-05-12 RX ORDER — INSULIN LISPRO 100 U/ML
INJECTION, SOLUTION INTRAVENOUS; SUBCUTANEOUS AT BEDTIME
Refills: 0 | Status: DISCONTINUED | OUTPATIENT
Start: 2025-05-12 | End: 2025-05-15

## 2025-05-12 RX ORDER — CADEXOMER IODINE 0.9 %
1 PADS, MEDICATED (EA) TOPICAL DAILY
Refills: 0 | Status: DISCONTINUED | OUTPATIENT
Start: 2025-05-12 | End: 2025-05-15

## 2025-05-12 RX ORDER — GLUCAGON 3 MG/1
1 POWDER NASAL ONCE
Refills: 0 | Status: DISCONTINUED | OUTPATIENT
Start: 2025-05-12 | End: 2025-05-12

## 2025-05-12 RX ORDER — FENOFIBRATE 160 MG/1
145 TABLET ORAL AT BEDTIME
Refills: 0 | Status: DISCONTINUED | OUTPATIENT
Start: 2025-05-12 | End: 2025-05-15

## 2025-05-12 RX ORDER — DEXTROSE 50 % IN WATER 50 %
12.5 SYRINGE (ML) INTRAVENOUS ONCE
Refills: 0 | Status: DISCONTINUED | OUTPATIENT
Start: 2025-05-12 | End: 2025-05-15

## 2025-05-12 RX ORDER — GLUCAGON 3 MG/1
1 POWDER NASAL ONCE
Refills: 0 | Status: DISCONTINUED | OUTPATIENT
Start: 2025-05-12 | End: 2025-05-15

## 2025-05-12 RX ADMIN — Medication 1000 MILLIGRAM(S): at 05:53

## 2025-05-12 RX ADMIN — Medication 400 MILLIGRAM(S): at 05:24

## 2025-05-12 RX ADMIN — Medication 1000 MILLIGRAM(S): at 23:56

## 2025-05-12 RX ADMIN — HEPARIN SODIUM 5000 UNIT(S): 1000 INJECTION INTRAVENOUS; SUBCUTANEOUS at 13:21

## 2025-05-12 RX ADMIN — Medication 1000 MILLIGRAM(S): at 00:50

## 2025-05-12 RX ADMIN — Medication 400 MILLIGRAM(S): at 13:21

## 2025-05-12 RX ADMIN — Medication 1000 MILLIGRAM(S): at 14:20

## 2025-05-12 RX ADMIN — Medication 1000 MILLIGRAM(S): at 05:23

## 2025-05-12 RX ADMIN — Medication 25 GRAM(S): at 13:21

## 2025-05-12 RX ADMIN — INSULIN LISPRO 6: 100 INJECTION, SOLUTION INTRAVENOUS; SUBCUTANEOUS at 05:24

## 2025-05-12 RX ADMIN — FENOFIBRATE 145 MILLIGRAM(S): 160 TABLET ORAL at 21:45

## 2025-05-12 RX ADMIN — METOPROLOL SUCCINATE 50 MILLIGRAM(S): 50 TABLET, EXTENDED RELEASE ORAL at 21:45

## 2025-05-12 RX ADMIN — Medication 400 MILLIGRAM(S): at 21:45

## 2025-05-12 RX ADMIN — INSULIN LISPRO 2: 100 INJECTION, SOLUTION INTRAVENOUS; SUBCUTANEOUS at 14:52

## 2025-05-12 RX ADMIN — Medication 81 MILLIGRAM(S): at 13:21

## 2025-05-12 RX ADMIN — HEPARIN SODIUM 5000 UNIT(S): 1000 INJECTION INTRAVENOUS; SUBCUTANEOUS at 05:23

## 2025-05-12 RX ADMIN — METOPROLOL SUCCINATE 50 MILLIGRAM(S): 50 TABLET, EXTENDED RELEASE ORAL at 05:23

## 2025-05-12 RX ADMIN — Medication 25 GRAM(S): at 21:44

## 2025-05-12 RX ADMIN — Medication 25 GRAM(S): at 05:24

## 2025-05-12 RX ADMIN — INSULIN GLARGINE-YFGN 16 UNIT(S): 100 INJECTION, SOLUTION SUBCUTANEOUS at 21:45

## 2025-05-12 RX ADMIN — SODIUM CHLORIDE 60 MILLILITER(S): 9 INJECTION, SOLUTION INTRAVENOUS at 14:52

## 2025-05-12 RX ADMIN — Medication 1000 MILLIGRAM(S): at 01:20

## 2025-05-12 RX ADMIN — TICAGRELOR 90 MILLIGRAM(S): 90 TABLET ORAL at 05:24

## 2025-05-12 RX ADMIN — SODIUM CHLORIDE 60 MILLILITER(S): 9 INJECTION, SOLUTION INTRAVENOUS at 00:50

## 2025-05-12 RX ADMIN — Medication 1000 MILLIGRAM(S): at 13:20

## 2025-05-12 RX ADMIN — INSULIN LISPRO 2: 100 INJECTION, SOLUTION INTRAVENOUS; SUBCUTANEOUS at 10:33

## 2025-05-12 RX ADMIN — ATORVASTATIN CALCIUM 80 MILLIGRAM(S): 80 TABLET, FILM COATED ORAL at 21:45

## 2025-05-12 NOTE — PROGRESS NOTE ADULT - ASSESSMENT
54M with PMH of HTN, HLD, DM2, CAD s/p CABG, HF, and recent admission to [facility] for toe amputation and found to be bacteremic, now presents to the ED with right foot wound. Nephrology consulted for AURORA.

## 2025-05-12 NOTE — BRIEF OPERATIVE NOTE - NSICDXBRIEFPOSTOP_GEN_ALL_CORE_FT
POST-OP DIAGNOSIS:  Acute osteomyelitis 09-May-2025 12:20:38  Mary Banks  
POST-OP DIAGNOSIS:  Non-healing wound of right lower extremity 08-May-2025 16:33:32  Vidya Vera  
POST-OP DIAGNOSIS:  Acute osteomyelitis 09-May-2025 12:20:38  Mary Banks

## 2025-05-12 NOTE — PROGRESS NOTE ADULT - SUBJECTIVE AND OBJECTIVE BOX
SURGERY DAILY PROGRESS NOTE    24 Hour/Overnight Events:   - BG max 344, ordered mSSI q4h  - Pre-op for R TMA today    SUBJECTIVE: Patient seen and evaluated on AM rounds. No acute complaints, pain is well-controlled    ------------------------------------------------------------------------------------------------------------  OBJECTIVE:  Vital Signs Last 24 Hrs  T(C): 36.4 (12 May 2025 05:14), Max: 36.6 (11 May 2025 10:26)  T(F): 97.5 (12 May 2025 05:14), Max: 97.8 (11 May 2025 10:26)  HR: 78 (12 May 2025 05:14) (73 - 84)  BP: 128/69 (12 May 2025 05:14) (119/67 - 150/79)  BP(mean): --  RR: 18 (12 May 2025 05:14) (18 - 18)  SpO2: 100% (12 May 2025 05:14) (97% - 100%)    Parameters below as of 12 May 2025 05:14  Patient On (Oxygen Delivery Method): room air      I&O's Detail    11 May 2025 07:01  -  12 May 2025 07:00  --------------------------------------------------------  IN:    IV PiggyBack: 100 mL    Oral Fluid: 640 mL  Total IN: 740 mL    OUT:    Voided (mL): 2055 mL  Total OUT: 2055 mL    Total NET: -1315 mL          PHYSICAL EXAM:  Constitutional: Well developed, well-nourished, appropriately interactive in no acute distress  Chest: Symmetric chest rise bilaterally, normal work of breathing on room air  Abdomen: Soft, nondistended  Extremities: right foot dressing is clean and dry. Moves bilateral lower extremities equally    LABS:                        8.1    9.88  )-----------( 340      ( 12 May 2025 07:13 )             25.6     05-12    141  |  106  |  15  ----------------------------<  285[H]  4.5   |  23  |  1.68[H]    Ca    8.7      12 May 2025 07:13  Phos  2.9     05-12  Mg     2.1     05-12        PT/INR - ( 12 May 2025 07:13 )   PT: 11.9 sec;   INR: 1.04 ratio         PTT - ( 12 May 2025 07:13 )  PTT:32.8 sec

## 2025-05-12 NOTE — PROGRESS NOTE ADULT - SUBJECTIVE AND OBJECTIVE BOX
DATE OF SERVICE: 05-12-25 @ 16:27    Patient is a 54y old  Male who presents with a chief complaint of Angiogram (12 May 2025 13:36)      INTERVAL HISTORY: for R TMA today 5/12    REVIEW OF SYSTEMS:  CONSTITUTIONAL: No weakness  EYES/ENT: No visual changes;  No throat pain   NECK: No pain or stiffness  RESPIRATORY: No cough, wheezing; No shortness of breath  CARDIOVASCULAR: No chest pain or palpitations  GASTROINTESTINAL: No abdominal  pain. No nausea, vomiting, or hematemesis  GENITOURINARY: No dysuria, frequency or hematuria  NEUROLOGICAL: No stroke like symptoms  SKIN: No rashes      	  MEDICATIONS:  metoprolol tartrate 50 milliGRAM(s) Oral two times a day        PHYSICAL EXAM:  T(C): 36.4 (05-12-25 @ 13:38), Max: 36.4 (05-11-25 @ 17:14)  HR: 73 (05-12-25 @ 13:38) (69 - 84)  BP: 114/70 (05-12-25 @ 13:38) (114/70 - 150/79)  RR: 18 (05-12-25 @ 13:38) (18 - 18)  SpO2: 99% (05-12-25 @ 13:38) (97% - 100%)  Wt(kg): --  I&O's Summary    11 May 2025 07:01  -  12 May 2025 07:00  --------------------------------------------------------  IN: 740 mL / OUT: 2055 mL / NET: -1315 mL    12 May 2025 07:01  -  12 May 2025 16:27  --------------------------------------------------------  IN: 540 mL / OUT: 1000 mL / NET: -460 mL          Appearance: In no distress	  HEENT:    PERRL, EOMI	  Cardiovascular:  S1 S2, No JVD  Respiratory: Lungs clear to auscultation	  Gastrointestinal:  Soft, Non-tender, + BS	  Vascularature:  No edema of LE  Psychiatric: Appropriate affect   Neuro: no acute focal deficits                               8.1    9.88  )-----------( 340      ( 12 May 2025 07:13 )             25.6     05-12    141  |  106  |  15  ----------------------------<  285[H]  4.5   |  23  |  1.68[H]    Ca    8.7      12 May 2025 07:13  Phos  2.9     05-12  Mg     2.1     05-12          Labs personally reviewed      ASSESSMENT/PLAN: 	    54M hx DM, CAD (s/p CABG), mild HFrEF (EF 45%), and PAD (with recent angiogram Aug. 2024), and recent admission to [facility] w/ right foot wound s/p right foot Partial 2nd ray resection and 3rd proximal phalanx bone biopsy on 3/7/25 (Cx + for E. Faecalis and Staph Epi requiring prolonged abx course s/p PICC), now presenting with right foot wound.      Problem/Plan - 1:  ·  Problem: Hypertension.   ·  Plan: hold entresto 2/2 to AURORA  c/w lopressor 50mg BID     Problem/Plan - 2:  ·  Problem: CAD (coronary artery disease).   ·  Plan: c/w aspirin, brilinta statin  TTE 3/6/25 with EF 55%.  Rest NM stress images March 2025 with medium-sized, severe defect(s) in the lateral and inferolateral walls. Stress portion not performed      Problem/Plan - 3:  ·  Problem: AURORA (acute kidney injury).   ·  Plan:  hold entresto for now  baseline 1s, now rising 1.4, c/w IVF   - 5/7 creat 1.49, appreciate nephro recs      Problem/Plan - 4:  ·  Problem: Preop risk stratification   - RLE angiogram with Dr. oYussef on Thurs, 5/8/25 pending improved AURORA. c/w Trental 400mg TID for PAD  - R 1st and 2nd ray resection thurs 5/8. Continue local wound care for right foot wounds, podiatry following  ·  Plan: Elevated risk for low risk peripheral angio +/- angioplasty  - Tolerated well               Iolani Behrbom, AG-NP   Edwin Dwyer, DO [facility]  Cardiovascular Medicine  [address] [place] [address], [address]  Available through call or text on [employer]  Office: [phone]

## 2025-05-12 NOTE — PROGRESS NOTE ADULT - ASSESSMENT
54 year old male with PMH of DM, CAD (s/p CABG), mild HFrEF (EF 45%), and PAD, and recent admission to [facility] with R foot wound s/p R foot Partial 2nd ray resection and 3rd proximal phalanx bone biopsy on 3/7/25 (Cx + for E. Faecalis and Staph Epi requiring prolonged abx course s/p PICC, clean margin with no residual OM), now presenting with right foot wound. Patient reports 1 week of subjective fevers, chills, hypothermia, and R foot lateral toe oozing and erythema c/f soft tissue infection. Endocrine consulted for uncontrolled T2DM and hyperglycemia (High risk patient with severely uncontrolled diabetes with A1c of 11.2% at high risk of CAD and CVA with high medical complexity and high level decision-making). Patient reports eating full meals and tolerating POs, s/p angio done yesterday 5/9. Now with hyperglycemia FBG and postprandial, will increase insulin regimen for tighter BG control. Noted patient getting blood transfusion today. Noted patient takes much more insulin at home, he states he eats more at home. Endocrine will closely monitor BG and adjust insulin as needed for BG goal 100-180mg/dL inpatient.       #Uncontrolled T2DM  A1C with Estimated Average Glucose Result: 10.1 % (05-05-25 @ 06:47)  A1C with Estimated Average Glucose Result: >15.5 % (03-04-25 @ 23:12)  Home reg: Lantus 52u QHS, Humalog 20u TID AC -> discharged in march on Lantus 30u QHS and Admelog 14u TID AC (not adherent)   Endocrinologist: none -> follows with internist/PCP Dr. Mcdaniel in [place]?  *an appt should be made with his endocrine MD prior to [facility] and plan of care at [facility] needs to be discussed with his endocrine*    *only has Medicare and Elder plan via Medicare

## 2025-05-12 NOTE — PROGRESS NOTE ADULT - SUBJECTIVE AND OBJECTIVE BOX
[facility] DIVISION OF KIDNEY DISEASE AND HYPERTENSION  [phone]    RENAL FOLLOW UP NOTE- NEPHROHOSPITALIST  --------------------------------------------------------------------------------  Patient seen and examined this morning, scheduled for RTOR with podiatry today    PAST HISTORY  --------------------------------------------------------------------------------  No significant changes to PMH, PSH, FHx, SHx, unless otherwise noted    ALLERGIES & MEDICATIONS  --------------------------------------------------------------------------------  Allergies    No Known Allergies    Intolerances      Standing Inpatient Medications  acetaminophen     Tablet .. 1000 milliGRAM(s) Oral every 6 hours  aspirin enteric coated 81 milliGRAM(s) Oral daily  atorvastatin 80 milliGRAM(s) Oral at bedtime  dextrose 5%. 1000 milliLiter(s) IV Continuous <Continuous>  fenofibrate Tablet 145 milliGRAM(s) Oral at bedtime  glucagon  Injectable 1 milliGRAM(s) IntraMuscular once  heparin   Injectable 5000 Unit(s) SubCutaneous every 8 hours  insulin glargine Injectable (LANTUS) 13 Unit(s) SubCutaneous at bedtime  insulin lispro (ADMELOG) corrective regimen sliding scale   SubCutaneous every 4 hours  insulin lispro Injectable (ADMELOG) 15 Unit(s) SubCutaneous three times a day before meals  lactated ringers. 1000 milliLiter(s) IV Continuous <Continuous>  metoprolol tartrate 50 milliGRAM(s) Oral two times a day  pentoxifylline 400 milliGRAM(s) Oral three times a day  piperacillin/tazobactam IVPB.. 3.375 Gram(s) IV Intermittent every 8 hours  ticagrelor 90 milliGRAM(s) Oral every 12 hours    PRN Inpatient Medications  oxyCODONE    IR 2.5 milliGRAM(s) Oral every 4 hours PRN  oxyCODONE    IR 5 milliGRAM(s) Oral every 4 hours PRN      FOCUSED REVIEW OF SYSTEMS  --------------------------------------------------------------------------------  denies fevers/rigors  denies CP/palpitations  denies SOB  denies oliguria/dysuria      VITALS/PHYSICAL EXAM  --------------------------------------------------------------------------------  T(C): 36.4 (05-12-25 @ 09:45), Max: 36.4 (05-11-25 @ 17:14)  HR: 69 (05-12-25 @ 09:45) (69 - 84)  BP: 128/70 (05-12-25 @ 09:45) (119/69 - 150/79)  RR: 18 (05-12-25 @ 09:45) (18 - 18)  SpO2: 97% (05-12-25 @ 09:45) (97% - 100%)  Wt(kg): --        05-11-25 @ 07:01  -  05-12-25 @ 07:00  --------------------------------------------------------  IN: 740 mL / OUT: 2055 mL / NET: -1315 mL    05-12-25 @ 07:01  -  05-12-25 @ 13:37  --------------------------------------------------------  IN: 0 mL / OUT: 550 mL / NET: -550 mL      Physical Exam:  	Gen: NAD, lying in bed  	Pulm: CTA B/L ant/lat fields  	CV: RRR, S1S2  	Abd: +BS, soft, nontender/nondistended  	: No suprapubic tenderness.  no craig          Extremity: R foot dressing in place, no LLE edema  	Neuro: A&O x 3      LABS/STUDIES  --------------------------------------------------------------------------------              8.1    9.88  >-----------<  340      [05-12-25 @ 07:13]              25.6     141  |  106  |  15  ----------------------------<  285      [05-12-25 @ 07:13]  4.5   |  23  |  1.68        Ca     8.7     [05-12-25 @ 07:13]      Mg     2.1     [05-12-25 @ 07:13]      Phos  2.9     [05-12-25 @ 07:13]      PT/INR: PT 11.9 , INR 1.04       [05-12-25 @ 07:13]  PTT: 32.8       [05-12-25 @ 07:13]        Creatinine Trend:  SCr 1.68 [05-12 @ 07:13]  SCr 1.59 [05-11 @ 07:28]  SCr 1.60 [05-10 @ 07:08]  SCr 1.51 [05-09 @ 05:35]  SCr 1.51 [05-08 @ 05:30]              Urinalysis - [05-12-25 @ 07:13]      Color  / Appearance  / SG  / pH       Gluc 285 / Ketone   / Bili  / Urobili        Blood  / Protein  / Leuk Est  / Nitrite       RBC  / WBC  / Hyaline  / Gran  / Sq Epi  / Non Sq Epi  / Bacteria     Urine Creatinine 53      [05-05-25 @ 15:34]  Urine Protein 17      [05-05-25 @ 15:34]  Urine Sodium 111      [05-05-25 @ 15:34]  Urine Urea Nitrogen 269      [05-05-25 @ 15:34]  Urine Potassium 46      [05-05-25 @ 15:34]  Urine Osmolality 438      [05-05-25 @ 15:34]    Lipid: chol 118, , HDL 29, LDL --      [03-06-25 @ 06:59]

## 2025-05-12 NOTE — PROGRESS NOTE ADULT - ASSESSMENT
Patient is a 54 year old male with PMH of DM, CAD (s/p CABG), mild HFrEF (EF 45%), and PAD (with recent angiogram Aug. 2024), and recent admission to [facility] with R foot wound s/p R foot Partial 2nd ray resection and 3rd proximal phalanx bone biopsy on 3/7/25 (Cx + for E. Faecalis and Staph Epi requiring prolonged abx course s/p PICC, clean margin with no residual OM), now presenting with right foot wound. Patient reports 1 week of subjective fevers, chills, hypothermia, and R foot lateral toe oozing and erythema c/f soft tissue infection.    R foot wound infection with 1st MPJ fluid collection, 1st and 2nd toe OM  - 5/4 s/p R foot I&D to level of subQ - scant, purulent drainage, no erythema, no malodor   - R foot abscess culture with Bacteroides fragilis   - R foot xray with no gas or OM  - R foot MRI with soft tissue wounds about distal forefoot with small volume of ill defined peripheral enhancing fluid preferentially surrounding the 1st MPJ fluid collection, residual second metatarsal and 1st proximal phalanx OM, possible reactive vs early OM of 3rd metatarsal  - Bcx NGTD x2    - 5/8 s/p RLE angiogram with vascular   5/9 s/p right foot Partial 1st ray resection and incision and drainage   - noted high concern for residual bone infection, necrotic soft tissue and bone noted laterally, low concern for viability   - culture with GPC in pairs in fluid media only     Recommendations:   Follow OR culture for ID/sensitivities   Podiatry following, plan for OR for R TMA and tendoachilles lengthening today 5/12   -please send cultures/path  Continue zosyn for now   Continue local wound care   Monitor temps/WBC  Continue rest of care per primary team       Jason Vidal M.D.  [facility]  Available on [employer] TEAMS - *PREFERRED*  [phone]  After 5pm on weekdays and all day on weekends - please call [phone]     Thank you for consulting us and involving us in the management of this patients case. In addition to reviewing history, imaging, documents, labs, microbiology, took into account antibiotic stewardship, local antibiogram and infection control strategies and potential transmission issues at time of treatment decision making process.

## 2025-05-12 NOTE — PROGRESS NOTE ADULT - ASSESSMENT
54M hx DM, CAD (s/p CABG), mild HFrEF (EF 45%), and PAD (with recent dx angiogram Aug. 2024), and recent admission to [facility] w/ right foot wound s/p right foot partial 2nd ray resection and 3rd proximal phalanx bone biopsy on 3/7/25 (Cx + for E. Faecalis and Staph Epi requiring prolonged antibioics), now presenting with multiple non-healing right foot wounds. He is s/p diagnostic RLE angiogram 5/8 and R partial 1st ray resection and I&D with podiatry on 5/10 demonstrating adequate bleeding and low concern for active infection. OR cultures 5/10 speciated with GPC in pairs. He will undergo right TMA today.    PLAN  - OR with podiatry for R TMA today  - Zosyn empirically for right foot infection. OR wound culture 5/10 with GPC pairs. Appreciate ID recs  - Pain: Tylenol, Oxy 2.5/5 PRN  - Diet: NPO. Resume CC post-op  - Lantus, pre-meal insulin, and SSI per endocrine, appreciate recs. BG q4h given hyperglycemia overnight  - Trental 400mg TID for PAD  - Continue home ASA/Brilinta, statin/fibrate, metoprolol. Appreciate medicine recs  - DVT ppx: SQH  - Dispo: [facility] per PT pending OR and antibiotic recs    Vascular Surgery  [id number].

## 2025-05-12 NOTE — PROGRESS NOTE ADULT - SUBJECTIVE AND OBJECTIVE BOX
[facility]  DU Garcia Y. Patel, S. Shah, G. Casimir  [phone]  ([phone] - weekdays after 5pm and weekends)    Name: ELSI MERINO  Age/Gender: 54y Male  MRN: [id number]    Interval History:  Patient seen and examined this morning.   No new complaints noted.  Notes reviewed, for OR later today.  No concerning overnight events  Afebrile. PCA present  Allergies: No Known Allergies      Objective:  Vitals:   T(F): 97.5 (05-12-25 @ 09:45), Max: 97.5 (05-11-25 @ 17:14)  HR: 69 (05-12-25 @ 09:45) (69 - 84)  BP: 128/70 (05-12-25 @ 09:45) (119/69 - 150/79)  RR: 18 (05-12-25 @ 09:45) (18 - 18)  SpO2: 97% (05-12-25 @ 09:45) (97% - 100%)  Physical Examination:  General: no acute distress, nontoxic appearing   HEENT: normocephalic, atraumatic, anicteric  Respiratory: no acc muscle use, breathing comfortably  Cardiovascular: S1 and S2 present  Gastrointestinal: normal appearing, nondistended  Extremities: RLE dressing     Laboratory Studies:  CBC:                       8.1    9.88  )-----------( 340      ( 12 May 2025 07:13 )             25.6     WBC Trend:  9.88 05-12-25 @ 07:13  11.35 05-11-25 @ 08:52  14.93 05-11-25 @ 07:28  10.67 05-10-25 @ 07:06  9.09 05-09-25 @ 12:46  8.67 05-09-25 @ 05:36  10.48 05-08-25 @ 05:30  8.86 05-07-25 @ 07:17  10.24 05-06-25 @ 07:09    CMP: 05-12    141  |  106  |  15  ----------------------------<  285[H]  4.5   |  23  |  1.68[H]    Ca    8.7      12 May 2025 07:13  Phos  2.9     05-12  Mg     2.1     05-12    Creatinine: 1.68 mg/dL (05-12-25 @ 07:13)  Creatinine: 1.59 mg/dL (05-11-25 @ 07:28)  Creatinine: 1.60 mg/dL (05-10-25 @ 07:08)  Creatinine: 1.51 mg/dL (05-09-25 @ 05:35)  Creatinine: 1.51 mg/dL (05-08-25 @ 05:30)  Creatinine: 1.49 mg/dL (05-07-25 @ 07:16)  Creatinine: 1.40 mg/dL (05-06-25 @ 07:17)    Microbiology: reviewed   Culture - Wound Aerobic/Anaerobic (collected 05-09-25 @ 12:57)  Source: Surgical Swab DEEP WOUND RIGHT FOOT  Preliminary Report (05-11-25 @ 23:39):    Growth in fluid media only Gram positive cocci in pairs    Culture - Abscess with Gram Stain (collected 05-04-25 @ 04:40)  Source: Abscess right foot  Gram Stain (05-06-25 @ 11:51):    No polymorphonuclear leukocytes seen per low power field    No organisms seen per oil power field  Final Report (05-06-25 @ 11:51):    Rare Bacteroides fragilis "Susceptibilities not performed"    Culture - Blood (collected 05-04-25 @ 02:10)  Source: Blood Blood-Peripheral  Final Report (05-09-25 @ 05:00):    No growth at 5 days    Culture - Blood (collected 05-04-25 @ 02:08)  Source: Blood Blood-Peripheral  Final Report (05-09-25 @ 05:00):    No growth at 5 days    Radiology: reviewed     Medications:  acetaminophen     Tablet .. 1000 milliGRAM(s) Oral every 6 hours  aspirin enteric coated 81 milliGRAM(s) Oral daily  atorvastatin 80 milliGRAM(s) Oral at bedtime  dextrose 5%. 1000 milliLiter(s) IV Continuous <Continuous>  fenofibrate Tablet 145 milliGRAM(s) Oral at bedtime  glucagon  Injectable 1 milliGRAM(s) IntraMuscular once  heparin   Injectable 5000 Unit(s) SubCutaneous every 8 hours  insulin glargine Injectable (LANTUS) 13 Unit(s) SubCutaneous at bedtime  insulin lispro (ADMELOG) corrective regimen sliding scale   SubCutaneous every 4 hours  insulin lispro Injectable (ADMELOG) 15 Unit(s) SubCutaneous three times a day before meals  lactated ringers. 1000 milliLiter(s) IV Continuous <Continuous>  metoprolol tartrate 50 milliGRAM(s) Oral two times a day  oxyCODONE    IR 2.5 milliGRAM(s) Oral every 4 hours PRN  oxyCODONE    IR 5 milliGRAM(s) Oral every 4 hours PRN  pentoxifylline 400 milliGRAM(s) Oral three times a day  piperacillin/tazobactam IVPB.. 3.375 Gram(s) IV Intermittent every 8 hours  ticagrelor 90 milliGRAM(s) Oral every 12 hours    Current Antimicrobials:  piperacillin/tazobactam IVPB.. 3.375 Gram(s) IV Intermittent every 8 hours    Prior/Completed Antimicrobials:  piperacillin/tazobactam IVPB.  piperacillin/tazobactam IVPB.-  vancomycin  IVPB

## 2025-05-12 NOTE — PROGRESS NOTE ADULT - PROBLEM SELECTOR PLAN 1
Inpatient Plan:  Patient is NPO today for OR with podiatry   - Fasting sugar is above goal- increase Lantus to 16 units nightly tonight   - currently NPO- standing insulin held. Please resume Admelog to 15u TID AC (HOLD if NPO)  - C/w low dose Admelog correctional scales TID AC, HS, and 2AM    Discharge Plan:  - Bsal/bolus reg, TBD closer to d/c.   - Patient should check BG TID AC and HS at home. Please tell patient to contact Endocrinologist if BG <70mg/dL x1 or >200mg/dL consistently or >400mg/dL x1.  - Endocrine follow up: has Medicare, can f/u at [facility] [address] [place] [address] [phone]. Email to be sent closer to d/c for appt.   - Given elevated BHOB of 3 w/ A1c >15.5% on last admission, patient to should have r/o T1DM/SUMIT labs checked.   - Recommend routine outpatient ophthalmology and podiatry follow up.  - Patient will need RX for basal insulin pen (ie. Basaglar, Lantus, Tresiba, Toujeo) and bolus insulin pen (ie. humalog/novolog/admelog) depending on insurance coverage; please send test scripts to see which is covered. Please make sure patient has all diabetes supplies on discharge (glucometer, test strips, lancets, alcohol swabs, insulin pen needles). Please write RX for glucose tablets/gel> use as directed plus Glucagon nasal/ IM injection (use as directed)> Can prescribe any covered by pt's insurance.

## 2025-05-12 NOTE — PROGRESS NOTE ADULT - SUBJECTIVE AND OBJECTIVE BOX
Podiatry Pager #: [phone]    Patient is a 54y old  Male who presents with a chief complaint of Angiogram (11 May 2025 16:24)      INTERVAL HPI/OVERNIGHT EVENTS:   Pt is scheduled for right foot transmetatarsal amputation and tendoachilles lengthening with Dr. Patterson at 5pm. Patient is aware of procedure and is NPO since midnight.    MEDICATIONS  (STANDING):  acetaminophen     Tablet .. 1000 milliGRAM(s) Oral every 6 hours  aspirin enteric coated 81 milliGRAM(s) Oral daily  atorvastatin 80 milliGRAM(s) Oral at bedtime  dextrose 5%. 1000 milliLiter(s) (100 mL/Hr) IV Continuous <Continuous>  fenofibrate Tablet 145 milliGRAM(s) Oral at bedtime  glucagon  Injectable 1 milliGRAM(s) IntraMuscular once  heparin   Injectable 5000 Unit(s) SubCutaneous every 8 hours  insulin glargine Injectable (LANTUS) 13 Unit(s) SubCutaneous at bedtime  insulin lispro (ADMELOG) corrective regimen sliding scale   SubCutaneous every 4 hours  insulin lispro Injectable (ADMELOG) 15 Unit(s) SubCutaneous three times a day before meals  lactated ringers. 1000 milliLiter(s) (60 mL/Hr) IV Continuous <Continuous>  metoprolol tartrate 50 milliGRAM(s) Oral two times a day  pentoxifylline 400 milliGRAM(s) Oral three times a day  piperacillin/tazobactam IVPB.. 3.375 Gram(s) IV Intermittent every 8 hours  ticagrelor 90 milliGRAM(s) Oral every 12 hours    MEDICATIONS  (PRN):  oxyCODONE    IR 2.5 milliGRAM(s) Oral every 4 hours PRN Moderate Pain (4 - 6)  oxyCODONE    IR 5 milliGRAM(s) Oral every 4 hours PRN Severe Pain (7 - 10)      Allergies    No Known Allergies    Intolerances        Vital Signs Last 24 Hrs  T(C): 36.4 (12 May 2025 01:00), Max: 36.6 (11 May 2025 10:26)  T(F): 97.5 (12 May 2025 01:00), Max: 97.8 (11 May 2025 10:26)  HR: 84 (12 May 2025 01:00) (73 - 84)  BP: 150/79 (12 May 2025 01:00) (119/67 - 150/79)  BP(mean): --  RR: 18 (12 May 2025 01:00) (18 - 18)  SpO2: 98% (12 May 2025 01:00) (97% - 100%)    Parameters below as of 12 May 2025 01:00  Patient On (Oxygen Delivery Method): room air        LABS:                        8.0    11.35 )-----------( 332      ( 11 May 2025 08:52 )             25.3     05-11    142  |  107  |  13  ----------------------------<  152[H]  4.1   |  23  |  1.59[H]    Ca    8.4      11 May 2025 07:28  Phos  2.6     05-11  Mg     2.0     05-11        Urinalysis Basic - ( 11 May 2025 07:28 )    Color: x / Appearance: x / SG: x / pH: x  Gluc: 152 mg/dL / Ketone: x  / Bili: x / Urobili: x   Blood: x / Protein: x / Nitrite: x   Leuk Esterase: x / RBC: x / WBC x   Sq Epi: x / Non Sq Epi: x / Bacteria: x      CAPILLARY BLOOD GLUCOSE      POCT Blood Glucose.: 296 mg/dL (12 May 2025 05:02)  POCT Blood Glucose.: 320 mg/dL (12 May 2025 00:59)  POCT Blood Glucose.: 318 mg/dL (11 May 2025 22:40)  POCT Blood Glucose.: 344 mg/dL (11 May 2025 22:37)  POCT Blood Glucose.: 300 mg/dL (11 May 2025 21:18)  POCT Blood Glucose.: 170 mg/dL (11 May 2025 17:24)  POCT Blood Glucose.: 239 mg/dL (11 May 2025 12:16)  POCT Blood Glucose.: 184 mg/dL (11 May 2025 08:44)      RADIOLOGY & ADDITIONAL TESTS:

## 2025-05-12 NOTE — PROGRESS NOTE ADULT - PROBLEM SELECTOR PLAN 2
EPIFANIO Youssef MD have participated in the daily care of this patient  and have seen and examined the patient today and agree with  the  evaluation, assessment and plan of the surgical house officer  EPIFANIO Youssef MD have personally seen and examined the patient at bedside today at  7 pm

## 2025-05-12 NOTE — BRIEF OPERATIVE NOTE - COMMENTS
Pt is s/p right foot transmetatarsal amputation and tendo-achilles lengthening, closed  - Low concern for residual soft tissue and bone infection  - High concern for viability secondary to vascular status Pt is s/p right foot transmetatarsal amputation and tendo-achilles lengthening, closed  - Low concern for residual soft tissue and bone infection  - High concern for viability secondary to vascular status  - Posterior splint applied to the RLE.   Pod plan: Discharge pending clean bone margin no growth x 48 hours/ final vasc recs/ final ID recs

## 2025-05-12 NOTE — BRIEF OPERATIVE NOTE - NSICDXBRIEFPROCEDURE_GEN_ALL_CORE_FT
PROCEDURES:  Angiogram, lower extremity, right 08-May-2025 16:33:11  Vidya Vera  
PROCEDURES:  Transmetatarsal amputation, right foot 12-May-2025 20:19:47  Shantell Shin  Lengthening, tendon, Achilles 12-May-2025 20:20:05  Shantell Shin  
PROCEDURES:  Detachment at right foot, partial 1st ray, open approach 09-May-2025 12:20:08  Mary Banks

## 2025-05-12 NOTE — PROGRESS NOTE ADULT - PROBLEM SELECTOR PLAN 1
AURORA likely in the setting of recent Entresto initiation. On review of labs on [place] [facility]IE, Scr was approximately ~0.8. During recent admission at [facility] (3/5/25 to 3/11/25) Scr was 0.8 on discharge (3/10/25). Patient was started on Entresto during that admission. Last outpatient Scr was elevated at 1.55 on 4/4/25.     -creatinine rising over weekend to 1.68 today; some relative hypotension noted in OR note from 05/09 that required use of phenylephrine.  Patient also s/p LE angiogram on 05/08    UA noted, UPCR 0.3, patient w/ h/o DM  -would continue holding Entresto  -patient for repeat OR today, IVF infusing at time of assessment.  Patient currently nonoliguric    -continue strict I/O please    -daily BMP, will need to monitor for further evolution of AURORA due to concern for GIGI v. hemodynamically mediated/ischemic ATN

## 2025-05-12 NOTE — PROGRESS NOTE ADULT - ASSESSMENT
Plan:   To OR today at 5pm with Dr. Patterson for right foot transmetatarsal amputation and tendoachilles lengthening.   CXR on sunrise.  EKG on sunrise.  Medical/Cardiac clearance since 5/6 and documented in chart.  Consent signed and in chart.  Procedure was explained to patient in detail. All alternatives, risks and complications were discussed. All questions answered.

## 2025-05-12 NOTE — PROGRESS NOTE ADULT - SUBJECTIVE AND OBJECTIVE BOX
Interval history:  BG is running above goal   Fasting sugar 296    MEDICATIONS  (STANDING):  acetaminophen     Tablet .. 1000 milliGRAM(s) Oral every 6 hours  aspirin enteric coated 81 milliGRAM(s) Oral daily  atorvastatin 80 milliGRAM(s) Oral at bedtime  dextrose 5%. 1000 milliLiter(s) (100 mL/Hr) IV Continuous <Continuous>  fenofibrate Tablet 145 milliGRAM(s) Oral at bedtime  glucagon  Injectable 1 milliGRAM(s) IntraMuscular once  heparin   Injectable 5000 Unit(s) SubCutaneous every 8 hours  insulin glargine Injectable (LANTUS) 13 Unit(s) SubCutaneous at bedtime  insulin lispro (ADMELOG) corrective regimen sliding scale   SubCutaneous every 4 hours  insulin lispro Injectable (ADMELOG) 15 Unit(s) SubCutaneous three times a day before meals  lactated ringers. 1000 milliLiter(s) (60 mL/Hr) IV Continuous <Continuous>  metoprolol tartrate 50 milliGRAM(s) Oral two times a day  pentoxifylline 400 milliGRAM(s) Oral three times a day  piperacillin/tazobactam IVPB.. 3.375 Gram(s) IV Intermittent every 8 hours  ticagrelor 90 milliGRAM(s) Oral every 12 hours    MEDICATIONS  (PRN):  oxyCODONE    IR 2.5 milliGRAM(s) Oral every 4 hours PRN Moderate Pain (4 - 6)  oxyCODONE    IR 5 milliGRAM(s) Oral every 4 hours PRN Severe Pain (7 - 10)      Allergies    No Known Allergies    Intolerances      Review of Systems:  Constitutional: No fever  Eyes: No blurry vision  Neuro: No tremors  HEENT: No pain  Cardiovascular: No chest pain, palpitations  Respiratory: No SOB, no cough  GI: No nausea, vomiting, abdominal pain  : No dysuria  Skin: no rash  Psych: no depression  Endocrine: no polyuria, polydipsia  Hem/lymph: no swelling  Osteoporosis: no fractures    ALL OTHER SYSTEMS REVIEWED AND NEGATIVE    UNABLE TO OBTAIN    PHYSICAL EXAM:  VITALS: T(C): 36.4 (05-12-25 @ 09:45)  T(F): 97.5 (05-12-25 @ 09:45), Max: 97.5 (05-11-25 @ 17:14)  HR: 69 (05-12-25 @ 09:45) (69 - 84)  BP: 128/70 (05-12-25 @ 09:45) (119/69 - 150/79)  RR:  (18 - 18)  SpO2:  (97% - 100%)  Wt(kg): --  GENERAL: NAD, well-groomed, well-developed  EYES: No proptosis, no lid lag, anicteric  HEENT:  Atraumatic, Normocephalic, moist mucous membranes  THYROID: Normal size, no palpable nodules  RESPIRATORY: Clear to auscultation bilaterally; No rales, rhonchi, wheezing, or rubs  CARDIOVASCULAR: Regular rate and rhythm; No murmurs; no peripheral edema  GI: Soft, nontender, non distended, normal bowel sounds  SKIN: Dry, intact, No rashes or lesions  MUSCULOSKELETAL: Full range of motion, normal strength  NEURO: sensation intact, extraocular movements intact, no tremor, normal reflexes  PSYCH: Alert and oriented x 3, normal affect, normal mood  CUSHING'S SIGNS: no striae    POCT Blood Glucose.: 192 mg/dL (05-12-25 @ 10:04)  POCT Blood Glucose.: 296 mg/dL (05-12-25 @ 05:02)  POCT Blood Glucose.: 320 mg/dL (05-12-25 @ 00:59)  POCT Blood Glucose.: 318 mg/dL (05-11-25 @ 22:40)  POCT Blood Glucose.: 344 mg/dL (05-11-25 @ 22:37)  POCT Blood Glucose.: 300 mg/dL (05-11-25 @ 21:18)  POCT Blood Glucose.: 170 mg/dL (05-11-25 @ 17:24)  POCT Blood Glucose.: 239 mg/dL (05-11-25 @ 12:16)  POCT Blood Glucose.: 184 mg/dL (05-11-25 @ 08:44)  POCT Blood Glucose.: 152 mg/dL (05-11-25 @ 05:35)  POCT Blood Glucose.: 185 mg/dL (05-10-25 @ 21:14)  POCT Blood Glucose.: 131 mg/dL (05-10-25 @ 19:05)  POCT Blood Glucose.: 92 mg/dL (05-10-25 @ 17:21)  POCT Blood Glucose.: 246 mg/dL (05-10-25 @ 13:04)  POCT Blood Glucose.: 288 mg/dL (05-10-25 @ 08:29)  POCT Blood Glucose.: 253 mg/dL (05-09-25 @ 22:06)  POCT Blood Glucose.: 306 mg/dL (05-09-25 @ 16:55)  POCT Blood Glucose.: 202 mg/dL (05-09-25 @ 14:12)  POCT Blood Glucose.: 160 mg/dL (05-09-25 @ 12:18)      05-12    141  |  106  |  15  ----------------------------<  285[H]  4.5   |  23  |  1.68[H]    eGFR: 48[L]    Ca    8.7      05-12  Mg     2.1     05-12  Phos  2.9     05-12            Thyroid Function Tests:

## 2025-05-12 NOTE — BRIEF OPERATIVE NOTE - SPECIMENS
Pathology: right foot hallux Microbiology: right foot deep wound culture
None
Pathology: Right foot forefoot, right foot clean bone margin 2nd metatarsal; Microbiology: Right foot deep wound culture, right foot clean bone margin 2nd metatarsal

## 2025-05-12 NOTE — BRIEF OPERATIVE NOTE - NSICDXBRIEFPREOP_GEN_ALL_CORE_FT
PRE-OP DIAGNOSIS:  Acute osteomyelitis 09-May-2025 12:20:30  Mary Banks  
PRE-OP DIAGNOSIS:  Non-healing wound of right lower extremity 08-May-2025 16:33:24  Vidya Vera  
PRE-OP DIAGNOSIS:  Acute osteomyelitis 09-May-2025 12:20:30  Mary Banks

## 2025-05-12 NOTE — BRIEF OPERATIVE NOTE - OPERATION/FINDINGS
Pt is s/p right foot transmetatarsal amputation and tendo-achilles lengthening, closed  - Good intraop bleeding  - No purulence noted  - Bone at proximal margin hard and of good quality

## 2025-05-12 NOTE — PRE-ANESTHESIA EVALUATION ADULT - NSANTHPMHFT_GEN_ALL_CORE
54M hx DM, CAD (s/p CABG), mild HFrEF (EF 45%), and PAD (with recent dx angiogram Aug. 2024), and recent admission to [facility] w/ right foot wound s/p right foot partial 2nd ray resection and 3rd proximal phalanx bone biopsy on 3/7/25 (Cx + for E. Faecalis and Staph Epi requiring prolonged antibioics), now presenting with multiple non-healing right foot wounds. He is s/p diagnostic RLE angiogram 5/8 and R partial 1st ray resection and I&D with podiatry on 5/10 demonstrating adequate bleeding and low concern for active infection. OR cultures 5/10 speciated with GPC in pairs. He will undergo right TMA today 54M hx DM, CAD (s/p CABG), mild HFrEF (EF 45%), and PAD (with recent dx angiogram Aug. 2024), and recent admission to [facility] w/ right foot wound s/p right foot partial 2nd ray resection and 3rd proximal phalanx bone biopsy on 3/7/25 (Cx + for E. Faecalis and Staph Epi requiring prolonged antibioics), now presenting with multiple non-healing right foot wounds. He is s/p diagnostic RLE angiogram 5/8 and R partial 1st ray resection and I&D with podiatry on 5/10 demonstrating adequate bleeding and low concern for active infection. OR cultures 5/10 speciated with GPC in pairs. He will undergo right TMA today    Pt denies any chest pain, SOB, orthopnea/trouble lying flat, GERD symptoms/acid reflux, decreased cervical ROM.

## 2025-05-13 LAB
ANION GAP SERPL CALC-SCNC: 11 MMOL/L — SIGNIFICANT CHANGE UP (ref 5–17)
BUN SERPL-MCNC: 15 MG/DL — SIGNIFICANT CHANGE UP (ref 7–23)
CALCIUM SERPL-MCNC: 8.4 MG/DL — SIGNIFICANT CHANGE UP (ref 8.4–10.5)
CHLORIDE SERPL-SCNC: 103 MMOL/L — SIGNIFICANT CHANGE UP (ref 96–108)
CO2 SERPL-SCNC: 24 MMOL/L — SIGNIFICANT CHANGE UP (ref 22–31)
CREAT SERPL-MCNC: 1.53 MG/DL — HIGH (ref 0.5–1.3)
EGFR: 53 ML/MIN/1.73M2 — LOW
EGFR: 53 ML/MIN/1.73M2 — LOW
GLUCOSE BLDC GLUCOMTR-MCNC: 112 MG/DL — HIGH (ref 70–99)
GLUCOSE BLDC GLUCOMTR-MCNC: 135 MG/DL — HIGH (ref 70–99)
GLUCOSE BLDC GLUCOMTR-MCNC: 290 MG/DL — HIGH (ref 70–99)
GLUCOSE BLDC GLUCOMTR-MCNC: 392 MG/DL — HIGH (ref 70–99)
GLUCOSE BLDC GLUCOMTR-MCNC: 435 MG/DL — HIGH (ref 70–99)
GLUCOSE BLDC GLUCOMTR-MCNC: 438 MG/DL — HIGH (ref 70–99)
GLUCOSE BLDC GLUCOMTR-MCNC: 450 MG/DL — CRITICAL HIGH (ref 70–99)
GLUCOSE SERPL-MCNC: 404 MG/DL — HIGH (ref 70–99)
GRAM STN FLD: SIGNIFICANT CHANGE UP
HCT VFR BLD CALC: 25.5 % — LOW (ref 39–50)
HGB BLD-MCNC: 8 G/DL — LOW (ref 13–17)
MAGNESIUM SERPL-MCNC: 2 MG/DL — SIGNIFICANT CHANGE UP (ref 1.6–2.6)
MCHC RBC-ENTMCNC: 25.6 PG — LOW (ref 27–34)
MCHC RBC-ENTMCNC: 31.4 G/DL — LOW (ref 32–36)
MCV RBC AUTO: 81.5 FL — SIGNIFICANT CHANGE UP (ref 80–100)
NRBC BLD AUTO-RTO: 0 /100 WBCS — SIGNIFICANT CHANGE UP (ref 0–0)
PHOSPHATE SERPL-MCNC: 3.1 MG/DL — SIGNIFICANT CHANGE UP (ref 2.5–4.5)
PLATELET # BLD AUTO: 319 K/UL — SIGNIFICANT CHANGE UP (ref 150–400)
POTASSIUM SERPL-MCNC: 4.1 MMOL/L — SIGNIFICANT CHANGE UP (ref 3.5–5.3)
POTASSIUM SERPL-SCNC: 4.1 MMOL/L — SIGNIFICANT CHANGE UP (ref 3.5–5.3)
RBC # BLD: 3.13 M/UL — LOW (ref 4.2–5.8)
RBC # FLD: 16.5 % — HIGH (ref 10.3–14.5)
SODIUM SERPL-SCNC: 138 MMOL/L — SIGNIFICANT CHANGE UP (ref 135–145)
SPECIMEN SOURCE: SIGNIFICANT CHANGE UP
WBC # BLD: 12.79 K/UL — HIGH (ref 3.8–10.5)
WBC # FLD AUTO: 12.79 K/UL — HIGH (ref 3.8–10.5)

## 2025-05-13 PROCEDURE — 99232 SBSQ HOSP IP/OBS MODERATE 35: CPT

## 2025-05-13 RX ORDER — TICAGRELOR 90 MG/1
90 TABLET ORAL EVERY 12 HOURS
Refills: 0 | Status: DISCONTINUED | OUTPATIENT
Start: 2025-05-13 | End: 2025-05-15

## 2025-05-13 RX ORDER — TICAGRELOR 90 MG/1
90 TABLET ORAL EVERY 12 HOURS
Refills: 0 | Status: DISCONTINUED | OUTPATIENT
Start: 2025-05-13 | End: 2025-05-13

## 2025-05-13 RX ORDER — INSULIN GLARGINE-YFGN 100 [IU]/ML
18 INJECTION, SOLUTION SUBCUTANEOUS AT BEDTIME
Refills: 0 | Status: DISCONTINUED | OUTPATIENT
Start: 2025-05-13 | End: 2025-05-14

## 2025-05-13 RX ORDER — INSULIN LISPRO 100 U/ML
18 INJECTION, SOLUTION INTRAVENOUS; SUBCUTANEOUS
Refills: 0 | Status: DISCONTINUED | OUTPATIENT
Start: 2025-05-13 | End: 2025-05-14

## 2025-05-13 RX ORDER — PIPERACILLIN-TAZO-DEXTROSE,ISO 2.25G/50ML
3.38 IV SOLUTION, PIGGYBACK PREMIX FROZEN(ML) INTRAVENOUS EVERY 8 HOURS
Refills: 0 | Status: DISCONTINUED | OUTPATIENT
Start: 2025-05-13 | End: 2025-05-14

## 2025-05-13 RX ORDER — HEPARIN SODIUM 1000 [USP'U]/ML
5000 INJECTION INTRAVENOUS; SUBCUTANEOUS EVERY 8 HOURS
Refills: 0 | Status: DISCONTINUED | OUTPATIENT
Start: 2025-05-13 | End: 2025-05-15

## 2025-05-13 RX ADMIN — INSULIN LISPRO 18 UNIT(S): 100 INJECTION, SOLUTION INTRAVENOUS; SUBCUTANEOUS at 13:37

## 2025-05-13 RX ADMIN — Medication 25 GRAM(S): at 13:35

## 2025-05-13 RX ADMIN — Medication 25 GRAM(S): at 05:18

## 2025-05-13 RX ADMIN — Medication 1000 MILLIGRAM(S): at 18:56

## 2025-05-13 RX ADMIN — ATORVASTATIN CALCIUM 80 MILLIGRAM(S): 80 TABLET, FILM COATED ORAL at 21:47

## 2025-05-13 RX ADMIN — Medication 1000 MILLIGRAM(S): at 05:18

## 2025-05-13 RX ADMIN — INSULIN LISPRO 3: 100 INJECTION, SOLUTION INTRAVENOUS; SUBCUTANEOUS at 13:37

## 2025-05-13 RX ADMIN — Medication 1000 MILLIGRAM(S): at 18:26

## 2025-05-13 RX ADMIN — Medication 25 GRAM(S): at 21:46

## 2025-05-13 RX ADMIN — Medication 1000 MILLIGRAM(S): at 05:48

## 2025-05-13 RX ADMIN — OXYCODONE HYDROCHLORIDE 5 MILLIGRAM(S): 30 TABLET ORAL at 05:18

## 2025-05-13 RX ADMIN — Medication 400 MILLIGRAM(S): at 05:18

## 2025-05-13 RX ADMIN — Medication 1000 MILLIGRAM(S): at 00:26

## 2025-05-13 RX ADMIN — OXYCODONE HYDROCHLORIDE 5 MILLIGRAM(S): 30 TABLET ORAL at 05:48

## 2025-05-13 RX ADMIN — TICAGRELOR 90 MILLIGRAM(S): 90 TABLET ORAL at 18:26

## 2025-05-13 RX ADMIN — Medication 81 MILLIGRAM(S): at 13:34

## 2025-05-13 RX ADMIN — FENOFIBRATE 145 MILLIGRAM(S): 160 TABLET ORAL at 21:47

## 2025-05-13 RX ADMIN — HEPARIN SODIUM 5000 UNIT(S): 1000 INJECTION INTRAVENOUS; SUBCUTANEOUS at 21:47

## 2025-05-13 RX ADMIN — OXYCODONE HYDROCHLORIDE 5 MILLIGRAM(S): 30 TABLET ORAL at 13:34

## 2025-05-13 RX ADMIN — OXYCODONE HYDROCHLORIDE 5 MILLIGRAM(S): 30 TABLET ORAL at 14:34

## 2025-05-13 RX ADMIN — INSULIN GLARGINE-YFGN 18 UNIT(S): 100 INJECTION, SOLUTION SUBCUTANEOUS at 21:48

## 2025-05-13 RX ADMIN — Medication 1000 MILLIGRAM(S): at 14:34

## 2025-05-13 RX ADMIN — Medication 1 APPLICATION(S): at 13:36

## 2025-05-13 RX ADMIN — Medication 400 MILLIGRAM(S): at 13:34

## 2025-05-13 RX ADMIN — INSULIN LISPRO 15 UNIT(S): 100 INJECTION, SOLUTION INTRAVENOUS; SUBCUTANEOUS at 08:54

## 2025-05-13 RX ADMIN — INSULIN LISPRO 6: 100 INJECTION, SOLUTION INTRAVENOUS; SUBCUTANEOUS at 08:54

## 2025-05-13 RX ADMIN — Medication 400 MILLIGRAM(S): at 21:46

## 2025-05-13 RX ADMIN — METOPROLOL SUCCINATE 50 MILLIGRAM(S): 50 TABLET, EXTENDED RELEASE ORAL at 05:18

## 2025-05-13 RX ADMIN — Medication 1000 MILLIGRAM(S): at 13:34

## 2025-05-13 NOTE — PHYSICAL THERAPY INITIAL EVALUATION ADULT - GAIT DEVIATIONS NOTED, PT EVAL
decreased nayeli/decreased step length/decreased stride length/decreased weight-shifting ability
decreased velocity of limb motion

## 2025-05-13 NOTE — PHYSICAL THERAPY INITIAL EVALUATION ADULT - ADDITIONAL COMMENTS
Lives with spouse and dtr in pvt house with 3 steps to enter, none inside to negotiate. States was independent with all ADL's and ambulation, using no assistive device. Pt states he has a RW at home.
Lives with spouse and dtr in pvt house with 3 steps to enter, none inside to negotiate. States was independent with all ADL's and ambulation, using no assistive device. Pt states he has a RW at home.

## 2025-05-13 NOTE — PHYSICAL THERAPY INITIAL EVALUATION ADULT - IMPAIRED TRANSFERS: SIT/STAND, REHAB EVAL
impaired balance/decreased flexibility/pain/decreased ROM/decreased strength
impaired balance/impaired coordination/decreased flexibility/impaired motor control/decreased strength

## 2025-05-13 NOTE — PROGRESS NOTE ADULT - SUBJECTIVE AND OBJECTIVE BOX
SURGERY DAILY PROGRESS NOTE    24 Hour/Overnight Events:  - R TMA  - H/H 8.1/25.6-->7.3/23.6. s/p 1U pRBC    SUBJECTIVE: Patient seen and evaluated on AM rounds.    ------------------------------------------------------------------------------------------------------------  OBJECTIVE:  Vital Signs Last 24 Hrs  T(C): 36.6 (13 May 2025 05:10), Max: 37.2 (12 May 2025 21:30)  T(F): 97.8 (13 May 2025 05:10), Max: 98.9 (12 May 2025 21:30)  HR: 90 (13 May 2025 05:10) (69 - 90)  BP: 128/75 (13 May 2025 05:10) (88/52 - 146/65)  BP(mean): 79 (12 May 2025 21:15) (63 - 89)  RR: 18 (13 May 2025 05:10) (16 - 18)  SpO2: 98% (13 May 2025 05:10) (96% - 100%)    Parameters below as of 13 May 2025 05:10  Patient On (Oxygen Delivery Method): room air      I&O's Detail    11 May 2025 07:01  -  12 May 2025 07:00  --------------------------------------------------------  IN:    IV PiggyBack: 100 mL    Oral Fluid: 640 mL  Total IN: 740 mL    OUT:    Voided (mL): 2055 mL  Total OUT: 2055 mL    Total NET: -1315 mL      12 May 2025 07:01  -  13 May 2025 06:05  --------------------------------------------------------  IN:    Lactated Ringers: 540 mL    Oral Fluid: 460 mL  Total IN: 1000 mL    OUT:    Voided (mL): 1870 mL  Total OUT: 1870 mL    Total NET: -870 mL          PHYSICAL EXAM:  Constitutional: Well developed, well-nourished, appropriately interactive in no acute distress  Chest: Symmetric chest rise bilaterally, normal work of breathing on room air  Abdomen: Soft, nondistended  Extremities:     LABS:                        7.3    8.09  )-----------( 303      ( 12 May 2025 20:37 )             23.6     05-12    141  |  106  |  15  ----------------------------<  285[H]  4.5   |  23  |  1.68[H]    Ca    8.7      12 May 2025 07:13  Phos  2.9     05-12  Mg     2.1     05-12        PT/INR - ( 12 May 2025 07:13 )   PT: 11.9 sec;   INR: 1.04 ratio         PTT - ( 12 May 2025 07:13 )  PTT:32.8 sec     SURGERY DAILY PROGRESS NOTE    24 Hour/Overnight Events:  - R TMA  - H/H 8.1/25.6-->7.3/23.6. s/p 1U pRBC    SUBJECTIVE: Patient seen and evaluated on AM rounds. He is complaining of moderate to severe pain that is not well-controlled on his current regimen. Otherwise denies chest pain and shortness of breath.    ------------------------------------------------------------------------------------------------------------  OBJECTIVE:  Vital Signs Last 24 Hrs  T(C): 36.6 (13 May 2025 05:10), Max: 37.2 (12 May 2025 21:30)  T(F): 97.8 (13 May 2025 05:10), Max: 98.9 (12 May 2025 21:30)  HR: 90 (13 May 2025 05:10) (69 - 90)  BP: 128/75 (13 May 2025 05:10) (88/52 - 146/65)  BP(mean): 79 (12 May 2025 21:15) (63 - 89)  RR: 18 (13 May 2025 05:10) (16 - 18)  SpO2: 98% (13 May 2025 05:10) (96% - 100%)    Parameters below as of 13 May 2025 05:10  Patient On (Oxygen Delivery Method): room air      I&O's Detail    11 May 2025 07:01  -  12 May 2025 07:00  --------------------------------------------------------  IN:    IV PiggyBack: 100 mL    Oral Fluid: 640 mL  Total IN: 740 mL    OUT:    Voided (mL): 2055 mL  Total OUT: 2055 mL    Total NET: -1315 mL      12 May 2025 07:01  -  13 May 2025 06:05  --------------------------------------------------------  IN:    Lactated Ringers: 540 mL    Oral Fluid: 460 mL  Total IN: 1000 mL    OUT:    Voided (mL): 1870 mL  Total OUT: 1870 mL    Total NET: -870 mL          PHYSICAL EXAM:  Constitutional: Well developed, well-nourished, appropriately interactive in no acute distress  Chest: Symmetric chest rise bilaterally, normal work of breathing on room air  Abdomen: Soft, nondistended  Extremities: right PT signal. right leg is soft, warm, and non-tender to palpation. Right foot dressing is clean and dry. Bilateral hip flexion/extension 5/5.    LABS:                        7.3    8.09  )-----------( 303      ( 12 May 2025 20:37 )             23.6     05-12    141  |  106  |  15  ----------------------------<  285[H]  4.5   |  23  |  1.68[H]    Ca    8.7      12 May 2025 07:13  Phos  2.9     05-12  Mg     2.1     05-12        PT/INR - ( 12 May 2025 07:13 )   PT: 11.9 sec;   INR: 1.04 ratio         PTT - ( 12 May 2025 07:13 )  PTT:32.8 sec

## 2025-05-13 NOTE — PROGRESS NOTE ADULT - ASSESSMENT
54M hx DM, CAD (s/p CABG), mild HFrEF (EF 45%), and PAD (with recent dx angiogram Aug. 2024), and recent admission to [facility] w/ right foot wound s/p right foot partial 2nd ray resection and 3rd proximal phalanx bone biopsy on 3/7/25 (Cx + for E. Faecalis and Staph Epi requiring prolonged antibioics), now presenting with multiple non-healing right foot wounds. He is s/p diagnostic RLE angiogram 5/8 and R partial 1st ray resection and I&D with podiatry on 5/10 demonstrating adequate bleeding and low concern for active infection. OR cultures 5/10 speciated with GPC in pairs. He is now s/p right TMA on 5/12/25 with low concern for residual infection and high concern for viability.    PLAN  - Follow-up AM CBC. s/p 1U pRBC post-operatively on 5/12  - Right foot dressing per podiatry  - Zosyn empirically for right foot infection. OR wound culture 5/10 with GPC pairs, follow-up bone margins from 5/12. Appreciate ID recs  - Pain: Tylenol, Oxy 2.5/5 PRN  - Diet: CC   - Lantus, pre-meal insulin, and SSI per endocrine, appreciate recs.   - Trental 400mg TID for PAD  - Continue home ASA/Brilinta, statin/fibrate, metoprolol. Appreciate medicine recs  - DVT ppx: SQH  - PT re-evaluation, previously [facility]  - Dispo: pending    Vascular Surgery  [id number].

## 2025-05-13 NOTE — PHYSICAL THERAPY INITIAL EVALUATION ADULT - BALANCE DISTURBANCE, IDENTIFIED IMPAIRMENT CONTRIBUTE, REHAB EVAL
pain/decreased ROM/decreased strength
impaired coordination/impaired motor control/decreased strength

## 2025-05-13 NOTE — PHYSICAL THERAPY INITIAL EVALUATION ADULT - PLANNED THERAPY INTERVENTIONS, PT EVAL
balance training/gait training/strengthening/transfer training
GOAL: Patient will be able to negotiate 3 steps in 2 weeks/bed mobility training/gait training

## 2025-05-13 NOTE — PROGRESS NOTE ADULT - SUBJECTIVE AND OBJECTIVE BOX
[facility] DIVISION OF KIDNEY DISEASE AND HYPERTENSION  [phone]    RENAL FOLLOW UP NOTE- NEPHROHOSPITALIST  --------------------------------------------------------------------------------  Patient seen and examined this AM, s/p RTOR yesterday with Podiatry for R foot TMA and tendo-achilles lengthening    PAST HISTORY  --------------------------------------------------------------------------------  No significant changes to PMH, PSH, FHx, SHx, unless otherwise noted    ALLERGIES & MEDICATIONS  --------------------------------------------------------------------------------  Allergies    No Known Allergies    Intolerances      Standing Inpatient Medications  acetaminophen     Tablet .. 1000 milliGRAM(s) Oral every 6 hours  aspirin enteric coated 81 milliGRAM(s) Oral daily  atorvastatin 80 milliGRAM(s) Oral at bedtime  cadexomer iodine 0.9% Gel 1 Application(s) Topical daily  dextrose 5%. 1000 milliLiter(s) IV Continuous <Continuous>  dextrose 5%. 1000 milliLiter(s) IV Continuous <Continuous>  dextrose 50% Injectable 25 Gram(s) IV Push once  dextrose 50% Injectable 12.5 Gram(s) IV Push once  dextrose 50% Injectable 25 Gram(s) IV Push once  dextrose Oral Gel 15 Gram(s) Oral once  fenofibrate Tablet 145 milliGRAM(s) Oral at bedtime  glucagon  Injectable 1 milliGRAM(s) IntraMuscular once  insulin glargine Injectable (LANTUS) 18 Unit(s) SubCutaneous at bedtime  insulin lispro (ADMELOG) corrective regimen sliding scale   SubCutaneous three times a day before meals  insulin lispro (ADMELOG) corrective regimen sliding scale   SubCutaneous at bedtime  insulin lispro Injectable (ADMELOG) 18 Unit(s) SubCutaneous three times a day before meals  metoprolol tartrate 50 milliGRAM(s) Oral two times a day  pentoxifylline 400 milliGRAM(s) Oral three times a day  piperacillin/tazobactam IVPB.. 3.375 Gram(s) IV Intermittent every 8 hours  ticagrelor 90 milliGRAM(s) Oral every 12 hours    PRN Inpatient Medications  oxyCODONE    IR 2.5 milliGRAM(s) Oral every 4 hours PRN  oxyCODONE    IR 5 milliGRAM(s) Oral every 4 hours PRN      FOCUSED REVIEW OF SYSTEMS  --------------------------------------------------------------------------------  denies fevers/rigors  denies CP/palpitations  denies SOB  denies N/V  denies oliguria  denies foot pain      VITALS/PHYSICAL EXAM  --------------------------------------------------------------------------------  T(C): 36.8 (05-13-25 @ 09:26), Max: 37.2 (05-12-25 @ 21:30)  HR: 82 (05-13-25 @ 09:26) (73 - 90)  BP: 132/82 (05-13-25 @ 09:26) (88/52 - 146/65)  RR: 18 (05-13-25 @ 09:26) (16 - 18)  SpO2: 95% (05-13-25 @ 09:26) (95% - 100%)  Wt(kg): --  Height (cm): 162.6 (05-12-25 @ 16:28)  Weight (kg): 67.4 (05-12-25 @ 16:28)  BMI (kg/m2): 25.5 (05-12-25 @ 16:28)  BSA (m2): 1.72 (05-12-25 @ 16:28)      05-12-25 @ 07:01  -  05-13-25 @ 07:00  --------------------------------------------------------  IN: 1200 mL / OUT: 1870 mL / NET: -670 mL    05-13-25 @ 07:01  -  05-13-25 @ 12:24  --------------------------------------------------------  IN: 0 mL / OUT: 300 mL / NET: -300 mL      Physical Exam:  	Gen: NAD, lying in bed  	Pulm: CTA B/L ant/lat fields  	CV: RRR, S1S2  	Abd: +BS, soft, nontender/nondistended  	: No suprapubic tenderness.  no craig          Extremity: R foot dressing in place, no LLE edema  	Neuro: A&O x 3      LABS/STUDIES  --------------------------------------------------------------------------------              8.0    12.79 >-----------<  319      [05-13-25 @ 07:38]              25.5     138  |  103  |  15  ----------------------------<  404      [05-13-25 @ 07:38]  4.1   |  24  |  1.53        Ca     8.4     [05-13-25 @ 07:38]      Mg     2.0     [05-13-25 @ 07:38]      Phos  3.1     [05-13-25 @ 07:38]      PT/INR: PT 11.9 , INR 1.04       [05-12-25 @ 07:13]  PTT: 32.8       [05-12-25 @ 07:13]        Creatinine Trend:  SCr 1.53 [05-13 @ 07:38]  SCr 1.68 [05-12 @ 07:13]  SCr 1.59 [05-11 @ 07:28]  SCr 1.60 [05-10 @ 07:08]  SCr 1.51 [05-09 @ 05:35]              Urinalysis - [05-13-25 @ 07:38]      Color  / Appearance  / SG  / pH       Gluc 404 / Ketone   / Bili  / Urobili        Blood  / Protein  / Leuk Est  / Nitrite       RBC  / WBC  / Hyaline  / Gran  / Sq Epi  / Non Sq Epi  / Bacteria       Lipid: chol 118, , HDL 29, LDL --      [03-06-25 @ 06:59]

## 2025-05-13 NOTE — PHYSICAL THERAPY INITIAL EVALUATION ADULT - IMPAIRMENTS CONTRIBUTING TO GAIT DEVIATIONS, PT EVAL
impaired balance/impaired coordination/impaired motor control/decreased strength
impaired balance/decreased flexibility/pain/impaired postural control/decreased ROM/decreased strength

## 2025-05-13 NOTE — PROGRESS NOTE ADULT - ASSESSMENT
54M  s/p right foot partial 1st ray resection, open (DOS 5/9)  - Patient seen and evaluated  - Afebrile, WBC 12.79  - 5/12 s/p right foot transmetatarsal amputation and tendoachilles lengthening, closed: staples intact, no deheisence, no underlying hematoma, flaps warm.   - Intraop finding low concern residual infection, high concern viability   - OR wound culture: no growth (prelim)   - Clean bone margin: no growth (prelim)   - Vasc recs, appreciated  - ID recs, appreciated   - Pod stable for discharge pending final ID recs/ final vasc recs  - Follow up information in discharge note provider   - Discussed with attending   54M  s/p right foot partial 1st ray resection, open (DOS 5/9)  - Patient seen and evaluated  - Afebrile, WBC 12.79  - 5/12 s/p right foot transmetatarsal amputation and tendoachilles lengthening, closed: staples intact, no deheisence, no underlying hematoma, flaps warm.   - Intraop finding low concern residual infection, high concern viability   - NWB to LLE in posterior splint   - OR wound culture: no growth (prelim)   - Clean bone margin: no growth (prelim)   - Vasc recs, appreciated  - ID recs, appreciated   - Pod stable for discharge pending final ID recs/ final vasc recs  - Rec d/c to [facility]  - Follow up information in discharge note provider   - Discussed with attending

## 2025-05-13 NOTE — PROGRESS NOTE ADULT - SUBJECTIVE AND OBJECTIVE BOX
[facility]  [facility] Garcia Y. Patel, S. Shah, G. Casimir  [phone]  ([phone] - weekdays after 5pm and weekends)    Name: ELSI MERINO  Age/Gender: 54y Male  MRN: [id number]    Interval History:  Has right foot pain, no other new complaints.   Notes reviewed. No concerning overnight events  Afebrile   Allergies: No Known Allergies      Objective:  Vitals:   T(F): 98.2 (05-13-25 @ 09:26), Max: 98.9 (05-12-25 @ 21:30)  HR: 82 (05-13-25 @ 09:26) (73 - 90)  BP: 132/82 (05-13-25 @ 09:26) (88/52 - 146/65)  RR: 18 (05-13-25 @ 09:26) (16 - 18)  SpO2: 95% (05-13-25 @ 09:26) (95% - 100%)  Physical Examination:  General: no acute distress  HEENT: normocephalic, atraumatic, anicteric  Respiratory: no acc muscle use, breathing comfortably  Cardiovascular: S1 and S2 present  Gastrointestinal: normal appearing, nondistended  Extremities: R foot dressing    Laboratory Studies:  CBC:                       8.0    12.79 )-----------( 319      ( 13 May 2025 07:38 )             25.5     WBC Trend:  12.79 05-13-25 @ 07:38  8.09 05-12-25 @ 20:37  9.88 05-12-25 @ 07:13  11.35 05-11-25 @ 08:52  14.93 05-11-25 @ 07:28  10.67 05-10-25 @ 07:06  9.09 05-09-25 @ 12:46  8.67 05-09-25 @ 05:36  10.48 05-08-25 @ 05:30  8.86 05-07-25 @ 07:17    CMP: 05-13    138  |  103  |  15  ----------------------------<  404[H]  4.1   |  24  |  1.53[H]    Ca    8.4      13 May 2025 07:38  Phos  3.1     05-13  Mg     2.0     05-13      Creatinine: 1.53 mg/dL (05-13-25 @ 07:38)  Creatinine: 1.68 mg/dL (05-12-25 @ 07:13)  Creatinine: 1.59 mg/dL (05-11-25 @ 07:28)  Creatinine: 1.60 mg/dL (05-10-25 @ 07:08)  Creatinine: 1.51 mg/dL (05-09-25 @ 05:35)  Creatinine: 1.51 mg/dL (05-08-25 @ 05:30)  Creatinine: 1.49 mg/dL (05-07-25 @ 07:16)    Microbiology: reviewed   Culture - Wound Aerobic/Anaerobic (collected 05-12-25 @ 21:13)  Source: Surgical Swab deep wound culture right foot  Gram Stain (05-13-25 @ 01:47):    No polymorphonuclear leukocytes seen per low power field    No organisms seen per oil power field    Culture - Tissue with Gram Stain (collected 05-12-25 @ 21:13)  Source: Bone clean bone margin 2nd metatarsal R foot  Gram Stain (05-13-25 @ 03:42):    No polymorphonuclear cells seen per low power field    No organisms seen per oil power field    Culture - Wound Aerobic/Anaerobic (collected 05-09-25 @ 12:57)  Source: Surgical Swab DEEP WOUND RIGHT FOOT  Preliminary Report (05-12-25 @ 18:25):    Growth in fluid media only Staphylococcus aureus    Culture - Abscess with Gram Stain (collected 05-04-25 @ 04:40)  Source: Abscess right foot  Gram Stain (05-06-25 @ 11:51):    No polymorphonuclear leukocytes seen per low power field    No organisms seen per oil power field  Final Report (05-06-25 @ 11:51):    Rare Bacteroides fragilis "Susceptibilities not performed"    Culture - Blood (collected 05-04-25 @ 02:10)  Source: Blood Blood-Peripheral  Final Report (05-09-25 @ 05:00):    No growth at 5 days    Culture - Blood (collected 05-04-25 @ 02:08)  Source: Blood Blood-Peripheral  Final Report (05-09-25 @ 05:00):    No growth at 5 days    Radiology: reviewed     Medications:  acetaminophen     Tablet .. 1000 milliGRAM(s) Oral every 6 hours  aspirin enteric coated 81 milliGRAM(s) Oral daily  atorvastatin 80 milliGRAM(s) Oral at bedtime  cadexomer iodine 0.9% Gel 1 Application(s) Topical daily  dextrose 5%. 1000 milliLiter(s) IV Continuous <Continuous>  dextrose 5%. 1000 milliLiter(s) IV Continuous <Continuous>  dextrose 50% Injectable 25 Gram(s) IV Push once  dextrose 50% Injectable 12.5 Gram(s) IV Push once  dextrose 50% Injectable 25 Gram(s) IV Push once  dextrose Oral Gel 15 Gram(s) Oral once  fenofibrate Tablet 145 milliGRAM(s) Oral at bedtime  glucagon  Injectable 1 milliGRAM(s) IntraMuscular once  insulin glargine Injectable (LANTUS) 16 Unit(s) SubCutaneous at bedtime  insulin lispro (ADMELOG) corrective regimen sliding scale   SubCutaneous three times a day before meals  insulin lispro (ADMELOG) corrective regimen sliding scale   SubCutaneous at bedtime  insulin lispro Injectable (ADMELOG) 15 Unit(s) SubCutaneous three times a day before meals  metoprolol tartrate 50 milliGRAM(s) Oral two times a day  oxyCODONE    IR 2.5 milliGRAM(s) Oral every 4 hours PRN  oxyCODONE    IR 5 milliGRAM(s) Oral every 4 hours PRN  pentoxifylline 400 milliGRAM(s) Oral three times a day  piperacillin/tazobactam IVPB.. 3.375 Gram(s) IV Intermittent every 8 hours  ticagrelor 90 milliGRAM(s) Oral every 12 hours    Current Antimicrobials:  piperacillin/tazobactam IVPB.. 3.375 Gram(s) IV Intermittent every 8 hours    Prior/Completed Antimicrobials:  piperacillin/tazobactam IVPB.  piperacillin/tazobactam IVPB.-  vancomycin  IVPB

## 2025-05-13 NOTE — CHART NOTE - NSCHARTNOTEFT_GEN_A_CORE
SURGERY POST OP CHECK    STATUS POST PROCEDURE: s/p R foot TMA     SUBJECTIVE: Pt seen and examined at bedside. Patient reports appropriate surgical incisional pain; pain is controlled. Pt is tolerating diet. Pt is ambulating well    --------------------------------------------------------------------------------------------------------------------------------------------------------------------------------------------------------------  OBJECTIVE:  Vital Signs Last 24 Hrs  T(C): 36.3 (12 May 2025 23:30), Max: 37.2 (12 May 2025 21:30)  T(F): 97.4 (12 May 2025 23:30), Max: 98.9 (12 May 2025 21:30)  HR: 75 (12 May 2025 23:30) (69 - 89)  BP: 107/69 (12 May 2025 23:30) (88/52 - 150/79)  BP(mean): 79 (12 May 2025 21:15) (63 - 89)  RR: 18 (12 May 2025 23:30) (16 - 18)  SpO2: 100% (12 May 2025 23:30) (97% - 100%)    Parameters below as of 12 May 2025 23:30  Patient On (Oxygen Delivery Method): room air      I&O's Summary    11 May 2025 07:01  -  12 May 2025 07:00  --------------------------------------------------------  IN: 740 mL / OUT: 2055 mL / NET: -1315 mL    12 May 2025 07:01  -  13 May 2025 00:30  --------------------------------------------------------  IN: 600 mL / OUT: 1000 mL / NET: -400 mL        PHYSICAL EXAM:  Constitutional: Well developed, well nourished, NAD  Chest: Symmetric chest rise bilaterally   Abdomen: Soft, nontender, nondistended   Groin: Soft, nontender, no ecchymosis/hematoma, no erythema, no edema.  Extremities: R foot covered with cast and ACE C/d/i. Warm to touch, soft, normal strength, sensory and motor intact.   ----------------------------------------------------------------------------------------------------------------------------------------------------------------------------------------------------------------  ASSESSMENT: HPI:  54M hx DM, CAD (s/p CABG), mild HFrEF (EF 45%), and PAD (with recent dx angiogram Aug. 2024), and recent admission to [facility] w/ right foot wound s/p right foot partial 2nd ray resection and 3rd proximal phalanx bone biopsy on 3/7/25 (Cx + for E. Faecalis and Staph Epi requiring prolonged antibioics), now presenting with multiple non-healing right foot wounds. He is s/p diagnostic RLE angiogram 5/8 and R partial 1st ray resection and I&D with podiatry on 5/10 demonstrating adequate bleeding and low concern for active infection. OR cultures 5/10 speciated with GPC in pairs s/p R foot TMA on 5/12/25.     PLAN:  - Diet: Regular   - Antibiotics: Zosyn   - Low concern for residual soft tissue and bone infection, High concern for viability secondary to vascular status; appreciate pods recs   - Hg 8.1 --> 7.3; given one unit for CAD, fu AM CBC    - Incentive spirometry  - OOB as tolerated  - DVT prophylaxis: SCD, aspirin   - Monitor overnight  - Dispo: Floor

## 2025-05-13 NOTE — PHYSICAL THERAPY INITIAL EVALUATION ADULT - ASSISTIVE DEVICE:SUPINE/SIT, REHAB EVAL
Operative Note      Patient: Miller Puri  YOB: 1953  MRN: [id number]    Date of Procedure: 1/17/22    Pre-Op Diagnosis: CHEST PAIN AND DYSPNEA   Post-Op Diagnosis: Same       Estimated Blood Loss (mL): less than 50     Complications: None    Electronically signed by Yadi Vega MD on 1/17/2022 at 9:33 AM      DICTATED --[id number]  LEFT MAIN PATENT  LAD MID 50% STENOSIS  RAMUS 90% TO 0% GEORGIE XIENCE 2.5X33MM STENT  LCX MILD DX  OM1/OM2 MILD DX  RCA PROXIMAL 100% WITH RIGHT TO RIGHT AND LEFT TO RIGHT COLLATERAL  LVEDP 10  ASA/PLAVIX AND HEPARIN  NO COMPLICATIONS  HOME LATER TODAY   F/U IN OFFICE TOMORROW WITH LABS  CONSIDER PCI OF RCA IN FUTURE IF REMAINS SYMPTOMATIC    Electronically signed by Yadi Vega MD on 1/17/22 at 9:46 AM EST
bed rails

## 2025-05-13 NOTE — PHYSICAL THERAPY INITIAL EVALUATION ADULT - DISCHARGE PLANNER MADE AWARE
Vaccine Information Statement(s) or the Emergency Use Authorization was given today. This has been reviewed, questions answered, and verbal consent given by Patient for injection(s) and administration of Influenza (Inactivated).      Patient tolerated without incident. See immunization grid for documentation.    
yes
yes

## 2025-05-13 NOTE — PROGRESS NOTE ADULT - PROBLEM SELECTOR PLAN 1
Inpatient Plan:  - Check BG TID AC, HS, and 2AM   - Adjust Lantus to 18u QHS  - Adjust Admelog to 18u TID AC (HOLD if NPO)  - C/w low dose Admelog correctional scales TID AC, HS, and 2AM    Discharge Plan:  - Bsal/bolus reg, TBD closer to d/c.   - Patient should check BG TID AC and HS at home. Please tell patient to contact Endocrinologist if BG <70mg/dL x1 or >200mg/dL consistently or >400mg/dL x1.  - Endocrine follow up: has Medicare, can f/u at [facility] [address] [place] [id number] [phone]. Email to be sent closer to d/c for appt.   - Given elevated BHOB of 3 w/ A1c >15.5% on last admission, patient to should have r/o T1DM/SUMIT labs checked.   - Recommend routine outpatient ophthalmology and podiatry follow up.  - Patient will need RX for basal insulin pen (ie. Basaglar, Lantus, Tresiba, Toujeo) and bolus insulin pen (ie. humalog/novolog/admelog) depending on insurance coverage; please send test scripts to see which is covered. Please make sure patient has all diabetes supplies on discharge (glucometer, test strips, lancets, alcohol swabs, insulin pen needles). Please write RX for glucose tablets/gel> use as directed plus Glucagon nasal/ IM injection (use as directed)> Can prescribe any covered by pt's insurance.

## 2025-05-13 NOTE — PROGRESS NOTE ADULT - PROBLEM SELECTOR PLAN 1
EPIFANIO Youssef MD have participated in the daily care of this patient  and have seen and examined the patient today and agree with  the  evaluation, assessment and plan of the surgical house officer  EPIFANIO Youssef MD have personally seen and examined the patient at bedside today at  8 pm

## 2025-05-13 NOTE — PROGRESS NOTE ADULT - SUBJECTIVE AND OBJECTIVE BOX
Patient seen today for follow up inpatient Diabetes Mellitus management.    Chief Complaint: Type 2 Diabetes Mellitus    INTERVAL HX:  Patient seen in [facility] 9MON 925 D1. Patient is alert and oriented, resting in bed. Patient reports feeling okay, eating full meals and tolerating POs. Noted severe hyperglycemia this am to 458mg/dL -> noted patient was NPO yesterday for procedure, reports he ate dinner at 9pm when he got back from procedure without any mealtime insulin given. No hypoglycemia. Blood glucose levels in the last 24hrs have been 113-458mg/dL.     Review of Systems:  General: As above.  Respiratory: Denies any SOB, CAGE, or cough.  Gastrointestinal: Denies any n/v/d or abdominal pain.   Endocrine: Denies any polyuria, polydipsia, polyphagia, visual changes, or numbness in feet.     Allergies  No Known Allergies      Intolerances  None.       MEDICATIONS  (STANDING):  acetaminophen     Tablet .. 1000 milliGRAM(s) Oral every 6 hours  aspirin enteric coated 81 milliGRAM(s) Oral daily  atorvastatin 80 milliGRAM(s) Oral at bedtime  cadexomer iodine 0.9% Gel 1 Application(s) Topical daily  dextrose 5%. 1000 milliLiter(s) IV Continuous <Continuous>  dextrose 5%. 1000 milliLiter(s) IV Continuous <Continuous>  dextrose 50% Injectable 25 Gram(s) IV Push once  dextrose 50% Injectable 12.5 Gram(s) IV Push once  dextrose 50% Injectable 25 Gram(s) IV Push once  dextrose Oral Gel 15 Gram(s) Oral once  fenofibrate Tablet 145 milliGRAM(s) Oral at bedtime  glucagon  Injectable 1 milliGRAM(s) IntraMuscular once  insulin glargine Injectable (LANTUS) 18 Unit(s) SubCutaneous at bedtime  insulin lispro (ADMELOG) corrective regimen sliding scale   SubCutaneous three times a day before meals  insulin lispro (ADMELOG) corrective regimen sliding scale   SubCutaneous at bedtime  insulin lispro Injectable (ADMELOG) 18 Unit(s) SubCutaneous three times a day before meals  metoprolol tartrate 50 milliGRAM(s) Oral two times a day  oxyCODONE    IR 2.5 milliGRAM(s) Oral every 4 hours PRN  oxyCODONE    IR 5 milliGRAM(s) Oral every 4 hours PRN  pentoxifylline 400 milliGRAM(s) Oral three times a day  piperacillin/tazobactam IVPB.. 3.375 Gram(s) IV Intermittent every 8 hours  ticagrelor 90 milliGRAM(s) Oral every 12 hours      atorvastatin 80 milliGRAM(s) Oral at bedtime  dextrose 50% Injectable 25 Gram(s) IV Push once  dextrose 50% Injectable 12.5 Gram(s) IV Push once  dextrose 50% Injectable 25 Gram(s) IV Push once  dextrose Oral Gel 15 Gram(s) Oral once  fenofibrate Tablet 145 milliGRAM(s) Oral at bedtime  glucagon  Injectable 1 milliGRAM(s) IntraMuscular once  insulin glargine Injectable (LANTUS) 18 Unit(s) SubCutaneous at bedtime  insulin lispro (ADMELOG) corrective regimen sliding scale   SubCutaneous three times a day before meals  insulin lispro (ADMELOG) corrective regimen sliding scale   SubCutaneous at bedtime  insulin lispro Injectable (ADMELOG) 18 Unit(s) SubCutaneous three times a day before meals      insulin lispro (ADMELOG) corrective regimen sliding scale   SubCutaneous three times a day before meals  insulin lispro (ADMELOG) corrective regimen sliding scale   SubCutaneous at bedtime  insulin lispro Injectable (ADMELOG) 18 Unit(s) SubCutaneous three times a day before meals      PHYSICAL EXAM:  VITALS:   T(C): 36.8 (05-13-25 @ 09:26), Max: 37.2 (05-12-25 @ 21:30)  HR: 82 (05-13-25 @ 09:26) (73 - 90)  BP: 132/82 (05-13-25 @ 09:26) (88/52 - 146/65)  RR: 18 (05-13-25 @ 09:26) (16 - 18)  SpO2: 95% (05-13-25 @ 09:26) (95% - 100%)    GENERAL: In no acute distress  Respiratory: Respirations unlabored  Extremities: Warm and dry, no edema  NEURO: Alert and oriented, appropriate     LABS:  POCT Blood Glucose.: 392 mg/dL (05-13-25 @ 10:19)  POCT Blood Glucose.: 435 mg/dL (05-13-25 @ 09:21)  POCT Blood Glucose.: 438 mg/dL (05-13-25 @ 08:37)  POCT Blood Glucose.: 450 mg/dL (05-13-25 @ 08:36)  POCT Blood Glucose.: 119 mg/dL (05-12-25 @ 21:31)  POCT Blood Glucose.: 124 mg/dL (05-12-25 @ 20:07)  POCT Blood Glucose.: 113 mg/dL (05-12-25 @ 16:26)  POCT Blood Glucose.: 153 mg/dL (05-12-25 @ 14:51)  POCT Blood Glucose.: 192 mg/dL (05-12-25 @ 10:04)  POCT Blood Glucose.: 296 mg/dL (05-12-25 @ 05:02)  POCT Blood Glucose.: 320 mg/dL (05-12-25 @ 00:59)  POCT Blood Glucose.: 318 mg/dL (05-11-25 @ 22:40)  POCT Blood Glucose.: 344 mg/dL (05-11-25 @ 22:37)  POCT Blood Glucose.: 300 mg/dL (05-11-25 @ 21:18)  POCT Blood Glucose.: 170 mg/dL (05-11-25 @ 17:24)  POCT Blood Glucose.: 239 mg/dL (05-11-25 @ 12:16)  POCT Blood Glucose.: 184 mg/dL (05-11-25 @ 08:44)  POCT Blood Glucose.: 152 mg/dL (05-11-25 @ 05:35)  POCT Blood Glucose.: 185 mg/dL (05-10-25 @ 21:14)  POCT Blood Glucose.: 131 mg/dL (05-10-25 @ 19:05)  POCT Blood Glucose.: 92 mg/dL (05-10-25 @ 17:21)  POCT Blood Glucose.: 246 mg/dL (05-10-25 @ 13:04)                          8.0    12.79 )-----------( 319      ( 13 May 2025 07:38 )             25.5     05-13    138  |  103  |  15  ----------------------------<  404[H]  4.1   |  24  |  1.53[H]    Ca    8.4      13 May 2025 07:38  Phos  3.1     05-13  Mg     2.0     05-13        PT/INR - ( 12 May 2025 07:13 )   PT: 11.9 sec;   INR: 1.04 ratio         PTT - ( 12 May 2025 07:13 )  PTT:32.8 sec      Urinalysis Basic - ( 13 May 2025 07:38 )    Color: x / Appearance: x / SG: x / pH: x  Gluc: 404 mg/dL / Ketone: x  / Bili: x / Urobili: x   Blood: x / Protein: x / Nitrite: x   Leuk Esterase: x / RBC: x / WBC x   Sq Epi: x / Non Sq Epi: x / Bacteria: x        Culture - Tissue with Gram Stain (collected 12 May 2025 21:13)  Source: Bone clean bone margin 2nd metatarsal R foot  Gram Stain (13 May 2025 03:42):    No polymorphonuclear cells seen per low power field    No organisms seen per oil power field    Culture - Wound Aerobic/Anaerobic (collected 12 May 2025 21:13)  Source: Surgical Swab deep wound culture right foot  Gram Stain (13 May 2025 01:47):    No polymorphonuclear leukocytes seen per low power field    No organisms seen per oil power field      A1C with Estimated Average Glucose Result: A1C with Estimated Average Glucose Result: 10.1 % (05-05-25 @ 06:47)  A1C with Estimated Average Glucose Result: >15.5 % (03-04-25 @ 23:12)

## 2025-05-13 NOTE — PHYSICAL THERAPY INITIAL EVALUATION ADULT - PERTINENT HX OF CURRENT PROBLEM, REHAB EVAL
Pt is a 54M adm to [facility] on 5/4/25 with hx DM, CAD (s/p CABG), mild HFrEF (EF 45%), and PAD (with recent angiogram Aug. 2024), and recent admission to [facility] w/ right foot wound s/p right foot Partial 2nd ray resection and 3rd proximal phalanx bone biopsy on 3/7/25 (Cx + for E. Faecalis and Staph Epi requiring prolonged abx course s/p PICC), now presenting with right foot wound. Patient reports 1w of subjective fevers, chills, hypothermia, and R foot lateral toe oozing and erythema c/f soft tissue infection. FS elevated to 400s on arrival patient reports compliance w/ diabetes medications. VSS. WBC 9.62. CRP 56. Glucose 285.  HC: Imaging: R foot xray: No convincing evidence for acute osteomyelitis. MRI would provide a more   sensitive evaluation. R Foot MRI: Soft tissue wounds about the distal forefoot with small volume of ill-defined peripheral enhancing fluid preferentially surrounding the first MTP joint as described. Low T1 signal with high STIR signal and enhancement involving the first metatarsal, residual second metatarsal and the first proximal phalanx concerning for osteomyelitis. High STIR signal and enhancement at the base of the third metatarsal is felt to be reactive with early osteomyelitis considered less likely. OBDULIO: The reduced amplitude of the pulse volume recordings at the right first toe is some evidence for disease affecting the small arteries of the right foot. The blood pressure measurement at the right great toe is 101 mmHg and the right toe-brachial index equals 0.75.This is a normal value. 5/9 foot xray: Currently status post partial 1st ray resection through mid metatarsal shaft with sharp smooth osseous stump margin and with post surgical changes in the overlying soft tissues. Stable previously seen partial 2nd   ray and 3rd metatarsal head resection defects. No proximally tracking gas collections beyond the amputation margins and no focal areas of osteomyelitis. Remainder offoot unchanged. Also correlate with intraoperative findings. Podiatry Consult-planned R foot revisional 2nd ray rxn and Partial 1st ray rxn on 5/7. Vascular Surgery-s/p RLE Angio via L fem access on 5/8 demonstrated patent iliac vessels, right femoral, popliteal including an above-knee popliteal stent, and peroneal/PT arteries. AT with moderate to severe stenosis. PT-dominant runoff to the foot with reconstitution of the dorsal arch via collateral circulation. Mynx closure. Podiatry Consult-5/9 s/p right foot Partial 1st ray resection and incision and drainage. Bone at proximal resection was hard and of good quality. Adequate intraop bleeding. No evidence of purulence or soft tissue infection. Incision primarily closed with 3-0 nylon. Post op R Foot strikethrough bleeding through dressing-redressed by podiatry with Surgicel, 4x4 gauze, abd's, eugenio and ACE-Strict NWB. Booked for R TMA and tendoachilles lengthening on Monday, 5/12.
54M hx DM, CAD (s/p CABG), mild HFrEF (EF 45%), and PAD (with recent dx angiogram Aug. 2024), and recent admission to [facility] w/ right foot wound s/p right foot partial 2nd ray resection and 3rd proximal phalanx bone biopsy on 3/7/25 (Cx + for E. Faecalis and Staph Epi requiring prolonged antibioics), now presenting with multiple non-healing right foot wounds. He is s/p diagnostic RLE angiogram 5/8 and R partial 1st ray resection and I&D with podiatry on 5/10 demonstrating adequate bleeding and low concern for active infection. OR cultures 5/10 speciated with GPC in pairs. Hospital course: s/p right TMA on 5/12/25 with low concern for residual infection and high concern for viability.

## 2025-05-13 NOTE — PHYSICAL THERAPY INITIAL EVALUATION ADULT - ACTIVE RANGE OF MOTION EXAMINATION, REHAB EVAL
except R foot ( in splint post sx)/bilateral upper extremity Active ROM was WFL (within functional limits)/bilateral  lower extremity Active ROM was WFL (within functional limits)

## 2025-05-13 NOTE — PHYSICAL THERAPY INITIAL EVALUATION ADULT - GAIT TRAINING, PT EVAL
Pt to be independent in ambulation NWB to RLE x 150 ft, using RW or least assistive device x 3 wks
GOAL: Patient will ambulate 25 feet independently with RLE in NWB precautions using RW

## 2025-05-13 NOTE — CHART NOTE - NSCHARTNOTESELECT_GEN_ALL_CORE
Endocrinology/Event Note
Post-Operative Check
Post-Procedure Note
Endocrine
Endocrine follow up/Event Note
Event Note
Event Note
Podiatry Attestation/Event Note
Post-Procedure Note

## 2025-05-13 NOTE — PHYSICAL THERAPY INITIAL EVALUATION ADULT - GENERAL OBSERVATIONS, REHAB EVAL
Pt. rec' d in bed, NAD, +IVL, agreeable to PT amara.
Pt rec'd sleeping in bed, awake and alert. No c/o feels well. Receiving blood transfusion.

## 2025-05-13 NOTE — PHYSICAL THERAPY INITIAL EVALUATION ADULT - NSPTDISCHREC_GEN_A_CORE
If home, pt. will need 24 hrs x 7 days assistance for OOB mobility, RW, WC with elevated leg rests for long distance, bedside commode/Sub-acute Rehab
Sub-acute Rehab

## 2025-05-13 NOTE — PHYSICAL THERAPY INITIAL EVALUATION ADULT - MANUAL MUSCLE TESTING RESULTS, REHAB EVAL
At least 3/5 B/L UE and LE except R talocrural not assessed 2/2 being in posterior splint/grossly assessed due to
Grossly 3+/5 throughout

## 2025-05-13 NOTE — PHYSICAL THERAPY INITIAL EVALUATION ADULT - DIAGNOSIS, PT EVAL
Pt with mobility impairment due to NWB of RLE, poor motor planning and incoordination with NWB of RLE, Decreased balance in standing and UE wkness.
Decreased functional mobility/capacity secondary to impairments listed below

## 2025-05-13 NOTE — PHYSICAL THERAPY INITIAL EVALUATION ADULT - BED MOBILITY TRAINING, PT EVAL
GOAL: Patient will perform bed mobility independently in 2 weeks Area L Indication Text: Tumors in this location are included in Area L (trunk and extremities).  Mohs surgery is indicated for larger tumors, or tumors with aggressive histologic features, in these anatomic locations.

## 2025-05-13 NOTE — PROGRESS NOTE ADULT - ASSESSMENT
54 year old male with PMH of DM, CAD (s/p CABG), mild HFrEF (EF 45%), and PAD, and recent admission to [facility] with R foot wound s/p R foot Partial 2nd ray resection and 3rd proximal phalanx bone biopsy on 3/7/25 (Cx + for E. Faecalis and Staph Epi requiring prolonged abx course s/p PICC, clean margin with no residual OM), now presenting with right foot wound. Patient reports 1 week of subjective fevers, chills, hypothermia, and R foot lateral toe oozing and erythema c/f soft tissue infection. Endocrine consulted for uncontrolled T2DM and hyperglycemia (High risk patient with severely uncontrolled diabetes with A1c of 11.2% at high risk of CAD and CVA with high medical complexity and high level decision-making). Patient reports eating full meals and tolerating POs, s/p OR yesterday 5/12 now with severe hyperglycemia this am 2/2 to eating dinner late last night after 2100 after procedure done, was NPO for the day yesterday. No hypoglycemia. Will slightly increase Lantus to 18u QHS for now given FBGs have been mostly well controlled inpatient. Will also slightly increase mealtime insulin for tighter BG control -> may need higher insulin doses but hard to tell pattern given patient was NPO yesterday and then now severe hyperglycemia 2/2 to eating overnight. Endocrine will closely monitor BG and adjust insulin as needed for BG goal 100-180mg/dL inpatient.       #Uncontrolled T2DM  A1C with Estimated Average Glucose Result: 10.1 % (05-05-25 @ 06:47)  A1C with Estimated Average Glucose Result: >15.5 % (03-04-25 @ 23:12)  Home reg: Lantus 52u QHS, Humalog 20u TID AC -> discharged in march on Lantus 30u QHS and Admelog 14u TID AC (not adherent)   Endocrinologist: none -> follows with internist/PCP Dr. Mcdaniel in [place]?  *an appt should be made with his endocrine MD prior to [facility] and plan of care at [facility] needs to be discussed with his endocrine*    *only has Medicare and Elder plan via Medicare

## 2025-05-13 NOTE — PROGRESS NOTE ADULT - ASSESSMENT
54M with PMH of HTN, HLD, DM2, CAD s/p CABG, HF, and recent admission to [facility] for toe amputation and found to be bacteremic, now presents to the ED with right foot wound. Nephrology consulted for AURORA.

## 2025-05-13 NOTE — PROGRESS NOTE ADULT - PROBLEM SELECTOR PLAN 1
AURORA likely in the setting of recent Entresto initiation. On review of labs [facility] [place], Scr was approximately ~0.8. During recent admission at [facility] (3/5/25 to 3/11/25) Scr was 0.8 on discharge (3/10/25). Patient was started on Entresto during that admission. Last outpatient Scr was elevated at 1.55 on 4/4/25.     -creatinine rising over weekend to 1.68, improved to 1.53 today; some relative hypotension noted in OR note from 05/09 that required use of phenylephrine.  Patient also s/p LE angiogram on 05/08 for evaluation of PAD prior to OR    UA noted, UPCR 0.3, patient w/ h/o DM  -likely that worsening azotemia was from combination of contrast and hemodynamic lability but patient not in ATN  -s/p R TMA 05/12 for definitive management of OM, continue abx per ID  -would continue holding Entresto  -patient remains nonoliguric    -continue strict I/O please    -daily BMP

## 2025-05-13 NOTE — PROGRESS NOTE ADULT - ASSESSMENT
Patient is a 54 year old male with PMH of DM, CAD (s/p CABG), mild HFrEF (EF 45%), and PAD (with recent angiogram Aug. 2024), and recent admission to [facility] with R foot wound s/p R foot Partial 2nd ray resection and 3rd proximal phalanx bone biopsy on 3/7/25 (Cx + for E. Faecalis and Staph Epi requiring prolonged abx course s/p PICC, clean margin with no residual OM), now presenting with right foot wound. Patient reports 1 week of subjective fevers, chills, hypothermia, and R foot lateral toe oozing and erythema c/f soft tissue infection.    R foot wound infection with 1st MPJ fluid collection, 1st and 2nd toe OM  - 5/4 s/p R foot I&D to level of subQ - scant, purulent drainage, no erythema, no malodor   - R foot abscess culture with Bacteroides fragilis   - R foot xray with no gas or OM  - R foot MRI with soft tissue wounds about distal forefoot with small volume of ill defined peripheral enhancing fluid preferentially surrounding the 1st MPJ fluid collection, residual second metatarsal and 1st proximal phalanx OM, possible reactive vs early OM of 3rd metatarsal  - Bcx NGTD x2    - 5/8 s/p RLE angiogram with vascular   5/9 s/p R foot Partial 1st ray resection and incision and drainage   - noted high concern for residual bone infection, necrotic soft tissue and bone noted laterally, low concern for viability   - culture with Staph aureus in fluid media only   5/12 s/p R TMA and tendoachilles lengthening   - noted low concern for residual soft tissue and bone infection, high concern for viability sec to vascular status     Recommendations:   Follow 5/9 OR culture for Staph sensitivities  Follow 5/12 OR cultures (gram stain neg)/path   Podiatry and Vascular following  Continue zosyn for now   Continue local wound care   Monitor temps/WBC  Continue rest of care per primary team       Jason Vidal M.D.  [facility]  Available on [employer] TEAMS - *PREFERRED*  [phone]  After 5pm on weekdays and all day on weekends - please call [phone]     Thank you for consulting us and involving us in the management of this patients case. In addition to reviewing history, imaging, documents, labs, microbiology, took into account antibiotic stewardship, local antibiogram and infection control strategies and potential transmission issues at time of treatment decision making process.

## 2025-05-13 NOTE — PROGRESS NOTE ADULT - SUBJECTIVE AND OBJECTIVE BOX
Patient is a 54y old  Male who presents with a chief complaint of Angiogram (13 May 2025 06:04)       INTERVAL HPI/OVERNIGHT EVENTS:  Patient seen and evaluated at bedside.  Pt is resting comfortable in NAD. Denies N/V/F/C.    Allergies    No Known Allergies    Intolerances        Vital Signs Last 24 Hrs  T(C): 36.6 (13 May 2025 05:10), Max: 37.2 (12 May 2025 21:30)  T(F): 97.8 (13 May 2025 05:10), Max: 98.9 (12 May 2025 21:30)  HR: 90 (13 May 2025 05:10) (69 - 90)  BP: 128/75 (13 May 2025 05:10) (88/52 - 146/65)  BP(mean): 79 (12 May 2025 21:15) (63 - 89)  RR: 18 (13 May 2025 05:10) (16 - 18)  SpO2: 98% (13 May 2025 05:10) (96% - 100%)    Parameters below as of 13 May 2025 05:10  Patient On (Oxygen Delivery Method): room air        LABS:                        8.0    12.79 )-----------( 319      ( 13 May 2025 07:38 )             25.5     05-13    138  |  103  |  15  ----------------------------<  404[H]  4.1   |  24  |  1.53[H]    Ca    8.4      13 May 2025 07:38  Phos  3.1     05-13  Mg     2.0     05-13      PT/INR - ( 12 May 2025 07:13 )   PT: 11.9 sec;   INR: 1.04 ratio         PTT - ( 12 May 2025 07:13 )  PTT:32.8 sec  Urinalysis Basic - ( 13 May 2025 07:38 )    Color: x / Appearance: x / SG: x / pH: x  Gluc: 404 mg/dL / Ketone: x  / Bili: x / Urobili: x   Blood: x / Protein: x / Nitrite: x   Leuk Esterase: x / RBC: x / WBC x   Sq Epi: x / Non Sq Epi: x / Bacteria: x      CAPILLARY BLOOD GLUCOSE      POCT Blood Glucose.: 438 mg/dL (13 May 2025 08:37)  POCT Blood Glucose.: 450 mg/dL (13 May 2025 08:36)  POCT Blood Glucose.: 119 mg/dL (12 May 2025 21:31)  POCT Blood Glucose.: 124 mg/dL (12 May 2025 20:07)  POCT Blood Glucose.: 113 mg/dL (12 May 2025 16:26)  POCT Blood Glucose.: 153 mg/dL (12 May 2025 14:51)  POCT Blood Glucose.: 192 mg/dL (12 May 2025 10:04)      Lower Extremity Physical Exam:  Vascular: DP/PT 0/4, B/L, CFT < 3 seconds B/L, Temperature gradient warm to cool, B/L.   Neuro: Epicritic sensation absent to the level of digits, B/L.  Musculoskeletal/Ortho: s/p Right foot 3rd metatarsal head resection, s/p Right foot Partial 2nd Ray Resection (DOS 3/6), s/p right foot partial 1st ray resection,  Skin: 5/12 s/p right foot transmetatarsal amputation and tendoachilles lengthening, closed: staples intact, no deheisence, no underlying hematoma, flaps warm.   RADIOLOGY & ADDITIONAL TESTS:

## 2025-05-14 LAB
ANION GAP SERPL CALC-SCNC: 15 MMOL/L — SIGNIFICANT CHANGE UP (ref 5–17)
BUN SERPL-MCNC: 13 MG/DL — SIGNIFICANT CHANGE UP (ref 7–23)
CALCIUM SERPL-MCNC: 8.9 MG/DL — SIGNIFICANT CHANGE UP (ref 8.4–10.5)
CHLORIDE SERPL-SCNC: 107 MMOL/L — SIGNIFICANT CHANGE UP (ref 96–108)
CO2 SERPL-SCNC: 20 MMOL/L — LOW (ref 22–31)
CREAT SERPL-MCNC: 1.66 MG/DL — HIGH (ref 0.5–1.3)
EGFR: 48 ML/MIN/1.73M2 — LOW
EGFR: 48 ML/MIN/1.73M2 — LOW
GLUCOSE BLDC GLUCOMTR-MCNC: 105 MG/DL — HIGH (ref 70–99)
GLUCOSE BLDC GLUCOMTR-MCNC: 79 MG/DL — SIGNIFICANT CHANGE UP (ref 70–99)
GLUCOSE BLDC GLUCOMTR-MCNC: 83 MG/DL — SIGNIFICANT CHANGE UP (ref 70–99)
GLUCOSE BLDC GLUCOMTR-MCNC: 85 MG/DL — SIGNIFICANT CHANGE UP (ref 70–99)
GLUCOSE BLDC GLUCOMTR-MCNC: 91 MG/DL — SIGNIFICANT CHANGE UP (ref 70–99)
GLUCOSE SERPL-MCNC: 82 MG/DL — SIGNIFICANT CHANGE UP (ref 70–99)
HCT VFR BLD CALC: 25.1 % — LOW (ref 39–50)
HGB BLD-MCNC: 8 G/DL — LOW (ref 13–17)
MAGNESIUM SERPL-MCNC: 2 MG/DL — SIGNIFICANT CHANGE UP (ref 1.6–2.6)
MCHC RBC-ENTMCNC: 25.6 PG — LOW (ref 27–34)
MCHC RBC-ENTMCNC: 31.9 G/DL — LOW (ref 32–36)
MCV RBC AUTO: 80.2 FL — SIGNIFICANT CHANGE UP (ref 80–100)
NRBC BLD AUTO-RTO: 0 /100 WBCS — SIGNIFICANT CHANGE UP (ref 0–0)
PHOSPHATE SERPL-MCNC: 3 MG/DL — SIGNIFICANT CHANGE UP (ref 2.5–4.5)
PLATELET # BLD AUTO: 329 K/UL — SIGNIFICANT CHANGE UP (ref 150–400)
POTASSIUM SERPL-MCNC: 3.8 MMOL/L — SIGNIFICANT CHANGE UP (ref 3.5–5.3)
POTASSIUM SERPL-SCNC: 3.8 MMOL/L — SIGNIFICANT CHANGE UP (ref 3.5–5.3)
RBC # BLD: 3.13 M/UL — LOW (ref 4.2–5.8)
RBC # FLD: 16.8 % — HIGH (ref 10.3–14.5)
SODIUM SERPL-SCNC: 142 MMOL/L — SIGNIFICANT CHANGE UP (ref 135–145)
WBC # BLD: 15.62 K/UL — HIGH (ref 3.8–10.5)
WBC # FLD AUTO: 15.62 K/UL — HIGH (ref 3.8–10.5)

## 2025-05-14 PROCEDURE — 99232 SBSQ HOSP IP/OBS MODERATE 35: CPT

## 2025-05-14 RX ORDER — INSULIN LISPRO 100 U/ML
16 INJECTION, SOLUTION INTRAVENOUS; SUBCUTANEOUS
Refills: 0 | Status: DISCONTINUED | OUTPATIENT
Start: 2025-05-14 | End: 2025-05-15

## 2025-05-14 RX ORDER — NALOXONE HYDROCHLORIDE 0.4 MG/ML
1 INJECTION, SOLUTION INTRAMUSCULAR; INTRAVENOUS; SUBCUTANEOUS
Qty: 1 | Refills: 0
Start: 2025-05-14

## 2025-05-14 RX ORDER — DOXYCYCLINE HYCLATE 100 MG
100 TABLET ORAL EVERY 12 HOURS
Refills: 0 | Status: DISCONTINUED | OUTPATIENT
Start: 2025-05-14 | End: 2025-05-15

## 2025-05-14 RX ORDER — CILOSTAZOL 50 MG/1
50 TABLET ORAL
Refills: 0 | Status: DISCONTINUED | OUTPATIENT
Start: 2025-05-14 | End: 2025-05-15

## 2025-05-14 RX ORDER — OXYCODONE HYDROCHLORIDE 30 MG/1
1 TABLET ORAL
Qty: 0 | Refills: 0 | DISCHARGE
Start: 2025-05-14

## 2025-05-14 RX ORDER — INSULIN GLARGINE-YFGN 100 [IU]/ML
16 INJECTION, SOLUTION SUBCUTANEOUS AT BEDTIME
Refills: 0 | Status: DISCONTINUED | OUTPATIENT
Start: 2025-05-14 | End: 2025-05-15

## 2025-05-14 RX ORDER — ACETAMINOPHEN 500 MG/5ML
2 LIQUID (ML) ORAL
Qty: 0 | Refills: 0 | DISCHARGE
Start: 2025-05-14

## 2025-05-14 RX ORDER — DOXYCYCLINE HYCLATE 100 MG
2 TABLET ORAL
Qty: 0 | Refills: 0 | DISCHARGE
Start: 2025-05-14

## 2025-05-14 RX ORDER — PENTOXIFYLLINE 100 %
1 POWDER (GRAM) MISCELLANEOUS
Qty: 0 | Refills: 0 | DISCHARGE
Start: 2025-05-14

## 2025-05-14 RX ORDER — CADEXOMER IODINE 0.9 %
1 PADS, MEDICATED (EA) TOPICAL
Qty: 0 | Refills: 0 | DISCHARGE
Start: 2025-05-14

## 2025-05-14 RX ADMIN — HEPARIN SODIUM 5000 UNIT(S): 1000 INJECTION INTRAVENOUS; SUBCUTANEOUS at 22:48

## 2025-05-14 RX ADMIN — Medication 1000 MILLIGRAM(S): at 05:31

## 2025-05-14 RX ADMIN — HEPARIN SODIUM 5000 UNIT(S): 1000 INJECTION INTRAVENOUS; SUBCUTANEOUS at 13:21

## 2025-05-14 RX ADMIN — Medication 81 MILLIGRAM(S): at 13:20

## 2025-05-14 RX ADMIN — Medication 400 MILLIGRAM(S): at 13:20

## 2025-05-14 RX ADMIN — INSULIN LISPRO 0: 100 INJECTION, SOLUTION INTRAVENOUS; SUBCUTANEOUS at 22:49

## 2025-05-14 RX ADMIN — CILOSTAZOL 50 MILLIGRAM(S): 50 TABLET ORAL at 18:02

## 2025-05-14 RX ADMIN — METOPROLOL SUCCINATE 50 MILLIGRAM(S): 50 TABLET, EXTENDED RELEASE ORAL at 05:31

## 2025-05-14 RX ADMIN — HEPARIN SODIUM 5000 UNIT(S): 1000 INJECTION INTRAVENOUS; SUBCUTANEOUS at 05:32

## 2025-05-14 RX ADMIN — TICAGRELOR 90 MILLIGRAM(S): 90 TABLET ORAL at 05:31

## 2025-05-14 RX ADMIN — Medication 1000 MILLIGRAM(S): at 18:02

## 2025-05-14 RX ADMIN — ATORVASTATIN CALCIUM 80 MILLIGRAM(S): 80 TABLET, FILM COATED ORAL at 22:48

## 2025-05-14 RX ADMIN — Medication 25 GRAM(S): at 05:30

## 2025-05-14 RX ADMIN — INSULIN LISPRO 18 UNIT(S): 100 INJECTION, SOLUTION INTRAVENOUS; SUBCUTANEOUS at 09:46

## 2025-05-14 RX ADMIN — Medication 100 MILLIGRAM(S): at 18:02

## 2025-05-14 RX ADMIN — INSULIN LISPRO 16 UNIT(S): 100 INJECTION, SOLUTION INTRAVENOUS; SUBCUTANEOUS at 18:34

## 2025-05-14 RX ADMIN — INSULIN GLARGINE-YFGN 16 UNIT(S): 100 INJECTION, SOLUTION SUBCUTANEOUS at 22:49

## 2025-05-14 RX ADMIN — TICAGRELOR 90 MILLIGRAM(S): 90 TABLET ORAL at 18:02

## 2025-05-14 RX ADMIN — Medication 1000 MILLIGRAM(S): at 13:20

## 2025-05-14 RX ADMIN — FENOFIBRATE 145 MILLIGRAM(S): 160 TABLET ORAL at 22:48

## 2025-05-14 RX ADMIN — METOPROLOL SUCCINATE 50 MILLIGRAM(S): 50 TABLET, EXTENDED RELEASE ORAL at 18:02

## 2025-05-14 RX ADMIN — Medication 1 APPLICATION(S): at 13:21

## 2025-05-14 RX ADMIN — Medication 1000 MILLIGRAM(S): at 14:20

## 2025-05-14 RX ADMIN — Medication 1000 MILLIGRAM(S): at 19:02

## 2025-05-14 RX ADMIN — Medication 1000 MILLIGRAM(S): at 00:35

## 2025-05-14 RX ADMIN — Medication 400 MILLIGRAM(S): at 05:31

## 2025-05-14 RX ADMIN — INSULIN LISPRO 16 UNIT(S): 100 INJECTION, SOLUTION INTRAVENOUS; SUBCUTANEOUS at 13:21

## 2025-05-14 NOTE — PROGRESS NOTE ADULT - ASSESSMENT
54M hx DM, CAD (s/p CABG), mild HFrEF (EF 45%), and PAD (with recent dx angiogram Aug. 2024), and recent admission to [facility] w/ right foot wound s/p right foot partial 2nd ray resection and 3rd proximal phalanx bone biopsy on 3/7/25 (Cx + for E. Faecalis and Staph Epi requiring prolonged antibioics), now presenting with multiple non-healing right foot wounds. He is s/p diagnostic RLE angiogram 5/8 and R partial 1st ray resection and I&D with podiatry on 5/10 demonstrating adequate bleeding and low concern for active infection. OR cultures 5/10 speciated with MRSA. He is now s/p right TMA on 5/12/25 with low concern for residual infection and high concern for viability.    PLAN  - Right foot dressing per podiatry  - Zosyn empirically for right foot infection. OR wound culture 5/10 speciated with MRSA, follow-up bone margins from 5/12. Appreciate ID recs  - Pain: Tylenol, Oxy 2.5/5 PRN  - Diet: CC   - Lantus, pre-meal insulin, and SSI per endocrine, appreciate recs.   - Trental 400mg TID for PAD  - Continue home ASA/Brilinta, statin/fibrate, metoprolol. Appreciate medicine recs  - DVT ppx: SQH  - Dispo: [facility] per PT    Vascular Surgery  [id number].

## 2025-05-14 NOTE — PROGRESS NOTE ADULT - SUBJECTIVE AND OBJECTIVE BOX
Patient is a 54y old  Male who presents with a chief complaint of Angiogram (14 May 2025 05:48)       INTERVAL HPI/OVERNIGHT EVENTS:  Patient seen and evaluated at bedside.  Pt is resting comfortable in NAD. Denies N/V/F/C.    Allergies    No Known Allergies    Intolerances        Vital Signs Last 24 Hrs  T(C): 36.9 (14 May 2025 05:35), Max: 37 (13 May 2025 13:22)  T(F): 98.5 (14 May 2025 05:35), Max: 98.6 (13 May 2025 13:22)  HR: 92 (14 May 2025 05:35) (84 - 96)  BP: 116/67 (14 May 2025 05:35) (92/50 - 122/69)  BP(mean): --  RR: 18 (14 May 2025 05:35) (18 - 18)  SpO2: 96% (14 May 2025 05:35) (96% - 99%)    Parameters below as of 14 May 2025 05:35  Patient On (Oxygen Delivery Method): room air        LABS:                        8.0    15.62 )-----------( 329      ( 14 May 2025 07:19 )             25.1     05-14    142  |  107  |  13  ----------------------------<  82  3.8   |  20[L]  |  1.66[H]    Ca    8.9      14 May 2025 07:15  Phos  3.0     05-14  Mg     2.0     05-14        Urinalysis Basic - ( 14 May 2025 07:15 )    Color: x / Appearance: x / SG: x / pH: x  Gluc: 82 mg/dL / Ketone: x  / Bili: x / Urobili: x   Blood: x / Protein: x / Nitrite: x   Leuk Esterase: x / RBC: x / WBC x   Sq Epi: x / Non Sq Epi: x / Bacteria: x      CAPILLARY BLOOD GLUCOSE      POCT Blood Glucose.: 105 mg/dL (14 May 2025 09:44)  POCT Blood Glucose.: 112 mg/dL (13 May 2025 21:39)  POCT Blood Glucose.: 135 mg/dL (13 May 2025 17:45)  POCT Blood Glucose.: 290 mg/dL (13 May 2025 13:12)  POCT Blood Glucose.: 392 mg/dL (13 May 2025 10:19)      Lower Extremity Physical Exam:  Vascular: DP/PT 0/4, B/L, CFT < 3 seconds B/L, Temperature gradient warm to cool, B/L.   Neuro: Epicritic sensation absent to the level of digits, B/L.  Musculoskeletal/Ortho: s/p Right foot 3rd metatarsal head resection, s/p Right foot Partial 2nd Ray Resection (DOS 3/6), s/p right foot partial 1st ray resection,  Skin: 5/12 s/p right foot transmetatarsal amputation and tendoachilles lengthening, closed: staples intact, small deheisence to medial incision with mild sanguinous drainage, no underlying hematoma, flaps warm.     RADIOLOGY & ADDITIONAL TESTS:

## 2025-05-14 NOTE — PROVIDER CONTACT NOTE (CRITICAL VALUE NOTIFICATION) - BACKGROUND
pt admitted with R foot wound s/p R TMA
s/p first partial open ray resection with 100ml of estimated blood loss, h/o htn, hld, dmt2, cad

## 2025-05-14 NOTE — PROGRESS NOTE ADULT - SUBJECTIVE AND OBJECTIVE BOX
Patient seen today for follow up inpatient Diabetes Mellitus management.    Chief Complaint: Type 2 Diabetes Mellitus    INTERVAL HX:  Patient seen in [facility] 9MON [id number] D1. Patient is alert and oriented, resting in bed. Patient reports feeling good, eating full meals and tolerating POs. FBG stable but on lower side this am to 105mg/dL, 82mg/dL on blood serum. Noted tight BG control postprandial to 83mg/dL today. No hypoglycemia. Patient pending discharge to rehab. Blood glucose levels in the last 24hrs have been 83-135mg/dL.     Review of Systems:  General: As above.  Respiratory: Denies any SOB, CAGE, or cough.  Gastrointestinal: Denies any n/v/d or abdominal pain.   Endocrine: Denies any polyuria, polydipsia, polyphagia, visual changes, or numbness in feet.     Allergies  No Known Allergies      Intolerances  None.       MEDICATIONS  (STANDING):  acetaminophen     Tablet .. 1000 milliGRAM(s) Oral every 6 hours  aspirin enteric coated 81 milliGRAM(s) Oral daily  atorvastatin 80 milliGRAM(s) Oral at bedtime  cadexomer iodine 0.9% Gel 1 Application(s) Topical daily  dextrose 5%. 1000 milliLiter(s) IV Continuous <Continuous>  dextrose 5%. 1000 milliLiter(s) IV Continuous <Continuous>  dextrose 50% Injectable 25 Gram(s) IV Push once  dextrose 50% Injectable 12.5 Gram(s) IV Push once  dextrose 50% Injectable 25 Gram(s) IV Push once  dextrose Oral Gel 15 Gram(s) Oral once  doxycycline monohydrate Capsule 100 milliGRAM(s) Oral every 12 hours  famotidine    Tablet 20 milliGRAM(s) Oral once PRN  fenofibrate Tablet 145 milliGRAM(s) Oral at bedtime  glucagon  Injectable 1 milliGRAM(s) IntraMuscular once  heparin   Injectable 5000 Unit(s) SubCutaneous every 8 hours  insulin glargine Injectable (LANTUS) 16 Unit(s) SubCutaneous at bedtime  insulin lispro (ADMELOG) corrective regimen sliding scale   SubCutaneous three times a day before meals  insulin lispro (ADMELOG) corrective regimen sliding scale   SubCutaneous at bedtime  insulin lispro Injectable (ADMELOG) 16 Unit(s) SubCutaneous three times a day before meals  metoprolol tartrate 50 milliGRAM(s) Oral two times a day  oxyCODONE    IR 2.5 milliGRAM(s) Oral every 4 hours PRN  oxyCODONE    IR 5 milliGRAM(s) Oral every 4 hours PRN  pentoxifylline 400 milliGRAM(s) Oral three times a day  ticagrelor 90 milliGRAM(s) Oral every 12 hours      atorvastatin 80 milliGRAM(s) Oral at bedtime  dextrose 50% Injectable 25 Gram(s) IV Push once  dextrose 50% Injectable 12.5 Gram(s) IV Push once  dextrose 50% Injectable 25 Gram(s) IV Push once  dextrose Oral Gel 15 Gram(s) Oral once  fenofibrate Tablet 145 milliGRAM(s) Oral at bedtime  glucagon  Injectable 1 milliGRAM(s) IntraMuscular once  insulin glargine Injectable (LANTUS) 16 Unit(s) SubCutaneous at bedtime  insulin lispro (ADMELOG) corrective regimen sliding scale   SubCutaneous three times a day before meals  insulin lispro (ADMELOG) corrective regimen sliding scale   SubCutaneous at bedtime  insulin lispro Injectable (ADMELOG) 16 Unit(s) SubCutaneous three times a day before meals      insulin lispro (ADMELOG) corrective regimen sliding scale   SubCutaneous three times a day before meals  insulin lispro (ADMELOG) corrective regimen sliding scale   SubCutaneous at bedtime  insulin lispro Injectable (ADMELOG) 16 Unit(s) SubCutaneous three times a day before meals      PHYSICAL EXAM:  VITALS:   T(C): 36.7 (05-14-25 @ 11:18), Max: 37 (05-13-25 @ 13:22)  HR: 78 (05-14-25 @ 11:18) (78 - 96)  BP: 109/67 (05-14-25 @ 11:18) (92/50 - 122/69)  RR: 18 (05-14-25 @ 11:18) (18 - 18)  SpO2: 99% (05-14-25 @ 11:18) (96% - 99%)    GENERAL: In no acute distress  Respiratory: Respirations unlabored  Extremities: Warm and dry, no edema  NEURO: Alert and oriented, appropriate     LABS:  POCT Blood Glucose.: 83 mg/dL (05-14-25 @ 12:44)  POCT Blood Glucose.: 105 mg/dL (05-14-25 @ 09:44)  POCT Blood Glucose.: 112 mg/dL (05-13-25 @ 21:39)  POCT Blood Glucose.: 135 mg/dL (05-13-25 @ 17:45)  POCT Blood Glucose.: 290 mg/dL (05-13-25 @ 13:12)  POCT Blood Glucose.: 392 mg/dL (05-13-25 @ 10:19)  POCT Blood Glucose.: 435 mg/dL (05-13-25 @ 09:21)  POCT Blood Glucose.: 438 mg/dL (05-13-25 @ 08:37)  POCT Blood Glucose.: 450 mg/dL (05-13-25 @ 08:36)  POCT Blood Glucose.: 119 mg/dL (05-12-25 @ 21:31)  POCT Blood Glucose.: 124 mg/dL (05-12-25 @ 20:07)  POCT Blood Glucose.: 113 mg/dL (05-12-25 @ 16:26)  POCT Blood Glucose.: 153 mg/dL (05-12-25 @ 14:51)  POCT Blood Glucose.: 192 mg/dL (05-12-25 @ 10:04)  POCT Blood Glucose.: 296 mg/dL (05-12-25 @ 05:02)  POCT Blood Glucose.: 320 mg/dL (05-12-25 @ 00:59)  POCT Blood Glucose.: 318 mg/dL (05-11-25 @ 22:40)  POCT Blood Glucose.: 344 mg/dL (05-11-25 @ 22:37)  POCT Blood Glucose.: 300 mg/dL (05-11-25 @ 21:18)  POCT Blood Glucose.: 170 mg/dL (05-11-25 @ 17:24)                          8.0    15.62 )-----------( 329      ( 14 May 2025 07:19 )             25.1     05-14    142  |  107  |  13  ----------------------------<  82  3.8   |  20[L]  |  1.66[H]    Ca    8.9      14 May 2025 07:15  Phos  3.0     05-14  Mg     2.0     05-14        Urinalysis Basic - ( 14 May 2025 07:15 )    Color: x / Appearance: x / SG: x / pH: x  Gluc: 82 mg/dL / Ketone: x  / Bili: x / Urobili: x   Blood: x / Protein: x / Nitrite: x   Leuk Esterase: x / RBC: x / WBC x   Sq Epi: x / Non Sq Epi: x / Bacteria: x        Culture - Tissue with Gram Stain (collected 12 May 2025 21:13)  Source: Bone clean bone margin 2nd metatarsal R foot  Gram Stain (13 May 2025 03:42):    No polymorphonuclear cells seen per low power field    No organisms seen per oil power field  Preliminary Report (13 May 2025 20:18):    No growth to date.    Culture - Wound Aerobic/Anaerobic (collected 12 May 2025 21:13)  Source: Surgical Swab deep wound culture right foot  Gram Stain (13 May 2025 01:47):    No polymorphonuclear leukocytes seen per low power field    No organisms seen per oil power field  Preliminary Report (13 May 2025 18:36):    No growth        A1C with Estimated Average Glucose Result: A1C with Estimated Average Glucose Result: 10.1 % (05-05-25 @ 06:47)  A1C with Estimated Average Glucose Result: >15.5 % (03-04-25 @ 23:12)

## 2025-05-14 NOTE — PROGRESS NOTE ADULT - ASSESSMENT
54M with PMH of HTN, HLD, DM2, CAD s/p CABG, HF, and recent admission to [facility] for toe amputation and found to be bacteremic, now presents to the ED with right foot wound. Nephrology consulted for [facility]A.

## 2025-05-14 NOTE — PROGRESS NOTE ADULT - SUBJECTIVE AND OBJECTIVE BOX
SURGERY DAILY PROGRESS NOTE    24 Hour/Overnight Events:  - Surgical swab culture 5/9 speciated with MRSA  - PT re-evaluated, RAIZA recommended    SUBJECTIVE: Patient seen and evaluated on AM rounds.    ------------------------------------------------------------------------------------------------------------  OBJECTIVE:  Vital Signs Last 24 Hrs  T(C): 36.8 (14 May 2025 01:48), Max: 37 (13 May 2025 13:22)  T(F): 98.2 (14 May 2025 01:48), Max: 98.6 (13 May 2025 13:22)  HR: 96 (14 May 2025 01:48) (82 - 96)  BP: 105/67 (14 May 2025 01:48) (92/50 - 132/82)  BP(mean): --  RR: 18 (14 May 2025 01:48) (18 - 18)  SpO2: 99% (14 May 2025 01:48) (95% - 99%)    Parameters below as of 14 May 2025 01:48  Patient On (Oxygen Delivery Method): room air      I&O's Detail    12 May 2025 07:01  -  13 May 2025 07:00  --------------------------------------------------------  IN:    IV PiggyBack: 200 mL    Lactated Ringers: 540 mL    Oral Fluid: 460 mL  Total IN: 1200 mL    OUT:    Voided (mL): 1870 mL  Total OUT: 1870 mL    Total NET: -670 mL      13 May 2025 07:01  -  14 May 2025 05:48  --------------------------------------------------------  IN:    Oral Fluid: 120 mL  Total IN: 120 mL    OUT:    Voided (mL): 500 mL  Total OUT: 500 mL    Total NET: -380 mL          PHYSICAL EXAM:  Constitutional: Well developed, well-nourished, appropriately interactive in no acute distress  Chest: Symmetric chest rise bilaterally, normal work of breathing on room air  Abdomen: Soft, nondistended  Extremities: right PT signal. right leg is soft, warm, and non-tender to palpation. Right foot dressing is clean and dry. Bilateral hip flexion/extension 5/5.    LABS:                        8.0    12.79 )-----------( 319      ( 13 May 2025 07:38 )             25.5     05-13    138  |  103  |  15  ----------------------------<  404[H]  4.1   |  24  |  1.53[H]    Ca    8.4      13 May 2025 07:38  Phos  3.1     05-13  Mg     2.0     05-13        PT/INR - ( 12 May 2025 07:13 )   PT: 11.9 sec;   INR: 1.04 ratio         PTT - ( 12 May 2025 07:13 )  PTT:32.8 sec     SURGERY DAILY PROGRESS NOTE    24 Hour/Overnight Events:  - Surgical swab culture 5/9 speciated with MRSA  - PT re-evaluated, RAIZA recommended    SUBJECTIVE: Patient seen and evaluated on AM rounds. Pt states pain is well controlled on current regimen.     ------------------------------------------------------------------------------------------------------------  OBJECTIVE:  Vital Signs Last 24 Hrs  T(C): 36.8 (14 May 2025 01:48), Max: 37 (13 May 2025 13:22)  T(F): 98.2 (14 May 2025 01:48), Max: 98.6 (13 May 2025 13:22)  HR: 96 (14 May 2025 01:48) (82 - 96)  BP: 105/67 (14 May 2025 01:48) (92/50 - 132/82)  BP(mean): --  RR: 18 (14 May 2025 01:48) (18 - 18)  SpO2: 99% (14 May 2025 01:48) (95% - 99%)    Parameters below as of 14 May 2025 01:48  Patient On (Oxygen Delivery Method): room air      I&O's Detail    12 May 2025 07:01  -  13 May 2025 07:00  --------------------------------------------------------  IN:    IV PiggyBack: 200 mL    Lactated Ringers: 540 mL    Oral Fluid: 460 mL  Total IN: 1200 mL    OUT:    Voided (mL): 1870 mL  Total OUT: 1870 mL    Total NET: -670 mL      13 May 2025 07:01  -  14 May 2025 05:48  --------------------------------------------------------  IN:    Oral Fluid: 120 mL  Total IN: 120 mL    OUT:    Voided (mL): 500 mL  Total OUT: 500 mL    Total NET: -380 mL      PHYSICAL EXAM:  Constitutional: Well developed, well-nourished, appropriately interactive in no acute distress  Chest: Symmetric chest rise bilaterally, normal work of breathing on room air  Abdomen: Soft, nondistended  Extremities: right PT signal. right leg is soft, warm, and non-tender to palpation. Right foot dressing is clean and dry. Bilateral hip flexion/extension 5/5.    LABS:                        8.0    12.79 )-----------( 319      ( 13 May 2025 07:38 )             25.5     05-13    138  |  103  |  15  ----------------------------<  404[H]  4.1   |  24  |  1.53[H]    Ca    8.4      13 May 2025 07:38  Phos  3.1     05-13  Mg     2.0     05-13        PT/INR - ( 12 May 2025 07:13 )   PT: 11.9 sec;   INR: 1.04 ratio         PTT - ( 12 May 2025 07:13 )  PTT:32.8 sec

## 2025-05-14 NOTE — PROGRESS NOTE ADULT - ASSESSMENT
Patient is a 54 year old male with PMH of DM, CAD (s/p CABG), mild HFrEF (EF 45%), and PAD (with recent angiogram Aug. 2024), and recent admission to [facility] with R foot wound s/p R foot Partial 2nd ray resection and 3rd proximal phalanx bone biopsy on 3/7/25 (Cx + for E. Faecalis and Staph Epi requiring prolonged abx course s/p PICC, clean margin with no residual OM), now presenting with right foot wound. Patient reports 1 week of subjective fevers, chills, hypothermia, and R foot lateral toe oozing and erythema c/f soft tissue infection.    R foot wound infection with 1st MPJ fluid collection, 1st and 2nd toe OM  - 5/4 s/p R foot I&D to level of subQ - scant, purulent drainage, no erythema, no malodor   - R foot abscess culture with Bacteroides fragilis   - R foot xray with no gas or OM  - R foot MRI with soft tissue wounds about distal forefoot with small volume of ill defined peripheral enhancing fluid preferentially surrounding the 1st MPJ fluid collection, residual second metatarsal and 1st proximal phalanx OM, possible reactive vs early OM of 3rd metatarsal  - Bcx NGTD x2    - 5/8 s/p RLE angiogram with vascular   5/9 s/p R foot Partial 1st ray resection and incision and drainage   - noted high concern for residual bone infection, necrotic soft tissue and bone noted laterally, low concern for viability   - deep wound R foot culture with Staph aureus - MRSA- in fluid media only   5/12 s/p R TMA and tendoachilles lengthening   - noted low concern for residual soft tissue and bone infection, high concern for viability sec to vascular status    - R foot 2nd metatarsal clean margin and deep wound cultures now with NGTD  leukocytosis noted uptrend-suspect likely reactive, remains afebrile, nontoxic     Recommendations:   Follow 5/12 OR cultures - NGTD  Follow surg path reports   Podiatry and Vascular following  Discontinue zosyn  Start on doxycycline 100mg PO for 7d course until 5/20  Continue local wound care   Monitor temps/WBC  Continue rest of care per primary team       D/w Vascular KYLEE Vidal M.D.  [facility]  Available on [employer] TEAMS - *PREFERRED*  [phone]  After 5pm on weekdays and all day on weekends - please call [phone]     Thank you for consulting us and involving us in the management of this patients case. In addition to reviewing history, imaging, documents, labs, microbiology, took into account antibiotic stewardship, local antibiogram and infection control strategies and potential transmission issues at time of treatment decision making process.

## 2025-05-14 NOTE — PROGRESS NOTE ADULT - SUBJECTIVE AND OBJECTIVE BOX
[facility] DIVISION OF KIDNEY DISEASE AND HYPERTENSION  [phone]    RENAL FOLLOW UP NOTE- NEPHROHOSPITALIST  --------------------------------------------------------------------------------  Patient seen and examined this morning,  episode of hypotension noted overnight.  Patient states he was "a  little dizzy" at the time but currently denies    PAST HISTORY  --------------------------------------------------------------------------------  No significant changes to PMH, PSH, FHx, SHx, unless otherwise noted    ALLERGIES & MEDICATIONS  --------------------------------------------------------------------------------  Allergies    No Known Allergies    Intolerances      Standing Inpatient Medications  acetaminophen     Tablet .. 1000 milliGRAM(s) Oral every 6 hours  aspirin enteric coated 81 milliGRAM(s) Oral daily  atorvastatin 80 milliGRAM(s) Oral at bedtime  cadexomer iodine 0.9% Gel 1 Application(s) Topical daily  dextrose 5%. 1000 milliLiter(s) IV Continuous <Continuous>  dextrose 5%. 1000 milliLiter(s) IV Continuous <Continuous>  dextrose 50% Injectable 25 Gram(s) IV Push once  dextrose 50% Injectable 12.5 Gram(s) IV Push once  dextrose 50% Injectable 25 Gram(s) IV Push once  dextrose Oral Gel 15 Gram(s) Oral once  doxycycline monohydrate Capsule 100 milliGRAM(s) Oral every 12 hours  fenofibrate Tablet 145 milliGRAM(s) Oral at bedtime  glucagon  Injectable 1 milliGRAM(s) IntraMuscular once  heparin   Injectable 5000 Unit(s) SubCutaneous every 8 hours  insulin glargine Injectable (LANTUS) 16 Unit(s) SubCutaneous at bedtime  insulin lispro (ADMELOG) corrective regimen sliding scale   SubCutaneous three times a day before meals  insulin lispro (ADMELOG) corrective regimen sliding scale   SubCutaneous at bedtime  insulin lispro Injectable (ADMELOG) 16 Unit(s) SubCutaneous three times a day before meals  metoprolol tartrate 50 milliGRAM(s) Oral two times a day  pentoxifylline 400 milliGRAM(s) Oral three times a day  ticagrelor 90 milliGRAM(s) Oral every 12 hours    PRN Inpatient Medications  famotidine    Tablet 20 milliGRAM(s) Oral once PRN  oxyCODONE    IR 2.5 milliGRAM(s) Oral every 4 hours PRN  oxyCODONE    IR 5 milliGRAM(s) Oral every 4 hours PRN      FOCUSED REVIEW OF SYSTEMS  --------------------------------------------------------------------------------  denies fevers/rigors  denies dizziness/lightheadedness  denies CP/palpitations  denies SOB/cough  denies N/V/abd pain  denies oliguria/dysuria      VITALS/PHYSICAL EXAM  --------------------------------------------------------------------------------  T(C): 36.5 (05-14-25 @ 13:25), Max: 36.9 (05-14-25 @ 05:35)  HR: 85 (05-14-25 @ 13:25) (78 - 96)  BP: 127/68 (05-14-25 @ 13:25) (92/50 - 127/68)  RR: 18 (05-14-25 @ 13:25) (18 - 18)  SpO2: 98% (05-14-25 @ 13:25) (96% - 99%)  Wt(kg): --  Height (cm): 162.6 (05-12-25 @ 16:28)  Weight (kg): 67.4 (05-12-25 @ 16:28)  BMI (kg/m2): 25.5 (05-12-25 @ 16:28)  BSA (m2): 1.72 (05-12-25 @ 16:28)      05-13-25 @ 07:01  -  05-14-25 @ 07:00  --------------------------------------------------------  IN: 120 mL / OUT: 1650 mL / NET: -1530 mL    05-14-25 @ 07:01  -  05-14-25 @ 13:51  --------------------------------------------------------  IN: 240 mL / OUT: 0 mL / NET: 240 mL      Physical Exam:  	Gen: NAD, sitting up at side of bed and eating breakfast  	Pulm: CTA B/L no w/r/r  	CV: RRR, S1S2  	Abd: +BS, soft, nontender/nondistended  	: No suprapubic tenderness.  no craig          Extremity: R foot dressing, no LLE edema  	Neuro: A&O x 3      LABS/STUDIES  --------------------------------------------------------------------------------              8.0    15.62 >-----------<  329      [05-14-25 @ 07:19]              25.1     142  |  107  |  13  ----------------------------<  82      [05-14-25 @ 07:15]  3.8   |  20  |  1.66        Ca     8.9     [05-14-25 @ 07:15]      Mg     2.0     [05-14-25 @ 07:15]      Phos  3.0     [05-14-25 @ 07:15]              Creatinine Trend:  SCr 1.66 [05-14 @ 07:15]  SCr 1.53 [05-13 @ 07:38]  SCr 1.68 [05-12 @ 07:13]  SCr 1.59 [05-11 @ 07:28]  SCr 1.60 [05-10 @ 07:08]              Urinalysis - [05-14-25 @ 07:15]      Color  / Appearance  / SG  / pH       Gluc 82 / Ketone   / Bili  / Urobili        Blood  / Protein  / Leuk Est  / Nitrite       RBC  / WBC  / Hyaline  / Gran  / Sq Epi  / Non Sq Epi  / Bacteria       Lipid: chol 118, , HDL 29, LDL --      [03-06-25 @ 06:59]

## 2025-05-14 NOTE — PROVIDER CONTACT NOTE (CRITICAL VALUE NOTIFICATION) - ACTION/TREATMENT ORDERED:
transfuse one unit prbc in pacu
pt on abx. Abx changed from zosyn to doxycycline this morning.
Repeat CBC stat ordered

## 2025-05-14 NOTE — PROGRESS NOTE ADULT - SUBJECTIVE AND OBJECTIVE BOX
[facility]  DU Garcia Y. Patel, S. Shah, G. Casimir  [phone]  ([phone] - weekdays after 5pm and weekends)    Name: ELSI MERINO  Age/Gender: 54y Male  MRN: [id number]    Interval History:  Patient seen and examined this morning.   Feels pain is better controlled, at times some sharp pains.   Feels cold at times, but denies fever, cough, n/v/d, or any other complaints.   Notes reviewed. No concerning overnight events  Afebrile   Allergies: No Known Allergies      Objective:  Vitals:   T(F): 98.5 (05-14-25 @ 05:35), Max: 98.6 (05-13-25 @ 13:22)  HR: 92 (05-14-25 @ 05:35) (84 - 96)  BP: 116/67 (05-14-25 @ 05:35) (92/50 - 122/69)  RR: 18 (05-14-25 @ 05:35) (18 - 18)  SpO2: 96% (05-14-25 @ 05:35) (96% - 99%)  Physical Examination:  General: no acute distress, nontoxic appearing   HEENT: normocephalic, atraumatic, anicteric  Respiratory: no acc muscle use, breathing comfortably  Cardiovascular: S1 and S2 present  Gastrointestinal: normal appearing, nondistended  Extremities: RLE ACE dressing  Skin: no visible rash    Laboratory Studies:  CBC:                       8.0    15.62 )-----------( 329      ( 14 May 2025 07:19 )             25.1     WBC Trend:  15.62 05-14-25 @ 07:19  12.79 05-13-25 @ 07:38  8.09 05-12-25 @ 20:37  9.88 05-12-25 @ 07:13  11.35 05-11-25 @ 08:52  14.93 05-11-25 @ 07:28  10.67 05-10-25 @ 07:06  9.09 05-09-25 @ 12:46  8.67 05-09-25 @ 05:36  10.48 05-08-25 @ 05:30    CMP: 05-14    142  |  107  |  13  ----------------------------<  82  3.8   |  20[L]  |  1.66[H]    Ca    8.9      14 May 2025 07:15  Phos  3.0     05-14  Mg     2.0     05-14      Creatinine: 1.66 mg/dL (05-14-25 @ 07:15)  Creatinine: 1.53 mg/dL (05-13-25 @ 07:38)  Creatinine: 1.68 mg/dL (05-12-25 @ 07:13)  Creatinine: 1.59 mg/dL (05-11-25 @ 07:28)  Creatinine: 1.60 mg/dL (05-10-25 @ 07:08)  Creatinine: 1.51 mg/dL (05-09-25 @ 05:35)  Creatinine: 1.51 mg/dL (05-08-25 @ 05:30)    Microbiology: reviewed   Culture - Wound Aerobic/Anaerobic (collected 05-12-25 @ 21:13)  Source: Surgical Swab deep wound culture right foot  Gram Stain (05-13-25 @ 01:47):    No polymorphonuclear leukocytes seen per low power field    No organisms seen per oil power field  Preliminary Report (05-13-25 @ 18:36):    No growth    Culture - Tissue with Gram Stain (collected 05-12-25 @ 21:13)  Source: Bone clean bone margin 2nd metatarsal R foot  Gram Stain (05-13-25 @ 03:42):    No polymorphonuclear cells seen per low power field    No organisms seen per oil power field  Preliminary Report (05-13-25 @ 20:18):    No growth to date.    Culture - Wound Aerobic/Anaerobic (collected 05-09-25 @ 12:57)  Source: Surgical Swab DEEP WOUND RIGHT FOOT  Preliminary Report (05-13-25 @ 21:15):    Growth in fluid media only Methicillin Resistant Staphylococcus aureus  Organism: Methicillin resistant Staphylococcus aureus (05-13-25 @ 21:15)  Organism: Methicillin resistant Staphylococcus aureus (05-13-25 @ 21:15)      -  Clindamycin: S <=0.25      -  Oxacillin: R >2      -  Gentamicin: S <=4 Should not be used as monotherapy      -  Daptomycin: S 1      -  Linezolid: S 2      -  Vancomycin: S 1      -  Tetracycline: S <=4      Method Type: MOJGAN      -  Penicillin: R >2      -  Rifampin: S <=1 Should not be used as monotherapy      -  Erythromycin: S <=0.25      -  Trimethoprim/Sulfamethoxazole: S <=0.5/9.5    Culture - Abscess with Gram Stain (collected 05-04-25 @ 04:40)  Source: Abscess right foot  Gram Stain (05-06-25 @ 11:51):    No polymorphonuclear leukocytes seen per low power field    No organisms seen per oil power field  Final Report (05-06-25 @ 11:51):    Rare Bacteroides fragilis "Susceptibilities not performed"    Culture - Blood (collected 05-04-25 @ 02:10)  Source: Blood Blood-Peripheral  Final Report (05-09-25 @ 05:00):    No growth at 5 days    Culture - Blood (collected 05-04-25 @ 02:08)  Source: Blood Blood-Peripheral  Final Report (05-09-25 @ 05:00):    No growth at 5 days    Radiology: reviewed     Medications:  acetaminophen     Tablet .. 1000 milliGRAM(s) Oral every 6 hours  aspirin enteric coated 81 milliGRAM(s) Oral daily  atorvastatin 80 milliGRAM(s) Oral at bedtime  cadexomer iodine 0.9% Gel 1 Application(s) Topical daily  dextrose 5%. 1000 milliLiter(s) IV Continuous <Continuous>  dextrose 5%. 1000 milliLiter(s) IV Continuous <Continuous>  dextrose 50% Injectable 25 Gram(s) IV Push once  dextrose 50% Injectable 12.5 Gram(s) IV Push once  dextrose 50% Injectable 25 Gram(s) IV Push once  dextrose Oral Gel 15 Gram(s) Oral once  famotidine    Tablet 20 milliGRAM(s) Oral once PRN  fenofibrate Tablet 145 milliGRAM(s) Oral at bedtime  glucagon  Injectable 1 milliGRAM(s) IntraMuscular once  heparin   Injectable 5000 Unit(s) SubCutaneous every 8 hours  insulin glargine Injectable (LANTUS) 18 Unit(s) SubCutaneous at bedtime  insulin lispro (ADMELOG) corrective regimen sliding scale   SubCutaneous three times a day before meals  insulin lispro (ADMELOG) corrective regimen sliding scale   SubCutaneous at bedtime  insulin lispro Injectable (ADMELOG) 18 Unit(s) SubCutaneous three times a day before meals  metoprolol tartrate 50 milliGRAM(s) Oral two times a day  oxyCODONE    IR 2.5 milliGRAM(s) Oral every 4 hours PRN  oxyCODONE    IR 5 milliGRAM(s) Oral every 4 hours PRN  pentoxifylline 400 milliGRAM(s) Oral three times a day  piperacillin/tazobactam IVPB.. 3.375 Gram(s) IV Intermittent every 8 hours  ticagrelor 90 milliGRAM(s) Oral every 12 hours    Current Antimicrobials:  piperacillin/tazobactam IVPB.. 3.375 Gram(s) IV Intermittent every 8 hours    Prior/Completed Antimicrobials:  piperacillin/tazobactam IVPB.  piperacillin/tazobactam IVPB.-  vancomycin  IVPB

## 2025-05-14 NOTE — PROGRESS NOTE ADULT - SUBJECTIVE AND OBJECTIVE BOX
DATE OF SERVICE: 05-14-25 @ 12:10    Patient is a 54y old  Male who presents with a chief complaint of Angiogram (14 May 2025 09:43)      INTERVAL HISTORY: in no acute distress    REVIEW OF SYSTEMS:  CONSTITUTIONAL: No weakness  EYES/ENT: No visual changes;  No throat pain   NECK: No pain or stiffness  RESPIRATORY: No cough, wheezing; No shortness of breath  CARDIOVASCULAR: No chest pain or palpitations  GASTROINTESTINAL: No abdominal  pain. No nausea, vomiting, or hematemesis  GENITOURINARY: No dysuria, frequency or hematuria  NEUROLOGICAL: No stroke like symptoms  SKIN: No rashes      MEDICATIONS:  metoprolol tartrate 50 milliGRAM(s) Oral two times a day        PHYSICAL EXAM:  T(C): 36.7 (05-14-25 @ 11:18), Max: 37 (05-13-25 @ 13:22)  HR: 78 (05-14-25 @ 11:18) (78 - 96)  BP: 109/67 (05-14-25 @ 11:18) (92/50 - 122/69)  RR: 18 (05-14-25 @ 11:18) (18 - 18)  SpO2: 99% (05-14-25 @ 11:18) (96% - 99%)  Wt(kg): --  I&O's Summary    13 May 2025 07:01  -  14 May 2025 07:00  --------------------------------------------------------  IN: 120 mL / OUT: 1650 mL / NET: -1530 mL    14 May 2025 07:01  -  14 May 2025 12:10  --------------------------------------------------------  IN: 240 mL / OUT: 0 mL / NET: 240 mL          Appearance: In no distress	  HEENT:    PERRL, EOMI	  Cardiovascular:  S1 S2, No JVD  Respiratory: Lungs clear to auscultation	  Gastrointestinal:  Soft, Non-tender, + BS	  Vascularature:  No edema of LE  Psychiatric: Appropriate affect   Neuro: no acute focal deficits                               8.0    15.62 )-----------( 329      ( 14 May 2025 07:19 )             25.1     05-14    142  |  107  |  13  ----------------------------<  82  3.8   |  20[L]  |  1.66[H]    Ca    8.9      14 May 2025 07:15  Phos  3.0     05-14  Mg     2.0     05-14          Labs personally reviewed      ASSESSMENT/PLAN: 	    54M hx DM, CAD (s/p CABG), mild HFrEF (EF 45%), and PAD (with recent angiogram Aug. 2024), and recent admission to [facility] w/ right foot wound s/p right foot Partial 2nd ray resection and 3rd proximal phalanx bone biopsy on 3/7/25 (Cx + for E. Faecalis and Staph Epi requiring prolonged abx course s/p PICC), now presenting with right foot wound.      Problem/Plan - 1:  ·  Problem: Hypertension.   ·  Plan: hold entresto 2/2 to AURORA  c/w lopressor 50mg BID     Problem/Plan - 2:  ·  Problem: CAD (coronary artery disease).   ·  Plan: c/w aspirin, brilinta, statin  TTE 3/6/25 with EF 55%.  Rest NM stress images March 2025 with medium-sized, severe defect(s) in the lateral and inferolateral walls. Stress portion not performed      Problem/Plan - 3:  ·  Problem: AURORA (acute kidney injury).   ·  Plan:  hold entresto for now  baseline 1s, now rising 1.4, c/w IVF   - 5/7 creat 1.49, appreciate nephro recs      Problem/Plan - 4:  ·  Problem: Preop risk stratification   - RLE angiogram with Dr. Youssef on Thurs, 5/8/25 pending improved AURORA. c/w Trental 400mg TID for PAD  - R 1st and 2nd ray resection thurs 5/8. Continue local wound care for right foot wounds, podiatry following  ·  Plan: Elevated risk for low risk peripheral angio +/- angioplasty  - Tolerated well       LATRICIA Kellogg, DO [facility]  Cardiovascular Medicine  [address], [address]  Available through call or text on [employer]  Office: [phone]

## 2025-05-14 NOTE — PROGRESS NOTE ADULT - ASSESSMENT
54 year old male with PMH of DM, CAD (s/p CABG), mild HFrEF (EF 45%), and PAD, and recent admission to [facility] with R foot wound s/p R foot Partial 2nd ray resection and 3rd proximal phalanx bone biopsy on 3/7/25 (Cx + for E. Faecalis and Staph Epi requiring prolonged abx course s/p PICC, clean margin with no residual OM), now presenting with right foot wound. Patient reports 1 week of subjective fevers, chills, hypothermia, and R foot lateral toe oozing and erythema c/f soft tissue infection. Endocrine consulted for uncontrolled T2DM and hyperglycemia (High risk patient with severely uncontrolled diabetes with A1c of 11.2% at high risk of CAD and CVA with high medical complexity and high level decision-making). Patient reports eating full meals and tolerating POs, s/p OR yesterday 5/12 now with severe hyperglycemia this am 2/2 to eating dinner late last night after 2100 after procedure done, was NPO for the day yesterday. No hypoglycemia. Will slightly increase Lantus to 18u QHS for now given FBGs have been mostly well controlled inpatient. Will also slightly increase mealtime insulin for tighter BG control -> may need higher insulin doses but hard to tell pattern given patient was NPO yesterday and then now severe hyperglycemia 2/2 to eating overnight. Endocrine will closely monitor BG and adjust insulin as needed for BG goal 100-180mg/dL inpatient.       #Uncontrolled T2DM  A1C with Estimated Average Glucose Result: 10.1 % (05-05-25 @ 06:47)  A1C with Estimated Average Glucose Result: >15.5 % (03-04-25 @ 23:12)  Home reg: Lantus 52u QHS, Humalog 20u TID AC -> discharged in march on Lantus 30u QHS and Admelog 14u TID AC (not adherent)   Endocrinologist: none -> follows with internist/PCP Dr. Mcdaniel in [place]?  *an appt should be made with his endocrine MD prior to [place] and plan of care at [place] needs to be discussed with his endocrine*    *only has Medicare and Elder plan via Medicare          54 year old male with PMH of DM, CAD (s/p CABG), mild HFrEF (EF 45%), and PAD, and recent admission to [facility] with R foot wound s/p R foot Partial 2nd ray resection and 3rd proximal phalanx bone biopsy on 3/7/25 (Cx + for E. Faecalis and Staph Epi requiring prolonged abx course s/p PICC, clean margin with no residual OM), now presenting with right foot wound. Patient reports 1 week of subjective fevers, chills, hypothermia, and R foot lateral toe oozing and erythema c/f soft tissue infection. Endocrine consulted for uncontrolled T2DM and hyperglycemia (High risk patient with severely uncontrolled diabetes with A1c of 11.2% at high risk of CAD and CVA with high medical complexity and high level decision-making). Patient reports eating full meals and tolerating POs, s/p OR yesterday 5/12. FBG stable but on lower side this am -> will lower Lantus to 16u QHS to prevent hypoglycemia. No hypoglycemia. Noted very tight postprandial BG to 83mg/dL, will lower mealtime insulin as well to rpevent hypoglycemia. Patient pending discharge to rehab. Patient states he would like to f/u with his Endocrinologist Dr. Mcdaniel outpatient. He states he knows how to use insulin pen however significant insulin doses different inpatient vs outpatient, RN to review insulin pen injection with patient to make sure patient doing right prior to d./c. Endocrine will closely monitor BG and adjust insulin as needed for BG goal 100-180mg/dL inpatient.       #Uncontrolled T2DM  A1C with Estimated Average Glucose Result: 10.1 % (05-05-25 @ 06:47)  A1C with Estimated Average Glucose Result: >15.5 % (03-04-25 @ 23:12)  Home reg: Lantus 52u QHS, Humalog 20u TID AC -> discharged in march on Lantus 30u QHS and Admelog 14u TID AC (not adherent)   Endocrinologist: none -> follows with internist/PCP Dr. Mcdaniel in [place]?  *an appt should be made with his endocrine MD prior to dc and plan of care at [place] needs to be discussed with his endocrine*    *only has Medicare and Elder plan via Medicare

## 2025-05-14 NOTE — PROGRESS NOTE ADULT - ASSESSMENT
54M  s/p right foot partial 1st ray resection, open (DOS 5/9)  - Patient seen and evaluated  - Afebrile, WBC 15.62  - 5/12 s/p right foot transmetatarsal amputation and tendoachilles lengthening, closed: staples intact, small deheisence to medial incision with mild sanguinous drainage, no underlying hematoma, flaps warm.   - Intraop finding low concern residual infection, high concern viability   - NWB to LLE in posterior splint   - OR wound culture: no growth (prelim)   - Clean bone margin: no growth (prelim)   - Vasc recs, appreciated  - ID recs, appreciated   - Pod stable for discharge pending final ID recs/ final vasc recs  - Rec d/c to [facility]  - Follow up information in discharge note provider   - Discussed with attending

## 2025-05-14 NOTE — PROGRESS NOTE ADULT - PROBLEM SELECTOR PLAN 1
AURORA likely in the setting of recent Entresto initiation. On review of labs on [place] [facility], Scr was approximately ~0.8. During recent admission at [facility] (3/5/25 to 3/11/25) Scr was 0.8 on discharge (3/10/25). Patient was started on Entresto during that admission. Last outpatient Scr was elevated at 1.55 on 4/4/25.     -creatinine rising over weekend to 1.68, decreased but then increased again to 1.66 today; patient had episode of hypotension noted overnight and states he was slightly symptomatic at time    UA noted, UPCR 0.3, patient w/ h/o DM  -likely that worsening azotemia is from hemodynamic lability but patient not in ATN  -s/p R TMA 05/12 for definitive management of OM, continue abx per ID  -would continue holding Entresto  -patient remains nonoliguric    -continue strict I/O please    -daily BMP

## 2025-05-14 NOTE — PROGRESS NOTE ADULT - PROBLEM SELECTOR PLAN 1
Inpatient Plan:  - Check BG TID AC, HS, and 2AM while on PO diet  - Adjust Lantus to 16u QHS  - Adjust Admelog to 16u TID AC (HOLD if NPO)  - C/w low dose Admelog correctional scales TID AC, HS, and 2AM  - RN to review insulin pen injections with patient to make sure patient doing it properly upon discharge     Discharge Plan:  - Discharge to rehab on Lantus 16u QHS and Admelog 16u with meals (hold admleog if skipping meal). Patient will need insulin adjustments while in rehab.   - Patient should check BG TID AC and HS at rehab and home. Please tell patient to contact Endocrinologist if BG <70mg/dL x1 or >200mg/dL consistently or >400mg/dL x1.  - Patient to f/u with his Endocrinologist Dr. Mcdaniel upon discharge from rehab.   - Given elevated BHOB of 3 w/ A1c >15.5% on last admission, patient to should have r/o T1DM/SUMIT labs checked.   - Recommend routine outpatient ophthalmology and podiatry follow up.  - Patient will need RX for basal insulin pen (ie. Basaglar, Lantus, Tresiba, Toujeo) and bolus insulin pen (ie. humalog/novolog/admelog) depending on insurance coverage; please send test scripts to see which is covered. Please make sure patient has all diabetes supplies on discharge (glucometer, test strips, lancets, alcohol swabs, insulin pen needles). Please write RX for glucose tablets/gel> use as directed plus Glucagon nasal/ IM injection (use as directed)> Can prescribe any covered by pt's insurance.

## 2025-05-15 ENCOUNTER — TRANSCRIPTION ENCOUNTER (OUTPATIENT)
Age: 55
End: 2025-05-15

## 2025-05-15 VITALS
RESPIRATION RATE: 18 BRPM | TEMPERATURE: 98 F | DIASTOLIC BLOOD PRESSURE: 78 MMHG | OXYGEN SATURATION: 98 % | HEART RATE: 84 BPM | SYSTOLIC BLOOD PRESSURE: 131 MMHG

## 2025-05-15 LAB
ANION GAP SERPL CALC-SCNC: 13 MMOL/L — SIGNIFICANT CHANGE UP (ref 5–17)
BLD GP AB SCN SERPL QL: NEGATIVE — SIGNIFICANT CHANGE UP
BUN SERPL-MCNC: 13 MG/DL — SIGNIFICANT CHANGE UP (ref 7–23)
CALCIUM SERPL-MCNC: 8.8 MG/DL — SIGNIFICANT CHANGE UP (ref 8.4–10.5)
CHLORIDE SERPL-SCNC: 104 MMOL/L — SIGNIFICANT CHANGE UP (ref 96–108)
CO2 SERPL-SCNC: 22 MMOL/L — SIGNIFICANT CHANGE UP (ref 22–31)
CREAT SERPL-MCNC: 1.61 MG/DL — HIGH (ref 0.5–1.3)
EGFR: 50 ML/MIN/1.73M2 — LOW
EGFR: 50 ML/MIN/1.73M2 — LOW
GLUCOSE BLDC GLUCOMTR-MCNC: 167 MG/DL — HIGH (ref 70–99)
GLUCOSE BLDC GLUCOMTR-MCNC: 209 MG/DL — HIGH (ref 70–99)
GLUCOSE BLDC GLUCOMTR-MCNC: 316 MG/DL — HIGH (ref 70–99)
GLUCOSE SERPL-MCNC: 287 MG/DL — HIGH (ref 70–99)
HCT VFR BLD CALC: 24.2 % — LOW (ref 39–50)
HGB BLD-MCNC: 7.6 G/DL — LOW (ref 13–17)
MAGNESIUM SERPL-MCNC: 2.1 MG/DL — SIGNIFICANT CHANGE UP (ref 1.6–2.6)
MCHC RBC-ENTMCNC: 25.3 PG — LOW (ref 27–34)
MCHC RBC-ENTMCNC: 31.4 G/DL — LOW (ref 32–36)
MCV RBC AUTO: 80.7 FL — SIGNIFICANT CHANGE UP (ref 80–100)
NRBC BLD AUTO-RTO: 0 /100 WBCS — SIGNIFICANT CHANGE UP (ref 0–0)
PHOSPHATE SERPL-MCNC: 2.7 MG/DL — SIGNIFICANT CHANGE UP (ref 2.5–4.5)
PLATELET # BLD AUTO: 323 K/UL — SIGNIFICANT CHANGE UP (ref 150–400)
POTASSIUM SERPL-MCNC: 4 MMOL/L — SIGNIFICANT CHANGE UP (ref 3.5–5.3)
POTASSIUM SERPL-SCNC: 4 MMOL/L — SIGNIFICANT CHANGE UP (ref 3.5–5.3)
RBC # BLD: 3 M/UL — LOW (ref 4.2–5.8)
RBC # FLD: 17.4 % — HIGH (ref 10.3–14.5)
RH IG SCN BLD-IMP: POSITIVE — SIGNIFICANT CHANGE UP
SODIUM SERPL-SCNC: 139 MMOL/L — SIGNIFICANT CHANGE UP (ref 135–145)
WBC # BLD: 10.36 K/UL — SIGNIFICANT CHANGE UP (ref 3.8–10.5)
WBC # FLD AUTO: 10.36 K/UL — SIGNIFICANT CHANGE UP (ref 3.8–10.5)

## 2025-05-15 PROCEDURE — 86900 BLOOD TYPING SEROLOGIC ABO: CPT

## 2025-05-15 PROCEDURE — 73720 MRI LWR EXTREMITY W/O&W/DYE: CPT | Mod: MC

## 2025-05-15 PROCEDURE — 85018 HEMOGLOBIN: CPT

## 2025-05-15 PROCEDURE — 93005 ELECTROCARDIOGRAM TRACING: CPT

## 2025-05-15 PROCEDURE — 85730 THROMBOPLASTIN TIME PARTIAL: CPT

## 2025-05-15 PROCEDURE — 85025 COMPLETE CBC W/AUTO DIFF WBC: CPT

## 2025-05-15 PROCEDURE — 88311 DECALCIFY TISSUE: CPT

## 2025-05-15 PROCEDURE — 99232 SBSQ HOSP IP/OBS MODERATE 35: CPT

## 2025-05-15 PROCEDURE — 96374 THER/PROPH/DIAG INJ IV PUSH: CPT

## 2025-05-15 PROCEDURE — 86901 BLOOD TYPING SEROLOGIC RH(D): CPT

## 2025-05-15 PROCEDURE — C1887: CPT

## 2025-05-15 PROCEDURE — 87075 CULTR BACTERIA EXCEPT BLOOD: CPT

## 2025-05-15 PROCEDURE — 71045 X-RAY EXAM CHEST 1 VIEW: CPT

## 2025-05-15 PROCEDURE — C1769: CPT

## 2025-05-15 PROCEDURE — 82803 BLOOD GASES ANY COMBINATION: CPT

## 2025-05-15 PROCEDURE — 96375 TX/PRO/DX INJ NEW DRUG ADDON: CPT

## 2025-05-15 PROCEDURE — 99285 EMERGENCY DEPT VISIT HI MDM: CPT | Mod: 25

## 2025-05-15 PROCEDURE — 87205 SMEAR GRAM STAIN: CPT

## 2025-05-15 PROCEDURE — 85014 HEMATOCRIT: CPT

## 2025-05-15 PROCEDURE — 85652 RBC SED RATE AUTOMATED: CPT

## 2025-05-15 PROCEDURE — 76770 US EXAM ABDO BACK WALL COMP: CPT

## 2025-05-15 PROCEDURE — 85610 PROTHROMBIN TIME: CPT

## 2025-05-15 PROCEDURE — 82962 GLUCOSE BLOOD TEST: CPT

## 2025-05-15 PROCEDURE — 97162 PT EVAL MOD COMPLEX 30 MIN: CPT

## 2025-05-15 PROCEDURE — 88307 TISSUE EXAM BY PATHOLOGIST: CPT

## 2025-05-15 PROCEDURE — 88305 TISSUE EXAM BY PATHOLOGIST: CPT

## 2025-05-15 PROCEDURE — 87070 CULTURE OTHR SPECIMN AEROBIC: CPT

## 2025-05-15 PROCEDURE — 36415 COLL VENOUS BLD VENIPUNCTURE: CPT

## 2025-05-15 PROCEDURE — 80048 BASIC METABOLIC PNL TOTAL CA: CPT

## 2025-05-15 PROCEDURE — 97164 PT RE-EVAL EST PLAN CARE: CPT

## 2025-05-15 PROCEDURE — 86923 COMPATIBILITY TEST ELECTRIC: CPT

## 2025-05-15 PROCEDURE — 85027 COMPLETE CBC AUTOMATED: CPT

## 2025-05-15 PROCEDURE — 87040 BLOOD CULTURE FOR BACTERIA: CPT

## 2025-05-15 PROCEDURE — C1760: CPT

## 2025-05-15 PROCEDURE — 80053 COMPREHEN METABOLIC PANEL: CPT

## 2025-05-15 PROCEDURE — 82435 ASSAY OF BLOOD CHLORIDE: CPT

## 2025-05-15 PROCEDURE — 81001 URINALYSIS AUTO W/SCOPE: CPT

## 2025-05-15 PROCEDURE — 82947 ASSAY GLUCOSE BLOOD QUANT: CPT

## 2025-05-15 PROCEDURE — 84300 ASSAY OF URINE SODIUM: CPT

## 2025-05-15 PROCEDURE — 36430 TRANSFUSION BLD/BLD COMPNT: CPT

## 2025-05-15 PROCEDURE — 82570 ASSAY OF URINE CREATININE: CPT

## 2025-05-15 PROCEDURE — 84681 ASSAY OF C-PEPTIDE: CPT

## 2025-05-15 PROCEDURE — A9585: CPT

## 2025-05-15 PROCEDURE — 84156 ASSAY OF PROTEIN URINE: CPT

## 2025-05-15 PROCEDURE — 36246 INS CATH ABD/L-EXT ART 2ND: CPT

## 2025-05-15 PROCEDURE — 84295 ASSAY OF SERUM SODIUM: CPT

## 2025-05-15 PROCEDURE — 84540 ASSAY OF URINE/UREA-N: CPT

## 2025-05-15 PROCEDURE — 83605 ASSAY OF LACTIC ACID: CPT

## 2025-05-15 PROCEDURE — 86850 RBC ANTIBODY SCREEN: CPT

## 2025-05-15 PROCEDURE — 82330 ASSAY OF CALCIUM: CPT

## 2025-05-15 PROCEDURE — 84100 ASSAY OF PHOSPHORUS: CPT

## 2025-05-15 PROCEDURE — 84132 ASSAY OF SERUM POTASSIUM: CPT

## 2025-05-15 PROCEDURE — 83935 ASSAY OF URINE OSMOLALITY: CPT

## 2025-05-15 PROCEDURE — P9016: CPT

## 2025-05-15 PROCEDURE — 86140 C-REACTIVE PROTEIN: CPT

## 2025-05-15 PROCEDURE — 87186 SC STD MICRODIL/AGAR DIL: CPT

## 2025-05-15 PROCEDURE — 83036 HEMOGLOBIN GLYCOSYLATED A1C: CPT

## 2025-05-15 PROCEDURE — 73630 X-RAY EXAM OF FOOT: CPT

## 2025-05-15 PROCEDURE — C1894: CPT

## 2025-05-15 PROCEDURE — P9040: CPT

## 2025-05-15 PROCEDURE — 86341 ISLET CELL ANTIBODY: CPT

## 2025-05-15 PROCEDURE — 83735 ASSAY OF MAGNESIUM: CPT

## 2025-05-15 PROCEDURE — 84133 ASSAY OF URINE POTASSIUM: CPT

## 2025-05-15 PROCEDURE — 93923 UPR/LXTR ART STDY 3+ LVLS: CPT

## 2025-05-15 PROCEDURE — C1889: CPT

## 2025-05-15 PROCEDURE — 75716 ARTERY X-RAYS ARMS/LEGS: CPT

## 2025-05-15 PROCEDURE — 87077 CULTURE AEROBIC IDENTIFY: CPT

## 2025-05-15 RX ORDER — POTASSIUM PHOSPHATE, MONOBASIC POTASSIUM PHOSPHATE, DIBASIC INJECTION, 236; 224 MG/ML; MG/ML
15 SOLUTION, CONCENTRATE INTRAVENOUS ONCE
Refills: 0 | Status: COMPLETED | OUTPATIENT
Start: 2025-05-15 | End: 2025-05-15

## 2025-05-15 RX ORDER — INSULIN LISPRO 100 U/ML
14 INJECTION, SOLUTION INTRAVENOUS; SUBCUTANEOUS
Refills: 0 | Status: DISCONTINUED | OUTPATIENT
Start: 2025-05-15 | End: 2025-05-15

## 2025-05-15 RX ORDER — CILOSTAZOL 50 MG/1
1 TABLET ORAL
Qty: 0 | Refills: 0 | DISCHARGE
Start: 2025-05-15

## 2025-05-15 RX ADMIN — Medication 1000 MILLIGRAM(S): at 12:30

## 2025-05-15 RX ADMIN — TICAGRELOR 90 MILLIGRAM(S): 90 TABLET ORAL at 06:28

## 2025-05-15 RX ADMIN — Medication 81 MILLIGRAM(S): at 11:41

## 2025-05-15 RX ADMIN — INSULIN LISPRO 1: 100 INJECTION, SOLUTION INTRAVENOUS; SUBCUTANEOUS at 13:51

## 2025-05-15 RX ADMIN — Medication 1 APPLICATION(S): at 13:58

## 2025-05-15 RX ADMIN — Medication 1000 MILLIGRAM(S): at 06:28

## 2025-05-15 RX ADMIN — CILOSTAZOL 50 MILLIGRAM(S): 50 TABLET ORAL at 06:31

## 2025-05-15 RX ADMIN — HEPARIN SODIUM 5000 UNIT(S): 1000 INJECTION INTRAVENOUS; SUBCUTANEOUS at 06:31

## 2025-05-15 RX ADMIN — INSULIN LISPRO 16 UNIT(S): 100 INJECTION, SOLUTION INTRAVENOUS; SUBCUTANEOUS at 09:28

## 2025-05-15 RX ADMIN — Medication 1000 MILLIGRAM(S): at 11:41

## 2025-05-15 RX ADMIN — INSULIN LISPRO 14 UNIT(S): 100 INJECTION, SOLUTION INTRAVENOUS; SUBCUTANEOUS at 13:55

## 2025-05-15 RX ADMIN — HEPARIN SODIUM 5000 UNIT(S): 1000 INJECTION INTRAVENOUS; SUBCUTANEOUS at 13:55

## 2025-05-15 RX ADMIN — POTASSIUM PHOSPHATE, MONOBASIC POTASSIUM PHOSPHATE, DIBASIC INJECTION, 62.5 MILLIMOLE(S): 236; 224 SOLUTION, CONCENTRATE INTRAVENOUS at 09:27

## 2025-05-15 RX ADMIN — INSULIN LISPRO 4: 100 INJECTION, SOLUTION INTRAVENOUS; SUBCUTANEOUS at 09:27

## 2025-05-15 RX ADMIN — METOPROLOL SUCCINATE 50 MILLIGRAM(S): 50 TABLET, EXTENDED RELEASE ORAL at 06:29

## 2025-05-15 RX ADMIN — Medication 100 MILLIGRAM(S): at 06:28

## 2025-05-15 RX ADMIN — Medication 1000 MILLIGRAM(S): at 06:58

## 2025-05-15 NOTE — DISCHARGE NOTE NURSING/CASE MANAGEMENT/SOCIAL WORK - NSDCFUADDAPPT_GEN_ALL_CORE_FT
Podiatry Discharge Instructions:  Follow up: Please follow up with Dr. Patterson within 1 week of discharge from the hospital, please call [phone] for appointment and discuss that you recently were seen in the hospital.  Wound Care: Please leave dressing clean dry and intact  Weight bearing: Please remain nonweightbearing as tolerated to right foot  Antibiotics: Please continue as instructed.    Please follow up with [facility] [address] [place] [id number] [phone] no

## 2025-05-15 NOTE — PROGRESS NOTE ADULT - SUBJECTIVE AND OBJECTIVE BOX
[facility]  DU Garcia Y. Patel, S. Shah, G. Casimir  [phone]  ([phone] - weekdays after 5pm and weekends)    Name: ELSI MERINO  Age/Gender: 54y Male  MRN: [id number]    Interval History:  Patient seen and examined this morning.   No new complaints noted.  Notes reviewed  No concerning overnight events  Afebrile   Allergies: No Known Allergies      Objective:  Vitals:   T(F): 98 (05-15-25 @ 09:07), Max: 98.7 (05-15-25 @ 06:24)  HR: 99 (05-15-25 @ 09:07) (85 - 100)  BP: 107/56 (05-15-25 @ 09:07) (107/56 - 144/80)  RR: 18 (05-15-25 @ 09:07) (18 - 18)  SpO2: 98% (05-15-25 @ 09:07) (97% - 99%)  Physical Examination:  General: no acute distress  HEENT: normocephalic, atraumatic, anicteric  Respiratory: no acc muscle use, breathing comfortably  Cardiovascular: S1 and S2 present  Gastrointestinal: normal appearing, nondistended  Extremities: RLE dressing    Laboratory Studies:  CBC:                       7.6    10.36 )-----------( 323      ( 15 May 2025 07:22 )             24.2     WBC Trend:  10.36 05-15-25 @ 07:22  15.62 05-14-25 @ 07:19  12.79 05-13-25 @ 07:38  8.09 05-12-25 @ 20:37  9.88 05-12-25 @ 07:13  11.35 05-11-25 @ 08:52  14.93 05-11-25 @ 07:28  10.67 05-10-25 @ 07:06  9.09 05-09-25 @ 12:46  8.67 05-09-25 @ 05:36    CMP: 05-15    139  |  104  |  13  ----------------------------<  287[H]  4.0   |  22  |  1.61[H]    Ca    8.8      15 May 2025 07:22  Phos  2.7     05-15  Mg     2.1     05-15      Creatinine: 1.61 mg/dL (05-15-25 @ 07:22)  Creatinine: 1.66 mg/dL (05-14-25 @ 07:15)  Creatinine: 1.53 mg/dL (05-13-25 @ 07:38)  Creatinine: 1.68 mg/dL (05-12-25 @ 07:13)  Creatinine: 1.59 mg/dL (05-11-25 @ 07:28)  Creatinine: 1.60 mg/dL (05-10-25 @ 07:08)  Creatinine: 1.51 mg/dL (05-09-25 @ 05:35)    Microbiology: reviewed   Culture - Wound Aerobic/Anaerobic (collected 05-12-25 @ 21:13)  Source: Surgical Swab deep wound culture right foot  Gram Stain (05-13-25 @ 01:47):    No polymorphonuclear leukocytes seen per low power field    No organisms seen per oil power field  Preliminary Report (05-13-25 @ 18:36):    No growth    Culture - Tissue with Gram Stain (collected 05-12-25 @ 21:13)  Source: Bone clean bone margin 2nd metatarsal R foot  Gram Stain (05-13-25 @ 03:42):    No polymorphonuclear cells seen per low power field    No organisms seen per oil power field  Preliminary Report (05-13-25 @ 20:18):    No growth to date.    Culture - Wound Aerobic/Anaerobic (collected 05-09-25 @ 12:57)  Source: Surgical Swab DEEP WOUND RIGHT FOOT  Final Report (05-14-25 @ 17:53):    Growth in fluid media only Methicillin Resistant Staphylococcus aureus  Organism: Methicillin resistant Staphylococcus aureus (05-14-25 @ 17:53)  Organism: Methicillin resistant Staphylococcus aureus (05-14-25 @ 17:53)      -  Clindamycin: S <=0.25      -  Oxacillin: R >2      -  Gentamicin: S <=4 Should not be used as monotherapy      -  Daptomycin: S 1      -  Linezolid: S 2      -  Vancomycin: S 1      -  Tetracycline: S <=4      Method Type: MOJGAN      -  Penicillin: R >2      -  Rifampin: S <=1 Should not be used as monotherapy      -  Erythromycin: S <=0.25      -  Trimethoprim/Sulfamethoxazole: S <=0.5/9.5    Culture - Abscess with Gram Stain (collected 05-04-25 @ 04:40)  Source: Abscess right foot  Gram Stain (05-06-25 @ 11:51):    No polymorphonuclear leukocytes seen per low power field    No organisms seen per oil power field  Final Report (05-06-25 @ 11:51):    Rare Bacteroides fragilis "Susceptibilities not performed"    Culture - Blood (collected 05-04-25 @ 02:10)  Source: Blood Blood-Peripheral  Final Report (05-09-25 @ 05:00):    No growth at 5 days    Culture - Blood (collected 05-04-25 @ 02:08)  Source: Blood Blood-Peripheral  Final Report (05-09-25 @ 05:00):    No growth at 5 days          Radiology: reviewed     Medications:  acetaminophen     Tablet .. 1000 milliGRAM(s) Oral every 6 hours  aspirin enteric coated 81 milliGRAM(s) Oral daily  atorvastatin 80 milliGRAM(s) Oral at bedtime  cadexomer iodine 0.9% Gel 1 Application(s) Topical daily  cilostazol 50 milliGRAM(s) Oral two times a day  dextrose 5%. 1000 milliLiter(s) IV Continuous <Continuous>  dextrose 5%. 1000 milliLiter(s) IV Continuous <Continuous>  dextrose 50% Injectable 25 Gram(s) IV Push once  dextrose 50% Injectable 12.5 Gram(s) IV Push once  dextrose 50% Injectable 25 Gram(s) IV Push once  dextrose Oral Gel 15 Gram(s) Oral once  doxycycline monohydrate Capsule 100 milliGRAM(s) Oral every 12 hours  famotidine    Tablet 20 milliGRAM(s) Oral once PRN  fenofibrate Tablet 145 milliGRAM(s) Oral at bedtime  glucagon  Injectable 1 milliGRAM(s) IntraMuscular once  heparin   Injectable 5000 Unit(s) SubCutaneous every 8 hours  insulin glargine Injectable (LANTUS) 16 Unit(s) SubCutaneous at bedtime  insulin lispro (ADMELOG) corrective regimen sliding scale   SubCutaneous three times a day before meals  insulin lispro (ADMELOG) corrective regimen sliding scale   SubCutaneous at bedtime  insulin lispro Injectable (ADMELOG) 14 Unit(s) SubCutaneous three times a day before meals  metoprolol tartrate 50 milliGRAM(s) Oral two times a day  oxyCODONE    IR 2.5 milliGRAM(s) Oral every 4 hours PRN  oxyCODONE    IR 5 milliGRAM(s) Oral every 4 hours PRN  ticagrelor 90 milliGRAM(s) Oral every 12 hours    Current Antimicrobials:  doxycycline monohydrate Capsule 100 milliGRAM(s) Oral every 12 hours    Prior/Completed Antimicrobials:  piperacillin/tazobactam IVPB.  piperacillin/tazobactam IVPB.-  vancomycin  IVPB

## 2025-05-15 NOTE — PROVIDER CONTACT NOTE (OTHER) - SITUATION
Blood cultures from 5/9, in deep wound in right foot were positive with growth in fluid media and positive for MRSA
Pt fingerstick 450

## 2025-05-15 NOTE — PROVIDER CONTACT NOTE (OTHER) - REASON
Pt fingerstick 450
Blood cultures from 5/9, in deep wound in right foot were positive with growth in fluid media and positive for MRSA

## 2025-05-15 NOTE — PROGRESS NOTE ADULT - SUBJECTIVE AND OBJECTIVE BOX
[facility] DIVISION OF KIDNEY DISEASE AND HYPERTENSION  [phone]    RENAL FOLLOW UP NOTE- NEPHROHOSPITALIST  --------------------------------------------------------------------------------  Patient seen and examined this morning.  Resting in bed, states he ate breakfast    PAST HISTORY  --------------------------------------------------------------------------------  No significant changes to PMH, PSH, FHx, SHx, unless otherwise noted    ALLERGIES & MEDICATIONS  --------------------------------------------------------------------------------  Allergies    No Known Allergies    Intolerances      Standing Inpatient Medications  acetaminophen     Tablet .. 1000 milliGRAM(s) Oral every 6 hours  aspirin enteric coated 81 milliGRAM(s) Oral daily  atorvastatin 80 milliGRAM(s) Oral at bedtime  cadexomer iodine 0.9% Gel 1 Application(s) Topical daily  cilostazol 50 milliGRAM(s) Oral two times a day  dextrose 5%. 1000 milliLiter(s) IV Continuous <Continuous>  dextrose 5%. 1000 milliLiter(s) IV Continuous <Continuous>  dextrose 50% Injectable 25 Gram(s) IV Push once  dextrose 50% Injectable 12.5 Gram(s) IV Push once  dextrose 50% Injectable 25 Gram(s) IV Push once  dextrose Oral Gel 15 Gram(s) Oral once  doxycycline monohydrate Capsule 100 milliGRAM(s) Oral every 12 hours  fenofibrate Tablet 145 milliGRAM(s) Oral at bedtime  glucagon  Injectable 1 milliGRAM(s) IntraMuscular once  heparin   Injectable 5000 Unit(s) SubCutaneous every 8 hours  insulin glargine Injectable (LANTUS) 16 Unit(s) SubCutaneous at bedtime  insulin lispro (ADMELOG) corrective regimen sliding scale   SubCutaneous three times a day before meals  insulin lispro (ADMELOG) corrective regimen sliding scale   SubCutaneous at bedtime  insulin lispro Injectable (ADMELOG) 14 Unit(s) SubCutaneous three times a day before meals  metoprolol tartrate 50 milliGRAM(s) Oral two times a day  ticagrelor 90 milliGRAM(s) Oral every 12 hours    PRN Inpatient Medications  famotidine    Tablet 20 milliGRAM(s) Oral once PRN  oxyCODONE    IR 2.5 milliGRAM(s) Oral every 4 hours PRN  oxyCODONE    IR 5 milliGRAM(s) Oral every 4 hours PRN      FOCUSED REVIEW OF SYSTEMS  --------------------------------------------------------------------------------  denies fevers/rigors  denies CP/palpitations  denies SOB  denies N/V/abd pain.  Last BM yday  denies oliguria/dysuria      VITALS/PHYSICAL EXAM  --------------------------------------------------------------------------------  T(C): 36.7 (05-15-25 @ 09:07), Max: 37.1 (05-15-25 @ 06:24)  HR: 99 (05-15-25 @ 09:07) (85 - 100)  BP: 107/56 (05-15-25 @ 09:07) (107/56 - 144/80)  RR: 18 (05-15-25 @ 09:07) (18 - 18)  SpO2: 98% (05-15-25 @ 09:07) (97% - 99%)  Wt(kg): --        05-14-25 @ 07:01  -  05-15-25 @ 07:00  --------------------------------------------------------  IN: 1120 mL / OUT: 2200 mL / NET: -1080 mL    05-15-25 @ 07:01  -  05-15-25 @ 11:44  --------------------------------------------------------  IN: 300 mL / OUT: 450 mL / NET: -150 mL      Physical Exam:  	Gen: NAD, lying in bed  	Pulm: CTA B/L ant/lat fields  	CV: RRR, S1S2  	Abd: +BS, soft, nontender/nondistended  	: No suprapubic tenderness.  no craig          Extremity: R foot dressing in place, no LLE edema  	Neuro: A&O x 3      LABS/STUDIES  --------------------------------------------------------------------------------              7.6    10.36 >-----------<  323      [05-15-25 @ 07:22]              24.2     139  |  104  |  13  ----------------------------<  287      [05-15-25 @ 07:22]  4.0   |  22  |  1.61        Ca     8.8     [05-15-25 @ 07:22]      Mg     2.1     [05-15-25 @ 07:22]      Phos  2.7     [05-15-25 @ 07:22]              Creatinine Trend:  SCr 1.61 [05-15 @ 07:22]  SCr 1.66 [05-14 @ 07:15]  SCr 1.53 [05-13 @ 07:38]  SCr 1.68 [05-12 @ 07:13]  SCr 1.59 [05-11 @ 07:28]              Urinalysis - [05-15-25 @ 07:22]      Color  / Appearance  / SG  / pH       Gluc 287 / Ketone   / Bili  / Urobili        Blood  / Protein  / Leuk Est  / Nitrite       RBC  / WBC  / Hyaline  / Gran  / Sq Epi  / Non Sq Epi  / Bacteria       Lipid: chol 118, , HDL 29, LDL --      [03-06-25 @ 06:59]

## 2025-05-15 NOTE — PROGRESS NOTE ADULT - NS ATTEND AMEND GEN_ALL_CORE FT
Patient care and plan discussed and reviewed with Advanced Care Provider. Plan as outlined above edited by me to reflect our discussion.   In addition, I participated in    - Ordering, reviewing, and interpreting labs, testing, and imaging.  - Reviewing prior hospitalization and outpatient records when necessary  - Counselling and educating patient and/or family regarding interpretation of aforementioned items and plan of care.  - Communicating with other health professionals (when not separately reported), and documenting clinical information in the electronic health record.

## 2025-05-15 NOTE — PROGRESS NOTE ADULT - ASSESSMENT
54 year old male with PMH of DM, CAD (s/p CABG), mild HFrEF (EF 45%), and PAD, and recent admission to [facility] with R foot wound s/p R foot Partial 2nd ray resection and 3rd proximal phalanx bone biopsy on 3/7/25 (Cx + for E. Faecalis and Staph Epi requiring prolonged abx course s/p PICC, clean margin with no residual OM), now presenting with right foot wound. Patient reports 1 week of subjective fevers, chills, hypothermia, and R foot lateral toe oozing and erythema c/f soft tissue infection. Endocrine consulted for uncontrolled T2DM and hyperglycemia (High risk patient with severely uncontrolled diabetes with A1c of 11.2% at high risk of CAD and CVA with high medical complexity and high level decision-making). Patient reports eating full meals and tolerating POs, s/p OR yesterday 5/12. FBG elevated however patient ate a snack before fasting BG checked and also ate a late dinner from OSH food last night -> given low FBG to 105mg/dL yesterday, will keep Lantus at 16u QHS for now. Patient pending discharge to rehab today, will need insulin adjustments while there. Given tight postprandial BGs, will lower mealtime insulin to prevent hypoglycemia. Patient states he would like to f/u with his Endocrinologist Dr. Mcdaniel outpatient. He states he knows how to use insulin pen however significant insulin doses different inpatient vs outpatient, RN to review insulin pen injection with patient to make sure patient doing right prior to d./c. Endocrine will closely monitor BG and adjust insulin as needed for BG goal 100-180mg/dL inpatient.       #Uncontrolled T2DM  A1C with Estimated Average Glucose Result: 10.1 % (05-05-25 @ 06:47)  A1C with Estimated Average Glucose Result: >15.5 % (03-04-25 @ 23:12)  Home reg: Lantus 52u QHS, Humalog 20u TID AC -> discharged in march on Lantus 30u QHS and Admelog 14u TID AC (not adherent)   Endocrinologist: none -> follows with internist/PCP Dr. Mcdaniel in [place]?  *an appt [facility] be [facility] with [facility] and [facility] with [facility] endocrine*    *only has Medicare and Elder plan via Medicare

## 2025-05-15 NOTE — PROGRESS NOTE ADULT - PROVIDER SPECIALTY LIST ADULT
Cardiology
Endocrinology
Infectious Disease
Nephrology
Nephrology
Podiatry
Vascular Surgery
Cardiology
Endocrinology
Infectious Disease
Internal Medicine
Podiatry
Vascular Surgery
Cardiology
Infectious Disease
Infectious Disease
Nephrology
Podiatry
Vascular Surgery
Endocrinology
Infectious Disease
Podiatry
Podiatry
Internal Medicine
Vascular Surgery
Endocrinology
Nephrology
Nephrology
Endocrinology
Endocrinology

## 2025-05-15 NOTE — PROVIDER CONTACT NOTE (OTHER) - ACTION/TREATMENT ORDERED:
No further interventions at this time.
KYLEE Craig made aware, give patient sliding scale and premeal insulin and recheck fingerstick in 15 min.

## 2025-05-15 NOTE — PROGRESS NOTE ADULT - NS ATTEND OPT1 GEN_ALL_CORE
I attest my time as attending is greater than 50% of the total combined time spent on qualifying patient care activities by the PA/NP and attending.
I independently performed the documented:
I attest my time as attending is greater than 50% of the total combined time spent on qualifying patient care activities by the PA/NP and attending.
I independently performed the documented:
I attest my time as attending is greater than 50% of the total combined time spent on qualifying patient care activities by the PA/NP and attending.
I independently performed the documented:

## 2025-05-15 NOTE — PROGRESS NOTE ADULT - ASSESSMENT
54M with PMH of HTN, HLD, DM2, CAD s/p CABG, HF, and recent admission to [facility] for toe amputation and found to be bacteremic, now presents to the ED with right foot wound. Nephrology consulted for AURORA.

## 2025-05-15 NOTE — PROGRESS NOTE ADULT - NSPROGADDITIONALINFOA_GEN_ALL_CORE
Please do not hesitate to TEAMS Dr. Tan if further input needed during the day.  For questions Weekdays after 5PM and on weekends, please page the Renal Fellow on call.
Contact via Microsoft Teams during business hours  To reach covering provider access AMION via sunrise tools  For Urgent matters/after-hours/weekends/holidays please page endocrine fellow on call   For nonurgent matters please email [email or web address]    Please note that this patient may be followed by different provider tomorrow.  Notify endocrine 24 hours prior to discharge for final recommendations
Please do not hesitate to TEAMS Dr. Tan if further input needed during the day.  For questions Weekdays after 5PM and on weekends, please page the Renal Fellow on call.
Please do not hesitate to TEAMS Dr. Tan if further input needed during the day.  For questions Weekdays after 5PM and on weekends, please page the Renal Fellow on call.
Contact via Microsoft Teams during business hours  To reach covering provider access AMION via sunrise tools  For Urgent matters/after-hours/weekends/holidays please page endocrine fellow on call   For nonurgent matters please email [email or web address]    Please note that this patient may be followed by different provider tomorrow.  Notify endocrine 24 hours prior to discharge for final recommendations
Please do not hesitate to TEAMS Dr. Tan if further input needed during the day.  For questions Weekdays after 5PM and on weekends, please page the Renal Fellow on call.
Please do not hesitate to TEAMS Dr. Tan if further input needed during the day.  For questions Weekdays after 5PM and on weekends, please page the Renal Fellow on call.
Please do not hesitate to TEAMS Dr. Tan if further input needed during the day.  For questions Weekdays after 5PM and on weekends, please page the Renal Fellow on call.    Patient will need follow-up with [facility] Division of Kidney Disease and Hypertension s/p rehab stay to establish care with one of my colleagues (I do not see patients in the office).  Please included below contact information in discharge summary, and office will be contacted to facilitate follow-up once discharge date finalized:    [facility] DIVISION OF KIDNEY DISEASE AND HYPERTENSION    [address] [place] [address]    [phone]
Please do not hesitate to TEAMS Dr. Tan if further input needed during the day.  For questions Weekdays after 5PM and on weekends, please page the Renal Fellow on call.
Contact via Microsoft Teams during business hours  To reach covering provider access AMION via sunrise tools  For Urgent matters/after-hours/weekends/holidays please page endocrine fellow on call   For nonurgent matters please email [email or web address]    Please note that this patient may be followed by different provider tomorrow.  Notify endocrine 24 hours prior to discharge for final recommendations
Contact via Microsoft Teams during business hours  To reach covering provider access AMION via sunrise tools  For Urgent matters/after-hours/weekends/holidays please page endocrine fellow on call   For nonurgent matters please email [email or web address]    Please note that this patient may be followed by different provider tomorrow.  Notify endocrine 24 hours prior to discharge for final recommendations
Contact via Microsoft Teams during business hours  To reach covering provider access AMION via sunrise tools  For Urgent matters/after-hours/weekends/holidays please page endocrine fellow on call   For nonurgent matters please email [email or web address]    Please note that this patient may be followed by different provider tomorrow.  Notify endocrine 24 hours prior to discharge for final recommendations
patient is medically optimized for RLE angiogram.   RCRI Score 3 15.0 % Risk of major cardiac event        medicine to sign off, please call back with questions.

## 2025-05-15 NOTE — PROGRESS NOTE ADULT - NUTRITIONAL ASSESSMENT
Diet, Consistent Carbohydrate w/Evening Snack (05-09-25 @ 12:33) [Active]
Diet, Consistent Carbohydrate w/Evening Snack (05-12-25 @ 20:17) [Active]
Diet, Consistent Carbohydrate w/Evening Snack:   Halal (05-14-25 @ 19:08) [Active]
Diet, Consistent Carbohydrate w/Evening Snack (05-12-25 @ 20:17) [Active]
Diet, NPO after Midnight:      NPO Start Date: 06-May-2025,   NPO Start Time: 23:59  Except Medications (05-06-25 @ 11:00) [Active]  Diet, Consistent Carbohydrate w/Evening Snack:   Halal (05-05-25 @ 18:23) [Active]
Diet, Consistent Carbohydrate w/Evening Snack (05-09-25 @ 12:33) [Active]

## 2025-05-15 NOTE — PROGRESS NOTE ADULT - PROBLEM SELECTOR PROBLEM 1
AURORA (acute kidney injury)
Arterial insufficiency with ischemic ulcer
Arterial insufficiency with ischemic ulcer
Uncontrolled type 2 diabetes mellitus with hyperglycemia
AURORA (acute kidney injury)
Arterial insufficiency with ischemic ulcer
Uncontrolled type 2 diabetes mellitus with hyperglycemia
AURORA (acute kidney injury)
Arterial insufficiency with ischemic ulcer
Uncontrolled type 2 diabetes mellitus with hyperglycemia
Uncontrolled type 2 diabetes mellitus with hyperglycemia
Wound of right foot
Uncontrolled type 2 diabetes mellitus with hyperglycemia
Uncontrolled type 2 diabetes mellitus with hyperglycemia
Wound of right foot

## 2025-05-15 NOTE — PROGRESS NOTE ADULT - SUBJECTIVE AND OBJECTIVE BOX
Patient seen today for follow up inpatient Diabetes Mellitus management.    Chief Complaint: Type 2 Diabetes Mellitus     INTERVAL HX:  Patient seen in [facility] 9MON 925 D1. Patient is alert and oriented, resting in bed. Patient reports feeling good, eating full meals and tolerating POs. FBG elevated this am to 316mg/dL, 287mg/dL -> patient reports he ate dinner from home (wife brought in) late last night chicken/rice, also states he had a snack before fasting BG was checked this am, otherwise BGs have been mostly stable in the last 24hrs or on lower side postprandial. No hypoglycemia however near hypoglycemia last evening to 79mg/dL at bedtime, tight postprandial BGs noted. Patient leaving for rehab today at 5pm. Blood glucose levels in the last 24hrs have been 79-316mg/dL.     Review of Systems:  General: As above.  Respiratory: Denies any SOB, CAGE, or cough.  Gastrointestinal: Denies any n/v/d or abdominal pain.   Endocrine: Denies any polyuria, polydipsia, polyphagia, visual changes, or numbness in feet.     Allergies  No Known Allergies      Intolerances  None.       MEDICATIONS  (STANDING):  acetaminophen     Tablet .. 1000 milliGRAM(s) Oral every 6 hours  aspirin enteric coated 81 milliGRAM(s) Oral daily  atorvastatin 80 milliGRAM(s) Oral at bedtime  cadexomer iodine 0.9% Gel 1 Application(s) Topical daily  cilostazol 50 milliGRAM(s) Oral two times a day  dextrose 5%. 1000 milliLiter(s) IV Continuous <Continuous>  dextrose 5%. 1000 milliLiter(s) IV Continuous <Continuous>  dextrose 50% Injectable 25 Gram(s) IV Push once  dextrose 50% Injectable 12.5 Gram(s) IV Push once  dextrose 50% Injectable 25 Gram(s) IV Push once  dextrose Oral Gel 15 Gram(s) Oral once  doxycycline monohydrate Capsule 100 milliGRAM(s) Oral every 12 hours  famotidine    Tablet 20 milliGRAM(s) Oral once PRN  fenofibrate Tablet 145 milliGRAM(s) Oral at bedtime  glucagon  Injectable 1 milliGRAM(s) IntraMuscular once  heparin   Injectable 5000 Unit(s) SubCutaneous every 8 hours  insulin glargine Injectable (LANTUS) 16 Unit(s) SubCutaneous at bedtime  insulin lispro (ADMELOG) corrective regimen sliding scale   SubCutaneous three times a day before meals  insulin lispro (ADMELOG) corrective regimen sliding scale   SubCutaneous at bedtime  insulin lispro Injectable (ADMELOG) 14 Unit(s) SubCutaneous three times a day before meals  metoprolol tartrate 50 milliGRAM(s) Oral two times a day  oxyCODONE    IR 2.5 milliGRAM(s) Oral every 4 hours PRN  oxyCODONE    IR 5 milliGRAM(s) Oral every 4 hours PRN  ticagrelor 90 milliGRAM(s) Oral every 12 hours      atorvastatin 80 milliGRAM(s) Oral at bedtime  dextrose 50% Injectable 25 Gram(s) IV Push once  dextrose 50% Injectable 12.5 Gram(s) IV Push once  dextrose 50% Injectable 25 Gram(s) IV Push once  dextrose Oral Gel 15 Gram(s) Oral once  fenofibrate Tablet 145 milliGRAM(s) Oral at bedtime  glucagon  Injectable 1 milliGRAM(s) IntraMuscular once  insulin glargine Injectable (LANTUS) 16 Unit(s) SubCutaneous at bedtime  insulin lispro (ADMELOG) corrective regimen sliding scale   SubCutaneous three times a day before meals  insulin lispro (ADMELOG) corrective regimen sliding scale   SubCutaneous at bedtime  insulin lispro Injectable (ADMELOG) 14 Unit(s) SubCutaneous three times a day before meals      insulin lispro (ADMELOG) corrective regimen sliding scale   SubCutaneous three times a day before meals  insulin lispro (ADMELOG) corrective regimen sliding scale   SubCutaneous at bedtime  insulin lispro Injectable (ADMELOG) 14 Unit(s) SubCutaneous three times a day before meals      PHYSICAL EXAM:  VITALS:   T(C): 36.7 (05-15-25 @ 09:07), Max: 37.1 (05-15-25 @ 06:24)  HR: 99 (05-15-25 @ 09:07) (78 - 100)  BP: 107/56 (05-15-25 @ 09:07) (107/56 - 144/80)  RR: 18 (05-15-25 @ 09:07) (18 - 18)  SpO2: 98% (05-15-25 @ 09:07) (97% - 99%)    GENERAL: In no acute distress  Respiratory: Respirations unlabored  Extremities: Warm and dry, no edema  NEURO: Alert and oriented, appropriate     LABS:  POCT Blood Glucose.: 316 mg/dL (05-15-25 @ 09:08)  POCT Blood Glucose.: 91 mg/dL (05-14-25 @ 22:17)  POCT Blood Glucose.: 79 mg/dL (05-14-25 @ 21:46)  POCT Blood Glucose.: 85 mg/dL (05-14-25 @ 18:14)  POCT Blood Glucose.: 83 mg/dL (05-14-25 @ 12:44)  POCT Blood Glucose.: 105 mg/dL (05-14-25 @ 09:44)  POCT Blood Glucose.: 112 mg/dL (05-13-25 @ 21:39)  POCT Blood Glucose.: 135 mg/dL (05-13-25 @ 17:45)  POCT Blood Glucose.: 290 mg/dL (05-13-25 @ 13:12)  POCT Blood Glucose.: 392 mg/dL (05-13-25 @ 10:19)  POCT Blood Glucose.: 435 mg/dL (05-13-25 @ 09:21)  POCT Blood Glucose.: 438 mg/dL (05-13-25 @ 08:37)  POCT Blood Glucose.: 450 mg/dL (05-13-25 @ 08:36)  POCT Blood Glucose.: 119 mg/dL (05-12-25 @ 21:31)  POCT Blood Glucose.: 124 mg/dL (05-12-25 @ 20:07)  POCT Blood Glucose.: 113 mg/dL (05-12-25 @ 16:26)  POCT Blood Glucose.: 153 mg/dL (05-12-25 @ 14:51)                          7.6    10.36 )-----------( 323      ( 15 May 2025 07:22 )             24.2     05-15    139  |  104  |  13  ----------------------------<  287[H]  4.0   |  22  |  1.61[H]    Ca    8.8      15 May 2025 07:22  Phos  2.7     05-15  Mg     2.1     05-15        Urinalysis Basic - ( 15 May 2025 07:22 )    Color: x / Appearance: x / SG: x / pH: x  Gluc: 287 mg/dL / Ketone: x  / Bili: x / Urobili: x   Blood: x / Protein: x / Nitrite: x   Leuk Esterase: x / RBC: x / WBC x   Sq Epi: x / Non Sq Epi: x / Bacteria: x        Culture - Tissue with Gram Stain (collected 12 May 2025 21:13)  Source: Bone clean bone margin 2nd metatarsal R foot  Gram Stain (13 May 2025 03:42):    No polymorphonuclear cells seen per low power field    No organisms seen per oil power field  Preliminary Report (13 May 2025 20:18):    No growth to date.    Culture - Wound Aerobic/Anaerobic (collected 12 May 2025 21:13)  Source: Surgical Swab deep wound culture right foot  Gram Stain (13 May 2025 01:47):    No polymorphonuclear leukocytes seen per low power field    No organisms seen per oil power field  Preliminary Report (13 May 2025 18:36):    No growth        A1C with Estimated Average Glucose Result: A1C with Estimated Average Glucose Result: 10.1 % (05-05-25 @ 06:47)  A1C with Estimated Average Glucose Result: >15.5 % (03-04-25 @ 23:12)

## 2025-05-15 NOTE — PROGRESS NOTE ADULT - PROBLEM SELECTOR PLAN 1
AURORA likely in the setting of recent Entresto initiation. On review of labs on [place] HIE, Scr was approximately ~0.8. During recent admission at [facility] (3/5/25 to 3/11/25) Scr was 0.8 on discharge (3/10/25). Patient was started on Entresto during that admission. Last outpatient Scr was elevated at 1.55 on 4/4/25.     -creatinine rising over weekend to 1.68, fluctuating post op but now downtrending to 1.61 today; patient had episode of hypotension noted overnight two nights ago and states he was slightly symptomatic at time    UA noted, UPCR 0.3, patient w/ h/o DM  -likely that transient worsening azotemia is from hemodynamic lability but patient not in ATN  -s/p R TMA 05/12 for definitive management of OM, continue abx per ID  -would continue holding Entresto upon discharge  -patient remains nonoliguric    -continue strict I/O please    -no renal objection to d/c planning

## 2025-05-15 NOTE — PROGRESS NOTE ADULT - SUBJECTIVE AND OBJECTIVE BOX
DATE OF SERVICE: 05-15-25 @ 13:39    Patient is a 54y old  Male who presents with a chief complaint of Angiogram (15 May 2025 11:43)      INTERVAL HISTORY: Feels ok.    REVIEW OF SYSTEMS:  CONSTITUTIONAL: No weakness  EYES/ENT: No visual changes;  No throat pain   NECK: No pain or stiffness  RESPIRATORY: No cough, wheezing; No shortness of breath  CARDIOVASCULAR: No chest pain or palpitations  GASTROINTESTINAL: No abdominal  pain. No nausea, vomiting, or hematemesis  GENITOURINARY: No dysuria, frequency or hematuria  NEUROLOGICAL: No stroke like symptoms  SKIN: No rashes    	  MEDICATIONS:  metoprolol tartrate 50 milliGRAM(s) Oral two times a day        PHYSICAL EXAM:  T(C): 36.7 (05-15-25 @ 09:07), Max: 37.1 (05-15-25 @ 06:24)  HR: 99 (05-15-25 @ 09:07) (88 - 100)  BP: 107/56 (05-15-25 @ 09:07) (107/56 - 144/80)  RR: 18 (05-15-25 @ 09:07) (18 - 18)  SpO2: 98% (05-15-25 @ 09:07) (97% - 99%)  Wt(kg): --  I&O's Summary    14 May 2025 07:01  -  15 May 2025 07:00  --------------------------------------------------------  IN: 1120 mL / OUT: 2200 mL / NET: -1080 mL    15 May 2025 07:01  -  15 May 2025 13:39  --------------------------------------------------------  IN: 300 mL / OUT: 450 mL / NET: -150 mL          Appearance: In no distress	  HEENT:    PERRL, EOMI	  Cardiovascular:  S1 S2, No JVD  Respiratory: Lungs clear to auscultation	  Gastrointestinal:  Soft, Non-tender, + BS	  Vascularature:  No edema of LE  Psychiatric: Appropriate affect   Neuro: no acute focal deficits                               7.6    10.36 )-----------( 323      ( 15 May 2025 07:22 )             24.2     05-15    139  |  104  |  13  ----------------------------<  287[H]  4.0   |  22  |  1.61[H]    Ca    8.8      15 May 2025 07:22  Phos  2.7     05-15  Mg     2.1     05-15          Labs personally reviewed      ASSESSMENT/PLAN: 	    54M hx DM, CAD (s/p CABG), mild HFrEF (EF 45%), and PAD (with recent angiogram Aug. 2024), and recent admission to [facility] w/ right foot wound s/p right foot Partial 2nd ray resection and 3rd proximal phalanx bone biopsy on 3/7/25 (Cx + for E. Faecalis and Staph Epi requiring prolonged abx course s/p PICC), now presenting with right foot wound.      Problem/Plan - 1:  ·  Problem: Hypertension.   ·  Plan: hold entresto 2/2 to AURORA  c/w lopressor 50mg BID     Problem/Plan - 2:  ·  Problem: CAD (coronary artery disease).   ·  Plan: c/w aspirin, brilinta, statin  TTE 3/6/25 with EF 55%.  Rest NM stress images March 2025 with medium-sized, severe defect(s) in the lateral and inferolateral walls. Stress portion not performed      Problem/Plan - 3:  ·  Problem: AURORA (acute kidney injury).   ·  Plan:  hold entresto for now  baseline 1s, now rising 1.4, c/w IVF   - 5/7 creat 1.49, appreciate nephro recs      Problem/Plan - 4:  ·  Problem: Preop risk stratification   - RLE angiogram with Dr. Youssef on Thurs, 5/8/25 pending improved AURORA. c/w Trental 400mg TID for PAD  - R 1st and 2nd ray resection thurs 5/8. Continue local wound care for right foot wounds, podiatry following  ·  Plan: Elevated risk for low risk peripheral angio +/- angioplasty  - Tolerated well           Shana Pizano, AG-NP   Edwin Dwyer,  [facility]  Cardiovascular Medicine  [address], [address]  Available through call or text on [employer] TEAMs  Office: [phone]

## 2025-05-15 NOTE — PROGRESS NOTE ADULT - ASSESSMENT
Patient is a 54 year old male with PMH of DM, CAD (s/p CABG), mild HFrEF (EF 45%), and PAD (with recent angiogram Aug. 2024), and recent admission to [facility] with R foot wound s/p R foot Partial 2nd ray resection and 3rd proximal phalanx bone biopsy on 3/7/25 (Cx + for E. Faecalis and Staph Epi requiring prolonged abx course s/p PICC, clean margin with no residual OM), now presenting with right foot wound. Patient reports 1 week of subjective fevers, chills, hypothermia, and R foot lateral toe oozing and erythema c/f soft tissue infection.    R foot wound infection with 1st MPJ fluid collection, 1st and 2nd toe OM  - 5/4 s/p R foot I&D to level of subQ - scant, purulent drainage, no erythema, no malodor   - R foot abscess culture with Bacteroides fragilis   - R foot xray with no gas or OM  - R foot MRI with soft tissue wounds about distal forefoot with small volume of ill defined peripheral enhancing fluid preferentially surrounding the 1st MPJ fluid collection, residual second metatarsal and 1st proximal phalanx OM, possible reactive vs early OM of 3rd metatarsal  - Bcx NGTD x2    - 5/8 s/p RLE angiogram with vascular   5/9 s/p R foot Partial 1st ray resection and incision and drainage   - noted high concern for residual bone infection, necrotic soft tissue and bone noted laterally, low concern for viability   - deep wound R foot culture with Staph aureus - MRSA- in fluid media only   5/12 s/p R TMA and tendoachilles lengthening   - noted low concern for residual soft tissue and bone infection, high concern for viability sec to vascular status    - R foot 2nd metatarsal clean margin and deep wound cultures now with NGTD  leukocytosis noted uptrend-suspect likely reactive-normalized, remains afebrile, nontoxic     s/p zosyn 5/4-5/14   s/p vancomycin 5/4     Recommendations:   Follow 5/12 OR cultures - NGTD  Follow surg path reports   Podiatry and Vascular following  Continue doxycycline 100mg PO for 7d course until 5/20  Continue local wound care   Monitor temps/WBC  Continue rest of care per primary team       D/w Vascular PA Jann Vidal M.D.  [facility]  Available on [employer] TEAMS - *PREFERRED*  [phone]  After 5pm on weekdays and all day on weekends - please call [phone]     Thank you for consulting us and involving us in the management of this patients case. In addition to reviewing history, imaging, documents, labs, microbiology, took into account antibiotic stewardship, local antibiogram and infection control strategies and potential transmission issues at time of treatment decision making process.

## 2025-05-15 NOTE — DISCHARGE NOTE NURSING/CASE MANAGEMENT/SOCIAL WORK - FINANCIAL ASSISTANCE
[facility] provides services at a reduced cost to those who are determined to be eligible through [facility]’s financial assistance program. Information regarding [facility]’s financial assistance program can be found by going to [email or web address] or by calling [phone].

## 2025-05-15 NOTE — PROVIDER CONTACT NOTE (OTHER) - ASSESSMENT
PT is A&Ox4,VSS. Denies sob/chest pain/n/v.
Pt A&Ox4, VSS. Pt has not eat anything since dinner last night at approximately 9pm. Pt finger stick was 450 and repeat was 438. Pt has not gotten his breakfast try yet.

## 2025-05-15 NOTE — PROGRESS NOTE ADULT - SUBJECTIVE AND OBJECTIVE BOX
SURGERY DAILY PROGRESS NOTE    24 Hour/Overnight Events:   - Transitioned from Zosyn to doxycycline    SUBJECTIVE: Patient seen and evaluated on AM rounds. He reports well-controlled pain. No other complaints    ------------------------------------------------------------------------------------------------------------  OBJECTIVE:  Vital Signs Last 24 Hrs  T(C): 37.1 (15 May 2025 06:24), Max: 37.1 (15 May 2025 06:24)  T(F): 98.7 (15 May 2025 06:24), Max: 98.7 (15 May 2025 06:24)  HR: 100 (15 May 2025 06:24) (78 - 100)  BP: 136/80 (15 May 2025 06:24) (109/67 - 144/80)  BP(mean): --  RR: 18 (15 May 2025 06:24) (18 - 18)  SpO2: 98% (15 May 2025 06:24) (97% - 99%)    Parameters below as of 15 May 2025 06:24  Patient On (Oxygen Delivery Method): room air      I&O's Detail    14 May 2025 07:01  -  15 May 2025 07:00  --------------------------------------------------------  IN:    Oral Fluid: 1120 mL  Total IN: 1120 mL    OUT:    Voided (mL): 2200 mL  Total OUT: 2200 mL    Total NET: -1080 mL          PHYSICAL EXAM:  Constitutional: Well developed, well-nourished, appropriately interactive in no acute distress  Chest: Symmetric chest rise bilaterally, normal work of breathing on room air  Abdomen: Soft, nondistended  Extremities: Right foot dressing is clean and dry. Bilateral hip flexion/extension 5/5.    LABS:                        8.0    15.62 )-----------( 329      ( 14 May 2025 07:19 )             25.1     05-14    142  |  107  |  13  ----------------------------<  82  3.8   |  20[L]  |  1.66[H]    Ca    8.9      14 May 2025 07:15  Phos  3.0     05-14  Mg     2.0     05-14

## 2025-05-15 NOTE — PROGRESS NOTE ADULT - ASSESSMENT
54M hx DM, CAD (s/p CABG), mild HFrEF (EF 45%), and PAD (with recent dx angiogram Aug. 2024), and recent admission to [facility] w/ right foot wound s/p right foot partial 2nd ray resection and 3rd proximal phalanx bone biopsy on 3/7/25 (Cx + for E. Faecalis and Staph Epi requiring prolonged antibioics), now presenting with multiple non-healing right foot wounds. He is s/p diagnostic RLE angiogram 5/8 and R partial 1st ray resection and I&D with podiatry on 5/10 demonstrating adequate bleeding and low concern for active infection. OR cultures 5/10 speciated with MRSA. He is now s/p right TMA on 5/12/25 with low concern for residual infection and high concern for viability.    PLAN  - Right foot dressing per podiatry  - Doxycycline PO for right foot infection. s/p Zosyn. OR wound culture 5/10 speciated with MRSA in fluid culture only, bone margins from 5/12 NGTD. Appreciate ID recs  - Pain: Tylenol, Oxy 2.5/5 PRN  - Diet: CC   - Lantus, pre-meal insulin, and SSI per endocrine, appreciate recs.   - Pletal  - Continue home ASA/Brilinta, statin/fibrate, metoprolol. Appreciate medicine recs  - DVT ppx: SQH  - Dispo: [facility] per PT    Vascular Surgery  [id number].

## 2025-05-15 NOTE — DISCHARGE NOTE NURSING/CASE MANAGEMENT/SOCIAL WORK - PATIENT PORTAL LINK FT
You can access the FollowMyHealth Patient Portal offered by [facility] by registering at the following website: [email or web address]. By joining [employer]’s FollowMyHealth portal, you will also be able to view your health information using other applications (apps) compatible with our system.

## 2025-05-15 NOTE — PROGRESS NOTE ADULT - PROBLEM SELECTOR PLAN 1
Inpatient Plan:  - Check BG TID AC, HS, and 2AM while on PO diet  - C/w Lantus 16u QHS  - Adjust Admelog to 14u TID AC (HOLD if NPO)  - C/w low dose Admelog correctional scales TID AC, HS, and 2AM  - RN to review insulin pen injections with patient to make sure patient doing it properly upon discharge     Discharge Plan:  - Discharge to rehab on Lantus 16u QHS and Admelog 14u with meals (hold admleog if skipping meal). Patient will need insulin adjustments while in rehab.   - Patient should check BG TID AC and HS at rehab and home. Please tell patient to contact Endocrinologist if BG <70mg/dL x1 or >200mg/dL consistently or >400mg/dL x1.  - Patient to f/u with his Endocrinologist Dr. Mcdaniel upon discharge from rehab.   - Given elevated BHOB of 3 w/ A1c >15.5% on last admission, patient to should have r/o T1DM/SUMIT labs checked.   - Recommend routine outpatient ophthalmology and podiatry follow up.  - Patient will need RX for basal insulin pen (ie. Basaglar, Lantus, Tresiba, Toujeo) and bolus insulin pen (ie. humalog/novolog/admelog) depending on insurance coverage; please send test scripts to see which is covered. Please make sure patient has all diabetes supplies on discharge (glucometer, test strips, lancets, alcohol swabs, insulin pen needles). Please write RX for glucose tablets/gel> use as directed plus Glucagon nasal/ IM injection (use as directed)> Can prescribe any covered by pt's insurance.

## 2025-05-15 NOTE — PROGRESS NOTE ADULT - REASON FOR ADMISSION
Angiogram

## 2025-05-16 LAB — ZINC TRANSPORTER 8 AB, RESULT: <15 U/ML — SIGNIFICANT CHANGE UP

## 2025-05-17 LAB
CULTURE RESULTS: SIGNIFICANT CHANGE UP
SPECIMEN SOURCE: SIGNIFICANT CHANGE UP

## 2025-05-20 LAB — SURGICAL PATHOLOGY STUDY: SIGNIFICANT CHANGE UP

## 2025-05-27 ENCOUNTER — APPOINTMENT (OUTPATIENT)
Dept: VASCULAR SURGERY | Facility: CLINIC | Age: 55
End: 2025-05-27
Payer: MEDICARE

## 2025-05-27 VITALS
HEART RATE: 82 BPM | SYSTOLIC BLOOD PRESSURE: 101 MMHG | WEIGHT: 145 LBS | TEMPERATURE: 97.7 F | HEIGHT: 67 IN | DIASTOLIC BLOOD PRESSURE: 66 MMHG | BODY MASS INDEX: 22.76 KG/M2

## 2025-05-27 DIAGNOSIS — I77.1 STRICTURE OF ARTERY: ICD-10-CM

## 2025-05-27 DIAGNOSIS — L98.499 STRICTURE OF ARTERY: ICD-10-CM

## 2025-05-27 PROCEDURE — 99213 OFFICE O/P EST LOW 20 MIN: CPT

## 2025-06-23 ENCOUNTER — APPOINTMENT (OUTPATIENT)
Dept: CARDIOLOGY | Facility: CLINIC | Age: 55
End: 2025-06-23
Payer: MEDICARE

## 2025-06-23 VITALS — OXYGEN SATURATION: 98 % | HEART RATE: 106 BPM | DIASTOLIC BLOOD PRESSURE: 64 MMHG | SYSTOLIC BLOOD PRESSURE: 100 MMHG

## 2025-06-23 PROCEDURE — 99214 OFFICE O/P EST MOD 30 MIN: CPT

## 2025-06-23 PROCEDURE — G2211 COMPLEX E/M VISIT ADD ON: CPT

## 2025-06-23 PROCEDURE — 93000 ELECTROCARDIOGRAM COMPLETE: CPT

## 2025-06-24 RX ORDER — LOPERAMIDE HYDROCHLORIDE 2 MG/1
2 CAPSULE ORAL
Qty: 120 | Refills: 0 | Status: ACTIVE | COMMUNITY
Start: 2025-06-12

## 2025-06-24 RX ORDER — TICAGRELOR 90 MG/1
90 TABLET ORAL TWICE DAILY
Qty: 180 | Refills: 2 | Status: ACTIVE | COMMUNITY
Start: 2025-06-24

## 2025-06-24 RX ORDER — INSULIN ASPART 100 [IU]/ML
100 INJECTION, SOLUTION INTRAVENOUS; SUBCUTANEOUS 3 TIMES DAILY
Refills: 0 | Status: ACTIVE | COMMUNITY
Start: 2025-02-10

## 2025-06-24 RX ORDER — METOPROLOL TARTRATE 50 MG/1
50 TABLET ORAL
Qty: 60 | Refills: 6 | Status: ACTIVE | COMMUNITY
Start: 2025-06-12

## 2025-06-24 RX ORDER — FAMOTIDINE 20 MG/1
20 TABLET, FILM COATED ORAL
Qty: 30 | Refills: 0 | Status: ACTIVE | COMMUNITY
Start: 2025-06-12

## 2025-07-29 ENCOUNTER — APPOINTMENT (OUTPATIENT)
Dept: VASCULAR SURGERY | Facility: CLINIC | Age: 55
End: 2025-07-29

## 2025-09-01 ENCOUNTER — RESULT REVIEW (OUTPATIENT)
Age: 55
End: 2025-09-01

## 2025-09-05 ENCOUNTER — RESULT REVIEW (OUTPATIENT)
Age: 55
End: 2025-09-05

## 2025-09-09 ENCOUNTER — RESULT REVIEW (OUTPATIENT)
Age: 55
End: 2025-09-09

## 2025-09-13 ENCOUNTER — TRANSCRIPTION ENCOUNTER (OUTPATIENT)
Age: 55
End: 2025-09-13

## (undated) DEVICE — STAPLER SKIN VISI-STAT 35 WIDE

## (undated) DEVICE — WARMING BLANKET UPPER ADULT

## (undated) DEVICE — DRAPE MAGNETIC INSTRUMENT MEDIUM

## (undated) DEVICE — PACK EXTREMITY

## (undated) DEVICE — BUR STRYKER OVAL SOLID CARBIDE MED 4MM

## (undated) DEVICE — DRSG STERISTRIPS 0.5 X 4"

## (undated) DEVICE — PREP BETADINE KIT

## (undated) DEVICE — DRSG KLING 4"

## (undated) DEVICE — BLADE SCALPEL SAFETYLOCK #15

## (undated) DEVICE — GLV 8 PROTEXIS (WHITE)

## (undated) DEVICE — DRAPE 1/2 SHEET 40X57"

## (undated) DEVICE — SOL IRR POUR NS 0.9% 500ML

## (undated) DEVICE — DRAPE 3/4 SHEET W REINFORCEMENT 56X77"

## (undated) DEVICE — SAW BLADE MICROAIRE OSCILATING 25.4MM X 90MM X 1.27MM

## (undated) DEVICE — DRAPE ISOLATION BAG 20X20"

## (undated) DEVICE — PACKING GAUZE IODOFORM 2"

## (undated) DEVICE — DRSG XEROFORM 1 X 8"

## (undated) DEVICE — MEDICATION LABELS W MARKER

## (undated) DEVICE — SAW BLADE MICROAIRE SAGITTAL 9.4MMX25.4MMX0.6MM

## (undated) DEVICE — DRSG STOCKINETTE IMPERVIOUS MED

## (undated) DEVICE — SPECIMEN CONTAINER 100ML

## (undated) DEVICE — DRAIN PENROSE .25" X 12" SILICONE

## (undated) DEVICE — SUT PLAIN GUT 4-0 18" P-12

## (undated) DEVICE — SUT POLYSORB 2-0 30" GS-21 UNDYED

## (undated) DEVICE — DRSG ADAPTIC CURITY OIL EMULSION 3 X 8"

## (undated) DEVICE — SYR LUER LOK 10CC

## (undated) DEVICE — DRAPE TOWEL BLUE 17" X 24"

## (undated) DEVICE — LAP PAD 18 X 18"

## (undated) DEVICE — DRSG CURITY GAUZE SPONGE 4 X 4" 12-PLY

## (undated) DEVICE — DRSG KERLIX ROLL LG 4.5"

## (undated) DEVICE — DRAPE INSTRUMENT POUCH 6.75" X 11"

## (undated) DEVICE — PACKING GAUZE PLAIN 0.5"

## (undated) DEVICE — STRYKER PULSE LAVAGE WITH HIGH FLOW TIP

## (undated) DEVICE — DRSG COMBINE 5X9"

## (undated) DEVICE — MARKING PEN W RULER

## (undated) DEVICE — VENODYNE/SCD SLEEVE CALF MEDIUM

## (undated) DEVICE — GLV 7.5 PROTEXIS (WHITE)

## (undated) DEVICE — SAW BLADE MICROAIRE SAGITTAL 5.8X25.4X0.6 MM

## (undated) DEVICE — DRSG COMBINE 5 X 9"

## (undated) DEVICE — FOLEY TRAY 16FR 5CC LTX UMETER CLOSED

## (undated) DEVICE — POSITIONER FOAM EGG CRATE ULNAR 2PCS (PINK)

## (undated) DEVICE — DRAPE LIGHT HANDLE COVER (BLUE)

## (undated) DEVICE — DRSG STOCKINETTE IMPERVIOUS XL 12 X 48"

## (undated) DEVICE — SOL IRR POUR H2O 250ML

## (undated) DEVICE — GLV 6.5 PROTEXIS (WHITE)

## (undated) DEVICE — DRSG STOCKINETTE IMPERVIOUS XL

## (undated) DEVICE — STRYKER INTERPULSE HANDPIECE W IRR SUCTION TUBE

## (undated) DEVICE — NDL HYPO REGULAR BEVEL 25G X 1.5" (BLUE)

## (undated) DEVICE — DRSG STOCKINETTE IMPERVIOUS MED 8 X 38"

## (undated) DEVICE — DRSG ACE BANDAGE 6"

## (undated) DEVICE — PACKING GAUZE PLAIN 2"

## (undated) DEVICE — PACKING GAUZE IODOFORM 1"

## (undated) DEVICE — GLV 7 PROTEXIS (WHITE)

## (undated) DEVICE — SUT POLYSORB 3-0 30" V-20 UNDYED